# Patient Record
Sex: FEMALE | Race: WHITE | NOT HISPANIC OR LATINO | Employment: OTHER | URBAN - METROPOLITAN AREA
[De-identification: names, ages, dates, MRNs, and addresses within clinical notes are randomized per-mention and may not be internally consistent; named-entity substitution may affect disease eponyms.]

---

## 2017-02-02 ENCOUNTER — TRANSCRIBE ORDERS (OUTPATIENT)
Dept: MAMMOGRAPHY | Facility: HOSPITAL | Age: 67
End: 2017-02-02

## 2017-02-02 DIAGNOSIS — Z12.31 VISIT FOR SCREENING MAMMOGRAM: Primary | ICD-10-CM

## 2017-02-21 ENCOUNTER — HOSPITAL ENCOUNTER (OUTPATIENT)
Dept: MAMMOGRAPHY | Facility: HOSPITAL | Age: 67
Discharge: HOME | End: 2017-02-21
Admitting: FAMILY MEDICINE

## 2017-02-21 DIAGNOSIS — Z12.31 VISIT FOR SCREENING MAMMOGRAM: ICD-10-CM

## 2017-02-21 PROCEDURE — 77063 BREAST TOMOSYNTHESIS BI: CPT

## 2017-02-21 PROCEDURE — G0202 SCR MAMMO BI INCL CAD: HCPCS | Performed by: RADIOLOGY

## 2017-02-21 PROCEDURE — 77063 BREAST TOMOSYNTHESIS BI: CPT | Performed by: RADIOLOGY

## 2017-02-21 PROCEDURE — G0202 SCR MAMMO BI INCL CAD: HCPCS

## 2017-08-31 ENCOUNTER — RESULTS ENCOUNTER (OUTPATIENT)
Dept: INTERNAL MEDICINE | Facility: CLINIC | Age: 67
End: 2017-08-31

## 2017-08-31 ENCOUNTER — OFFICE VISIT (OUTPATIENT)
Dept: INTERNAL MEDICINE | Facility: CLINIC | Age: 67
End: 2017-08-31

## 2017-08-31 VITALS
WEIGHT: 185 LBS | BODY MASS INDEX: 28.04 KG/M2 | HEIGHT: 68 IN | DIASTOLIC BLOOD PRESSURE: 68 MMHG | SYSTOLIC BLOOD PRESSURE: 120 MMHG | HEART RATE: 66 BPM | OXYGEN SATURATION: 98 %

## 2017-08-31 DIAGNOSIS — Z12.11 SCREENING FOR COLON CANCER: ICD-10-CM

## 2017-08-31 DIAGNOSIS — E11.40 CONTROLLED TYPE 2 DIABETES MELLITUS WITH DIABETIC NEUROPATHY, WITHOUT LONG-TERM CURRENT USE OF INSULIN (HCC): ICD-10-CM

## 2017-08-31 DIAGNOSIS — N90.7 LABIAL CYST: ICD-10-CM

## 2017-08-31 DIAGNOSIS — E03.9 HYPOTHYROIDISM, UNSPECIFIED TYPE: ICD-10-CM

## 2017-08-31 DIAGNOSIS — I10 ESSENTIAL HYPERTENSION: ICD-10-CM

## 2017-08-31 DIAGNOSIS — Z00.00 HEALTHCARE MAINTENANCE: ICD-10-CM

## 2017-08-31 DIAGNOSIS — K21.9 GASTROESOPHAGEAL REFLUX DISEASE WITHOUT ESOPHAGITIS: ICD-10-CM

## 2017-08-31 DIAGNOSIS — K21.9 GASTROESOPHAGEAL REFLUX DISEASE, ESOPHAGITIS PRESENCE NOT SPECIFIED: ICD-10-CM

## 2017-08-31 DIAGNOSIS — E78.2 MIXED HYPERLIPIDEMIA: ICD-10-CM

## 2017-08-31 DIAGNOSIS — B37.31 VULVOVAGINAL CANDIDIASIS: ICD-10-CM

## 2017-08-31 DIAGNOSIS — H66.005 RECURRENT ACUTE SUPPURATIVE OTITIS MEDIA WITHOUT SPONTANEOUS RUPTURE OF LEFT TYMPANIC MEMBRANE: Primary | ICD-10-CM

## 2017-08-31 DIAGNOSIS — E55.9 VITAMIN D INSUFFICIENCY: ICD-10-CM

## 2017-08-31 DIAGNOSIS — N95.1 MENOPAUSAL HOT FLUSHES: ICD-10-CM

## 2017-08-31 PROCEDURE — 99204 OFFICE O/P NEW MOD 45 MIN: CPT | Performed by: NURSE PRACTITIONER

## 2017-08-31 RX ORDER — AMLODIPINE BESYLATE 5 MG/1
5 TABLET ORAL DAILY
Qty: 90 TABLET | Refills: 1 | Status: SHIPPED | OUTPATIENT
Start: 2017-08-31 | End: 2018-01-25 | Stop reason: SDUPTHER

## 2017-08-31 RX ORDER — ESTRADIOL 1 MG/1
1 TABLET ORAL DAILY
Qty: 90 TABLET | Refills: 1 | Status: SHIPPED | OUTPATIENT
Start: 2017-08-31 | End: 2018-01-25 | Stop reason: SDUPTHER

## 2017-08-31 RX ORDER — FLUCONAZOLE 200 MG/1
TABLET ORAL
COMMUNITY
Start: 2017-06-26 | End: 2017-08-31 | Stop reason: SDUPTHER

## 2017-08-31 RX ORDER — TRIAMCINOLONE ACETONIDE 0.25 MG/G
CREAM TOPICAL
COMMUNITY
Start: 2017-08-18 | End: 2018-06-09 | Stop reason: SDUPTHER

## 2017-08-31 RX ORDER — PANTOPRAZOLE SODIUM 20 MG/1
TABLET, DELAYED RELEASE ORAL
COMMUNITY
Start: 2017-06-20 | End: 2017-08-31 | Stop reason: SDUPTHER

## 2017-08-31 RX ORDER — LATANOPROST 50 UG/ML
SOLUTION/ DROPS OPHTHALMIC
COMMUNITY
Start: 2017-07-24 | End: 2018-06-26

## 2017-08-31 RX ORDER — METFORMIN HYDROCHLORIDE 500 MG/1
1000 TABLET, EXTENDED RELEASE ORAL
Qty: 180 TABLET | Refills: 1 | Status: SHIPPED | OUTPATIENT
Start: 2017-08-31 | End: 2018-01-25 | Stop reason: SDUPTHER

## 2017-08-31 RX ORDER — LEVOTHYROXINE SODIUM 0.05 MG/1
TABLET ORAL
COMMUNITY
Start: 2017-06-20 | End: 2017-08-31 | Stop reason: SDUPTHER

## 2017-08-31 RX ORDER — FLUCONAZOLE 200 MG/1
200 TABLET ORAL ONCE
Qty: 1 TABLET | Refills: 1 | Status: SHIPPED | OUTPATIENT
Start: 2017-08-31 | End: 2017-08-31

## 2017-08-31 RX ORDER — LOSARTAN POTASSIUM AND HYDROCHLOROTHIAZIDE 12.5; 1 MG/1; MG/1
1 TABLET ORAL DAILY
Qty: 90 TABLET | Refills: 1 | Status: SHIPPED | OUTPATIENT
Start: 2017-08-31 | End: 2018-01-25 | Stop reason: SDUPTHER

## 2017-08-31 RX ORDER — AMLODIPINE BESYLATE 5 MG/1
TABLET ORAL
COMMUNITY
Start: 2017-06-20 | End: 2017-08-31 | Stop reason: SDUPTHER

## 2017-08-31 RX ORDER — NYSTATIN 100000 U/G
CREAM TOPICAL
COMMUNITY
Start: 2017-08-18 | End: 2018-05-11

## 2017-08-31 RX ORDER — PANTOPRAZOLE SODIUM 20 MG/1
20 TABLET, DELAYED RELEASE ORAL DAILY
Qty: 90 TABLET | Refills: 1 | Status: SHIPPED | OUTPATIENT
Start: 2017-08-31 | End: 2018-01-25 | Stop reason: SDUPTHER

## 2017-08-31 RX ORDER — CLOTRIMAZOLE 1 %
CREAM (GRAM) TOPICAL 2 TIMES DAILY
Qty: 40 G | Refills: 0 | Status: SHIPPED | OUTPATIENT
Start: 2017-08-31 | End: 2020-05-11

## 2017-08-31 RX ORDER — ESTRADIOL 1 MG/1
TABLET ORAL
COMMUNITY
Start: 2017-06-20 | End: 2017-08-31 | Stop reason: SDUPTHER

## 2017-08-31 RX ORDER — LEVOTHYROXINE SODIUM 0.05 MG/1
50 TABLET ORAL DAILY
Qty: 90 TABLET | Refills: 1 | Status: SHIPPED | OUTPATIENT
Start: 2017-08-31 | End: 2018-01-25 | Stop reason: SDUPTHER

## 2017-08-31 RX ORDER — AMOXICILLIN 500 MG/1
1000 CAPSULE ORAL 2 TIMES DAILY
Qty: 40 CAPSULE | Refills: 0 | Status: SHIPPED | OUTPATIENT
Start: 2017-08-31 | End: 2017-09-01 | Stop reason: SDUPTHER

## 2017-08-31 RX ORDER — BLOOD SUGAR DIAGNOSTIC
STRIP MISCELLANEOUS
COMMUNITY
Start: 2017-07-10 | End: 2018-06-27 | Stop reason: SDUPTHER

## 2017-08-31 RX ORDER — TIMOLOL MALEATE 5 MG/ML
SOLUTION/ DROPS OPHTHALMIC
COMMUNITY
Start: 2017-07-10

## 2017-08-31 RX ORDER — LOSARTAN POTASSIUM AND HYDROCHLOROTHIAZIDE 12.5; 1 MG/1; MG/1
TABLET ORAL
COMMUNITY
Start: 2017-06-20 | End: 2017-08-31 | Stop reason: SDUPTHER

## 2017-08-31 RX ORDER — METFORMIN HYDROCHLORIDE 500 MG/1
TABLET, EXTENDED RELEASE ORAL
COMMUNITY
Start: 2017-06-30 | End: 2018-06-09 | Stop reason: SDUPTHER

## 2017-09-01 ENCOUNTER — TELEPHONE (OUTPATIENT)
Dept: INTERNAL MEDICINE | Facility: CLINIC | Age: 67
End: 2017-09-01

## 2017-09-01 DIAGNOSIS — H66.005 RECURRENT ACUTE SUPPURATIVE OTITIS MEDIA WITHOUT SPONTANEOUS RUPTURE OF LEFT TYMPANIC MEMBRANE: ICD-10-CM

## 2017-09-01 LAB
25(OH)D3+25(OH)D2 SERPL-MCNC: 8.6 NG/ML
ALBUMIN SERPL-MCNC: 4.2 G/DL (ref 3.2–4.8)
ALBUMIN/GLOB SERPL: 1.6 G/DL (ref 1.5–2.5)
ALP SERPL-CCNC: 66 U/L (ref 25–100)
ALT SERPL-CCNC: 24 U/L (ref 7–40)
AST SERPL-CCNC: 19 U/L (ref 0–33)
BASOPHILS # BLD AUTO: 0.03 10*3/MM3 (ref 0–0.2)
BASOPHILS NFR BLD AUTO: 0.4 % (ref 0–1)
BILIRUB SERPL-MCNC: 0.6 MG/DL (ref 0.3–1.2)
BUN SERPL-MCNC: 12 MG/DL (ref 9–23)
BUN/CREAT SERPL: 20 (ref 7–25)
CALCIUM SERPL-MCNC: 9.4 MG/DL (ref 8.7–10.4)
CHLORIDE SERPL-SCNC: 106 MMOL/L (ref 99–109)
CHOLEST SERPL-MCNC: 267 MG/DL (ref 0–200)
CO2 SERPL-SCNC: 26 MMOL/L (ref 20–31)
CREAT SERPL-MCNC: 0.6 MG/DL (ref 0.6–1.3)
EOSINOPHIL # BLD AUTO: 0.45 10*3/MM3 (ref 0–0.3)
EOSINOPHIL NFR BLD AUTO: 6.6 % (ref 0–3)
ERYTHROCYTE [DISTWIDTH] IN BLOOD BY AUTOMATED COUNT: 12.8 % (ref 11.3–14.5)
GLOBULIN SER CALC-MCNC: 2.7 GM/DL
GLUCOSE SERPL-MCNC: 158 MG/DL (ref 70–100)
HCT VFR BLD AUTO: 41 % (ref 34.5–44)
HDLC SERPL-MCNC: 54 MG/DL (ref 40–60)
HGB BLD-MCNC: 14.1 G/DL (ref 11.5–15.5)
IMM GRANULOCYTES # BLD: 0.02 10*3/MM3 (ref 0–0.03)
IMM GRANULOCYTES NFR BLD: 0.3 % (ref 0–0.6)
LDLC SERPL CALC-MCNC: ABNORMAL MG/DL
LYMPHOCYTES # BLD AUTO: 1.7 10*3/MM3 (ref 0.6–4.8)
LYMPHOCYTES NFR BLD AUTO: 24.9 % (ref 24–44)
MCH RBC QN AUTO: 30.5 PG (ref 27–31)
MCHC RBC AUTO-ENTMCNC: 34.4 G/DL (ref 32–36)
MCV RBC AUTO: 88.7 FL (ref 80–99)
MONOCYTES # BLD AUTO: 0.46 10*3/MM3 (ref 0–1)
MONOCYTES NFR BLD AUTO: 6.7 % (ref 0–12)
NEUTROPHILS # BLD AUTO: 4.16 10*3/MM3 (ref 1.5–8.3)
NEUTROPHILS NFR BLD AUTO: 61.1 % (ref 41–71)
PLATELET # BLD AUTO: 263 10*3/MM3 (ref 150–450)
POTASSIUM SERPL-SCNC: 3.3 MMOL/L (ref 3.5–5.5)
PROT SERPL-MCNC: 6.9 G/DL (ref 5.7–8.2)
RBC # BLD AUTO: 4.62 10*6/MM3 (ref 3.89–5.14)
SODIUM SERPL-SCNC: 139 MMOL/L (ref 132–146)
TRIGL SERPL-MCNC: 437 MG/DL (ref 0–150)
TSH SERPL DL<=0.005 MIU/L-ACNC: 2.52 MIU/ML (ref 0.35–5.35)
VLDLC SERPL CALC-MCNC: ABNORMAL MG/DL
WBC # BLD AUTO: 6.82 10*3/MM3 (ref 3.5–10.8)

## 2017-09-01 RX ORDER — AMOXICILLIN 500 MG/1
1000 CAPSULE ORAL 2 TIMES DAILY
Qty: 40 CAPSULE | Refills: 0 | Status: SHIPPED | OUTPATIENT
Start: 2017-09-01 | End: 2017-09-11

## 2017-09-01 NOTE — TELEPHONE ENCOUNTER
PT WAS SEEN YESTERDAY BY JONATHAN AND WHEN SHE DID THE REFILL REQUESTS, THE AMOXICILLIN FOR THE EARACHE WAS ALL SENT TO [facility].  CAN THE ANTIBIOTIC BE CALLED INTO [facility] ON [address].

## 2017-09-01 NOTE — TELEPHONE ENCOUNTER
----- Message from ELIZABETH Burch sent at 9/1/2017  9:54 AM EDT -----  Please notify patient that he cholesterol and triglycerides are high, if she is not agreeable to a statin, I recommend she try red yeast rice 1200 mg twice daily and recheck in 3 months. She can buy OTC.  Also Vitamin D is very low. Advise 5000 units of D3 daily. Potassium is a little low, increase potassium rich foods (spinach, bananas, avacado, sweet potato, coconut water)  TSH is good

## 2017-09-18 ENCOUNTER — TELEPHONE (OUTPATIENT)
Dept: INTERNAL MEDICINE | Facility: CLINIC | Age: 67
End: 2017-09-18

## 2017-10-18 ENCOUNTER — OFFICE VISIT (OUTPATIENT)
Dept: OBSTETRICS AND GYNECOLOGY | Facility: CLINIC | Age: 67
End: 2017-10-18

## 2017-10-18 VITALS
HEIGHT: 67 IN | OXYGEN SATURATION: 97 % | RESPIRATION RATE: 14 BRPM | WEIGHT: 179 LBS | BODY MASS INDEX: 28.09 KG/M2 | DIASTOLIC BLOOD PRESSURE: 76 MMHG | SYSTOLIC BLOOD PRESSURE: 140 MMHG | HEART RATE: 60 BPM

## 2017-10-18 DIAGNOSIS — N90.7 INCLUSION CYST OF VULVA: Primary | ICD-10-CM

## 2017-10-18 PROCEDURE — 11470 EXC SKN H/P/P/U SMPL/NTRM: CPT | Performed by: NURSE PRACTITIONER

## 2017-10-18 PROCEDURE — 99213 OFFICE O/P EST LOW 20 MIN: CPT | Performed by: NURSE PRACTITIONER

## 2017-10-18 RX ORDER — FLUCONAZOLE 200 MG/1
TABLET ORAL
COMMUNITY
Start: 2017-09-01 | End: 2018-05-11

## 2017-10-18 RX ORDER — NYSTATIN 100000 U/G
CREAM TOPICAL 2 TIMES DAILY
Qty: 90 G | Refills: 2 | Status: SHIPPED | OUTPATIENT
Start: 2017-10-18 | End: 2018-06-09 | Stop reason: SDUPTHER

## 2017-10-18 NOTE — PROGRESS NOTES
[facility]      Pari Howell  [id number]  1950      Chief Complaint: Follow-up (Lesion on right labia)        History of present illness:  Pari Howell is a 67 y.o. year old female who is here today for c/o right labial lesion. She is a previous patient of Dr. Kelly Mullen, last seen in 2015. She reports having a cyst to her right labia for many years that was the size of a marble, stable, and observation was recommended. Over the last year, however, this cyst has been enlarging and is now the size of a golf ball. She denies pain, redness, or drainage in the area. She does have external vaginal itching, but this is general to the external genitalia, not only to this area. She has a long history of chronic external genital itching and a combination of triamcinolone and nystatin was helpful in the past. She was given diflucan for these symptoms by another provider recently, which helped some, but not as much as the topical treatments. She states she still has some triamcinolone at home, but is out of her nystatin cream. She has used Premarin cream in the past for her dryness symptoms, but this was too costly and was not continued.     Obstetric History:  OB History      Para Term  AB Living    5 4 4  1 4    SAB TAB Ectopic Multiple Live Births                   Menstrual History:     No LMP recorded (lmp unknown). Patient has had a hysterectomy.          Past Medical History:   Diagnosis Date   • Diabetes    • Dyspareunia, female    • Glaucoma    • H/O sexual problem    • High cholesterol    • History of abnormal cervical Pap smear    • Hypertension    • Hypertension    • Labial cyst    • Muscle weakness    • Thyroid disease    • Thyroid disorder    • Urinary incontinence    • Vaginal itching    • Yeast infection        Past Surgical History:   Procedure Laterality Date   • CARPAL TUNNEL RELEASE     • CHOLECYSTECTOMY     • HYSTERECTOMY     • OOPHORECTOMY     •  "TUBAL ABDOMINAL LIGATION  11/1979       MEDICATIONS: The current medication list was reviewed and reconciled.     Allergies:  has No Known Allergies.    Family History   Problem Relation Age of Onset   • Colon cancer Maternal Uncle    • Diabetes Mother    • Heart attack Mother    • Hypertension Mother    • Hyperlipidemia Mother    • Thyroid disease Mother    • Stroke Mother    • Cancer Sister      Brain   • Thyroid disease Sister    • Diabetes Brother    • Prostate cancer Brother    • Breast cancer Neg Hx    • Ovarian cancer Neg Hx        Review of Systems   Constitutional: Negative for appetite change, chills, fatigue, fever and unexpected weight change.   Respiratory: Negative for cough, shortness of breath and wheezing.    Cardiovascular: Negative for chest pain, palpitations and leg swelling.   Gastrointestinal: Negative for abdominal distention, abdominal pain, blood in stool, constipation, diarrhea, nausea and vomiting.   Endocrine: Negative.    Genitourinary: Positive for genital sores (large cyst to right labia, nontender, enlarging). Negative for dyspareunia, dysuria, frequency, hematuria, pelvic pain, urgency, vaginal bleeding, vaginal discharge and vaginal pain.   Musculoskeletal: Negative for arthralgias, gait problem and joint swelling.   Neurological: Negative for dizziness, seizures, syncope, weakness, light-headedness, numbness and headaches.   Hematological: Negative for adenopathy.   Psychiatric/Behavioral: Negative.        Physical Exam  Vital Signs: /76  Pulse 60  Resp 14  Ht 67\" (170.2 cm)  Wt 179 lb (81.2 kg)  LMP  (LMP Unknown)  SpO2 97%  Breastfeeding? No  BMI 28.04 kg/m2   General Appearance:  alert, cooperative, no apparent distress and appears stated age   Neurologic/Psychiatric: A&O x 3, gait steady, appropriate affect   HEENT:  Normocephalic, without obvious abnormality, mucous membranes moist   Neck: Supple, symmetrical, trachea midline, no adenopathy;  No thyromegaly, " masses, or tenderness   Back:   Symmetric, no curvature, ROM normal, no CVA tenderness   Lungs:   Clear to auscultation bilaterally; respirations regular, even, and unlabored bilaterally   Heart:  Regular rate and rhythm, no murmurs appreciated   Breasts:  deferred   Abdomen:   Soft, non-tender, non-distended and no organomegaly   Extremities: Normal, atraumatic; no clubbing, cyanosis, or edema    Skin: No rashes, ulcers, or suspicious lesions noted   Pelvic: External Genitalia  No skin lesions noted. Right labia markedly enlarged with palpable cyst approx 3-4 cm in size, nontender, feels to be fluid filled.   Vagina  is pale, atrophic.   Vaginal Cuff  Female Vaginal Cuff: smooth, intact and without visible lesions  Uterus  surgically absent  Ovaries  surgically absent bilaterallly  Parametria  smooth  Rectovaginal  Female rectovaginal: deferred       Procedure Note:  After discussion of procedure and obtaining consent, an incision and drainage of large right labial cyst was attempted. After beginning procedure, found to be consistent with an inclusion cyst and procedure was converted to a simple excision. Local lidocaine used for anesthetic to lower aspect of labia. 2 cm incision made to open tissue and inclusion cyst pulled forward using hemostat. Tissue was placed in appropriate container and labeled at the bedside. Specimen prepared for lab evaluation. Bleeding was minimal and hemostasis was achieved with silver nitrate. Precautions and home wound care reviewed with patient. She tolerated the procedure well.     Assessment and Plan:    Pari was seen today for follow-up.    Diagnoses and all orders for this visit:    Inclusion cyst of vulva  -     Tissue Pathology Exam - Tissue, Vulva; Future  -     Tissue Pathology Exam - Tissue, Vulva    Other orders  -     nystatin (MYCOSTATIN) 009857 UNIT/GM cream; Apply  topically 2 (Two) Times a Day.        Simple inclusion cyst excision performed in office today for  enlarging cyst at right vulva. Pari was given home precautions and wound care instructions. She was educated that this tissue could recur and if that happens, I will recommend outpatient excision by one of our physicians. This tissue appears benign, but will be sent to pathology for confirmation. She will be notified of results upon their return. Once area heals she may return to mixing topical nystatin with triamcinolone for treatment of external genital itching and irritation.     Return for annual exam or PRN.      Fide Ho, ELIZABETH      Note: Speech recognition transcription software was used to dictate portions of this document.  An attempt at proofreading has been made though minor errors in transcription may still be present.  Please do not hesitate to call our office with any questions.

## 2018-01-25 ENCOUNTER — OFFICE VISIT (OUTPATIENT)
Dept: INTERNAL MEDICINE | Facility: CLINIC | Age: 68
End: 2018-01-25

## 2018-01-25 VITALS
DIASTOLIC BLOOD PRESSURE: 64 MMHG | OXYGEN SATURATION: 98 % | HEIGHT: 68 IN | RESPIRATION RATE: 16 BRPM | WEIGHT: 184 LBS | BODY MASS INDEX: 27.89 KG/M2 | HEART RATE: 83 BPM | SYSTOLIC BLOOD PRESSURE: 128 MMHG

## 2018-01-25 DIAGNOSIS — I10 ESSENTIAL HYPERTENSION: ICD-10-CM

## 2018-01-25 DIAGNOSIS — E11.40 CONTROLLED TYPE 2 DIABETES MELLITUS WITH DIABETIC NEUROPATHY, WITHOUT LONG-TERM CURRENT USE OF INSULIN (HCC): ICD-10-CM

## 2018-01-25 DIAGNOSIS — N95.1 MENOPAUSAL HOT FLUSHES: ICD-10-CM

## 2018-01-25 DIAGNOSIS — E03.9 HYPOTHYROIDISM, UNSPECIFIED TYPE: ICD-10-CM

## 2018-01-25 DIAGNOSIS — K21.9 GASTROESOPHAGEAL REFLUX DISEASE WITHOUT ESOPHAGITIS: ICD-10-CM

## 2018-01-25 PROCEDURE — 99214 OFFICE O/P EST MOD 30 MIN: CPT | Performed by: NURSE PRACTITIONER

## 2018-01-25 RX ORDER — INFLUENZA A VIRUS A/MICHIGAN/45/2015 X-275 (H1N1) ANTIGEN (FORMALDEHYDE INACTIVATED), INFLUENZA A VIRUS A/SINGAPORE/INFIMH-16-0019/2016 IVR-186 (H3N2) ANTIGEN (FORMALDEHYDE INACTIVATED), AND INFLUENZA B VIRUS B/MARYLAND/15/2016 BX-69A (A B/COLORADO/6/2017-LIKE VIRUS) ANTIGEN (FORMALDEHYDE INACTIVATED) 60; 60; 60 UG/.5ML; UG/.5ML; UG/.5ML
INJECTION, SUSPENSION INTRAMUSCULAR ONCE
COMMUNITY
Start: 2017-10-20 | End: 2019-05-06

## 2018-01-25 RX ORDER — LOSARTAN POTASSIUM AND HYDROCHLOROTHIAZIDE 12.5; 1 MG/1; MG/1
1 TABLET ORAL DAILY
Qty: 14 TABLET | Refills: 0 | Status: SHIPPED | OUTPATIENT
Start: 2018-01-25 | End: 2018-06-09 | Stop reason: SDUPTHER

## 2018-01-25 RX ORDER — AMLODIPINE BESYLATE 5 MG/1
5 TABLET ORAL DAILY
Qty: 14 TABLET | Refills: 0 | Status: SHIPPED | OUTPATIENT
Start: 2018-01-25 | End: 2018-06-09 | Stop reason: SDUPTHER

## 2018-01-25 RX ORDER — LEVOTHYROXINE SODIUM 0.05 MG/1
50 TABLET ORAL DAILY
Qty: 14 TABLET | Refills: 0 | Status: SHIPPED | OUTPATIENT
Start: 2018-01-25 | End: 2018-06-09 | Stop reason: SDUPTHER

## 2018-01-25 RX ORDER — PANTOPRAZOLE SODIUM 20 MG/1
20 TABLET, DELAYED RELEASE ORAL DAILY
Qty: 14 TABLET | Refills: 0 | Status: SHIPPED | OUTPATIENT
Start: 2018-01-25 | End: 2018-06-09 | Stop reason: SDUPTHER

## 2018-01-25 RX ORDER — ESTRADIOL 1 MG/1
1 TABLET ORAL DAILY
Qty: 14 TABLET | Refills: 0 | Status: SHIPPED | OUTPATIENT
Start: 2018-01-25 | End: 2018-06-09 | Stop reason: SDUPTHER

## 2018-01-25 RX ORDER — METFORMIN HYDROCHLORIDE 500 MG/1
1000 TABLET, EXTENDED RELEASE ORAL
Qty: 28 TABLET | Refills: 0 | Status: SHIPPED | OUTPATIENT
Start: 2018-01-25 | End: 2018-06-09 | Stop reason: SDUPTHER

## 2018-04-05 ENCOUNTER — TRANSCRIBE ORDERS (OUTPATIENT)
Dept: ADMINISTRATIVE | Facility: HOSPITAL | Age: 68
End: 2018-04-05

## 2018-04-05 DIAGNOSIS — Z12.31 VISIT FOR SCREENING MAMMOGRAM: Primary | ICD-10-CM

## 2018-05-10 ENCOUNTER — HOSPITAL ENCOUNTER (OUTPATIENT)
Dept: MAMMOGRAPHY | Facility: HOSPITAL | Age: 68
Discharge: HOME | End: 2018-05-10
Attending: INTERNAL MEDICINE | Admitting: INTERNAL MEDICINE

## 2018-05-10 DIAGNOSIS — Z12.31 VISIT FOR SCREENING MAMMOGRAM: ICD-10-CM

## 2018-05-10 PROCEDURE — 77067 SCR MAMMO BI INCL CAD: CPT

## 2018-05-10 PROCEDURE — 77063 BREAST TOMOSYNTHESIS BI: CPT | Performed by: RADIOLOGY

## 2018-05-10 PROCEDURE — 77067 SCR MAMMO BI INCL CAD: CPT | Performed by: RADIOLOGY

## 2018-05-10 PROCEDURE — 77063 BREAST TOMOSYNTHESIS BI: CPT

## 2018-06-09 ENCOUNTER — OFFICE VISIT (OUTPATIENT)
Dept: INTERNAL MEDICINE | Facility: CLINIC | Age: 68
End: 2018-06-09

## 2018-06-09 VITALS
DIASTOLIC BLOOD PRESSURE: 58 MMHG | BODY MASS INDEX: 28.34 KG/M2 | SYSTOLIC BLOOD PRESSURE: 120 MMHG | OXYGEN SATURATION: 96 % | HEIGHT: 68 IN | WEIGHT: 187 LBS | HEART RATE: 78 BPM | RESPIRATION RATE: 16 BRPM

## 2018-06-09 DIAGNOSIS — N89.8 VAGINAL ITCHING: ICD-10-CM

## 2018-06-09 DIAGNOSIS — N95.1 MENOPAUSAL HOT FLUSHES: ICD-10-CM

## 2018-06-09 DIAGNOSIS — E11.9 TYPE 2 DIABETES MELLITUS WITHOUT COMPLICATION, WITHOUT LONG-TERM CURRENT USE OF INSULIN (HCC): Primary | ICD-10-CM

## 2018-06-09 DIAGNOSIS — E03.9 HYPOTHYROIDISM, UNSPECIFIED TYPE: ICD-10-CM

## 2018-06-09 DIAGNOSIS — I10 ESSENTIAL HYPERTENSION: ICD-10-CM

## 2018-06-09 DIAGNOSIS — K21.9 GASTROESOPHAGEAL REFLUX DISEASE WITHOUT ESOPHAGITIS: ICD-10-CM

## 2018-06-09 LAB — HBA1C MFR BLD: 6.2 %

## 2018-06-09 PROCEDURE — 99214 OFFICE O/P EST MOD 30 MIN: CPT | Performed by: NURSE PRACTITIONER

## 2018-06-09 PROCEDURE — 83036 HEMOGLOBIN GLYCOSYLATED A1C: CPT | Performed by: NURSE PRACTITIONER

## 2018-06-09 RX ORDER — METFORMIN HYDROCHLORIDE 500 MG/1
1000 TABLET, EXTENDED RELEASE ORAL
Qty: 28 TABLET | Refills: 0 | Status: SHIPPED | OUTPATIENT
Start: 2018-06-09 | End: 2018-06-25 | Stop reason: SDUPTHER

## 2018-06-09 RX ORDER — NYSTATIN 100000 U/G
CREAM TOPICAL 2 TIMES DAILY
Qty: 90 G | Refills: 2 | Status: SHIPPED | OUTPATIENT
Start: 2018-06-09 | End: 2020-07-22 | Stop reason: SDUPTHER

## 2018-06-09 RX ORDER — AMLODIPINE BESYLATE 5 MG/1
5 TABLET ORAL DAILY
Qty: 14 TABLET | Refills: 0 | Status: SHIPPED | OUTPATIENT
Start: 2018-06-09 | End: 2018-06-25 | Stop reason: SDUPTHER

## 2018-06-09 RX ORDER — PANTOPRAZOLE SODIUM 20 MG/1
20 TABLET, DELAYED RELEASE ORAL DAILY
Qty: 14 TABLET | Refills: 0 | Status: SHIPPED | OUTPATIENT
Start: 2018-06-09 | End: 2018-06-25 | Stop reason: SDUPTHER

## 2018-06-09 RX ORDER — ESTRADIOL 1 MG/1
1 TABLET ORAL DAILY
Qty: 14 TABLET | Refills: 0 | Status: SHIPPED | OUTPATIENT
Start: 2018-06-09 | End: 2018-06-25 | Stop reason: SDUPTHER

## 2018-06-09 RX ORDER — TRIAMCINOLONE ACETONIDE 0.25 MG/G
CREAM TOPICAL 3 TIMES DAILY
Qty: 80 G | Refills: 4 | Status: SHIPPED | OUTPATIENT
Start: 2018-06-09 | End: 2020-01-08 | Stop reason: SDUPTHER

## 2018-06-09 RX ORDER — TRIAMCINOLONE ACETONIDE 0.25 MG/G
CREAM TOPICAL 3 TIMES DAILY
Qty: 80 G | Refills: 4 | Status: SHIPPED | OUTPATIENT
Start: 2018-06-09 | End: 2018-06-09 | Stop reason: SDUPTHER

## 2018-06-09 RX ORDER — LEVOTHYROXINE SODIUM 0.05 MG/1
50 TABLET ORAL DAILY
Qty: 14 TABLET | Refills: 0 | Status: SHIPPED | OUTPATIENT
Start: 2018-06-09 | End: 2018-06-25 | Stop reason: SDUPTHER

## 2018-06-09 RX ORDER — LOSARTAN POTASSIUM AND HYDROCHLOROTHIAZIDE 12.5; 1 MG/1; MG/1
1 TABLET ORAL DAILY
Qty: 14 TABLET | Refills: 0 | Status: SHIPPED | OUTPATIENT
Start: 2018-06-09 | End: 2018-06-25 | Stop reason: SDUPTHER

## 2018-06-09 NOTE — PROGRESS NOTES
CHIEF COMPLAINT  Diabetes (Pt. says she thinks the metformin isn't agreeing with her, cut down to one, pt says levels have been running around 130-150); Hypothyroidism; Hypertension; Hot Flashes; Heartburn; Vaginal Itching; and Med Refill      HPI  Pari Howell is a 68 y.o. female is here today for follow-up for T2DM, hypothyroidism, HTN, menopausal hot flashes, GERD, vaginal itching and needs refills on medications.    T2DM  Controlled, A1C=6.2; currently taking Metformin  mg daily; was taking BID, but causing a lot of GI sx, so she decreased to QD herself; denies hypoglycemic episodes and increased urination, thirst, hunger; AM glucose readings are in 130 range;     Hypothyroidism  Stable, last TSH=2.517; currently taking 50 mcg daily; denies c/o or s/e of medication    HTN  Controlled, HL=625/58; currently taking amlodipine 5 mg and losartan-HCTZ 100-12.5 mg daily; denies SOA, chest pain, h/a, dizziness, swelling in BLE    Hot flashes d/t Menopause  Stable, currently taking Estradiol 1 mg daily; works well; denies hot flashes     GERD  Controlled, currently taking pantoprazole EC 20 mg daily; denies heartburn, epigastric pain, reflux, nausea    Vaginal itching, chronic  Uses triamcinolone 0.025% cream TID prn for chronic vaginal itching; has been using this for many years and it works well; she does report having atrophic vaginitis for which this was prescribed as she is already taking oral HRT      Past Medical History:   Diagnosis Date   • Diabetes    • Dyspareunia, female    • Glaucoma    • H/O sexual problem    • High cholesterol    • History of abnormal cervical Pap smear    • Hypertension    • Hypertension    • Labial cyst    • Muscle weakness    • Thyroid disease    • Thyroid disorder    • Urinary incontinence    • Vaginal itching    • Yeast infection        Past Surgical History:   Procedure Laterality Date   • CARPAL TUNNEL RELEASE  2007   • CHOLECYSTECTOMY  1991   • HYSTERECTOMY  2007   •  "OOPHORECTOMY     • TUBAL ABDOMINAL LIGATION  11/1979       Family History   Problem Relation Age of Onset   • Colon cancer Maternal Uncle    • Diabetes Mother    • Heart attack Mother    • Hypertension Mother    • Hyperlipidemia Mother    • Thyroid disease Mother    • Stroke Mother    • Cancer Sister         Brain   • Thyroid disease Sister    • Diabetes Brother    • Prostate cancer Brother    • Breast cancer Neg Hx    • Ovarian cancer Neg Hx        Social History     Social History   • Marital status:      Spouse name: N/A   • Number of children: N/A   • Years of education: N/A     Occupational History   • Not on file.     Social History Main Topics   • Smoking status: Never Smoker   • Smokeless tobacco: Never Used   • Alcohol use No   • Drug use: No   • Sexual activity: Yes     Partners: Male     Birth control/ protection: Surgical     Other Topics Concern   • Not on file     Social History Narrative   • No narrative on file       The following portions of the patient's history were reviewed and updated as appropriate: allergies, current medications, past family history, past medical history, past social history, past surgical history and problem list.    ROS  Review of Systems   Constitutional: Negative for activity change, appetite change and fatigue.   Respiratory: Negative for chest tightness, shortness of breath and wheezing.    Cardiovascular: Negative for chest pain, palpitations and leg swelling.   Endocrine: Negative for cold intolerance, heat intolerance, polydipsia, polyphagia and polyuria.   Genitourinary: Negative for vaginal discharge.        Vaginal itching   Neurological: Negative for dizziness and headaches.   All other systems reviewed and are negative.      /58   Pulse 78   Resp 16   Ht 172.7 cm (68\")   Wt 84.8 kg (187 lb)   LMP  (LMP Unknown)   SpO2 96%   Breastfeeding? No   BMI 28.43 kg/m²     PHYSICAL EXAM  Physical Exam   Constitutional: She is oriented to person, place, " and time. She appears well-developed and well-nourished.   HENT:   Head: Normocephalic and atraumatic.   Eyes: Conjunctivae are normal.   Neck: Trachea normal and normal range of motion. Neck supple. No JVD present. No thyromegaly present.   Cardiovascular: Normal rate, regular rhythm and normal heart sounds.    No murmur heard.  No swelling in BLE   Pulmonary/Chest: Effort normal and breath sounds normal.   Neurological: She is alert and oriented to person, place, and time.   Skin: Skin is warm, dry and intact.   Vitals reviewed.      Lab Results   Component Value Date    HGBA1C 6.2 06/09/2018       ASSESSMENT/PLAN    1. Type 2 diabetes mellitus without complication, without long-term current use of insulin  - POC Glycosylated Hemoglobin (Hb A1C)  -continue Metformin  mg daily with breakfast  -continue to monitor glucose at home    2. Vaginal itching  -may continue triamcinolone cream  - triamcinolone (KENALOG) 0.025 % cream; Apply  topically 3 (Three) Times a Day.  Dispense: 80 g; Refill: 4  -also rx given for--  -Nystatin 100,000 unit/gm cream, apply BID prn vaginal itching    3. Hypothyroidism, unspecified type  -continue at current dose  - levothyroxine (SYNTHROID, LEVOTHROID) 50 MCG tablet; Take 1 tablet by mouth Daily.  Dispense: 14 tablet; Refill: 0  -will recheck TSH at next visit for Medicare Wellness     4. Essential hypertension  -continue both meds at current dose  - amLODIPine (NORVASC) 5 MG tablet; Take 1 tablet by mouth Daily.  Dispense: 14 tablet; Refill: 0  - losartan-hydrochlorothiazide (HYZAAR) 100-12.5 MG per tablet; Take 1 tablet by mouth Daily.  Dispense: 14 tablet; Refill: 0  -notify if increase in BP readings    5. Menopausal hot flushes  -may continue--  - estradiol (ESTRACE) 1 MG tablet; Take 1 tablet by mouth Daily.  Dispense: 14 tablet; Refill: 0    6. Gastroesophageal reflux disease without esophagitis  -continue at current dose  - pantoprazole (PROTONIX) 20 MG EC tablet; Take 1  tablet by mouth Daily.  Dispense: 14 tablet; Refill: 0      Plan of care reviewed with patient at the conclusion of today's visit. Education was provided in regards to diagnosis, management and any prescribed or recommended OTC medications.  Patient verbalizes Understanding of and agreement with management plan.    FOLLOW-UP  6 week(s) Medicare Wellness with fasting labs, ECG    RTC as needed      ELIZABETH Love  06/09/2018

## 2018-06-11 ENCOUNTER — TELEPHONE (OUTPATIENT)
Dept: INTERNAL MEDICINE | Facility: CLINIC | Age: 68
End: 2018-06-11

## 2018-06-25 DIAGNOSIS — K21.9 GASTROESOPHAGEAL REFLUX DISEASE WITHOUT ESOPHAGITIS: ICD-10-CM

## 2018-06-25 DIAGNOSIS — E03.9 HYPOTHYROIDISM, UNSPECIFIED TYPE: ICD-10-CM

## 2018-06-25 DIAGNOSIS — E11.40 CONTROLLED TYPE 2 DIABETES MELLITUS WITH DIABETIC NEUROPATHY, WITHOUT LONG-TERM CURRENT USE OF INSULIN (HCC): ICD-10-CM

## 2018-06-25 DIAGNOSIS — I10 ESSENTIAL HYPERTENSION: ICD-10-CM

## 2018-06-25 DIAGNOSIS — N95.1 MENOPAUSAL HOT FLUSHES: ICD-10-CM

## 2018-06-26 DIAGNOSIS — K21.9 GASTROESOPHAGEAL REFLUX DISEASE WITHOUT ESOPHAGITIS: ICD-10-CM

## 2018-06-26 DIAGNOSIS — E03.9 HYPOTHYROIDISM, UNSPECIFIED TYPE: ICD-10-CM

## 2018-06-26 DIAGNOSIS — I10 ESSENTIAL HYPERTENSION: ICD-10-CM

## 2018-06-26 DIAGNOSIS — E11.40 CONTROLLED TYPE 2 DIABETES MELLITUS WITH DIABETIC NEUROPATHY, WITHOUT LONG-TERM CURRENT USE OF INSULIN (HCC): ICD-10-CM

## 2018-06-26 DIAGNOSIS — H40.89 OTHER GLAUCOMA OF BOTH EYES: Primary | ICD-10-CM

## 2018-06-26 DIAGNOSIS — N95.1 MENOPAUSAL HOT FLUSHES: ICD-10-CM

## 2018-06-26 RX ORDER — METFORMIN HYDROCHLORIDE 500 MG/1
1000 TABLET, EXTENDED RELEASE ORAL
Qty: 180 TABLET | Refills: 3 | Status: SHIPPED | OUTPATIENT
Start: 2018-06-26 | End: 2018-06-27 | Stop reason: SDUPTHER

## 2018-06-26 RX ORDER — LOSARTAN POTASSIUM AND HYDROCHLOROTHIAZIDE 12.5; 1 MG/1; MG/1
1 TABLET ORAL DAILY
Qty: 90 TABLET | Refills: 3 | Status: SHIPPED | OUTPATIENT
Start: 2018-06-26 | End: 2018-06-27 | Stop reason: SDUPTHER

## 2018-06-26 RX ORDER — LATANOPROST 50 UG/ML
1 SOLUTION/ DROPS OPHTHALMIC DAILY
Qty: 7.5 ML | Refills: 3 | Status: SHIPPED | OUTPATIENT
Start: 2018-06-26

## 2018-06-26 RX ORDER — AMLODIPINE BESYLATE 5 MG/1
TABLET ORAL
Qty: 90 TABLET | Refills: 3 | Status: SHIPPED | OUTPATIENT
Start: 2018-06-26 | End: 2018-06-27 | Stop reason: SDUPTHER

## 2018-06-26 RX ORDER — LOSARTAN POTASSIUM AND HYDROCHLOROTHIAZIDE 12.5; 1 MG/1; MG/1
TABLET ORAL
Qty: 14 TABLET | Refills: 0 | Status: SHIPPED | OUTPATIENT
Start: 2018-06-26 | End: 2018-06-26

## 2018-06-26 RX ORDER — ESTRADIOL 1 MG/1
TABLET ORAL
Qty: 14 TABLET | Refills: 0 | Status: SHIPPED | OUTPATIENT
Start: 2018-06-26 | End: 2018-06-26

## 2018-06-26 RX ORDER — PANTOPRAZOLE SODIUM 20 MG/1
TABLET, DELAYED RELEASE ORAL
Qty: 14 TABLET | Refills: 0 | Status: SHIPPED | OUTPATIENT
Start: 2018-06-26 | End: 2018-06-26

## 2018-06-26 RX ORDER — ESTRADIOL 1 MG/1
1 TABLET ORAL DAILY
Qty: 90 TABLET | Refills: 3 | Status: SHIPPED | OUTPATIENT
Start: 2018-06-26 | End: 2019-05-06 | Stop reason: DRUGHIGH

## 2018-06-26 RX ORDER — PANTOPRAZOLE SODIUM 20 MG/1
20 TABLET, DELAYED RELEASE ORAL DAILY
Qty: 90 TABLET | Refills: 3 | Status: SHIPPED | OUTPATIENT
Start: 2018-06-26 | End: 2018-06-27 | Stop reason: SDUPTHER

## 2018-06-26 RX ORDER — LEVOTHYROXINE SODIUM 0.05 MG/1
50 TABLET ORAL DAILY
Qty: 90 TABLET | Refills: 3 | Status: SHIPPED | OUTPATIENT
Start: 2018-06-26 | End: 2018-06-27 | Stop reason: SDUPTHER

## 2018-06-26 RX ORDER — METFORMIN HYDROCHLORIDE 500 MG/1
TABLET, EXTENDED RELEASE ORAL
Qty: 28 TABLET | Refills: 0 | Status: SHIPPED | OUTPATIENT
Start: 2018-06-26 | End: 2018-06-26

## 2018-06-26 RX ORDER — LEVOTHYROXINE SODIUM 0.05 MG/1
TABLET ORAL
Qty: 14 TABLET | Refills: 0 | Status: SHIPPED | OUTPATIENT
Start: 2018-06-26 | End: 2018-06-26

## 2018-06-27 ENCOUNTER — OFFICE VISIT (OUTPATIENT)
Dept: INTERNAL MEDICINE | Facility: CLINIC | Age: 68
End: 2018-06-27

## 2018-06-27 VITALS
OXYGEN SATURATION: 96 % | SYSTOLIC BLOOD PRESSURE: 118 MMHG | WEIGHT: 188 LBS | HEIGHT: 68 IN | DIASTOLIC BLOOD PRESSURE: 70 MMHG | HEART RATE: 62 BPM | BODY MASS INDEX: 28.49 KG/M2

## 2018-06-27 DIAGNOSIS — Z00.00 HEALTHCARE MAINTENANCE: ICD-10-CM

## 2018-06-27 DIAGNOSIS — E78.00 HIGH CHOLESTEROL: Primary | ICD-10-CM

## 2018-06-27 DIAGNOSIS — E03.9 HYPOTHYROIDISM, UNSPECIFIED TYPE: ICD-10-CM

## 2018-06-27 DIAGNOSIS — Z86.2 HISTORY OF IRON DEFICIENCY ANEMIA: ICD-10-CM

## 2018-06-27 DIAGNOSIS — I10 ESSENTIAL HYPERTENSION: ICD-10-CM

## 2018-06-27 DIAGNOSIS — E11.40 CONTROLLED TYPE 2 DIABETES MELLITUS WITH DIABETIC NEUROPATHY, WITHOUT LONG-TERM CURRENT USE OF INSULIN (HCC): ICD-10-CM

## 2018-06-27 DIAGNOSIS — E55.9 VITAMIN D DEFICIENCY: ICD-10-CM

## 2018-06-27 DIAGNOSIS — K21.9 GASTROESOPHAGEAL REFLUX DISEASE WITHOUT ESOPHAGITIS: ICD-10-CM

## 2018-06-27 PROCEDURE — 93000 ELECTROCARDIOGRAM COMPLETE: CPT | Performed by: NURSE PRACTITIONER

## 2018-06-27 PROCEDURE — G0439 PPPS, SUBSEQ VISIT: HCPCS | Performed by: NURSE PRACTITIONER

## 2018-06-27 RX ORDER — LOSARTAN POTASSIUM AND HYDROCHLOROTHIAZIDE 12.5; 1 MG/1; MG/1
1 TABLET ORAL DAILY
Qty: 90 TABLET | Refills: 3 | Status: SHIPPED | OUTPATIENT
Start: 2018-06-27 | End: 2019-05-06 | Stop reason: SDUPTHER

## 2018-06-27 RX ORDER — PANTOPRAZOLE SODIUM 20 MG/1
20 TABLET, DELAYED RELEASE ORAL DAILY
Qty: 90 TABLET | Refills: 3 | Status: SHIPPED | OUTPATIENT
Start: 2018-06-27 | End: 2019-05-06 | Stop reason: SDUPTHER

## 2018-06-27 RX ORDER — BLOOD SUGAR DIAGNOSTIC
STRIP MISCELLANEOUS
Qty: 50 EACH | Refills: 5 | Status: SHIPPED | OUTPATIENT
Start: 2018-06-27 | End: 2019-05-06

## 2018-06-27 RX ORDER — METFORMIN HYDROCHLORIDE 500 MG/1
1000 TABLET, EXTENDED RELEASE ORAL
Qty: 180 TABLET | Refills: 3 | Status: SHIPPED | OUTPATIENT
Start: 2018-06-27 | End: 2019-05-06 | Stop reason: SDUPTHER

## 2018-06-27 RX ORDER — GEMFIBROZIL 600 MG/1
600 TABLET, FILM COATED ORAL
Qty: 180 TABLET | Refills: 4 | Status: SHIPPED | OUTPATIENT
Start: 2018-06-27 | End: 2019-05-06

## 2018-06-27 RX ORDER — LEVOTHYROXINE SODIUM 0.05 MG/1
50 TABLET ORAL DAILY
Qty: 90 TABLET | Refills: 3 | Status: SHIPPED | OUTPATIENT
Start: 2018-06-27 | End: 2019-05-08 | Stop reason: SDUPTHER

## 2018-06-27 RX ORDER — AMLODIPINE BESYLATE 5 MG/1
5 TABLET ORAL DAILY
Qty: 90 TABLET | Refills: 3 | Status: SHIPPED | OUTPATIENT
Start: 2018-06-27 | End: 2019-05-06 | Stop reason: SDUPTHER

## 2018-06-28 LAB
25(OH)D3+25(OH)D2 SERPL-MCNC: 13.5 NG/ML
ALBUMIN SERPL-MCNC: 4.48 G/DL (ref 3.2–4.8)
ALBUMIN/GLOB SERPL: 1.8 G/DL (ref 1.5–2.5)
ALP SERPL-CCNC: 70 U/L (ref 25–100)
ALT SERPL-CCNC: 28 U/L (ref 7–40)
AST SERPL-CCNC: 22 U/L (ref 0–33)
BASOPHILS # BLD AUTO: 0.05 10*3/MM3 (ref 0–0.2)
BASOPHILS NFR BLD AUTO: 0.8 % (ref 0–1)
BILIRUB SERPL-MCNC: 0.7 MG/DL (ref 0.3–1.2)
BUN SERPL-MCNC: 10 MG/DL (ref 9–23)
BUN/CREAT SERPL: 17.2 (ref 7–25)
CALCIUM SERPL-MCNC: 9.3 MG/DL (ref 8.7–10.4)
CHLORIDE SERPL-SCNC: 101 MMOL/L (ref 99–109)
CHOLEST SERPL-MCNC: 257 MG/DL (ref 0–200)
CO2 SERPL-SCNC: 29 MMOL/L (ref 20–31)
CREAT SERPL-MCNC: 0.58 MG/DL (ref 0.6–1.3)
EOSINOPHIL # BLD AUTO: 0.39 10*3/MM3 (ref 0–0.3)
EOSINOPHIL NFR BLD AUTO: 6 % (ref 0–3)
ERYTHROCYTE [DISTWIDTH] IN BLOOD BY AUTOMATED COUNT: 12.6 % (ref 11.3–14.5)
FERRITIN SERPL-MCNC: 84 NG/ML (ref 10–291)
GLOBULIN SER CALC-MCNC: 2.5 GM/DL
GLUCOSE SERPL-MCNC: 131 MG/DL (ref 70–100)
HCT VFR BLD AUTO: 43.6 % (ref 34.5–44)
HCV AB S/CO SERPL IA: <0.1 S/CO RATIO (ref 0–0.9)
HDLC SERPL-MCNC: 55 MG/DL (ref 40–60)
HGB BLD-MCNC: 14.8 G/DL (ref 11.5–15.5)
IMM GRANULOCYTES # BLD: 0.02 10*3/MM3 (ref 0–0.03)
IMM GRANULOCYTES NFR BLD: 0.3 % (ref 0–0.6)
IRON SATN MFR SERPL: 25 % (ref 15–50)
IRON SERPL-MCNC: 92 MCG/DL (ref 50–175)
LDLC SERPL CALC-MCNC: ABNORMAL MG/DL
LYMPHOCYTES # BLD AUTO: 1.61 10*3/MM3 (ref 0.6–4.8)
LYMPHOCYTES NFR BLD AUTO: 24.8 % (ref 24–44)
MCH RBC QN AUTO: 31.3 PG (ref 27–31)
MCHC RBC AUTO-ENTMCNC: 33.9 G/DL (ref 32–36)
MCV RBC AUTO: 92.2 FL (ref 80–99)
MONOCYTES # BLD AUTO: 0.4 10*3/MM3 (ref 0–1)
MONOCYTES NFR BLD AUTO: 6.2 % (ref 0–12)
NEUTROPHILS # BLD AUTO: 4.01 10*3/MM3 (ref 1.5–8.3)
NEUTROPHILS NFR BLD AUTO: 61.9 % (ref 41–71)
PLATELET # BLD AUTO: 263 10*3/MM3 (ref 150–450)
POTASSIUM SERPL-SCNC: 4.1 MMOL/L (ref 3.5–5.5)
PROT SERPL-MCNC: 7 G/DL (ref 5.7–8.2)
RBC # BLD AUTO: 4.73 10*6/MM3 (ref 3.89–5.14)
SODIUM SERPL-SCNC: 138 MMOL/L (ref 132–146)
TIBC SERPL-MCNC: 361 MCG/DL (ref 250–450)
TRIGL SERPL-MCNC: 437 MG/DL (ref 0–150)
TSH SERPL DL<=0.005 MIU/L-ACNC: 3.02 MIU/ML (ref 0.35–5.35)
UIBC SERPL-MCNC: 269 MCG/DL
VLDLC SERPL CALC-MCNC: ABNORMAL MG/DL
WBC # BLD AUTO: 6.48 10*3/MM3 (ref 3.5–10.8)

## 2018-06-29 DIAGNOSIS — E55.9 VITAMIN D DEFICIENCY: Primary | ICD-10-CM

## 2018-06-29 RX ORDER — ERGOCALCIFEROL 1.25 MG/1
50000 CAPSULE ORAL WEEKLY
Qty: 12 CAPSULE | Refills: 0 | Status: SHIPPED | OUTPATIENT
Start: 2018-06-29 | End: 2018-06-29

## 2018-06-29 RX ORDER — ERGOCALCIFEROL 1.25 MG/1
50000 CAPSULE ORAL WEEKLY
Qty: 12 CAPSULE | Refills: 0 | Status: SHIPPED | OUTPATIENT
Start: 2018-06-29 | End: 2018-09-15

## 2018-07-05 ENCOUNTER — TELEPHONE (OUTPATIENT)
Dept: INTERNAL MEDICINE | Facility: CLINIC | Age: 68
End: 2018-07-05

## 2018-07-16 ENCOUNTER — TELEPHONE (OUTPATIENT)
Dept: INTERNAL MEDICINE | Facility: CLINIC | Age: 68
End: 2018-07-16

## 2018-07-16 NOTE — TELEPHONE ENCOUNTER
THEY FAXED US INFORMATION BACK ON 6/1/18 FOR DRUG THERAPY ALERT ON THIS  PATIENT. THEY ARE CALLING TO FOLLOW UP THE STATUS OF THIS PAPER WORK. YOU CAN REACH THEM BACK [phone] FAX NUMBER [phone]

## 2018-08-17 ENCOUNTER — TELEPHONE (OUTPATIENT)
Dept: INTERNAL MEDICINE | Facility: CLINIC | Age: 68
End: 2018-08-17

## 2018-08-17 DIAGNOSIS — E11.9 TYPE 2 DIABETES MELLITUS WITHOUT COMPLICATION, WITHOUT LONG-TERM CURRENT USE OF INSULIN (HCC): Primary | ICD-10-CM

## 2019-01-07 ENCOUNTER — HOSPITAL ENCOUNTER (OUTPATIENT)
Dept: PHYSICAL THERAPY | Facility: HOSPITAL | Age: 69
Setting detail: THERAPIES SERIES
Discharge: HOME | End: 2019-01-07

## 2019-01-07 DIAGNOSIS — S93.401S SPRAIN OF RIGHT ANKLE, UNSPECIFIED LIGAMENT, SEQUELA: Primary | ICD-10-CM

## 2019-01-07 PROCEDURE — 97161 PT EVAL LOW COMPLEX 20 MIN: CPT | Performed by: PHYSICAL THERAPIST

## 2019-01-07 NOTE — THERAPY EVALUATION
Outpatient Physical Therapy Ortho Initial Evaluation   [place]     Patient Name: Pari Howell  : 1950  MRN: [id number]  Today's Date: 2019      Visit Date: 2019    Patient Active Problem List   Diagnosis   • Diabetes (CMS/HCC)   • Vaginal itching   • Other specified glaucoma   • High cholesterol   • Vitamin D deficiency        Past Medical History:   Diagnosis Date   • Diabetes (CMS/HCC)    • Dyspareunia, female    • Glaucoma    • H/O sexual problem    • High cholesterol    • History of abnormal cervical Pap smear    • Hypertension    • Hypertension    • Labial cyst    • Muscle weakness    • Thyroid disease    • Thyroid disorder    • Urinary incontinence    • Vaginal itching    • Yeast infection         Past Surgical History:   Procedure Laterality Date   • CARPAL TUNNEL RELEASE     • CHOLECYSTECTOMY     • HYSTERECTOMY     • OOPHORECTOMY     • TUBAL ABDOMINAL LIGATION  1979       Visit Dx:     ICD-10-CM ICD-9-CM   1. Sprain of right ankle, unspecified ligament, sequela S93.401S 905.7       Patient History     Row Name 19 1000             History    Chief Complaint  Difficulty with daily activities;Pain;Swelling  -RB      Type of Pain  Ankle pain  -RB      Brief Description of Current Complaint  Pt reports that she initially sprained her R ankle last September. She recovered well but then reinjured her ankle in 2018 when running to try and catch a plane. Pn has improved and usually is minimal, but she reports mild discomfort behind the medial malleolus extending up into the medial calf/lower leg. She has difficulty walking when she first stands and descending stairs. Has swelling occasionally at night, usually after a long day of being up on her feet. Was given a brace to wear but has not used it much, concerned it will inhibit her recovery.   -RB      Previous treatment for THIS PROBLEM  Medication  -RB      Patient/Caregiver Goals  Relieve pain  -RB      Current  Tobacco Use  None  -RB      Patient's Rating of General Health  Very good  -RB      Hand Dominance  right-handed  -RB      Occupation/sports/leisure activities  Retired, enjoys reading.  -RB      Patient seeing anyone else for problem(s)?  Dr. Zamorano- follow up in 6 months  -RB      What clinical tests have you had for this problem?  X-ray  -RB      Results of Clinical Tests  unremarkable  -RB         Pain     Pain Location  Ankle  -RB      Pain at Present  0  -RB      Pain at Best  0  -RB      Pain at Worst  4  -RB      Pain Frequency  Intermittent  -RB      What Performance Factors Make the Current Problem(s) WORSE?  standing after immobility, descending stairs  -RB      What Performance Factors Make the Current Problem(s) BETTER?  rest, exercise  -RB      Is your sleep disturbed?  No  -RB         Fall Risk Assessment    Any falls in the past year:  No  -RB         Daily Activities    Primary Language  English  -RB      Are you able to read  Yes  -RB      Are you able to write  Yes  -RB      How does patient learn best?  Demonstration;Reading  -RB      Teaching needs identified  Home Exercise Program;Management of Condition  -RB      Patient is concerned about/has problems with  Climbing Stairs;Flexibility;Performing home management (household chores, shopping, care of dependents);Walking  -RB      Does patient have problems with the following?  None  -RB      Barriers to learning  None  -RB      Pt Participated in POC and Goals  Yes  -RB         Safety    Are you being hurt, hit, or frightened by anyone at home or in your life?  No  -RB      Are you being neglected by a caregiver  No  -RB        User Key  (r) = Recorded By, (t) = Taken By, (c) = Cosigned By    Initials Name Provider Type    RB Dana Veloz PT Physical Therapist          PT Ortho     Row Name 01/07/19 1000       Precautions and Contraindications    Precautions/Limitations  no known precautions/limitations  -RB       Posture/Observations     Posture/Observations Comments  Pt presents to PT ambulating independently. With tennis shoes donned demo minimal to no gait deviation. However w/o shoes demo decreased stance time, decreased push off R foot. Mild edema observed around medial malleolus, non tender to palpation.  -RB       Special Tests/Palpation    Special Tests/Palpation  Ankle/Foot  -RB       Foot/Ankle Palpation    Foot/Ankle Palpation?  No Tenderness/Abnormality  -RB       Ankle Accessory Motions    Ankle Accessory Motions Tested?  -- intact and w/o pn  -RB       Ankle/Foot Special Tests    Anterior drawer (ATFL lesion)  Negative  -RB    Talar tilt test (instability)  Negative  -RB       General ROM    RT Lower Ext  Rt Ankle Dorsiflexion;Rt Ankle Plantarflexion;Rt Ankle Inversion;Rt Ankle Eversion  -RB    LT Lower Ext  Lt Ankle Dorsiflexion;Lt Ankle Plantarflexion;Lt Ankle Inversion;Lt Ankle Eversion  -RB       Right Lower Ext    Rt Ankle Dorsiflexion AROM  10  -RB    Rt Ankle Plantarflexion AROM  58  -RB    Rt Ankle Inversion AROM  40  -RB    Rt Ankle Eversion AROM  10 deg w/ report of tightness medial ankle and foot  -RB       Left Lower Ext    Lt Ankle Dorsiflexion AROM  10  -RB    Lt Ankle Plantarflexion AROM  60  -RB    Lt Ankle Inversion AROM  42  -RB    Lt Ankle Eversion AROM  25  -RB       MMT (Manual Muscle Testing)    Rt Lower Ext  Rt Ankle Plantarflexion;Rt Ankle Dorsiflexion;Rt Ankle Subtalar Inversion;Rt Ankle Subtalar Eversion  -RB    Lt Lower Ext  Lt Ankle Plantarflexion;Lt Ankle Dorsiflexion;Lt Ankle Subtalar Inversion;Lt Ankle Subtalar Eversion  -RB       MMT Right Lower Ext    Rt Ankle Plantarflexion MMT, Gross Movement  (4/5) good  -RB    Rt Ankle Dorsiflexion MMT, Gross Movement  (4/5) good  -RB    Rt Ankle Subtalar Inversion MT, Gross Movement  (4-/5) good minus  -RB    Rt Ankle Subtalar Eversion MMT, Gross Movement  (4-/5) good minus  -RB    Rt Lower Extremity Comments   post tib 4-/5  -RB       MMT Left Lower Ext    Lt  Ankle Plantarflexion MMT, Gross Movement  (5/5) normal  -RB    Lt Ankle Dorsiflexion MMT, Gross Movement  (5/5) normal  -RB    Lt Ankle Subtalar Inversion MMT, Gross Movement  (4+/5) good plus  -RB    Lt Ankle Subtalar Eversion MMT, Gross Movement  (4+/5) good plus  -RB    Lt Lower Extremity Comments   post tib 4+/5  -RB       Sensation    Sensation WNL?  WNL  -RB       Girth    Girth Measured?  Ankle  -RB       Ankle Girth    Figure 8- Right  53.5  -RB    Figure 8- Left  53.5  -RB       Balance Skills Training    SLS  unable on R w/o assist  -RB      User Key  (r) = Recorded By, (t) = Taken By, (c) = Cosigned By    Initials Name Provider Type    Dana An PT Physical Therapist                      Therapy Education  Education Details: issued initial HEP including NWB arch raises, 4 way ankle w/ RTB which was issued to pt  Given: HEP  Program: New  How Provided: Verbal, Demonstration, Written  Provided to: Patient  Level of Understanding: Teach back education performed     PT OP Goals     Row Name 01/07/19 1555 01/07/19 1500       PT Short Term Goals    STG Date to Achieve  --  01/28/19  -RB    STG 1  --  Pt to be independent w/ long term HEP and self mgmt  -RB    STG 1 Progress  --  New  -RB    STG 2  --  Pt to demo R ankle eversion ROM equal to that of the L w/ minimal to no pn reproduction.  -RB    STG 2 Progress  --  New  -RB    STG 3  --  Pt to improve R ankle strength to 4+/5 in all planes  -RB    STG 3 Progress  --  New  -RB    STG 4  --  Pt to maintain SLS on RLE for 15 seconds or greater to reflect improved strength and neuromuscular control.  -RB    STG 4 Progress  --  New  -RB       Time Calculation    PT Goal Re-Cert Due Date  04/07/19  -RB  04/07/19  -RB      User Key  (r) = Recorded By, (t) = Taken By, (c) = Cosigned By    Initials Name Provider Type    Dana An PT Physical Therapist          PT Assessment/Plan     Row Name 01/07/19 1552          PT Assessment    Impairments   Balance;Edema;Pain;Range of motion;Muscle strength  -RB     Assessment Comments  Pt presents w/ complaint of chronic ankle weakness, discomfort, instability s/p previous R ankle sprain. She demonstrates mild ROM deficits w/ more profound impairments in ankle strength and balance, limiting her tolerance to daily activities. She would benefit from skilled PT services to address deficits, decrease pn, and maximize function.  -RB     Please refer to paper survey for additional self-reported information  Yes  -RB     Rehab Potential  Good  -RB     Patient/caregiver participated in establishment of treatment plan and goals  Yes  -RB     Patient would benefit from skilled therapy intervention  Yes  -RB        PT Plan    PT Frequency  1x/week  -RB     Predicted Duration of Therapy Intervention (Therapy Eval)  6-8 visits  -RB     Planned CPT's?  PT EVAL LOW COMPLEXITY: 81390;PT RE-EVAL: 09081;PT THER PROC EA 15 MIN: 88028;PT MANUAL THERAPY EA 15 MIN: 04580;PT NEUROMUSC RE-EDUCATION EA 15 MIN: 98573;PT ULTRASOUND EA 15 MIN: 16060;PT ELECTRICAL STIM UNATTEND: ;PT HOT OR COLD PACK TREAT MCARE  -RB     PT Plan Comments  PT POC to include progressive ankle strengthening/ROM activities, neuromuscular re-education to restore muscular stability, manual therapy techniques and modalities as indicated.  -RB       User Key  (r) = Recorded By, (t) = Taken By, (c) = Cosigned By    Initials Name Provider Type    RB Dana Veloz, PT Physical Therapist                              Outcome Measure Options: Lower Extremity Functional Scale (LEFS)  Lower Extremity Functional Index  Any of your usual work, housework or school activities: A little bit of difficulty  Your usual hobbies, recreational or sporting activities: A little bit of difficulty  Getting into or out of the bath: A little bit of difficulty  Walking between rooms: A little bit of difficulty  Putting on your shoes or socks: No difficulty  Squatting: No difficulty  Lifting  an object, like a bag of groceries from the floor: No difficulty  Performing light activities around your home: A little bit of difficulty  Performing heavy activities around your home: A little bit of difficulty  Getting into or out of a car: A little bit of difficulty  Walking 2 blocks: A little bit of difficulty  Walking a mile: Moderate difficulty  Going up or down 10 stairs (about 1 flight of stairs): Moderate difficulty  Standing for 1 hour: A little bit of difficulty  Sitting for 1 hour: No difficulty  Running on even ground: A little bit of difficulty  Running on uneven ground: A little bit of difficulty  Making sharp turns while running fast: A little bit of difficulty  Hopping: A little bit of difficulty  Rolling over in bed: No difficulty  Total: 63      Time Calculation:     Therapy Suggested Charges     Code   Minutes Charges    None             Start Time: 1015     Therapy Charges for Today     Code Description Service Date Service Provider Modifiers Qty    52283967368 HC PT EVAL LOW COMPLEXITY 3 1/7/2019 Dana Veloz, PT GP 1          PT G-Codes  Outcome Measure Options: Lower Extremity Functional Scale (LEFS)  Total: 63         Dana Veloz, PT  1/7/2019

## 2019-01-08 ENCOUNTER — TRANSCRIBE ORDERS (OUTPATIENT)
Dept: PHYSICAL THERAPY | Facility: HOSPITAL | Age: 69
End: 2019-01-08

## 2019-01-08 DIAGNOSIS — S93.409D SPRAIN OF ANKLE, UNSPECIFIED LATERALITY, UNSPECIFIED LIGAMENT, SUBSEQUENT ENCOUNTER: ICD-10-CM

## 2019-01-08 DIAGNOSIS — M25.579 ANKLE PAIN, UNSPECIFIED CHRONICITY, UNSPECIFIED LATERALITY: ICD-10-CM

## 2019-01-08 DIAGNOSIS — M25.373 UNSTABLE ANKLE, UNSPECIFIED LATERALITY: Primary | ICD-10-CM

## 2019-01-24 ENCOUNTER — HOSPITAL ENCOUNTER (OUTPATIENT)
Dept: PHYSICAL THERAPY | Facility: HOSPITAL | Age: 69
Setting detail: THERAPIES SERIES
Discharge: HOME | End: 2019-01-24

## 2019-01-24 DIAGNOSIS — S93.401S SPRAIN OF RIGHT ANKLE, UNSPECIFIED LIGAMENT, SEQUELA: Primary | ICD-10-CM

## 2019-01-24 PROCEDURE — 97110 THERAPEUTIC EXERCISES: CPT | Performed by: PHYSICAL THERAPIST

## 2019-01-24 NOTE — THERAPY TREATMENT NOTE
Outpatient Physical Therapy Ortho Treatment Note   [place]     Patient Name: Pari Howell  : 1950  MRN: [id number]  Today's Date: 2019      Visit Date: 2019    Visit Dx:    ICD-10-CM ICD-9-CM   1. Sprain of right ankle, unspecified ligament, sequela S93.401S 905.7       Patient Active Problem List   Diagnosis   • Diabetes (CMS/HCC)   • Vaginal itching   • Other specified glaucoma   • High cholesterol   • Vitamin D deficiency        Past Medical History:   Diagnosis Date   • Diabetes (CMS/HCC)    • Dyspareunia, female    • Glaucoma    • H/O sexual problem    • High cholesterol    • History of abnormal cervical Pap smear    • Hypertension    • Hypertension    • Labial cyst    • Muscle weakness    • Thyroid disease    • Thyroid disorder    • Urinary incontinence    • Vaginal itching    • Yeast infection         Past Surgical History:   Procedure Laterality Date   • CARPAL TUNNEL RELEASE     • CHOLECYSTECTOMY     • HYSTERECTOMY     • OOPHORECTOMY     • TUBAL ABDOMINAL LIGATION  1979       PT Ortho     Row Name 19 1000       Subjective Comments    Subjective Comments  Pt reports that pn is much improved. Still feels some catching, but more infrequent. No difficulty w/ transition between sitting and standing like before. HEP going well.  -RB       Subjective Pain    Able to rate subjective pain?  yes  -RB    Pre-Treatment Pain Level  0  -RB    Post-Treatment Pain Level  0  -RB      User Key  (r) = Recorded By, (t) = Taken By, (c) = Cosigned By    Initials Name Provider Type    RB Dana Veloz, PT Physical Therapist                      PT Assessment/Plan     Row Name 19 1101          PT Assessment    Assessment Comments  Pt subjectively reports improved pn and decreased catching in ankle w/ gait and transfers. She tolerated all ther ex w/o report of pn. She required cueing for proper technique w/ arch raises.  -RB        PT Plan    PT Plan Comments  Cont per POC-  update HEP.  -RB       User Key  (r) = Recorded By, (t) = Taken By, (c) = Cosigned By    Initials Name Provider Type    Dana An PT Physical Therapist              Exercises     Row Name 01/24/19 1000             Subjective Comments    Subjective Comments  Pt reports that pn is much improved. Still feels some catching, but more infrequent. No difficulty w/ transition between sitting and standing like before. HEP going well.  -RB         Subjective Pain    Able to rate subjective pain?  yes  -RB      Pre-Treatment Pain Level  0  -RB      Post-Treatment Pain Level  0  -RB         Total Minutes    32397 - PT Therapeutic Exercise Minutes  30  -RB         Exercise 1    Exercise Name 1  Ther ex in clinic per flow sheet w/ emphasis on restoring ankle mobility, low level ankle/foot intrinsic strengthening , and neuromuscular control.  -RB        User Key  (r) = Recorded By, (t) = Taken By, (c) = Cosigned By    Initials Name Provider Type    Dana An PT Physical Therapist                         PT OP Goals     Row Name 01/24/19 1103          Time Calculation    PT Goal Re-Cert Due Date  04/07/19  -RB       User Key  (r) = Recorded By, (t) = Taken By, (c) = Cosigned By    Initials Name Provider Type    Dana An PT Physical Therapist          Therapy Education  Education Details: issued GTB for progression of ankle 4 way  Given: HEP  Program: Progressed  How Provided: Verbal, Demonstration  Provided to: Patient  Level of Understanding: Teach back education performed              Time Calculation:   Start Time: 1028  Therapy Suggested Charges     Code   Minutes Charges    89248 (CPT®) Hc Pt Neuromusc Re Education Ea 15 Min      16642 (CPT®) Hc Pt Ther Proc Ea 15 Min 30 2    04380 (CPT®) Hc Gait Training Ea 15 Min      38928 (CPT®) Hc Pt Therapeutic Act Ea 15 Min      84777 (CPT®) Hc Pt Manual Therapy Ea 15 Min      75954 (CPT®) Hc Pt Ther Massage- Per 15 Min      32488 (CPT®) Hc Pt  Iontophoresis Ea 15 Min      84488 (CPT®) Hc Pt Elec Stim Ea-Per 15 Min      29499 (CPT®) Hc Pt Ultrasound Ea 15 Min      77363 (CPT®) Hc Pt Self Care/Mgmt/Train Ea 15 Min      90287 (CPT®) Hc Pt Prosthetic (S) Train Initial Encounter, Each 15 Min      84848 (CPT®) Hc Orthotic(S) Mgmt/Train Initial Encounter, Each 15min      62213 (CPT®) Hc Pt Aquatic Therapy Ea 15 Min      24150 (CPT®) Hc Pt Orthotic(S)/Prosthetic(S) Encounter, Each 15 Min       (CPT®) Hc Pt Electrical Stim Unattended      Total  30 2        Therapy Charges for Today     Code Description Service Date Service Provider Modifiers Qty    04242044239 HC PT THER PROC EA 15 MIN 1/24/2019 Dana Veloz, PT GP 2                    Dana Veloz, PT  1/24/2019

## 2019-01-31 ENCOUNTER — HOSPITAL ENCOUNTER (OUTPATIENT)
Dept: PHYSICAL THERAPY | Facility: HOSPITAL | Age: 69
Setting detail: THERAPIES SERIES
Discharge: HOME | End: 2019-01-31

## 2019-01-31 DIAGNOSIS — S93.401S SPRAIN OF RIGHT ANKLE, UNSPECIFIED LIGAMENT, SEQUELA: Primary | ICD-10-CM

## 2019-01-31 PROCEDURE — 97110 THERAPEUTIC EXERCISES: CPT | Performed by: PHYSICAL THERAPIST

## 2019-04-16 ENCOUNTER — TRANSCRIBE ORDERS (OUTPATIENT)
Dept: ADMINISTRATIVE | Facility: HOSPITAL | Age: 69
End: 2019-04-16

## 2019-04-16 DIAGNOSIS — Z12.31 VISIT FOR SCREENING MAMMOGRAM: Primary | ICD-10-CM

## 2019-05-06 ENCOUNTER — OFFICE VISIT (OUTPATIENT)
Dept: INTERNAL MEDICINE | Facility: CLINIC | Age: 69
End: 2019-05-06

## 2019-05-06 VITALS
WEIGHT: 182 LBS | HEIGHT: 68 IN | BODY MASS INDEX: 27.58 KG/M2 | SYSTOLIC BLOOD PRESSURE: 120 MMHG | OXYGEN SATURATION: 96 % | DIASTOLIC BLOOD PRESSURE: 60 MMHG | HEART RATE: 65 BPM | TEMPERATURE: 98.2 F

## 2019-05-06 DIAGNOSIS — E11.40 CONTROLLED TYPE 2 DIABETES MELLITUS WITH DIABETIC NEUROPATHY, WITHOUT LONG-TERM CURRENT USE OF INSULIN (HCC): ICD-10-CM

## 2019-05-06 DIAGNOSIS — E03.9 HYPOTHYROIDISM, UNSPECIFIED TYPE: ICD-10-CM

## 2019-05-06 DIAGNOSIS — K21.9 GASTROESOPHAGEAL REFLUX DISEASE WITHOUT ESOPHAGITIS: Primary | ICD-10-CM

## 2019-05-06 DIAGNOSIS — I10 ESSENTIAL HYPERTENSION: ICD-10-CM

## 2019-05-06 DIAGNOSIS — E89.41 HOT FLASHES DUE TO SURGICAL MENOPAUSE: ICD-10-CM

## 2019-05-06 LAB — HBA1C MFR BLD: 6 %

## 2019-05-06 PROCEDURE — 99213 OFFICE O/P EST LOW 20 MIN: CPT | Performed by: NURSE PRACTITIONER

## 2019-05-06 PROCEDURE — 83036 HEMOGLOBIN GLYCOSYLATED A1C: CPT | Performed by: NURSE PRACTITIONER

## 2019-05-06 RX ORDER — LOSARTAN POTASSIUM AND HYDROCHLOROTHIAZIDE 12.5; 1 MG/1; MG/1
1 TABLET ORAL DAILY
Qty: 90 TABLET | Refills: 1 | Status: SHIPPED | OUTPATIENT
Start: 2019-05-06 | End: 2019-09-24 | Stop reason: SDUPTHER

## 2019-05-06 RX ORDER — BLOOD-GLUCOSE METER
1 KIT MISCELLANEOUS DAILY
Qty: 1 EACH | Refills: 0 | Status: SHIPPED | OUTPATIENT
Start: 2019-05-06 | End: 2022-05-13

## 2019-05-06 RX ORDER — METFORMIN HYDROCHLORIDE 500 MG/1
1000 TABLET, EXTENDED RELEASE ORAL
Qty: 180 TABLET | Refills: 1 | Status: SHIPPED | OUTPATIENT
Start: 2019-05-06 | End: 2019-09-24 | Stop reason: SDUPTHER

## 2019-05-06 RX ORDER — AMLODIPINE BESYLATE 5 MG/1
5 TABLET ORAL DAILY
Qty: 90 TABLET | Refills: 1 | Status: SHIPPED | OUTPATIENT
Start: 2019-05-06 | End: 2019-09-24 | Stop reason: SDUPTHER

## 2019-05-06 RX ORDER — ESTRADIOL 0.5 MG/1
0.5 TABLET ORAL DAILY
Qty: 90 TABLET | Refills: 1 | Status: SHIPPED | OUTPATIENT
Start: 2019-05-06 | End: 2019-09-24 | Stop reason: SDUPTHER

## 2019-05-06 RX ORDER — LANCETS 30 GAUGE
1 EACH MISCELLANEOUS DAILY
Qty: 100 EACH | Refills: 3 | Status: SHIPPED | OUTPATIENT
Start: 2019-05-06

## 2019-05-06 RX ORDER — PANTOPRAZOLE SODIUM 20 MG/1
20 TABLET, DELAYED RELEASE ORAL DAILY
Qty: 90 TABLET | Refills: 0 | Status: SHIPPED | OUTPATIENT
Start: 2019-05-06 | End: 2019-07-10 | Stop reason: SDUPTHER

## 2019-05-06 NOTE — PROGRESS NOTES
Subjective   Pari Howell is a 68 y.o. female.   Chief Complaint   Patient presents with   • Establish Care   • Diabetes     needs a new meter      History of Present Illness BG less than 120.  Has rash to left lower leg.  Itches, takes benadryl and using mycostatin cream with good relief.  Is tired.  Patient denies fever chills, headache, ear pain.  Does have vertigo-sees chiro with good relief.  No sore throat, shortness of air, cough, wheezing, chest pain, abdominal pain, nausea, vomiting.  Has diarrhea RT IBS-watches diet.  No dysuria, blood in stool or urine.  Mood is good.  Eating and drinking as usual.  No unexplained weight loss or gain.  Sees Dr Saldana for eye exam. (glaucoma).  Stopped lopid RT GI intol.  BP good at home. Tried CBD oil for neuropathy in feet with spike in BP,  lyndsey CBD cream for feet neuropathy-with some relief.       The following portions of the patient's history were reviewed and updated as appropriate: allergies, current medications, past family history, past medical history, past social history, past surgical history and problem list.    Current Outpatient Medications:   •  amLODIPine (NORVASC) 5 MG tablet, Take 1 tablet by mouth Daily., Disp: 90 tablet, Rfl: 1  •  clotrimazole (LOTRIMIN) 1 % cream, Apply  topically 2 (Two) Times a Day., Disp: 40 g, Rfl: 0  •  latanoprost (XALATAN) 0.005 % ophthalmic solution, Apply 1 drop to eye Daily., Disp: 7.5 mL, Rfl: 3  •  losartan-hydrochlorothiazide (HYZAAR) 100-12.5 MG per tablet, Take 1 tablet by mouth Daily., Disp: 90 tablet, Rfl: 1  •  metFORMIN ER (GLUCOPHAGE-XR) 500 MG 24 hr tablet, Take 2 tablets by mouth Daily With Breakfast., Disp: 180 tablet, Rfl: 1  •  nystatin (MYCOSTATIN) 373512 UNIT/GM cream, Apply  topically 2 (Two) Times a Day., Disp: 90 g, Rfl: 2  •  pantoprazole (PROTONIX) 20 MG EC tablet, Take 1 tablet by mouth Daily., Disp: 90 tablet, Rfl: 0  •  timolol (TIMOPTIC) 0.5 % ophthalmic solution, , Disp: , Rfl:   •   "triamcinolone (KENALOG) 0.025 % cream, Apply  topically 3 (Three) Times a Day., Disp: 80 g, Rfl: 4  •  estradiol (ESTRACE) 0.5 MG tablet, Take 1 tablet by mouth Daily., Disp: 90 tablet, Rfl: 1  •  glucose blood test strip, 1 each by Other route Daily. Use as instructed, Disp: 100 each, Rfl: 3  •  glucose monitor monitoring kit, 1 each Daily., Disp: 1 each, Rfl: 0  •  Lancets misc, 1 applicator Daily., Disp: 100 each, Rfl: 3  •  levothyroxine (SYNTHROID, LEVOTHROID) 50 MCG tablet, Take 1 tablet by mouth Daily., Disp: 90 tablet, Rfl: 1    Review of Systems Consitutional, HEENT, Respiratory, CV, GI, , Skin, Musculoskeletal, Neuro-mental, Endocrinological, Hematological were reviewed.  Positives were discussed in the HPI, otherwise ROS was negative   /60   Pulse 65   Temp 98.2 °F (36.8 °C)   Ht 172.7 cm (68\")   Wt 82.6 kg (182 lb)   LMP  (LMP Unknown)   SpO2 96%   BMI 27.67 kg/m²     Objective   No Known Allergies    Physical Exam   Constitutional: She is oriented to person, place, and time. She appears well-developed and well-nourished. No distress.   HENT:   Head: Normocephalic.   Right Ear: External ear normal.   Left Ear: External ear normal.   Nose: Nose normal.   Mouth/Throat: Oropharynx is clear and moist.   TMs clear   Eyes: Right eye exhibits no discharge. Left eye exhibits no discharge. No scleral icterus.   Neck: Neck supple. No thyromegaly present.   No carotid bruit bilat   Cardiovascular: Normal rate, regular rhythm, normal heart sounds and intact distal pulses. Exam reveals no gallop and no friction rub.   No murmur heard.  No pedal edema   Pulmonary/Chest: Effort normal and breath sounds normal. No stridor. No respiratory distress. She has no wheezes.   Abdominal: Soft. Bowel sounds are normal. There is no tenderness.   Musculoskeletal:   RYAN well.  Gait upright and steady    Lymphadenopathy:     She has no cervical adenopathy.   Neurological: She is alert and oriented to person, place, " and time.   Skin: Skin is warm and dry. Capillary refill takes less than 2 seconds.   Is pink, no rash    Psychiatric: She has a normal mood and affect. Her behavior is normal. Judgment and thought content normal.   Nursing note and vitals reviewed.      Procedures    LABS  Results for orders placed or performed in visit on 05/06/19   Comprehensive Metabolic Panel   Result Value Ref Range    Glucose 136 (H) 65 - 99 mg/dL    BUN 12 8 - 23 mg/dL    Creatinine 0.65 0.57 - 1.00 mg/dL    eGFR Non African Am 91 >60 mL/min/1.73    eGFR African Am 110 >60 mL/min/1.73    BUN/Creatinine Ratio 18.5 7.0 - 25.0    Sodium 140 136 - 145 mmol/L    Potassium 4.2 3.5 - 5.2 mmol/L    Chloride 100 98 - 107 mmol/L    Total CO2 26.1 22.0 - 29.0 mmol/L    Calcium 10.1 8.6 - 10.5 mg/dL    Total Protein 7.8 6.0 - 8.5 g/dL    Albumin 4.60 3.50 - 5.20 g/dL    Globulin 3.2 gm/dL    A/G Ratio 1.4 g/dL    Total Bilirubin 0.5 0.2 - 1.2 mg/dL    Alkaline Phosphatase 73 39 - 117 U/L    AST (SGOT) 14 1 - 32 U/L    ALT (SGPT) 19 1 - 33 U/L   TSH   Result Value Ref Range    TSH 2.600 0.450 - 4.500 uIU/mL   POC Glycosylated Hemoglobin (Hb A1C)   Result Value Ref Range    Hemoglobin A1C 6 %       Assessment/Plan   Pari was seen today for establish care and diabetes.    Diagnoses and all orders for this visit:    Gastroesophageal reflux disease without esophagitis  -     pantoprazole (PROTONIX) 20 MG EC tablet; Take 1 tablet by mouth Daily.    Hypothyroidism, unspecified type  -     TSH    Controlled type 2 diabetes mellitus with diabetic neuropathy, without long-term current use of insulin (CMS/[facility])  -     POC Glycosylated Hemoglobin (Hb A1C)  -     glucose monitor monitoring kit; 1 each Daily.  -     glucose blood test strip; 1 each by Other route Daily. Use as instructed  -     Lancets misc; 1 applicator Daily.  -     metFORMIN ER (GLUCOPHAGE-XR) 500 MG 24 hr tablet; Take 2 tablets by mouth Daily With Breakfast.  -     Comprehensive Metabolic  Panel    Hot flashes due to surgical menopause  -     estradiol (ESTRACE) 0.5 MG tablet; Take 1 tablet by mouth Daily.    Essential hypertension  -     losartan-hydrochlorothiazide (HYZAAR) 100-12.5 MG per tablet; Take 1 tablet by mouth Daily.  -     amLODIPine (NORVASC) 5 MG tablet; Take 1 tablet by mouth Daily.      Patient Instructions   Labs as discussed.  Continue same medicines.  Stay active.  Monitor blood pressure at least once a week.  Monitor blood sugars at least twice weekly.  Recommend diabetic eye exam.  Patient will schedule on her own.  Pt verbalizes understanding and agreement with plan of care.         EMR Dragon/transcription disclaimer:  Please note that portions of this note were completed with a voice recognition program.  Electronic transcription of the voice recognition program may permit erroneous words or phrases to be inadvertently transcribed.  Although I have reviewed the note for such errors, some may still exist in this documentation   ELIZABETH Harper

## 2019-05-07 ENCOUNTER — TELEPHONE (OUTPATIENT)
Dept: INTERNAL MEDICINE | Facility: CLINIC | Age: 69
End: 2019-05-07

## 2019-05-07 LAB
ALBUMIN SERPL-MCNC: 4.6 G/DL (ref 3.5–5.2)
ALBUMIN/GLOB SERPL: 1.4 G/DL
ALP SERPL-CCNC: 73 U/L (ref 39–117)
ALT SERPL-CCNC: 19 U/L (ref 1–33)
AST SERPL-CCNC: 14 U/L (ref 1–32)
BILIRUB SERPL-MCNC: 0.5 MG/DL (ref 0.2–1.2)
BUN SERPL-MCNC: 12 MG/DL (ref 8–23)
BUN/CREAT SERPL: 18.5 (ref 7–25)
CALCIUM SERPL-MCNC: 10.1 MG/DL (ref 8.6–10.5)
CHLORIDE SERPL-SCNC: 100 MMOL/L (ref 98–107)
CO2 SERPL-SCNC: 26.1 MMOL/L (ref 22–29)
CREAT SERPL-MCNC: 0.65 MG/DL (ref 0.57–1)
GLOBULIN SER CALC-MCNC: 3.2 GM/DL
GLUCOSE SERPL-MCNC: 136 MG/DL (ref 65–99)
POTASSIUM SERPL-SCNC: 4.2 MMOL/L (ref 3.5–5.2)
PROT SERPL-MCNC: 7.8 G/DL (ref 6–8.5)
SODIUM SERPL-SCNC: 140 MMOL/L (ref 136–145)
TSH SERPL DL<=0.005 MIU/L-ACNC: 2.6 UIU/ML (ref 0.45–4.5)

## 2019-05-07 NOTE — TELEPHONE ENCOUNTER
PT informed that Fanny is out of office today. Once labs are reviewed we will contact her with instructions. PT voiced understanding

## 2019-05-07 NOTE — TELEPHONE ENCOUNTER
Patient would like to know her a1c results and her tsh.  She would like to know if she needs to change any medications.  Please advise.

## 2019-05-08 DIAGNOSIS — E03.9 HYPOTHYROIDISM, UNSPECIFIED TYPE: ICD-10-CM

## 2019-05-08 RX ORDER — LEVOTHYROXINE SODIUM 0.05 MG/1
50 TABLET ORAL DAILY
Qty: 90 TABLET | Refills: 1 | Status: SHIPPED | OUTPATIENT
Start: 2019-05-08 | End: 2019-09-24 | Stop reason: SDUPTHER

## 2019-05-08 NOTE — TELEPHONE ENCOUNTER
There is a note in the computer.  No medication changes are needed.  I sent her prescription for levothyroxine to her mail order pharmacy.  If she needs anything else please let me know

## 2019-06-04 ENCOUNTER — HOSPITAL ENCOUNTER (OUTPATIENT)
Dept: MAMMOGRAPHY | Facility: HOSPITAL | Age: 69
Discharge: HOME | End: 2019-06-04
Admitting: NURSE PRACTITIONER

## 2019-06-04 DIAGNOSIS — Z12.31 VISIT FOR SCREENING MAMMOGRAM: ICD-10-CM

## 2019-06-04 PROCEDURE — 77063 BREAST TOMOSYNTHESIS BI: CPT | Performed by: RADIOLOGY

## 2019-06-04 PROCEDURE — 77063 BREAST TOMOSYNTHESIS BI: CPT

## 2019-06-04 PROCEDURE — 77067 SCR MAMMO BI INCL CAD: CPT

## 2019-06-04 PROCEDURE — 77067 SCR MAMMO BI INCL CAD: CPT | Performed by: RADIOLOGY

## 2019-07-01 ENCOUNTER — TELEPHONE (OUTPATIENT)
Dept: INTERNAL MEDICINE | Facility: CLINIC | Age: 69
End: 2019-07-01

## 2019-07-01 NOTE — TELEPHONE ENCOUNTER
Please call [facility] they wanted to make sure we received the drug therapy plan for the to start a statin due to diabetes    Please call [phone]

## 2019-07-03 NOTE — TELEPHONE ENCOUNTER
I am not certain why my name is listed as PCP. I have never seen her and she is seeing you; has FU appt with you.

## 2019-07-10 ENCOUNTER — OFFICE VISIT (OUTPATIENT)
Dept: INTERNAL MEDICINE | Facility: CLINIC | Age: 69
End: 2019-07-10

## 2019-07-10 VITALS
SYSTOLIC BLOOD PRESSURE: 140 MMHG | WEIGHT: 183.2 LBS | HEART RATE: 70 BPM | BODY MASS INDEX: 27.77 KG/M2 | HEIGHT: 68 IN | OXYGEN SATURATION: 98 % | DIASTOLIC BLOOD PRESSURE: 74 MMHG

## 2019-07-10 DIAGNOSIS — K21.9 GASTROESOPHAGEAL REFLUX DISEASE WITHOUT ESOPHAGITIS: ICD-10-CM

## 2019-07-10 DIAGNOSIS — I10 ESSENTIAL HYPERTENSION: Primary | ICD-10-CM

## 2019-07-10 DIAGNOSIS — E11.40 CONTROLLED TYPE 2 DIABETES MELLITUS WITH DIABETIC NEUROPATHY, WITHOUT LONG-TERM CURRENT USE OF INSULIN (HCC): ICD-10-CM

## 2019-07-10 LAB
GLUCOSE BLDC GLUCOMTR-MCNC: 119 MG/DL (ref 70–130)
HBA1C MFR BLD: 6.6 %

## 2019-07-10 PROCEDURE — 83036 HEMOGLOBIN GLYCOSYLATED A1C: CPT | Performed by: NURSE PRACTITIONER

## 2019-07-10 PROCEDURE — 82962 GLUCOSE BLOOD TEST: CPT | Performed by: NURSE PRACTITIONER

## 2019-07-10 PROCEDURE — 99213 OFFICE O/P EST LOW 20 MIN: CPT | Performed by: NURSE PRACTITIONER

## 2019-07-10 RX ORDER — DIPHENHYDRAMINE HCL 25 MG
TABLET ORAL
COMMUNITY
Start: 2019-05-07 | End: 2021-04-30 | Stop reason: SDUPTHER

## 2019-07-10 RX ORDER — PANTOPRAZOLE SODIUM 20 MG/1
TABLET, DELAYED RELEASE ORAL
Qty: 90 TABLET | Refills: 0 | Status: SHIPPED | OUTPATIENT
Start: 2019-07-10 | End: 2019-09-11 | Stop reason: SDUPTHER

## 2019-07-10 NOTE — PATIENT INSTRUCTIONS
Patient is to return to the clinic in 1 month for a blood pressure recheck, formal visit in 3 months.  I did not change any medicines today however if her blood pressure remains elevated may need to increase amlodipine to 10 mg daily.  Labs as discussed.  Hemoglobin A1c was 6.6.  Recommend dietary modification.  I did not change any medicines.Pt verbalizes understanding and agreement with plan of care.

## 2019-07-10 NOTE — PROGRESS NOTES
Subjective   Pari Howell is a 69 y.o. female.   Chief Complaint   Patient presents with   • Hyperlipidemia     Follow UP   • Diabetes      History of Present Illness -170.  Denies sores or wounds that will not heal.  Since last visit, has had [employer] home visit.  Patient denies fever chills, headache, ear pain, sore throat, shortness of air, cough, wheezing, chest pain, abdominal pain, nausea, vomiting, diarrhea, dysuria, blood in stool or urine.  Mood is good.  Eating and drinking as usual.  No unexplained weight loss or gain.      The following portions of the patient's history were reviewed and updated as appropriate: allergies, current medications, past family history, past medical history, past social history, past surgical history and problem list.    Current Outpatient Medications:   •  amLODIPine (NORVASC) 5 MG tablet, Take 1 tablet by mouth Daily., Disp: 90 tablet, Rfl: 1  •  Blood Glucose Monitoring Suppl (TRUE METRIX AIR GLUCOSE METER) w/Device kit, , Disp: , Rfl:   •  clotrimazole (LOTRIMIN) 1 % cream, Apply  topically 2 (Two) Times a Day., Disp: 40 g, Rfl: 0  •  estradiol (ESTRACE) 0.5 MG tablet, Take 1 tablet by mouth Daily., Disp: 90 tablet, Rfl: 1  •  glucose blood test strip, 1 each by Other route Daily. Use as instructed, Disp: 100 each, Rfl: 3  •  glucose monitor monitoring kit, 1 each Daily., Disp: 1 each, Rfl: 0  •  Lancets misc, 1 applicator Daily., Disp: 100 each, Rfl: 3  •  latanoprost (XALATAN) 0.005 % ophthalmic solution, Apply 1 drop to eye Daily., Disp: 7.5 mL, Rfl: 3  •  levothyroxine (SYNTHROID, LEVOTHROID) 50 MCG tablet, Take 1 tablet by mouth Daily., Disp: 90 tablet, Rfl: 1  •  losartan-hydrochlorothiazide (HYZAAR) 100-12.5 MG per tablet, Take 1 tablet by mouth Daily., Disp: 90 tablet, Rfl: 1  •  metFORMIN ER (GLUCOPHAGE-XR) 500 MG 24 hr tablet, Take 2 tablets by mouth Daily With Breakfast., Disp: 180 tablet, Rfl: 1  •  nystatin (MYCOSTATIN) 432012 UNIT/GM cream, Apply  topically  "2 (Two) Times a Day., Disp: 90 g, Rfl: 2  •  pantoprazole (PROTONIX) 20 MG EC tablet, Take 1 tablet by mouth Daily., Disp: 90 tablet, Rfl: 0  •  timolol (TIMOPTIC) 0.5 % ophthalmic solution, , Disp: , Rfl:   •  triamcinolone (KENALOG) 0.025 % cream, Apply  topically 3 (Three) Times a Day., Disp: 80 g, Rfl: 4    Review of Systems Consitutional, HEENT, Respiratory, CV, GI, , Skin, Musculoskeletal, Neuro-mental, Endocrinological, Hematological were reviewed.  Positives were discussed in the HPI, otherwise ROS was negative   /74   Pulse 70   Ht 172.7 cm (68\")   Wt 83.1 kg (183 lb 3.2 oz)   LMP  (LMP Unknown)   SpO2 98%   BMI 27.86 kg/m²     Objective   No Known Allergies    Physical Exam   Constitutional: She is oriented to person, place, and time. She appears well-developed and well-nourished. No distress.   HENT:   Head: Normocephalic.   Eyes: Left eye exhibits no discharge. No scleral icterus.   Neck: Neck supple. No thyromegaly present.   No carotid bruit bilat   Cardiovascular: Normal rate, regular rhythm, normal heart sounds and intact distal pulses. Exam reveals no gallop and no friction rub.   No murmur heard.  No pedal edema   Pulmonary/Chest: Effort normal and breath sounds normal. No stridor. No respiratory distress. She has no wheezes. She has no rales.   Abdominal: Soft. There is no tenderness.   Musculoskeletal:   RYAN well.  Gait upright and steady    Lymphadenopathy:     She has no cervical adenopathy.   Neurological: She is alert and oriented to person, place, and time.   Skin: Skin is warm and dry. Capillary refill takes less than 2 seconds.   Is pink, no rash    Nursing note and vitals reviewed.      Procedures    LABS  Results for orders placed or performed in visit on 07/10/19   POC Glycosylated Hemoglobin (Hb A1C)   Result Value Ref Range    Hemoglobin A1C 6.6 %   POCT Glucose   Result Value Ref Range    Glucose 119 70 - 130 mg/dL       Assessment/Plan   Pari was seen today for " hyperlipidemia and diabetes.    Diagnoses and all orders for this visit:    Essential hypertension  -     Basic Metabolic Panel    Controlled type 2 diabetes mellitus with diabetic neuropathy, without long-term current use of insulin (CMS/[facility])  -     Basic Metabolic Panel  -     POC Glycosylated Hemoglobin (Hb A1C)  -     POCT Glucose      Patient Instructions   Patient is to return to the clinic in 1 month for a blood pressure recheck, formal visit in 3 months.  I did not change any medicines today however if her blood pressure remains elevated may need to increase amlodipine to 10 mg daily.  Labs as discussed.  Hemoglobin A1c was 6.6.  Recommend dietary modification.  I did not change any medicines.Pt verbalizes understanding and agreement with plan of care.       EMR Dragon/transcription disclaimer:  Please note that portions of this note were completed with a voice recognition program.  Electronic transcription of the voice recognition program may permit erroneous words or phrases to be inadvertently transcribed.  Although I have reviewed the note for such errors, some may still exist in this documentation     ELIZABETH Harper

## 2019-07-11 LAB
BUN SERPL-MCNC: 13 MG/DL (ref 8–23)
BUN/CREAT SERPL: 17.3 (ref 7–25)
CALCIUM SERPL-MCNC: 9.6 MG/DL (ref 8.6–10.5)
CHLORIDE SERPL-SCNC: 102 MMOL/L (ref 98–107)
CO2 SERPL-SCNC: 25.4 MMOL/L (ref 22–29)
CREAT SERPL-MCNC: 0.75 MG/DL (ref 0.57–1)
GLUCOSE SERPL-MCNC: 143 MG/DL (ref 65–99)
POTASSIUM SERPL-SCNC: 4.4 MMOL/L (ref 3.5–5.2)
SODIUM SERPL-SCNC: 140 MMOL/L (ref 136–145)

## 2019-08-09 ENCOUNTER — HOSPITAL ENCOUNTER (OUTPATIENT)
Dept: CT IMAGING | Facility: HOSPITAL | Age: 69
Discharge: HOME | End: 2019-08-09
Admitting: NURSE PRACTITIONER

## 2019-08-09 ENCOUNTER — OFFICE VISIT (OUTPATIENT)
Dept: INTERNAL MEDICINE | Facility: CLINIC | Age: 69
End: 2019-08-09

## 2019-08-09 VITALS
HEIGHT: 68 IN | RESPIRATION RATE: 16 BRPM | DIASTOLIC BLOOD PRESSURE: 68 MMHG | WEIGHT: 179 LBS | HEART RATE: 69 BPM | TEMPERATURE: 97.9 F | SYSTOLIC BLOOD PRESSURE: 126 MMHG | BODY MASS INDEX: 27.13 KG/M2 | OXYGEN SATURATION: 95 %

## 2019-08-09 DIAGNOSIS — R19.7 DIARRHEA, UNSPECIFIED TYPE: ICD-10-CM

## 2019-08-09 DIAGNOSIS — R10.84 GENERALIZED ABDOMINAL PAIN: Primary | ICD-10-CM

## 2019-08-09 DIAGNOSIS — R10.84 GENERALIZED ABDOMINAL PAIN: ICD-10-CM

## 2019-08-09 PROCEDURE — 99214 OFFICE O/P EST MOD 30 MIN: CPT | Performed by: NURSE PRACTITIONER

## 2019-08-09 PROCEDURE — 74176 CT ABD & PELVIS W/O CONTRAST: CPT

## 2019-08-09 NOTE — PATIENT INSTRUCTIONS
N.p.o. until after abdominal CAT scan results have been obtained.   Scan was ordered stat.  Further plan of care will be decided once CT results are in.  Pt verbalizes understanding and agreement with plan of care.

## 2019-08-09 NOTE — PROGRESS NOTES
Subjective   Pari Howell is a 69 y.o. female.   Chief Complaint   Patient presents with   • GI Problem   • Diarrhea     Pt states Immodium isn't working any more, yellow and liquid   • Fatigue      History of Present Illness  Generalized abd pain.  Onset Tuesday.  Feels like cramping.  Took imodium yesterday for loose stool with minimal relief.  Has about 6 loose stools today.  No blood in stool.  No vomiting but is nauseated.  Not eating but is drinking OK.  No fever.  No dysuria.   No antibiocs.  Granddaughter had similar symptoms in July after returing from [facility].  Last intake 0830 this AM.   Denies headache, shortness of air, chest pain.  Denies history of diverticulitis or diverticulosis.    The following portions of the patient's history were reviewed and updated as appropriate: allergies, current medications, past family history, past medical history, past social history, past surgical history and problem list.    Current Outpatient Medications:   •  amLODIPine (NORVASC) 5 MG tablet, Take 1 tablet by mouth Daily., Disp: 90 tablet, Rfl: 1  •  Blood Glucose Monitoring Suppl (TRUE METRIX AIR GLUCOSE METER) w/Device kit, , Disp: , Rfl:   •  clotrimazole (LOTRIMIN) 1 % cream, Apply  topically 2 (Two) Times a Day., Disp: 40 g, Rfl: 0  •  estradiol (ESTRACE) 0.5 MG tablet, Take 1 tablet by mouth Daily., Disp: 90 tablet, Rfl: 1  •  glucose blood test strip, 1 each by Other route Daily. Use as instructed, Disp: 100 each, Rfl: 3  •  glucose monitor monitoring kit, 1 each Daily., Disp: 1 each, Rfl: 0  •  Lancets misc, 1 applicator Daily., Disp: 100 each, Rfl: 3  •  latanoprost (XALATAN) 0.005 % ophthalmic solution, Apply 1 drop to eye Daily., Disp: 7.5 mL, Rfl: 3  •  levothyroxine (SYNTHROID, LEVOTHROID) 50 MCG tablet, Take 1 tablet by mouth Daily., Disp: 90 tablet, Rfl: 1  •  losartan-hydrochlorothiazide (HYZAAR) 100-12.5 MG per tablet, Take 1 tablet by mouth Daily., Disp: 90 tablet, Rfl: 1  •  metFORMIN ER  "(GLUCOPHAGE-XR) 500 MG 24 hr tablet, Take 2 tablets by mouth Daily With Breakfast., Disp: 180 tablet, Rfl: 1  •  nystatin (MYCOSTATIN) 004632 UNIT/GM cream, Apply  topically 2 (Two) Times a Day., Disp: 90 g, Rfl: 2  •  pantoprazole (PROTONIX) 20 MG EC tablet, TAKE 1 TABLET EVERY DAY, Disp: 90 tablet, Rfl: 0  •  timolol (TIMOPTIC) 0.5 % ophthalmic solution, , Disp: , Rfl:   •  triamcinolone (KENALOG) 0.025 % cream, Apply  topically 3 (Three) Times a Day., Disp: 80 g, Rfl: 4    Review of Systems Consitutional, HEENT, Respiratory, CV, GI, , Skin, Musculoskeletal, Neuro-mental, Endocrinological, Hematological were reviewed.  Positives were discussed in the HPI, otherwise ROS was negative   /68   Pulse 69   Temp 97.9 °F (36.6 °C)   Resp 16   Ht 172.7 cm (68\")   Wt 81.2 kg (179 lb)   LMP  (LMP Unknown)   SpO2 95%   BMI 27.22 kg/m²     Objective   No Known Allergies    Physical Exam   Constitutional: She is oriented to person, place, and time. She appears well-developed and well-nourished.   Appears not to feel well   HENT:   Head: Normocephalic.   Oral mucosa moist   Eyes: Right eye exhibits no discharge. Left eye exhibits no discharge. No scleral icterus.   Neck: Neck supple.   Cardiovascular: Normal rate, regular rhythm, normal heart sounds and intact distal pulses. Exam reveals no gallop and no friction rub.   No murmur heard.  Pulmonary/Chest: Effort normal and breath sounds normal. No stridor. No respiratory distress. She has no wheezes. She has no rales.   Abdominal: Soft.   Has hyperactive bowel sounds in all quadrants.  Diffusely tender to abdomen especially to the epigastrium and left lower quadrant.  No mass   Lymphadenopathy:     She has no cervical adenopathy.   Neurological: She is alert and oriented to person, place, and time.   Skin: Skin is warm and dry. Capillary refill takes less than 2 seconds.   Is pink, no rash    Nursing note and vitals reviewed.      Procedures    LABS  Results for " orders placed or performed in visit on 07/10/19   Basic Metabolic Panel   Result Value Ref Range    Glucose 143 (H) 65 - 99 mg/dL    BUN 13 8 - 23 mg/dL    Creatinine 0.75 0.57 - 1.00 mg/dL    eGFR Non African Am 77 >60 mL/min/1.73    eGFR African Am 93 >60 mL/min/1.73    BUN/Creatinine Ratio 17.3 7.0 - 25.0    Sodium 140 136 - 145 mmol/L    Potassium 4.4 3.5 - 5.2 mmol/L    Chloride 102 98 - 107 mmol/L    Total CO2 25.4 22.0 - 29.0 mmol/L    Calcium 9.6 8.6 - 10.5 mg/dL   POC Glycosylated Hemoglobin (Hb A1C)   Result Value Ref Range    Hemoglobin A1C 6.6 %   POCT Glucose   Result Value Ref Range    Glucose 119 70 - 130 mg/dL       Assessment/Plan   Pari was seen today for gi problem, diarrhea and fatigue.    Diagnoses and all orders for this visit:    Generalized abdominal pain  -     CBC & Differential  -     Comprehensive Metabolic Panel  -     CT Abdomen Pelvis Without Contrast; Future        Patient Instructions   N.p.o. until after abdominal CAT scan results have been obtained.   Scan was ordered stat.  Further plan of care will be decided once CT results are in.  Pt verbalizes understanding and agreement with plan of care.   EMR Dragon/transcription disclaimer:  Please note that portions of this note were completed with a voice recognition program.  Electronic transcription of the voice recognition program may permit erroneous words or phrases to be inadvertently transcribed.  Although I have reviewed the note for such errors, some may still exist in this documentation       ELIZABETH Harper

## 2019-08-10 LAB
ALBUMIN SERPL-MCNC: 4.1 G/DL (ref 3.5–5.2)
ALBUMIN/GLOB SERPL: 1.4 G/DL
ALP SERPL-CCNC: 73 U/L (ref 39–117)
ALT SERPL-CCNC: 20 U/L (ref 1–33)
AST SERPL-CCNC: 12 U/L (ref 1–32)
BASOPHILS # BLD AUTO: 0.06 10*3/MM3 (ref 0–0.2)
BASOPHILS NFR BLD AUTO: 0.8 % (ref 0–1.5)
BILIRUB SERPL-MCNC: 0.6 MG/DL (ref 0.2–1.2)
BUN SERPL-MCNC: 9 MG/DL (ref 8–23)
BUN/CREAT SERPL: 12 (ref 7–25)
CALCIUM SERPL-MCNC: 8.8 MG/DL (ref 8.6–10.5)
CHLORIDE SERPL-SCNC: 100 MMOL/L (ref 98–107)
CO2 SERPL-SCNC: 25.4 MMOL/L (ref 22–29)
CREAT SERPL-MCNC: 0.75 MG/DL (ref 0.57–1)
EOSINOPHIL # BLD AUTO: 0.52 10*3/MM3 (ref 0–0.4)
EOSINOPHIL NFR BLD AUTO: 6.7 % (ref 0.3–6.2)
ERYTHROCYTE [DISTWIDTH] IN BLOOD BY AUTOMATED COUNT: 12.6 % (ref 12.3–15.4)
GLOBULIN SER CALC-MCNC: 3 GM/DL
GLUCOSE SERPL-MCNC: 126 MG/DL (ref 65–99)
HCT VFR BLD AUTO: 46.7 % (ref 34–46.6)
HGB BLD-MCNC: 15.1 G/DL (ref 12–15.9)
IMM GRANULOCYTES # BLD AUTO: 0.03 10*3/MM3 (ref 0–0.05)
IMM GRANULOCYTES NFR BLD AUTO: 0.4 % (ref 0–0.5)
LYMPHOCYTES # BLD AUTO: 1.64 10*3/MM3 (ref 0.7–3.1)
LYMPHOCYTES NFR BLD AUTO: 21 % (ref 19.6–45.3)
MCH RBC QN AUTO: 30.2 PG (ref 26.6–33)
MCHC RBC AUTO-ENTMCNC: 32.3 G/DL (ref 31.5–35.7)
MCV RBC AUTO: 93.4 FL (ref 79–97)
MONOCYTES # BLD AUTO: 0.81 10*3/MM3 (ref 0.1–0.9)
MONOCYTES NFR BLD AUTO: 10.4 % (ref 5–12)
NEUTROPHILS # BLD AUTO: 4.74 10*3/MM3 (ref 1.7–7)
NEUTROPHILS NFR BLD AUTO: 60.7 % (ref 42.7–76)
NRBC BLD AUTO-RTO: 0 /100 WBC (ref 0–0.2)
PLATELET # BLD AUTO: 271 10*3/MM3 (ref 140–450)
POTASSIUM SERPL-SCNC: 3.8 MMOL/L (ref 3.5–5.2)
PROT SERPL-MCNC: 7.1 G/DL (ref 6–8.5)
RBC # BLD AUTO: 5 10*6/MM3 (ref 3.77–5.28)
SODIUM SERPL-SCNC: 140 MMOL/L (ref 136–145)
WBC # BLD AUTO: 7.8 10*3/MM3 (ref 3.4–10.8)

## 2019-08-15 ENCOUNTER — TELEPHONE (OUTPATIENT)
Dept: INTERNAL MEDICINE | Facility: CLINIC | Age: 69
End: 2019-08-15

## 2019-08-15 RX ORDER — METRONIDAZOLE 500 MG/1
500 TABLET ORAL 3 TIMES DAILY
Qty: 21 TABLET | Refills: 0 | Status: SHIPPED | OUTPATIENT
Start: 2019-08-15 | End: 2020-05-11

## 2019-08-15 NOTE — TELEPHONE ENCOUNTER
PATIENT CALLED TO REPORT THAT SHE IS STILL HAVING TROUBLE WITH DIARRHEA AND WANTED TO LET THE DOCTOR KNOW.

## 2019-08-15 NOTE — TELEPHONE ENCOUNTER
Patient continues to have loose stool.  No blood.  Some abdominal cramping.  Stool cultures pending.  She is using the Metamucil.  She may begin Imodium.  We will try Flagyl pending culture results.  Gloria, please call patient and let her know the cultures are still pending.  I have sent in a prescription for Flagyl to [facility].

## 2019-08-16 LAB
BACTERIA SPEC CULT: NORMAL
BACTERIA SPEC CULT: NORMAL
CAMPYLOBACTER STL CULT: NORMAL
E COLI SXT STL QL IA: NEGATIVE
SALM + SHIG STL CULT: NORMAL
SPECIMEN STATUS: NORMAL

## 2019-08-20 ENCOUNTER — TELEPHONE (OUTPATIENT)
Dept: INTERNAL MEDICINE | Facility: CLINIC | Age: 69
End: 2019-08-20

## 2019-08-20 NOTE — TELEPHONE ENCOUNTER
----- Message from ELIZABETH Woodson sent at 8/16/2019  5:53 PM EDT -----  Please call patient the stool cultures were negative for Salmonella and Shigella, Campylobacter and E. coli.  Please let me know if you are not doing better.

## 2019-08-21 ENCOUNTER — OFFICE VISIT (OUTPATIENT)
Dept: INTERNAL MEDICINE | Facility: CLINIC | Age: 69
End: 2019-08-21

## 2019-08-21 VITALS
SYSTOLIC BLOOD PRESSURE: 138 MMHG | BODY MASS INDEX: 26.98 KG/M2 | OXYGEN SATURATION: 95 % | DIASTOLIC BLOOD PRESSURE: 72 MMHG | RESPIRATION RATE: 16 BRPM | HEIGHT: 68 IN | WEIGHT: 178 LBS | HEART RATE: 78 BPM

## 2019-08-21 DIAGNOSIS — R19.7 DIARRHEA, UNSPECIFIED TYPE: Primary | ICD-10-CM

## 2019-08-21 PROCEDURE — 99213 OFFICE O/P EST LOW 20 MIN: CPT | Performed by: NURSE PRACTITIONER

## 2019-08-21 NOTE — PROGRESS NOTES
Subjective   Pari Howell is a 69 y.o. female.   Chief Complaint   Patient presents with   • Abdominal Pain     Follow Up   • Diarrhea      History of Present Illness Pt reports her diarrhea has improved.  Still with some cramping.  No blood in stool.  Feels weak from all the diarrhea.  Has no energy.  She continues to take the Flagyl and also the Metamucil.    The following portions of the patient's history were reviewed and updated as appropriate: allergies, current medications, past family history, past medical history, past social history, past surgical history and problem list.    Current Outpatient Medications:   •  amLODIPine (NORVASC) 5 MG tablet, Take 1 tablet by mouth Daily., Disp: 90 tablet, Rfl: 1  •  Blood Glucose Monitoring Suppl (TRUE METRIX AIR GLUCOSE METER) w/Device kit, , Disp: , Rfl:   •  clotrimazole (LOTRIMIN) 1 % cream, Apply  topically 2 (Two) Times a Day., Disp: 40 g, Rfl: 0  •  estradiol (ESTRACE) 0.5 MG tablet, Take 1 tablet by mouth Daily., Disp: 90 tablet, Rfl: 1  •  glucose blood test strip, 1 each by Other route Daily. Use as instructed, Disp: 100 each, Rfl: 3  •  glucose monitor monitoring kit, 1 each Daily., Disp: 1 each, Rfl: 0  •  Lancets misc, 1 applicator Daily., Disp: 100 each, Rfl: 3  •  latanoprost (XALATAN) 0.005 % ophthalmic solution, Apply 1 drop to eye Daily., Disp: 7.5 mL, Rfl: 3  •  levothyroxine (SYNTHROID, LEVOTHROID) 50 MCG tablet, Take 1 tablet by mouth Daily., Disp: 90 tablet, Rfl: 1  •  losartan-hydrochlorothiazide (HYZAAR) 100-12.5 MG per tablet, Take 1 tablet by mouth Daily., Disp: 90 tablet, Rfl: 1  •  metFORMIN ER (GLUCOPHAGE-XR) 500 MG 24 hr tablet, Take 2 tablets by mouth Daily With Breakfast., Disp: 180 tablet, Rfl: 1  •  metroNIDAZOLE (FLAGYL) 500 MG tablet, Take 1 tablet by mouth 3 (Three) Times a Day., Disp: 21 tablet, Rfl: 0  •  nystatin (MYCOSTATIN) 815805 UNIT/GM cream, Apply  topically 2 (Two) Times a Day., Disp: 90 g, Rfl: 2  •  pantoprazole  "(PROTONIX) 20 MG EC tablet, TAKE 1 TABLET EVERY DAY, Disp: 90 tablet, Rfl: 0  •  timolol (TIMOPTIC) 0.5 % ophthalmic solution, , Disp: , Rfl:   •  triamcinolone (KENALOG) 0.025 % cream, Apply  topically 3 (Three) Times a Day., Disp: 80 g, Rfl: 4    Review of Systems Consitutional, HEENT, Respiratory, CV, GI, , Skin, Musculoskeletal, Neuro-mental, Endocrinological, Hematological were reviewed.  Positives were discussed in the HPI, otherwise ROS was negative   /72   Pulse 78   Resp 16   Ht 172.7 cm (68\")   Wt 80.7 kg (178 lb)   LMP  (LMP Unknown)   SpO2 95%   Breastfeeding? No   BMI 27.06 kg/m²     Objective   No Known Allergies    Physical Exam   Constitutional: She is oriented to person, place, and time. She appears well-developed and well-nourished. No distress.   HENT:   Head: Normocephalic.   Cardiovascular: Normal rate, regular rhythm, normal heart sounds and intact distal pulses. Exam reveals no gallop and no friction rub.   No murmur heard.  Pulmonary/Chest: Effort normal and breath sounds normal. No stridor. No respiratory distress. She has no wheezes. She has no rales.   Abdominal: Soft.   Has active bowel sounds in all quadrants.  Is soft, nontender.  No mass   Neurological: She is alert and oriented to person, place, and time.   Skin: Skin is warm and dry. Capillary refill takes less than 2 seconds.   Is pink, no rash    Nursing note and vitals reviewed.      Procedures    LABS  Results for orders placed or performed in visit on 08/09/19   Stool Culture - ,   Result Value Ref Range    Salmonella/Shigella Screen Final report     Result 1 Comment     Campylobacter Culture Final report     Result 1 Comment     E coli, Shiga toxin Assay Negative Negative   Comprehensive Metabolic Panel   Result Value Ref Range    Glucose 126 (H) 65 - 99 mg/dL    BUN 9 8 - 23 mg/dL    Creatinine 0.75 0.57 - 1.00 mg/dL    eGFR Non African Am 77 >60 mL/min/1.73    eGFR African Am 93 >60 mL/min/1.73    " BUN/Creatinine Ratio 12.0 7.0 - 25.0    Sodium 140 136 - 145 mmol/L    Potassium 3.8 3.5 - 5.2 mmol/L    Chloride 100 98 - 107 mmol/L    Total CO2 25.4 22.0 - 29.0 mmol/L    Calcium 8.8 8.6 - 10.5 mg/dL    Total Protein 7.1 6.0 - 8.5 g/dL    Albumin 4.10 3.50 - 5.20 g/dL    Globulin 3.0 gm/dL    A/G Ratio 1.4 g/dL    Total Bilirubin 0.6 0.2 - 1.2 mg/dL    Alkaline Phosphatase 73 39 - 117 U/L    AST (SGOT) 12 1 - 32 U/L    ALT (SGPT) 20 1 - 33 U/L   Specimen Status Report   Result Value Ref Range    Specimen Status CANCELED    CBC & Differential   Result Value Ref Range    WBC 7.80 3.40 - 10.80 10*3/mm3    RBC 5.00 3.77 - 5.28 10*6/mm3    Hemoglobin 15.1 12.0 - 15.9 g/dL    Hematocrit 46.7 (H) 34.0 - 46.6 %    MCV 93.4 79.0 - 97.0 fL    MCH 30.2 26.6 - 33.0 pg    MCHC 32.3 31.5 - 35.7 g/dL    RDW 12.6 12.3 - 15.4 %    Platelets 271 140 - 450 10*3/mm3    Neutrophil Rel % 60.7 42.7 - 76.0 %    Lymphocyte Rel % 21.0 19.6 - 45.3 %    Monocyte Rel % 10.4 5.0 - 12.0 %    Eosinophil Rel % 6.7 (H) 0.3 - 6.2 %    Basophil Rel % 0.8 0.0 - 1.5 %    Neutrophils Absolute 4.74 1.70 - 7.00 10*3/mm3    Lymphocytes Absolute 1.64 0.70 - 3.10 10*3/mm3    Monocytes Absolute 0.81 0.10 - 0.90 10*3/mm3    Eosinophils Absolute 0.52 (H) 0.00 - 0.40 10*3/mm3    Basophils Absolute 0.06 0.00 - 0.20 10*3/mm3    Immature Granulocyte Rel % 0.4 0.0 - 0.5 %    Immature Grans Absolute 0.03 0.00 - 0.05 10*3/mm3    nRBC 0.0 0.0 - 0.2 /100 WBC       Assessment/Plan   Pari was seen today for abdominal pain and diarrhea.    Diagnoses and all orders for this visit:    Diarrhea, unspecified type  -     CBC & Differential  -     Comprehensive Metabolic Panel  -     Vitamin B12  -     Helicobacter Pylori, IgA IgG IgM        Patient Instructions   Labs as discussed.  Continue Flagyl and Metamucil.  If she continues to have problems with loose stool, we may need to try to decrease her metformin dose to see if that will help.  May also need a GI referral.  Pt  verbalizes understanding and agreement with plan of care.       EMR Dragon/transcription disclaimer:  Please note that portions of this note were completed with a voice recognition program.  Electronic transcription of the voice recognition program may permit erroneous words or phrases to be inadvertently transcribed.  Although I have reviewed the note for such errors, some may still exist in this documentation   Fanny Veloz, APRN

## 2019-08-22 PROBLEM — K58.0 IRRITABLE BOWEL SYNDROME WITH DIARRHEA: Status: ACTIVE | Noted: 2019-08-22

## 2019-08-22 NOTE — PATIENT INSTRUCTIONS
Labs as discussed.  Continue Flagyl and Metamucil.  If she continues to have problems with loose stool, we may need to try to decrease her metformin dose to see if that will help.  May also need a GI referral.  Pt verbalizes understanding and agreement with plan of care.

## 2019-08-23 ENCOUNTER — TELEPHONE (OUTPATIENT)
Dept: INTERNAL MEDICINE | Facility: CLINIC | Age: 69
End: 2019-08-23

## 2019-08-23 LAB
ALBUMIN SERPL-MCNC: 4.6 G/DL (ref 3.5–5.2)
ALBUMIN/GLOB SERPL: 2.2 G/DL
ALP SERPL-CCNC: 64 U/L (ref 39–117)
ALT SERPL-CCNC: 23 U/L (ref 1–33)
AST SERPL-CCNC: 25 U/L (ref 1–32)
BASOPHILS # BLD AUTO: 0.05 10*3/MM3 (ref 0–0.2)
BASOPHILS NFR BLD AUTO: 0.6 % (ref 0–1.5)
BILIRUB SERPL-MCNC: 0.4 MG/DL (ref 0.2–1.2)
BUN SERPL-MCNC: 9 MG/DL (ref 8–23)
BUN/CREAT SERPL: 13.8 (ref 7–25)
CALCIUM SERPL-MCNC: 9.4 MG/DL (ref 8.6–10.5)
CHLORIDE SERPL-SCNC: 103 MMOL/L (ref 98–107)
CO2 SERPL-SCNC: 22.1 MMOL/L (ref 22–29)
CREAT SERPL-MCNC: 0.65 MG/DL (ref 0.57–1)
EOSINOPHIL # BLD AUTO: 0.36 10*3/MM3 (ref 0–0.4)
EOSINOPHIL NFR BLD AUTO: 4.4 % (ref 0.3–6.2)
ERYTHROCYTE [DISTWIDTH] IN BLOOD BY AUTOMATED COUNT: 13.2 % (ref 12.3–15.4)
GLOBULIN SER CALC-MCNC: 2.1 GM/DL
GLUCOSE SERPL-MCNC: 122 MG/DL (ref 65–99)
H PYLORI IGA SER-ACNC: <9 UNITS (ref 0–8.9)
H PYLORI IGG SER IA-ACNC: 0.14 INDEX VALUE (ref 0–0.79)
H PYLORI IGM SER-ACNC: <9 UNITS (ref 0–8.9)
HCT VFR BLD AUTO: 45.3 % (ref 34–46.6)
HGB BLD-MCNC: 14.7 G/DL (ref 12–15.9)
IMM GRANULOCYTES # BLD AUTO: 0.02 10*3/MM3 (ref 0–0.05)
IMM GRANULOCYTES NFR BLD AUTO: 0.2 % (ref 0–0.5)
LYMPHOCYTES # BLD AUTO: 1.51 10*3/MM3 (ref 0.7–3.1)
LYMPHOCYTES NFR BLD AUTO: 18.4 % (ref 19.6–45.3)
MCH RBC QN AUTO: 30.1 PG (ref 26.6–33)
MCHC RBC AUTO-ENTMCNC: 32.5 G/DL (ref 31.5–35.7)
MCV RBC AUTO: 92.8 FL (ref 79–97)
MONOCYTES # BLD AUTO: 0.59 10*3/MM3 (ref 0.1–0.9)
MONOCYTES NFR BLD AUTO: 7.2 % (ref 5–12)
NEUTROPHILS # BLD AUTO: 5.68 10*3/MM3 (ref 1.7–7)
NEUTROPHILS NFR BLD AUTO: 69.2 % (ref 42.7–76)
NRBC BLD AUTO-RTO: 0 /100 WBC (ref 0–0.2)
PLATELET # BLD AUTO: 353 10*3/MM3 (ref 140–450)
POTASSIUM SERPL-SCNC: 3.7 MMOL/L (ref 3.5–5.2)
PROT SERPL-MCNC: 6.7 G/DL (ref 6–8.5)
RBC # BLD AUTO: 4.88 10*6/MM3 (ref 3.77–5.28)
SODIUM SERPL-SCNC: 139 MMOL/L (ref 136–145)
VIT B12 SERPL-MCNC: 591 PG/ML (ref 211–946)
WBC # BLD AUTO: 8.21 10*3/MM3 (ref 3.4–10.8)

## 2019-09-11 DIAGNOSIS — K21.9 GASTROESOPHAGEAL REFLUX DISEASE WITHOUT ESOPHAGITIS: ICD-10-CM

## 2019-09-11 RX ORDER — PANTOPRAZOLE SODIUM 20 MG/1
TABLET, DELAYED RELEASE ORAL
Qty: 90 TABLET | Refills: 0 | Status: SHIPPED | OUTPATIENT
Start: 2019-09-11 | End: 2019-11-13 | Stop reason: SDUPTHER

## 2019-09-24 DIAGNOSIS — E03.9 HYPOTHYROIDISM, UNSPECIFIED TYPE: ICD-10-CM

## 2019-09-24 DIAGNOSIS — E89.41 HOT FLASHES DUE TO SURGICAL MENOPAUSE: ICD-10-CM

## 2019-09-24 DIAGNOSIS — E11.40 CONTROLLED TYPE 2 DIABETES MELLITUS WITH DIABETIC NEUROPATHY, WITHOUT LONG-TERM CURRENT USE OF INSULIN (HCC): ICD-10-CM

## 2019-09-24 DIAGNOSIS — I10 ESSENTIAL HYPERTENSION: ICD-10-CM

## 2019-09-25 RX ORDER — ESTRADIOL 0.5 MG/1
TABLET ORAL
Qty: 90 TABLET | Refills: 1 | Status: SHIPPED | OUTPATIENT
Start: 2019-09-25 | End: 2020-01-08 | Stop reason: SDUPTHER

## 2019-09-25 RX ORDER — METFORMIN HYDROCHLORIDE 500 MG/1
1000 TABLET, EXTENDED RELEASE ORAL
Qty: 180 TABLET | Refills: 1 | Status: SHIPPED | OUTPATIENT
Start: 2019-09-25 | End: 2020-01-08 | Stop reason: SDUPTHER

## 2019-09-25 RX ORDER — LEVOTHYROXINE SODIUM 0.05 MG/1
TABLET ORAL
Qty: 90 TABLET | Refills: 1 | Status: SHIPPED | OUTPATIENT
Start: 2019-09-25 | End: 2020-02-12

## 2019-09-25 RX ORDER — AMLODIPINE BESYLATE 5 MG/1
TABLET ORAL
Qty: 90 TABLET | Refills: 1 | Status: SHIPPED | OUTPATIENT
Start: 2019-09-25 | End: 2020-01-08 | Stop reason: SDUPTHER

## 2019-09-25 RX ORDER — LOSARTAN POTASSIUM AND HYDROCHLOROTHIAZIDE 12.5; 1 MG/1; MG/1
TABLET ORAL
Qty: 90 TABLET | Refills: 1 | Status: SHIPPED | OUTPATIENT
Start: 2019-09-25 | End: 2020-02-12

## 2019-10-01 ENCOUNTER — OFFICE VISIT (OUTPATIENT)
Dept: INTERNAL MEDICINE | Facility: CLINIC | Age: 69
End: 2019-10-01

## 2019-10-01 VITALS
RESPIRATION RATE: 16 BRPM | SYSTOLIC BLOOD PRESSURE: 142 MMHG | DIASTOLIC BLOOD PRESSURE: 64 MMHG | OXYGEN SATURATION: 93 % | HEART RATE: 88 BPM | WEIGHT: 179.4 LBS | TEMPERATURE: 100 F | BODY MASS INDEX: 27.19 KG/M2 | HEIGHT: 68 IN

## 2019-10-01 DIAGNOSIS — R50.9 FEVER, UNSPECIFIED FEVER CAUSE: ICD-10-CM

## 2019-10-01 DIAGNOSIS — R30.0 DYSURIA: Primary | ICD-10-CM

## 2019-10-01 DIAGNOSIS — R19.7 DIARRHEA, UNSPECIFIED TYPE: ICD-10-CM

## 2019-10-01 LAB
BILIRUB BLD-MCNC: NEGATIVE MG/DL
CLARITY, POC: CLEAR
COLOR UR: YELLOW
EXPIRATION DATE: NORMAL
FLUAV AG NPH QL: NEGATIVE
FLUBV AG NPH QL: NEGATIVE
GLUCOSE UR STRIP-MCNC: NEGATIVE MG/DL
INTERNAL CONTROL: NORMAL
KETONES UR QL: NEGATIVE
LEUKOCYTE EST, POC: NEGATIVE
Lab: NORMAL
NITRITE UR-MCNC: NEGATIVE MG/ML
PH UR: 5 [PH] (ref 5–8)
PROT UR STRIP-MCNC: NEGATIVE MG/DL
RBC # UR STRIP: NEGATIVE /UL
SP GR UR: 1.01 (ref 1–1.03)
UROBILINOGEN UR QL: NORMAL

## 2019-10-01 PROCEDURE — 87804 INFLUENZA ASSAY W/OPTIC: CPT | Performed by: NURSE PRACTITIONER

## 2019-10-01 PROCEDURE — 99213 OFFICE O/P EST LOW 20 MIN: CPT | Performed by: NURSE PRACTITIONER

## 2019-10-01 PROCEDURE — 81003 URINALYSIS AUTO W/O SCOPE: CPT | Performed by: NURSE PRACTITIONER

## 2019-10-01 NOTE — PROGRESS NOTES
Subjective   Pari Howell is a 69 y.o. female.   Chief Complaint   Patient presents with   • Urinary Tract Infection     diarreha, chills, fever      History of Present Illness As above.  Some burning with urination.  Denies headache, ear pain, sore throat, shortness of air, cough, chest pain.  Said she had some diarrhea since yesterday after eating a fried sandwich.  No blood in stool.  She is taking Imodium with relief.  No blood in urine.  No back pain.    The following portions of the patient's history were reviewed and updated as appropriate: allergies, current medications, past family history, past medical history, past social history, past surgical history and problem list.    Current Outpatient Medications:   •  amLODIPine (NORVASC) 5 MG tablet, TAKE 1 TABLET EVERY DAY, Disp: 90 tablet, Rfl: 1  •  Blood Glucose Monitoring Suppl (TRUE METRIX AIR GLUCOSE METER) w/Device kit, , Disp: , Rfl:   •  clotrimazole (LOTRIMIN) 1 % cream, Apply  topically 2 (Two) Times a Day., Disp: 40 g, Rfl: 0  •  estradiol (ESTRACE) 0.5 MG tablet, TAKE 1 TABLET EVERY DAY, Disp: 90 tablet, Rfl: 1  •  glucose blood test strip, 1 each by Other route Daily. Use as instructed, Disp: 100 each, Rfl: 3  •  glucose monitor monitoring kit, 1 each Daily., Disp: 1 each, Rfl: 0  •  Lancets misc, 1 applicator Daily., Disp: 100 each, Rfl: 3  •  latanoprost (XALATAN) 0.005 % ophthalmic solution, Apply 1 drop to eye Daily., Disp: 7.5 mL, Rfl: 3  •  levothyroxine (SYNTHROID, LEVOTHROID) 50 MCG tablet, TAKE 1 TABLET EVERY DAY, Disp: 90 tablet, Rfl: 1  •  losartan-hydrochlorothiazide (HYZAAR) 100-12.5 MG per tablet, TAKE 1 TABLET EVERY DAY, Disp: 90 tablet, Rfl: 1  •  metFORMIN ER (GLUCOPHAGE-XR) 500 MG 24 hr tablet, TAKE 2 TABLETS BY MOUTH DAILY WITH BREAKFAST., Disp: 180 tablet, Rfl: 1  •  metroNIDAZOLE (FLAGYL) 500 MG tablet, Take 1 tablet by mouth 3 (Three) Times a Day., Disp: 21 tablet, Rfl: 0  •  nystatin (MYCOSTATIN) 001466 UNIT/GM cream,  "Apply  topically 2 (Two) Times a Day., Disp: 90 g, Rfl: 2  •  pantoprazole (PROTONIX) 20 MG EC tablet, TAKE 1 TABLET EVERY DAY, Disp: 90 tablet, Rfl: 0  •  timolol (TIMOPTIC) 0.5 % ophthalmic solution, , Disp: , Rfl:   •  triamcinolone (KENALOG) 0.025 % cream, Apply  topically 3 (Three) Times a Day., Disp: 80 g, Rfl: 4     Active Ambulatory Problems     Diagnosis Date Noted   • Diabetes (CMS/HCC) 06/09/2018   • Other specified glaucoma 06/26/2018   • High cholesterol 06/27/2018   • Vitamin D deficiency 06/29/2018   • Irritable bowel syndrome with diarrhea 08/22/2019     Resolved Ambulatory Problems     Diagnosis Date Noted   • Vaginal itching 06/09/2018     Past Medical History:   Diagnosis Date   • Diabetes (CMS/[facility])    • Dyspareunia, female    • Glaucoma    • H/O sexual problem    • High cholesterol    • History of abnormal cervical Pap smear    • Hypertension    • Hypertension    • Labial cyst    • Muscle weakness    • Thyroid disease    • Thyroid disorder    • Urinary incontinence    • Vaginal itching    • Yeast infection        Review of Systems Consitutional, HEENT, Respiratory, CV, GI, , Skin, Musculoskeletal, Neuro-mental, Endocrinological, Hematological were reviewed.  Positives were discussed in the HPI, otherwise ROS was negative   /64   Pulse 88   Temp 100 °F (37.8 °C)   Resp 16   Ht 172.7 cm (68\")   Wt 81.4 kg (179 lb 6.4 oz)   LMP  (LMP Unknown)   SpO2 93%   Breastfeeding? No   BMI 27.28 kg/m²     Objective   No Known Allergies    Physical Exam   Constitutional: She is oriented to person, place, and time. She appears well-developed and well-nourished. No distress.   HENT:   Head: Normocephalic.   Eyes: Right eye exhibits no discharge. Left eye exhibits no discharge.   Neck: Neck supple.   Cardiovascular: Normal rate, regular rhythm, normal heart sounds and intact distal pulses. Exam reveals no gallop and no friction rub.   No murmur heard.  Pulmonary/Chest: Effort normal and breath " sounds normal. No stridor. No respiratory distress. She has no wheezes. She has no rales.   Abdominal: Soft. Bowel sounds are normal.   Diffusely tender.  No CVA/flank tenderness   Neurological: She is alert and oriented to person, place, and time.   Skin: Skin is warm and dry. Capillary refill takes less than 2 seconds.   Is pink, no rash    Nursing note and vitals reviewed.      Procedures    LABS  Results for orders placed or performed in visit on 10/01/19   POCT urinalysis dipstick, automated   Result Value Ref Range    Color Yellow Yellow, Straw, Dark Yellow, Alicia    Clarity, UA Clear Clear    Specific Gravity  1.010 1.005 - 1.030    pH, Urine 5.0 5.0 - 8.0    Leukocytes Negative Negative    Nitrite, UA Negative Negative    Protein, POC Negative Negative mg/dL    Glucose, UA Negative Negative, 1000 mg/dL (3+) mg/dL    Ketones, UA Negative Negative    Urobilinogen, UA Normal Normal    Bilirubin Negative Negative    Blood, UA Negative Negative   POCT Influenza A/B   Result Value Ref Range    Rapid Influenza A Ag Negative Negative    Rapid Influenza B Ag Negative Negative    Internal Control Passed Passed    Lot Number [id number]     Expiration Date 11/17/2021        Assessment/Plan   Pari was seen today for urinary tract infection.    Diagnoses and all orders for this visit:    Dysuria  -     POCT urinalysis dipstick, automated    Diarrhea, unspecified type  -     Ambulatory Referral to Gastroenterology    Fever, unspecified fever cause  -     POCT Influenza A/B        Patient Instructions   Tylenol for pain and fever.  Drink plenty of fluids.  Continue Imodium for loose stool.  Add Metamucil to help thicken stool.  Since the loose stool seems to be an ongoing problem, we will refer to GI.  Flu was negative.  Urine was negative.  If you have new or worsening of symptoms, go to the emergency department.  Pt verbalizes understanding and agreement with plan of care.     EMR Dragon/transcription disclaimer:  Please  note that portions of this note were completed with a voice recognition program.  Electronic transcription of the voice recognition program may permit erroneous words or phrases to be inadvertently transcribed.  Although I have reviewed the note for such errors, some may still exist in this documentation     Fanny Veloz APRN

## 2019-10-02 PROBLEM — N89.8 VAGINAL ITCHING: Status: RESOLVED | Noted: 2018-06-09 | Resolved: 2019-10-02

## 2019-10-02 NOTE — PATIENT INSTRUCTIONS
Tylenol for pain and fever.  Drink plenty of fluids.  Continue Imodium for loose stool.  Add Metamucil to help thicken stool.  Since the loose stool seems to be an ongoing problem, we will refer to GI.  Flu was negative.  Urine was negative.  If you have new or worsening of symptoms, go to the emergency department.  Pt verbalizes understanding and agreement with plan of care.

## 2019-10-04 ENCOUNTER — TELEPHONE (OUTPATIENT)
Dept: INTERNAL MEDICINE | Facility: CLINIC | Age: 69
End: 2019-10-04

## 2019-10-04 NOTE — TELEPHONE ENCOUNTER
PT WAS CALLING ABOUT HER COLONOSCOPY     PLEASE CALL HER AS SOON AS ITS SCHEDULED  PTS NUMBER [phone]

## 2019-11-07 DIAGNOSIS — Z12.11 SCREENING FOR COLON CANCER: Primary | ICD-10-CM

## 2019-11-11 ENCOUNTER — TELEPHONE (OUTPATIENT)
Dept: GASTROENTEROLOGY | Facility: CLINIC | Age: 69
End: 2019-11-11

## 2019-11-11 NOTE — TELEPHONE ENCOUNTER
I WENT OVER BOWEL PREP INSTRUCTIONS WITH PATIENT. SHE WASN'T FOR SURE ON THE TIME TO START BOWEL PREP.

## 2019-11-13 DIAGNOSIS — K21.9 GASTROESOPHAGEAL REFLUX DISEASE WITHOUT ESOPHAGITIS: ICD-10-CM

## 2019-11-14 ENCOUNTER — OUTSIDE FACILITY SERVICE (OUTPATIENT)
Dept: GASTROENTEROLOGY | Facility: CLINIC | Age: 69
End: 2019-11-14

## 2019-11-14 ENCOUNTER — LAB REQUISITION (OUTPATIENT)
Dept: LAB | Facility: HOSPITAL | Age: 69
End: 2019-11-14

## 2019-11-14 DIAGNOSIS — K57.30 DIVERTICULOSIS OF LARGE INTESTINE WITHOUT PERFORATION OR ABSCESS WITHOUT BLEEDING: ICD-10-CM

## 2019-11-14 DIAGNOSIS — R19.7 DIARRHEA, UNSPECIFIED: ICD-10-CM

## 2019-11-14 PROCEDURE — G0500 MOD SEDAT ENDO SERVICE >5YRS: HCPCS | Performed by: INTERNAL MEDICINE

## 2019-11-14 PROCEDURE — 88305 TISSUE EXAM BY PATHOLOGIST: CPT | Performed by: INTERNAL MEDICINE

## 2019-11-14 PROCEDURE — 45380 COLONOSCOPY AND BIOPSY: CPT | Performed by: INTERNAL MEDICINE

## 2019-11-14 RX ORDER — PANTOPRAZOLE SODIUM 20 MG/1
TABLET, DELAYED RELEASE ORAL
Qty: 90 TABLET | Refills: 0 | Status: SHIPPED | OUTPATIENT
Start: 2019-11-14 | End: 2020-01-08 | Stop reason: SDUPTHER

## 2019-11-15 LAB
CYTO UR: NORMAL
LAB AP CASE REPORT: NORMAL
LAB AP CLINICAL INFORMATION: NORMAL
PATH REPORT.FINAL DX SPEC: NORMAL
PATH REPORT.GROSS SPEC: NORMAL

## 2019-11-22 ENCOUNTER — TELEPHONE (OUTPATIENT)
Dept: GASTROENTEROLOGY | Facility: CLINIC | Age: 69
End: 2019-11-22

## 2019-11-22 NOTE — TELEPHONE ENCOUNTER
Result Notes for Tissue Pathology Exam     Notes recorded by Brett Lara MD on 11/15/2019 at 6:27 PM EST  She should have started Align also and needs to give this combination 4-6 weeks before additional medication is tried.She can use OTC imodium 1-2 pills before going out or daily as needed. Dr. Lara  ------    Notes recorded by Michelle Vincent MA on 11/15/2019 at 4:05 PM EST  Spoke with patient and advised of results. She would like to know what to do since she is still having the issue with diarrhea?? She states she has started the benefiber today. Please advise?  ------    Notes recorded by Michelle Vincent MA on 11/15/2019 at 2:48 PM EST  LVM for pt to cb. Provided office number.  ------    Notes recorded by Brett Lara MD on 11/15/2019 at 1:26 PM EST  Please let Pari know that she does not have microscopic colitis. Dr. Lara

## 2019-11-22 NOTE — TELEPHONE ENCOUNTER
Called patient with recommendation. No answer left detailed message with instructions to call back with any questions.

## 2020-01-08 ENCOUNTER — OFFICE VISIT (OUTPATIENT)
Dept: INTERNAL MEDICINE | Facility: CLINIC | Age: 70
End: 2020-01-08

## 2020-01-08 VITALS
SYSTOLIC BLOOD PRESSURE: 130 MMHG | RESPIRATION RATE: 18 BRPM | HEART RATE: 67 BPM | WEIGHT: 184.6 LBS | OXYGEN SATURATION: 98 % | HEIGHT: 68 IN | DIASTOLIC BLOOD PRESSURE: 74 MMHG | TEMPERATURE: 97.5 F | BODY MASS INDEX: 27.98 KG/M2

## 2020-01-08 DIAGNOSIS — I10 ESSENTIAL HYPERTENSION: ICD-10-CM

## 2020-01-08 DIAGNOSIS — N89.8 VAGINAL ITCHING: ICD-10-CM

## 2020-01-08 DIAGNOSIS — E11.9 TYPE 2 DIABETES MELLITUS WITHOUT COMPLICATION, WITHOUT LONG-TERM CURRENT USE OF INSULIN (HCC): ICD-10-CM

## 2020-01-08 DIAGNOSIS — K21.9 GASTROESOPHAGEAL REFLUX DISEASE WITHOUT ESOPHAGITIS: ICD-10-CM

## 2020-01-08 DIAGNOSIS — E89.41 HOT FLASHES DUE TO SURGICAL MENOPAUSE: ICD-10-CM

## 2020-01-08 DIAGNOSIS — E11.40 CONTROLLED TYPE 2 DIABETES MELLITUS WITH DIABETIC NEUROPATHY, WITHOUT LONG-TERM CURRENT USE OF INSULIN (HCC): ICD-10-CM

## 2020-01-08 PROCEDURE — 99214 OFFICE O/P EST MOD 30 MIN: CPT | Performed by: INTERNAL MEDICINE

## 2020-01-08 RX ORDER — METFORMIN HYDROCHLORIDE 500 MG/1
1000 TABLET, EXTENDED RELEASE ORAL
Qty: 180 TABLET | Refills: 1 | Status: SHIPPED | OUTPATIENT
Start: 2020-01-08 | End: 2020-07-22 | Stop reason: SDUPTHER

## 2020-01-08 RX ORDER — TRIAMCINOLONE ACETONIDE 0.25 MG/G
CREAM TOPICAL 3 TIMES DAILY
Qty: 80 G | Refills: 4 | Status: SHIPPED | OUTPATIENT
Start: 2020-01-08 | End: 2020-07-22 | Stop reason: SDUPTHER

## 2020-01-08 RX ORDER — ESTRADIOL 0.5 MG/1
0.5 TABLET ORAL DAILY
Qty: 90 TABLET | Refills: 1 | Status: SHIPPED | OUTPATIENT
Start: 2020-01-08 | End: 2020-07-22 | Stop reason: SDUPTHER

## 2020-01-08 RX ORDER — AMLODIPINE BESYLATE 5 MG/1
5 TABLET ORAL DAILY
Qty: 90 TABLET | Refills: 1 | Status: SHIPPED | OUTPATIENT
Start: 2020-01-08 | End: 2020-07-22 | Stop reason: SDUPTHER

## 2020-01-08 RX ORDER — INFLUENZA A VIRUS A/MICHIGAN/45/2015 (H1N1) RECOMBINANT HEMAGGLUTININ ANTIGEN, INFLUENZA A VIRUS A/SINGAPORE/INFIMH-16-0019/2016 (H3N2) RECOMBINANT HEMAGGLUTININ ANTIGEN, INFLUENZA B VIRUS B/MARYLAND/15/2016 RECOMBINANT HEMAGGLUTININ ANTIGEN, AND INFLUENZA B VIRUS B/PHUKET/3073/2013 RECOMBINANT HEMAGGLUTININ ANTIGEN 45; 45; 45; 45 UG/.5ML; UG/.5ML; UG/.5ML; UG/.5ML
INJECTION INTRAMUSCULAR
Refills: 0 | COMMUNITY
Start: 2019-10-17 | End: 2020-01-08

## 2020-01-08 RX ORDER — PANTOPRAZOLE SODIUM 20 MG/1
20 TABLET, DELAYED RELEASE ORAL DAILY
Qty: 90 TABLET | Refills: 0 | Status: SHIPPED | OUTPATIENT
Start: 2020-01-08 | End: 2020-03-19

## 2020-01-08 NOTE — PROGRESS NOTES
"Subjective   Pari Howell is a 69 y.o. female.   Chief Complaint   Patient presents with   • Diabetes       History of Present Illness   Here to estab care with me. Was seeing NP in our practice who left. HTN, hot flashes, DMII, GERD. BP is well controlled on current medication.  DMII is controlled. Eye exam up to date. Sees podiatrist. Hot flashes are stable. Gerd is controlled with PPI  The following portions of the patient's history were reviewed and updated as appropriate: allergies, current medications, past family history, past medical history, past surgical history and problem list.    Review of Systems   Constitutional: Negative for activity change, appetite change, chills, diaphoresis, fatigue, fever and unexpected weight change.   HENT: Negative for congestion, ear discharge, ear pain, mouth sores, nosebleeds, sinus pressure, sneezing and sore throat.    Eyes: Negative for pain, discharge and itching.   Respiratory: Negative for cough, chest tightness, shortness of breath and wheezing.    Cardiovascular: Negative for chest pain, palpitations and leg swelling.   Gastrointestinal: Negative for abdominal pain, constipation, diarrhea, nausea and vomiting.   Endocrine: Negative for cold intolerance, heat intolerance, polydipsia and polyphagia.   Genitourinary: Negative for dysuria, flank pain, frequency, hematuria and urgency.   Musculoskeletal: Negative for arthralgias, back pain, gait problem, myalgias, neck pain and neck stiffness.   Skin: Negative for color change, pallor and rash.   Neurological: Negative for seizures, speech difficulty, numbness and headaches.   Psychiatric/Behavioral: Negative for agitation, confusion, decreased concentration and sleep disturbance. The patient is not nervous/anxious.    /74   Pulse 67   Temp 97.5 °F (36.4 °C) (Temporal)   Resp 18   Ht 172.7 cm (68\")   Wt 83.7 kg (184 lb 9.6 oz)   LMP  (LMP Unknown)   SpO2 98%   BMI 28.07 kg/m²       Objective   Physical " Exam   Constitutional: She is oriented to person, place, and time. She appears well-developed.   HENT:   Head: Normocephalic.   Right Ear: External ear normal.   Left Ear: External ear normal.   Nose: Nose normal.   Mouth/Throat: Oropharynx is clear and moist.   Eyes: Pupils are equal, round, and reactive to light. Conjunctivae are normal.   Neck: No JVD present. No thyromegaly present.   Cardiovascular: Normal rate, regular rhythm and normal heart sounds. Exam reveals no friction rub.   No murmur heard.  Pulmonary/Chest: Effort normal and breath sounds normal. No respiratory distress. She has no wheezes. She has no rales.   Abdominal: Soft. Bowel sounds are normal. She exhibits no distension. There is no tenderness. There is no guarding.   Musculoskeletal: She exhibits no edema or tenderness.   Lymphadenopathy:     She has no cervical adenopathy.   Neurological: She is alert and oriented to person, place, and time. She displays normal reflexes. No cranial nerve deficit.   Skin: No rash noted.   Psychiatric: Her behavior is normal.   Nursing note and vitals reviewed.      Assessment/Plan   Pari was seen today for diabetes.    Diagnoses and all orders for this visit:    Essential hypertension  -     amLODIPine (NORVASC) 5 MG tablet; Take 1 tablet by mouth Daily.  -     Comprehensive Metabolic Panel; Future  -     Lipid Panel; Future    Hot flashes due to surgical menopause  -     estradiol (ESTRACE) 0.5 MG tablet; Take 1 tablet by mouth Daily.    Controlled type 2 diabetes mellitus with diabetic neuropathy, without long-term current use of insulin (CMS/[facility])  -     metFORMIN ER (GLUCOPHAGE-XR) 500 MG 24 hr tablet; Take 2 tablets by mouth Daily With Breakfast.    Gastroesophageal reflux disease without esophagitis  -     pantoprazole (PROTONIX) 20 MG EC tablet; Take 1 tablet by mouth Daily.    Type 2 diabetes mellitus without complication, without long-term current use of insulin (CMS/HCC)  -     triamcinolone  (KENALOG) 0.025 % cream; Apply  topically to the appropriate area as directed 3 (Three) Times a Day.  -     Hemoglobin A1c; Future  -     Microalbumin / Creatinine Urine Ratio - Urine, Clean Catch; Future    Vaginal itching  -     triamcinolone (KENALOG) 0.025 % cream; Apply  topically to the appropriate area as directed 3 (Three) Times a Day.

## 2020-01-28 PROCEDURE — 87186 SC STD MICRODIL/AGAR DIL: CPT | Performed by: NURSE PRACTITIONER

## 2020-01-28 PROCEDURE — 87086 URINE CULTURE/COLONY COUNT: CPT | Performed by: NURSE PRACTITIONER

## 2020-01-30 ENCOUNTER — TELEPHONE (OUTPATIENT)
Dept: URGENT CARE | Facility: CLINIC | Age: 70
End: 2020-01-30

## 2020-01-31 ENCOUNTER — TELEPHONE (OUTPATIENT)
Dept: URGENT CARE | Facility: CLINIC | Age: 70
End: 2020-01-31

## 2020-02-12 ENCOUNTER — TELEPHONE (OUTPATIENT)
Dept: INTERNAL MEDICINE | Facility: CLINIC | Age: 70
End: 2020-02-12

## 2020-02-12 DIAGNOSIS — E03.9 HYPOTHYROIDISM, UNSPECIFIED TYPE: ICD-10-CM

## 2020-02-12 DIAGNOSIS — I10 ESSENTIAL HYPERTENSION: ICD-10-CM

## 2020-02-12 RX ORDER — LEVOTHYROXINE SODIUM 0.05 MG/1
TABLET ORAL
Qty: 90 TABLET | Refills: 1 | Status: SHIPPED | OUTPATIENT
Start: 2020-02-12 | End: 2020-07-22 | Stop reason: SDUPTHER

## 2020-02-12 RX ORDER — LOSARTAN POTASSIUM AND HYDROCHLOROTHIAZIDE 12.5; 1 MG/1; MG/1
TABLET ORAL
Qty: 90 TABLET | Refills: 1 | Status: SHIPPED | OUTPATIENT
Start: 2020-02-12 | End: 2020-07-30

## 2020-02-12 NOTE — TELEPHONE ENCOUNTER
PT called, states she had labwork completed 1/28/20 and never received her results. It looks as though results were relayed to PT, she may have just forgotten. PT would like someone to call her back to relay results, she'd also like a paper copy of the results mailed to the following address:   [address] [place] [address]      Please call Pari back with results:   [phone]

## 2020-02-13 NOTE — TELEPHONE ENCOUNTER
LVM for pt to return call, the labs pt is wanting results for was done during hospital encounter. Office number given.

## 2020-02-14 NOTE — TELEPHONE ENCOUNTER
Okay to print labs and mail. Sugars in diabetic range (not new). Triglycerides high. Needs low carb diet and otc fish oil to help lower TG.

## 2020-03-18 DIAGNOSIS — K21.9 GASTROESOPHAGEAL REFLUX DISEASE WITHOUT ESOPHAGITIS: ICD-10-CM

## 2020-03-19 RX ORDER — PANTOPRAZOLE SODIUM 20 MG/1
TABLET, DELAYED RELEASE ORAL
Qty: 90 TABLET | Refills: 0 | Status: SHIPPED | OUTPATIENT
Start: 2020-03-19 | End: 2020-05-11 | Stop reason: SDUPTHER

## 2020-04-24 DIAGNOSIS — E11.40 CONTROLLED TYPE 2 DIABETES MELLITUS WITH DIABETIC NEUROPATHY, WITHOUT LONG-TERM CURRENT USE OF INSULIN (HCC): ICD-10-CM

## 2020-04-27 RX ORDER — CALCIUM CITRATE/VITAMIN D3 200MG-6.25
TABLET ORAL
Qty: 100 EACH | Refills: 3 | Status: SHIPPED | OUTPATIENT
Start: 2020-04-27 | End: 2021-04-15

## 2020-05-11 ENCOUNTER — OFFICE VISIT (OUTPATIENT)
Dept: INTERNAL MEDICINE | Facility: CLINIC | Age: 70
End: 2020-05-11

## 2020-05-11 ENCOUNTER — LAB REQUISITION (OUTPATIENT)
Dept: LAB | Facility: HOSPITAL | Age: 70
End: 2020-05-11

## 2020-05-11 VITALS
HEART RATE: 70 BPM | HEIGHT: 67 IN | SYSTOLIC BLOOD PRESSURE: 136 MMHG | DIASTOLIC BLOOD PRESSURE: 80 MMHG | BODY MASS INDEX: 29.47 KG/M2 | TEMPERATURE: 96 F | OXYGEN SATURATION: 98 % | WEIGHT: 187.8 LBS

## 2020-05-11 DIAGNOSIS — E11.9 TYPE 2 DIABETES MELLITUS WITHOUT COMPLICATION, WITHOUT LONG-TERM CURRENT USE OF INSULIN (HCC): ICD-10-CM

## 2020-05-11 DIAGNOSIS — E78.5 HYPERLIPIDEMIA LDL GOAL <100: Primary | ICD-10-CM

## 2020-05-11 DIAGNOSIS — E03.9 HYPOTHYROIDISM (ACQUIRED): ICD-10-CM

## 2020-05-11 DIAGNOSIS — Z00.00 ROUTINE GENERAL MEDICAL EXAMINATION AT A HEALTH CARE FACILITY: ICD-10-CM

## 2020-05-11 DIAGNOSIS — K21.9 GASTROESOPHAGEAL REFLUX DISEASE WITHOUT ESOPHAGITIS: ICD-10-CM

## 2020-05-11 LAB
GLUCOSE BLDC GLUCOMTR-MCNC: 188 MG/DL (ref 70–130)
HBA1C MFR BLD: 7.2 %

## 2020-05-11 PROCEDURE — 83036 HEMOGLOBIN GLYCOSYLATED A1C: CPT | Performed by: INTERNAL MEDICINE

## 2020-05-11 PROCEDURE — 36415 COLL VENOUS BLD VENIPUNCTURE: CPT | Performed by: INTERNAL MEDICINE

## 2020-05-11 PROCEDURE — 99213 OFFICE O/P EST LOW 20 MIN: CPT | Performed by: INTERNAL MEDICINE

## 2020-05-11 PROCEDURE — 82962 GLUCOSE BLOOD TEST: CPT | Performed by: INTERNAL MEDICINE

## 2020-05-11 RX ORDER — PANTOPRAZOLE SODIUM 20 MG/1
20 TABLET, DELAYED RELEASE ORAL DAILY
Qty: 90 TABLET | Refills: 1 | Status: SHIPPED | OUTPATIENT
Start: 2020-05-11 | End: 2020-07-22 | Stop reason: SDUPTHER

## 2020-05-11 NOTE — PROGRESS NOTES
"Subjective   Pari Howell is a 69 y.o. female.   Chief Complaint   Patient presents with   • Hypertension       History of Present Illness   F/u on HTN, dm, and hlp. HTN has been stable. No cp or SOA. No HA. No heart palpitations.  HLP stable. Tolerating med. Working on low carb diet.  DM stable. Eye exam and foot exam up to date.  Hypothyroid has been stable on synthroid.  Itching all over for few weeks. Takes benadryl at night which helps. No fever.  The following portions of the patient's history were reviewed and updated as appropriate: allergies, current medications, past family history, past medical history, past social history, past surgical history and problem list.    Review of Systems   Constitutional: Negative for activity change, appetite change, chills, diaphoresis, fatigue, fever and unexpected weight change.   HENT: Negative for congestion, ear discharge, ear pain, mouth sores, nosebleeds, sinus pressure, sneezing and sore throat.    Eyes: Negative for pain, discharge and itching.   Respiratory: Negative for cough, chest tightness, shortness of breath and wheezing.    Cardiovascular: Negative for chest pain, palpitations and leg swelling.   Gastrointestinal: Negative for abdominal pain, constipation, diarrhea, nausea and vomiting.   Endocrine: Negative for cold intolerance, heat intolerance, polydipsia and polyphagia.   Genitourinary: Negative for dysuria, flank pain, frequency, hematuria and urgency.   Musculoskeletal: Negative for arthralgias, back pain, gait problem, myalgias, neck pain and neck stiffness.   Skin: Negative for color change, pallor and rash.        itching   Neurological: Negative for seizures, speech difficulty, numbness and headaches.   Psychiatric/Behavioral: Negative for agitation, confusion, decreased concentration and sleep disturbance. The patient is not nervous/anxious.      /80   Pulse 70   Temp 96 °F (35.6 °C)   Ht 170.2 cm (67\")   Wt 85.2 kg (187 lb 12.8 oz)  "  LMP  (LMP Unknown)   SpO2 98%   BMI 29.41 kg/m²     Objective   Physical Exam   Constitutional: She is oriented to person, place, and time. She appears well-developed.   HENT:   Head: Normocephalic.   Right Ear: External ear normal.   Left Ear: External ear normal.   Nose: Nose normal.   Mouth/Throat: Oropharynx is clear and moist.   Eyes: Pupils are equal, round, and reactive to light. Conjunctivae are normal.   Neck: No JVD present. No thyromegaly present.   Cardiovascular: Normal rate, regular rhythm and normal heart sounds. Exam reveals no friction rub.   No murmur heard.  Pulmonary/Chest: Effort normal and breath sounds normal. No respiratory distress. She has no wheezes. She has no rales.   Abdominal: Soft. Bowel sounds are normal. She exhibits no distension. There is no tenderness. There is no guarding.   Musculoskeletal: She exhibits no edema or tenderness.   Lymphadenopathy:     She has no cervical adenopathy.   Neurological: She is alert and oriented to person, place, and time. She displays normal reflexes. No cranial nerve deficit.   Skin: No rash noted.   Psychiatric: Her behavior is normal.   Nursing note and vitals reviewed.      Assessment/Plan   Pari was seen today for hypertension.    Diagnoses and all orders for this visit:    Hyperlipidemia LDL goal <100  -     Comprehensive Metabolic Panel  -     Lipid Panel    Type 2 diabetes mellitus without complication, without long-term current use of insulin (CMS/[facility])  -     POC Glycosylated Hemoglobin (Hb A1C)  -     POCT Glucose    Gastroesophageal reflux disease without esophagitis  -     pantoprazole (PROTONIX) 20 MG EC tablet; Take 1 tablet by mouth Daily.    Hypothyroidism (acquired)  -     TSH

## 2020-05-12 LAB
ALBUMIN SERPL-MCNC: 4.6 G/DL (ref 3.5–5.2)
ALBUMIN/GLOB SERPL: 1.5 G/DL
ALP SERPL-CCNC: 69 U/L (ref 39–117)
ALT SERPL-CCNC: 22 U/L (ref 1–33)
AST SERPL-CCNC: 13 U/L (ref 1–32)
BILIRUB SERPL-MCNC: 0.6 MG/DL (ref 0.2–1.2)
BUN SERPL-MCNC: 12 MG/DL (ref 8–23)
BUN/CREAT SERPL: 15 (ref 7–25)
CALCIUM SERPL-MCNC: 9.8 MG/DL (ref 8.6–10.5)
CHLORIDE SERPL-SCNC: 98 MMOL/L (ref 98–107)
CO2 SERPL-SCNC: 23.3 MMOL/L (ref 22–29)
CREAT SERPL-MCNC: 0.8 MG/DL (ref 0.57–1)
GLOBULIN SER CALC-MCNC: 3 GM/DL
GLUCOSE SERPL-MCNC: 204 MG/DL (ref 65–99)
POTASSIUM SERPL-SCNC: 4.2 MMOL/L (ref 3.5–5.2)
PROT SERPL-MCNC: 7.6 G/DL (ref 6–8.5)
SODIUM SERPL-SCNC: 138 MMOL/L (ref 136–145)
TSH SERPL DL<=0.005 MIU/L-ACNC: 3.25 UIU/ML (ref 0.27–4.2)

## 2020-07-22 ENCOUNTER — OFFICE VISIT (OUTPATIENT)
Dept: INTERNAL MEDICINE | Facility: CLINIC | Age: 70
End: 2020-07-22

## 2020-07-22 VITALS
TEMPERATURE: 98.6 F | HEART RATE: 75 BPM | BODY MASS INDEX: 29.32 KG/M2 | WEIGHT: 186.8 LBS | OXYGEN SATURATION: 94 % | SYSTOLIC BLOOD PRESSURE: 142 MMHG | HEIGHT: 67 IN | DIASTOLIC BLOOD PRESSURE: 80 MMHG

## 2020-07-22 DIAGNOSIS — R21 RASH: Primary | ICD-10-CM

## 2020-07-22 DIAGNOSIS — E78.5 HYPERLIPIDEMIA LDL GOAL <100: ICD-10-CM

## 2020-07-22 DIAGNOSIS — E89.41 HOT FLASHES DUE TO SURGICAL MENOPAUSE: ICD-10-CM

## 2020-07-22 DIAGNOSIS — N89.8 VAGINAL ITCHING: ICD-10-CM

## 2020-07-22 DIAGNOSIS — E11.9 TYPE 2 DIABETES MELLITUS WITHOUT COMPLICATION, WITHOUT LONG-TERM CURRENT USE OF INSULIN (HCC): ICD-10-CM

## 2020-07-22 DIAGNOSIS — E03.9 HYPOTHYROIDISM, UNSPECIFIED TYPE: ICD-10-CM

## 2020-07-22 DIAGNOSIS — E11.40 CONTROLLED TYPE 2 DIABETES MELLITUS WITH DIABETIC NEUROPATHY, WITHOUT LONG-TERM CURRENT USE OF INSULIN (HCC): ICD-10-CM

## 2020-07-22 DIAGNOSIS — K21.9 GASTROESOPHAGEAL REFLUX DISEASE WITHOUT ESOPHAGITIS: ICD-10-CM

## 2020-07-22 DIAGNOSIS — I10 ESSENTIAL HYPERTENSION: ICD-10-CM

## 2020-07-22 PROCEDURE — 99214 OFFICE O/P EST MOD 30 MIN: CPT | Performed by: INTERNAL MEDICINE

## 2020-07-22 RX ORDER — ESTRADIOL 0.5 MG/1
0.5 TABLET ORAL DAILY
Qty: 90 TABLET | Refills: 1 | Status: SHIPPED | OUTPATIENT
Start: 2020-07-22 | End: 2020-12-17

## 2020-07-22 RX ORDER — PANTOPRAZOLE SODIUM 20 MG/1
20 TABLET, DELAYED RELEASE ORAL DAILY
Qty: 90 TABLET | Refills: 1 | Status: SHIPPED | OUTPATIENT
Start: 2020-07-22 | End: 2020-12-17 | Stop reason: SDUPTHER

## 2020-07-22 RX ORDER — AMLODIPINE BESYLATE 5 MG/1
5 TABLET ORAL DAILY
Qty: 90 TABLET | Refills: 1 | Status: SHIPPED | OUTPATIENT
Start: 2020-07-22 | End: 2020-12-17 | Stop reason: SDUPTHER

## 2020-07-22 RX ORDER — HYDROXYZINE HYDROCHLORIDE 25 MG/1
25 TABLET, FILM COATED ORAL 3 TIMES DAILY PRN
COMMUNITY
End: 2021-03-03

## 2020-07-22 RX ORDER — NYSTATIN 100000 U/G
CREAM TOPICAL 2 TIMES DAILY
Qty: 90 G | Refills: 2 | Status: SHIPPED | OUTPATIENT
Start: 2020-07-22 | End: 2021-04-21

## 2020-07-22 RX ORDER — METFORMIN HYDROCHLORIDE 500 MG/1
1000 TABLET, EXTENDED RELEASE ORAL
Qty: 180 TABLET | Refills: 1 | Status: SHIPPED | OUTPATIENT
Start: 2020-07-22 | End: 2020-07-22 | Stop reason: SINTOL

## 2020-07-22 RX ORDER — TRIAMCINOLONE ACETONIDE 0.25 MG/G
CREAM TOPICAL 3 TIMES DAILY
Qty: 80 G | Refills: 4 | Status: SHIPPED | OUTPATIENT
Start: 2020-07-22 | End: 2021-03-18

## 2020-07-22 RX ORDER — LEVOTHYROXINE SODIUM 0.05 MG/1
50 TABLET ORAL DAILY
Qty: 90 TABLET | Refills: 1 | Status: SHIPPED | OUTPATIENT
Start: 2020-07-22 | End: 2020-12-17 | Stop reason: SDUPTHER

## 2020-07-22 NOTE — PROGRESS NOTES
Subjective   Pari Howell is a 70 y.o. female.   Chief Complaint   Patient presents with   • Rash   • Hypertension   • Heartburn   • Diabetes       History of Present Illness   Rash for months. Started on legs. Given steroid cream from Derm and got better. Now hive like rash on back, called derm and told to see PCP. Rash itches. No change in meds, foods, detergents, or perfumes. No fever. Rash is not painful.  Using atarax and Benadryl and steroid cream.  F/u on dm, htn, heartburn, diabetes, hypothyroid, vaginal itching, and hot flashes. Needs refills on all meds.  These conditions are stable on current meds.   The following portions of the patient's history were reviewed and updated as appropriate: allergies, current medications, past family history, past medical history, past social history, past surgical history and problem list.    Review of Systems   Constitutional: Negative for activity change, appetite change, chills, diaphoresis, fatigue, fever and unexpected weight change.   HENT: Negative for congestion, ear discharge, ear pain, mouth sores, nosebleeds, sinus pressure, sneezing and sore throat.    Eyes: Negative for pain, discharge and itching.   Respiratory: Negative for cough, chest tightness, shortness of breath and wheezing.    Cardiovascular: Negative for chest pain, palpitations and leg swelling.   Gastrointestinal: Negative for abdominal pain, constipation, diarrhea, nausea and vomiting.   Endocrine: Negative for cold intolerance, heat intolerance, polydipsia and polyphagia.   Genitourinary: Negative for dysuria, flank pain, frequency, hematuria and urgency.   Musculoskeletal: Negative for arthralgias, back pain, gait problem, myalgias, neck pain and neck stiffness.   Skin: Positive for rash. Negative for color change and pallor.   Neurological: Negative for seizures, speech difficulty, numbness and headaches.   Psychiatric/Behavioral: Negative for agitation, confusion, decreased concentration  "and sleep disturbance. The patient is not nervous/anxious.    /80   Pulse 75   Temp 98.6 °F (37 °C)   Ht 170.2 cm (67\")   Wt 84.7 kg (186 lb 12.8 oz)   LMP  (LMP Unknown)   SpO2 94%   BMI 29.26 kg/m²       Objective   Physical Exam   Constitutional: She appears well-developed.   HENT:   Head: Normocephalic.   Right Ear: External ear normal.   Left Ear: External ear normal.   Nose: Nose normal.   Mouth/Throat: Oropharynx is clear and moist.   Eyes: Pupils are equal, round, and reactive to light. Conjunctivae are normal.   Neck: No JVD present. No thyromegaly present.   Cardiovascular: Normal rate, regular rhythm and normal heart sounds. Exam reveals no friction rub.   No murmur heard.  Pulmonary/Chest: No respiratory distress. She has no wheezes. She has no rales.   Abdominal: She exhibits no distension. There is no tenderness. There is no guarding.   Musculoskeletal: She exhibits no edema or tenderness.   Lymphadenopathy:     She has no cervical adenopathy.   Neurological: She displays normal reflexes. No cranial nerve deficit.   Skin: Rash (urticarial rash on back) noted.   Psychiatric: Her behavior is normal.   Nursing note and vitals reviewed.      Assessment/Plan   Pari was seen today for rash, hypertension, heartburn and diabetes.    Diagnoses and all orders for this visit:    Rash  -     triamcinolone (KENALOG) 0.025 % cream; Apply  topically to the appropriate area as directed 3 (Three) Times a Day.  -     Ambulatory Referral to Allergy    Essential hypertension  -     amLODIPine (NORVASC) 5 MG tablet; Take 1 tablet by mouth Daily.    Hot flashes due to surgical menopause  -     estradiol (ESTRACE) 0.5 MG tablet; Take 1 tablet by mouth Daily.    Hypothyroidism, unspecified type  -     levothyroxine (SYNTHROID, LEVOTHROID) 50 MCG tablet; Take 1 tablet by mouth Daily.    Controlled type 2 diabetes mellitus with diabetic neuropathy, without long-term current use of insulin (CMS/[facility])  -     " Discontinue: metFORMIN ER (GLUCOPHAGE-XR) 500 MG 24 hr tablet; Take 2 tablets by mouth Daily With Breakfast.    Gastroesophageal reflux disease without esophagitis  -     pantoprazole (PROTONIX) 20 MG EC tablet; Take 1 tablet by mouth Daily.    Type 2 diabetes mellitus without complication, without long-term current use of insulin (CMS/[facility])  -     linagliptin (TRADJENTA) 5 MG tablet tablet; Take 1 tablet by mouth Daily.    Vaginal itching  -     nystatin (MYCOSTATIN) 868021 UNIT/GM cream; Apply  topically to the appropriate area as directed 2 (Two) Times a Day.

## 2020-07-27 ENCOUNTER — TRANSCRIBE ORDERS (OUTPATIENT)
Dept: ADMINISTRATIVE | Facility: HOSPITAL | Age: 70
End: 2020-07-27

## 2020-07-27 DIAGNOSIS — Z12.31 VISIT FOR SCREENING MAMMOGRAM: Primary | ICD-10-CM

## 2020-07-30 DIAGNOSIS — I10 ESSENTIAL HYPERTENSION: ICD-10-CM

## 2020-07-30 RX ORDER — LOSARTAN POTASSIUM AND HYDROCHLOROTHIAZIDE 12.5; 1 MG/1; MG/1
TABLET ORAL
Qty: 90 TABLET | Refills: 0 | Status: SHIPPED | OUTPATIENT
Start: 2020-07-30 | End: 2020-10-29

## 2020-08-14 ENCOUNTER — TELEPHONE (OUTPATIENT)
Dept: INTERNAL MEDICINE | Facility: CLINIC | Age: 70
End: 2020-08-14

## 2020-08-14 DIAGNOSIS — E11.9 TYPE 2 DIABETES MELLITUS WITHOUT COMPLICATION, WITHOUT LONG-TERM CURRENT USE OF INSULIN (HCC): ICD-10-CM

## 2020-08-14 NOTE — TELEPHONE ENCOUNTER
Pari called and said that her meds for her diabetes was suppose to be changed and she hasn't heard from [facility] in 3 weeks, she was wondering if you could send it over to the [facility] in [facility]. Thanks :)

## 2020-08-18 ENCOUNTER — TELEPHONE (OUTPATIENT)
Dept: INTERNAL MEDICINE | Facility: CLINIC | Age: 70
End: 2020-08-18

## 2020-08-18 NOTE — TELEPHONE ENCOUNTER
PT JUST RECEIVED A PRESCRIPTION FOR linagliptin (TRADJENTA) 5 MG tablet tablet AND WANTS TO KNOW IF SHE NEEDS TO STOP THE METFORMIN. PATIENT WOULD LIKE A CALL BACK.  PT NUMBER VERIFIED [phone]    PLEASE ADVISE

## 2020-09-21 ENCOUNTER — TELEPHONE (OUTPATIENT)
Dept: INTERNAL MEDICINE | Facility: CLINIC | Age: 70
End: 2020-09-21

## 2020-09-21 DIAGNOSIS — E78.5 HYPERLIPIDEMIA LDL GOAL <100: Primary | ICD-10-CM

## 2020-09-21 NOTE — TELEPHONE ENCOUNTER
PT CALLED TO REQUEST LAB ORDERS FOR APPT. Friday, PT WOULD LIKE TO COME TODAY BECAUSE SHE HAS NOT HAD ANYTHING TO EAT.    PLEASE ADVISE.  CALL BACK:[phone]

## 2020-09-22 ENCOUNTER — LAB REQUISITION (OUTPATIENT)
Dept: LAB | Facility: HOSPITAL | Age: 70
End: 2020-09-22

## 2020-09-22 DIAGNOSIS — E78.5 HYPERLIPIDEMIA, UNSPECIFIED: ICD-10-CM

## 2020-09-23 ENCOUNTER — TELEPHONE (OUTPATIENT)
Dept: INTERNAL MEDICINE | Facility: CLINIC | Age: 70
End: 2020-09-23

## 2020-09-23 DIAGNOSIS — Z79.899 HIGH RISK MEDICATION USE: Primary | ICD-10-CM

## 2020-09-23 LAB
ALBUMIN SERPL-MCNC: 4.3 G/DL (ref 3.5–5.2)
ALBUMIN/GLOB SERPL: 1.7 G/DL
ALP SERPL-CCNC: 77 U/L (ref 39–117)
ALT SERPL-CCNC: 21 U/L (ref 1–33)
AST SERPL-CCNC: 12 U/L (ref 1–32)
BILIRUB SERPL-MCNC: 0.5 MG/DL (ref 0–1.2)
BUN SERPL-MCNC: 17 MG/DL (ref 8–23)
BUN/CREAT SERPL: 23.9 (ref 7–25)
CALCIUM SERPL-MCNC: 9.5 MG/DL (ref 8.6–10.5)
CHLORIDE SERPL-SCNC: 101 MMOL/L (ref 98–107)
CHOLEST SERPL-MCNC: 266 MG/DL (ref 0–200)
CO2 SERPL-SCNC: 25.4 MMOL/L (ref 22–29)
CREAT SERPL-MCNC: 0.71 MG/DL (ref 0.57–1)
GLOBULIN SER CALC-MCNC: 2.6 GM/DL
GLUCOSE SERPL-MCNC: 249 MG/DL (ref 65–99)
HDLC SERPL-MCNC: 37 MG/DL (ref 40–60)
LDLC SERPL CALC-MCNC: ABNORMAL MG/DL
POTASSIUM SERPL-SCNC: 3.7 MMOL/L (ref 3.5–5.2)
PROT SERPL-MCNC: 6.9 G/DL (ref 6–8.5)
SODIUM SERPL-SCNC: 139 MMOL/L (ref 136–145)
TRIGL SERPL-MCNC: 424 MG/DL (ref 0–150)
VLDLC SERPL CALC-MCNC: ABNORMAL MG/DL

## 2020-09-23 RX ORDER — ATORVASTATIN CALCIUM 10 MG/1
10 TABLET, FILM COATED ORAL DAILY
Qty: 90 TABLET | Refills: 0 | Status: SHIPPED | OUTPATIENT
Start: 2020-09-23 | End: 2020-12-17 | Stop reason: SINTOL

## 2020-09-23 NOTE — TELEPHONE ENCOUNTER
----- Message from Hayley Ty DO sent at 9/23/2020 10:24 AM EDT -----  TG high. Puts her a t risk of pancreatitis. Start Lipitor 10 mg po qhs 30 with 1 refill. Liver labs 6 weeks. OV 3m.

## 2020-09-24 RX ORDER — MONTELUKAST SODIUM 10 MG/1
10 TABLET ORAL EVERY EVENING
COMMUNITY
Start: 2020-08-27 | End: 2020-12-17

## 2020-09-24 RX ORDER — INFLUENZA A VIRUS A/MICHIGAN/45/2015 X-275 (H1N1) ANTIGEN (FORMALDEHYDE INACTIVATED), INFLUENZA A VIRUS A/SINGAPORE/INFIMH-16-0019/2016 IVR-186 (H3N2) ANTIGEN (FORMALDEHYDE INACTIVATED), INFLUENZA B VIRUS B/PHUKET/3073/2013 ANTIGEN (FORMALDEHYDE INACTIVATED), AND INFLUENZA B VIRUS B/MARYLAND/15/2016 BX-69A ANTIGEN (FORMALDEHYDE INACTIVATED) 60; 60; 60; 60 UG/.7ML; UG/.7ML; UG/.7ML; UG/.7ML
INJECTION, SUSPENSION INTRAMUSCULAR
COMMUNITY
Start: 2020-09-17 | End: 2020-09-25

## 2020-09-24 RX ORDER — METFORMIN HYDROCHLORIDE 500 MG/1
TABLET, EXTENDED RELEASE ORAL
COMMUNITY
Start: 2020-07-23 | End: 2020-09-25 | Stop reason: SINTOL

## 2020-09-25 ENCOUNTER — OFFICE VISIT (OUTPATIENT)
Dept: INTERNAL MEDICINE | Facility: CLINIC | Age: 70
End: 2020-09-25

## 2020-09-25 VITALS
SYSTOLIC BLOOD PRESSURE: 142 MMHG | WEIGHT: 181.8 LBS | OXYGEN SATURATION: 96 % | BODY MASS INDEX: 28.53 KG/M2 | DIASTOLIC BLOOD PRESSURE: 88 MMHG | TEMPERATURE: 97.1 F | HEIGHT: 67 IN | HEART RATE: 72 BPM

## 2020-09-25 DIAGNOSIS — Z79.899 HIGH RISK MEDICATION USE: ICD-10-CM

## 2020-09-25 DIAGNOSIS — E03.9 HYPOTHYROIDISM (ACQUIRED): ICD-10-CM

## 2020-09-25 DIAGNOSIS — H40.89 OTHER SPECIFIED GLAUCOMA, UNSPECIFIED LATERALITY: ICD-10-CM

## 2020-09-25 DIAGNOSIS — E11.9 TYPE 2 DIABETES MELLITUS WITHOUT COMPLICATION, WITHOUT LONG-TERM CURRENT USE OF INSULIN (HCC): ICD-10-CM

## 2020-09-25 DIAGNOSIS — Z00.00 MEDICARE ANNUAL WELLNESS VISIT, SUBSEQUENT: ICD-10-CM

## 2020-09-25 DIAGNOSIS — E78.5 HYPERLIPIDEMIA LDL GOAL <100: ICD-10-CM

## 2020-09-25 DIAGNOSIS — E55.9 VITAMIN D DEFICIENCY: ICD-10-CM

## 2020-09-25 LAB
GLUCOSE BLDC GLUCOMTR-MCNC: 302 MG/DL (ref 70–130)
HBA1C MFR BLD: 8.5 %

## 2020-09-25 PROCEDURE — 82947 ASSAY GLUCOSE BLOOD QUANT: CPT | Performed by: INTERNAL MEDICINE

## 2020-09-25 PROCEDURE — 96160 PT-FOCUSED HLTH RISK ASSMT: CPT | Performed by: INTERNAL MEDICINE

## 2020-09-25 PROCEDURE — G0439 PPPS, SUBSEQ VISIT: HCPCS | Performed by: INTERNAL MEDICINE

## 2020-09-25 PROCEDURE — 83036 HEMOGLOBIN GLYCOSYLATED A1C: CPT | Performed by: INTERNAL MEDICINE

## 2020-09-25 RX ORDER — GLIMEPIRIDE 2 MG/1
2 TABLET ORAL
Qty: 30 TABLET | Refills: 1 | Status: SHIPPED | OUTPATIENT
Start: 2020-09-25 | End: 2020-11-16 | Stop reason: SDUPTHER

## 2020-09-25 NOTE — PROGRESS NOTES
The ABCs of the Annual Wellness Visit  Subsequent Medicare Wellness Visit    Chief Complaint   Patient presents with   • Medicare Wellness-subsequent       Subjective   History of Present Illness:  Pari Howell is a 70 y.o. female who presents for a Subsequent Medicare Wellness Visit.    HEALTH RISK ASSESSMENT    Recent Hospitalizations:  No hospitalization(s) within the last year.    Current Medical Providers:  Patient Care Team:  Hayley Ty DO as PCP - General (Internal Medicine)    Smoking Status:  Social History     Tobacco Use   Smoking Status Never Smoker   Smokeless Tobacco Never Used       Alcohol Consumption:  Social History     Substance and Sexual Activity   Alcohol Use No       Depression Screen:   PHQ-2/PHQ-9 Depression Screening 9/25/2020   Little interest or pleasure in doing things 0   Feeling down, depressed, or hopeless 0   Total Score 0       Fall Risk Screen:  STEADI Fall Risk Assessment was completed, and patient is at LOW risk for falls.Assessment completed on:9/25/2020    Health Habits and Functional and Cognitive Screening:  Functional & Cognitive Status 9/25/2020   Do you have difficulty preparing food and eating? No   Do you have difficulty bathing yourself, getting dressed or grooming yourself? No   Do you have difficulty using the toilet? No   Do you have difficulty moving around from place to place? No   Do you have trouble with steps or getting out of a bed or a chair? No   Current Diet Well Balanced Diet   Dental Exam Up to date   Eye Exam Up to date   Exercise (times per week) 3 times per week   Current Exercise Activities Include Stationary Bicycling/Spin Class   Do you need help using the phone?  No   Are you deaf or do you have serious difficulty hearing?  No   Do you need help with transportation? No   Do you need help shopping? No   Do you need help preparing meals?  No   Do you need help with housework?  No   Do you need help with laundry? No   Do you need help  taking your medications? No   Do you need help managing money? No   Do you ever drive or ride in a car without wearing a seat belt? No   Have you felt unusual stress, anger or loneliness in the last month? No   Who do you live with? Spouse   If you need help, do you have trouble finding someone available to you? No   Have you been bothered in the last four weeks by sexual problems? No   Do you have difficulty concentrating, remembering or making decisions? -         Does the patient have evidence of cognitive impairment? No    Asprin use counseling:Does not need ASA (and currently is not on it)    Age-appropriate Screening Schedule:  Refer to the list below for future screening recommendations based on patient's age, sex and/or medical conditions. Orders for these recommended tests are listed in the plan section. The patient has been provided with a written plan.    Health Maintenance   Topic Date Due   • ZOSTER VACCINE (2 of 3) 10/26/2015   • DIABETIC FOOT EXAM  10/07/2020   • DIABETIC EYE EXAM  10/15/2020   • HEMOGLOBIN A1C  11/11/2020   • URINE MICROALBUMIN  01/10/2021   • MAMMOGRAM  06/04/2021   • LIPID PANEL  09/22/2021   • TDAP/TD VACCINES (2 - Td) 01/24/2028   • COLONOSCOPY  11/14/2029   • INFLUENZA VACCINE  Completed   • PAP SMEAR  Discontinued          The following portions of the patient's history were reviewed and updated as appropriate: allergies, current medications, past family history, past medical history, past social history, past surgical history and problem list.    Outpatient Medications Prior to Visit   Medication Sig Dispense Refill   • amLODIPine (NORVASC) 5 MG tablet Take 1 tablet by mouth Daily. 90 tablet 1   • atorvastatin (Lipitor) 10 MG tablet Take 1 tablet by mouth Daily. 90 tablet 0   • Blood Glucose Monitoring Suppl (TRUE METRIX AIR GLUCOSE METER) w/Device kit      • glucose monitor monitoring kit 1 each Daily. 1 each 0   • Lancets misc 1 applicator Daily. 100 each 3   • latanoprost  (XALATAN) 0.005 % ophthalmic solution Apply 1 drop to eye Daily. 7.5 mL 3   • levothyroxine (SYNTHROID, LEVOTHROID) 50 MCG tablet Take 1 tablet by mouth Daily. 90 tablet 1   • linagliptin (TRADJENTA) 5 MG tablet tablet Take 1 tablet by mouth Daily. 30 tablet 1   • losartan-hydrochlorothiazide (HYZAAR) 100-12.5 MG per tablet TAKE 1 TABLET EVERY DAY 90 tablet 0   • montelukast (SINGULAIR) 10 MG tablet Take 10 mg by mouth Every Evening.     • nystatin (MYCOSTATIN) 725664 UNIT/GM cream Apply  topically to the appropriate area as directed 2 (Two) Times a Day. 90 g 2   • pantoprazole (PROTONIX) 20 MG EC tablet Take 1 tablet by mouth Daily. 90 tablet 1   • timolol (TIMOPTIC) 0.5 % ophthalmic solution      • triamcinolone (KENALOG) 0.025 % cream Apply  topically to the appropriate area as directed 3 (Three) Times a Day. 80 g 4   • TRUE METRIX BLOOD GLUCOSE TEST test strip TEST EVERY DAY  AS  INSTRUCTED 100 each 3   • Fluzone High-Dose Quadrivalent 0.7 ML suspension prefilled syringe PHARMACIST ADMINISTERED IMMUNIZATION ADMINISTERED AT TIME OF DISPENSING     • estradiol (ESTRACE) 0.5 MG tablet Take 1 tablet by mouth Daily. 90 tablet 1   • hydrOXYzine (ATARAX) 25 MG tablet Take 25 mg by mouth 3 (Three) Times a Day As Needed for Itching.     • metFORMIN ER (GLUCOPHAGE-XR) 500 MG 24 hr tablet        No facility-administered medications prior to visit.        Patient Active Problem List   Diagnosis   • Type 2 diabetes mellitus without complication, without long-term current use of insulin (CMS/[facility])   • Other specified glaucoma   • Hyperlipidemia LDL goal <100   • Vitamin D deficiency   • Irritable bowel syndrome with diarrhea   • Hypothyroidism (acquired)       Advanced Care Planning:  ACP discussion was held with the patient during this visit. Patient has an advance directive (not in EMR), copy requested.    Review of Systems   Constitutional: Negative for activity change, appetite change, chills, diaphoresis, fatigue, fever  "and unexpected weight change.   HENT: Negative for congestion, ear discharge, ear pain, mouth sores, nosebleeds, sinus pressure, sneezing and sore throat.    Eyes: Negative for pain, discharge and itching.   Respiratory: Negative for cough, chest tightness, shortness of breath and wheezing.    Cardiovascular: Negative for chest pain, palpitations and leg swelling.   Gastrointestinal: Negative for abdominal pain, constipation, diarrhea, nausea and vomiting.   Endocrine: Negative for cold intolerance, heat intolerance, polydipsia and polyphagia.   Genitourinary: Negative for dysuria, flank pain, frequency, hematuria and urgency.   Musculoskeletal: Negative for arthralgias, back pain, gait problem, myalgias, neck pain and neck stiffness.   Skin: Negative for color change, pallor and rash.   Neurological: Negative for seizures, speech difficulty, numbness and headaches.   Psychiatric/Behavioral: Negative for agitation, confusion, decreased concentration and sleep disturbance. The patient is not nervous/anxious.        Compared to one year ago, the patient feels her physical health is the same.  Compared to one year ago, the patient feels her mental health is the same.    Reviewed chart for potential of high risk medication in the elderly: yes  Reviewed chart for potential of harmful drug interactions in the elderly:yes    Objective         Vitals:    09/25/20 0959   BP: 142/88   Pulse: 72   Temp: 97.1 °F (36.2 °C)   SpO2: 96%   Weight: 82.5 kg (181 lb 12.8 oz)   Height: 170.2 cm (67\")   PainSc: 0-No pain       Body mass index is 28.47 kg/m².  Discussed the patient's BMI with her. The BMI is above average; BMI management plan is completed.    Physical Exam  Vitals signs and nursing note reviewed.   Constitutional:       Appearance: She is well-developed.   HENT:      Head: Normocephalic.      Right Ear: Tympanic membrane, ear canal and external ear normal.      Left Ear: External ear normal.      Nose: Nose normal.      " Mouth/Throat:      Pharynx: No oropharyngeal exudate or posterior oropharyngeal erythema.   Eyes:      Conjunctiva/sclera: Conjunctivae normal.      Pupils: Pupils are equal, round, and reactive to light.   Neck:      Thyroid: No thyromegaly.      Vascular: No JVD.   Cardiovascular:      Rate and Rhythm: Normal rate and regular rhythm.      Heart sounds: Normal heart sounds. No murmur. No friction rub.   Pulmonary:      Effort: Pulmonary effort is normal. No respiratory distress.      Breath sounds: Normal breath sounds. No wheezing or rales.   Abdominal:      General: There is no distension.      Tenderness: There is no abdominal tenderness.   Musculoskeletal:         General: No tenderness.   Lymphadenopathy:      Cervical: No cervical adenopathy.   Skin:     Findings: No rash.   Neurological:      General: No focal deficit present.      Mental Status: She is oriented to person, place, and time.      Cranial Nerves: No cranial nerve deficit.      Deep Tendon Reflexes: Reflexes normal.   Psychiatric:         Mood and Affect: Mood normal.         Behavior: Behavior normal.         Lab Results   Component Value Date     (H) 09/22/2020    CHLPL 266 (H) 09/22/2020    TRIG 424 (H) 09/22/2020    HDL 37 (L) 09/22/2020    LDL CANCELED 09/22/2020    VLDL CANCELED 09/22/2020    HGBA1C 8.5 09/25/2020        Assessment/Plan   Medicare Risks and Personalized Health Plan  CMS Preventative Services Quick Reference  Obesity/Overweight     The above risks/problems have been discussed with the patient.  Pertinent information has been shared with the patient in the After Visit Summary.  Follow up plans and orders are seen below in the Assessment/Plan Section.    Diagnoses and all orders for this visit:    1. Medicare annual wellness visit, subsequent  Done today  2. Type 2 diabetes mellitus without complication, without long-term current use of insulin (CMS/[facility])  -     POCT Glucose  -     POC Glycosylated Hemoglobin (Hb  A1C)  -     Comprehensive Metabolic Panel; Future  -     Lipid Panel; Future  -     glimepiride (AMARYL) 2 MG tablet; Take 1 tablet by mouth Every Morning Before Breakfast.  Dispense: 30 tablet; Refill: 1  Can't take metformin= severe diarrhea and abdominal pian. Tradjenta= increase her blood sugars. Stop tradjenta. Will try amaryl 2mg po qd. She will monitro her blood sugars daily. Call 2 weeks sugars to us.   A1c 8.5  3. Hyperlipidemia LDL goal <100  -     Lipid Panel; Future    4. Vitamin D deficiency  Stable on 1,000 IU vitamin d 3 daily  5. Hypothyroidism (acquired)  Continue synthroid  6. Other specified glaucoma, unspecified laterality  Follows with opth.  7. High risk medication use  -     CBC & Differential; Future      Follow Up:  Return in about 3 months (around 12/25/2020).     An After Visit Summary and PPPS were given to the patient.

## 2020-10-09 ENCOUNTER — TELEPHONE (OUTPATIENT)
Dept: INTERNAL MEDICINE | Facility: CLINIC | Age: 70
End: 2020-10-09

## 2020-10-09 NOTE — TELEPHONE ENCOUNTER
PT STATES THAT HER BLOOD PRESSURE HAS BEEN RUNNING FROM 150/80 -162/97.     PT STATES EVEN AFTER TAKING BOTH MEDICATIONS HER BLOOD PRESSURE IS STILL STAYING HIGH.      PLEASE ADVISE  [phone]

## 2020-10-28 DIAGNOSIS — I10 ESSENTIAL HYPERTENSION: ICD-10-CM

## 2020-10-28 DIAGNOSIS — E11.9 TYPE 2 DIABETES MELLITUS WITHOUT COMPLICATION, WITHOUT LONG-TERM CURRENT USE OF INSULIN (HCC): ICD-10-CM

## 2020-10-29 RX ORDER — LINAGLIPTIN 5 MG/1
TABLET, FILM COATED ORAL
Qty: 60 TABLET | Refills: 0 | Status: SHIPPED | OUTPATIENT
Start: 2020-10-29 | End: 2020-12-17

## 2020-10-29 RX ORDER — LOSARTAN POTASSIUM AND HYDROCHLOROTHIAZIDE 12.5; 1 MG/1; MG/1
TABLET ORAL
Qty: 90 TABLET | Refills: 0 | Status: SHIPPED | OUTPATIENT
Start: 2020-10-29 | End: 2020-12-17 | Stop reason: SDUPTHER

## 2020-10-29 NOTE — TELEPHONE ENCOUNTER
LOV for medicare wellness on 09/25/20  NOV 12/17/20    Losartan refilled on 07/30/20  Tradjenta refilled on 08/14/20 for 30 with 1 refill

## 2020-11-11 ENCOUNTER — HOSPITAL ENCOUNTER (OUTPATIENT)
Dept: MAMMOGRAPHY | Facility: HOSPITAL | Age: 70
Discharge: HOME | End: 2020-11-11
Admitting: INTERNAL MEDICINE

## 2020-11-11 DIAGNOSIS — Z12.31 VISIT FOR SCREENING MAMMOGRAM: ICD-10-CM

## 2020-11-11 PROCEDURE — 77067 SCR MAMMO BI INCL CAD: CPT

## 2020-11-11 PROCEDURE — 77067 SCR MAMMO BI INCL CAD: CPT | Performed by: RADIOLOGY

## 2020-11-11 PROCEDURE — 77063 BREAST TOMOSYNTHESIS BI: CPT | Performed by: RADIOLOGY

## 2020-11-11 PROCEDURE — 77063 BREAST TOMOSYNTHESIS BI: CPT

## 2020-11-16 DIAGNOSIS — E11.9 TYPE 2 DIABETES MELLITUS WITHOUT COMPLICATION, WITHOUT LONG-TERM CURRENT USE OF INSULIN (HCC): ICD-10-CM

## 2020-11-16 RX ORDER — GLIMEPIRIDE 2 MG/1
2 TABLET ORAL
Qty: 90 TABLET | Refills: 0 | Status: SHIPPED | OUTPATIENT
Start: 2020-11-16 | End: 2020-12-17 | Stop reason: SDUPTHER

## 2020-11-16 NOTE — TELEPHONE ENCOUNTER
Last Office Visit: 09/25/20  Next Office Visit: 12/17/20    Labs completed in past 6 months? Yes   Last Refill Date: 09/25/20  Quantity: 30  Refills: 1

## 2020-11-19 DIAGNOSIS — E11.9 TYPE 2 DIABETES MELLITUS WITHOUT COMPLICATION, WITHOUT LONG-TERM CURRENT USE OF INSULIN (HCC): ICD-10-CM

## 2020-11-19 RX ORDER — GLIMEPIRIDE 2 MG/1
TABLET ORAL
Qty: 30 TABLET | Refills: 0 | OUTPATIENT
Start: 2020-11-19

## 2020-11-19 NOTE — TELEPHONE ENCOUNTER
Last Office Visit: 09/25/20  Next Office Visit:12/17/20    Last Refill Date:11/16/20  Quantity:90  Refills:0

## 2020-11-23 DIAGNOSIS — E11.9 TYPE 2 DIABETES MELLITUS WITHOUT COMPLICATION, WITHOUT LONG-TERM CURRENT USE OF INSULIN (HCC): ICD-10-CM

## 2020-11-23 RX ORDER — GLIMEPIRIDE 2 MG/1
TABLET ORAL
Qty: 30 TABLET | Refills: 0 | OUTPATIENT
Start: 2020-11-23

## 2020-11-27 DIAGNOSIS — E11.9 TYPE 2 DIABETES MELLITUS WITHOUT COMPLICATION, WITHOUT LONG-TERM CURRENT USE OF INSULIN (HCC): ICD-10-CM

## 2020-11-30 DIAGNOSIS — E11.9 TYPE 2 DIABETES MELLITUS WITHOUT COMPLICATION, WITHOUT LONG-TERM CURRENT USE OF INSULIN (HCC): ICD-10-CM

## 2020-11-30 RX ORDER — GLIMEPIRIDE 2 MG/1
TABLET ORAL
Qty: 30 TABLET | Refills: 0 | OUTPATIENT
Start: 2020-11-30

## 2020-11-30 NOTE — TELEPHONE ENCOUNTER
Last Office Visit: 09/25/20  Next Office Visit:12/17/20    Labs completed in past 6 months? yes      Last Refill Date:11/16/20  Quantity:90  Refills:0

## 2020-12-01 RX ORDER — GLIMEPIRIDE 2 MG/1
TABLET ORAL
Qty: 30 TABLET | Refills: 0 | OUTPATIENT
Start: 2020-12-01

## 2020-12-01 NOTE — TELEPHONE ENCOUNTER
Last Office Visit:  09/25/20  Next Office Visit: 12/17/20    Labs completed in past 6 months? yes      Last Refill Date:11/16/20  Quantity:90  Refills:0    refilled for 90 day supply at mail order pharmacy on 11/16/20

## 2020-12-09 ENCOUNTER — LAB REQUISITION (OUTPATIENT)
Dept: LAB | Facility: HOSPITAL | Age: 70
End: 2020-12-09

## 2020-12-09 DIAGNOSIS — Z79.899 OTHER LONG TERM (CURRENT) DRUG THERAPY: ICD-10-CM

## 2020-12-09 DIAGNOSIS — E11.9 TYPE 2 DIABETES MELLITUS WITHOUT COMPLICATIONS (HCC): ICD-10-CM

## 2020-12-09 DIAGNOSIS — E78.5 HYPERLIPIDEMIA, UNSPECIFIED: ICD-10-CM

## 2020-12-09 LAB
ALBUMIN SERPL-MCNC: 4.2 G/DL (ref 3.5–5.2)
ALBUMIN/GLOB SERPL: 1.4 G/DL
ALP SERPL-CCNC: 76 U/L (ref 39–117)
ALT SERPL-CCNC: 16 U/L (ref 1–33)
AST SERPL-CCNC: 8 U/L (ref 1–32)
BASOPHILS # BLD AUTO: 0.05 10*3/MM3 (ref 0–0.2)
BASOPHILS NFR BLD AUTO: 0.8 % (ref 0–1.5)
BILIRUB SERPL-MCNC: 0.5 MG/DL (ref 0–1.2)
BUN SERPL-MCNC: 16 MG/DL (ref 8–23)
BUN/CREAT SERPL: 23.2 (ref 7–25)
CALCIUM SERPL-MCNC: 9.8 MG/DL (ref 8.6–10.5)
CHLORIDE SERPL-SCNC: 101 MMOL/L (ref 98–107)
CHOLEST SERPL-MCNC: 275 MG/DL (ref 0–200)
CO2 SERPL-SCNC: 27.2 MMOL/L (ref 22–29)
CREAT SERPL-MCNC: 0.69 MG/DL (ref 0.57–1)
EOSINOPHIL # BLD AUTO: 0.44 10*3/MM3 (ref 0–0.4)
EOSINOPHIL NFR BLD AUTO: 7 % (ref 0.3–6.2)
ERYTHROCYTE [DISTWIDTH] IN BLOOD BY AUTOMATED COUNT: 13 % (ref 12.3–15.4)
GLOBULIN SER CALC-MCNC: 3.1 GM/DL
GLUCOSE SERPL-MCNC: 177 MG/DL (ref 65–99)
HCT VFR BLD AUTO: 45 % (ref 34–46.6)
HDLC SERPL-MCNC: 44 MG/DL (ref 40–60)
HGB BLD-MCNC: 15 G/DL (ref 12–15.9)
IMM GRANULOCYTES # BLD AUTO: 0.02 10*3/MM3 (ref 0–0.05)
IMM GRANULOCYTES NFR BLD AUTO: 0.3 % (ref 0–0.5)
LDLC SERPL CALC-MCNC: 159 MG/DL (ref 0–100)
LYMPHOCYTES # BLD AUTO: 1.41 10*3/MM3 (ref 0.7–3.1)
LYMPHOCYTES NFR BLD AUTO: 22.5 % (ref 19.6–45.3)
MCH RBC QN AUTO: 30.1 PG (ref 26.6–33)
MCHC RBC AUTO-ENTMCNC: 33.3 G/DL (ref 31.5–35.7)
MCV RBC AUTO: 90.4 FL (ref 79–97)
MONOCYTES # BLD AUTO: 0.48 10*3/MM3 (ref 0.1–0.9)
MONOCYTES NFR BLD AUTO: 7.6 % (ref 5–12)
NEUTROPHILS # BLD AUTO: 3.88 10*3/MM3 (ref 1.7–7)
NEUTROPHILS NFR BLD AUTO: 61.8 % (ref 42.7–76)
NRBC BLD AUTO-RTO: 0 /100 WBC (ref 0–0.2)
PLATELET # BLD AUTO: 293 10*3/MM3 (ref 140–450)
POTASSIUM SERPL-SCNC: 4 MMOL/L (ref 3.5–5.2)
PROT SERPL-MCNC: 7.3 G/DL (ref 6–8.5)
RBC # BLD AUTO: 4.98 10*6/MM3 (ref 3.77–5.28)
SODIUM SERPL-SCNC: 139 MMOL/L (ref 136–145)
TRIGL SERPL-MCNC: 377 MG/DL (ref 0–150)
VLDLC SERPL CALC-MCNC: 72 MG/DL (ref 5–40)
WBC # BLD AUTO: 6.28 10*3/MM3 (ref 3.4–10.8)

## 2020-12-10 ENCOUNTER — TELEPHONE (OUTPATIENT)
Dept: INTERNAL MEDICINE | Facility: CLINIC | Age: 70
End: 2020-12-10

## 2020-12-10 NOTE — TELEPHONE ENCOUNTER
----- Message from Hayley Ty DO sent at 12/10/2020 10:29 AM EST -----  LDL not at goal. Is she taking her lipitor every night?

## 2020-12-10 NOTE — TELEPHONE ENCOUNTER
Attempted to call patient.  Unable to leavet message for patient to call regarding lab results do do voicemail not set up.

## 2020-12-15 DIAGNOSIS — E11.9 TYPE 2 DIABETES MELLITUS WITHOUT COMPLICATION, WITHOUT LONG-TERM CURRENT USE OF INSULIN (HCC): ICD-10-CM

## 2020-12-15 RX ORDER — GLIMEPIRIDE 2 MG/1
TABLET ORAL
Qty: 30 TABLET | Refills: 0 | OUTPATIENT
Start: 2020-12-15

## 2020-12-17 ENCOUNTER — OFFICE VISIT (OUTPATIENT)
Dept: INTERNAL MEDICINE | Facility: CLINIC | Age: 70
End: 2020-12-17

## 2020-12-17 VITALS
BODY MASS INDEX: 28.88 KG/M2 | OXYGEN SATURATION: 98 % | HEIGHT: 67 IN | WEIGHT: 184 LBS | DIASTOLIC BLOOD PRESSURE: 80 MMHG | HEART RATE: 78 BPM | SYSTOLIC BLOOD PRESSURE: 135 MMHG

## 2020-12-17 DIAGNOSIS — E11.9 TYPE 2 DIABETES MELLITUS WITHOUT COMPLICATION, WITHOUT LONG-TERM CURRENT USE OF INSULIN (HCC): Primary | ICD-10-CM

## 2020-12-17 DIAGNOSIS — K21.9 GASTROESOPHAGEAL REFLUX DISEASE WITHOUT ESOPHAGITIS: ICD-10-CM

## 2020-12-17 DIAGNOSIS — I10 ESSENTIAL HYPERTENSION: ICD-10-CM

## 2020-12-17 DIAGNOSIS — E03.9 HYPOTHYROIDISM, UNSPECIFIED TYPE: ICD-10-CM

## 2020-12-17 LAB — HBA1C MFR BLD: 8.3 %

## 2020-12-17 PROCEDURE — 83036 HEMOGLOBIN GLYCOSYLATED A1C: CPT | Performed by: INTERNAL MEDICINE

## 2020-12-17 PROCEDURE — 99214 OFFICE O/P EST MOD 30 MIN: CPT | Performed by: INTERNAL MEDICINE

## 2020-12-17 RX ORDER — LOSARTAN POTASSIUM AND HYDROCHLOROTHIAZIDE 12.5; 1 MG/1; MG/1
1 TABLET ORAL DAILY
Qty: 90 TABLET | Refills: 0 | Status: SHIPPED | OUTPATIENT
Start: 2020-12-17 | End: 2021-04-08

## 2020-12-17 RX ORDER — LEVOTHYROXINE SODIUM 0.05 MG/1
50 TABLET ORAL DAILY
Qty: 90 TABLET | Refills: 1 | Status: SHIPPED | OUTPATIENT
Start: 2020-12-17 | End: 2021-03-07 | Stop reason: SDUPTHER

## 2020-12-17 RX ORDER — PANTOPRAZOLE SODIUM 20 MG/1
20 TABLET, DELAYED RELEASE ORAL DAILY
Qty: 90 TABLET | Refills: 0 | Status: SHIPPED | OUTPATIENT
Start: 2020-12-17 | End: 2021-04-08

## 2020-12-17 RX ORDER — GLIMEPIRIDE 2 MG/1
2 TABLET ORAL
Qty: 90 TABLET | Refills: 0 | Status: SHIPPED | OUTPATIENT
Start: 2020-12-17 | End: 2020-12-30 | Stop reason: DRUGHIGH

## 2020-12-17 RX ORDER — AMLODIPINE BESYLATE 5 MG/1
10 TABLET ORAL DAILY
Qty: 90 TABLET | Refills: 0 | Status: SHIPPED | OUTPATIENT
Start: 2020-12-17 | End: 2021-03-10

## 2020-12-17 RX ORDER — EZETIMIBE 10 MG/1
10 TABLET ORAL DAILY
Qty: 90 TABLET | Refills: 1 | Status: SHIPPED | OUTPATIENT
Start: 2020-12-17 | End: 2021-03-18

## 2020-12-21 ENCOUNTER — TELEPHONE (OUTPATIENT)
Dept: INTERNAL MEDICINE | Facility: CLINIC | Age: 70
End: 2020-12-21

## 2020-12-21 NOTE — TELEPHONE ENCOUNTER
Abi with [facility] though [place] called about Blood Pressure Therapy assessment for the patient.  Abi stated that she has sent faxes with no response so she would like a call back.    Abi Callback: [phone]  Patient reference # [id number]    Please advise

## 2020-12-21 NOTE — TELEPHONE ENCOUNTER
Called and spoke with [facility].  Information given of patients last visit and relayed that her BP was under control.

## 2020-12-30 ENCOUNTER — TELEPHONE (OUTPATIENT)
Dept: INTERNAL MEDICINE | Facility: CLINIC | Age: 70
End: 2020-12-30

## 2020-12-30 DIAGNOSIS — E11.9 TYPE 2 DIABETES MELLITUS WITHOUT COMPLICATION, WITHOUT LONG-TERM CURRENT USE OF INSULIN: ICD-10-CM

## 2020-12-30 DIAGNOSIS — E11.9 TYPE 2 DIABETES MELLITUS WITHOUT COMPLICATION, WITHOUT LONG-TERM CURRENT USE OF INSULIN (HCC): ICD-10-CM

## 2020-12-30 RX ORDER — GLIMEPIRIDE 2 MG/1
2 TABLET ORAL 2 TIMES DAILY
Qty: 180 TABLET | Refills: 0 | Status: CANCELLED | OUTPATIENT
Start: 2020-12-30

## 2020-12-30 RX ORDER — GLIMEPIRIDE 2 MG/1
2 TABLET ORAL 2 TIMES DAILY
Qty: 180 TABLET | Refills: 0 | Status: SHIPPED | OUTPATIENT
Start: 2020-12-30 | End: 2021-02-19 | Stop reason: SDUPTHER

## 2020-12-30 NOTE — TELEPHONE ENCOUNTER
Caller: Pari Howell    Relationship: Self    Best call back number: [phone]    Medication needed:   glimepiride (AMARYL) 2 MG tablet    PATIENT SAYS THAT PCP HAD INCREASED THE DOSAGE OF THIS MEDICATION TO 2 TIMES A DAY AT MORNING AND AT NOON.      PATIENT ONLY HAS ENOUGH MEDS TO LAST FOR 2 WEEKS AND DOESN'T SEE THE DIABETIC DOCTOR UNTIL MARCH 3RD.  PLEASE REFILL WITH NEW DOSING INFORMATION AND INCREASE QUANTITY.       When do you need the refill by: 12-    What details did the patient provide when requesting the medication: SAME AS ABOVE    Does the patient have less than a 3 day supply:  [] Yes  [x] No    What is the patient's preferred pharmacy: [facility] - [facility] [address] - [phone] [facility] [phone]

## 2021-02-19 RX ORDER — GLIMEPIRIDE 2 MG/1
2 TABLET ORAL 2 TIMES DAILY
Qty: 180 TABLET | Refills: 1 | Status: SHIPPED | OUTPATIENT
Start: 2021-02-19 | End: 2021-06-03

## 2021-02-19 NOTE — TELEPHONE ENCOUNTER
Last Office Visit:  12/17/20  Next Office Visit: 03//1821    Labs completed in past 6 months? yes  Labs completed in past year? yes    Last Refill Date:12/30/20  Quantity:180  Refills:0    Pharmacy:

## 2021-03-03 ENCOUNTER — OFFICE VISIT (OUTPATIENT)
Dept: ENDOCRINOLOGY | Facility: CLINIC | Age: 71
End: 2021-03-03

## 2021-03-03 VITALS
RESPIRATION RATE: 18 BRPM | TEMPERATURE: 97.1 F | OXYGEN SATURATION: 95 % | DIASTOLIC BLOOD PRESSURE: 82 MMHG | HEIGHT: 67 IN | BODY MASS INDEX: 28.88 KG/M2 | WEIGHT: 184 LBS | HEART RATE: 69 BPM | SYSTOLIC BLOOD PRESSURE: 112 MMHG

## 2021-03-03 DIAGNOSIS — L50.9 HIVES: ICD-10-CM

## 2021-03-03 DIAGNOSIS — E78.2 MIXED HYPERLIPIDEMIA: ICD-10-CM

## 2021-03-03 DIAGNOSIS — E11.69 TYPE 2 DIABETES MELLITUS WITH OTHER SPECIFIED COMPLICATION, WITHOUT LONG-TERM CURRENT USE OF INSULIN (HCC): Primary | ICD-10-CM

## 2021-03-03 DIAGNOSIS — E03.9 HYPOTHYROIDISM (ACQUIRED): ICD-10-CM

## 2021-03-03 DIAGNOSIS — B37.31 VAGINAL YEAST INFECTION: ICD-10-CM

## 2021-03-03 PROCEDURE — 99204 OFFICE O/P NEW MOD 45 MIN: CPT | Performed by: INTERNAL MEDICINE

## 2021-03-03 RX ORDER — SEMAGLUTIDE 1.34 MG/ML
0.25 INJECTION, SOLUTION SUBCUTANEOUS WEEKLY
Qty: 1 PEN | Refills: 0
Start: 2021-03-03 | End: 2021-06-03

## 2021-03-03 RX ORDER — SEMAGLUTIDE 1.34 MG/ML
0.25 INJECTION, SOLUTION SUBCUTANEOUS WEEKLY
Qty: 4.5 ML | Refills: 1 | Status: SHIPPED | OUTPATIENT
Start: 2021-03-03 | End: 2021-03-18 | Stop reason: SDUPTHER

## 2021-03-03 RX ORDER — FLUCONAZOLE 150 MG/1
150 TABLET ORAL
Qty: 3 TABLET | Refills: 0 | Status: SHIPPED | OUTPATIENT
Start: 2021-03-03 | End: 2021-03-10

## 2021-03-03 NOTE — PROGRESS NOTES
Chief Complaint   Patient presents with   • Establish Care   • Diabetes        Referring Provider  Hayley Ty DO HPI   Pari Howell is a 70 y.o. female had concerns including Establish Care and Diabetes.    Diabetes was diagnosed 10-12 years ago.  Complications include neuropathy, glaucoma.  Last ophtho exam was 2 months ago.  Current medications for diabetes include glimepiride 2 mg twice daily. She has issues with vaginal itching/recurrent yeast infections.   She checks her blood sugar 1 times per day. BGs range high 100s-200s. Avg .   Denies any hypoglycemia.   Pt has a history of intolerance to metformin. Pt was also on januvia in the past but she noted no improvement in her glucose levels.   No personal history of pancreatitis, no family history of MEN or MTC.    Diet: avoids sweets, eats salads, meats, vegetables, no fried foods. Knows she eats a moderate amount of carbs. Drinks water and unsweet decaf tea.     Has an issue with hives.   Pt has hypothyroidism and has been on levothyroxine for many years. Is on 50 mcg levothyroxine daily.   Last TSH was 3.25.    Complains of fatigue. Feels like she sleep well.     She was on lopid and atorvastatin, rosuvastatin in the past and didn't tolerate due to myalgia. Hasn't been on lovastatin or pravastatin. Is on zetia now and tolerating.    Past Medical History:   Diagnosis Date   • Bladder infection    • Dyspareunia, female    • Glaucoma    • H/O sexual problem    • High cholesterol    • History of abnormal cervical Pap smear    • Hypertension    • Hypertension    • Hypothyroidism    • Labial cyst    • Muscle weakness    • Type 2 diabetes mellitus (CMS/HCC)    • Urinary incontinence    • Vaginal itching    • Yeast infection      Past Surgical History:   Procedure Laterality Date   • CARPAL TUNNEL RELEASE  2007   • CHOLECYSTECTOMY  1991   • HYSTERECTOMY  2007   • OOPHORECTOMY     • TUBAL ABDOMINAL LIGATION  11/1979      Family History   Problem  Relation Age of Onset   • Colon cancer Maternal Uncle    • Diabetes Mother    • Heart attack Mother    • Hypertension Mother    • Hyperlipidemia Mother    • Thyroid disease Mother    • Stroke Mother    • Cancer Sister         Brain   • Thyroid disease Sister    • Diabetes Brother    • Prostate cancer Brother    • Breast cancer Daughter 46   • Ovarian cancer Neg Hx       Social History     Socioeconomic History   • Marital status:      Spouse name: Not on file   • Number of children: Not on file   • Years of education: Not on file   • Highest education level: Not on file   Tobacco Use   • Smoking status: Never Smoker   • Smokeless tobacco: Never Used   Substance and Sexual Activity   • Alcohol use: No   • Drug use: No   • Sexual activity: Yes     Partners: Male     Birth control/protection: Surgical      No Known Allergies   Current Outpatient Medications on File Prior to Visit   Medication Sig Dispense Refill   • amLODIPine (NORVASC) 5 MG tablet Take 2 tablets by mouth Daily. 90 tablet 0   • Blood Glucose Monitoring Suppl (TRUE METRIX AIR GLUCOSE METER) w/Device kit      • ezetimibe (Zetia) 10 MG tablet Take 1 tablet by mouth Daily. 90 tablet 1   • glimepiride (Amaryl) 2 MG tablet Take 1 tablet by mouth 2 (two) times a day. 180 tablet 1   • glucose monitor monitoring kit 1 each Daily. 1 each 0   • Lancets misc 1 applicator Daily. 100 each 3   • latanoprost (XALATAN) 0.005 % ophthalmic solution Apply 1 drop to eye Daily. 7.5 mL 3   • levothyroxine (SYNTHROID, LEVOTHROID) 50 MCG tablet Take 1 tablet by mouth Daily. 90 tablet 1   • losartan-hydrochlorothiazide (HYZAAR) 100-12.5 MG per tablet Take 1 tablet by mouth Daily. 90 tablet 0   • nystatin (MYCOSTATIN) 099103 UNIT/GM cream Apply  topically to the appropriate area as directed 2 (Two) Times a Day. 90 g 2   • pantoprazole (PROTONIX) 20 MG EC tablet Take 1 tablet by mouth Daily. 90 tablet 0   • timolol (TIMOPTIC) 0.5 % ophthalmic solution      • triamcinolone  "(KENALOG) 0.025 % cream Apply  topically to the appropriate area as directed 3 (Three) Times a Day. 80 g 4   • TRUE METRIX BLOOD GLUCOSE TEST test strip TEST EVERY DAY  AS  INSTRUCTED 100 each 3   • [DISCONTINUED] hydrOXYzine (ATARAX) 25 MG tablet Take 25 mg by mouth 3 (Three) Times a Day As Needed for Itching.       No current facility-administered medications on file prior to visit.         Review of Systems   Constitutional: Positive for fatigue. Negative for activity change and appetite change.   HENT: Negative.    Eyes: Positive for itching. Negative for redness.   Respiratory: Negative.    Cardiovascular: Negative.    Gastrointestinal: Positive for diarrhea and GERD.   Endocrine: Positive for polydipsia and polyuria.        Hair loss   Genitourinary: Positive for dyspareunia and urinary incontinence.   Musculoskeletal: Positive for gait problem. Negative for arthralgias.   Skin: Positive for rash (hives). Negative for bruise.   Allergic/Immunologic: Positive for environmental allergies. Negative for food allergies.   Neurological: Positive for dizziness. Negative for headache.   Hematological: Negative.    Psychiatric/Behavioral: Negative.         /82   Pulse 69   Temp 97.1 °F (36.2 °C)   Resp 18   Ht 170.2 cm (67.01\")   Wt 83.5 kg (184 lb)   LMP  (LMP Unknown)   SpO2 95%   BMI 28.81 kg/m²      Physical Exam    Constitutional:  well developed; well nourished  no acute distress   ENT/Thyroid: no thyromegaly  no palpable nodules   Eyes: EOM intact  Conjunctiva: clear   Respiratory:  breathing is unlabored  clear to auscultation bilaterally   Cardiovascular:  regular rate and rhythm, S1, S2 normal, no murmur, click, rub or gallop   Chest:  Not performed.   Abdomen: Not performed.   : Not performed.   Musculoskeletal: negative findings:  ROM of all joints is normal, no deformities present   Skin: dry and warm   Neuro: normal without focal findings and mental status, speech normal, alert and " oriented x3   Psych: oriented to time, place and person, mood and affect are within normal limits     CMP:  Lab Results   Component Value Date     (H) 12/09/2020    BUN 16 12/09/2020    CREATININE 0.69 12/09/2020    EGFRIFNONA 84 12/09/2020    EGFRIFAFRI 102 12/09/2020    BCR 23.2 12/09/2020     12/09/2020    K 4.0 12/09/2020    CO2 27.2 12/09/2020    CALCIUM 9.8 12/09/2020    PROTENTOTREF 7.3 12/09/2020    ALBUMIN 4.20 12/09/2020    LABGLOBREF 3.1 12/09/2020    LABIL2 1.4 12/09/2020    BILITOT 0.5 12/09/2020    ALKPHOS 76 12/09/2020    AST 8 12/09/2020    ALT 16 12/09/2020     Lipid Panel:  Lab Results   Component Value Date    TRIG 377 (H) 12/09/2020    HDL 44 12/09/2020    VLDL 72 (H) 12/09/2020     (H) 12/09/2020     HbA1c:  Lab Results   Component Value Date    HGBA1C 8.3 12/17/2020    HGBA1C 8.5 09/25/2020     Glucose:  Lab Results   Component Value Date    POCGLU 302 (A) 09/25/2020     Microalbumin:  Lab Results   Component Value Date    MALBCRERATIO 4.8 01/10/2020     TSH:  Lab Results   Component Value Date    TSH 3.250 05/11/2020       Assessment and Plan    Diagnoses and all orders for this visit:    1. Type 2 diabetes mellitus with other specified complication, without long-term current use of insulin (CMS/[facility]) (Primary)  Uncontrolled last A1c at 8.3 and frequent glucose levels greater than 200.  Complicated by history of neuropathy and glaucoma.  Continue glimepiride 4 mg daily.  Add Ozempic 0.25 mg weekly and increase to 0.5 mg after 4 weeks.  Caution regarding possible GI side effects.  No personal history of pancreatitis and no family history of MEN or MTC.  Trulicity was not covered in the past therefore insurance coverage may be an issue.  Glimepiride could be increased to 8 mg daily if needed.  Consider addition of SGLT2 inhibitor if patient has resolution of recurrent yeast infections.  If glucose levels are better controlled, this may be an option.    Check BG's once daily  and call the office if persistently greater than 180.  Labs are up-to-date.  Urine microalbumin is due.  Ophthoexam is up-to-date every 4 months.  Monofilament was checked by PCP last year.  -     Semaglutide,0.25 or 0.5MG/DOS, (Ozempic, 0.25 or 0.5 MG/DOSE,) 2 MG/1.5ML solution pen-injector; Inject 0.25 mg under the skin into the appropriate area as directed 1 (One) Time Per Week. Increase to 0.5 mg weekly after 4 weeks.  Dispense: 1 pen; Refill: 0  -     Microalbumin / Creatinine Urine Ratio - Urine, Clean Catch  -     Semaglutide,0.25 or 0.5MG/DOS, (Ozempic, 0.25 or 0.5 MG/DOSE,) 2 MG/1.5ML solution pen-injector; Inject 0.25 mg under the skin into the appropriate area as directed 1 (One) Time Per Week.  Dispense: 4.5 mL; Refill: 1    2. Vaginal yeast infection  Suspect due to hyperglycemia.  Diflucan prescription was sent.  -     fluconazole (Diflucan) 150 MG tablet; Take 1 tablet by mouth Every 72 (Seventy-Two) Hours for 3 doses.  Dispense: 3 tablet; Refill: 0    3. Hypothyroidism (acquired)  TSH is in the normal range but potentially could be improved.  Patient complains of fatigue which is likely multifactorial, potentially related to uncontrolled diabetes.  There is some data of potential reduction in hives with optimizing levothyroxine dosing.  Recheck TFTs today and titrate levothyroxine if able for TSH in the mid to lower range of normal.  -     TSH  -     T4, Free    4. Mixed hyperlipidemia  Last .  History of intolerance to atorvastatin and rosuvastatin due to myalgia.  Sample of atorvastatin was given today.  If not tolerated or LDL fails to improve enough, consider addition of PCSK9 inhibitor.  Patient is agreeable to try pitavastatin before Repatha/Praluent.  -     pitavastatin calcium (Livalo) 2 MG tablet tablet; Take 1 tablet by mouth Every Night.  Dispense: 7 tablet; Refill: 0    5. Hives  As above, there may be some link between hypothyroidism and hives.  Optimize thyroid dosing as above.   Hives may or may not improve with adjustment in levothyroxine.       Return in about 3 months (around 6/3/2021) for next scheduled follow up. The patient was instructed to contact the clinic with any interval questions or concerns.    Gloria Santoro,    Endocrinologist    Please note that portions of this document were completed with a voice recognition program. Efforts were made to edit the dictations, but occasionally words are mis-transcribed.

## 2021-03-04 LAB
ALBUMIN/CREAT UR: 10 MG/G CREAT (ref 0–29)
CREAT UR-MCNC: 120.1 MG/DL
MICROALBUMIN UR-MCNC: 11.8 UG/ML
T4 FREE SERPL-MCNC: 1.49 NG/DL (ref 0.93–1.7)
TSH SERPL DL<=0.005 MIU/L-ACNC: 2.63 UIU/ML (ref 0.27–4.2)

## 2021-03-07 DIAGNOSIS — E03.9 HYPOTHYROIDISM, UNSPECIFIED TYPE: ICD-10-CM

## 2021-03-07 RX ORDER — LEVOTHYROXINE SODIUM 0.07 MG/1
75 TABLET ORAL DAILY
Qty: 30 TABLET | Refills: 3 | Status: SHIPPED | OUTPATIENT
Start: 2021-03-07 | End: 2021-06-10

## 2021-03-09 ENCOUNTER — TELEPHONE (OUTPATIENT)
Dept: ENDOCRINOLOGY | Facility: CLINIC | Age: 71
End: 2021-03-09

## 2021-03-09 DIAGNOSIS — E78.2 MIXED HYPERLIPIDEMIA: ICD-10-CM

## 2021-03-10 DIAGNOSIS — I10 ESSENTIAL HYPERTENSION: ICD-10-CM

## 2021-03-10 RX ORDER — AMLODIPINE BESYLATE 5 MG/1
TABLET ORAL
Qty: 90 TABLET | Refills: 0 | Status: SHIPPED | OUTPATIENT
Start: 2021-03-10 | End: 2021-07-19 | Stop reason: SDUPTHER

## 2021-03-10 NOTE — TELEPHONE ENCOUNTER
Last Office Visit: 12/1720  Next Office Visit:03/18/21    Labs completed in past 6 months? yes  Labs completed in past year? yes    Last Refill Date: 12/17/20  Quantity:90  Refills:0    Pharmacy:

## 2021-03-16 ENCOUNTER — TELEPHONE (OUTPATIENT)
Dept: ENDOCRINOLOGY | Facility: CLINIC | Age: 71
End: 2021-03-16

## 2021-03-16 NOTE — TELEPHONE ENCOUNTER
PLEASE CALL [facility] [phone] EXT [id number]  PTS LIVALO IS NOT COVERED AND THEY WANT TO CHANGE TO PRAVASTATIN, SIMBAVASTIN LOVASTATIN    PLEASE CALL THEM BACK

## 2021-03-18 ENCOUNTER — TELEPHONE (OUTPATIENT)
Dept: ENDOCRINOLOGY | Facility: CLINIC | Age: 71
End: 2021-03-18

## 2021-03-18 ENCOUNTER — OFFICE VISIT (OUTPATIENT)
Dept: INTERNAL MEDICINE | Facility: CLINIC | Age: 71
End: 2021-03-18

## 2021-03-18 VITALS
OXYGEN SATURATION: 97 % | HEART RATE: 63 BPM | SYSTOLIC BLOOD PRESSURE: 128 MMHG | HEIGHT: 67 IN | BODY MASS INDEX: 28.56 KG/M2 | WEIGHT: 182 LBS | TEMPERATURE: 96.9 F | DIASTOLIC BLOOD PRESSURE: 70 MMHG

## 2021-03-18 DIAGNOSIS — E78.2 MIXED HYPERLIPIDEMIA: Primary | ICD-10-CM

## 2021-03-18 DIAGNOSIS — Z78.0 POSTMENOPAUSAL: ICD-10-CM

## 2021-03-18 DIAGNOSIS — E78.5 HYPERLIPIDEMIA LDL GOAL <70: Primary | ICD-10-CM

## 2021-03-18 DIAGNOSIS — E55.9 VITAMIN D DEFICIENCY: ICD-10-CM

## 2021-03-18 PROCEDURE — 99213 OFFICE O/P EST LOW 20 MIN: CPT | Performed by: INTERNAL MEDICINE

## 2021-03-18 RX ORDER — PRAVASTATIN SODIUM 10 MG
10 TABLET ORAL EVERY EVENING
Qty: 90 TABLET | Refills: 1 | Status: SHIPPED | OUTPATIENT
Start: 2021-03-18 | End: 2021-06-14 | Stop reason: SDUPTHER

## 2021-03-18 RX ORDER — PRAVASTATIN SODIUM 10 MG
10 TABLET ORAL EVERY EVENING
Qty: 30 TABLET | Refills: 5 | Status: SHIPPED | OUTPATIENT
Start: 2021-03-18 | End: 2021-03-18 | Stop reason: SDUPTHER

## 2021-03-18 NOTE — TELEPHONE ENCOUNTER
Approvedon March 17  The drug you asked for is non-formulary (not on [employer] list of preferred drugs). Before the drug can be covered, we need more information. Please ask your prescriber to explain to [facility] why the preferred drugs have not worked for your medical condition and/or would have bad side effects. The list of drugs offered by your [employer] plan can be found in your Prescription Drug Guide (PDG). You can view your PDG online at [email or web address], or request a printed list be mailed to you from this website or by calling customer care at [phone] ([phone]: [phone]), 8 a.m. 8 p.m., seven days a week. If you have not tried the preferred drugs, including pravastatin tablet, please talk to your health care provider about prescribing one of these for you. Some preferred drugs may require an additional review and approval by [employer]. This determination was based on the [employer] Non-Formulary Exceptions Coverage Policy. PA Case: [id number], Status: Approved, Coverage Starts on: 1/1/2021 12:00:00 AM, Coverage Ends on: 12/31/2021 12:00:00 AM. Questions? Contact [phone].  Drug  Livalo 2MG tablets  Form  [employer] Electronic PA Form

## 2021-03-18 NOTE — PROGRESS NOTES
Subjective   Pari Howell is a 70 y.o. female.   Chief Complaint   Patient presents with   • Hyperlipidemia   • Vitamin D Deficiency       History of Present Illness   Here for f/u on HLP and vitmain d def. While she was seeing Endo they stopped her Zetia and changed to Livalo which she could not afford. They now changed her to Pravastatin (which she has not tolerated statins in the past).  She has not gotten the pravastatin in the mail so has not restarted it. Vitamin d is stable on 1,000 IU daily  The following portions of the patient's history were reviewed and updated as appropriate: allergies, current medications, past family history, past medical history, past social history, past surgical history and problem list.  Past Medical History:   Diagnosis Date   • Bladder infection    • Dyspareunia, female    • Glaucoma    • H/O sexual problem    • High cholesterol    • History of abnormal cervical Pap smear    • Hypertension    • Hypertension    • Hypothyroidism    • Labial cyst    • Muscle weakness    • Type 2 diabetes mellitus (CMS/HCC)    • Urinary incontinence    • Vaginal itching    • Yeast infection      Past Surgical History:   Procedure Laterality Date   • CARPAL TUNNEL RELEASE  2007   • CHOLECYSTECTOMY  1991   • HYSTERECTOMY  2007   • OOPHORECTOMY     • TUBAL ABDOMINAL LIGATION  11/1979     Family History   Problem Relation Age of Onset   • Colon cancer Maternal Uncle    • Diabetes Mother    • Heart attack Mother    • Hypertension Mother    • Hyperlipidemia Mother    • Thyroid disease Mother    • Stroke Mother    • Cancer Sister         Brain   • Thyroid disease Sister    • Diabetes Brother    • Prostate cancer Brother    • Breast cancer Daughter 46   • Ovarian cancer Neg Hx      Social History     Socioeconomic History   • Marital status:      Spouse name: Not on file   • Number of children: Not on file   • Years of education: Not on file   • Highest education level: Not on file   Tobacco Use    • Smoking status: Never Smoker   • Smokeless tobacco: Never Used   Substance and Sexual Activity   • Alcohol use: No   • Drug use: No   • Sexual activity: Yes     Partners: Male     Birth control/protection: Surgical     Current Outpatient Medications on File Prior to Visit   Medication Sig Dispense Refill   • amLODIPine (NORVASC) 5 MG tablet TAKE 2 TABLETS EVERY DAY 90 tablet 0   • Blood Glucose Monitoring Suppl (TRUE METRIX AIR GLUCOSE METER) w/Device kit      • glimepiride (Amaryl) 2 MG tablet Take 1 tablet by mouth 2 (two) times a day. 180 tablet 1   • glucose monitor monitoring kit 1 each Daily. 1 each 0   • Lancets misc 1 applicator Daily. 100 each 3   • latanoprost (XALATAN) 0.005 % ophthalmic solution Apply 1 drop to eye Daily. 7.5 mL 3   • levothyroxine (SYNTHROID, LEVOTHROID) 75 MCG tablet Take 1 tablet by mouth Daily. 30 tablet 3   • losartan-hydrochlorothiazide (HYZAAR) 100-12.5 MG per tablet Take 1 tablet by mouth Daily. 90 tablet 0   • nystatin (MYCOSTATIN) 117284 UNIT/GM cream Apply  topically to the appropriate area as directed 2 (Two) Times a Day. 90 g 2   • pantoprazole (PROTONIX) 20 MG EC tablet Take 1 tablet by mouth Daily. 90 tablet 0   • Semaglutide,0.25 or 0.5MG/DOS, (Ozempic, 0.25 or 0.5 MG/DOSE,) 2 MG/1.5ML solution pen-injector Inject 0.25 mg under the skin into the appropriate area as directed 1 (One) Time Per Week. Increase to 0.5 mg weekly after 4 weeks. 1 pen 0   • timolol (TIMOPTIC) 0.5 % ophthalmic solution      • TRUE METRIX BLOOD GLUCOSE TEST test strip TEST EVERY DAY  AS  INSTRUCTED 100 each 3   • [DISCONTINUED] triamcinolone (KENALOG) 0.025 % cream Apply  topically to the appropriate area as directed 3 (Three) Times a Day. 80 g 4   • Semaglutide,0.25 or 0.5MG/DOS, (Ozempic, 0.25 or 0.5 MG/DOSE,) 2 MG/1.5ML solution pen-injector Inject 0.25 mg under the skin into the appropriate area as directed 1 (One) Time Per Week. 4.5 mL 1   • [DISCONTINUED] ezetimibe (Zetia) 10 MG tablet  "Take 1 tablet by mouth Daily. 90 tablet 1   • [DISCONTINUED] pitavastatin calcium (Livalo) 2 MG tablet tablet Take 1 tablet by mouth Every Night. 90 tablet 3     No current facility-administered medications on file prior to visit.       Review of Systems   Constitutional: Negative for activity change, appetite change, chills, diaphoresis, fatigue, fever and unexpected weight change.   HENT: Negative for congestion, ear discharge, ear pain, mouth sores, nosebleeds, sinus pressure, sneezing and sore throat.    Eyes: Negative for pain, discharge and itching.   Respiratory: Negative for cough, chest tightness, shortness of breath and wheezing.    Cardiovascular: Negative for chest pain, palpitations and leg swelling.   Gastrointestinal: Negative for abdominal pain, constipation, diarrhea, nausea and vomiting.   Endocrine: Negative for cold intolerance, heat intolerance, polydipsia and polyphagia.   Genitourinary: Negative for dysuria, flank pain, frequency, hematuria and urgency.   Musculoskeletal: Negative for arthralgias, back pain, gait problem, myalgias, neck pain and neck stiffness.   Skin: Negative for color change, pallor and rash.   Neurological: Negative for seizures, speech difficulty, numbness and headaches.   Psychiatric/Behavioral: Negative for agitation, confusion, decreased concentration and sleep disturbance. The patient is not nervous/anxious.      /70   Pulse 63   Temp 96.9 °F (36.1 °C)   Ht 170.2 cm (67\")   Wt 82.6 kg (182 lb)   LMP  (LMP Unknown)   SpO2 97%   BMI 28.51 kg/m²     Objective   Physical Exam  Vitals and nursing note reviewed.   Constitutional:       Appearance: Normal appearance. She is well-developed.   HENT:      Head: Normocephalic.      Right Ear: External ear normal.      Left Ear: External ear normal.      Nose: Nose normal.   Eyes:      Conjunctiva/sclera: Conjunctivae normal.      Pupils: Pupils are equal, round, and reactive to light.   Neck:      Thyroid: No " thyromegaly.      Vascular: No JVD.   Cardiovascular:      Rate and Rhythm: Normal rate and regular rhythm.      Heart sounds: Normal heart sounds. No murmur heard.   No friction rub.   Pulmonary:      Effort: No respiratory distress.      Breath sounds: No wheezing or rales.   Abdominal:      General: Abdomen is flat. There is no distension.      Palpations: Abdomen is soft.      Tenderness: There is no abdominal tenderness.   Musculoskeletal:         General: No tenderness.   Lymphadenopathy:      Cervical: No cervical adenopathy.   Skin:     Findings: No rash.   Neurological:      General: No focal deficit present.      Mental Status: She is alert and oriented to person, place, and time.      Cranial Nerves: No cranial nerve deficit.      Deep Tendon Reflexes: Reflexes normal.   Psychiatric:         Mood and Affect: Mood normal.         Behavior: Behavior normal.         Assessment/Plan   Diagnoses and all orders for this visit:    1. Hyperlipidemia LDL goal <70 (Primary)  Endo dc her Zetia and gave Livalo= too expensive and now they changed her to Pravastatin (which did not tolerate Lipitor in past). Has not arrived in mail. I will recheck  Lipids in . Advised her to have Lft's labs 6 weeks after starting statin.   2. Vitamin D deficiency  Continue 1,000 vitamin d 3 daily  3. Postmenopausal  -     DEXA Bone Density Axial; Future

## 2021-03-18 NOTE — TELEPHONE ENCOUNTER
PT CALLED STATING THAT WE SENT IN A PA FOR A CHOLESTEROL MEDICATION (PT DID NOT RECALL NAME) AND IT IS STILL TOO EXPENSIVE. PT STATED THAT HER INSURANCE WILL COVER PRAVASTATIN. PLEASE CONFER W/ DR AND SEND IN MED. THANK YOU

## 2021-03-31 RX ORDER — GLIMEPIRIDE 2 MG/1
TABLET ORAL
Qty: 180 TABLET | Refills: 1 | OUTPATIENT
Start: 2021-03-31

## 2021-03-31 NOTE — TELEPHONE ENCOUNTER
Last Office Visit: 03/18/21  Next Office Visit: 07/1921    Labs completed in past 6 months? yes  Labs completed in past year? yes    Last Refill Date: 02/198/21  Quantity:180  Refills:1    Pharmacy:

## 2021-04-07 DIAGNOSIS — K21.9 GASTROESOPHAGEAL REFLUX DISEASE WITHOUT ESOPHAGITIS: ICD-10-CM

## 2021-04-07 DIAGNOSIS — I10 ESSENTIAL HYPERTENSION: ICD-10-CM

## 2021-04-08 RX ORDER — LOSARTAN POTASSIUM AND HYDROCHLOROTHIAZIDE 12.5; 1 MG/1; MG/1
TABLET ORAL
Qty: 90 TABLET | Refills: 0 | Status: SHIPPED | OUTPATIENT
Start: 2021-04-08 | End: 2021-07-07

## 2021-04-08 RX ORDER — PANTOPRAZOLE SODIUM 20 MG/1
TABLET, DELAYED RELEASE ORAL
Qty: 90 TABLET | Refills: 0 | Status: SHIPPED | OUTPATIENT
Start: 2021-04-08 | End: 2021-07-07

## 2021-04-08 NOTE — TELEPHONE ENCOUNTER
Last Office Visit: 03/18/21  Next Office Visit:07/18/21    Labs completed in past 6 months? yes  Labs completed in past year? yes    Last Refill Date: 12/17/20  Quantity:90  Refills:0    Pharmacy:     Please review pended refill request for any changes needed on refills or quantities. Thank you!

## 2021-04-14 DIAGNOSIS — E11.40 CONTROLLED TYPE 2 DIABETES MELLITUS WITH DIABETIC NEUROPATHY, WITHOUT LONG-TERM CURRENT USE OF INSULIN (HCC): ICD-10-CM

## 2021-04-15 RX ORDER — CALCIUM CITRATE/VITAMIN D3 200MG-6.25
TABLET ORAL
Qty: 100 EACH | Refills: 11 | Status: SHIPPED | OUTPATIENT
Start: 2021-04-15 | End: 2022-05-13 | Stop reason: SDUPTHER

## 2021-04-15 NOTE — TELEPHONE ENCOUNTER
Last Office Visit: 03/18/21  Next Office Visit:07/19/21    Labs completed in past 6 months? yes  Labs completed in past year? yes    Last Refill Date: 04/27/20  Quantity:100  Refills:3    Pharmacy:     Please review pended refill request for any changes needed on refills or quantities. Thank you!

## 2021-04-21 DIAGNOSIS — N89.8 VAGINAL ITCHING: ICD-10-CM

## 2021-04-21 RX ORDER — NYSTATIN 100000 U/G
CREAM TOPICAL 2 TIMES DAILY
Qty: 90 G | Refills: 2 | Status: SHIPPED | OUTPATIENT
Start: 2021-04-21 | End: 2022-11-09 | Stop reason: SDUPTHER

## 2021-04-21 NOTE — TELEPHONE ENCOUNTER
Last Office Visit: 03/18/21  Next Office Visit: 07/19/21    Labs completed in past 6 months? yes  Labs completed in past year? yes    Last Refill Date: 07/22/20  Quantity: 90  Refills:2    Pharmacy:     Please review pended refill request for any changes needed on refills or quantities. Thank you!

## 2021-04-30 RX ORDER — DIPHENHYDRAMINE HCL 25 MG
1 TABLET ORAL 2 TIMES DAILY
Qty: 1 KIT | Refills: 0 | Status: SHIPPED | OUTPATIENT
Start: 2021-04-30

## 2021-06-03 ENCOUNTER — OFFICE VISIT (OUTPATIENT)
Dept: ENDOCRINOLOGY | Facility: CLINIC | Age: 71
End: 2021-06-03

## 2021-06-03 VITALS
BODY MASS INDEX: 26.37 KG/M2 | HEIGHT: 68 IN | DIASTOLIC BLOOD PRESSURE: 58 MMHG | HEART RATE: 70 BPM | WEIGHT: 174 LBS | SYSTOLIC BLOOD PRESSURE: 118 MMHG | OXYGEN SATURATION: 98 %

## 2021-06-03 DIAGNOSIS — E11.69 TYPE 2 DIABETES MELLITUS WITH OTHER SPECIFIED COMPLICATION, WITHOUT LONG-TERM CURRENT USE OF INSULIN (HCC): Primary | ICD-10-CM

## 2021-06-03 DIAGNOSIS — E78.2 MIXED HYPERLIPIDEMIA: ICD-10-CM

## 2021-06-03 DIAGNOSIS — E03.9 HYPOTHYROIDISM, UNSPECIFIED TYPE: ICD-10-CM

## 2021-06-03 LAB
EXPIRATION DATE: NORMAL
EXPIRATION DATE: NORMAL
GLUCOSE BLDC GLUCOMTR-MCNC: 110 MG/DL (ref 70–130)
HBA1C MFR BLD: 6.2 %
Lab: NORMAL
Lab: NORMAL

## 2021-06-03 PROCEDURE — 3044F HG A1C LEVEL LT 7.0%: CPT | Performed by: INTERNAL MEDICINE

## 2021-06-03 PROCEDURE — 99214 OFFICE O/P EST MOD 30 MIN: CPT | Performed by: INTERNAL MEDICINE

## 2021-06-03 PROCEDURE — 82947 ASSAY GLUCOSE BLOOD QUANT: CPT | Performed by: INTERNAL MEDICINE

## 2021-06-03 PROCEDURE — 83036 HEMOGLOBIN GLYCOSYLATED A1C: CPT | Performed by: INTERNAL MEDICINE

## 2021-06-03 RX ORDER — SEMAGLUTIDE 1.34 MG/ML
0.5 INJECTION, SOLUTION SUBCUTANEOUS WEEKLY
Qty: 1 PEN | Refills: 0
Start: 2021-06-03 | End: 2021-09-08

## 2021-06-03 NOTE — PROGRESS NOTES
Chief Complaint   Patient presents with   • Diabetes          HPI   Pari Howell is a 70 y.o. female had concerns including Diabetes.    She is checking blood sugars 1-2 times per day. BGs are all in the low 100s.   A1c is improved to 6.2 from 8.3 at her last visit.  She is decrease glimepiride to 2 mg once a day rather than twice daily.  Current medications for diabetes include glimepiride 2 mg once a day, ozempic 0.5 mg once per week. Has some constipation which is new - wonders if it is the ozempic.    Still having genital irritation and itching. Using monistat. Hasn't seen gyn in years. Denies discharge. C/o dryness.     Dose of levothyroxine was increased after her last visit. She feels better on this.     Is tolerating pravastatin without difficulty.     The following portions of the patient's history were reviewed and updated as appropriate: allergies, current medications, past family history, past medical history, past social history, past surgical history and problem list.      Review of Systems   Constitutional: Negative.    Endocrine: Negative.    Genitourinary:        See HPI   Neurological: Positive for numbness.        Physical Exam  Vitals reviewed.   Constitutional:       Appearance: Normal appearance.   Cardiovascular:      Rate and Rhythm: Normal rate.      Pulses:           Dorsalis pedis pulses are 2+ on the right side and 2+ on the left side.   Pulmonary:      Effort: Pulmonary effort is normal.   Feet:      Right foot:      Skin integrity: Skin integrity normal.      Toenail Condition: Fungal disease present.     Left foot:      Skin integrity: Skin integrity normal.      Toenail Condition: Left toenails are normal.      Comments: Monofilament 2/5 bilaterally, diminished diffusely    Neurological:      General: No focal deficit present.      Mental Status: She is alert. Mental status is at baseline.   Psychiatric:         Mood and Affect: Mood normal.        /58 (BP Location: Left arm,  "Patient Position: Sitting, Cuff Size: Adult)   Pulse 70   Ht 172.7 cm (68\")   Wt 78.9 kg (174 lb)   LMP  (LMP Unknown)   SpO2 98%   BMI 26.46 kg/m²    Diabetic Foot Exam Performed and Monofilament Test Performed      Labs and imaging    CMP:  Lab Results   Component Value Date     (H) 12/09/2020    BUN 16 12/09/2020    CREATININE 0.69 12/09/2020    EGFRIFNONA 84 12/09/2020    EGFRIFAFRI 102 12/09/2020    BCR 23.2 12/09/2020     12/09/2020    K 4.0 12/09/2020    CO2 27.2 12/09/2020    CALCIUM 9.8 12/09/2020    PROTENTOTREF 7.3 12/09/2020    ALBUMIN 4.20 12/09/2020    LABGLOBREF 3.1 12/09/2020    LABIL2 1.4 12/09/2020    BILITOT 0.5 12/09/2020    ALKPHOS 76 12/09/2020    AST 8 12/09/2020    ALT 16 12/09/2020     Lipid Panel:  Lab Results   Component Value Date    TRIG 377 (H) 12/09/2020    HDL 44 12/09/2020    VLDL 72 (H) 12/09/2020     (H) 12/09/2020     HbA1c:  Lab Results   Component Value Date    HGBA1C 6.2 06/03/2021    HGBA1C 8.3 12/17/2020     Glucose:    Lab Results   Component Value Date    POCGLU 110 06/03/2021     Microalbumin:  Lab Results   Component Value Date    MALBCRERATIO 10 03/03/2021     TSH:  Lab Results   Component Value Date    TSH 2.630 03/03/2021       Assessment and plan  Diagnoses and all orders for this visit:    1. Type 2 diabetes mellitus with other specified complication, without long-term current use of insulin (CMS/[facility]) (Primary)  Controlled with A1c down to 6.2.  Complicated by history of neuropathy, retinopathy with glaucoma.  Discontinue glimepiride.  Continue Ozempic 0.5 mg weekly.  If BG's trend up, try increased dose to 1 mg weekly.  She has mild constipation which she thinks could be in part due to Ozempic.  SGLT2 inhibitor has been avoided due to vaginal irritation and question of recurrent yeast infections.  Recommend she follow-up with PCP/gynecology for additional evaluation as this is a chronic problem and BG's are now well controlled.  Labs are " up-to-date though she is due for repeat lipid and CMP.  Ophtho exam up-to-date from December 2020.  Monofilament updated in the office today.  -     POC Glycosylated Hemoglobin (Hb A1C)  -     POC Glucose, Blood  -     Lipid Panel; Future  -     Comprehensive Metabolic Panel; Future  -     Semaglutide,0.25 or 0.5MG/DOS, (Ozempic, 0.25 or 0.5 MG/DOSE,) 2 MG/1.5ML solution pen-injector; Inject 0.5 mg under the skin into the appropriate area as directed 1 (One) Time Per Week.  Dispense: 1 pen; Refill: 0    2. Hypothyroidism, unspecified type  Symptoms improved after dose increase to 75 mcg daily.  Due for repeat TFTs.  Titrate levothyroxine if needed for TSH in the mid to lower range of normal.  -     T4, Free; Future  -     TSH; Future    3. Mixed hyperlipidemia  Tolerating pravastatin without difficulty.  Recheck fasting lipid and CMP.       Return in about 3 months (around 9/3/2021) for next scheduled follow up. The patient was instructed to contact the clinic with any interval questions or concerns.    Gloria Santoro, DO   Endocrinologist    Please note that portions of this document were completed with a voice recognition program. Efforts were made to edit the dictations, but occasionally words are mis-transcribed.

## 2021-06-04 ENCOUNTER — HOSPITAL ENCOUNTER (OUTPATIENT)
Dept: BONE DENSITY | Facility: HOSPITAL | Age: 71
Discharge: HOME | End: 2021-06-04
Admitting: INTERNAL MEDICINE

## 2021-06-04 ENCOUNTER — LAB (OUTPATIENT)
Dept: ENDOCRINOLOGY | Facility: CLINIC | Age: 71
End: 2021-06-04

## 2021-06-04 DIAGNOSIS — Z78.0 POSTMENOPAUSAL: ICD-10-CM

## 2021-06-04 DIAGNOSIS — E11.69 TYPE 2 DIABETES MELLITUS WITH OTHER SPECIFIED COMPLICATION, WITHOUT LONG-TERM CURRENT USE OF INSULIN (HCC): ICD-10-CM

## 2021-06-04 DIAGNOSIS — E03.9 HYPOTHYROIDISM, UNSPECIFIED TYPE: ICD-10-CM

## 2021-06-04 PROCEDURE — 77080 DXA BONE DENSITY AXIAL: CPT

## 2021-06-05 LAB
ALBUMIN SERPL-MCNC: 4.4 G/DL (ref 3.5–5.2)
ALBUMIN/GLOB SERPL: 1.5 G/DL
ALP SERPL-CCNC: 55 U/L (ref 39–117)
ALT SERPL-CCNC: 14 U/L (ref 1–33)
AST SERPL-CCNC: 13 U/L (ref 1–32)
BILIRUB SERPL-MCNC: 0.6 MG/DL (ref 0–1.2)
BUN SERPL-MCNC: 15 MG/DL (ref 8–23)
BUN/CREAT SERPL: 18.8 (ref 7–25)
CALCIUM SERPL-MCNC: 10.3 MG/DL (ref 8.6–10.5)
CHLORIDE SERPL-SCNC: 103 MMOL/L (ref 98–107)
CHOLEST SERPL-MCNC: 182 MG/DL (ref 0–200)
CO2 SERPL-SCNC: 25.5 MMOL/L (ref 22–29)
CREAT SERPL-MCNC: 0.8 MG/DL (ref 0.57–1)
GLOBULIN SER CALC-MCNC: 3 GM/DL
GLUCOSE SERPL-MCNC: 112 MG/DL (ref 65–99)
HDLC SERPL-MCNC: 38 MG/DL (ref 40–60)
LDLC SERPL CALC-MCNC: 111 MG/DL (ref 0–100)
POTASSIUM SERPL-SCNC: 5.2 MMOL/L (ref 3.5–5.2)
PROT SERPL-MCNC: 7.4 G/DL (ref 6–8.5)
SODIUM SERPL-SCNC: 138 MMOL/L (ref 136–145)
T4 FREE SERPL-MCNC: 1.53 NG/DL (ref 0.93–1.7)
TRIGL SERPL-MCNC: 191 MG/DL (ref 0–150)
TSH SERPL DL<=0.005 MIU/L-ACNC: 2.32 UIU/ML (ref 0.27–4.2)
VLDLC SERPL CALC-MCNC: 33 MG/DL (ref 5–40)

## 2021-06-09 DIAGNOSIS — E03.9 HYPOTHYROIDISM, UNSPECIFIED TYPE: ICD-10-CM

## 2021-06-10 RX ORDER — LEVOTHYROXINE SODIUM 0.07 MG/1
75 TABLET ORAL DAILY
Qty: 90 TABLET | Refills: 0 | Status: SHIPPED | OUTPATIENT
Start: 2021-06-10 | End: 2021-06-10 | Stop reason: SDUPTHER

## 2021-06-10 RX ORDER — LEVOTHYROXINE SODIUM 0.07 MG/1
75 TABLET ORAL DAILY
Qty: 14 TABLET | Refills: 0 | Status: SHIPPED | OUTPATIENT
Start: 2021-06-10 | End: 2021-06-14 | Stop reason: SDUPTHER

## 2021-06-10 NOTE — TELEPHONE ENCOUNTER
PATIENT NEEDS AT LEAST A WEEKS SUPPLY OF LEVOTHYROXINE CALLED INTO LOCAL PHARMACY AS SHE WAITS ON MAIL ORDER PRESCRIPTION. SHE ONLY HAS 1 DAY LEFT OF MEDICATION. LOCAL PHARMACY IS [facility] ON [facility].

## 2021-06-14 DIAGNOSIS — E78.2 MIXED HYPERLIPIDEMIA: ICD-10-CM

## 2021-06-14 DIAGNOSIS — E03.9 HYPOTHYROIDISM, UNSPECIFIED TYPE: ICD-10-CM

## 2021-06-14 RX ORDER — LEVOTHYROXINE SODIUM 0.07 MG/1
75 TABLET ORAL DAILY
Qty: 90 TABLET | Refills: 3 | Status: SHIPPED | OUTPATIENT
Start: 2021-06-14 | End: 2021-07-19 | Stop reason: SDUPTHER

## 2021-06-14 RX ORDER — PRAVASTATIN SODIUM 40 MG
40 TABLET ORAL EVERY EVENING
Qty: 90 TABLET | Refills: 1 | Status: SHIPPED | OUTPATIENT
Start: 2021-06-14 | End: 2021-09-08 | Stop reason: SDUPTHER

## 2021-07-07 DIAGNOSIS — K21.9 GASTROESOPHAGEAL REFLUX DISEASE WITHOUT ESOPHAGITIS: ICD-10-CM

## 2021-07-07 DIAGNOSIS — I10 ESSENTIAL HYPERTENSION: ICD-10-CM

## 2021-07-07 RX ORDER — PANTOPRAZOLE SODIUM 20 MG/1
TABLET, DELAYED RELEASE ORAL
Qty: 90 TABLET | Refills: 0 | Status: SHIPPED | OUTPATIENT
Start: 2021-07-07 | End: 2021-07-19 | Stop reason: SDUPTHER

## 2021-07-07 RX ORDER — LOSARTAN POTASSIUM AND HYDROCHLOROTHIAZIDE 12.5; 1 MG/1; MG/1
TABLET ORAL
Qty: 90 TABLET | Refills: 0 | Status: SHIPPED | OUTPATIENT
Start: 2021-07-07 | End: 2021-12-06

## 2021-07-07 NOTE — TELEPHONE ENCOUNTER
Last Office Visit: 3/18/21  Next Office Visit:7/19/21    Labs completed in past 6 months? yes  Labs completed in past year? yes    Last Refill Date:4/8/21  Quantity:90  Refills:0    Pharmacy: on file     Please review pended refill request for any changes needed on refills or quantities. Thank you!    Patient instructed to follow up with his regular Primary Care Physician or Walk-in Care if his symptoms fail to improve as anticipated, worsen, or new symptoms develop.  Please proceed to the nearest hospital Emergency Room or call 911 for medical emergencies.    Additional Educational Resources:  For additional resources regarding your symptoms, diagnosis, or further health information, please visit the Health Resources section on [email or web address] or the Online Health Resources section in [employer].

## 2021-07-19 ENCOUNTER — LAB (OUTPATIENT)
Dept: LAB | Facility: HOSPITAL | Age: 71
End: 2021-07-19

## 2021-07-19 ENCOUNTER — OFFICE VISIT (OUTPATIENT)
Dept: INTERNAL MEDICINE | Facility: CLINIC | Age: 71
End: 2021-07-19

## 2021-07-19 VITALS
WEIGHT: 172 LBS | OXYGEN SATURATION: 99 % | SYSTOLIC BLOOD PRESSURE: 130 MMHG | HEART RATE: 62 BPM | BODY MASS INDEX: 26.07 KG/M2 | HEIGHT: 68 IN | DIASTOLIC BLOOD PRESSURE: 80 MMHG

## 2021-07-19 DIAGNOSIS — I10 ESSENTIAL HYPERTENSION: ICD-10-CM

## 2021-07-19 DIAGNOSIS — K21.9 GASTROESOPHAGEAL REFLUX DISEASE WITHOUT ESOPHAGITIS: ICD-10-CM

## 2021-07-19 DIAGNOSIS — E03.9 HYPOTHYROIDISM (ACQUIRED): ICD-10-CM

## 2021-07-19 DIAGNOSIS — E78.5 HYPERLIPIDEMIA LDL GOAL <70: Primary | ICD-10-CM

## 2021-07-19 DIAGNOSIS — E78.5 HYPERLIPIDEMIA LDL GOAL <70: ICD-10-CM

## 2021-07-19 PROCEDURE — 99214 OFFICE O/P EST MOD 30 MIN: CPT | Performed by: INTERNAL MEDICINE

## 2021-07-19 RX ORDER — LEVOTHYROXINE SODIUM 0.07 MG/1
75 TABLET ORAL DAILY
Qty: 90 TABLET | Refills: 1 | Status: SHIPPED | OUTPATIENT
Start: 2021-07-19 | End: 2022-05-04 | Stop reason: SDUPTHER

## 2021-07-19 RX ORDER — PANTOPRAZOLE SODIUM 20 MG/1
20 TABLET, DELAYED RELEASE ORAL DAILY
Qty: 90 TABLET | Refills: 1 | Status: SHIPPED | OUTPATIENT
Start: 2021-07-19 | End: 2021-11-05 | Stop reason: SDUPTHER

## 2021-07-19 RX ORDER — AMLODIPINE BESYLATE 5 MG/1
10 TABLET ORAL DAILY
Qty: 90 TABLET | Refills: 1 | Status: SHIPPED | OUTPATIENT
Start: 2021-07-19 | End: 2021-11-05 | Stop reason: SDUPTHER

## 2021-07-19 NOTE — PROGRESS NOTES
Subjective   Pari Howell is a 71 y.o. female.   Chief Complaint   Patient presents with   • Hyperlipidemia   • Hypertension   • Hypothyroidism   • Heartburn       History of Present Illness   Here for f/u on chronic conditions: HLP, HTN, hypothyroid, and GERD. HTN controlled with losartan hctz and norvasc.. No CP or SOA. No heart palpitations. HLP controlled with pravastatin. No muscle weakness. Hypothyroid controlled with synthroid. No fatigue or weakness.  GERD controlled with Protonix and dietary changes.   The following portions of the patient's history were reviewed and updated as appropriate: allergies, current medications, past family history, past medical history, past social history, past surgical history and problem list.  Past Medical History:   Diagnosis Date   • Bladder infection    • Dyspareunia, female    • Glaucoma    • H/O sexual problem    • High cholesterol    • History of abnormal cervical Pap smear    • Hypertension    • Hypertension    • Hypothyroidism    • Labial cyst    • Muscle weakness    • Type 2 diabetes mellitus (CMS/HCC)    • Urinary incontinence    • Vaginal itching    • Yeast infection      Past Surgical History:   Procedure Laterality Date   • CARPAL TUNNEL RELEASE  2007   • CHOLECYSTECTOMY  1991   • HYSTERECTOMY  2007   • OOPHORECTOMY     • TUBAL ABDOMINAL LIGATION  11/1979     Family History   Problem Relation Age of Onset   • Colon cancer Maternal Uncle    • Diabetes Mother    • Heart attack Mother    • Hypertension Mother    • Hyperlipidemia Mother    • Thyroid disease Mother    • Stroke Mother    • Cancer Sister         Brain   • Thyroid disease Sister    • Diabetes Brother    • Prostate cancer Brother    • Breast cancer Daughter 46   • Ovarian cancer Neg Hx      Social History     Socioeconomic History   • Marital status:      Spouse name: Not on file   • Number of children: Not on file   • Years of education: Not on file   • Highest education level: Not on file    Tobacco Use   • Smoking status: Never Smoker   • Smokeless tobacco: Never Used   Vaping Use   • Vaping Use: Never used   Substance and Sexual Activity   • Alcohol use: No   • Drug use: No   • Sexual activity: Yes     Partners: Male     Birth control/protection: Surgical     Current Outpatient Medications on File Prior to Visit   Medication Sig Dispense Refill   • Blood Glucose Monitoring Suppl (True Metrix Air Glucose Meter) w/Device kit 1 each 2 (two) times a day. 1 kit 0   • glucose monitor monitoring kit 1 each Daily. 1 each 0   • Lancets misc 1 applicator Daily. 100 each 3   • latanoprost (XALATAN) 0.005 % ophthalmic solution Apply 1 drop to eye Daily. 7.5 mL 3   • losartan-hydrochlorothiazide (HYZAAR) 100-12.5 MG per tablet TAKE 1 TABLET EVERY DAY 90 tablet 0   • nystatin (MYCOSTATIN) 715386 UNIT/GM cream APPLY  TOPICALLY TO THE APPROPRIATE AREA AS DIRECTED 2 (TWO) TIMES A DAY. 90 g 2   • pravastatin (PRAVACHOL) 40 MG tablet Take 1 tablet by mouth Every Evening. 90 tablet 1   • Semaglutide,0.25 or 0.5MG/DOS, (Ozempic, 0.25 or 0.5 MG/DOSE,) 2 MG/1.5ML solution pen-injector Inject 0.5 mg under the skin into the appropriate area as directed 1 (One) Time Per Week. 1 pen 0   • timolol (TIMOPTIC) 0.5 % ophthalmic solution      • True Metrix Blood Glucose Test test strip TEST EVERY DAY  AS  INSTRUCTED 100 each 11   • [DISCONTINUED] amLODIPine (NORVASC) 5 MG tablet TAKE 2 TABLETS EVERY DAY 90 tablet 0   • [DISCONTINUED] levothyroxine (SYNTHROID, LEVOTHROID) 75 MCG tablet Take 1 tablet by mouth Daily. 90 tablet 3   • [DISCONTINUED] pantoprazole (PROTONIX) 20 MG EC tablet TAKE 1 TABLET EVERY DAY 90 tablet 0     No current facility-administered medications on file prior to visit.       Review of Systems   Constitutional: Negative for activity change, appetite change, chills, diaphoresis, fatigue, fever and unexpected weight change.   HENT: Negative for congestion, mouth sores, nosebleeds, sinus pressure, sneezing and  "sore throat.    Eyes: Negative for pain, discharge and itching.   Respiratory: Negative for cough, chest tightness, shortness of breath and wheezing.    Cardiovascular: Negative for chest pain, palpitations and leg swelling.   Gastrointestinal: Negative for abdominal pain, constipation, diarrhea, nausea and vomiting.   Endocrine: Negative for cold intolerance, heat intolerance, polydipsia and polyphagia.   Genitourinary: Negative for dysuria, flank pain, frequency, hematuria and urgency.   Musculoskeletal: Negative for arthralgias, back pain, gait problem, myalgias, neck pain and neck stiffness.   Skin: Negative for color change, pallor and rash.   Neurological: Negative for seizures, speech difficulty, numbness and headaches.   Psychiatric/Behavioral: Negative for agitation, confusion, decreased concentration and sleep disturbance. The patient is not nervous/anxious.      /80   Pulse 62   Ht 172.7 cm (68\")   Wt 78 kg (172 lb)   LMP  (LMP Unknown)   SpO2 99%   BMI 26.15 kg/m²     Objective   Physical Exam  Vitals and nursing note reviewed.   Constitutional:       Appearance: Normal appearance. She is well-developed.   HENT:      Head: Normocephalic.      Right Ear: External ear normal.      Left Ear: External ear normal.      Nose: Nose normal.   Eyes:      Conjunctiva/sclera: Conjunctivae normal.      Pupils: Pupils are equal, round, and reactive to light.   Neck:      Thyroid: No thyromegaly.      Vascular: No JVD.   Cardiovascular:      Rate and Rhythm: Normal rate and regular rhythm.      Heart sounds: Normal heart sounds. No murmur heard.   No friction rub.   Pulmonary:      Effort: Pulmonary effort is normal. No respiratory distress.      Breath sounds: Normal breath sounds. No wheezing or rales.   Abdominal:      General: There is no distension.      Palpations: Abdomen is soft.      Tenderness: There is no abdominal tenderness.   Musculoskeletal:         General: No tenderness.   Lymphadenopathy: "      Cervical: No cervical adenopathy.   Skin:     Findings: No rash.   Neurological:      Mental Status: She is alert and oriented to person, place, and time.      Cranial Nerves: No cranial nerve deficit.      Deep Tendon Reflexes: Reflexes normal.   Psychiatric:         Mood and Affect: Mood normal.         Behavior: Behavior normal.         Assessment/Plan   Diagnoses and all orders for this visit:    1. Hyperlipidemia LDL goal <70 (Primary)  -     Lipid Panel; Future  Continue Pravastatin 40 mg po qhs  2. Essential hypertension  -     amLODIPine (NORVASC) 5 MG tablet; Take 2 tablets by mouth Daily.  Dispense: 90 tablet; Refill: 1  -     Comprehensive Metabolic Panel; Future  -     CBC & Differential; Future  Continue Norvasc 5mg 2 tabs po qd and losartan hctz 100 / 12.5 mg po qd  3. Gastroesophageal reflux disease without esophagitis  -     pantoprazole (PROTONIX) 20 MG EC tablet; Take 1 tablet by mouth Daily.  Dispense: 90 tablet; Refill: 1  Continue Protonix 20 mg po qd  4. Hypothyroidism (acquired)  -     levothyroxine (SYNTHROID, LEVOTHROID) 75 MCG tablet; Take 1 tablet by mouth Daily.  Dispense: 90 tablet; Refill: 1  -     TSH; Future  Continue synthroid 75 mcg po qd.

## 2021-07-20 LAB
ALBUMIN SERPL-MCNC: 4.3 G/DL (ref 3.5–5.2)
ALBUMIN/GLOB SERPL: 1.4 G/DL
ALP SERPL-CCNC: 47 U/L (ref 39–117)
ALT SERPL-CCNC: 17 U/L (ref 1–33)
AST SERPL-CCNC: 14 U/L (ref 1–32)
BASOPHILS # BLD AUTO: 0.05 10*3/MM3 (ref 0–0.2)
BASOPHILS NFR BLD AUTO: 0.9 % (ref 0–1.5)
BILIRUB SERPL-MCNC: 0.7 MG/DL (ref 0–1.2)
BUN SERPL-MCNC: 11 MG/DL (ref 8–23)
BUN/CREAT SERPL: 15.3 (ref 7–25)
CALCIUM SERPL-MCNC: 10 MG/DL (ref 8.6–10.5)
CHLORIDE SERPL-SCNC: 104 MMOL/L (ref 98–107)
CHOLEST SERPL-MCNC: 169 MG/DL (ref 0–200)
CO2 SERPL-SCNC: 26.5 MMOL/L (ref 22–29)
CREAT SERPL-MCNC: 0.72 MG/DL (ref 0.57–1)
EOSINOPHIL # BLD AUTO: 0.48 10*3/MM3 (ref 0–0.4)
EOSINOPHIL NFR BLD AUTO: 8.5 % (ref 0.3–6.2)
ERYTHROCYTE [DISTWIDTH] IN BLOOD BY AUTOMATED COUNT: 12.9 % (ref 12.3–15.4)
GLOBULIN SER CALC-MCNC: 3 GM/DL
GLUCOSE SERPL-MCNC: 95 MG/DL (ref 65–99)
HCT VFR BLD AUTO: 44.6 % (ref 34–46.6)
HDLC SERPL-MCNC: 39 MG/DL (ref 40–60)
HGB BLD-MCNC: 15 G/DL (ref 12–15.9)
IMM GRANULOCYTES # BLD AUTO: 0.01 10*3/MM3 (ref 0–0.05)
IMM GRANULOCYTES NFR BLD AUTO: 0.2 % (ref 0–0.5)
LDLC SERPL CALC-MCNC: 103 MG/DL (ref 0–100)
LYMPHOCYTES # BLD AUTO: 1.83 10*3/MM3 (ref 0.7–3.1)
LYMPHOCYTES NFR BLD AUTO: 32.3 % (ref 19.6–45.3)
MCH RBC QN AUTO: 30.5 PG (ref 26.6–33)
MCHC RBC AUTO-ENTMCNC: 33.6 G/DL (ref 31.5–35.7)
MCV RBC AUTO: 90.8 FL (ref 79–97)
MONOCYTES # BLD AUTO: 0.49 10*3/MM3 (ref 0.1–0.9)
MONOCYTES NFR BLD AUTO: 8.6 % (ref 5–12)
NEUTROPHILS # BLD AUTO: 2.81 10*3/MM3 (ref 1.7–7)
NEUTROPHILS NFR BLD AUTO: 49.5 % (ref 42.7–76)
NRBC BLD AUTO-RTO: 0 /100 WBC (ref 0–0.2)
PLATELET # BLD AUTO: 271 10*3/MM3 (ref 140–450)
POTASSIUM SERPL-SCNC: 3.8 MMOL/L (ref 3.5–5.2)
PROT SERPL-MCNC: 7.3 G/DL (ref 6–8.5)
RBC # BLD AUTO: 4.91 10*6/MM3 (ref 3.77–5.28)
SODIUM SERPL-SCNC: 140 MMOL/L (ref 136–145)
TRIGL SERPL-MCNC: 154 MG/DL (ref 0–150)
TSH SERPL DL<=0.005 MIU/L-ACNC: 2.41 UIU/ML (ref 0.27–4.2)
VLDLC SERPL CALC-MCNC: 27 MG/DL (ref 5–40)
WBC # BLD AUTO: 5.67 10*3/MM3 (ref 3.4–10.8)

## 2021-09-08 ENCOUNTER — OFFICE VISIT (OUTPATIENT)
Dept: ENDOCRINOLOGY | Facility: CLINIC | Age: 71
End: 2021-09-08

## 2021-09-08 VITALS
HEIGHT: 68 IN | BODY MASS INDEX: 25.76 KG/M2 | SYSTOLIC BLOOD PRESSURE: 130 MMHG | WEIGHT: 170 LBS | HEART RATE: 74 BPM | DIASTOLIC BLOOD PRESSURE: 70 MMHG | OXYGEN SATURATION: 97 %

## 2021-09-08 DIAGNOSIS — E78.2 MIXED HYPERLIPIDEMIA: ICD-10-CM

## 2021-09-08 DIAGNOSIS — E11.69 TYPE 2 DIABETES MELLITUS WITH OTHER SPECIFIED COMPLICATION, WITHOUT LONG-TERM CURRENT USE OF INSULIN (HCC): Primary | ICD-10-CM

## 2021-09-08 DIAGNOSIS — E03.9 HYPOTHYROIDISM (ACQUIRED): ICD-10-CM

## 2021-09-08 LAB
EXPIRATION DATE: ABNORMAL
EXPIRATION DATE: NORMAL
GLUCOSE BLDC GLUCOMTR-MCNC: 150 MG/DL (ref 70–130)
HBA1C MFR BLD: 5.3 %
Lab: ABNORMAL
Lab: NORMAL

## 2021-09-08 PROCEDURE — 99214 OFFICE O/P EST MOD 30 MIN: CPT | Performed by: INTERNAL MEDICINE

## 2021-09-08 PROCEDURE — 82947 ASSAY GLUCOSE BLOOD QUANT: CPT | Performed by: INTERNAL MEDICINE

## 2021-09-08 RX ORDER — SEMAGLUTIDE 1.34 MG/ML
1 INJECTION, SOLUTION SUBCUTANEOUS
Qty: 9 ML | Refills: 1 | Status: SHIPPED | OUTPATIENT
Start: 2021-09-08 | End: 2021-09-08

## 2021-09-08 RX ORDER — GLIMEPIRIDE 2 MG/1
2 TABLET ORAL
COMMUNITY
End: 2021-09-08

## 2021-09-08 RX ORDER — PRAVASTATIN SODIUM 40 MG
40 TABLET ORAL EVERY EVENING
Qty: 90 TABLET | Refills: 3 | Status: SHIPPED | OUTPATIENT
Start: 2021-09-08 | End: 2022-09-22 | Stop reason: SDUPTHER

## 2021-09-08 RX ORDER — SEMAGLUTIDE 1.34 MG/ML
0.5 INJECTION, SOLUTION SUBCUTANEOUS WEEKLY
Qty: 4 PEN | Refills: 0
Start: 2021-09-08 | End: 2022-01-10 | Stop reason: SDUPTHER

## 2021-09-08 NOTE — PROGRESS NOTES
"Chief Complaint   Patient presents with   • Diabetes          HPI   Pari Howell is a 71 y.o. female had concerns including Diabetes.    She is checking blood sugars 1 times per day. Fasting BGs ranging 100s. Around 3-5 PM she gets hungry. Thinks it could be glucose related but hasn't checked.   Current medications for diabetes include glimepiride 2 mg AM, ozempic 0.5 mg weekly.  Resumed the glimepiride because glucose levels were running high.     Still having some constipation controlled with stool softener.     Yeast infection/genital irritation controlled on vagisil and AZO. Notes worse problems when glucose high.     History of intolerance to alternate statins due to myalgia.  Tolerating pravastatin without issue.    The following portions of the patient's history were reviewed and updated as appropriate: allergies, current medications, past family history, past medical history, past social history, past surgical history and problem list.      Review of Systems   Constitutional: Positive for appetite change.   Gastrointestinal: Positive for constipation.   Genitourinary:        See HPI        Physical Exam  Vitals reviewed.   Constitutional:       Appearance: Normal appearance.   Cardiovascular:      Rate and Rhythm: Normal rate.   Pulmonary:      Effort: Pulmonary effort is normal.   Neurological:      General: No focal deficit present.      Mental Status: She is alert. Mental status is at baseline.   Psychiatric:         Mood and Affect: Mood normal.         Behavior: Behavior normal.        /70   Pulse 74   Ht 172.7 cm (68\")   Wt 77.1 kg (170 lb)   LMP  (LMP Unknown)   SpO2 97%   BMI 25.85 kg/m²      Labs and imaging    CMP:  Lab Results   Component Value Date    GLU 95 07/19/2021    BUN 11 07/19/2021    CREATININE 0.72 07/19/2021    EGFRIFNONA 80 07/19/2021    EGFRIFAFRI 97 07/19/2021    BCR 15.3 07/19/2021     07/19/2021    K 3.8 07/19/2021    CO2 26.5 07/19/2021    CALCIUM 10.0 " 07/19/2021    PROTENTOTREF 7.3 07/19/2021    ALBUMIN 4.30 07/19/2021    LABGLOBREF 3.0 07/19/2021    LABIL2 1.4 07/19/2021    BILITOT 0.7 07/19/2021    ALKPHOS 47 07/19/2021    AST 14 07/19/2021    ALT 17 07/19/2021     Lipid Panel:  Lab Results   Component Value Date    TRIG 154 (H) 07/19/2021    HDL 39 (L) 07/19/2021    VLDL 27 07/19/2021     (H) 07/19/2021     HbA1c:  Lab Results   Component Value Date    HGBA1C 5.3 09/08/2021    HGBA1C 6.2 06/03/2021     Glucose:    Lab Results   Component Value Date    POCGLU 150 (A) 09/08/2021     Microalbumin:  Lab Results   Component Value Date    MALBCRERATIO 10 03/03/2021     TSH:  Lab Results   Component Value Date    TSH 2.410 07/19/2021       Assessment and plan  Diagnoses and all orders for this visit:    1. Type 2 diabetes mellitus with other specified complication, without long-term current use of insulin (CMS/[facility]) (Primary)  Controlled but with suspected hypoglycemia with A1c down to 5.3.  Complicated by history of neuropathy and retinopathy with glaucoma.  Discontinue glimepiride.  Discussed possible increase in Ozempic to 1 mg weekly though out-of-pocket cost over $100 for 3-month supply.  Patient prefers to remain on samples of 0.5 mg if able.  If BG's trend up, increase Ozempic to 1 mg weekly.  History of intolerance to Metformin.  Patient has chronic/recurrent vaginal irritation and yeast infections, SGLT2 inhibitors contraindicated.  Microalbumin normal from March.  Other labs are up-to-date from July.  Ophtho exam up-to-date yearly.  Patient has pending cataract surgery.  Monofilament up-to-date from June 2021.  -     POC Glycosylated Hemoglobin (Hb A1C)  -     POC Glucose, Blood  -     Semaglutide,0.25 or 0.5MG/DOS, (Ozempic, 0.25 or 0.5 MG/DOSE,) 2 MG/1.5ML solution pen-injector; Inject 0.5 mg under the skin into the appropriate area as directed 1 (One) Time Per Week. SAMPLE  Dispense: 4 pen; Refill: 0    2. Mixed hyperlipidemia  Lipids not quite at  goal but patient has a history of intolerance to alternate statins due to myalgia.    Continue pravastatin.  -     pravastatin (PRAVACHOL) 40 MG tablet; Take 1 tablet by mouth Every Evening.  Dispense: 90 tablet; Refill: 3    3. Hypothyroidism (acquired)  Clinically and biochemically euthyroid on levothyroxine 75 mcg daily.  Monitor yearly.       Return in about 4 months (around 1/8/2022) for next scheduled follow up. The patient was instructed to contact the clinic with any interval questions or concerns.    Gloria Santoro, DO   Endocrinologist    Please note that portions of this document were completed with a voice recognition program. Efforts were made to edit the dictations, but occasionally words are mis-transcribed.

## 2021-11-05 ENCOUNTER — OFFICE VISIT (OUTPATIENT)
Dept: INTERNAL MEDICINE | Facility: CLINIC | Age: 71
End: 2021-11-05

## 2021-11-05 VITALS
HEART RATE: 67 BPM | SYSTOLIC BLOOD PRESSURE: 138 MMHG | DIASTOLIC BLOOD PRESSURE: 70 MMHG | BODY MASS INDEX: 26.52 KG/M2 | WEIGHT: 175 LBS | HEIGHT: 68 IN | OXYGEN SATURATION: 99 %

## 2021-11-05 DIAGNOSIS — K21.9 GASTROESOPHAGEAL REFLUX DISEASE WITHOUT ESOPHAGITIS: ICD-10-CM

## 2021-11-05 DIAGNOSIS — E78.5 HYPERLIPIDEMIA LDL GOAL <70: ICD-10-CM

## 2021-11-05 DIAGNOSIS — E11.9 TYPE 2 DIABETES MELLITUS WITHOUT COMPLICATION, WITHOUT LONG-TERM CURRENT USE OF INSULIN (HCC): ICD-10-CM

## 2021-11-05 DIAGNOSIS — E55.9 VITAMIN D DEFICIENCY: ICD-10-CM

## 2021-11-05 DIAGNOSIS — H40.89 OTHER GLAUCOMA OF BOTH EYES: ICD-10-CM

## 2021-11-05 DIAGNOSIS — E03.9 HYPOTHYROIDISM (ACQUIRED): ICD-10-CM

## 2021-11-05 DIAGNOSIS — I10 ESSENTIAL HYPERTENSION: ICD-10-CM

## 2021-11-05 DIAGNOSIS — Z12.31 ENCOUNTER FOR SCREENING MAMMOGRAM FOR BREAST CANCER: ICD-10-CM

## 2021-11-05 DIAGNOSIS — Z00.00 MEDICARE ANNUAL WELLNESS VISIT, SUBSEQUENT: ICD-10-CM

## 2021-11-05 DIAGNOSIS — Z23 NEED FOR INFLUENZA VACCINATION: ICD-10-CM

## 2021-11-05 PROCEDURE — 1160F RVW MEDS BY RX/DR IN RCRD: CPT | Performed by: INTERNAL MEDICINE

## 2021-11-05 PROCEDURE — G0439 PPPS, SUBSEQ VISIT: HCPCS | Performed by: INTERNAL MEDICINE

## 2021-11-05 PROCEDURE — 96160 PT-FOCUSED HLTH RISK ASSMT: CPT | Performed by: INTERNAL MEDICINE

## 2021-11-05 PROCEDURE — 99214 OFFICE O/P EST MOD 30 MIN: CPT | Performed by: INTERNAL MEDICINE

## 2021-11-05 PROCEDURE — 90662 IIV NO PRSV INCREASED AG IM: CPT | Performed by: INTERNAL MEDICINE

## 2021-11-05 PROCEDURE — 1126F AMNT PAIN NOTED NONE PRSNT: CPT | Performed by: INTERNAL MEDICINE

## 2021-11-05 PROCEDURE — 1170F FXNL STATUS ASSESSED: CPT | Performed by: INTERNAL MEDICINE

## 2021-11-05 PROCEDURE — G0008 ADMIN INFLUENZA VIRUS VAC: HCPCS | Performed by: INTERNAL MEDICINE

## 2021-11-05 RX ORDER — PANTOPRAZOLE SODIUM 20 MG/1
20 TABLET, DELAYED RELEASE ORAL DAILY
Qty: 90 TABLET | Refills: 1 | Status: SHIPPED | OUTPATIENT
Start: 2021-11-05 | End: 2022-05-18

## 2021-11-05 RX ORDER — AMLODIPINE BESYLATE 5 MG/1
10 TABLET ORAL DAILY
Qty: 90 TABLET | Refills: 1 | Status: SHIPPED | OUTPATIENT
Start: 2021-11-05 | End: 2022-08-09

## 2021-11-05 NOTE — PROGRESS NOTES
Immunization  Immunization performed in [facility] by Citlaly Hood MA. Patient tolerated the procedure well without complications.  11/05/21   Citlaly Hood MA

## 2021-11-05 NOTE — PROGRESS NOTES
The ABCs of the Annual Wellness Visit  Subsequent Medicare Wellness Visit    Chief Complaint   Patient presents with   • Medicare Wellness-subsequent   • Hyperlipidemia   • Fatigue      Subjective    History of Present Illness:  Pari Howell is a 71 y.o. female who presents for a Subsequent Medicare Wellness Visit.    The following portions of the patient's history were reviewed and   updated as appropriate: allergies, current medications, past family history, past medical history, past social history, past surgical history and problem list.    Compared to one year ago, the patient feels her physical   health is the same.    Compared to one year ago, the patient feels her mental   health is the same.    Recent Hospitalizations:  She was not admitted to the hospital during the last year.       Current Medical Providers:  Patient Care Team:  Hayley Ty DO as PCP - General (Internal Medicine)  Gloria Santoro DO as Consulting Physician (Endocrinology)    Outpatient Medications Prior to Visit   Medication Sig Dispense Refill   • Blood Glucose Monitoring Suppl (True Metrix Air Glucose Meter) w/Device kit 1 each 2 (two) times a day. 1 kit 0   • glucose monitor monitoring kit 1 each Daily. 1 each 0   • Lancets misc 1 applicator Daily. 100 each 3   • latanoprost (XALATAN) 0.005 % ophthalmic solution Apply 1 drop to eye Daily. 7.5 mL 3   • levothyroxine (SYNTHROID, LEVOTHROID) 75 MCG tablet Take 1 tablet by mouth Daily. 90 tablet 1   • losartan-hydrochlorothiazide (HYZAAR) 100-12.5 MG per tablet TAKE 1 TABLET EVERY DAY 90 tablet 0   • nystatin (MYCOSTATIN) 717636 UNIT/GM cream APPLY  TOPICALLY TO THE APPROPRIATE AREA AS DIRECTED 2 (TWO) TIMES A DAY. 90 g 2   • pravastatin (PRAVACHOL) 40 MG tablet Take 1 tablet by mouth Every Evening. 90 tablet 3   • Semaglutide,0.25 or 0.5MG/DOS, (Ozempic, 0.25 or 0.5 MG/DOSE,) 2 MG/1.5ML solution pen-injector Inject 0.5 mg under the skin into the appropriate area as  "directed 1 (One) Time Per Week. SAMPLE 4 pen 0   • timolol (TIMOPTIC) 0.5 % ophthalmic solution      • True Metrix Blood Glucose Test test strip TEST EVERY DAY  AS  INSTRUCTED 100 each 11   • amLODIPine (NORVASC) 5 MG tablet Take 2 tablets by mouth Daily. 90 tablet 1   • pantoprazole (PROTONIX) 20 MG EC tablet Take 1 tablet by mouth Daily. 90 tablet 1     No facility-administered medications prior to visit.       No opioid medication identified on active medication list. I have reviewed chart for other potential  high risk medication/s and harmful drug interactions in the elderly.          Aspirin is not on active medication list.  Aspirin use is not indicated based on review of current medical condition/s. Risk of harm outweighs potential benefits.  .    Patient Active Problem List   Diagnosis   • Type 2 diabetes mellitus without complication, without long-term current use of insulin (HCC)   • Other specified glaucoma   • Hyperlipidemia LDL goal <70   • Vitamin D deficiency   • Irritable bowel syndrome with diarrhea   • Hypothyroidism (acquired)     Advance Care Planning  Advance Directive is not on file.  ACP discussion was held with the patient during this visit. Patient has an advance directive (not in EMR), copy requested.    Review of Systems   Constitutional: Negative for chills, diaphoresis, fatigue and fever.   HENT: Negative for rhinorrhea and sinus pressure.    Respiratory: Negative for cough and shortness of breath.    Cardiovascular: Negative for chest pain and palpitations.   Gastrointestinal: Negative for diarrhea, nausea and vomiting.   Genitourinary: Negative for dysuria.   Musculoskeletal: Negative for back pain.   Neurological: Negative for confusion.   Psychiatric/Behavioral: Negative for hallucinations.        Objective    Vitals:    11/05/21 1239   BP: 138/70   Pulse: 67   SpO2: 99%   Weight: 79.4 kg (175 lb)   Height: 172.7 cm (68\")   PainSc: 0-No pain     BMI Readings from Last 1 Encounters: "   11/05/21 26.61 kg/m²   BMI is above normal parameters. Recommendations include: exercise counseling    Does the patient have evidence of cognitive impairment? No    Physical Exam  Vitals reviewed.   Cardiovascular:      Rate and Rhythm: Normal rate and regular rhythm.      Heart sounds: No murmur heard.      Pulmonary:      Effort: No respiratory distress.      Breath sounds: No wheezing or rales.   Abdominal:      General: There is no distension.      Tenderness: There is no abdominal tenderness. There is no guarding.   Neurological:      General: No focal deficit present.      Mental Status: She is alert and oriented to person, place, and time.   Psychiatric:         Mood and Affect: Mood normal.         Behavior: Behavior normal.       Lab Results   Component Value Date    HGBA1C 5.3 09/08/2021            HEALTH RISK ASSESSMENT    Smoking Status:  Social History     Tobacco Use   Smoking Status Never Smoker   Smokeless Tobacco Never Used     Alcohol Consumption:  Social History     Substance and Sexual Activity   Alcohol Use No     Fall Risk Screen:    MADONNA Fall Risk Assessment was completed, and patient is at LOW risk for falls.Assessment completed on:11/5/2021    Depression Screening:  PHQ-2/PHQ-9 Depression Screening 11/5/2021   Little interest or pleasure in doing things 0   Feeling down, depressed, or hopeless 0   Total Score 0       Health Habits and Functional and Cognitive Screening:  Functional & Cognitive Status 11/5/2021   Do you have difficulty preparing food and eating? No   Do you have difficulty bathing yourself, getting dressed or grooming yourself? No   Do you have difficulty using the toilet? No   Do you have difficulty moving around from place to place? No   Do you have trouble with steps or getting out of a bed or a chair? No   Current Diet Well Balanced Diet   Dental Exam Up to date   Eye Exam Up to date   Exercise (times per week) 3 times per week   Current Exercises Include Walking    Current Exercise Activities Include -   Do you need help using the phone?  No   Are you deaf or do you have serious difficulty hearing?  No   Do you need help with transportation? No   Do you need help shopping? No   Do you need help preparing meals?  No   Do you need help with housework?  No   Do you need help with laundry? No   Do you need help taking your medications? No   Do you need help managing money? No   Do you ever drive or ride in a car without wearing a seat belt? No   Have you felt unusual stress, anger or loneliness in the last month? No   Who do you live with? Spouse   If you need help, do you have trouble finding someone available to you? No   Have you been bothered in the last four weeks by sexual problems? No   Do you have difficulty concentrating, remembering or making decisions? No       Age-appropriate Screening Schedule:  Refer to the list below for future screening recommendations based on patient's age, sex and/or medical conditions. Orders for these recommended tests are listed in the plan section. The patient has been provided with a written plan.    Health Maintenance   Topic Date Due   • ZOSTER VACCINE (2 of 3) 10/26/2015   • DIABETIC EYE EXAM  12/16/2021   • URINE MICROALBUMIN  03/03/2022   • HEMOGLOBIN A1C  03/08/2022   • DIABETIC FOOT EXAM  06/03/2022   • LIPID PANEL  07/19/2022   • MAMMOGRAM  11/11/2022   • DXA SCAN  06/04/2023   • TDAP/TD VACCINES (2 - Td or Tdap) 01/24/2028   • INFLUENZA VACCINE  Completed   • PAP SMEAR  Discontinued              Assessment/Plan   CMS Preventative Services Quick Reference  Risk Factors Identified During Encounter  Obesity/Overweight   The above risks/problems have been discussed with the patient.  Follow up actions/plans if indicated are seen below in the Assessment/Plan Section.  Pertinent information has been shared with the patient in the After Visit Summary.    Diagnoses and all orders for this visit:    1. Medicare annual wellness visit,  subsequent  Done today  2. Essential hypertension  -     amLODIPine (NORVASC) 5 MG tablet; Take 2 tablets by mouth Daily.  Dispense: 90 tablet; Refill: 1  -     Comprehensive Metabolic Panel; Future  Continue norvasc 5 mg po qd and losartan hctz 100/12.5 mg po qd  3. Gastroesophageal reflux disease without esophagitis  -     pantoprazole (PROTONIX) 20 MG EC tablet; Take 1 tablet by mouth Daily.  Dispense: 90 tablet; Refill: 1  Continue Protonix 20 mg po qd  4. Hyperlipidemia LDL goal <70  -     Lipid Panel; Future  Continue pravastatin 40 mg po qhs  5. Hypothyroidism (acquired)  Continue synthroid 75 mcg po qd  6. Type 2 diabetes mellitus without complication, without long-term current use of insulin (HCC)  Continue ozempic as prescribed by conrado  7. Vitamin D deficiency  Continue vitamin d3 1,000 IU daily  8. Other glaucoma of both eyes  Continue xalatan 1 drop each eye daily  9. Need for influenza vaccination  -     Fluzone High-Dose 65+yrs    10. Encounter for screening mammogram for breast cancer  -     Mammo screening digital tomosynthesis bilateral w CAD; Future        Follow Up:   No follow-ups on file.     An After Visit Summary and PPPS were made available to the patient.    {Optional Chart N

## 2021-11-29 ENCOUNTER — LAB (OUTPATIENT)
Dept: LAB | Facility: HOSPITAL | Age: 71
End: 2021-11-29

## 2021-11-29 DIAGNOSIS — E78.5 HYPERLIPIDEMIA LDL GOAL <70: ICD-10-CM

## 2021-11-29 DIAGNOSIS — I10 ESSENTIAL HYPERTENSION: ICD-10-CM

## 2021-11-29 LAB
ALBUMIN SERPL-MCNC: 4.5 G/DL (ref 3.5–5.2)
ALBUMIN/GLOB SERPL: 1.9 G/DL
ALP SERPL-CCNC: 61 U/L (ref 39–117)
ALT SERPL-CCNC: 22 U/L (ref 1–33)
AST SERPL-CCNC: 18 U/L (ref 1–32)
BILIRUB SERPL-MCNC: 0.6 MG/DL (ref 0–1.2)
BUN SERPL-MCNC: 13 MG/DL (ref 8–23)
BUN/CREAT SERPL: 17.8 (ref 7–25)
CALCIUM SERPL-MCNC: 9.7 MG/DL (ref 8.6–10.5)
CHLORIDE SERPL-SCNC: 104 MMOL/L (ref 98–107)
CHOLEST SERPL-MCNC: 206 MG/DL (ref 0–200)
CO2 SERPL-SCNC: 25.7 MMOL/L (ref 22–29)
CREAT SERPL-MCNC: 0.73 MG/DL (ref 0.57–1)
GLOBULIN SER CALC-MCNC: 2.4 GM/DL
GLUCOSE SERPL-MCNC: 137 MG/DL (ref 65–99)
HDLC SERPL-MCNC: 46 MG/DL (ref 40–60)
LDLC SERPL CALC-MCNC: 116 MG/DL (ref 0–100)
POTASSIUM SERPL-SCNC: 3.6 MMOL/L (ref 3.5–5.2)
PROT SERPL-MCNC: 6.9 G/DL (ref 6–8.5)
SODIUM SERPL-SCNC: 140 MMOL/L (ref 136–145)
TRIGL SERPL-MCNC: 251 MG/DL (ref 0–150)
VLDLC SERPL CALC-MCNC: 44 MG/DL (ref 5–40)

## 2021-12-06 DIAGNOSIS — I10 ESSENTIAL HYPERTENSION: ICD-10-CM

## 2021-12-06 RX ORDER — LOSARTAN POTASSIUM AND HYDROCHLOROTHIAZIDE 12.5; 1 MG/1; MG/1
TABLET ORAL
Qty: 90 TABLET | Refills: 0 | Status: SHIPPED | OUTPATIENT
Start: 2021-12-06 | End: 2022-10-13 | Stop reason: SDUPTHER

## 2022-01-10 ENCOUNTER — OFFICE VISIT (OUTPATIENT)
Dept: ENDOCRINOLOGY | Facility: CLINIC | Age: 72
End: 2022-01-10

## 2022-01-10 VITALS
BODY MASS INDEX: 26.37 KG/M2 | WEIGHT: 174 LBS | DIASTOLIC BLOOD PRESSURE: 70 MMHG | SYSTOLIC BLOOD PRESSURE: 132 MMHG | OXYGEN SATURATION: 97 % | HEART RATE: 68 BPM | HEIGHT: 68 IN

## 2022-01-10 DIAGNOSIS — E11.69 TYPE 2 DIABETES MELLITUS WITH OTHER SPECIFIED COMPLICATION, WITHOUT LONG-TERM CURRENT USE OF INSULIN: Primary | ICD-10-CM

## 2022-01-10 DIAGNOSIS — E03.9 HYPOTHYROIDISM (ACQUIRED): ICD-10-CM

## 2022-01-10 DIAGNOSIS — E78.2 MIXED HYPERLIPIDEMIA: ICD-10-CM

## 2022-01-10 LAB
EXPIRATION DATE: ABNORMAL
EXPIRATION DATE: NORMAL
GLUCOSE BLDC GLUCOMTR-MCNC: 159 MG/DL (ref 70–130)
HBA1C MFR BLD: 6.4 %
Lab: ABNORMAL
Lab: NORMAL

## 2022-01-10 PROCEDURE — 82947 ASSAY GLUCOSE BLOOD QUANT: CPT | Performed by: INTERNAL MEDICINE

## 2022-01-10 PROCEDURE — 3044F HG A1C LEVEL LT 7.0%: CPT | Performed by: INTERNAL MEDICINE

## 2022-01-10 PROCEDURE — 99214 OFFICE O/P EST MOD 30 MIN: CPT | Performed by: INTERNAL MEDICINE

## 2022-01-10 PROCEDURE — 83036 HEMOGLOBIN GLYCOSYLATED A1C: CPT | Performed by: INTERNAL MEDICINE

## 2022-01-10 RX ORDER — SEMAGLUTIDE 1.34 MG/ML
0.5 INJECTION, SOLUTION SUBCUTANEOUS WEEKLY
Qty: 4.5 ML | Refills: 1 | Status: SHIPPED | OUTPATIENT
Start: 2022-01-10 | End: 2022-05-04 | Stop reason: SDUPTHER

## 2022-01-10 NOTE — PROGRESS NOTES
"Chief Complaint   Patient presents with   • Diabetes     Type II          HPI   Pari Howell is a 71 y.o. female had concerns including Diabetes (Type II).    She is checking blood sugars daily times per day. BGs range 130s-180s.  Current medications for diabetes include ozempic 0.5 mg weekly. Glimepiride was stopped after her last visit for down to A1c 5.3.    She is on pravastatin for cholesterol. History of intolerance to alternate statins.    Is on levothyroxine 75 mcg daily. Last TSH normal range in July.     PT is taking biosoothe pills daily for her neuropathy pain which has helped her neuropathy pain tremendously.     The following portions of the patient's history were reviewed and updated as appropriate: allergies, current medications, past family history, past medical history, past social history, past surgical history and problem list.      Review of Systems   Constitutional: Positive for fatigue.   Endocrine:        Hot flashes   Genitourinary:        Vaginal itching   Neurological: Positive for numbness.   Psychiatric/Behavioral: Positive for sleep disturbance.        Physical Exam  Vitals reviewed.   Constitutional:       Appearance: Normal appearance.   Cardiovascular:      Rate and Rhythm: Normal rate.   Pulmonary:      Effort: Pulmonary effort is normal.   Neurological:      General: No focal deficit present.      Mental Status: She is alert. Mental status is at baseline.   Psychiatric:         Mood and Affect: Mood normal.         Behavior: Behavior normal.        /70 (BP Location: Left arm, Patient Position: Sitting, Cuff Size: Adult)   Pulse 68   Ht 172.7 cm (68\")   Wt 78.9 kg (174 lb)   LMP  (LMP Unknown)   SpO2 97%   BMI 26.46 kg/m²      Labs and imaging    CMP:  Lab Results   Component Value Date    BUN 13 11/29/2021    CREATININE 0.73 11/29/2021    EGFRIFNONA 79 11/29/2021    EGFRIFAFRI 95 11/29/2021    BCR 17.8 11/29/2021     11/29/2021    K 3.6 11/29/2021    CO2 25.7 " 11/29/2021    CALCIUM 9.7 11/29/2021    PROTENTOTREF 6.9 11/29/2021    ALBUMIN 4.50 11/29/2021    LABGLOBREF 2.4 11/29/2021    LABIL2 1.9 11/29/2021    BILITOT 0.6 11/29/2021    ALKPHOS 61 11/29/2021    AST 18 11/29/2021    ALT 22 11/29/2021     Lipid Panel:  Lab Results   Component Value Date    TRIG 251 (H) 11/29/2021    HDL 46 11/29/2021    VLDL 44 (H) 11/29/2021     (H) 11/29/2021     HbA1c:  Lab Results   Component Value Date    HGBA1C 6.4 01/10/2022    HGBA1C 5.3 09/08/2021     Glucose:    Lab Results   Component Value Date    POCGLU 159 (A) 01/10/2022     Microalbumin:  Lab Results   Component Value Date    MALBCRERATIO 10 03/03/2021     TSH:  Lab Results   Component Value Date    TSH 2.410 07/19/2021       Assessment and plan  Diagnoses and all orders for this visit:    1. Type 2 diabetes mellitus with other specified complication, without long-term current use of insulin (HCC) (Primary)  Controlled with A1c 6.4.  Complicated by neuropathy, retinopathy with history of glaucoma.  Continue Ozempic 0.5 mg weekly.  Higher doses not tolerated due to GI side effects.  No metformin due to GI side effects.  Glimepiride was discontinued after her last visit and has not needed.  Labs are up-to-date with lipid and CMP from November.  Urine microalbumin up-to-date from March.  Ophtho exam should be updated yearly.  Monofilament up-to-date from June.  -     POC Glucose, Blood  -     POC Glycosylated Hemoglobin (Hb A1C)  -     Semaglutide,0.25 or 0.5MG/DOS, (Ozempic, 0.25 or 0.5 MG/DOSE,) 2 MG/1.5ML solution pen-injector; Inject 0.5 mg under the skin into the appropriate area as directed 1 (One) Time Per Week. SAMPLE  Dispense: 4.5 mL; Refill: 1    2. Mixed hyperlipidemia  Lipids have been above goal though patient has a history of intolerance to alternate statins.  Continue pravastatin.    3. Hypothyroidism (acquired)  Clinically and biochemically euthyroid on levothyroxine 75 mcg daily.  TSH in a normal range  from July.  Monitor yearly.  No higher doses recommended due to heat intolerance/hot flashes.    Return in about 4 months (around 5/10/2022) for next scheduled follow up. The patient was instructed to contact the clinic with any interval questions or concerns.    Gloria Santoro, DO   Endocrinologist    Please note that portions of this note were completed with a voice recognition program.

## 2022-01-20 ENCOUNTER — HOSPITAL ENCOUNTER (OUTPATIENT)
Dept: MAMMOGRAPHY | Facility: HOSPITAL | Age: 72
Discharge: HOME | End: 2022-01-20
Admitting: INTERNAL MEDICINE

## 2022-01-20 DIAGNOSIS — Z12.31 ENCOUNTER FOR SCREENING MAMMOGRAM FOR BREAST CANCER: ICD-10-CM

## 2022-01-20 PROCEDURE — 77063 BREAST TOMOSYNTHESIS BI: CPT

## 2022-01-20 PROCEDURE — 77063 BREAST TOMOSYNTHESIS BI: CPT | Performed by: RADIOLOGY

## 2022-01-20 PROCEDURE — 77067 SCR MAMMO BI INCL CAD: CPT | Performed by: RADIOLOGY

## 2022-01-20 PROCEDURE — 77067 SCR MAMMO BI INCL CAD: CPT

## 2022-05-04 ENCOUNTER — LAB (OUTPATIENT)
Dept: LAB | Facility: HOSPITAL | Age: 72
End: 2022-05-04

## 2022-05-04 ENCOUNTER — OFFICE VISIT (OUTPATIENT)
Dept: ENDOCRINOLOGY | Facility: CLINIC | Age: 72
End: 2022-05-04

## 2022-05-04 VITALS
OXYGEN SATURATION: 99 % | DIASTOLIC BLOOD PRESSURE: 66 MMHG | WEIGHT: 180 LBS | BODY MASS INDEX: 27.28 KG/M2 | SYSTOLIC BLOOD PRESSURE: 142 MMHG | HEIGHT: 68 IN | HEART RATE: 68 BPM | RESPIRATION RATE: 16 BRPM

## 2022-05-04 DIAGNOSIS — E78.2 MIXED HYPERLIPIDEMIA: ICD-10-CM

## 2022-05-04 DIAGNOSIS — E03.9 HYPOTHYROIDISM (ACQUIRED): ICD-10-CM

## 2022-05-04 DIAGNOSIS — E11.42 TYPE 2 DIABETES MELLITUS WITH DIABETIC POLYNEUROPATHY, WITHOUT LONG-TERM CURRENT USE OF INSULIN: Primary | ICD-10-CM

## 2022-05-04 DIAGNOSIS — E11.42 TYPE 2 DIABETES MELLITUS WITH DIABETIC POLYNEUROPATHY, WITHOUT LONG-TERM CURRENT USE OF INSULIN: ICD-10-CM

## 2022-05-04 LAB
EXPIRATION DATE: ABNORMAL
EXPIRATION DATE: NORMAL
GLUCOSE BLDC GLUCOMTR-MCNC: 153 MG/DL (ref 70–130)
HBA1C MFR BLD: 6.6 %
Lab: ABNORMAL
Lab: NORMAL
TSH SERPL DL<=0.05 MIU/L-ACNC: 2.13 UIU/ML (ref 0.27–4.2)

## 2022-05-04 PROCEDURE — 36415 COLL VENOUS BLD VENIPUNCTURE: CPT

## 2022-05-04 PROCEDURE — 83036 HEMOGLOBIN GLYCOSYLATED A1C: CPT | Performed by: INTERNAL MEDICINE

## 2022-05-04 PROCEDURE — 3044F HG A1C LEVEL LT 7.0%: CPT | Performed by: INTERNAL MEDICINE

## 2022-05-04 PROCEDURE — 82947 ASSAY GLUCOSE BLOOD QUANT: CPT | Performed by: INTERNAL MEDICINE

## 2022-05-04 PROCEDURE — 82570 ASSAY OF URINE CREATININE: CPT | Performed by: INTERNAL MEDICINE

## 2022-05-04 PROCEDURE — 99214 OFFICE O/P EST MOD 30 MIN: CPT | Performed by: INTERNAL MEDICINE

## 2022-05-04 PROCEDURE — 82043 UR ALBUMIN QUANTITATIVE: CPT | Performed by: INTERNAL MEDICINE

## 2022-05-04 PROCEDURE — 84443 ASSAY THYROID STIM HORMONE: CPT

## 2022-05-04 RX ORDER — LEVOTHYROXINE SODIUM 0.07 MG/1
75 TABLET ORAL DAILY
Qty: 90 TABLET | Refills: 1 | Status: SHIPPED | OUTPATIENT
Start: 2022-05-04 | End: 2022-12-28 | Stop reason: SDUPTHER

## 2022-05-04 RX ORDER — SEMAGLUTIDE 1.34 MG/ML
0.5 INJECTION, SOLUTION SUBCUTANEOUS WEEKLY
Qty: 4.5 ML | Refills: 1 | Status: SHIPPED | OUTPATIENT
Start: 2022-05-04 | End: 2022-09-22

## 2022-05-04 RX ORDER — HYDROCODONE BITARTRATE AND ACETAMINOPHEN 5; 325 MG/1; MG/1
TABLET ORAL
COMMUNITY
Start: 2022-04-12 | End: 2022-09-22

## 2022-05-04 NOTE — PROGRESS NOTES
"Chief Complaint   Patient presents with   • Type 2 diabetes mellitus with other specified complication,     4 mo f/u   • Med Refill     Glucose strips          HPI   Pari Howell is a 71 y.o. female had concerns including Type 2 diabetes mellitus with other specified complication, (4 mo f/u) and Med Refill (Glucose strips).      Current medications for diabetes include Ozempic 0.5 mg weekly.  A1c 6.6 today.    Pt had COVID in February. Required steroids. No hospitalization. BGs were running a bit higher.     Has since had hot flashes at night. Symptoms are followed by chills after.     Is on levothyroxine 75 mcg daily.    The following portions of the patient's history were reviewed and updated as appropriate: allergies, current medications, past family history, past medical history, past social history, past surgical history and problem list.      Review of Systems   Constitutional: Negative.    Endocrine:        Hot flashes   Musculoskeletal: Positive for arthralgias.        Physical Exam  Vitals reviewed.   Constitutional:       Appearance: Normal appearance.   Cardiovascular:      Rate and Rhythm: Normal rate.   Pulmonary:      Effort: Pulmonary effort is normal.   Neurological:      General: No focal deficit present.      Mental Status: She is alert. Mental status is at baseline.   Psychiatric:         Mood and Affect: Mood normal.         Behavior: Behavior normal.        /66   Pulse 68   Resp 16   Ht 172.7 cm (68\")   Wt 81.6 kg (180 lb)   LMP  (LMP Unknown)   SpO2 99%   BMI 27.37 kg/m²      Labs and imaging    CMP:  Lab Results   Component Value Date    BUN 13 11/29/2021    CREATININE 0.73 11/29/2021    EGFRIFNONA 79 11/29/2021    EGFRIFAFRI 95 11/29/2021    BCR 17.8 11/29/2021     11/29/2021    K 3.6 11/29/2021    CO2 25.7 11/29/2021    CALCIUM 9.7 11/29/2021    PROTENTOTREF 6.9 11/29/2021    ALBUMIN 4.50 11/29/2021    LABGLOBREF 2.4 11/29/2021    LABIL2 1.9 11/29/2021    BILITOT 0.6 " 11/29/2021    ALKPHOS 61 11/29/2021    AST 18 11/29/2021    ALT 22 11/29/2021     Lipid Panel:  Lab Results   Component Value Date    TRIG 251 (H) 11/29/2021    HDL 46 11/29/2021    VLDL 44 (H) 11/29/2021     (H) 11/29/2021     HbA1c:  Lab Results   Component Value Date    HGBA1C 6.6 05/04/2022    HGBA1C 6.4 01/10/2022     Glucose:    Lab Results   Component Value Date    POCGLU 153 (A) 05/04/2022     Microalbumin:  Lab Results   Component Value Date    MALBCRERATIO 10 03/03/2021     TSH:  Lab Results   Component Value Date    TSH 2.410 07/19/2021       Assessment and plan  Diagnoses and all orders for this visit:    1. Type 2 diabetes mellitus with diabetic polyneuropathy, without long-term current use of insulin (HCC) (Primary)  Controlled with A1c 6.6.    Complicated by neuropathy, retinopathy with history of glaucoma.  Continue Ozempic 0.5 mg weekly.  Higher doses not tolerated due to GI side effects.  No metformin due to GI side effects.  Glimepiride was discontinued after her last visit and not needed at this time unless unable to stay on ozempic due to cost.   Discussed option to try actos or resume glimepiride if ozempic can't be continued.  Labs are up-to-date with lipid and CMP from November.  Urine microalbumin will be checked today. Ophtho exam should be updated yearly.  Monofilament up-to-date from June.  -     POC Glucose, Blood  -     POC Glycosylated Hemoglobin (Hb A1C)  -     Microalbumin / Creatinine Urine Ratio - Urine, Clean Catch  -     Semaglutide,0.25 or 0.5MG/DOS, (Ozempic, 0.25 or 0.5 MG/DOSE,) 2 MG/1.5ML solution pen-injector; Inject 0.5 mg under the skin into the appropriate area as directed 1 (One) Time Per Week.  Dispense: 4.5 mL; Refill: 1    2. Mixed hyperlipidemia  Lipids last checked in November.  History of intolerance to alternate statins.  Continue pravastatin monitor lipids yearly.    3. Hypothyroidism (acquired)  Clinically euthyroid on levothyroxine 75 mcg daily.  Last  TSH was in the normal range.  Patient has not tolerated higher doses.  Recheck level today due to recent hot flashes post COVID.  Patient had a total hysterectomy with oophorectomy years ago.  -     TSH Rfx On Abnormal To Free T4         Return in about 4 months (around 9/4/2022) for next scheduled follow up. The patient was instructed to contact the clinic with any interval questions or concerns.    Gloria Santoro, DO   Endocrinologist    Please note that portions of this note were completed with a voice recognition program.

## 2022-05-06 LAB
ALBUMIN/CREAT UR: 6 MG/G CREAT (ref 0–29)
CREAT UR-MCNC: 100.9 MG/DL
MICROALBUMIN UR-MCNC: 6.1 UG/ML

## 2022-05-13 DIAGNOSIS — E11.40 CONTROLLED TYPE 2 DIABETES MELLITUS WITH DIABETIC NEUROPATHY, WITHOUT LONG-TERM CURRENT USE OF INSULIN: ICD-10-CM

## 2022-05-13 RX ORDER — CALCIUM CITRATE/VITAMIN D3 200MG-6.25
TABLET ORAL
Qty: 100 EACH | Refills: 3 | Status: SHIPPED | OUTPATIENT
Start: 2022-05-13 | End: 2023-03-08 | Stop reason: SDUPTHER

## 2022-05-13 RX ORDER — CALCIUM CITRATE/VITAMIN D3 200MG-6.25
TABLET ORAL
Qty: 100 EACH | Refills: 11 | Status: CANCELLED | OUTPATIENT
Start: 2022-05-13

## 2022-05-18 DIAGNOSIS — K21.9 GASTROESOPHAGEAL REFLUX DISEASE WITHOUT ESOPHAGITIS: ICD-10-CM

## 2022-05-18 RX ORDER — PANTOPRAZOLE SODIUM 20 MG/1
TABLET, DELAYED RELEASE ORAL
Qty: 90 TABLET | Refills: 1 | Status: SHIPPED | OUTPATIENT
Start: 2022-05-18 | End: 2022-11-09 | Stop reason: SDUPTHER

## 2022-07-28 RX ORDER — PIOGLITAZONEHYDROCHLORIDE 15 MG/1
15 TABLET ORAL DAILY
Qty: 90 TABLET | Refills: 1 | Status: SHIPPED | OUTPATIENT
Start: 2022-07-28 | End: 2022-09-22 | Stop reason: SDUPTHER

## 2022-07-28 NOTE — TELEPHONE ENCOUNTER
PT CALLED AND SAID HER OZEMPIC WAS OVER $400 THIS MONTH AND SHE HAD SPOKEN WITH DR. CEDENO ABOUT CHANGING HER MEDICATION TO ACTOS (?) BUT SHE IS NEEDING MEDICATION SOON. CAN SHE COME AND  SAMPLES OF OZEMPIC UNTIL HER F/U IN SEPT OR CAN WE CALL IN THE ACTOS (?) MEDICATION?    PLEASE CALL PT [phone]    LAST OV 1/10/22  F/U 9/22/22

## 2022-07-28 NOTE — TELEPHONE ENCOUNTER
If we have samples she can . I'll go ahead and send in script for actos. Watch for possible leg swelling. Notify the office if any issues though is typically well tolerated. Does she want sent to local pharmacy or mail order?

## 2022-08-09 DIAGNOSIS — I10 ESSENTIAL HYPERTENSION: ICD-10-CM

## 2022-08-09 RX ORDER — AMLODIPINE BESYLATE 5 MG/1
TABLET ORAL
Qty: 180 TABLET | Refills: 0 | Status: SHIPPED | OUTPATIENT
Start: 2022-08-09 | End: 2022-11-09 | Stop reason: SDUPTHER

## 2022-09-22 ENCOUNTER — OFFICE VISIT (OUTPATIENT)
Dept: ENDOCRINOLOGY | Facility: CLINIC | Age: 72
End: 2022-09-22

## 2022-09-22 VITALS
OXYGEN SATURATION: 98 % | HEIGHT: 68 IN | SYSTOLIC BLOOD PRESSURE: 148 MMHG | DIASTOLIC BLOOD PRESSURE: 66 MMHG | BODY MASS INDEX: 27.43 KG/M2 | HEART RATE: 59 BPM | WEIGHT: 181 LBS

## 2022-09-22 DIAGNOSIS — E03.9 HYPOTHYROIDISM (ACQUIRED): ICD-10-CM

## 2022-09-22 DIAGNOSIS — E78.2 MIXED HYPERLIPIDEMIA: ICD-10-CM

## 2022-09-22 DIAGNOSIS — E11.65 TYPE 2 DIABETES MELLITUS WITH HYPERGLYCEMIA, WITHOUT LONG-TERM CURRENT USE OF INSULIN: Primary | ICD-10-CM

## 2022-09-22 LAB
EXPIRATION DATE: ABNORMAL
EXPIRATION DATE: NORMAL
GLUCOSE BLDC GLUCOMTR-MCNC: 161 MG/DL (ref 70–130)
HBA1C MFR BLD: 6.9 %
Lab: ABNORMAL
Lab: NORMAL

## 2022-09-22 PROCEDURE — 82947 ASSAY GLUCOSE BLOOD QUANT: CPT | Performed by: INTERNAL MEDICINE

## 2022-09-22 PROCEDURE — 83036 HEMOGLOBIN GLYCOSYLATED A1C: CPT | Performed by: INTERNAL MEDICINE

## 2022-09-22 PROCEDURE — 99214 OFFICE O/P EST MOD 30 MIN: CPT | Performed by: INTERNAL MEDICINE

## 2022-09-22 PROCEDURE — 3044F HG A1C LEVEL LT 7.0%: CPT | Performed by: INTERNAL MEDICINE

## 2022-09-22 RX ORDER — METFORMIN HYDROCHLORIDE 500 MG/1
500 TABLET, EXTENDED RELEASE ORAL DAILY
Qty: 90 TABLET | Refills: 1 | Status: SHIPPED | OUTPATIENT
Start: 2022-09-22 | End: 2022-11-09 | Stop reason: SDUPTHER

## 2022-09-22 RX ORDER — PIOGLITAZONEHYDROCHLORIDE 30 MG/1
30 TABLET ORAL DAILY
Qty: 90 TABLET | Refills: 1 | Status: SHIPPED | OUTPATIENT
Start: 2022-09-22 | End: 2022-11-09 | Stop reason: SDUPTHER

## 2022-09-22 RX ORDER — PRAVASTATIN SODIUM 40 MG
40 TABLET ORAL EVERY EVENING
Qty: 90 TABLET | Refills: 3 | Status: SHIPPED | OUTPATIENT
Start: 2022-09-22 | End: 2023-03-08 | Stop reason: SDUPTHER

## 2022-09-22 NOTE — PROGRESS NOTES
Chief Complaint   Patient presents with   • Diabetes     Type II          HPI   Pari Howell is a 72 y.o. female had concerns including Diabetes (Type II).    She is checking blood sugars 0-1 times per day.  Fasting BG's recently are higher, highest in recent weeks to 57.  This may be in part due to decreased activity level after having cataract surgery recently.  Current medications for diabetes include Actos 15 mg daily.  Ozempic has been inadequately covered and therefore she had to discontinue taking.  She is interested in trying low-dose metformin again despite a prior history of GI intolerance.    She has a history of recurrent yeast infections - uses AZO and replens wash.     The following portions of the patient's history were reviewed and updated as appropriate: allergies, current medications, past family history, past medical history, past social history, past surgical history and problem list.      Review of Systems   Constitutional: Negative.    Endocrine:        See HPI        Physical Exam  Vitals reviewed.   Constitutional:       Appearance: Normal appearance.   Cardiovascular:      Rate and Rhythm: Normal rate.      Pulses:           Dorsalis pedis pulses are 2+ on the right side and 2+ on the left side.   Pulmonary:      Effort: Pulmonary effort is normal.   Feet:      Right foot:      Skin integrity: Skin integrity normal.      Toenail Condition: Fungal disease present.     Left foot:      Skin integrity: Skin integrity normal.      Toenail Condition: Left toenails are normal.      Comments: Diabetic Foot Exam Performed and Monofilament Test Performed    Monofilament 5/5 bilaterally though diminished    Neurological:      General: No focal deficit present.      Mental Status: She is alert. Mental status is at baseline.   Psychiatric:         Mood and Affect: Mood normal.         Behavior: Behavior normal.        /66 (BP Location: Left arm, Patient Position: Sitting, Cuff Size: Adult)    "Pulse 59   Ht 172.7 cm (68\")   Wt 82.1 kg (181 lb)   LMP  (LMP Unknown)   SpO2 98%   BMI 27.52 kg/m²      Labs and imaging    CMP:  Lab Results   Component Value Date    BUN 13 11/29/2021    CREATININE 0.73 11/29/2021    EGFRIFNONA 79 11/29/2021    EGFRIFAFRI 95 11/29/2021    BCR 17.8 11/29/2021     11/29/2021    K 3.6 11/29/2021    CO2 25.7 11/29/2021    CALCIUM 9.7 11/29/2021    PROTENTOTREF 6.9 11/29/2021    ALBUMIN 4.50 11/29/2021    LABGLOBREF 2.4 11/29/2021    LABIL2 1.9 11/29/2021    BILITOT 0.6 11/29/2021    ALKPHOS 61 11/29/2021    AST 18 11/29/2021    ALT 22 11/29/2021     Lipid Panel:  Lab Results   Component Value Date    TRIG 251 (H) 11/29/2021    HDL 46 11/29/2021    VLDL 44 (H) 11/29/2021     (H) 11/29/2021     HbA1c:  Lab Results   Component Value Date    HGBA1C 6.9 09/22/2022    HGBA1C 6.6 05/04/2022     Glucose:    Lab Results   Component Value Date    POCGLU 161 (A) 09/22/2022     Microalbumin:  Lab Results   Component Value Date    MALBCRERATIO 6 05/04/2022     TSH:  Lab Results   Component Value Date    TSH 2.130 05/04/2022        Assessment and plan  Diagnoses and all orders for this visit:    1. Type 2 diabetes mellitus with hyperglycemia, without long-term current use of insulin (HCC) (Primary)  Uncontrolled with hyperglycemia.  A1c up to 6.9.  Complicated by history of neuropathy and retinopathy with glaucoma.  Resume metformin at low-dose - 500 mg nightly.  History of GI intolerance.  Increase pioglitazone to 30 mg daily.  Call the office if BG's are persistently greater than 200.  GLP-1 agonists are inadequately covered.  SGLT2 inhibitors relatively contraindicated due to recurrent/chronic yeast infections.  Lipid and metabolic panel up-to-date from November.  Urine microalbumin normal in May.  Ophtho exam should be updated yearly.  Monofilament updated today.  -     POC Glucose, Blood  -     POC Glycosylated Hemoglobin (Hb A1C)  -     pioglitazone (Actos) 30 MG tablet; " Take 1 tablet by mouth Daily.  Dispense: 90 tablet; Refill: 1  -     metFORMIN ER (GLUCOPHAGE-XR) 500 MG 24 hr tablet; Take 1 tablet by mouth Daily.  Dispense: 90 tablet; Refill: 1    2. Hypothyroidism (acquired)  Clinically and biochemically euthyroid on levothyroxine 75 mcg daily.  Higher doses not tolerated in the past.  Monitor yearly.    3. Mixed hyperlipidemia  Alternate statins not tolerated in the past.  On pravastatin 40 mg daily.  Continue pravastatin and monitor lipid and CMP yearly.  -     pravastatin (PRAVACHOL) 40 MG tablet; Take 1 tablet by mouth Every Evening.  Dispense: 90 tablet; Refill: 3         Return in about 4 months (around 1/22/2023) for next scheduled follow up. The patient was instructed to contact the clinic with any interval questions or concerns.    Gloria Santoro,    Endocrinologist    Please note that portions of this note were completed with a voice recognition program.

## 2022-10-13 DIAGNOSIS — I10 ESSENTIAL HYPERTENSION: ICD-10-CM

## 2022-10-13 NOTE — TELEPHONE ENCOUNTER
Caller: Pari Howell    Relationship: Self    Best call back number: [phone]    Requested Prescriptions:   Requested Prescriptions     Pending Prescriptions Disp Refills   • losartan-hydrochlorothiazide (HYZAAR) 100-12.5 MG per tablet 90 tablet 0     Sig: Take 1 tablet by mouth Daily.        Pharmacy where request should be sent: [facility] - [facility] [address] - [phone]  - [phone]     Additional details provided by patient: THE PATIENT HAS A WEEK LEFT OF MEDICATION SHE HAS AN APPOINTMENT SCHEDULED FOR 11/09/2022    Does the patient have less than a 3 day supply:  [] Yes  [x] No    Brandon Lux Rep   10/13/22 15:04 EDT

## 2022-10-14 RX ORDER — LOSARTAN POTASSIUM AND HYDROCHLOROTHIAZIDE 12.5; 1 MG/1; MG/1
1 TABLET ORAL DAILY
Qty: 90 TABLET | Refills: 0 | Status: SHIPPED | OUTPATIENT
Start: 2022-10-14 | End: 2022-11-09 | Stop reason: SDUPTHER

## 2022-11-09 ENCOUNTER — OFFICE VISIT (OUTPATIENT)
Dept: INTERNAL MEDICINE | Facility: CLINIC | Age: 72
End: 2022-11-09

## 2022-11-09 VITALS
WEIGHT: 178 LBS | HEART RATE: 70 BPM | TEMPERATURE: 97.2 F | HEIGHT: 68 IN | OXYGEN SATURATION: 96 % | SYSTOLIC BLOOD PRESSURE: 114 MMHG | DIASTOLIC BLOOD PRESSURE: 70 MMHG | BODY MASS INDEX: 26.98 KG/M2

## 2022-11-09 DIAGNOSIS — E03.9 HYPOTHYROIDISM (ACQUIRED): ICD-10-CM

## 2022-11-09 DIAGNOSIS — E78.5 HYPERLIPIDEMIA LDL GOAL <70: ICD-10-CM

## 2022-11-09 DIAGNOSIS — Z00.00 MEDICARE ANNUAL WELLNESS VISIT, SUBSEQUENT: Primary | ICD-10-CM

## 2022-11-09 DIAGNOSIS — N89.8 VAGINAL ITCHING: ICD-10-CM

## 2022-11-09 DIAGNOSIS — E11.9 TYPE 2 DIABETES MELLITUS WITHOUT COMPLICATION, WITHOUT LONG-TERM CURRENT USE OF INSULIN: ICD-10-CM

## 2022-11-09 DIAGNOSIS — K21.9 GASTROESOPHAGEAL REFLUX DISEASE WITHOUT ESOPHAGITIS: ICD-10-CM

## 2022-11-09 DIAGNOSIS — I10 ESSENTIAL HYPERTENSION: ICD-10-CM

## 2022-11-09 PROCEDURE — 1126F AMNT PAIN NOTED NONE PRSNT: CPT | Performed by: INTERNAL MEDICINE

## 2022-11-09 PROCEDURE — G0439 PPPS, SUBSEQ VISIT: HCPCS | Performed by: INTERNAL MEDICINE

## 2022-11-09 PROCEDURE — 1160F RVW MEDS BY RX/DR IN RCRD: CPT | Performed by: INTERNAL MEDICINE

## 2022-11-09 PROCEDURE — 1170F FXNL STATUS ASSESSED: CPT | Performed by: INTERNAL MEDICINE

## 2022-11-09 PROCEDURE — 96160 PT-FOCUSED HLTH RISK ASSMT: CPT | Performed by: INTERNAL MEDICINE

## 2022-11-09 RX ORDER — TRIAMCINOLONE ACETONIDE 0.25 MG/G
1 CREAM TOPICAL 2 TIMES DAILY
Qty: 15 G | Refills: 1 | Status: SHIPPED | OUTPATIENT
Start: 2022-11-09

## 2022-11-09 RX ORDER — NYSTATIN 100000 U/G
CREAM TOPICAL 2 TIMES DAILY
Qty: 90 G | Refills: 2 | Status: SHIPPED | OUTPATIENT
Start: 2022-11-09

## 2022-11-09 RX ORDER — PANTOPRAZOLE SODIUM 20 MG/1
20 TABLET, DELAYED RELEASE ORAL DAILY
Qty: 90 TABLET | Refills: 1 | Status: SHIPPED | OUTPATIENT
Start: 2022-11-09

## 2022-11-09 RX ORDER — LOSARTAN POTASSIUM AND HYDROCHLOROTHIAZIDE 12.5; 1 MG/1; MG/1
1 TABLET ORAL DAILY
Qty: 90 TABLET | Refills: 1 | Status: SHIPPED | OUTPATIENT
Start: 2022-11-09

## 2022-11-09 RX ORDER — TRIAMCINOLONE ACETONIDE 0.25 MG/G
1 CREAM TOPICAL 2 TIMES DAILY
COMMUNITY
End: 2022-11-09 | Stop reason: SDUPTHER

## 2022-11-09 RX ORDER — AMLODIPINE BESYLATE 5 MG/1
10 TABLET ORAL DAILY
Qty: 180 TABLET | Refills: 1 | Status: SHIPPED | OUTPATIENT
Start: 2022-11-09

## 2022-11-10 ENCOUNTER — LAB (OUTPATIENT)
Dept: LAB | Facility: HOSPITAL | Age: 72
End: 2022-11-10

## 2022-11-10 DIAGNOSIS — E03.9 HYPOTHYROIDISM (ACQUIRED): ICD-10-CM

## 2022-11-10 DIAGNOSIS — I10 ESSENTIAL HYPERTENSION: ICD-10-CM

## 2022-11-10 DIAGNOSIS — E78.5 HYPERLIPIDEMIA LDL GOAL <70: ICD-10-CM

## 2022-11-11 LAB
ALBUMIN SERPL-MCNC: 4.3 G/DL (ref 3.5–5.2)
ALBUMIN/GLOB SERPL: 2 G/DL
ALP SERPL-CCNC: 62 U/L (ref 39–117)
ALT SERPL-CCNC: 16 U/L (ref 1–33)
AST SERPL-CCNC: 15 U/L (ref 1–32)
BASOPHILS # BLD AUTO: 0.05 10*3/MM3 (ref 0–0.2)
BASOPHILS NFR BLD AUTO: 0.8 % (ref 0–1.5)
BILIRUB SERPL-MCNC: 0.4 MG/DL (ref 0–1.2)
BUN SERPL-MCNC: 10 MG/DL (ref 8–23)
BUN/CREAT SERPL: 14.1 (ref 7–25)
CALCIUM SERPL-MCNC: 9.5 MG/DL (ref 8.6–10.5)
CHLORIDE SERPL-SCNC: 103 MMOL/L (ref 98–107)
CHOLEST SERPL-MCNC: 207 MG/DL (ref 0–200)
CO2 SERPL-SCNC: 25 MMOL/L (ref 22–29)
CREAT SERPL-MCNC: 0.71 MG/DL (ref 0.57–1)
EGFRCR SERPLBLD CKD-EPI 2021: 90.5 ML/MIN/1.73
EOSINOPHIL # BLD AUTO: 0.41 10*3/MM3 (ref 0–0.4)
EOSINOPHIL NFR BLD AUTO: 6.9 % (ref 0.3–6.2)
ERYTHROCYTE [DISTWIDTH] IN BLOOD BY AUTOMATED COUNT: 12.5 % (ref 12.3–15.4)
GLOBULIN SER CALC-MCNC: 2.1 GM/DL
GLUCOSE SERPL-MCNC: 122 MG/DL (ref 65–99)
HCT VFR BLD AUTO: 42.8 % (ref 34–46.6)
HDLC SERPL-MCNC: 46 MG/DL (ref 40–60)
HGB BLD-MCNC: 14.5 G/DL (ref 12–15.9)
IMM GRANULOCYTES # BLD AUTO: 0.02 10*3/MM3 (ref 0–0.05)
IMM GRANULOCYTES NFR BLD AUTO: 0.3 % (ref 0–0.5)
LDLC SERPL CALC-MCNC: 130 MG/DL (ref 0–100)
LYMPHOCYTES # BLD AUTO: 1.68 10*3/MM3 (ref 0.7–3.1)
LYMPHOCYTES NFR BLD AUTO: 28.4 % (ref 19.6–45.3)
MCH RBC QN AUTO: 31.5 PG (ref 26.6–33)
MCHC RBC AUTO-ENTMCNC: 33.9 G/DL (ref 31.5–35.7)
MCV RBC AUTO: 93 FL (ref 79–97)
MONOCYTES # BLD AUTO: 0.45 10*3/MM3 (ref 0.1–0.9)
MONOCYTES NFR BLD AUTO: 7.6 % (ref 5–12)
NEUTROPHILS # BLD AUTO: 3.3 10*3/MM3 (ref 1.7–7)
NEUTROPHILS NFR BLD AUTO: 56 % (ref 42.7–76)
NRBC BLD AUTO-RTO: 0 /100 WBC (ref 0–0.2)
PLATELET # BLD AUTO: 261 10*3/MM3 (ref 140–450)
POTASSIUM SERPL-SCNC: 3.7 MMOL/L (ref 3.5–5.2)
PROT SERPL-MCNC: 6.4 G/DL (ref 6–8.5)
RBC # BLD AUTO: 4.6 10*6/MM3 (ref 3.77–5.28)
SODIUM SERPL-SCNC: 139 MMOL/L (ref 136–145)
TRIGL SERPL-MCNC: 172 MG/DL (ref 0–150)
TSH SERPL DL<=0.005 MIU/L-ACNC: 2.59 UIU/ML (ref 0.27–4.2)
VLDLC SERPL CALC-MCNC: 31 MG/DL (ref 5–40)
WBC # BLD AUTO: 5.91 10*3/MM3 (ref 3.4–10.8)

## 2022-12-28 DIAGNOSIS — E03.9 HYPOTHYROIDISM (ACQUIRED): ICD-10-CM

## 2022-12-28 RX ORDER — PIOGLITAZONEHYDROCHLORIDE 30 MG/1
30 TABLET ORAL DAILY
Qty: 90 TABLET | Refills: 1
Start: 2022-12-28 | End: 2023-03-08 | Stop reason: SDUPTHER

## 2022-12-28 RX ORDER — LEVOTHYROXINE SODIUM 0.07 MG/1
75 TABLET ORAL DAILY
Qty: 90 TABLET | Refills: 1 | Status: SHIPPED | OUTPATIENT
Start: 2022-12-28

## 2022-12-28 NOTE — TELEPHONE ENCOUNTER
Last visit 9/22/22  Next 3/8/23  Spoke to pt-she can not tolerate metformin.  She has been taking actos 30mg daily. (wasn't on her med list so I added it) She wants to get back on ozempic-I mailed her the pt assistance application (she spoke to [employer] and they recommended she do that).  She states her bs's are 150-190.  She had to reschedule her appt due to  minor surgery and being out of town.

## 2023-03-08 ENCOUNTER — OFFICE VISIT (OUTPATIENT)
Dept: ENDOCRINOLOGY | Facility: CLINIC | Age: 73
End: 2023-03-08
Payer: MEDICARE

## 2023-03-08 VITALS
OXYGEN SATURATION: 96 % | WEIGHT: 182.8 LBS | BODY MASS INDEX: 27.71 KG/M2 | SYSTOLIC BLOOD PRESSURE: 120 MMHG | DIASTOLIC BLOOD PRESSURE: 82 MMHG | HEIGHT: 68 IN | HEART RATE: 70 BPM

## 2023-03-08 DIAGNOSIS — E03.9 HYPOTHYROIDISM (ACQUIRED): ICD-10-CM

## 2023-03-08 DIAGNOSIS — E11.65 TYPE 2 DIABETES MELLITUS WITH HYPERGLYCEMIA, WITHOUT LONG-TERM CURRENT USE OF INSULIN: Primary | ICD-10-CM

## 2023-03-08 DIAGNOSIS — E78.2 MIXED HYPERLIPIDEMIA: ICD-10-CM

## 2023-03-08 LAB
EXPIRATION DATE: ABNORMAL
EXPIRATION DATE: NORMAL
GLUCOSE BLDC GLUCOMTR-MCNC: 230 MG/DL (ref 70–130)
HBA1C MFR BLD: 8 %
Lab: ABNORMAL
Lab: NORMAL

## 2023-03-08 PROCEDURE — 3052F HG A1C>EQUAL 8.0%<EQUAL 9.0%: CPT | Performed by: INTERNAL MEDICINE

## 2023-03-08 PROCEDURE — 83036 HEMOGLOBIN GLYCOSYLATED A1C: CPT | Performed by: INTERNAL MEDICINE

## 2023-03-08 PROCEDURE — 1160F RVW MEDS BY RX/DR IN RCRD: CPT | Performed by: INTERNAL MEDICINE

## 2023-03-08 PROCEDURE — 1159F MED LIST DOCD IN RCRD: CPT | Performed by: INTERNAL MEDICINE

## 2023-03-08 PROCEDURE — 99214 OFFICE O/P EST MOD 30 MIN: CPT | Performed by: INTERNAL MEDICINE

## 2023-03-08 PROCEDURE — 82947 ASSAY GLUCOSE BLOOD QUANT: CPT | Performed by: INTERNAL MEDICINE

## 2023-03-08 RX ORDER — SEMAGLUTIDE 1.34 MG/ML
0.25 INJECTION, SOLUTION SUBCUTANEOUS WEEKLY
Qty: 1.5 ML | Refills: 0
Start: 2023-03-08

## 2023-03-08 RX ORDER — PIOGLITAZONEHYDROCHLORIDE 30 MG/1
30 TABLET ORAL DAILY
Qty: 90 TABLET | Refills: 1 | Status: SHIPPED | OUTPATIENT
Start: 2023-03-08

## 2023-03-08 RX ORDER — PRAVASTATIN SODIUM 40 MG
40 TABLET ORAL EVERY EVENING
Qty: 90 TABLET | Refills: 3 | Status: SHIPPED | OUTPATIENT
Start: 2023-03-08 | End: 2024-03-07

## 2023-03-08 NOTE — PROGRESS NOTES
"Chief Complaint   Patient presents with   • Diabetes          HPI   Pari Howell is a 72 y.o. female had concerns including Diabetes.    She is checking blood sugars 0-1 times per day. BGs mid to upper 100s - low 200s.   A1c up to 8.0.  Current medications for diabetes include actos 30 mg daily.  She cannot tolerate even the lowest dose of metformin.   BG's were much improved on Ozempic.  She has a pending application to [employer] patient assistance program.    BG up to 230 today - she had white toast with butter.     Has increase in fatigue. Is on levothyroxine 75 mcg daily.     The following portions of the patient's history were reviewed and updated as appropriate: allergies, current medications, past family history, past medical history, past social history, past surgical history and problem list.      Review of Systems   Constitutional: Positive for fatigue.   Endocrine:        See HPI        Physical Exam  Vitals reviewed.   Constitutional:       Appearance: Normal appearance.   Cardiovascular:      Rate and Rhythm: Normal rate.   Pulmonary:      Effort: Pulmonary effort is normal.   Neurological:      General: No focal deficit present.      Mental Status: She is alert. Mental status is at baseline.   Psychiatric:         Mood and Affect: Mood normal.         Behavior: Behavior normal.        /82   Pulse 70   Ht 172.7 cm (68\")   Wt 82.9 kg (182 lb 12.8 oz)   LMP  (LMP Unknown)   SpO2 96%   BMI 27.79 kg/m²      Labs and imaging    CMP:  Lab Results   Component Value Date    BUN 10 11/10/2022    CREATININE 0.71 11/10/2022    EGFRIFNONA 79 11/29/2021    EGFRIFAFRI 95 11/29/2021    BCR 14.1 11/10/2022     11/10/2022    K 3.7 11/10/2022    CO2 25.0 11/10/2022    CALCIUM 9.5 11/10/2022    PROTENTOTREF 6.4 11/10/2022    ALBUMIN 4.30 11/10/2022    LABGLOBREF 2.1 11/10/2022    LABIL2 2.0 11/10/2022    BILITOT 0.4 11/10/2022    ALKPHOS 62 11/10/2022    AST 15 11/10/2022    ALT 16 11/10/2022 "     Lipid Panel:  Lab Results   Component Value Date    TRIG 172 (H) 11/10/2022    HDL 46 11/10/2022    VLDL 31 11/10/2022     (H) 11/10/2022     HbA1c:  Lab Results   Component Value Date    HGBA1C 8.0 03/08/2023    HGBA1C 6.9 09/22/2022     Glucose:    Lab Results   Component Value Date    POCGLU 230 (A) 03/08/2023     Microalbumin:  Lab Results   Component Value Date    MALBCRERATIO 6 05/04/2022     TSH:  Lab Results   Component Value Date    TSH 2.590 11/10/2022       Assessment and plan  Diagnoses and all orders for this visit:    1. Type 2 diabetes mellitus with hyperglycemia, without long-term current use of insulin (HCC) (Primary)  Uncontrolled with hyperglycemia.  A1c up to 8.0.    Complicated by neuropathy and retinopathy.  History of GI intolerance to even the lowest dose of metformin.  SGLT2 inhibitors relatively contraindication due to recurrent yeast infections.  Resume Ozempic.  Sample given today.  Patient will complete patient assistance program and resend.  We have completed her portion of the paperwork.  If BGs remain in the 200s, resume glipizide 5 mg daily.  Prescription will be sent if needed.  Labs are up-to-date from November.  Urine microalbumin normal from last May.  Ophtho exam should be updated yearly.  Monofilament up-to-date from September.  -     POC Glucose, Blood  -     POC Glycosylated Hemoglobin (Hb A1C)  -     glucose blood (True Metrix Blood Glucose Test) test strip; 1 strip daily  Dispense: 100 each; Refill: 3  -     pioglitazone (Actos) 30 MG tablet; Take 1 tablet by mouth Daily.  Dispense: 90 tablet; Refill: 1  -     Semaglutide,0.25 or 0.5MG/DOS, (Ozempic, 0.25 or 0.5 MG/DOSE,) 2 MG/1.5ML solution pen-injector; Inject 0.25 mg under the skin into the appropriate area as directed 1 (One) Time Per Week. SAMPLE, increase to 0.5 mg after 4 weeks  Dispense: 1.5 mL; Refill: 0    2. Hypothyroidism (acquired)  Clinically euthyroid on levothyroxine 75 mcg daily.  Last TSH in a  normal range in November.  Notes fatigue.  Could be due to uncontrolled diabetes.  Management of diabetes as above and if fatigue is persistent recheck TSH and titrate levothyroxine if able for TSH in the lower recently normal.    3. Mixed hyperlipidemia  Intolerant to alternate statins.  Refill sent for pravastatin.  Monitor lipids yearly.  -     pravastatin (PRAVACHOL) 40 MG tablet; Take 1 tablet by mouth Every Evening.  Dispense: 90 tablet; Refill: 3         Return in about 3 months (around 6/8/2023) for next scheduled follow up. The patient was instructed to contact the clinic with any interval questions or concerns.    Gloria Santoro, DO   Endocrinologist    Please note that portions of this note were completed with a voice recognition program.

## 2023-03-09 ENCOUNTER — TRANSCRIBE ORDERS (OUTPATIENT)
Dept: ADMINISTRATIVE | Facility: HOSPITAL | Age: 73
End: 2023-03-09
Payer: MEDICARE

## 2023-03-09 DIAGNOSIS — Z12.31 VISIT FOR SCREENING MAMMOGRAM: Primary | ICD-10-CM

## 2023-03-29 ENCOUNTER — TELEPHONE (OUTPATIENT)
Dept: ENDOCRINOLOGY | Facility: CLINIC | Age: 73
End: 2023-03-29
Payer: MEDICARE

## 2023-03-29 NOTE — TELEPHONE ENCOUNTER
Pt called states Ozempic is out of stock at all pharmacies pt wants to know the next step. Pt last f/u 03/08/23 pt next f/u 06/19/23

## 2023-03-31 ENCOUNTER — HOSPITAL ENCOUNTER (OUTPATIENT)
Dept: MAMMOGRAPHY | Facility: HOSPITAL | Age: 73
Discharge: HOME | End: 2023-03-31
Admitting: INTERNAL MEDICINE
Payer: MEDICARE

## 2023-03-31 DIAGNOSIS — Z12.31 VISIT FOR SCREENING MAMMOGRAM: ICD-10-CM

## 2023-03-31 PROCEDURE — 77063 BREAST TOMOSYNTHESIS BI: CPT | Performed by: RADIOLOGY

## 2023-03-31 PROCEDURE — 77067 SCR MAMMO BI INCL CAD: CPT

## 2023-03-31 PROCEDURE — 77067 SCR MAMMO BI INCL CAD: CPT | Performed by: RADIOLOGY

## 2023-03-31 PROCEDURE — 77063 BREAST TOMOSYNTHESIS BI: CPT

## 2023-04-17 DIAGNOSIS — K21.9 GASTROESOPHAGEAL REFLUX DISEASE WITHOUT ESOPHAGITIS: ICD-10-CM

## 2023-04-17 DIAGNOSIS — I10 ESSENTIAL HYPERTENSION: ICD-10-CM

## 2023-04-17 RX ORDER — PANTOPRAZOLE SODIUM 20 MG/1
TABLET, DELAYED RELEASE ORAL
Qty: 30 TABLET | Refills: 0 | Status: SHIPPED | OUTPATIENT
Start: 2023-04-17

## 2023-04-17 RX ORDER — AMLODIPINE BESYLATE 5 MG/1
TABLET ORAL
Qty: 60 TABLET | Refills: 0 | Status: SHIPPED | OUTPATIENT
Start: 2023-04-17

## 2023-04-17 NOTE — TELEPHONE ENCOUNTER
Sent patient 1 month supply of Amlodipine and Pantoprazole. Advised pharmacy patient needs an appointment for any further refills.

## 2023-04-20 RX ORDER — SEMAGLUTIDE 0.68 MG/ML
0.5 INJECTION, SOLUTION SUBCUTANEOUS WEEKLY
Qty: 9 ML | Refills: 1 | Status: SHIPPED | OUTPATIENT
Start: 2023-04-20 | End: 2023-04-20

## 2023-04-20 RX ORDER — SEMAGLUTIDE 0.68 MG/ML
0.5 INJECTION, SOLUTION SUBCUTANEOUS WEEKLY
Qty: 9 ML | Refills: 1 | Status: SHIPPED | OUTPATIENT
Start: 2023-04-20

## 2023-06-05 DIAGNOSIS — I10 ESSENTIAL HYPERTENSION: ICD-10-CM

## 2023-06-05 RX ORDER — LOSARTAN POTASSIUM AND HYDROCHLOROTHIAZIDE 12.5; 1 MG/1; MG/1
TABLET ORAL
Qty: 90 TABLET | Refills: 1 | Status: SHIPPED | OUTPATIENT
Start: 2023-06-05

## 2023-06-19 ENCOUNTER — OFFICE VISIT (OUTPATIENT)
Dept: ENDOCRINOLOGY | Facility: CLINIC | Age: 73
End: 2023-06-19
Payer: MEDICARE

## 2023-06-19 VITALS
HEIGHT: 68 IN | HEART RATE: 66 BPM | BODY MASS INDEX: 27.28 KG/M2 | SYSTOLIC BLOOD PRESSURE: 126 MMHG | WEIGHT: 180 LBS | DIASTOLIC BLOOD PRESSURE: 82 MMHG | OXYGEN SATURATION: 98 %

## 2023-06-19 DIAGNOSIS — E11.42 TYPE 2 DIABETES MELLITUS WITH DIABETIC POLYNEUROPATHY, WITHOUT LONG-TERM CURRENT USE OF INSULIN: Primary | ICD-10-CM

## 2023-06-19 DIAGNOSIS — E78.2 MIXED HYPERLIPIDEMIA: ICD-10-CM

## 2023-06-19 DIAGNOSIS — R53.82 CHRONIC FATIGUE: ICD-10-CM

## 2023-06-19 DIAGNOSIS — E03.9 HYPOTHYROIDISM (ACQUIRED): ICD-10-CM

## 2023-06-19 LAB
EXPIRATION DATE: NORMAL
EXPIRATION DATE: NORMAL
GLUCOSE BLDC GLUCOMTR-MCNC: 107 MG/DL (ref 70–130)
HBA1C MFR BLD: 5.7 %
Lab: NORMAL
Lab: NORMAL

## 2023-06-19 PROCEDURE — 99214 OFFICE O/P EST MOD 30 MIN: CPT | Performed by: INTERNAL MEDICINE

## 2023-06-19 PROCEDURE — 1159F MED LIST DOCD IN RCRD: CPT | Performed by: INTERNAL MEDICINE

## 2023-06-19 PROCEDURE — 3044F HG A1C LEVEL LT 7.0%: CPT | Performed by: INTERNAL MEDICINE

## 2023-06-19 PROCEDURE — 36415 COLL VENOUS BLD VENIPUNCTURE: CPT | Performed by: INTERNAL MEDICINE

## 2023-06-19 PROCEDURE — 82947 ASSAY GLUCOSE BLOOD QUANT: CPT | Performed by: INTERNAL MEDICINE

## 2023-06-19 PROCEDURE — 83036 HEMOGLOBIN GLYCOSYLATED A1C: CPT | Performed by: INTERNAL MEDICINE

## 2023-06-19 PROCEDURE — 1160F RVW MEDS BY RX/DR IN RCRD: CPT | Performed by: INTERNAL MEDICINE

## 2023-06-19 NOTE — PROGRESS NOTES
"Chief Complaint   Patient presents with    Diabetes          HPI   Pari Howell is a 72 y.o. female had concerns including Diabetes.    She is checking blood sugars daily. Bgs range low to mid 100s.   Current medications for diabetes include Actos 30 mg daily, Ozempic 0.5 mg weekly.   Has poor appetite for the first few days after ozempic but is tolerable.     Notes fatigue and cold intolerance. Is on levothyroxine 75 mcg daily. Interested in a dose increase.     Having night sweats. Worse since COVID 2 years ago.    The following portions of the patient's history were reviewed and updated as appropriate: allergies, current medications, past family history, past medical history, past social history, past surgical history, and problem list.      Review of Systems   Constitutional: Negative.    Endocrine: Negative.       Physical Exam  Vitals reviewed.   Constitutional:       Appearance: Normal appearance.   Cardiovascular:      Rate and Rhythm: Normal rate.   Pulmonary:      Effort: Pulmonary effort is normal.   Neurological:      General: No focal deficit present.      Mental Status: She is alert. Mental status is at baseline.   Psychiatric:         Mood and Affect: Mood normal.         Behavior: Behavior normal.      /82   Pulse 66   Ht 172.7 cm (68\")   Wt 81.6 kg (180 lb)   LMP  (LMP Unknown)   SpO2 98%   BMI 27.37 kg/m²      Labs and imaging    CMP:  Lab Results   Component Value Date    BUN 10 11/10/2022    CREATININE 0.71 11/10/2022    EGFRIFNONA 79 11/29/2021    EGFRIFAFRI 95 11/29/2021    BCR 14.1 11/10/2022     11/10/2022    K 3.7 11/10/2022    CO2 25.0 11/10/2022    CALCIUM 9.5 11/10/2022    PROTENTOTREF 6.4 11/10/2022    ALBUMIN 4.30 11/10/2022    LABGLOBREF 2.1 11/10/2022    LABIL2 2.0 11/10/2022    BILITOT 0.4 11/10/2022    ALKPHOS 62 11/10/2022    AST 15 11/10/2022    ALT 16 11/10/2022     Lipid Panel:  Lab Results   Component Value Date    TRIG 172 (H) 11/10/2022    HDL 46 11/10/2022 "    VLDL 31 11/10/2022     (H) 11/10/2022     HbA1c:  Lab Results   Component Value Date    HGBA1C 5.7 06/19/2023    HGBA1C 8.0 03/08/2023     Glucose:    Lab Results   Component Value Date    POCGLU 107 06/19/2023     Microalbumin:  Lab Results   Component Value Date    MALBCRERATIO 6 05/04/2022     TSH:  Lab Results   Component Value Date    TSH 2.590 11/10/2022       Assessment and plan  Diagnoses and all orders for this visit:    1. Type 2 diabetes mellitus with diabetic polyneuropathy, without long-term current use of insulin (Primary)  Controlled with A1c down to 5.7.  Complicated by neuropathy and retinopathy.  History of GI intolerance to even the lowest dose of metformin.  SGLT2 inhibitors relatively contraindication due to recurrent yeast infections.  Discontinue Actos, no longer needed.    Continue Ozempic 0.5 mg weekly.  Consider increased dose if needed but she has poor appetite on current dose, higher doses may not be tolerated.  Labs are up-to-date from November.  Urine microalbumin due today.  Monofilament up-to-date from September.  Ophtho exam should be updated yearly.  -     POC Glucose, Blood  -     POC Glycosylated Hemoglobin (Hb A1C)  -     Microalbumin / Creatinine Urine Ratio - Urine, Clean Catch    2. Hypothyroidism (acquired)  On levothyroxine 75 mcg daily.  Notes good fatigue and cold intolerance.  Repeat TFTs and titrate levothyroxine if able for TSH in the lower range of normal.  -     T4, Free  -     TSH    3. Mixed hyperlipidemia  History of intolerance to alternate statins.  Continue pravastatin 40 mg daily.  Monitor lipids yearly.    4. Chronic fatigue  Check B12 levels with labs today.  -     Vitamin B12         Return in about 4 months (around 10/19/2023) for next scheduled follow up. The patient was instructed to contact the clinic with any interval questions or concerns.    Gloria Santoro, DO   Endocrinologist    Please note that portions of this note were completed with a  voice recognition program.

## 2023-06-20 LAB
ALBUMIN/CREAT UR: 20 MG/G CREAT (ref 0–29)
CREAT UR-MCNC: 134 MG/DL
MICROALBUMIN UR-MCNC: 26.4 UG/ML
T4 FREE SERPL-MCNC: 1.58 NG/DL (ref 0.93–1.7)
TSH SERPL DL<=0.005 MIU/L-ACNC: 1.91 UIU/ML (ref 0.27–4.2)
VIT B12 SERPL-MCNC: 851 PG/ML (ref 211–946)

## 2023-08-28 ENCOUNTER — TELEPHONE (OUTPATIENT)
Dept: ENDOCRINOLOGY | Facility: CLINIC | Age: 73
End: 2023-08-28
Payer: MEDICARE

## 2023-08-28 ENCOUNTER — TELEPHONE (OUTPATIENT)
Dept: INTERNAL MEDICINE | Facility: CLINIC | Age: 73
End: 2023-08-28

## 2023-08-28 NOTE — TELEPHONE ENCOUNTER
Provider: DR. CARMEN    Caller: EVIE THERESA     Phone Number: [phone]     Reason for Call: PATIENT MOVED TO [place] AND WAS GOING TO FIND A NEW PROVIDER AND TOLD DR. CARMEN THAT BUT SHE HAS NOW CHANGED HER MIND AND IS NOT GOING TO FIND A NEW PROVIDER AND WILL CONTINUE TO SEE DR. CARMEN.

## 2023-09-19 DIAGNOSIS — K21.9 GASTROESOPHAGEAL REFLUX DISEASE WITHOUT ESOPHAGITIS: ICD-10-CM

## 2023-09-19 RX ORDER — PANTOPRAZOLE SODIUM 20 MG/1
TABLET, DELAYED RELEASE ORAL
Qty: 90 TABLET | Refills: 0 | Status: SHIPPED | OUTPATIENT
Start: 2023-09-19

## 2023-09-20 DIAGNOSIS — I10 ESSENTIAL HYPERTENSION: ICD-10-CM

## 2023-09-20 RX ORDER — AMLODIPINE BESYLATE 5 MG/1
TABLET ORAL
Qty: 180 TABLET | Refills: 0 | Status: SHIPPED | OUTPATIENT
Start: 2023-09-20

## 2023-10-25 ENCOUNTER — APPOINTMENT (OUTPATIENT)
Dept: GENERAL RADIOLOGY | Facility: HOSPITAL | Age: 73
End: 2023-10-25
Payer: MEDICARE

## 2023-10-25 ENCOUNTER — HOSPITAL ENCOUNTER (EMERGENCY)
Facility: HOSPITAL | Age: 73
Discharge: HOME | End: 2023-10-25
Attending: STUDENT IN AN ORGANIZED HEALTH CARE EDUCATION/TRAINING PROGRAM
Payer: MEDICARE

## 2023-10-25 VITALS
RESPIRATION RATE: 14 BRPM | TEMPERATURE: 99 F | HEIGHT: 68 IN | WEIGHT: 175.8 LBS | OXYGEN SATURATION: 91 % | DIASTOLIC BLOOD PRESSURE: 76 MMHG | SYSTOLIC BLOOD PRESSURE: 134 MMHG | HEART RATE: 94 BPM | BODY MASS INDEX: 26.64 KG/M2

## 2023-10-25 DIAGNOSIS — B34.9 VIRAL ILLNESS: Primary | ICD-10-CM

## 2023-10-25 LAB
FLUAV SUBTYP SPEC NAA+PROBE: NOT DETECTED
FLUBV RNA ISLT QL NAA+PROBE: NOT DETECTED
GLUCOSE BLDC GLUCOMTR-MCNC: 261 MG/DL (ref 70–130)
SARS-COV-2 RNA RESP QL NAA+PROBE: NOT DETECTED

## 2023-10-25 PROCEDURE — 99283 EMERGENCY DEPT VISIT LOW MDM: CPT

## 2023-10-25 PROCEDURE — 82948 REAGENT STRIP/BLOOD GLUCOSE: CPT

## 2023-10-25 PROCEDURE — 63710000001 ONDANSETRON ODT 4 MG TABLET DISPERSIBLE

## 2023-10-25 PROCEDURE — 71045 X-RAY EXAM CHEST 1 VIEW: CPT

## 2023-10-25 PROCEDURE — 87636 SARSCOV2 & INF A&B AMP PRB: CPT

## 2023-10-25 RX ORDER — NAPROXEN 500 MG/1
500 TABLET ORAL ONCE
Status: COMPLETED | OUTPATIENT
Start: 2023-10-25 | End: 2023-10-25

## 2023-10-25 RX ORDER — ONDANSETRON 4 MG/1
4 TABLET, ORALLY DISINTEGRATING ORAL ONCE
Status: COMPLETED | OUTPATIENT
Start: 2023-10-25 | End: 2023-10-25

## 2023-10-25 RX ADMIN — NAPROXEN 500 MG: 500 TABLET ORAL at 12:01

## 2023-10-25 RX ADMIN — ONDANSETRON 4 MG: 4 TABLET, ORALLY DISINTEGRATING ORAL at 12:01

## 2023-10-25 NOTE — ED PROVIDER NOTES
Subjective:  History of Present Illness:    Patient is a 73-year-old female here today with nausea, vomiting, headache, and body aches.  She states that the symptoms suddenly started over night and are progressing.  She describes her headache as being in the occipital region with associated nausea and vomiting.  She is noting hyperglycemia, is a type II diabetic, but states that she recently took steroids for an allergic reaction to shellfish.  Body aches are generalized. History of diabetes, glaucoma, HLD, HTN, and hypothyroidism.      Nurses Notes reviewed and agree, including vitals, allergies, social history and prior medical history.     REVIEW OF SYSTEMS: All systems reviewed and not pertinent unless noted.  Review of Systems    Past Medical History:   Diagnosis Date    Bladder infection     Dyspareunia, female     Glaucoma     H/O sexual problem     High cholesterol     History of abnormal cervical Pap smear     Hypertension     Hypertension     Hypothyroidism     Labial cyst     Muscle weakness     Type 2 diabetes mellitus     Urinary incontinence     Vaginal itching     Yeast infection        Allergies:    Shellfish-derived products      Past Surgical History:   Procedure Laterality Date    CARPAL TUNNEL RELEASE  2007    CATARACT EXTRACTION Right 09/27/2022    CATARACT EXTRACTION, BILATERAL Left     CHOLECYSTECTOMY  1991    EYE SURGERY  2021    HYSTERECTOMY  2007    OOPHORECTOMY      TUBAL ABDOMINAL LIGATION  11/1979         Social History     Socioeconomic History    Marital status:    Tobacco Use    Smoking status: Never    Smokeless tobacco: Never   Vaping Use    Vaping Use: Never used   Substance and Sexual Activity    Alcohol use: Never    Drug use: Never    Sexual activity: Yes     Partners: Male     Birth control/protection: Surgical, Hysterectomy         Family History   Problem Relation Age of Onset    Colon cancer Maternal Uncle     Diabetes Mother     Heart attack Mother     Hypertension  "Mother     Hyperlipidemia Mother     Thyroid disease Mother     Stroke Mother     Vision loss Mother     Cancer Sister     Thyroid disease Sister     Diabetes Brother     Prostate cancer Brother     Cancer Brother         Efren prostrates cancer    Breast cancer Daughter 46    Diabetes Sister     Hyperlipidemia Sister     Thyroid disease Sister     Vision loss Sister     Vision loss Sister     Hyperlipidemia Sister     Ovarian cancer Neg Hx        Objective  Physical Exam:  /76   Pulse 94   Temp 99 °F (37.2 °C) (Oral)   Resp 14   Ht 172.7 cm (68\")   Wt 79.7 kg (175 lb 12.8 oz)   LMP  (LMP Unknown)   SpO2 91%   BMI 26.73 kg/m²      Physical Exam  Vitals and nursing note reviewed.   Constitutional:       Appearance: Normal appearance. She is ill-appearing.   HENT:      Head: Normocephalic and atraumatic.      Right Ear: Tympanic membrane, ear canal and external ear normal.      Left Ear: Tympanic membrane, ear canal and external ear normal.      Nose: Nose normal.      Mouth/Throat:      Mouth: Mucous membranes are moist.      Pharynx: Oropharynx is clear.   Eyes:      Extraocular Movements: Extraocular movements intact.      Conjunctiva/sclera: Conjunctivae normal.      Pupils: Pupils are equal, round, and reactive to light.   Neck:      Vascular: No carotid bruit.   Cardiovascular:      Rate and Rhythm: Normal rate and regular rhythm.      Pulses: Normal pulses.      Heart sounds: Normal heart sounds.   Pulmonary:      Effort: Pulmonary effort is normal.      Breath sounds: Normal breath sounds.   Abdominal:      General: Abdomen is flat. Bowel sounds are normal. There is no distension.      Palpations: Abdomen is soft.      Tenderness: There is no abdominal tenderness.   Musculoskeletal:      Cervical back: Neck supple. No tenderness.      Right lower leg: No edema.      Left lower leg: No edema.   Lymphadenopathy:      Cervical: No cervical adenopathy.   Skin:     General: Skin is warm and dry.      " Capillary Refill: Capillary refill takes less than 2 seconds.   Neurological:      General: No focal deficit present.      Mental Status: She is alert and oriented to person, place, and time.   Psychiatric:         Mood and Affect: Mood normal.         Behavior: Behavior normal.         Procedures    ED Course:         Lab Results (last 24 hours)       Procedure Component Value Units Date/Time    POC Glucose Once [[id number]]  (Abnormal) Collected: 10/25/23 1034    Specimen: Blood Updated: 10/25/23 1035     Glucose 261 mg/dL      Comment: Serial Number: [id number]:  [id number]       COVID-19 and FLU A/B PCR - Swab, Nasopharynx [[id number]]  (Normal) Collected: 10/25/23 1204    Specimen: Swab from Nasopharynx Updated: 10/25/23 1227     COVID19 Not Detected     Influenza A PCR Not Detected     Influenza B PCR Not Detected    Narrative:      Fact sheet for providers: https://www.fda.gov/media/753408/download    Fact sheet for patients: https://www.fda.gov/media/129500/download    Test performed by PCR.             XR Chest 1 View    Result Date: 10/25/2023  PROCEDURE: XR CHEST 1 VW-  INDICATION:  Wheezing  FINDINGS:  A portable view of the chest was obtained.  Comparison is made to a prior exam dated 5/2/2016.   Cardiac and mediastinal silhouettes are normal. There is left basilar opacity which could represent atelectasis or pneumonia. The lungs are otherwise clear. No pleural effusion or pneumothorax.      Impression: Left basilar opacity, atelectasis versus pneumonia.   This report was signed and finalized on 10/25/2023 1:12 PM by Suyapa Madrid MD.          MDM     Amount and/or Complexity of Data Reviewed  Clinical lab tests: reviewed        Initial impression of presenting illness: Patient is a 73 year old female here today with cough, headache, and body aches.     DDX: includes but is not limited to: COVID, influenza, other viral illness, pneumonia, among others    Patient arrives hemodynamically stable with  vitals interpreted by myself.     Pertinent features from physical exam: Ill appearance, no acute process noted.    Initial diagnostic plan: COVID/influenza swab and chest x-ray    Results from initial plan were reviewed and interpreted by me revealing no acute process per my interpretation. Radiologist shows atelectasis vs pneumonia. Attending physician reviewed, no concern at this time.    Diagnostic information from other sources: medical record    Interventions / Re-evaluation: Naproxen and Zofran given for symptomatic treatment.    Results/clinical rationale were discussed with attending physician. Illness is viral in nature, without confirmatory flu test will not provide Tamiflu today. Instead recommended treatment with OTC meds. Follow-up with her PCP as needed.    Consultations/Discussion of results with other physicians: none    Disposition plan: Patient discharged home in stable condition.  -----        Final diagnoses:   Viral illness          Haim Shaw, APRN  10/25/23 172

## 2023-11-07 ENCOUNTER — APPOINTMENT (OUTPATIENT)
Dept: GENERAL RADIOLOGY | Facility: HOSPITAL | Age: 73
DRG: 195 | End: 2023-11-07
Payer: MEDICARE

## 2023-11-07 ENCOUNTER — APPOINTMENT (OUTPATIENT)
Dept: CT IMAGING | Facility: HOSPITAL | Age: 73
DRG: 195 | End: 2023-11-07
Payer: MEDICARE

## 2023-11-07 ENCOUNTER — HOSPITAL ENCOUNTER (INPATIENT)
Facility: HOSPITAL | Age: 73
LOS: 2 days | Discharge: HOME | DRG: 195 | End: 2023-11-09
Attending: EMERGENCY MEDICINE | Admitting: FAMILY MEDICINE
Payer: MEDICARE

## 2023-11-07 DIAGNOSIS — J96.01 ACUTE RESPIRATORY FAILURE WITH HYPOXIA: Primary | ICD-10-CM

## 2023-11-07 DIAGNOSIS — R06.89 ACUTE RESPIRATORY INSUFFICIENCY: ICD-10-CM

## 2023-11-07 DIAGNOSIS — J06.9 VIRAL URI WITH COUGH: ICD-10-CM

## 2023-11-07 LAB
ALBUMIN SERPL-MCNC: 3.9 G/DL (ref 3.5–5.2)
ALBUMIN/GLOB SERPL: 1.2 G/DL
ALP SERPL-CCNC: 68 U/L (ref 39–117)
ALT SERPL W P-5'-P-CCNC: 14 U/L (ref 1–33)
ANION GAP SERPL CALCULATED.3IONS-SCNC: 13.7 MMOL/L (ref 5–15)
AST SERPL-CCNC: 15 U/L (ref 1–32)
B PARAPERT DNA SPEC QL NAA+PROBE: NOT DETECTED
B PERT DNA SPEC QL NAA+PROBE: NOT DETECTED
BASOPHILS # BLD AUTO: 0.07 10*3/MM3 (ref 0–0.2)
BASOPHILS NFR BLD AUTO: 0.5 % (ref 0–1.5)
BILIRUB SERPL-MCNC: 0.7 MG/DL (ref 0–1.2)
BUN SERPL-MCNC: 9 MG/DL (ref 8–23)
BUN/CREAT SERPL: 12.9 (ref 7–25)
C PNEUM DNA NPH QL NAA+NON-PROBE: NOT DETECTED
CALCIUM SPEC-SCNC: 9.6 MG/DL (ref 8.6–10.5)
CHLORIDE SERPL-SCNC: 103 MMOL/L (ref 98–107)
CO2 SERPL-SCNC: 22.3 MMOL/L (ref 22–29)
CREAT SERPL-MCNC: 0.7 MG/DL (ref 0.57–1)
D DIMER PPP FEU-MCNC: 0.39 MCGFEU/ML (ref 0–0.73)
D-LACTATE SERPL-SCNC: 1.1 MMOL/L (ref 0.5–2)
D-LACTATE SERPL-SCNC: 2.1 MMOL/L (ref 0.5–2)
DEPRECATED RDW RBC AUTO: 39.5 FL (ref 37–54)
EGFRCR SERPLBLD CKD-EPI 2021: 91.5 ML/MIN/1.73
EOSINOPHIL # BLD AUTO: 0.41 10*3/MM3 (ref 0–0.4)
EOSINOPHIL NFR BLD AUTO: 2.9 % (ref 0.3–6.2)
ERYTHROCYTE [DISTWIDTH] IN BLOOD BY AUTOMATED COUNT: 12.7 % (ref 12.3–15.4)
FLUAV RNA RESP QL NAA+PROBE: NOT DETECTED
FLUAV SUBTYP SPEC NAA+PROBE: NOT DETECTED
FLUBV RNA ISLT QL NAA+PROBE: NOT DETECTED
FLUBV RNA RESP QL NAA+PROBE: NOT DETECTED
GLOBULIN UR ELPH-MCNC: 3.3 GM/DL
GLUCOSE BLDC GLUCOMTR-MCNC: 313 MG/DL (ref 70–130)
GLUCOSE SERPL-MCNC: 122 MG/DL (ref 65–99)
HADV DNA SPEC NAA+PROBE: NOT DETECTED
HCOV 229E RNA SPEC QL NAA+PROBE: NOT DETECTED
HCOV HKU1 RNA SPEC QL NAA+PROBE: NOT DETECTED
HCOV NL63 RNA SPEC QL NAA+PROBE: NOT DETECTED
HCOV OC43 RNA SPEC QL NAA+PROBE: NOT DETECTED
HCT VFR BLD AUTO: 36.4 % (ref 34–46.6)
HGB BLD-MCNC: 13 G/DL (ref 12–15.9)
HMPV RNA NPH QL NAA+NON-PROBE: NOT DETECTED
HOLD SPECIMEN: NORMAL
HOLD SPECIMEN: NORMAL
HPIV1 RNA ISLT QL NAA+PROBE: NOT DETECTED
HPIV2 RNA SPEC QL NAA+PROBE: NOT DETECTED
HPIV3 RNA NPH QL NAA+PROBE: NOT DETECTED
HPIV4 P GENE NPH QL NAA+PROBE: NOT DETECTED
IMM GRANULOCYTES # BLD AUTO: 0.05 10*3/MM3 (ref 0–0.05)
IMM GRANULOCYTES NFR BLD AUTO: 0.3 % (ref 0–0.5)
LYMPHOCYTES # BLD AUTO: 1.29 10*3/MM3 (ref 0.7–3.1)
LYMPHOCYTES NFR BLD AUTO: 9 % (ref 19.6–45.3)
M PNEUMO IGG SER IA-ACNC: NOT DETECTED
MAGNESIUM SERPL-MCNC: 1.5 MG/DL (ref 1.6–2.4)
MCH RBC QN AUTO: 31 PG (ref 26.6–33)
MCHC RBC AUTO-ENTMCNC: 35.7 G/DL (ref 31.5–35.7)
MCV RBC AUTO: 86.7 FL (ref 79–97)
MONOCYTES # BLD AUTO: 0.48 10*3/MM3 (ref 0.1–0.9)
MONOCYTES NFR BLD AUTO: 3.3 % (ref 5–12)
NEUTROPHILS NFR BLD AUTO: 12.08 10*3/MM3 (ref 1.7–7)
NEUTROPHILS NFR BLD AUTO: 84 % (ref 42.7–76)
NRBC BLD AUTO-RTO: 0 /100 WBC (ref 0–0.2)
NT-PROBNP SERPL-MCNC: 383.9 PG/ML (ref 0–900)
PLATELET # BLD AUTO: 317 10*3/MM3 (ref 140–450)
PMV BLD AUTO: 8.8 FL (ref 6–12)
POTASSIUM SERPL-SCNC: 3.3 MMOL/L (ref 3.5–5.2)
PROCALCITONIN SERPL-MCNC: 0.07 NG/ML (ref 0–0.25)
PROT SERPL-MCNC: 7.2 G/DL (ref 6–8.5)
RBC # BLD AUTO: 4.2 10*6/MM3 (ref 3.77–5.28)
RHINOVIRUS RNA SPEC NAA+PROBE: NOT DETECTED
RSV RNA NPH QL NAA+NON-PROBE: NOT DETECTED
SARS-COV-2 RNA NPH QL NAA+NON-PROBE: NOT DETECTED
SARS-COV-2 RNA RESP QL NAA+PROBE: NOT DETECTED
SODIUM SERPL-SCNC: 139 MMOL/L (ref 136–145)
TROPONIN T SERPL HS-MCNC: 9 NG/L
WBC NRBC COR # BLD: 14.38 10*3/MM3 (ref 3.4–10.8)
WHOLE BLOOD HOLD COAG: NORMAL
WHOLE BLOOD HOLD SPECIMEN: NORMAL

## 2023-11-07 PROCEDURE — 94761 N-INVAS EAR/PLS OXIMETRY MLT: CPT

## 2023-11-07 PROCEDURE — 83880 ASSAY OF NATRIURETIC PEPTIDE: CPT | Performed by: EMERGENCY MEDICINE

## 2023-11-07 PROCEDURE — 94640 AIRWAY INHALATION TREATMENT: CPT

## 2023-11-07 PROCEDURE — 0202U NFCT DS 22 TRGT SARS-COV-2: CPT | Performed by: EMERGENCY MEDICINE

## 2023-11-07 PROCEDURE — 80053 COMPREHEN METABOLIC PANEL: CPT | Performed by: EMERGENCY MEDICINE

## 2023-11-07 PROCEDURE — 85379 FIBRIN DEGRADATION QUANT: CPT | Performed by: EMERGENCY MEDICINE

## 2023-11-07 PROCEDURE — 87040 BLOOD CULTURE FOR BACTERIA: CPT | Performed by: FAMILY MEDICINE

## 2023-11-07 PROCEDURE — 93005 ELECTROCARDIOGRAM TRACING: CPT | Performed by: EMERGENCY MEDICINE

## 2023-11-07 PROCEDURE — 36415 COLL VENOUS BLD VENIPUNCTURE: CPT

## 2023-11-07 PROCEDURE — 71250 CT THORAX DX C-: CPT

## 2023-11-07 PROCEDURE — 99285 EMERGENCY DEPT VISIT HI MDM: CPT

## 2023-11-07 PROCEDURE — 25010000002 ENOXAPARIN PER 10 MG: Performed by: FAMILY MEDICINE

## 2023-11-07 PROCEDURE — 94799 UNLISTED PULMONARY SVC/PX: CPT

## 2023-11-07 PROCEDURE — 87449 NOS EACH ORGANISM AG IA: CPT | Performed by: FAMILY MEDICINE

## 2023-11-07 PROCEDURE — 82948 REAGENT STRIP/BLOOD GLUCOSE: CPT

## 2023-11-07 PROCEDURE — 63710000001 INSULIN ASPART PER 5 UNITS: Performed by: FAMILY MEDICINE

## 2023-11-07 PROCEDURE — 71045 X-RAY EXAM CHEST 1 VIEW: CPT

## 2023-11-07 PROCEDURE — 83735 ASSAY OF MAGNESIUM: CPT | Performed by: FAMILY MEDICINE

## 2023-11-07 PROCEDURE — 83605 ASSAY OF LACTIC ACID: CPT | Performed by: EMERGENCY MEDICINE

## 2023-11-07 PROCEDURE — 25010000002 METHYLPREDNISOLONE PER 125 MG: Performed by: EMERGENCY MEDICINE

## 2023-11-07 PROCEDURE — 84484 ASSAY OF TROPONIN QUANT: CPT | Performed by: EMERGENCY MEDICINE

## 2023-11-07 PROCEDURE — 25010000002 CEFTRIAXONE SODIUM-DEXTROSE 2-2.22 GM-%(50ML) RECONSTITUTED SOLUTION: Performed by: FAMILY MEDICINE

## 2023-11-07 PROCEDURE — 85025 COMPLETE CBC W/AUTO DIFF WBC: CPT | Performed by: EMERGENCY MEDICINE

## 2023-11-07 PROCEDURE — 84145 PROCALCITONIN (PCT): CPT | Performed by: EMERGENCY MEDICINE

## 2023-11-07 PROCEDURE — 99223 1ST HOSP IP/OBS HIGH 75: CPT | Performed by: FAMILY MEDICINE

## 2023-11-07 PROCEDURE — 87636 SARSCOV2 & INF A&B AMP PRB: CPT | Performed by: EMERGENCY MEDICINE

## 2023-11-07 RX ORDER — SODIUM CHLORIDE 0.9 % (FLUSH) 0.9 %
10 SYRINGE (ML) INJECTION EVERY 12 HOURS SCHEDULED
Status: DISCONTINUED | OUTPATIENT
Start: 2023-11-07 | End: 2023-11-09 | Stop reason: HOSPADM

## 2023-11-07 RX ORDER — CEFTRIAXONE 2 G/50ML
2000 INJECTION, SOLUTION INTRAVENOUS EVERY 24 HOURS
Status: DISCONTINUED | OUTPATIENT
Start: 2023-11-07 | End: 2023-11-09 | Stop reason: HOSPADM

## 2023-11-07 RX ORDER — BISACODYL 10 MG
10 SUPPOSITORY, RECTAL RECTAL DAILY PRN
Status: DISCONTINUED | OUTPATIENT
Start: 2023-11-07 | End: 2023-11-09 | Stop reason: HOSPADM

## 2023-11-07 RX ORDER — ENOXAPARIN SODIUM 100 MG/ML
40 INJECTION SUBCUTANEOUS NIGHTLY
Status: DISCONTINUED | OUTPATIENT
Start: 2023-11-07 | End: 2023-11-09 | Stop reason: HOSPADM

## 2023-11-07 RX ORDER — INSULIN ASPART 100 [IU]/ML
2-9 INJECTION, SOLUTION INTRAVENOUS; SUBCUTANEOUS
Status: DISCONTINUED | OUTPATIENT
Start: 2023-11-07 | End: 2023-11-09 | Stop reason: HOSPADM

## 2023-11-07 RX ORDER — POLYETHYLENE GLYCOL 3350 17 G/17G
17 POWDER, FOR SOLUTION ORAL DAILY PRN
Status: DISCONTINUED | OUTPATIENT
Start: 2023-11-07 | End: 2023-11-09 | Stop reason: HOSPADM

## 2023-11-07 RX ORDER — SODIUM CHLORIDE 0.9 % (FLUSH) 0.9 %
10 SYRINGE (ML) INJECTION AS NEEDED
Status: DISCONTINUED | OUTPATIENT
Start: 2023-11-07 | End: 2023-11-09 | Stop reason: HOSPADM

## 2023-11-07 RX ORDER — IPRATROPIUM BROMIDE AND ALBUTEROL SULFATE 2.5; .5 MG/3ML; MG/3ML
3 SOLUTION RESPIRATORY (INHALATION) ONCE
Status: COMPLETED | OUTPATIENT
Start: 2023-11-07 | End: 2023-11-07

## 2023-11-07 RX ORDER — METHYLPREDNISOLONE SODIUM SUCCINATE 125 MG/2ML
125 INJECTION, POWDER, LYOPHILIZED, FOR SOLUTION INTRAMUSCULAR; INTRAVENOUS ONCE
Status: COMPLETED | OUTPATIENT
Start: 2023-11-07 | End: 2023-11-07

## 2023-11-07 RX ORDER — LATANOPROST 50 UG/ML
1 SOLUTION/ DROPS OPHTHALMIC DAILY
Status: DISCONTINUED | OUTPATIENT
Start: 2023-11-08 | End: 2023-11-09 | Stop reason: HOSPADM

## 2023-11-07 RX ORDER — DEXTROSE MONOHYDRATE 25 G/50ML
25 INJECTION, SOLUTION INTRAVENOUS
Status: DISCONTINUED | OUTPATIENT
Start: 2023-11-07 | End: 2023-11-09 | Stop reason: HOSPADM

## 2023-11-07 RX ORDER — LEVOTHYROXINE SODIUM 0.07 MG/1
75 TABLET ORAL
Status: DISCONTINUED | OUTPATIENT
Start: 2023-11-08 | End: 2023-11-09 | Stop reason: HOSPADM

## 2023-11-07 RX ORDER — SODIUM CHLORIDE 9 MG/ML
40 INJECTION, SOLUTION INTRAVENOUS AS NEEDED
Status: DISCONTINUED | OUTPATIENT
Start: 2023-11-07 | End: 2023-11-09 | Stop reason: HOSPADM

## 2023-11-07 RX ORDER — POTASSIUM CHLORIDE 750 MG/1
40 CAPSULE, EXTENDED RELEASE ORAL ONCE
Status: COMPLETED | OUTPATIENT
Start: 2023-11-07 | End: 2023-11-07

## 2023-11-07 RX ORDER — PANTOPRAZOLE SODIUM 40 MG/1
40 TABLET, DELAYED RELEASE ORAL
Status: DISCONTINUED | OUTPATIENT
Start: 2023-11-08 | End: 2023-11-09 | Stop reason: HOSPADM

## 2023-11-07 RX ORDER — ACETAMINOPHEN 325 MG/1
650 TABLET ORAL EVERY 4 HOURS PRN
Status: DISCONTINUED | OUTPATIENT
Start: 2023-11-07 | End: 2023-11-09 | Stop reason: HOSPADM

## 2023-11-07 RX ORDER — NICOTINE POLACRILEX 4 MG
15 LOZENGE BUCCAL
Status: DISCONTINUED | OUTPATIENT
Start: 2023-11-07 | End: 2023-11-09 | Stop reason: HOSPADM

## 2023-11-07 RX ORDER — HYDROCHLOROTHIAZIDE 12.5 MG/1
12.5 TABLET ORAL
Status: DISCONTINUED | OUTPATIENT
Start: 2023-11-08 | End: 2023-11-09 | Stop reason: HOSPADM

## 2023-11-07 RX ORDER — GUAIFENESIN AND DEXTROMETHORPHAN HYDROBROMIDE 600; 30 MG/1; MG/1
1 TABLET, EXTENDED RELEASE ORAL 2 TIMES DAILY PRN
Status: DISCONTINUED | OUTPATIENT
Start: 2023-11-07 | End: 2023-11-09 | Stop reason: HOSPADM

## 2023-11-07 RX ORDER — CHOLECALCIFEROL (VITAMIN D3) 125 MCG
5 CAPSULE ORAL NIGHTLY PRN
Status: DISCONTINUED | OUTPATIENT
Start: 2023-11-07 | End: 2023-11-09 | Stop reason: HOSPADM

## 2023-11-07 RX ORDER — LOSARTAN POTASSIUM 50 MG/1
100 TABLET ORAL
Status: DISCONTINUED | OUTPATIENT
Start: 2023-11-08 | End: 2023-11-09 | Stop reason: HOSPADM

## 2023-11-07 RX ORDER — ALUMINA, MAGNESIA, AND SIMETHICONE 2400; 2400; 240 MG/30ML; MG/30ML; MG/30ML
15 SUSPENSION ORAL EVERY 6 HOURS PRN
Status: DISCONTINUED | OUTPATIENT
Start: 2023-11-07 | End: 2023-11-09 | Stop reason: HOSPADM

## 2023-11-07 RX ORDER — BENZONATATE 100 MG/1
100 CAPSULE ORAL 3 TIMES DAILY PRN
Status: DISCONTINUED | OUTPATIENT
Start: 2023-11-07 | End: 2023-11-09 | Stop reason: HOSPADM

## 2023-11-07 RX ORDER — PRAVASTATIN SODIUM 20 MG
40 TABLET ORAL NIGHTLY
Status: DISCONTINUED | OUTPATIENT
Start: 2023-11-07 | End: 2023-11-09 | Stop reason: HOSPADM

## 2023-11-07 RX ORDER — NITROGLYCERIN 0.4 MG/1
0.4 TABLET SUBLINGUAL
Status: DISCONTINUED | OUTPATIENT
Start: 2023-11-07 | End: 2023-11-09 | Stop reason: HOSPADM

## 2023-11-07 RX ORDER — AMOXICILLIN 250 MG
2 CAPSULE ORAL 2 TIMES DAILY
Status: DISCONTINUED | OUTPATIENT
Start: 2023-11-07 | End: 2023-11-09 | Stop reason: HOSPADM

## 2023-11-07 RX ORDER — BISACODYL 5 MG/1
5 TABLET, DELAYED RELEASE ORAL DAILY PRN
Status: DISCONTINUED | OUTPATIENT
Start: 2023-11-07 | End: 2023-11-09 | Stop reason: HOSPADM

## 2023-11-07 RX ORDER — ACETAMINOPHEN 650 MG/1
650 SUPPOSITORY RECTAL EVERY 4 HOURS PRN
Status: DISCONTINUED | OUTPATIENT
Start: 2023-11-07 | End: 2023-11-09 | Stop reason: HOSPADM

## 2023-11-07 RX ORDER — AMLODIPINE BESYLATE 5 MG/1
10 TABLET ORAL DAILY
Status: DISCONTINUED | OUTPATIENT
Start: 2023-11-08 | End: 2023-11-09 | Stop reason: HOSPADM

## 2023-11-07 RX ORDER — ACETAMINOPHEN 160 MG/5ML
650 SOLUTION ORAL EVERY 4 HOURS PRN
Status: DISCONTINUED | OUTPATIENT
Start: 2023-11-07 | End: 2023-11-09 | Stop reason: HOSPADM

## 2023-11-07 RX ORDER — ONDANSETRON 2 MG/ML
4 INJECTION INTRAMUSCULAR; INTRAVENOUS EVERY 6 HOURS PRN
Status: DISCONTINUED | OUTPATIENT
Start: 2023-11-07 | End: 2023-11-09 | Stop reason: HOSPADM

## 2023-11-07 RX ORDER — ONDANSETRON 4 MG/1
4 TABLET, FILM COATED ORAL EVERY 6 HOURS PRN
Status: DISCONTINUED | OUTPATIENT
Start: 2023-11-07 | End: 2023-11-09 | Stop reason: HOSPADM

## 2023-11-07 RX ORDER — IPRATROPIUM BROMIDE AND ALBUTEROL SULFATE 2.5; .5 MG/3ML; MG/3ML
3 SOLUTION RESPIRATORY (INHALATION) EVERY 4 HOURS PRN
Status: DISCONTINUED | OUTPATIENT
Start: 2023-11-07 | End: 2023-11-09 | Stop reason: HOSPADM

## 2023-11-07 RX ADMIN — ENOXAPARIN SODIUM 40 MG: 100 INJECTION SUBCUTANEOUS at 20:04

## 2023-11-07 RX ADMIN — INSULIN ASPART 7 UNITS: 100 INJECTION, SOLUTION INTRAVENOUS; SUBCUTANEOUS at 20:18

## 2023-11-07 RX ADMIN — DOCUSATE SODIUM 50MG AND SENNOSIDES 8.6MG 2 TABLET: 8.6; 5 TABLET, FILM COATED ORAL at 20:04

## 2023-11-07 RX ADMIN — IPRATROPIUM BROMIDE AND ALBUTEROL SULFATE 3 ML: .5; 3 SOLUTION RESPIRATORY (INHALATION) at 13:57

## 2023-11-07 RX ADMIN — Medication 10 ML: at 20:04

## 2023-11-07 RX ADMIN — CEFTRIAXONE 2000 MG: 2 INJECTION, SOLUTION INTRAVENOUS at 20:04

## 2023-11-07 RX ADMIN — METHYLPREDNISOLONE SODIUM SUCCINATE 125 MG: 125 INJECTION, POWDER, FOR SOLUTION INTRAMUSCULAR; INTRAVENOUS at 13:52

## 2023-11-07 RX ADMIN — POTASSIUM CHLORIDE 40 MEQ: 10 CAPSULE, COATED, EXTENDED RELEASE ORAL at 20:04

## 2023-11-07 NOTE — CASE MANAGEMENT/SOCIAL WORK
Dis[facility] Planning Assessment  [facility]     Patient Name: Pari Howell  MRN: [id number]  Today's Date: 11/7/2023    Admit Date: 11/7/2023    Plan: The patient is awake and able to answer questions.  Her daughter is at bedside; she consents for her daughter to be present for her DC planning.  She is a current patient of Radha Gregg and gets her medications from [facility].  She does not use any DME at home, and denies the need for any DME or additional services when she is DC home.  This RN asked patient if she would be open to home O2 if it is necessary, and she stated that she doesn't want to go home if she has to have more oxygen.  She plans to return to her home with her  on DC.  The patient's daughter is very supportive and helpful.  All questions and concerns were addressed during time of conversation.  Will provide additional resources and information upon patient request.   Discharge Needs Assessment       Row Name 11/07/23 1818       Living Environment    People in Home spouse    Name(s) of People in Home Aldo Howell,     Current Living Arrangements home    Duration at Residence 1 year    Potentially Unsafe Housing Conditions none    In the past 12 months has the electric, gas, oil, or water company threatened to shut off services in your home? No    Primary Care Provided by self    Provides Primary Care For spouse    Caregiving Concerns  has been sick with pneumonia for over a month.  She has been providing care for him    Quality of Family Relationships helpful;involved;supportive    Able to Return to Prior Arrangements yes       Resource/Environmental Concerns    Resource/Environmental Concerns none       Food Insecurity    Within the past 12 months, you worried that your food would run out before you got the money to buy more. Never true    Within the past 12 months, the food you bought just didn't last and you didn't have money to get more. Never true       Transition Planning     Patient/Family Anticipates Transition to home with family    Patient/Family Anticipated Services at Transition none       Discharge Needs Assessment    Readmission Within the Last 30 Days no previous admission in last 30 days    Equipment Currently Used at Home none    Concerns to be Addressed denies needs/concerns at this time    Equipment Needed After Discharge none                   Discharge Plan       Row Name 11/07/23 1822       Plan    Plan The patient is awake and able to answer questions.  Her daughter is at bedside; she consents for her daughter to be present for her DC planning.  She is a current patient of Radha Gregg and gets her medications from [facility].  She does not use any DME at home, and denies the need for any DME or additional services when she is DC home.  This RN asked patient if she would be open to home O2 if it is necessary, and she stated that she doesn't want to go home if she has to have more oxygen.  She plans to return to her home with her  on DC.  The patient's daughter is very supportive and helpful.  All questions and concerns were addressed during time of conversation.  Will provide additional resources and information upon patient request.    Final Discharge Disposition Code 01 - home or self-care                  Continued Care and Services - Admitted Since 11/7/2023    Coordination has not been started for this encounter.          Demographic Summary       Row Name 11/07/23 1816       General Information    Admission Type inpatient    Arrived From emergency department    Required Notices Provided Important Message from Medicare    Referral Source admission list    Reason for Consult discharge planning    Preferred Language English                   Functional Status       Row Name 11/07/23 1816       Functional Status    Usual Activity Tolerance good    Current Activity Tolerance moderate    Functional Status Comments Patient states that her increased shortness of  breath has made her weak.       Physical Activity    On average, how many days per week do you engage in moderate to strenuous exercise (like a brisk walk)? 0 days    On average, how many minutes do you engage in exercise at this level? 0 min    Number of minutes of exercise per week 0       Assessment of Health Literacy    How often do you have someone help you read hospital materials? Never    How often do you have problems learning about your medical condition because of difficulty understanding written information? Never    How often do you have a problem understanding what is told to you about your medical condition? Never    How confident are you filling out medical forms by yourself? Extremely    Health Literacy Excellent       Functional Status, IADL    Medications independent    Meal Preparation independent    Housekeeping independent    Laundry independent    Shopping independent       Mental Status    General Appearance WDL WDL       Mental Status Summary    Recent Changes in Mental Status/Cognitive Functioning no changes       Employment/    Employment Status retired    Current or Previous Occupation healthcare                   Psychosocial       Row Name 11/07/23 1817       Values/Beliefs    Spiritual, Cultural Beliefs, Advent Practices, Values that Affect Care no       Behavior WDL    Behavior WDL WDL       Emotion Mood WDL    Emotion/Mood/Affect WDL WDL       Mental Health    Little Interest or Pleasure in Doing Things 0-->not at all    Feeling Down, Depressed or Hopeless 0-->not at all    Do you feel stress - tense, restless, nervous, or anxious, or unable to sleep at night because your mind is troubled all the time - these days? Only a littl       Speech WDL    Speech WDL WDL       Perceptual State WDL    Perceptual State WDL WDL       Thought Process WDL    Thought Process WDL WDL                   Abuse/Neglect       Row Name 11/07/23 1817       Personal Safety    Feels Unsafe at Home  or Work/School no    Feels Threatened by Someone no    Does Anyone Try to Keep You From Having Contact with Others or Doing Things Outside Your Home? no    Physical Signs of Abuse Present no                   Legal       Row Name 11/07/23 1817       Financial Resource Strain    How hard is it for you to pay for the very basics like food, housing, medical care, and heating? Not hard                   Substance Abuse       Row Name 11/07/23 1818       Substance Use    Substance Use Status never used                   Patient Forms       Row Name 11/07/23 1826       Patient Forms    Important Message from Medicare (IMM) Delivered    Delivered to Patient    Method of delivery In person                      Vandana Good

## 2023-11-07 NOTE — PLAN OF CARE
Goal Outcome Evaluation:         Pt is a new admission from the ED, admitted to  320.

## 2023-11-07 NOTE — H&P
[facility]   HISTORY AND PHYSICAL      Name:  Pari Howell   Age:  73 y.o.  Sex:  female  :  1950  MRN:  [id number]   Visit Number:  [id number]  Admission Date:  2023  Date Of Service:  23  Primary Care Physician:  Hayley Ty DO    Chief Complaint:     Cough, fever, shortness of breath    History Of Presenting Illness:      Patient is a pleasant 73 with a history of hypertension, hypothyroidism, diabetes, who presented to the emergency room with complaints of cough shortness of breath and fever.  She states that her and her  have both been sick since about , he actually just got out of the hospital after being treated for pneumonia and some heart issues.  She states that she had also been sick about the same time, but has been taking care of him at home.  She noted today she developed a fever, increased cough and sputum production.  She notes that she has had off-and-on breathing issues since she had COVID in February of last year.  She denies any falls or trauma.  Appetite has been okay.  Denies any recent nausea or vomiting.  Most recent travel was to [place] in October.  Denies any known sick contacts other than her .    In the ER, she was overall hemodynamically stable but was requiring 2 L of oxygen.  She did have a white count of 14, otherwise her CMP was unremarkable.  Procalcitonin within normal limits.  Negative respiratory panel.  Chest x-ray was unremarkable.  CT scan of the chest demonstrating findings concerning for pneumonia/groundglass opacities with questionable edema.  The patient was admitted thereafter.    Review Of Systems:    All systems were reviewed and negative except as mentioned in history of presenting illness, assessment and plan.    Past Medical History: Patient  has a past medical history of Bladder infection, Dyspareunia, female, Glaucoma, H/O sexual problem, High cholesterol, History of abnormal cervical Pap  smear, Hypertension, Hypertension, Hypothyroidism, Labial cyst, Muscle weakness, Type 2 diabetes mellitus, Urinary incontinence, Vaginal itching, and Yeast infection.    Past Surgical History: Patient  has a past surgical history that includes Tubal ligation (11/1979); Cholecystectomy (1991); Carpal tunnel release (2007); Oophorectomy; Hysterectomy (2007); Eye surgery (2021); Cataract extraction, bilateral (Left); and Cataract extraction (Right, 09/27/2022).    Social History: Patient  reports that she has never smoked. She has never used smokeless tobacco. She reports that she does not drink alcohol and does not use drugs.    Family History:  Patient's family history has been reviewed and found to be noncontributory.     Allergies:      Shellfish-derived products    Home Medications:    Prior to Admission Medications       Prescriptions Last Dose Informant Patient Reported? Taking?    amLODIPine (NORVASC) 5 MG tablet   No No    TAKE 2 TABLETS EVERY DAY    Blood Glucose Monitoring Suppl (True Metrix Air Glucose Meter) w/Device kit   No No    1 each 2 (two) times a day.    glucose blood (True Metrix Blood Glucose Test) test strip   No No    1 strip daily    Lancets misc   No No    1 applicator Daily.    latanoprost (XALATAN) 0.005 % ophthalmic solution   No No    Apply 1 drop to eye Daily.    levothyroxine (SYNTHROID, LEVOTHROID) 75 MCG tablet   No No    Take 1 tablet by mouth Daily.    losartan-hydrochlorothiazide (HYZAAR) 100-12.5 MG per tablet   No No    TAKE 1 TABLET EVERY DAY    nystatin (MYCOSTATIN) 668192 UNIT/GM cream   No No    Apply  topically to the appropriate area as directed 2 (Two) Times a Day.    pantoprazole (PROTONIX) 20 MG EC tablet   No No    TAKE 1 TABLET EVERY DAY    pravastatin (PRAVACHOL) 40 MG tablet   No No    Take 1 tablet by mouth Every Evening.    Semaglutide,0.25 or 0.5MG/DOS, (Ozempic, 0.25 or 0.5 MG/DOSE,) 2 MG/3ML solution pen-injector   No No    Inject 0.5 mg under the skin into the  "appropriate area as directed 1 (One) Time Per Week.    timolol (TIMOPTIC) 0.5 % ophthalmic solution   Yes No    triamcinolone (KENALOG) 0.025 % cream   No No    Apply 1 application topically to the appropriate area as directed 2 (Two) Times a Day.          ED Medications:    Medications   sodium chloride 0.9 % flush 10 mL (has no administration in time range)   ipratropium-albuterol (DUO-NEB) nebulizer solution 3 mL (3 mL Nebulization Given 11/7/23 1357)   methylPREDNISolone sodium succinate (SOLU-Medrol) injection 125 mg (125 mg Intravenous Given 11/7/23 1352)     Vital Signs:  Temp:  [98.9 °F (37.2 °C)-99.7 °F (37.6 °C)] 98.9 °F (37.2 °C)  Heart Rate:  [80-99] 82  Resp:  [18-20] 20  BP: (127-160)/() 143/75        11/07/23  1252   Weight: 81.6 kg (180 lb)     Body mass index is 27.37 kg/m².    Physical Exam:     Most recent vital Signs: /75   Pulse 82   Temp 98.9 °F (37.2 °C) (Oral)   Resp 20   Ht 172.7 cm (68\")   Wt 81.6 kg (180 lb)   LMP  (LMP Unknown)   SpO2 93%   BMI 27.37 kg/m²     Physical Exam  Constitutional:       Appearance: Normal appearance.   HENT:      Head: Normocephalic and atraumatic.      Mouth/Throat:      Mouth: Mucous membranes are dry.   Eyes:      Extraocular Movements: Extraocular movements intact.      Pupils: Pupils are equal, round, and reactive to light.   Cardiovascular:      Rate and Rhythm: Normal rate and regular rhythm.      Pulses: Normal pulses.      Heart sounds: No murmur heard.     No gallop.   Pulmonary:      Effort: No respiratory distress.      Breath sounds: Rhonchi present. No rales.   Abdominal:      General: Abdomen is flat. Bowel sounds are normal. There is no distension.      Tenderness: There is no abdominal tenderness. There is no guarding.   Musculoskeletal:         General: Normal range of motion.      Right lower leg: No edema.      Left lower leg: No edema.   Skin:     General: Skin is warm.      Capillary Refill: Capillary refill takes less " "than 2 seconds.      Findings: No bruising or lesion.   Neurological:      General: No focal deficit present.      Mental Status: She is alert. Mental status is at baseline.      Motor: Weakness present.   Psychiatric:         Mood and Affect: Mood normal.         Thought Content: Thought content normal.         Laboratory data:    I have reviewed the labs done in the emergency room.    Results from last 7 days   Lab Units 11/07/23  1305   SODIUM mmol/L 139   POTASSIUM mmol/L 3.3*   CHLORIDE mmol/L 103   CO2 mmol/L 22.3   BUN mg/dL 9   CREATININE mg/dL 0.70   CALCIUM mg/dL 9.6   BILIRUBIN mg/dL 0.7   ALK PHOS U/L 68   ALT (SGPT) U/L 14   AST (SGOT) U/L 15   GLUCOSE mg/dL 122*     Results from last 7 days   Lab Units 11/07/23  1305   WBC 10*3/mm3 14.38*   HEMOGLOBIN g/dL 13.0   HEMATOCRIT % 36.4   PLATELETS 10*3/mm3 317         Results from last 7 days   Lab Units 11/07/23  1305   HSTROP T ng/L 9     Results from last 7 days   Lab Units 11/07/23  1305   PROBNP pg/mL 383.9                       Invalid input(s): \"USDES\", \"NITRITITE\", \"BACT\", \"EP\"    Pain Management Panel  More data exists         Latest Ref Rng & Units 6/19/2023 5/4/2022   Pain Management Panel   Creatinine, Urine Not Estab. mg/dL 134.0  100.9        EKG:      Normal rhythm, rate, no acute ST or T wave changes per my interpretation    Radiology:    XR Chest 1 View    Result Date: 11/7/2023  PROCEDURE: XR CHEST 1 VW-  HISTORY: SOA Triage Protocol, fever, cough for 2 weeks  COMPARISON: October 25, 2023..  FINDINGS: The heart is normal in size. Right lung is clear. There are linear densities in the left lung base which are stable consistent with atelectasis or or scar.. The mediastinum is unremarkable. There is no pneumothorax.  There are no acute osseous abnormalities.      Stable chest..      This report was signed and finalized on 11/7/2023 1:34 PM by Miesha Schmidt MD.       Assessment:    Community-acquired pneumonia, due to unknown organism, present " "admission  Acute respiratory insufficiency  Hypertension  Type 2 diabetes  Hypothyroidism    Plan:    Admit to hospital    Pneumonia:  My suspicion is that patient initially had a viral infection that is now more progressive than likely bacterial infection.  We will send for strep pneumo, Legionella antigens and sputum if possible.  We will treat with Rocephin.  See no indication for steroids.  Wean oxygen as tolerated.  Antitussives as needed.  Incentive spirometer.  Of note, patient may be \"long-hauler\" from COVID.  Consider outpatient pulmonology follow-up.    Hypertension/diabetes/hypothyroidism:  We will restart home medications.  Complicates all aspects of care.  Sliding scale insulin coverage.  Further recommendations depend upon clinical course.  Plan of care discussed with the patient and her daughter at bedside.      Risk Assessment: High  DVT Prophylaxis: Lovenox  Code Status: Full  Diet: As tolerated diabetic    Advance Care Planning   ACP discussion was held with the patient during this visit. Patient does not have an advance directive, declines further assistance.           Annemarie Barton, DO  11/07/23  18:26 EST    Dictated utilizing Dragon dictation.  "

## 2023-11-07 NOTE — ED PROVIDER NOTES
Subjective   History of Present Illness  73-year-old female presents to the ED with a chief complaint of shortness of breath.  Patient states that she has been short of breath for approximately 1 week.  Has been worsening over the last 2 days.  Patient's  was recently admitted for pneumonia.  He tested negative for COVID.  She states that she was caring for him and has been unable to take care of herself.  Her primary care physician did place her on some Tessalon Perles and told her she had a likely viral respiratory infection.  Her shortness of breath is worsening.  At home today they used her 's pulse oximeter and her pulse ox was 82%. Cough is productive of small amount of sputum. + fever today and yesterday. + chills. No other complaints at this time.       Review of Systems   Constitutional:  Positive for chills, fatigue and fever.   Respiratory:  Positive for cough, chest tightness, shortness of breath and wheezing.    Cardiovascular:  Negative for palpitations and leg swelling.   All other systems reviewed and are negative.      Past Medical History:   Diagnosis Date    Bladder infection     Dyspareunia, female     Glaucoma     H/O sexual problem     High cholesterol     History of abnormal cervical Pap smear     Hypertension     Hypertension     Hypothyroidism     Impaired functional mobility, balance, gait, and endurance     Labial cyst     Muscle weakness     Type 2 diabetes mellitus     Urinary incontinence     Vaginal itching     Yeast infection        Allergies   Allergen Reactions    Shellfish-Derived Products Rash       Past Surgical History:   Procedure Laterality Date    CARPAL TUNNEL RELEASE  2007    CATARACT EXTRACTION Right 09/27/2022    CATARACT EXTRACTION, BILATERAL Left     CHOLECYSTECTOMY  1991    EYE SURGERY  2021    HYSTERECTOMY  2007    OOPHORECTOMY      TUBAL ABDOMINAL LIGATION  11/1979       Family History   Problem Relation Age of Onset    Colon cancer Maternal Uncle      Diabetes Mother     Heart attack Mother     Hypertension Mother     Hyperlipidemia Mother     Thyroid disease Mother     Stroke Mother     Vision loss Mother     Cancer Sister     Thyroid disease Sister     Diabetes Brother     Prostate cancer Brother     Cancer Brother         Efren prostrates cancer    Breast cancer Daughter 46    Diabetes Sister     Hyperlipidemia Sister     Thyroid disease Sister     Vision loss Sister     Vision loss Sister     Hyperlipidemia Sister     Ovarian cancer Neg Hx        Social History     Socioeconomic History    Marital status:    Tobacco Use    Smoking status: Never    Smokeless tobacco: Never   Vaping Use    Vaping Use: Never used   Substance and Sexual Activity    Alcohol use: Never    Drug use: Never    Sexual activity: Yes     Partners: Male     Birth control/protection: Surgical, Hysterectomy           Objective   Physical Exam  Vitals and nursing note reviewed.   Constitutional:       General: She is not in acute distress.     Appearance: She is well-developed. She is not diaphoretic.   HENT:      Head: Normocephalic and atraumatic.      Nose: Nose normal.   Eyes:      Conjunctiva/sclera: Conjunctivae normal.   Cardiovascular:      Rate and Rhythm: Regular rhythm. Tachycardia present.   Pulmonary:      Effort: No respiratory distress.      Comments: Coarse breath sounds bilateral lung fields.  Mild tachypnea.  Abdominal:      General: There is no distension.      Palpations: Abdomen is soft.      Tenderness: There is no abdominal tenderness. There is no guarding.   Musculoskeletal:         General: No deformity.      Right lower leg: No edema.      Left lower leg: No edema.   Neurological:      Mental Status: She is alert and oriented to person, place, and time.      Cranial Nerves: No cranial nerve deficit.         Procedures           ED Course                                           Medical Decision Making  Problems Addressed:  Acute respiratory failure with  hypoxia: complicated acute illness or injury  Viral URI with cough: complicated acute illness or injury    Amount and/or Complexity of Data Reviewed  Labs: ordered.  Radiology: ordered.  ECG/medicine tests: ordered.    Risk  Prescription drug management.  Decision regarding hospitalization.    EKG interpreted by me.  Normal sinus rhythm.  Rate of 80.  Intervals appropriate.  No obvious ST segment or T wave changes.  Normal EKG      Chief complaint: Shortness of breath    Differential diagnosis includes but not limited to: Pneumonia, viral bronchitis, STEMI, NSTEMI, pulmonary embolism, pleural effusion, CHF, other    Patient arrives stable, afebrile, without respiratory distress with initial vitals interpreted by myself.  Pertinent initial vitals include hypoxic 88% on room air, heart rate 99, /76.    Plan: CBC, CMP, EKG, chest x-ray, troponin, BNP, procalcitonin, lactic acid, D-dimer, other    Results from initial plan were reviewed and interpreted by myself and include: Respiratory panel negative.  Chest x-ray did not show any focal pneumonia, initial lactic was 2.1.  D-dimer was negative at 0.39, procalcitonin was negative at 0.07.  White blood cell count was slightly elevated at 14.38 with 84% neutrophils.  proBNP was normal at 383.  Troponin was negative.  CMP without significant acute abnormality.  CT chest was obtained which shows patchy groundglass opacities infection versus edema and mediastinal adenopathy.    Consultations Dr. Barton, hospitalist graciously accepts the patient for admission    Reevaluation/discussion: Patient was able to ambulate to the restroom but immediately upon returning back from the restroom her pulse ox was 85 to 86% she was placed back on 2 L nasal cannula.  Sats were around 90% on 2 L nasal cannula.  Given this new oxygen requirement and findings I suspect that she likely has a viral respiratory infection.  She was treated as such with supplemental oxygen, DuoNebs, steroids  and magnesium.  She will be admitted for further evaluation medical management.      Diagnostic information from other sources includes: Review of previous visits, prior labs, prior imaging, available notes from prior evaluations or visits with specialists, medication list, allergies, past medical history, past surgical history    Interventions in the ED included: DuoNeb's Solu-Medrol magnesium continuous cardiac monitoring, supplemental oxygen, admission    Disposition plan: Admission    Final diagnoses:   Acute respiratory failure with hypoxia   Viral URI with cough       ED Disposition  ED Disposition       ED Disposition   Decision to Admit    Condition   --    Comment   Level of Care: Telemetry [5]   Diagnosis: Acute respiratory insufficiency [755795]   Admitting Physician: ADYAMI DOZIER [[id number]]   Attending Physician: DAYAMI DOZIER [[id number]]   Isolate for COVID?: No [0]   Certification: I Certify That Inpatient Hospital Services Are Medically Necessary For Greater Than 2 Midnights                 No follow-up provider specified.       Medication List      No changes were made to your prescriptions during this visit.            Rod Elias, DO  11/08/23 [id number]

## 2023-11-08 LAB
ANION GAP SERPL CALCULATED.3IONS-SCNC: 12.4 MMOL/L (ref 5–15)
BUN SERPL-MCNC: 12 MG/DL (ref 8–23)
BUN/CREAT SERPL: 15.2 (ref 7–25)
CALCIUM SPEC-SCNC: 10.4 MG/DL (ref 8.6–10.5)
CHLORIDE SERPL-SCNC: 99 MMOL/L (ref 98–107)
CO2 SERPL-SCNC: 24.6 MMOL/L (ref 22–29)
CREAT SERPL-MCNC: 0.79 MG/DL (ref 0.57–1)
DEPRECATED RDW RBC AUTO: 41.4 FL (ref 37–54)
EGFRCR SERPLBLD CKD-EPI 2021: 79.1 ML/MIN/1.73
ERYTHROCYTE [DISTWIDTH] IN BLOOD BY AUTOMATED COUNT: 12.8 % (ref 12.3–15.4)
GLUCOSE BLDC GLUCOMTR-MCNC: 165 MG/DL (ref 70–130)
GLUCOSE BLDC GLUCOMTR-MCNC: 230 MG/DL (ref 70–130)
GLUCOSE BLDC GLUCOMTR-MCNC: 239 MG/DL (ref 70–130)
GLUCOSE BLDC GLUCOMTR-MCNC: 242 MG/DL (ref 70–130)
GLUCOSE SERPL-MCNC: 227 MG/DL (ref 65–99)
HCT VFR BLD AUTO: 39.3 % (ref 34–46.6)
HGB BLD-MCNC: 13.7 G/DL (ref 12–15.9)
L PNEUMO1 AG UR QL IA: NEGATIVE
MCH RBC QN AUTO: 30.9 PG (ref 26.6–33)
MCHC RBC AUTO-ENTMCNC: 34.9 G/DL (ref 31.5–35.7)
MCV RBC AUTO: 88.5 FL (ref 79–97)
PLATELET # BLD AUTO: 389 10*3/MM3 (ref 140–450)
PMV BLD AUTO: 8.9 FL (ref 6–12)
POTASSIUM SERPL-SCNC: 3.6 MMOL/L (ref 3.5–5.2)
POTASSIUM SERPL-SCNC: 4.3 MMOL/L (ref 3.5–5.2)
RBC # BLD AUTO: 4.44 10*6/MM3 (ref 3.77–5.28)
S PNEUM AG SPEC QL LA: NEGATIVE
SODIUM SERPL-SCNC: 136 MMOL/L (ref 136–145)
WBC NRBC COR # BLD: 15.66 10*3/MM3 (ref 3.4–10.8)

## 2023-11-08 PROCEDURE — 80048 BASIC METABOLIC PNL TOTAL CA: CPT | Performed by: FAMILY MEDICINE

## 2023-11-08 PROCEDURE — 63710000001 INSULIN ASPART PER 5 UNITS: Performed by: FAMILY MEDICINE

## 2023-11-08 PROCEDURE — 25010000002 ENOXAPARIN PER 10 MG: Performed by: FAMILY MEDICINE

## 2023-11-08 PROCEDURE — 97165 OT EVAL LOW COMPLEX 30 MIN: CPT

## 2023-11-08 PROCEDURE — 84132 ASSAY OF SERUM POTASSIUM: CPT | Performed by: FAMILY MEDICINE

## 2023-11-08 PROCEDURE — 97161 PT EVAL LOW COMPLEX 20 MIN: CPT

## 2023-11-08 PROCEDURE — 85027 COMPLETE CBC AUTOMATED: CPT | Performed by: FAMILY MEDICINE

## 2023-11-08 PROCEDURE — 94799 UNLISTED PULMONARY SVC/PX: CPT

## 2023-11-08 PROCEDURE — 99232 SBSQ HOSP IP/OBS MODERATE 35: CPT | Performed by: FAMILY MEDICINE

## 2023-11-08 PROCEDURE — 82948 REAGENT STRIP/BLOOD GLUCOSE: CPT

## 2023-11-08 PROCEDURE — 25010000002 CEFTRIAXONE SODIUM-DEXTROSE 2-2.22 GM-%(50ML) RECONSTITUTED SOLUTION: Performed by: FAMILY MEDICINE

## 2023-11-08 RX ORDER — POTASSIUM CHLORIDE 20 MEQ/1
40 TABLET, EXTENDED RELEASE ORAL EVERY 4 HOURS
Status: COMPLETED | OUTPATIENT
Start: 2023-11-08 | End: 2023-11-08

## 2023-11-08 RX ORDER — MULTIVIT WITH MINERALS/LUTEIN
1000 TABLET ORAL DAILY
COMMUNITY

## 2023-11-08 RX ORDER — UBIDECARENONE 75 MG
100 CAPSULE ORAL DAILY
COMMUNITY

## 2023-11-08 RX ORDER — MULTIPLE VITAMINS W/ MINERALS TAB 9MG-400MCG
1 TAB ORAL DAILY
COMMUNITY

## 2023-11-08 RX ORDER — DIPHENHYDRAMINE HCL 25 MG
25 CAPSULE ORAL NIGHTLY PRN
COMMUNITY

## 2023-11-08 RX ORDER — CETIRIZINE HYDROCHLORIDE 10 MG/1
10 TABLET ORAL DAILY
COMMUNITY

## 2023-11-08 RX ADMIN — INSULIN ASPART 2 UNITS: 100 INJECTION, SOLUTION INTRAVENOUS; SUBCUTANEOUS at 17:15

## 2023-11-08 RX ADMIN — LOSARTAN POTASSIUM 100 MG: 50 TABLET, FILM COATED ORAL at 08:17

## 2023-11-08 RX ADMIN — IPRATROPIUM BROMIDE AND ALBUTEROL SULFATE 3 ML: .5; 3 SOLUTION RESPIRATORY (INHALATION) at 00:54

## 2023-11-08 RX ADMIN — POTASSIUM CHLORIDE 40 MEQ: 1500 TABLET, EXTENDED RELEASE ORAL at 12:22

## 2023-11-08 RX ADMIN — PRAVASTATIN SODIUM 40 MG: 20 TABLET ORAL at 20:16

## 2023-11-08 RX ADMIN — INSULIN ASPART 4 UNITS: 100 INJECTION, SOLUTION INTRAVENOUS; SUBCUTANEOUS at 20:26

## 2023-11-08 RX ADMIN — DOCUSATE SODIUM 50MG AND SENNOSIDES 8.6MG 2 TABLET: 8.6; 5 TABLET, FILM COATED ORAL at 08:17

## 2023-11-08 RX ADMIN — POTASSIUM CHLORIDE 40 MEQ: 1500 TABLET, EXTENDED RELEASE ORAL at 15:09

## 2023-11-08 RX ADMIN — CEFTRIAXONE 2000 MG: 2 INJECTION, SOLUTION INTRAVENOUS at 20:26

## 2023-11-08 RX ADMIN — GUAIFENESIN AND DEXTROMETHORPHAN HYDROBROMIDE 1 TABLET: 600; 30 TABLET, EXTENDED RELEASE ORAL at 00:31

## 2023-11-08 RX ADMIN — Medication 10 ML: at 08:17

## 2023-11-08 RX ADMIN — BENZONATATE 100 MG: 100 CAPSULE ORAL at 14:33

## 2023-11-08 RX ADMIN — BENZONATATE 100 MG: 100 CAPSULE ORAL at 00:31

## 2023-11-08 RX ADMIN — GUAIFENESIN AND DEXTROMETHORPHAN HYDROBROMIDE 1 TABLET: 600; 30 TABLET, EXTENDED RELEASE ORAL at 20:34

## 2023-11-08 RX ADMIN — HYDROCHLOROTHIAZIDE 12.5 MG: 12.5 CAPSULE ORAL at 08:17

## 2023-11-08 RX ADMIN — INSULIN ASPART 4 UNITS: 100 INJECTION, SOLUTION INTRAVENOUS; SUBCUTANEOUS at 12:21

## 2023-11-08 RX ADMIN — LEVOTHYROXINE SODIUM 75 MCG: 75 TABLET ORAL at 06:45

## 2023-11-08 RX ADMIN — Medication 5 MG: at 20:16

## 2023-11-08 RX ADMIN — INSULIN ASPART 4 UNITS: 100 INJECTION, SOLUTION INTRAVENOUS; SUBCUTANEOUS at 08:17

## 2023-11-08 RX ADMIN — Medication 10 ML: at 20:17

## 2023-11-08 RX ADMIN — AMLODIPINE BESYLATE 10 MG: 5 TABLET ORAL at 08:17

## 2023-11-08 RX ADMIN — PANTOPRAZOLE SODIUM 40 MG: 40 TABLET, DELAYED RELEASE ORAL at 06:45

## 2023-11-08 RX ADMIN — ENOXAPARIN SODIUM 40 MG: 100 INJECTION SUBCUTANEOUS at 20:15

## 2023-11-08 NOTE — CASE MANAGEMENT/SOCIAL WORK
Case Management/Social Work    Patient Name:  Pari Howell  YOB: 1950  MRN: [id number]  Admit Date:  11/7/2023        Pt not medically ready for dc at this time. Pt currently on 2L O2 at this time. None at baseline. Goal is home, family to transport. SW/CM will continue to follow for any needs.       Electronically signed by:  JUNIOR Calderon  11/08/23 09:12 EST

## 2023-11-08 NOTE — PHARMACY RECOMMENDATION
"Pharmacy Consult - Enoxaparin Dosing  Pharmacy was consulted to dose enoxaparin for  Pari Howell, a 73 y.o. female  172.7 cm (67.99\") 79.1 kg (174 lb 6.1 oz)    Indication: VTE prophylaxis    Allergies  Shellfish-derived products    Relevant clinical data and objective history reviewed:   [Ht: 172.7 cm (67.99\"); Wt: 79.1 kg (174 lb 6.1 oz)]  Body mass index is 26.52 kg/m².  Estimated Creatinine Clearance: 79.1 mL/min (by C-G formula based on SCr of 0.7 mg/dL).  Results from last 7 days   Lab Units 11/07/23  1305   HEMOGLOBIN g/dL 13.0   HEMATOCRIT % 36.4   PLATELETS 10*3/mm3 317   CREATININE mg/dL 0.70       Asessment/Plan  Initiate enoxaparin 40 mg SQ every 24 hours  Pharmacy will monitor renal function and clinical status and adjust the dose and/or frequency as needed.    Thanks,   Charmaine Villarreal, PharmD, BCPS  11/7/2023  19:13 EST    "

## 2023-11-08 NOTE — PLAN OF CARE
Goal Outcome Evaluation:  Plan of Care Reviewed With: patient        Progress: no change  Outcome Evaluation: VSS, maintaining o2 sats >90% on 2L NC, increased cough noted  with activity with PRN meds given

## 2023-11-08 NOTE — THERAPY DISCHARGE NOTE
Acute Care - Occupational Therapy Discharge  [facility]    Patient Name: Pari Howell  : 1950    MRN: [id number]                              Today's Date: 2023       Admit Date: 2023    Visit Dx:     ICD-10-CM ICD-9-CM   1. Acute respiratory failure with hypoxia  J96.01 518.81   2. Viral URI with cough  J06.9 465.9     Patient Active Problem List   Diagnosis    Type 2 diabetes mellitus without complication, without long-term current use of insulin    Other specified glaucoma    Hyperlipidemia LDL goal <70    Vitamin D deficiency    Irritable bowel syndrome with diarrhea    Hypothyroidism (acquired)    Acute respiratory insufficiency     Past Medical History:   Diagnosis Date    Bladder infection     Dyspareunia, female     Glaucoma     H/O sexual problem     High cholesterol     History of abnormal cervical Pap smear     Hypertension     Hypertension     Hypothyroidism     Labial cyst     Muscle weakness     Type 2 diabetes mellitus     Urinary incontinence     Vaginal itching     Yeast infection      Past Surgical History:   Procedure Laterality Date    CARPAL TUNNEL RELEASE      CATARACT EXTRACTION Right 2022    CATARACT EXTRACTION, BILATERAL Left     CHOLECYSTECTOMY  1991    EYE SURGERY      HYSTERECTOMY      OOPHORECTOMY      TUBAL ABDOMINAL LIGATION  1979      General Information       Row Name 23 1103          OT Time and Intention    Document Type discharge evaluation/summary  -     Mode of Treatment occupational therapy  -       Row Name 23 1103          General Information    Patient Profile Reviewed yes  -     Prior Level of Function independent:;ADL's;community mobility  -     Existing Precautions/Restrictions oxygen therapy device and L/min  -       Row Name 23 1103          Living Environment    People in Home spouse  -       Row Name 23 1103          Home Main Entrance    Number of Stairs, Main Entrance six  -     Stair  Railings, Main Entrance railings on both sides of stairs  -[facility] Name 11/08/23 1103          Stairs Within Home, Primary    Number of Stairs, Within Home, Primary none  -[facility] Name 11/08/23 1103          Cognition    Orientation Status (Cognition) oriented x 4  -[facility] Name 11/08/23 1103          Safety Issues, Functional Mobility    Impairments Affecting Function (Mobility) endurance/activity tolerance  -               User Key  (r) = Recorded By, (t) = Taken By, (c) = Cosigned By      Initials Name Provider Type    Aleyda Fernandez Occupational Therapist                   Mobility/ADL's       Row Name 11/08/23 1104          Bed Mobility    Bed Mobility bed mobility (all) activities  -     All Activities, [place] (Bed Mobility) independent  -[facility] Name 11/08/23 1104          Transfers    Transfers sit-stand transfer;toilet transfer  -[facility] Name 11/08/23 1104          Sit-Stand Transfer    Sit-Stand [place] (Transfers) independent  -[facility] Name 11/08/23 1104          Toilet Transfer    Type (Toilet Transfer) sit-stand;stand-sit  -     [place] Level (Toilet Transfer) independent  -[facility] Name 11/08/23 1104          Functional Mobility    Functional Mobility- Ind. Level independent  -     Functional Mobility-Distance (Feet) 330  -     Functional Mobility- Safety Issues supplemental O2  -[facility] Name 11/08/23 1104          Activities of Daily Living    BADL Assessment/Intervention bathing;upper body dressing;lower body dressing;grooming;feeding;toileting  -[facility] Name 11/08/23 1104          Bathing Assessment/Intervention    [place] Level (Bathing) independent  -[facility] Name 11/08/23 1104          Upper Body Dressing Assessment/Training    [place] Level (Upper Body Dressing) independent  -[facility] Name 11/08/23 1104          Lower Body Dressing Assessment/Training    [place] Level (Lower Body Dressing) independent   -[facility] Name 11/08/23 1104          Grooming Assessment/Training    [place] Level (Grooming) independent  -AH       Row Name 11/08/23 1104          Self-Feeding Assessment/Training    [place] Level (Feeding) independent  -AH       Row Name 11/08/23 1104          Toileting Assessment/Training    [place] Level (Toileting) independent  -               User Key  (r) = Recorded By, (t) = Taken By, (c) = Cosigned By      Initials Name Provider Type     Aleyda Gastelum Occupational Therapist                   Obj/Interventions       Row Name 11/08/23 1106          Vision Assessment/Intervention    Visual Impairment/Limitations WFL;corrective lenses full-time  -[facility] Name 11/08/23 1106          Range of Motion Comprehensive    General Range of Motion bilateral upper extremity ROM WFL  -AH       Row Name 11/08/23 1106          Strength Comprehensive (MMT)    Comment, General Manual Muscle Testing (MMT) Assessment BUE WFL  -               User Key  (r) = Recorded By, (t) = Taken By, (c) = Cosigned By      Initials Name Provider Type     Aleyda Gastelum Occupational Therapist                   Goals/Plan    No documentation.                  Clinical Impression       Row Name 11/08/23 [facility]6          Pain Assessment    Pretreatment Pain Rating 0/10 - no pain  -     Posttreatment Pain Rating 0/10 - no pain  -     Pain Intervention(s) Repositioned;Ambulation/increased activity  -AH       Row Name 11/08/23 1106          Plan of Care Review    Plan of Care Reviewed With patient;spouse  -     Progress no change  -     Outcome Evaluation OT evaluation completed today.  Pt independent with all mobility and self care tasks.  Pt walked 330' initially on 3L O2, but decreased to 2L as her sats were 96-97% with activity.  Pt is independent with all self care tasks and has no need for skilled OT services.  OT will sign off at this time.  -[facility] Name 11/08/23 1106          Therapy  Assessment/Plan (OT)    Patient/Family Therapy Goal Statement (OT) d/c home  -     Criteria for Skilled Therapeutic Interventions Met (OT) no problems identified which require skilled intervention  -     Therapy Frequency (OT) evaluation only  -       Row Name 11/08/23 1106          Therapy Plan Review/Discharge Plan (OT)    Anticipated Discharge Disposition (OT) home  -       Row Name 11/08/23 1106          Vital Signs    Pre SpO2 (%) 94  -AH     O2 Delivery Pre Treatment supplemental O2  3L  -AH     Intra SpO2 (%) 97  -AH     O2 Delivery Intra Treatment supplemental O2  2L  -AH     Post SpO2 (%) 94  -AH     O2 Delivery Post Treatment room air  O2 removed by RN  -       Row Name 11/08/23 1106          Positioning and Restraints    Pre-Treatment Position in bed  -     Post Treatment Position chair  -     In Chair sitting;call light within reach;encouraged to call for assist;with family/caregiver;with other staff  -               User Key  (r) = Recorded By, (t) = Taken By, (c) = Cosigned By      Initials Name Provider Type    Aleyda Fernandez Occupational Therapist                   Outcome Measures       Row Name 11/08/23 1110          How much help from another is currently needed...    Putting on and taking off regular lower body clothing? 4  -AH     Bathing (including washing, rinsing, and drying) 4  -AH     Toileting (which includes using toilet bed pan or urinal) 4  -AH     Putting on and taking off regular upper body clothing 4  -AH     Taking care of personal grooming (such as brushing teeth) 4  -AH     Eating meals 4  -AH     AM-PAC 6 Clicks Score (OT) 24  -       Row Name 11/08/23 0800          How much help from another person do you currently need...    Turning from your back to your side while in flat bed without using bedrails? 4  -LA     Moving from lying on back to sitting on the side of a flat bed without bedrails? 4  -LA     Moving to and from a bed to a chair (including a  wheelchair)? 4  -LA     Standing up from a chair using your arms (e.g., wheelchair, bedside chair)? 4  -LA     Climbing 3-5 steps with a railing? 3  -LA     To walk in hospital room? 4  -LA     AM-PAC 6 Clicks Score (PT) 23  -LA     Highest level of mobility 7 --> Walked 25 feet or more  -LA       Row Name 11/08/23 1110          Functional Assessment    Outcome Measure Options AM-PAC 6 Clicks Daily Activity (OT)  -               User Key  (r) = Recorded By, (t) = Taken By, (c) = Cosigned By      Initials Name Provider Type     Aleyda Gastelum Occupational Therapist    Pavan Aguilar RN Registered Nurse                  Occupational Therapy Education       Title: PT OT SLP Therapies (Done)       Topic: Occupational Therapy (Done)       Point: ADL training (Done)       Description:   Instruct learner(s) on proper safety adaptation and remediation techniques during self care or transfers.   Instruct in proper use of assistive devices.                  Learning Progress Summary             Patient Acceptance, E,TB, VU by  at 11/8/2023 1110    Comment: Role of OT   Family Acceptance, E,TB, VU by  at 11/8/2023 1110    Comment: Role of OT                                         User Key       Initials Effective Dates Name Provider Type [facility] 06/16/21 -  Aleyda Gastelum Occupational Therapist OT                  OT Recommendation and Plan  Therapy Frequency (OT): evaluation only  Plan of Care Review  Plan of Care Reviewed With: patient, spouse  Progress: no change  Outcome Evaluation: OT evaluation completed today.  Pt independent with all mobility and self care tasks.  Pt walked 330' initially on 3L O2, but decreased to 2L as her sats were 96-97% with activity.  Pt is independent with all self care tasks and has no need for skilled OT services.  OT will sign off at this time.  Plan of Care Reviewed With: patient, spouse  Outcome Evaluation: OT evaluation completed today.  Pt independent with all mobility  and self care tasks.  Pt walked 330' initially on 3L O2, but decreased to 2L as her sats were 96-97% with activity.  Pt is independent with all self care tasks and has no need for skilled OT services.  OT will sign off at this time.     Time Calculation:   Evaluation Complexity (OT)  Review Occupational Profile/Medical/Therapy History Complexity: brief/low complexity  Assessment, Occupational Performance/Identification of Deficit Complexity: 1-3 performance deficits  Clinical Decision Making Complexity (OT): problem focused assessment/low complexity  Overall Complexity of Evaluation (OT): low complexity     Time Calculation- OT       Row Name 11/08/23 1111             Time Calculation- OT    OT Start Time 1003  -AH      OT Received On 11/08/23  -AH         Untimed Charges    OT Eval/Re-eval Minutes 30  -AH         Total Minutes    Untimed Charges Total Minutes 30  -AH       Total Minutes 30  -AH                User Key  (r) = Recorded By, (t) = Taken By, (c) = Cosigned By      Initials Name Provider Type     Aleyda Gastelum Occupational Therapist                  Therapy Charges for Today       Code Description Service Date Service Provider Modifiers Qty    [id number]  OT EVAL LOW COMPLEXITY 2 11/8/2023 Aleyda Gastelum GO 1               OT Discharge Summary  Anticipated Discharge Disposition (OT): home    Aleyda Gastelum  11/8/2023

## 2023-11-08 NOTE — PROGRESS NOTES
[facility]    Name:  Pari Howell   Age:  73 y.o.  Sex:  female  :  1950  MRN:  [id number]   Visit Number:  [id number]  Admission Date:  2023  Date Of Service:  23  Primary Care Physician:  Hayley Ty DO     LOS: 1 day :    Chief Complaint:      Cough, fever, shortness of breath     Subjective:    Patient was seen and examined at bedside today.  Patient sitting up comfortably in chair with no distress.  Patient reports feeling much better today overall.  She denies shortness of breath currently, continues to have dry cough.  She denies any pain or other complaints today.  Patient is titrated down to room air and currently saturating above 90%.  Vitals otherwise stable.    Hospital Course:    Patient is a pleasant 73 with a history of hypertension, hypothyroidism, diabetes, who presented to the emergency room with complaints of cough shortness of breath and fever.  She states that her and her  have both been sick since about , he actually just got out of the hospital after being treated for pneumonia and some heart issues.  She states that she had also been sick about the same time, but has been taking care of him at home.  She noted today she developed a fever, increased cough and sputum production.  She notes that she has had off-and-on breathing issues since she had COVID in February of last year.  She denies any falls or trauma.  Appetite has been okay.  Denies any recent nausea or vomiting.  Most recent travel was to [place] in October.  Denies any known sick contacts other than her .     In the ER, she was overall hemodynamically stable but was requiring 2 L of oxygen.  She did have a white count of 14, otherwise her CMP was unremarkable.  Procalcitonin within normal limits.  Negative respiratory panel.  Chest x-ray was unremarkable.  CT scan of the chest demonstrating findings concerning for pneumonia/groundglass  "opacities with questionable edema.  The patient was admitted thereafter.    Review of Systems:     All systems were reviewed and negative except as mentioned in subjective, assessment and plan.    Vital Signs:    Temp:  [98 °F (36.7 °C)-99.7 °F (37.6 °C)] 98.1 °F (36.7 °C)  Heart Rate:  [77-99] 85  Resp:  [16-20] 16  BP: (123-160)/() 133/81    Intake and output:    I/O last 3 completed shifts:  In: 360 [P.O.:360]  Out: 1500 [Urine:1500]  I/O this shift:  In: 240 [P.O.:240]  Out: -     Physical Examination:    General Appearance:  Alert and cooperative.  No acute distress.   Head:  Atraumatic and normocephalic.   Eyes: Conjunctivae and sclerae normal, no icterus. No pallor.   Throat: No oral lesions, no thrush, oral mucosa moist.   Neck: Supple, trachea midline, no thyromegaly.   Lungs:   Breath sounds heard bilaterally equally.  No wheezing or crackles. No Pleural rub or bronchial breathing.   Heart:  Normal S1 and S2, no murmur, no gallop, no rub. No JVD.   Abdomen:   Normal bowel sounds, no masses, no organomegaly. Soft, nontender, nondistended, no rebound tenderness.   Extremities: Supple, no edema, no cyanosis, no clubbing.   Skin: No bleeding or rash.   Neurologic: Alert and oriented x 3. No facial asymmetry. Moves all four limbs. No tremors.      Laboratory results:    Results from last 7 days   Lab Units 11/07/23  1305   SODIUM mmol/L 139   POTASSIUM mmol/L 3.3*   CHLORIDE mmol/L 103   CO2 mmol/L 22.3   BUN mg/dL 9   CREATININE mg/dL 0.70   CALCIUM mg/dL 9.6   BILIRUBIN mg/dL 0.7   ALK PHOS U/L 68   ALT (SGPT) U/L 14   AST (SGOT) U/L 15   GLUCOSE mg/dL 122*     Results from last 7 days   Lab Units 11/07/23  1305   WBC 10*3/mm3 14.38*   HEMOGLOBIN g/dL 13.0   HEMATOCRIT % 36.4   PLATELETS 10*3/mm3 317         Results from last 7 days   Lab Units 11/07/23  1305   HSTROP T ng/L 9         No results for input(s): \"PHART\", \"YKA7YBY\", \"PO2ART\", \"BBZ2NTO\", \"BASEEXCESS\" in the last 8760 hours.   I have reviewed " the patient's laboratory results.    Radiology results:    CT Chest Without Contrast Diagnostic    Result Date: 11/7/2023  FINAL REPORT TECHNIQUE: Axial images were obtained from the lung apex to the mid abdomen by computed tomography. Coronal reformatted images were obtained.  This study was performed with techniques to keep radiation doses as low as reasonably achievable, (ALARA). Individualized dose reduction techniques using automated exposure control or adjustment of mA and/or kV according to the patient''s size were employed. CLINICAL HISTORY: acute resp failure, pneumonia, vs bronchitis FINDINGS: There is mild mediastinal and left hilar adenopathy as a a nonspecific finding.  This may be reactive or neoplastic.  There are bilateral patchy ground glass opacities that may represent pulmonary edema or pneumonia.  Heart size is normal. There is no pericardial or pleural effusion.   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     Impression: 1.  Mild mediastinal and left hilar adenopathy which is nonspecific.  Recommend follow-up postcontrast CT in 3-6 months or PET/CT.  2.  Patchy ground glass opacities, edema versus pneumonia. Authenticated and Electronically Signed by Usman Segovia III, MD on 11/07/2023 07:24:08 PM    XR Chest 1 View    Result Date: 11/7/2023  PROCEDURE: XR CHEST 1 VW-  HISTORY: SOA Triage Protocol, fever, cough for 2 weeks  COMPARISON: October 25, 2023..  FINDINGS: The heart is normal in size. Right lung is clear. There are linear densities in the left lung base which are stable consistent with atelectasis or or scar.. The mediastinum is unremarkable. There is no pneumothorax.  There are no acute osseous abnormalities.      Impression: Stable chest..      This report was signed and finalized on 11/7/2023 1:34 PM by Miesha Schmidt MD.     I have reviewed the patient's radiology reports.    Medication Review:     I have reviewed the patient's active and  "prn medications.     Problem List:      Acute respiratory insufficiency      Assessment:    Community-acquired pneumonia, due to unknown organism, present admission  Acute respiratory insufficiency  Hypertension  Type 2 diabetes  Hypothyroidism       Plan:    Pneumonia:  My suspicion is that patient initially had a viral infection that is now more progressive than likely bacterial infection.  We will send for strep pneumo, Legionella antigens and sputum if possible.  We will treat with Rocephin.  See no indication for steroids.  Wean oxygen as tolerated.  Antitussives as needed.  Incentive spirometer.  Of note, patient may be \"long-hauler\" from COVID.  Consider outpatient pulmonology follow-up.  -Titrated down and saturating well with no supplemental oxygen and while on room air.  -Plan on switching her antibiotics to p.o. in a.m.   -Cultures still pending.     Hypertension/diabetes/hypothyroidism:  We will restart home medications.  Complicates all aspects of care.  Sliding scale insulin coverage.  Further recommendations depend upon clinical course.  Plan of care discussed with the patient and her daughter at bedside.     I have reviewed the copied text and it is accurate as of 11/8/2023     DVT Prophylaxis: Lovenox  Code Status: Full  Diet: As tolerated diabetic  Discharge Plan: Likely home in a..    Anthony Cormier MD  11/08/23  09:56 EST    Dictated utilizing Dragon dictation.    "

## 2023-11-08 NOTE — PAYOR COMM NOTE
"TO:HUMANA  FROM:KENA GAYTAN, RN PHONE [phone]  IN CLINICALS REF# [id number]    Pari Howell (73 y.o. Female)       Date of Birth   1950    Social Security Number       Address   [address]  [place] [address]    Home Phone   [phone]    MRN   [id number]       Protestant   Restorationist    Marital Status                               Admission Date   23    Admission Type   Emergency    Admitting Provider   Annemarie Barton DO    Attending Provider   Anthony Cormier MD    Department, Room/Bed   [facility] TELEMETRY 3, 320/1       Discharge Date       Discharge Disposition       Discharge Destination                                 Attending Provider: Anthony Cormier MD    Allergies: Shellfish-derived Products    Isolation: None   Infection: None   Code Status: CPR    Ht: 172.7 cm (67.99\")   Wt: 79.1 kg (174 lb 6.1 oz)    Admission Cmt: None   Principal Problem: Acute respiratory insufficiency [R06.89]                   Active Insurance as of 2023       Primary Coverage       Payor Plan Insurance Group Employer/Plan Group    HUMANA MEDICARE REPLACEMENT HUMANA MEDICARE REPLACEMENT [id number]       Payor Plan Address Payor Plan Phone Number Payor Plan Fax Number Effective Dates    [address] [address] [phone]  2015 - None Entered    [facility] [id number]         Subscriber Name Subscriber Birth Date Member ID       PARI HOWELL 1950 [id number]                     Emergency Contacts        (Rel.) Home Phone Work Phone Mobile Phone    sandeep howell (Spouse) [phone] -- [phone]                 History & Physical        Annemarie Barton DO at 23 1826            [facility] HOSPITALIST   HISTORY AND PHYSICAL      Name:  Pari Howell   Age:  73 y.o.  Sex:  female  :  1950  MRN:  [id number]   Visit Number:  [id number]  Admission Date:  2023  Date Of Service:  23  Primary " Care Physician:  Hayley Ty DO    Chief Complaint:     Cough, fever, shortness of breath    History Of Presenting Illness:      Patient is a pleasant 73 with a history of hypertension, hypothyroidism, diabetes, who presented to the emergency room with complaints of cough shortness of breath and fever.  She states that her and her  have both been sick since about 25 October, he actually just got out of the hospital after being treated for pneumonia and some heart issues.  She states that she had also been sick about the same time, but has been taking care of him at home.  She noted today she developed a fever, increased cough and sputum production.  She notes that she has had off-and-on breathing issues since she had COVID in February of last year.  She denies any falls or trauma.  Appetite has been okay.  Denies any recent nausea or vomiting.  Most recent travel was to [place] in October.  Denies any known sick contacts other than her .    In the ER, she was overall hemodynamically stable but was requiring 2 L of oxygen.  She did have a white count of 14, otherwise her CMP was unremarkable.  Procalcitonin within normal limits.  Negative respiratory panel.  Chest x-ray was unremarkable.  CT scan of the chest demonstrating findings concerning for pneumonia/groundglass opacities with questionable edema.  The patient was admitted thereafter.    Review Of Systems:    All systems were reviewed and negative except as mentioned in history of presenting illness, assessment and plan.    Past Medical History: Patient  has a past medical history of Bladder infection, Dyspareunia, female, Glaucoma, H/O sexual problem, High cholesterol, History of abnormal cervical Pap smear, Hypertension, Hypertension, Hypothyroidism, Labial cyst, Muscle weakness, Type 2 diabetes mellitus, Urinary incontinence, Vaginal itching, and Yeast infection.    Past Surgical History: Patient  has a past surgical history that includes  Tubal ligation (11/1979); Cholecystectomy (1991); Carpal tunnel release (2007); Oophorectomy; Hysterectomy (2007); Eye surgery (2021); Cataract extraction, bilateral (Left); and Cataract extraction (Right, 09/27/2022).    Social History: Patient  reports that she has never smoked. She has never used smokeless tobacco. She reports that she does not drink alcohol and does not use drugs.    Family History:  Patient's family history has been reviewed and found to be noncontributory.     Allergies:      Shellfish-derived products    Home Medications:    Prior to Admission Medications       Prescriptions Last Dose Informant Patient Reported? Taking?    amLODIPine (NORVASC) 5 MG tablet   No No    TAKE 2 TABLETS EVERY DAY    Blood Glucose Monitoring Suppl (True Metrix Air Glucose Meter) w/Device kit   No No    1 each 2 (two) times a day.    glucose blood (True Metrix Blood Glucose Test) test strip   No No    1 strip daily    Lancets misc   No No    1 applicator Daily.    latanoprost (XALATAN) 0.005 % ophthalmic solution   No No    Apply 1 drop to eye Daily.    levothyroxine (SYNTHROID, LEVOTHROID) 75 MCG tablet   No No    Take 1 tablet by mouth Daily.    losartan-hydrochlorothiazide (HYZAAR) 100-12.5 MG per tablet   No No    TAKE 1 TABLET EVERY DAY    nystatin (MYCOSTATIN) 880117 UNIT/GM cream   No No    Apply  topically to the appropriate area as directed 2 (Two) Times a Day.    pantoprazole (PROTONIX) 20 MG EC tablet   No No    TAKE 1 TABLET EVERY DAY    pravastatin (PRAVACHOL) 40 MG tablet   No No    Take 1 tablet by mouth Every Evening.    Semaglutide,0.25 or 0.5MG/DOS, (Ozempic, 0.25 or 0.5 MG/DOSE,) 2 MG/3ML solution pen-injector   No No    Inject 0.5 mg under the skin into the appropriate area as directed 1 (One) Time Per Week.    timolol (TIMOPTIC) 0.5 % ophthalmic solution   Yes No    triamcinolone (KENALOG) 0.025 % cream   No No    Apply 1 application topically to the appropriate area as directed 2 (Two) Times a  "Day.          ED Medications:    Medications   sodium chloride 0.9 % flush 10 mL (has no administration in time range)   ipratropium-albuterol (DUO-NEB) nebulizer solution 3 mL (3 mL Nebulization Given 11/7/23 1357)   methylPREDNISolone sodium succinate (SOLU-Medrol) injection 125 mg (125 mg Intravenous Given 11/7/23 1352)     Vital Signs:  Temp:  [98.9 °F (37.2 °C)-99.7 °F (37.6 °C)] 98.9 °F (37.2 °C)  Heart Rate:  [80-99] 82  Resp:  [18-20] 20  BP: (127-160)/() 143/75        11/07/23  1252   Weight: 81.6 kg (180 lb)     Body mass index is 27.37 kg/m².    Physical Exam:     Most recent vital Signs: /75   Pulse 82   Temp 98.9 °F (37.2 °C) (Oral)   Resp 20   Ht 172.7 cm (68\")   Wt 81.6 kg (180 lb)   LMP  (LMP Unknown)   SpO2 93%   BMI 27.37 kg/m²     Physical Exam  Constitutional:       Appearance: Normal appearance.   HENT:      Head: Normocephalic and atraumatic.      Mouth/Throat:      Mouth: Mucous membranes are dry.   Eyes:      Extraocular Movements: Extraocular movements intact.      Pupils: Pupils are equal, round, and reactive to light.   Cardiovascular:      Rate and Rhythm: Normal rate and regular rhythm.      Pulses: Normal pulses.      Heart sounds: No murmur heard.     No gallop.   Pulmonary:      Effort: No respiratory distress.      Breath sounds: Rhonchi present. No rales.   Abdominal:      General: Abdomen is flat. Bowel sounds are normal. There is no distension.      Tenderness: There is no abdominal tenderness. There is no guarding.   Musculoskeletal:         General: Normal range of motion.      Right lower leg: No edema.      Left lower leg: No edema.   Skin:     General: Skin is warm.      Capillary Refill: Capillary refill takes less than 2 seconds.      Findings: No bruising or lesion.   Neurological:      General: No focal deficit present.      Mental Status: She is alert. Mental status is at baseline.      Motor: Weakness present.   Psychiatric:         Mood and Affect: " "Mood normal.         Thought Content: Thought content normal.         Laboratory data:    I have reviewed the labs done in the emergency room.    Results from last 7 days   Lab Units 11/07/23  1305   SODIUM mmol/L 139   POTASSIUM mmol/L 3.3*   CHLORIDE mmol/L 103   CO2 mmol/L 22.3   BUN mg/dL 9   CREATININE mg/dL 0.70   CALCIUM mg/dL 9.6   BILIRUBIN mg/dL 0.7   ALK PHOS U/L 68   ALT (SGPT) U/L 14   AST (SGOT) U/L 15   GLUCOSE mg/dL 122*     Results from last 7 days   Lab Units 11/07/23  1305   WBC 10*3/mm3 14.38*   HEMOGLOBIN g/dL 13.0   HEMATOCRIT % 36.4   PLATELETS 10*3/mm3 317         Results from last 7 days   Lab Units 11/07/23  1305   HSTROP T ng/L 9     Results from last 7 days   Lab Units 11/07/23  1305   PROBNP pg/mL 383.9                       Invalid input(s): \"USDES\", \"NITRITITE\", \"BACT\", \"EP\"    Pain Management Panel  More data exists         Latest Ref Rng & Units 6/19/2023 5/4/2022   Pain Management Panel   Creatinine, Urine Not Estab. mg/dL 134.0  100.9        EKG:      Normal rhythm, rate, no acute ST or T wave changes per my interpretation    Radiology:    XR Chest 1 View    Result Date: 11/7/2023  PROCEDURE: XR CHEST 1 VW-  HISTORY: SOA Triage Protocol, fever, cough for 2 weeks  COMPARISON: October 25, 2023..  FINDINGS: The heart is normal in size. Right lung is clear. There are linear densities in the left lung base which are stable consistent with atelectasis or or scar.. The mediastinum is unremarkable. There is no pneumothorax.  There are no acute osseous abnormalities.      Stable chest..      This report was signed and finalized on 11/7/2023 1:34 PM by Miesha Schmidt MD.       Assessment:    Community-acquired pneumonia, due to unknown organism, present admission  Acute respiratory insufficiency  Hypertension  Type 2 diabetes  Hypothyroidism    Plan:    Admit to hospital    Pneumonia:  My suspicion is that patient initially had a viral infection that is now more progressive than likely " "bacterial infection.  We will send for strep pneumo, Legionella antigens and sputum if possible.  We will treat with Rocephin.  See no indication for steroids.  Wean oxygen as tolerated.  Antitussives as needed.  Incentive spirometer.  Of note, patient may be \"long-hauler\" from COVID.  Consider outpatient pulmonology follow-up.    Hypertension/diabetes/hypothyroidism:  We will restart home medications.  Complicates all aspects of care.  Sliding scale insulin coverage.  Further recommendations depend upon clinical course.  Plan of care discussed with the patient and her daughter at bedside.      Risk Assessment: High  DVT Prophylaxis: Lovenox  Code Status: Full  Diet: As tolerated diabetic    Advance Care Planning  ACP discussion was held with the patient during this visit. Patient does not have an advance directive, declines further assistance.           Annemarie Barton DO  11/07/23  18:26 EST    Dictated utilizing Dragon dictation.    Electronically signed by Annemarie Barton DO at 11/07/23 1940       Emergency Department Notes    No notes of this type exist for this encounter.       Vital Signs (last day)       Date/Time Temp Temp src Pulse Resp BP Patient Position SpO2    11/08/23 0448 98 (36.7) Oral 85 18 131/79 Lying 92    11/08/23 0102 -- -- 78 18 -- -- --    11/08/23 0054 -- -- 80 18 -- -- 93    11/07/23 2309 98.4 (36.9) Oral 77 18 123/70 Lying 92    11/07/23 1827 98.3 (36.8) Oral -- 18 153/65 Lying 94    11/07/23 17:32:09 98.9 (37.2) Oral -- 20 -- -- --    11/07/23 1702 -- -- 82 -- 143/75 -- 93    11/07/23 1603 -- -- 80 -- -- -- 94    11/07/23 1600 -- -- -- -- 144/89 -- --    11/07/23 1459 -- -- 86 -- -- -- 91    11/07/23 1429 -- -- 94 -- 127/103 -- 92    11/07/23 1357 -- -- 87 -- -- -- 94    11/07/23 1328 -- -- 87 -- 146/72 -- 90    11/07/23 1252 99.7 (37.6) Oral 99 18 160/76 Sitting 88          Current Facility-Administered Medications   Medication Dose Route Frequency Provider Last Rate Last " Admin    acetaminophen (TYLENOL) tablet 650 mg  650 mg Oral Q4H PRN Annemarie Barton DO        Or    acetaminophen (TYLENOL) 160 MG/5ML oral solution 650 mg  650 mg Oral Q4H PRN Annemarie Barton DO        Or    acetaminophen (TYLENOL) suppository 650 mg  650 mg Rectal Q4H PRN Annemarie Barton DO        aluminum-magnesium hydroxide-simethicone (MAALOX MAX) 400-400-40 MG/5ML suspension 15 mL  15 mL Oral Q6H PRN Annemarie Barton DO        amLODIPine (NORVASC) tablet 10 mg  10 mg Oral Daily Annemarie Barton DO        benzonatate (TESSALON) capsule 100 mg  100 mg Oral TID PRN Annemarie Barton DO   100 mg at 11/08/23 0031    sennosides-docusate (PERICOLACE) 8.6-50 MG per tablet 2 tablet  2 tablet Oral BID Annemarie Barton DO   2 tablet at 11/07/23 2004    And    polyethylene glycol (MIRALAX) packet 17 g  17 g Oral Daily PRN Annemarie Barton DO        And    bisacodyl (DULCOLAX) EC tablet 5 mg  5 mg Oral Daily PRN Annemarie Barton DO        And    bisacodyl (DULCOLAX) suppository 10 mg  10 mg Rectal Daily PRN Annemarie Barton DO        cefTRIAXone (ROCEPHIN) IVPB 2000 mg/50ml dextrose (premix)  2,000 mg Intravenous Q24H Annemarie Barton  mL/hr at 11/07/23 2004 2,000 mg at 11/07/23 2004    dextrose (D50W) (25 g/50 mL) IV injection 25 g  25 g Intravenous Q15 Min PRN Annemarie Barton DO        dextrose (GLUTOSE) oral gel 15 g  15 g Oral Q15 Min PRN Annemarie Barton DO        Enoxaparin Sodium (LOVENOX) syringe 40 mg  40 mg Subcutaneous Nightly Annemarie Barton DO   40 mg at 11/07/23 2004    glucagon (GLUCAGEN) injection 1 mg  1 mg Intramuscular Q15 Min PRN Annemarie Barton DO        guaifenesin-dextromethorphan (MUCINEX DM) tablet 1 tablet  1 tablet Oral BID PRN Annemarie Barton DO   1 tablet at 11/08/23 0031    losartan (COZAAR) tablet 100 mg  100 mg Oral Q24H Annemarie Barton DO        And     hydroCHLOROthiazide (HYDRODIURIL) oral 12.5 mg  12.5 mg Oral Q24H Annemarie Barton,         Insulin Aspart (novoLOG) injection 2-9 Units  2-9 Units Subcutaneous 4x Daily AC & at Bedtime Annemarie Barton, DO   7 Units at 11/07/23 2018    ipratropium-albuterol (DUO-NEB) nebulizer solution 3 mL  3 mL Nebulization Q4H PRN Annemarie Barton, DO   3 mL at 11/08/23 0054    latanoprost (XALATAN) 0.005 % ophthalmic solution 1 drop  1 drop Both Eyes Daily Annemarie Barton DO        levothyroxine (SYNTHROID, LEVOTHROID) tablet 75 mcg  75 mcg Oral Q AM Annemarie Barton, DO   75 mcg at 11/08/23 0645    melatonin tablet 5 mg  5 mg Oral Nightly PRN Annemarie Barton, DO        nitroglycerin (NITROSTAT) SL tablet 0.4 mg  0.4 mg Sublingual Q5 Min PRN Annemarie Barton, DO        ondansetron (ZOFRAN) tablet 4 mg  4 mg Oral Q6H PRN Annemarie Barton DO        Or    ondansetron (ZOFRAN) injection 4 mg  4 mg Intravenous Q6H PRN Annemarie Barton,         pantoprazole (PROTONIX) EC tablet 40 mg  40 mg Oral QAM AC Annemarie Barton, DO   40 mg at 11/08/23 0645    Pharmacy to Dose enoxaparin (LOVENOX)   Does not apply Continuous PRN Annemarie Barton DO        pravastatin (PRAVACHOL) tablet 40 mg  40 mg Oral Nightly Annemarie Barton,         sodium chloride 0.9 % flush 10 mL  10 mL Intravenous PRN Annemarie Barton,         sodium chloride 0.9 % flush 10 mL  10 mL Intravenous Q12H Annemarie Barton, DO   10 mL at 11/07/23 2004    sodium chloride 0.9 % flush 10 mL  10 mL Intravenous PRN Annemarie Barton,         sodium chloride 0.9 % infusion 40 mL  40 mL Intravenous PRN Annemarie Barton,          Lab Results (last 24 hours)       Procedure Component Value Units Date/Time    POC Glucose Once [[id number]]  (Abnormal) Collected: 11/08/23 0736    Specimen: Blood Updated: 11/08/23 0742     Glucose 239 mg/dL      Comment: Serial Number:  [id number]:  [id number]       S. Pneumo Ag Urine or CSF - Urine, Urine, Clean Catch [[id number]]  (Normal) Collected: 11/07/23 2019    Specimen: Urine, Clean Catch Updated: 11/08/23 0157     Strep Pneumo Ag Negative    Legionella Antigen, Urine - Urine, Urine, Clean Catch [[id number]]  (Normal) Collected: 11/07/23 2019    Specimen: Urine, Clean Catch Updated: 11/08/23 0157     LEGIONELLA ANTIGEN, URINE Negative    POC Glucose Once [[id number]]  (Abnormal) Collected: 11/07/23 1954    Specimen: Blood Updated: 11/07/23 2046     Glucose 313 mg/dL      Comment: Serial Number: [id number]:  [id number]       Blood Culture - Blood, Hand, Right [[id number]] Collected: 11/07/23 1949    Specimen: Blood from Hand, Right Updated: 11/07/23 2040    Blood Culture - Blood, Hand, Left [[id number]] Collected: 11/07/23 1958    Specimen: Blood from Hand, Left Updated: 11/07/23 2039    Magnesium [[id number]]  (Abnormal) Collected: 11/07/23 1305    Specimen: Blood Updated: 11/07/23 1919     Magnesium 1.5 mg/dL     STAT Lactic Acid, Reflex [[id number]]  (Normal) Collected: 11/07/23 1612    Specimen: Blood Updated: 11/07/23 1639     Lactate 1.1 mmol/L     Respiratory Panel PCR w/COVID-19(SARS-CoV-2) [facility] In-House, NP Swab in UTM/VTM, 3-4 HR TAT - Swab, Nasopharynx [[id number]]  (Normal) Collected: 11/07/23 1353    Specimen: Swab from Nasopharynx Updated: 11/07/23 1446     ADENOVIRUS, PCR Not Detected     Coronavirus 229E Not Detected     Coronavirus HKU1 Not Detected     Coronavirus NL63 Not Detected     Coronavirus OC43 Not Detected     COVID19 Not Detected     Human Metapneumovirus Not Detected     Human Rhinovirus/Enterovirus Not Detected     Influenza A PCR Not Detected     Influenza B PCR Not Detected     Parainfluenza Virus 1 Not Detected     Parainfluenza Virus 2 Not Detected     Parainfluenza Virus 3 Not Detected     Parainfluenza Virus 4 Not Detected     RSV, PCR Not Detected     Bordetella pertussis pcr Not  Detected     Bordetella parapertussis PCR Not Detected     Chlamydophila pneumoniae PCR Not Detected     Mycoplasma pneumo by PCR Not Detected    Narrative:      In the setting of a positive respiratory panel with a viral infection PLUS a negative procalcitonin without other underlying concern for bacterial infection, consider observing off antibiotics or discontinuation of antibiotics and continue supportive care. If the respiratory panel is positive for atypical bacterial infection (Bordetella pertussis, Chlamydophila pneumoniae, or Mycoplasma pneumoniae), consider antibiotic de-escalation to target atypical bacterial infection.    [place] [facility] [[id number]] Collected: 11/07/23 1305    Specimen: Blood Updated: 11/07/23 1415    Narrative:      The following orders were created for panel order [place] [facility].  Procedure                               Abnormality         Status                     ---------                               -----------         ------                     Green Top (Gel)[[id number]]                                  Final result               Lavender Top[[id number]]                                     Final result               Gold Top - SST[[id number]]                                   Final result               Light Blue Top[[id number]]                                   Final result                 Please view results for these tests on the individual orders.    Green Top (Gel) [[id number]] Collected: 11/07/23 1305    Specimen: Blood Updated: 11/07/23 1415     Extra Tube Hold for add-ons.     Comment: Auto resulted.       Lavender Top [[id number]] Collected: 11/07/23 1305    Specimen: Blood Updated: 11/07/23 1415     Extra Tube hold for add-on     Comment: Auto resulted       Gold Top - SST [[id number]] Collected: 11/07/23 1305    Specimen: Blood Updated: 11/07/23 1415     Extra Tube Hold for add-ons.     Comment: Auto resulted.       Light Blue Top [[id number]] Collected: 11/07/23 1305     "Specimen: Blood Updated: 11/07/23 1415     Extra Tube Hold for add-ons.     Comment: Auto resulted       Procalcitonin [[id number]]  (Normal) Collected: 11/07/23 1305    Specimen: Blood Updated: 11/07/23 1355     Procalcitonin 0.07 ng/mL     Narrative:      As a Marker for Sepsis (Non-Neonates):    1. <0.5 ng/mL represents a low risk of severe sepsis and/or septic shock.  2. >2 ng/mL represents a high risk of severe sepsis and/or septic shock.    As a Marker for Lower Respiratory Tract Infections that require antibiotic therapy:    PCT on Admission    Antibiotic Therapy       6-12 Hrs later    >0.5                Strongly Recommended  >0.25 - <0.5        Recommended   0.1 - 0.25          Discouraged              Remeasure/reassess PCT  <0.1                Strongly Discouraged     Remeasure/reassess PCT    As 28 day mortality risk marker: \"Change in Procalcitonin Result\" (>80% or <=80%) if Day 0 (or Day 1) and Day 4 values are available. Refer to http://www.[facility] [place]p[place]    Change in PCT <=80%  A decrease of PCT levels below or equal to 80% defines a positive change in PCT test result representing a higher risk for 28-day all-cause mortality of patients diagnosed with severe sepsis for septic shock.    Change in PCT >80%  A decrease of PCT levels of more than 80% defines a negative change in PCT result representing a lower risk for 28-day all-cause mortality of patients diagnosed with severe sepsis or septic shock.       D-dimer, Quantitative [[id number]]  (Normal) Collected: 11/07/23 1305    Specimen: Blood Updated: 11/07/23 1353     D-Dimer, Quantitative 0.39 MCGFEU/mL     Narrative:      According to the assay 's published package insert, a normal (<0.50 MCGFEU/mL) D-dimer result in conjunction with a non-high clinical probability assessment, excludes deep vein thrombosis (DVT) and pulmonary embolism (PE) with high sensitivity.    D-dimer values increase with age and this can make VTE " "exclusion of an older population difficult. To address this, the American College of [employer], based on best available evidence and recent guidelines, recommends that clinicians use age-adjusted D-dimer thresholds in patients greater than 50 years of age with: a) a low probability of PE who do not meet all Pulmonary Embolism Rule Out Criteria, or b) in those with intermediate probability of PE.   The formula for an age-adjusted D-dimer cut-off is \"age/100\".  For example, a 60 year old patient would have an age-adjusted cut-off of 0.60 MCGFEU/mL and an 80 year old 0.80 MCGFEU/mL.    Lactic Acid, Plasma [[id number]]  (Abnormal) Collected: 11/07/23 1305    Specimen: Blood Updated: 11/07/23 1352     Lactate 2.1 mmol/L     Comprehensive Metabolic Panel [[id number]]  (Abnormal) Collected: 11/07/23 1305    Specimen: Blood Updated: 11/07/23 1334     Glucose 122 mg/dL      BUN 9 mg/dL      Creatinine 0.70 mg/dL      Sodium 139 mmol/L      Potassium 3.3 mmol/L      Chloride 103 mmol/L      CO2 22.3 mmol/L      Calcium 9.6 mg/dL      Total Protein 7.2 g/dL      Albumin 3.9 g/dL      ALT (SGPT) 14 U/L      AST (SGOT) 15 U/L      Alkaline Phosphatase 68 U/L      Total Bilirubin 0.7 mg/dL      Globulin 3.3 gm/dL      A/G Ratio 1.2 g/dL      BUN/Creatinine Ratio 12.9     Anion Gap 13.7 mmol/L      eGFR 91.5 mL/min/1.73     Narrative:      GFR Normal >60  Chronic Kidney Disease <60  Kidney Failure <15    The GFR formula is only valid for adults with stable renal function between ages 18 and 70.    Single High Sensitivity Troponin T [[id number]]  (Normal) Collected: 11/07/23 1305    Specimen: Blood Updated: 11/07/23 1333     HS Troponin T 9 ng/L     Narrative:      High Sensitive Troponin T Reference Range:  <14.0 ng/L- Negative Female for AMI  <22.0 ng/L- Negative Male for AMI  >=14 - Abnormal Female indicating possible myocardial injury.  >=22 - Abnormal Male indicating possible myocardial injury.   Clinicians would have to utilize " clinical acumen, EKG, Troponin, and serial changes to determine if it is an Acute Myocardial Infarction or myocardial injury due to an underlying chronic condition.         BNP [[id number]]  (Normal) Collected: 11/07/23 1305    Specimen: Blood Updated: 11/07/23 1333     proBNP 383.9 pg/mL     Narrative:      This assay is used as an aid in the diagnosis of individuals suspected of having heart failure. It can be used as an aid in the diagnosis of acute decompensated heart failure (ADHF) in patients presenting with signs and symptoms of ADHF to the emergency department (ED). In addition, NT-proBNP of <300 pg/mL indicates ADHF is not likely.    Age Range Result Interpretation  NT-proBNP Concentration (pg/mL:      <50             Positive            >450                   Gray                 300-450                    Negative             <300    50-75           Positive            >900                  Gray                300-900                  Negative            <300      >75             Positive            >1800                  Gray                300-1800                  Negative            <300    COVID-19 and FLU A/B PCR - Swab, Nasopharynx [[id number]]  (Normal) Collected: 11/07/23 1301    Specimen: Swab from Nasopharynx Updated: 11/07/23 1325     COVID19 Not Detected     Influenza A PCR Not Detected     Influenza B PCR Not Detected    Narrative:      Fact sheet for providers: https://www.fda.gov/media/334621/download    Fact sheet for patients: https://www.fda.gov/media/040561/download    Test performed by PCR.    CBC & Differential [[id number]]  (Abnormal) Collected: 11/07/23 1305    Specimen: Blood Updated: 11/07/23 1313    Narrative:      The following orders were created for panel order CBC & Differential.  Procedure                               Abnormality         Status                     ---------                               -----------         ------                     CBC Auto  Differential[[id number]]        Abnormal            Final result                 Please view results for these tests on the individual orders.    CBC Auto Differential [[id number]]  (Abnormal) Collected: 11/07/23 1305    Specimen: Blood Updated: 11/07/23 1313     WBC 14.38 10*3/mm3      RBC 4.20 10*6/mm3      Hemoglobin 13.0 g/dL      Hematocrit 36.4 %      MCV 86.7 fL      MCH 31.0 pg      MCHC 35.7 g/dL      RDW 12.7 %      RDW-SD 39.5 fl      MPV 8.8 fL      Platelets 317 10*3/mm3      Neutrophil % 84.0 %      Lymphocyte % 9.0 %      Monocyte % 3.3 %      Eosinophil % 2.9 %      Basophil % 0.5 %      Immature Grans % 0.3 %      Neutrophils, Absolute 12.08 10*3/mm3      Lymphocytes, Absolute 1.29 10*3/mm3      Monocytes, Absolute 0.48 10*3/mm3      Eosinophils, Absolute 0.41 10*3/mm3      Basophils, Absolute 0.07 10*3/mm3      Immature Grans, Absolute 0.05 10*3/mm3      nRBC 0.0 /100 WBC           Imaging Results (Last 24 Hours)       Procedure Component Value Units Date/Time    CT Chest Without Contrast Diagnostic [[id number]] Collected: 11/07/23 1924     Updated: 11/07/23 1925    Narrative:      FINAL REPORT    TECHNIQUE:  Axial images were obtained from the lung apex to the mid abdomen  by computed tomography. Coronal reformatted images were  obtained.  This study was performed with techniques to keep  radiation doses as low as reasonably achievable, (ALARA).  Individualized dose reduction techniques using automated  exposure control or adjustment of mA and/or kV according to the  patient''s size were employed.    CLINICAL HISTORY:  acute resp failure, pneumonia, vs bronchitis    FINDINGS:  There is mild mediastinal and left hilar adenopathy as a a  nonspecific finding.  This may be reactive or neoplastic.  There  are bilateral patchy ground glass opacities that may represent  pulmonary edema or pneumonia.  Heart size is normal. There is no  pericardial or pleural effusion.   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      Impression:      1.  Mild mediastinal and left hilar adenopathy which is  nonspecific.  Recommend follow-up postcontrast CT in 3-6 months  or PET/CT.  2.  Patchy ground glass opacities, edema versus  pneumonia.    Authenticated and Electronically Signed by Usman Segovia III, MD on 11/07/2023 07:24:08 PM    XR Chest 1 View [[id number]] Collected: 11/07/23 1334     Updated: 11/07/23 1336    Narrative:      PROCEDURE: XR CHEST 1 VW-     HISTORY: [facility] Triage Protocol, fever, cough for 2 weeks     COMPARISON: October 25, 2023..     FINDINGS: The heart is normal in size. Right lung is clear. There are  linear densities in the left lung base which are stable consistent with  atelectasis or or scar.. The mediastinum is unremarkable. There is no  pneumothorax.  There are no acute osseous abnormalities.       Impression:      Stable chest..                 This report was signed and finalized on 11/7/2023 1:34 PM by Miesha Schmidt MD.             Physician Progress Notes (last 24 hours)  Notes from 11/07/23 0758 through 11/08/23 0758   No notes of this type exist for this encounter.       Consult Notes (last 24 hours)  Notes from 11/07/23 0758 through 11/08/23 0758   No notes of this type exist for this encounter.

## 2023-11-08 NOTE — PLAN OF CARE
Goal Outcome Evaluation:  Plan of Care Reviewed With: patient, spouse        Progress: no change  Outcome Evaluation: Patient participated well in PT evauation and demosntrated safe, independent fucntional mobility.  She was able to walk 330 feet without assistance.  She maintained oxygen saturation levels of 97% while walking with 2 LPM of oxygen adn was at 94% on room air after walking.  She does not require the skills of PT, but is encouraged to walk several times per day and to stay out of bed as much as possible.  PT will sign off at this time.      Anticipated Discharge Disposition (PT): home

## 2023-11-08 NOTE — PLAN OF CARE
Problem: Adult Inpatient Plan of Care  Goal: Absence of Hospital-Acquired Illness or Injury  Intervention: Identify and Manage Fall Risk  Recent Flowsheet Documentation  Taken 11/8/2023 1600 by Pavan Henry RN  Safety Promotion/Fall Prevention:   clutter free environment maintained   assistive device/personal items within reach   activity supervised   fall prevention program maintained   gait belt   lighting adjusted   nonskid shoes/slippers when out of bed   room organization consistent   safety round/check completed  Taken 11/8/2023 1400 by Pavan Henry RN  Safety Promotion/Fall Prevention:   clutter free environment maintained   assistive device/personal items within reach   activity supervised   fall prevention program maintained   gait belt   lighting adjusted   nonskid shoes/slippers when out of bed   room organization consistent   safety round/check completed  Taken 11/8/2023 1200 by Pavan Henry RN  Safety Promotion/Fall Prevention:   assistive device/personal items within reach   clutter free environment maintained   activity supervised   fall prevention program maintained   gait belt   lighting adjusted   nonskid shoes/slippers when out of bed   room organization consistent   safety round/check completed  Taken 11/8/2023 1000 by Pavan Henry RN  Safety Promotion/Fall Prevention:   clutter free environment maintained   assistive device/personal items within reach   activity supervised   fall prevention program maintained   gait belt   lighting adjusted   nonskid shoes/slippers when out of bed   safety round/check completed   room organization consistent  Taken 11/8/2023 0800 by Pavan Henry RN  Safety Promotion/Fall Prevention:   assistive device/personal items within reach   activity supervised   clutter free environment maintained   fall prevention program maintained   lighting adjusted   gait belt   nonskid shoes/slippers when out of bed   room organization consistent   safety round/check  completed  Intervention: Prevent Skin Injury  Recent Flowsheet Documentation  Taken 11/8/2023 1600 by Pavan Henry RN  Body Position:   turned   left   sitting up in bed  Taken 11/8/2023 1400 by Pavan Henry RN  Body Position:   turned   right   sitting up in bed  Taken 11/8/2023 1200 by Pavan Henry RN  Body Position:   turned   left   sitting up in bed  Taken 11/8/2023 1000 by Pavan Henry RN  Body Position:   turned   right   sitting up in bed  Taken 11/8/2023 0800 by Pavan Henry RN  Body Position:   turned   left   sitting up in bed  Skin Protection:   adhesive use limited   tubing/devices free from skin contact   pulse oximeter probe site changed  Intervention: Prevent and Manage VTE (Venous Thromboembolism) Risk  Recent Flowsheet Documentation  Taken 11/8/2023 1600 by Pavan Henry RN  Activity Management: activity encouraged  Taken 11/8/2023 1400 by Pavan Henry RN  Activity Management: activity encouraged  Taken 11/8/2023 1200 by Pavan Henry RN  Activity Management: activity encouraged  Taken 11/8/2023 1000 by Pavan Henry RN  Activity Management: activity encouraged  Taken 11/8/2023 0800 by Pavan Henry RN  Activity Management: activity encouraged  Range of Motion: active ROM (range of motion) encouraged  Intervention: Prevent Infection  Recent Flowsheet Documentation  Taken 11/8/2023 1600 by Pavan Henry RN  Infection Prevention:   environmental surveillance performed   single patient room provided  Taken 11/8/2023 1400 by Pavan Henry RN  Infection Prevention:   environmental surveillance performed   single patient room provided  Taken 11/8/2023 1200 by Pavan Henry RN  Infection Prevention:   environmental surveillance performed   single patient room provided  Taken 11/8/2023 1000 by Pavan Henry RN  Infection Prevention:   environmental surveillance performed   single patient room provided  Taken 11/8/2023 0800 by Pavan Henry RN  Infection Prevention:   environmental surveillance  performed   single patient room provided  Goal: Optimal Comfort and Wellbeing  Intervention: Provide Person-Centered Care  Recent Flowsheet Documentation  Taken 11/8/2023 0800 by Pavan Henry RN  Trust Relationship/Rapport:   care explained   thoughts/feelings acknowledged   reassurance provided   questions encouraged   questions answered     Problem: Diabetes Comorbidity  Goal: Blood Glucose Level Within Targeted Range  Intervention: Monitor and Manage Glycemia  Recent Flowsheet Documentation  Taken 11/8/2023 0800 by Pavan Henry RN  Glycemic Management: blood glucose monitored     Problem: Hypertension Comorbidity  Goal: Blood Pressure in Desired Range  Intervention: Maintain Blood Pressure Management  Recent Flowsheet Documentation  Taken 11/8/2023 1600 by Pavan Henry RN  Medication Review/Management: medications reviewed  Taken 11/8/2023 1400 by Pavan Henry RN  Medication Review/Management: medications reviewed  Taken 11/8/2023 1200 by Pavan Henry RN  Medication Review/Management: medications reviewed  Taken 11/8/2023 1000 by Pavan Henry RN  Medication Review/Management: medications reviewed  Taken 11/8/2023 0800 by Pavan Henry RN  Medication Review/Management: medications reviewed     Problem: Fall Injury Risk  Goal: Absence of Fall and Fall-Related Injury  Intervention: Identify and Manage Contributors  Recent Flowsheet Documentation  Taken 11/8/2023 1600 by Pavan Henry RN  Medication Review/Management: medications reviewed  Taken 11/8/2023 1400 by Pavan Henry RN  Medication Review/Management: medications reviewed  Taken 11/8/2023 1200 by Pavan Henry RN  Medication Review/Management: medications reviewed  Taken 11/8/2023 1000 by Pavan Henry RN  Medication Review/Management: medications reviewed  Taken 11/8/2023 0800 by Pavan Henry RN  Medication Review/Management: medications reviewed  Intervention: Promote Injury-Free Environment  Recent Flowsheet Documentation  Taken 11/8/2023 1600 by  Carl, Laili, RN  Safety Promotion/Fall Prevention:   clutter free environment maintained   assistive device/personal items within reach   activity supervised   fall prevention program maintained   gait belt   lighting adjusted   nonskid shoes/slippers when out of bed   room organization consistent   safety round/check completed  Taken 11/8/2023 1400 by Pavan Henry RN  Safety Promotion/Fall Prevention:   clutter free environment maintained   assistive device/personal items within reach   activity supervised   fall prevention program maintained   gait belt   lighting adjusted   nonskid shoes/slippers when out of bed   room organization consistent   safety round/check completed  Taken 11/8/2023 1200 by Pavan Henry RN  Safety Promotion/Fall Prevention:   assistive device/personal items within reach   clutter free environment maintained   activity supervised   fall prevention program maintained   gait belt   lighting adjusted   nonskid shoes/slippers when out of bed   room organization consistent   safety round/check completed  Taken 11/8/2023 1000 by Pavan Henry RN  Safety Promotion/Fall Prevention:   clutter free environment maintained   assistive device/personal items within reach   activity supervised   fall prevention program maintained   gait belt   lighting adjusted   nonskid shoes/slippers when out of bed   safety round/check completed   room organization consistent  Taken 11/8/2023 0800 by Pavan Henry RN  Safety Promotion/Fall Prevention:   assistive device/personal items within reach   activity supervised   clutter free environment maintained   fall prevention program maintained   lighting adjusted   gait belt   nonskid shoes/slippers when out of bed   room organization consistent   safety round/check completed   Goal Outcome Evaluation:  Plan of Care Reviewed With: patient        Progress: improving  Outcome Evaluation: Patient participated in therapy today. FSBG monitored throughout shift; see MAR.  Electrolyte protocol initiated; see MAR. Patient oxygn titrated as needed. Will continue to monitor.

## 2023-11-08 NOTE — PLAN OF CARE
Goal Outcome Evaluation:  Plan of Care Reviewed With: patient, spouse        Progress: no change  Outcome Evaluation: OT evaluation completed today.  Pt independent with all mobility and self care tasks.  Pt walked 330' initially on 3L O2, but decreased to 2L as her sats were 96-97% with activity.  Pt is independent with all self care tasks and has no need for skilled OT services.  OT will sign off at this time.      Anticipated Discharge Disposition (OT): home

## 2023-11-08 NOTE — THERAPY DISCHARGE NOTE
Patient Name: Pari Howell  : 1950    MRN: [id number]                              Today's Date: 2023       Admit Date: 2023    Visit Dx:     ICD-10-CM ICD-9-CM   1. Acute respiratory failure with hypoxia  J96.01 518.81   2. Viral URI with cough  J06.9 465.9     Patient Active Problem List   Diagnosis    Type 2 diabetes mellitus without complication, without long-term current use of insulin    Other specified glaucoma    Hyperlipidemia LDL goal <70    Vitamin D deficiency    Irritable bowel syndrome with diarrhea    Hypothyroidism (acquired)    Acute respiratory insufficiency     Past Medical History:   Diagnosis Date    Bladder infection     Dyspareunia, female     Glaucoma     H/O sexual problem     High cholesterol     History of abnormal cervical Pap smear     Hypertension     Hypertension     Hypothyroidism     Impaired functional mobility, balance, gait, and endurance     Labial cyst     Muscle weakness     Type 2 diabetes mellitus     Urinary incontinence     Vaginal itching     Yeast infection      Past Surgical History:   Procedure Laterality Date    CARPAL TUNNEL RELEASE      CATARACT EXTRACTION Right 2022    CATARACT EXTRACTION, BILATERAL Left     CHOLECYSTECTOMY      EYE SURGERY      HYSTERECTOMY      OOPHORECTOMY      TUBAL ABDOMINAL LIGATION  1979      General Information       Row Name 2344          Physical Therapy Time and Intention    Document Type discharge evaluation/summary  -JR     Mode of Treatment physical therapy  -JR       Row Name 23          General Information    Patient Profile Reviewed yes  -JR     Prior Level of Function independent:;community mobility  -JR     Existing Precautions/Restrictions oxygen therapy device and L/min  -JR     Barriers to Rehab none identified  -JR       Row Name 2344          Living Environment    People in Home spouse  -JR       Row Name 2344          Home Main Entrance     Number of Stairs, Main Entrance six  -JR     Stair Railings, Main Entrance railings on both sides of stairs  -       Row Name 11/08/23 0944          Stairs Within Home, Primary    Number of Stairs, Within Home, Primary none  -JR       Row Name 11/08/23 0944          Cognition    Orientation Status (Cognition) oriented x 4  -       Row Name 11/08/23 0944          Safety Issues, Functional Mobility    Impairments Affecting Function (Mobility) endurance/activity tolerance  -               User Key  (r) = Recorded By, (t) = Taken By, (c) = Cosigned By      Initials Name Provider Type     Juliette Alexander, PT Physical Therapist                   Mobility       Row Name 11/08/23 0944          Bed Mobility    Bed Mobility bed mobility (all) activities  -     All Activities, [place] (Bed Mobility) independent  -       Row Name 11/08/23 0944          Sit-Stand Transfer    Sit-Stand [place] (Transfers) independent  -       Row Name 11/08/23 0944          Gait/Stairs (Locomotion)    [place] Level (Gait) independent  -     Distance in Feet (Gait) 330  -JR     Comment, (Gait/Stairs) Good pattern without path deviations  -               User Key  (r) = Recorded By, (t) = Taken By, (c) = Cosigned By      Initials Name Provider Type     Juliette Alexander, PT Physical Therapist                   Obj/Interventions       Row Name 11/08/23 0944          Range of Motion Comprehensive    General Range of Motion bilateral lower extremity ROM WFL  -       Row Name 11/08/23 0944          Strength Comprehensive (MMT)    General Manual Muscle Testing (MMT) Assessment no strength deficits identified  -       Row Name 11/08/23 0944          Balance    Balance Assessment sitting static balance;sitting dynamic balance;sit to stand dynamic balance;standing static balance;standing dynamic balance  -     Static Sitting Balance independent  -JR     Dynamic Sitting Balance independent  -JR     Position, Sitting Balance  unsupported;sitting edge of bed  -JR     Sit to Stand Dynamic Balance independent  -JR     Static Standing Balance independent  -JR     Dynamic Standing Balance independent  -JR               User Key  (r) = Recorded By, (t) = Taken By, (c) = Cosigned By      Initials Name Provider Type    Juliette Stephens, PT Physical Therapist                   Goals/Plan    No documentation.                  Clinical Impression       Row Name 11/08/23 0944          Pain    Pretreatment Pain Rating 0/10 - no pain  -JR     Posttreatment Pain Rating 0/10 - no pain  -JR       Row Name 11/08/23 0944          Plan of Care Review    Plan of Care Reviewed With patient;spouse  -JR     Progress no change  -JR     Outcome Evaluation Patient participated well in PT evauation and demosntrated safe, independent fucntional mobility.  She was able to walk 330 feet without assistance.  She maintained oxygen saturation levels of 97% while walking with 2 LPM of oxygen adn was at 94% on room air after walking.  She does not require the skills of PT, but is encouraged to walk several times per day and to stay out of bed as much as possible.  PT will sign off at this time.  -JR       Row Name 11/08/23 0944          Therapy Assessment/Plan (PT)    Patient/Family Therapy Goals Statement (PT) Patient is eager to go home  -JR     Criteria for Skilled Interventions Met (PT) no;no problems identified which require skilled intervention  -JR     Therapy Frequency (PT) evaluation only  -JR       Row Name 11/08/23 0944          Positioning and Restraints    Pre-Treatment Position in bed  -JR     Post Treatment Position chair  -JR     In Chair sitting;call light within reach;encouraged to call for assist;with family/caregiver;with other staff  -JR               User Key  (r) = Recorded By, (t) = Taken By, (c) = Cosigned By      Initials Name Provider Type    Juliette Stephens, PT Physical Therapist                   Outcome Measures       Row Name 11/08/23 0944  11/08/23 0800       How much help from another person do you currently need...    Turning from your back to your side while in flat bed without using bedrails? 4  -JR 4  -LA    Moving from lying on back to sitting on the side of a flat bed without bedrails? 4  -JR 4  -LA    Moving to and from a bed to a chair (including a wheelchair)? 4  -JR 4  -LA    Standing up from a chair using your arms (e.g., wheelchair, bedside chair)? 4  -JR 4  -LA    Climbing 3-5 steps with a railing? 4  -JR 3  -LA    To walk in hospital room? 4  -JR 4  -LA    AM-PAC 6 Clicks Score (PT) 24  - 23  -LA    Highest level of mobility 8 --> Walked 250 feet or more  - 7 --> Walked 25 feet or more  -LA      Row Name 11/08/23 1110 11/08/23 0944       Functional Assessment    Outcome Measure Options AM-PAC 6 Clicks Daily Activity (OT)  - AM-PAC 6 Clicks Basic Mobility (PT)  -              User Key  (r) = Recorded By, (t) = Taken By, (c) = Cosigned By      Initials Name Provider Type     Aleyda Gastelum Occupational Therapist     Juliette Alexander, PT Physical Therapist    Pavan Aguilar, RN Registered Nurse                  Physical Therapy Education       Title: PT OT SLP Therapies (Done)       Topic: Physical Therapy (Done)       Point: Mobility training (Done)       Learning Progress Summary             Patient Acceptance, E,TB, VU by  at 11/8/2023 1634    Comment: Importance of mobility to recovery                                         User Key       Initials Effective Dates Name Provider Type [facility] 08/22/23 -  Juliette Alexander, PT Physical Therapist PT                  PT Recommendation and Plan     Plan of Care Reviewed With: patient, spouse  Progress: no change  Outcome Evaluation: Patient participated well in PT evauation and demosntrated safe, independent fucntional mobility.  She was able to walk 330 feet without assistance.  She maintained oxygen saturation levels of 97% while walking with 2 LPM of oxygen adn was at 94%  on room air after walking.  She does not require the skills of PT, but is encouraged to walk several times per day and to stay out of bed as much as possible.  PT will sign off at this time.     Time Calculation:   PT Evaluation Complexity  History, PT Evaluation Complexity: 1-2 personal factors and/or comorbidities  Examination of Body Systems (PT Eval Complexity): 1-2 elements  Clinical Presentation (PT Evaluation Complexity): evolving  Clinical Decision Making (PT Evaluation Complexity): low complexity  Overall Complexity (PT Evaluation Complexity): low complexity     PT Charges       Row Name 11/08/23 0944             Time Calculation    Start Time 0944  -JR      PT Received On 11/08/23  -JR         Untimed Charges    PT Eval/Re-eval Minutes 35  -JR         Total Minutes    Untimed Charges Total Minutes 35  -JR       Total Minutes 35  -JR                User Key  (r) = Recorded By, (t) = Taken By, (c) = Cosigned By      Initials Name Provider Type    Juliette Stephens, PT Physical Therapist                  Therapy Charges for Today       Code Description Service Date Service Provider Modifiers Qty    [id number]  PT EVAL LOW COMPLEXITY 2 11/8/2023 Juliette Alexander, PT GP 1            PT G-Codes  Outcome Measure Options: AM-PAC 6 Clicks Daily Activity (OT)  AM-PAC 6 Clicks Score (PT): 24  AM-PAC 6 Clicks Score (OT): 24    PT Discharge Summary  Anticipated Discharge Disposition (PT): home    Juliette Alexander PT  11/8/2023

## 2023-11-09 ENCOUNTER — READMISSION MANAGEMENT (OUTPATIENT)
Dept: CALL CENTER | Facility: HOSPITAL | Age: 73
End: 2023-11-09
Payer: MEDICARE

## 2023-11-09 VITALS
BODY MASS INDEX: 27 KG/M2 | RESPIRATION RATE: 16 BRPM | HEIGHT: 68 IN | DIASTOLIC BLOOD PRESSURE: 77 MMHG | OXYGEN SATURATION: 96 % | TEMPERATURE: 98 F | SYSTOLIC BLOOD PRESSURE: 141 MMHG | HEART RATE: 61 BPM | WEIGHT: 178.13 LBS

## 2023-11-09 LAB — GLUCOSE BLDC GLUCOMTR-MCNC: 124 MG/DL (ref 70–130)

## 2023-11-09 PROCEDURE — 99238 HOSP IP/OBS DSCHRG MGMT 30/<: CPT | Performed by: FAMILY MEDICINE

## 2023-11-09 PROCEDURE — 82948 REAGENT STRIP/BLOOD GLUCOSE: CPT

## 2023-11-09 RX ORDER — DEXTROMETHORPHAN HYDROBROMIDE AND PROMETHAZINE HYDROCHLORIDE 15; 6.25 MG/5ML; MG/5ML
5 SYRUP ORAL 4 TIMES DAILY PRN
Qty: 180 ML | Refills: 0 | Status: SHIPPED | OUTPATIENT
Start: 2023-11-09 | End: 2023-11-19

## 2023-11-09 RX ORDER — CEFDINIR 300 MG/1
300 CAPSULE ORAL 2 TIMES DAILY
Qty: 10 CAPSULE | Refills: 0 | Status: SHIPPED | OUTPATIENT
Start: 2023-11-09 | End: 2023-11-14

## 2023-11-09 RX ORDER — BENZONATATE 100 MG/1
100 CAPSULE ORAL 3 TIMES DAILY PRN
Qty: 30 CAPSULE | Refills: 0 | Status: SHIPPED | OUTPATIENT
Start: 2023-11-09 | End: 2023-11-19

## 2023-11-09 RX ADMIN — PANTOPRAZOLE SODIUM 40 MG: 40 TABLET, DELAYED RELEASE ORAL at 06:22

## 2023-11-09 RX ADMIN — LOSARTAN POTASSIUM 100 MG: 50 TABLET, FILM COATED ORAL at 09:00

## 2023-11-09 RX ADMIN — HYDROCHLOROTHIAZIDE 12.5 MG: 12.5 CAPSULE ORAL at 09:00

## 2023-11-09 RX ADMIN — AMLODIPINE BESYLATE 10 MG: 5 TABLET ORAL at 08:59

## 2023-11-09 RX ADMIN — LEVOTHYROXINE SODIUM 75 MCG: 75 TABLET ORAL at 06:22

## 2023-11-09 NOTE — DISCHARGE INSTRUCTIONS
-Take antibiotics as prescribed until finished.  -Use cough medicine as prescribed as needed  -Follow-up with your PCP in 2 to 3 days for recheck and continued care.  -Follow-up with pulmonology as an outpatient as directed.

## 2023-11-09 NOTE — OUTREACH NOTE
Prep Survey      Flowsheet Row Responses   [facility] facility patient discharged from? [facility]   Is LACE score < 7 ? No   Eligibility [facility]   Date of Admission 11/07/23   Date of Discharge 11/09/23   Discharge Disposition Home or Self Care   Discharge diagnosis Acute resp insufficiency   Does the patient have one of the following disease processes/diagnoses(primary or secondary)? Other   Does the patient have Home health ordered? No   Is there a DME ordered? No   Prep survey completed? Yes            RACHELLE CESPEDES - Registered Nurse

## 2023-11-09 NOTE — PAYOR COMM NOTE
"    D/c summary  Ur manager; ifrah treviño [phone] and fax [phone]    Pari Cardenas (73 y.o. Female)       Date of Birth   1950    Social Security Number       Address   [address] [place] [address]    Home Phone   [phone]    MRN   [id number]       [facility]    Marital Status                               Admission Date   23    Admission Type   Emergency    Admitting Provider   Annemarie Barton DO    Attending Provider       Department, Room/Bed   [facility] TELEMETRY 3, 320/1       Discharge Date   2023    Discharge Disposition   Home or Self Care    Discharge Destination                                 Attending Provider: (none)   Allergies: Shellfish-derived Products    Isolation: None   Infection: None   Code Status: CPR    Ht: 172.7 cm (67.99\")   Wt: 80.8 kg (178 lb 2.1 oz)    Admission Cmt: None   Principal Problem: Acute respiratory insufficiency [R06.89]                   Active Insurance as of 2023       Primary Coverage       Payor Plan Insurance Group Employer/Plan Group    HUMANA MEDICARE REPLACEMENT HUMANA MEDICARE REPLACEMENT [id number]       Payor Plan Address Payor Plan Phone Number Payor Plan Fax Number Effective Dates    [address] [address] [phone]  2015 - None Entered    [facility] [address]         Subscriber Name Subscriber Birth Date Member ID       PARI CARDENAS 1950 [id number]                     Emergency Contacts        (Rel.) Home Phone Work Phone Mobile Phone    sandeep cardenas (Spouse) [phone] -- [phone]              Physician Progress Notes (last 24 hours)  Notes from 23 1132 through 23 1132   No notes of this type exist for this encounter.          Discharge Summary        Anthony Cormier MD at 23 0921              [facility] HOSPITALIST   DISCHARGE SUMMARY      Name:  Pari Cardenas   Age:  73 y.o.  Sex:  female  :  " 1950  MRN:  [id number]   Visit Number:  [id number]    Admission Date:  11/7/2023  Date of Discharge:  11/9/2023  Primary Care Physician:  Hayley Ty, DO    Important issues to note:    -Discharged on Omnicef to finish treatment  -Follow-up with PCP and pulmonology    Discharge Diagnoses:     Community-acquired pneumonia, due to unknown organism, present admission, improved  Acute respiratory insufficiency, POA, improved  Hypertension  Type 2 diabetes  Hypothyroidism       Problem List:     Active Hospital Problems    Diagnosis  POA    **Acute respiratory insufficiency [R06.89]  Yes      Resolved Hospital Problems   No resolved problems to display.     Presenting Problem:    Chief Complaint   Patient presents with    Shortness of Breath     States fever, cough with low O2 sats. Pt feels soa. Pt has a home pulse ox that has been reading in the 80's. Pt was here 2 weeks ago for cough.       Consults:     Consulting Physician(s)                     None              Procedures Performed:        History of presenting illness/Hospital Course:    Patient is a pleasant 73 with a history of hypertension, hypothyroidism, diabetes, who presented to the emergency room with complaints of cough shortness of breath and fever.  She states that her and her  have both been sick since about 25 October, he actually just got out of the hospital after being treated for pneumonia and some heart issues.  She states that she had also been sick about the same time, but has been taking care of him at home.  She noted today she developed a fever, increased cough and sputum production.  She notes that she has had off-and-on breathing issues since she had COVID in February of last year.  She denies any falls or trauma.  Appetite has been okay.  Denies any recent nausea or vomiting.  Most recent travel was to [place] in October.  Denies any known sick contacts other than her .     In the ER, she was overall  "hemodynamically stable but was requiring 2 L of oxygen.  She did have a white count of 14, otherwise her CMP was unremarkable.  Procalcitonin within normal limits.  Negative respiratory panel.  Chest x-ray was unremarkable.  CT scan of the chest demonstrating findings concerning for pneumonia/groundglass opacities with questionable edema.  The patient was admitted thereafter.    Pneumonia:  -My suspicion is that patient initially had a viral infection that is now more progressive than likely bacterial infection.    -strep pneumo, Legionella antigens negative.  -Treated with Rocephin.  -Leukocytosis could be reactive and secondary to steroids she received in the ER, Pro-Robel was negative.  -D-dimer was negative.  -no indication for steroids.    - Antitussives as needed.    -Incentive spirometer.    -Of note, patient may be \"long-hauler\" from COVID.  Referral sent for outpatient pulmonology follow-up.  -Titrated down and saturating well with no supplemental oxygen and while on room air.  -Switched antibiotics to Omnicef on discharge  -Blood cultures remains negative.    Patient was seen and examined on the day of discharge.  Patient sitting up comfortably in bed with no distress.  Patient reporting feeling very good today and is ready to go home.  Patient is titrated down and no longer needing supplemental oxygen and saturating well above 90% on room air.  Patient reporting overall improvement on her respiratory status, she still has dry nonproductive cough which appears to be improving slowly.  She denies any pain or discomfort.  No acute complaints otherwise today.  Patient clinically improved. Patient instructed on management and plan in details.  Discharge patient on Omnicef to finish treatment.  Plan on following up with pulmonology as an outpatient and with PCP in 2 to 3 days for recheck and continued care. Clear return precautions discussed.  Patient verbalized understanding and agreeable to plan.  All questions " were answered to the patient's satisfaction.    Vital Signs:    Temp:  [97.6 °F (36.4 °C)-98 °F (36.7 °C)] 98 °F (36.7 °C)  Heart Rate:  [61-84] 61  Resp:  [16-24] 16  BP: (115-151)/(71-81) 141/77    Physical Exam:    General Appearance:  Alert and cooperative.  No acute distress.   Head:  Atraumatic and normocephalic.   Eyes: Conjunctivae and sclerae normal, no icterus. No pallor.   Ears:  Ears with no abnormalities noted.   Throat: No oral lesions, no thrush, oral mucosa moist.   Neck: Supple, trachea midline, no thyromegaly.   Back:   No kyphoscoliosis present. No tenderness to palpation.   Lungs:   Breath sounds heard bilaterally equally.  No crackles or wheezing. No Pleural rub or bronchial breathing.   Heart:  Normal S1 and S2, no murmur, no gallop, no rub. No JVD.   Abdomen:   Normal bowel sounds, no masses, no organomegaly. Soft, nontender, nondistended, no rebound tenderness.   Extremities: Supple, no edema, no cyanosis, no clubbing.   Pulses: Pulses palpable bilaterally.   Skin: No bleeding or rash.   Neurologic: Alert and oriented x 3. No facial asymmetry. Moves all four limbs. No tremors.     Pertinent Lab Results:     Results from last 7 days   Lab Units 11/08/23 2009 11/08/23 1022 11/07/23  1305   SODIUM mmol/L  --  136 139   POTASSIUM mmol/L 4.3 3.6 3.3*   CHLORIDE mmol/L  --  99 103   CO2 mmol/L  --  24.6 22.3   BUN mg/dL  --  12 9   CREATININE mg/dL  --  0.79 0.70   CALCIUM mg/dL  --  10.4 9.6   BILIRUBIN mg/dL  --   --  0.7   ALK PHOS U/L  --   --  68   ALT (SGPT) U/L  --   --  14   AST (SGOT) U/L  --   --  15   GLUCOSE mg/dL  --  227* 122*     Results from last 7 days   Lab Units 11/08/23 1022 11/07/23  1305   WBC 10*3/mm3 15.66* 14.38*   HEMOGLOBIN g/dL 13.7 13.0   HEMATOCRIT % 39.3 36.4   PLATELETS 10*3/mm3 389 317         Results from last 7 days   Lab Units 11/07/23  1305   HSTROP T ng/L 9     Results from last 7 days   Lab Units 11/07/23  1305   PROBNP pg/mL 383.9                 Results  from last 7 days   Lab Units 11/07/23 1958 11/07/23 1949   BLOODCX  No growth at 24 hours No growth at 24 hours       Pertinent Radiology Results:    Imaging Results (All)       Procedure Component Value Units Date/Time    CT Chest Without Contrast Diagnostic [[id number]] Collected: 11/07/23 1924     Updated: 11/07/23 1925    Narrative:      FINAL REPORT    TECHNIQUE:  Axial images were obtained from the lung apex to the mid abdomen  by computed tomography. Coronal reformatted images were  obtained.  This study was performed with techniques to keep  radiation doses as low as reasonably achievable, (ALARA).  Individualized dose reduction techniques using automated  exposure control or adjustment of mA and/or kV according to the  patient''s size were employed.    CLINICAL HISTORY:  acute resp failure, pneumonia, vs bronchitis    FINDINGS:  There is mild mediastinal and left hilar adenopathy as a a  nonspecific finding.  This may be reactive or neoplastic.  There  are bilateral patchy ground glass opacities that may represent  pulmonary edema or pneumonia.  Heart size is normal. There is no  pericardial or pleural effusion.   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      Impression:      1.  Mild mediastinal and left hilar adenopathy which is  nonspecific.  Recommend follow-up postcontrast CT in 3-6 months  or PET/CT.  2.  Patchy ground glass opacities, edema versus  pneumonia.    Authenticated and Electronically Signed by Usman Segovia III, MD on 11/07/2023 07:24:08 PM    XR Chest 1 View [[id number]] Collected: 11/07/23 1334     Updated: 11/07/23 1336    Narrative:      PROCEDURE: XR CHEST 1 VW-     HISTORY: SOA Triage Protocol, fever, cough for 2 weeks     COMPARISON: October 25, 2023..     FINDINGS: The heart is normal in size. Right lung is clear. There are  linear densities in the left lung base which are stable consistent with  atelectasis or or scar.. The  mediastinum is unremarkable. There is no  pneumothorax.  There are no acute osseous abnormalities.       Impression:      Stable chest..                 This report was signed and finalized on 11/7/2023 1:34 PM by Miesha Schmidt MD.               Echo:      Condition on Discharge:      Stable.    Code status during the hospital stay:    Code Status and Medical Interventions:   Ordered at: 11/07/23 1908     Code Status (Patient has no pulse and is not breathing):    CPR (Attempt to Resuscitate)     Medical Interventions (Patient has pulse or is breathing):    Full Support     Discharge Disposition:    Home or Self Care    Discharge Medications:       Discharge Medications        New Medications        Instructions Start Date   benzonatate 100 MG capsule  Commonly known as: TESSALON   100 mg, Oral, 3 Times Daily PRN      cefdinir 300 MG capsule  Commonly known as: OMNICEF   300 mg, Oral, 2 Times Daily      promethazine-dextromethorphan 6.25-15 MG/5ML syrup  Commonly known as: PROMETHAZINE-DM   5 mL, Oral, 4 Times Daily PRN             Changes to Medications        Instructions Start Date   latanoprost 0.005 % ophthalmic solution  Commonly known as: XALATAN  What changed: how to take this   1 drop, Ophthalmic, Daily             Continue These Medications        Instructions Start Date   amLODIPine 5 MG tablet  Commonly known as: NORVASC   TAKE 2 TABLETS EVERY DAY      cetirizine 10 MG tablet  Commonly known as: zyrTEC   10 mg, Oral, Daily      diphenhydrAMINE 25 mg capsule  Commonly known as: BENADRYL   25 mg, Oral, Nightly PRN      ELDERBERRY PO   1 tablet, Oral, Daily, Patient unsure of dose      glucose blood test strip  Commonly known as: True Metrix Blood Glucose Test   1 strip daily      Lancets misc   1 applicator, Does not apply, Daily      levothyroxine 75 MCG tablet  Commonly known as: SYNTHROID, LEVOTHROID   75 mcg, Oral, Daily      losartan-hydrochlorothiazide 100-12.5 MG per tablet  Commonly known as:  HYZAAR   TAKE 1 TABLET EVERY DAY      multivitamin with minerals tablet tablet   1 tablet, Oral, Daily      NON FORMULARY   Specialty Compounded Cream for neuropathy of feet. Cream contains lidocaine, gabapentin, ketoprofen, ibuprofen and clonidine per patient. Apply to bilateral feet at bedtime      nystatin 302309 UNIT/GM cream  Commonly known as: MYCOSTATIN   Topical, 2 Times Daily      Ozempic (0.25 or 0.5 MG/DOSE) 2 MG/3ML solution pen-injector  Generic drug: Semaglutide(0.25 or 0.5MG/DOS)   0.5 mg, Subcutaneous, Weekly      pantoprazole 20 MG EC tablet  Commonly known as: PROTONIX   TAKE 1 TABLET EVERY DAY      pravastatin 40 MG tablet  Commonly known as: PRAVACHOL   40 mg, Oral, Every Evening      timolol 0.5 % ophthalmic solution  Commonly known as: TIMOPTIC   1 drop, Both Eyes, 2 Times Daily      triamcinolone 0.025 % cream  Commonly known as: KENALOG   1 application , Topical, 2 Times Daily      True Metrix Air Glucose Meter w/Device kit   1 each, Does not apply, 2 times daily      vitamin B-12 100 MCG tablet  Commonly known as: CYANOCOBALAMIN   100 mcg, Oral, Daily      vitamin C 250 MG tablet  Commonly known as: ASCORBIC ACID   1,000 mg, Oral, Daily             Discharge Diet:   Cardiac    Activity at Discharge:   As tolerated    Follow-up Appointments:     Follow-up Information       Luz Marina Baca MD .    Specialties: Pulmonary Disease, Sleep Medicine  Contact information:  [address]  [address] [address]  [phone]                           Future Appointments   Date Time Provider Department Center   11/10/2023 10:45 AM Hayley Ty DO [facility]   12/18/2023  2:00 PM Hayley Ty DO [facility]     Test Results Pending at Discharge:    Pending Labs       Order Current Status    Blood Culture - Blood, Hand, Left Preliminary result    Blood Culture - Blood, Hand, Right Preliminary result               Anthony Cormier MD  11/09/23  09:21 EST    Time:  I spent 28 minutes on this discharge activity which included: face-to-face encounter with the patient, reviewing the data in the system, coordination of the care with the nursing staff as well as consultants, documentation, and entering orders.     Dictated utilizing Dragon dictation.      Electronically signed by Anthony Cormier MD at 11/09/23 1027

## 2023-11-09 NOTE — CASE MANAGEMENT/SOCIAL WORK
Case Management Discharge Note                Selected Continued Care - Admitted Since 11/7/2023       Destination    No services have been selected for the patient.                Durable Medical Equipment    No services have been selected for the patient.                Dialysis/Infusion    No services have been selected for the patient.                Home Medical Care    No services have been selected for the patient.                Therapy    No services have been selected for the patient.                Community Resources    No services have been selected for the patient.                [facility]    No services have been selected for the patient.                    Transportation Services  Private: Car    Final Discharge Disposition Code: 01 - home or self-care

## 2023-11-09 NOTE — PLAN OF CARE
Goal Outcome Evaluation:  Plan of Care Reviewed With: patient        Progress: improving  Outcome Evaluation: VSS, maintaining o2 sats >90% on RA, pt anticipating d/c home today.

## 2023-11-09 NOTE — DISCHARGE SUMMARY
[facility]      Name:  Pari Howell   Age:  73 y.o.  Sex:  female  :  1950  MRN:  [id number]   Visit Number:  [id number]    Admission Date:  2023  Date of Discharge:  2023  Primary Care Physician:  Hayley Ty, DO    Important issues to note:    -Discharged on Omnicef to finish treatment  -Follow-up with PCP and pulmonology    Discharge Diagnoses:     Community-acquired pneumonia, due to unknown organism, present admission, improved  Acute respiratory insufficiency, POA, improved  Hypertension  Type 2 diabetes  Hypothyroidism       Problem List:     Active Hospital Problems    Diagnosis  POA    **Acute respiratory insufficiency [R06.89]  Yes      Resolved Hospital Problems   No resolved problems to display.     Presenting Problem:    Chief Complaint   Patient presents with    Shortness of Breath     States fever, cough with low O2 sats. Pt feels soa. Pt has a home pulse ox that has been reading in the 80's. Pt was here 2 weeks ago for cough.       Consults:     Consulting Physician(s)                     None              Procedures Performed:        History of presenting illness/Hospital Course:    Patient is a pleasant 73 with a history of hypertension, hypothyroidism, diabetes, who presented to the emergency room with complaints of cough shortness of breath and fever.  She states that her and her  have both been sick since about , he actually just got out of the hospital after being treated for pneumonia and some heart issues.  She states that she had also been sick about the same time, but has been taking care of him at home.  She noted today she developed a fever, increased cough and sputum production.  She notes that she has had off-and-on breathing issues since she had COVID in February of last year.  She denies any falls or trauma.  Appetite has been okay.  Denies any recent nausea or vomiting.  Most recent travel  "was to [place] in October.  Denies any known sick contacts other than her .     In the ER, she was overall hemodynamically stable but was requiring 2 L of oxygen.  She did have a white count of 14, otherwise her CMP was unremarkable.  Procalcitonin within normal limits.  Negative respiratory panel.  Chest x-ray was unremarkable.  CT scan of the chest demonstrating findings concerning for pneumonia/groundglass opacities with questionable edema.  The patient was admitted thereafter.    Pneumonia:  -My suspicion is that patient initially had a viral infection that is now more progressive than likely bacterial infection.    -strep pneumo, Legionella antigens negative.  -Treated with Rocephin.  -Leukocytosis could be reactive and secondary to steroids she received in the ER, Pro-Robel was negative.  -D-dimer was negative.  -no indication for steroids.    - Antitussives as needed.    -Incentive spirometer.    -Of note, patient may be \"long-hauler\" from COVID.  Referral sent for outpatient pulmonology follow-up.  -Titrated down and saturating well with no supplemental oxygen and while on room air.  -Switched antibiotics to Omnicef on discharge  -Blood cultures remains negative.    Patient was seen and examined on the day of discharge.  Patient sitting up comfortably in bed with no distress.  Patient reporting feeling very good today and is ready to go home.  Patient is titrated down and no longer needing supplemental oxygen and saturating well above 90% on room air.  Patient reporting overall improvement on her respiratory status, she still has dry nonproductive cough which appears to be improving slowly.  She denies any pain or discomfort.  No acute complaints otherwise today.  Patient clinically improved. Patient instructed on management and plan in details.  Discharge patient on Omnicef to finish treatment.  Plan on following up with pulmonology as an outpatient and with PCP in 2 to 3 days for recheck and continued " care. Clear return precautions discussed.  Patient verbalized understanding and agreeable to plan.  All questions were answered to the patient's satisfaction.    Vital Signs:    Temp:  [97.6 °F (36.4 °C)-98 °F (36.7 °C)] 98 °F (36.7 °C)  Heart Rate:  [61-84] 61  Resp:  [16-24] 16  BP: (115-151)/(71-81) 141/77    Physical Exam:    General Appearance:  Alert and cooperative.  No acute distress.   Head:  Atraumatic and normocephalic.   Eyes: Conjunctivae and sclerae normal, no icterus. No pallor.   Ears:  Ears with no abnormalities noted.   Throat: No oral lesions, no thrush, oral mucosa moist.   Neck: Supple, trachea midline, no thyromegaly.   Back:   No kyphoscoliosis present. No tenderness to palpation.   Lungs:   Breath sounds heard bilaterally equally.  No crackles or wheezing. No Pleural rub or bronchial breathing.   Heart:  Normal S1 and S2, no murmur, no gallop, no rub. No JVD.   Abdomen:   Normal bowel sounds, no masses, no organomegaly. Soft, nontender, nondistended, no rebound tenderness.   Extremities: Supple, no edema, no cyanosis, no clubbing.   Pulses: Pulses palpable bilaterally.   Skin: No bleeding or rash.   Neurologic: Alert and oriented x 3. No facial asymmetry. Moves all four limbs. No tremors.     Pertinent Lab Results:     Results from last 7 days   Lab Units 11/08/23 2009 11/08/23 1022 11/07/23  1305   SODIUM mmol/L  --  136 139   POTASSIUM mmol/L 4.3 3.6 3.3*   CHLORIDE mmol/L  --  99 103   CO2 mmol/L  --  24.6 22.3   BUN mg/dL  --  12 9   CREATININE mg/dL  --  0.79 0.70   CALCIUM mg/dL  --  10.4 9.6   BILIRUBIN mg/dL  --   --  0.7   ALK PHOS U/L  --   --  68   ALT (SGPT) U/L  --   --  14   AST (SGOT) U/L  --   --  15   GLUCOSE mg/dL  --  227* 122*     Results from last 7 days   Lab Units 11/08/23 1022 11/07/23  1305   WBC 10*3/mm3 15.66* 14.38*   HEMOGLOBIN g/dL 13.7 13.0   HEMATOCRIT % 39.3 36.4   PLATELETS 10*3/mm3 389 317         Results from last 7 days   Lab Units 11/07/23  6581    HSTROP T ng/L 9     Results from last 7 days   Lab Units 11/07/23  1305   PROBNP pg/mL 383.9                 Results from last 7 days   Lab Units 11/07/23 1958 11/07/23  1949   BLOODCX  No growth at 24 hours No growth at 24 hours       Pertinent Radiology Results:    Imaging Results (All)       Procedure Component Value Units Date/Time    CT Chest Without Contrast Diagnostic [[id number]] Collected: 11/07/23 1924     Updated: 11/07/23 1925    Narrative:      FINAL REPORT    TECHNIQUE:  Axial images were obtained from the lung apex to the mid abdomen  by computed tomography. Coronal reformatted images were  obtained.  This study was performed with techniques to keep  radiation doses as low as reasonably achievable, (ALARA).  Individualized dose reduction techniques using automated  exposure control or adjustment of mA and/or kV according to the  patient''s size were employed.    CLINICAL HISTORY:  acute resp failure, pneumonia, vs bronchitis    FINDINGS:  There is mild mediastinal and left hilar adenopathy as a a  nonspecific finding.  This may be reactive or neoplastic.  There  are bilateral patchy ground glass opacities that may represent  pulmonary edema or pneumonia.  Heart size is normal. There is no  pericardial or pleural effusion.   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      Impression:      1.  Mild mediastinal and left hilar adenopathy which is  nonspecific.  Recommend follow-up postcontrast CT in 3-6 months  or PET/CT.  2.  Patchy ground glass opacities, edema versus  pneumonia.    Authenticated and Electronically Signed by Usman Segovia III, MD on 11/07/2023 07:24:08 PM    XR Chest 1 View [[id number]] Collected: 11/07/23 1334     Updated: 11/07/23 1336    Narrative:      PROCEDURE: XR CHEST 1 VW-     HISTORY: SOA Triage Protocol, fever, cough for 2 weeks     COMPARISON: October 25, 2023..     FINDINGS: The heart is normal in size. Right lung is clear.  There are  linear densities in the left lung base which are stable consistent with  atelectasis or or scar.. The mediastinum is unremarkable. There is no  pneumothorax.  There are no acute osseous abnormalities.       Impression:      Stable chest..                 This report was signed and finalized on 11/7/2023 1:34 PM by Miesha Schmidt MD.               Echo:      Condition on Discharge:      Stable.    Code status during the hospital stay:    Code Status and Medical Interventions:   Ordered at: 11/07/23 7179     Code Status (Patient has no pulse and is not breathing):    CPR (Attempt to Resuscitate)     Medical Interventions (Patient has pulse or is breathing):    Full Support     Discharge Disposition:    Home or Self Care    Discharge Medications:       Discharge Medications        New Medications        Instructions Start Date   benzonatate 100 MG capsule  Commonly known as: TESSALON   100 mg, Oral, 3 Times Daily PRN      cefdinir 300 MG capsule  Commonly known as: OMNICEF   300 mg, Oral, 2 Times Daily      promethazine-dextromethorphan 6.25-15 MG/5ML syrup  Commonly known as: PROMETHAZINE-DM   5 mL, Oral, 4 Times Daily PRN             Changes to Medications        Instructions Start Date   latanoprost 0.005 % ophthalmic solution  Commonly known as: XALATAN  What changed: how to take this   1 drop, Ophthalmic, Daily             Continue These Medications        Instructions Start Date   amLODIPine 5 MG tablet  Commonly known as: NORVASC   TAKE 2 TABLETS EVERY DAY      cetirizine 10 MG tablet  Commonly known as: zyrTEC   10 mg, Oral, Daily      diphenhydrAMINE 25 mg capsule  Commonly known as: BENADRYL   25 mg, Oral, Nightly PRN      ELDERBERRY PO   1 tablet, Oral, Daily, Patient unsure of dose      glucose blood test strip  Commonly known as: True Metrix Blood Glucose Test   1 strip daily      Lancets misc   1 applicator, Does not apply, Daily      levothyroxine 75 MCG tablet  Commonly known as: SYNTHROID,  LEVOTHROID   75 mcg, Oral, Daily      losartan-hydrochlorothiazide 100-12.5 MG per tablet  Commonly known as: HYZAAR   TAKE 1 TABLET EVERY DAY      multivitamin with minerals tablet tablet   1 tablet, Oral, Daily      NON FORMULARY   Specialty Compounded Cream for neuropathy of feet. Cream contains lidocaine, gabapentin, ketoprofen, ibuprofen and clonidine per patient. Apply to bilateral feet at bedtime      nystatin [id number] UNIT/GM cream  Commonly known as: MYCOSTATIN   Topical, 2 Times Daily      Ozempic (0.25 or 0.5 MG/DOSE) 2 MG/3ML solution pen-injector  Generic drug: Semaglutide(0.25 or 0.5MG/DOS)   0.5 mg, Subcutaneous, Weekly      pantoprazole 20 MG EC tablet  Commonly known as: PROTONIX   TAKE 1 TABLET EVERY DAY      pravastatin 40 MG tablet  Commonly known as: PRAVACHOL   40 mg, Oral, Every Evening      timolol 0.5 % ophthalmic solution  Commonly known as: TIMOPTIC   1 drop, Both Eyes, 2 Times Daily      triamcinolone 0.025 % cream  Commonly known as: KENALOG   1 application , Topical, 2 Times Daily      True Metrix Air Glucose Meter w/Device kit   1 each, Does not apply, 2 times daily      vitamin B-12 100 MCG tablet  Commonly known as: CYANOCOBALAMIN   100 mcg, Oral, Daily      vitamin C 250 MG tablet  Commonly known as: ASCORBIC ACID   1,000 mg, Oral, Daily             Discharge Diet:   Cardiac    Activity at Discharge:   As tolerated    Follow-up Appointments:     Follow-up Information       Luz Marina Baca MD .    Specialties: Pulmonary Disease, Sleep Medicine  Contact information:  [address] [facility] [address] [place] [id number] [phone]                           Future Appointments   Date Time Provider Department Center   11/10/2023 10:45 AM Hayley Ty DO [facility]   12/18/2023  2:00 PM Hayley Ty DO [facility]     Test Results Pending at Discharge:    Pending Labs       Order Current Status    Blood Culture - Blood, Hand, Left Preliminary result    Blood  Culture - Blood, Hand, Right Preliminary result               Anthony Cormier MD  11/09/23  09:21 EST    Time: I spent 28 minutes on this discharge activity which included: face-to-face encounter with the patient, reviewing the data in the system, coordination of the care with the nursing staff as well as consultants, documentation, and entering orders.     Dictated utilizing Dragon dictation.

## 2023-11-10 ENCOUNTER — TRANSITIONAL CARE MANAGEMENT TELEPHONE ENCOUNTER (OUTPATIENT)
Dept: CALL CENTER | Facility: HOSPITAL | Age: 73
End: 2023-11-10
Payer: MEDICARE

## 2023-11-10 NOTE — OUTREACH NOTE
Call Center TCM Note      Flowsheet Row Responses   [facility] patient discharged from? [facility]   Does the patient have one of the following disease processes/diagnoses(primary or secondary)? Other   TCM attempt successful? Yes   Call start time 1136   Call end time 1146   Discharge diagnosis Acute resp insufficiency   Meds reviewed with patient/caregiver? Yes   Is the patient having any side effects they believe may be caused by any medication additions or changes? No   Does the patient have all medications ordered at discharge? N/A   Is the patient taking all medications as directed (includes completed medication regime)? Yes   Medication comments Pt has questions regarding restarting her ozempic this week as she has decreased appetite and BG have been low--she was receiving SSI while inpt,  advised to call her PCP to discuss action plan   Comments Hospital f/u 11/14/23 with PCP (updated PCP as pt has changed to non- provider)   Does the patient have an appointment with their PCP within 7-14 days of discharge? Yes   Has home health visited the patient within 72 hours of discharge? N/A   Psychosocial issues? No   Did the patient receive a copy of their discharge instructions? Yes   Nursing interventions Reviewed instructions with patient   What is the patient's perception of their health status since discharge? Same  [Reports she is still feeling poorly.  Has a cough, had a fever last HS.  Reports her sats 93-94% and her .  Reports she has had issues since covid dx a few years ago and will be following up with pulmonology once appt arranged.  Pt using I.S.]   Is the patient/caregiver able to teach back signs and symptoms related to disease process for when to call PCP? Yes   Is the patient/caregiver able to teach back signs and symptoms related to disease process for when to call 911? Yes   Additional teach back comments pt aware to seek care for increased SOA and worsening resp s/s.   TCM call  completed? Yes   Call end time [id number]            Meeta Doan RN    11/10/2023, 12:04 EST

## 2023-11-10 NOTE — PAYOR COMM NOTE
"To:  [employer]  From: Laura Sarmiento RN  Phone: [phone]  Fax: [phone]  NPI: [id number]  TIN: [id number]  Member ID: [id number]   MRN: [id number]    Pari Cardenas (73 y.o. Female)       Date of Birth   1950    Social Security Number       Address   [address]  [facility] [address]    Home Phone   [phone]    MRN   [id number]       [facility]    Marital Status                               Admission Date   23    Admission Type   Emergency    Admitting Provider   Annemarie Barton DO    Attending Provider       Department, Room/Bed   [facility] TELEMETRY 3, 320/1       Discharge Date   2023    Discharge Disposition   Home or Self Care    Discharge Destination                                 Attending Provider: (none)   Allergies: Shellfish-derived Products    Isolation: None   Infection: None   Code Status: Prior    Ht: 172.7 cm (67.99\")   Wt: 80.8 kg (178 lb 2.1 oz)    Admission Cmt: None   Principal Problem: Acute respiratory insufficiency [R06.89]                   Active Insurance as of 2023       Primary Coverage       Payor Plan Insurance Group Employer/Plan Group    HUMA[employer] MEDICARE REPLACEMENT HU[employer] MEDICARE REPLACEMENT [id number]       Payor Plan Address Payor Plan Phone Number Payor Plan Fax Number Effective Dates    [address] [address] [phone]  2015 - None Entered    [facility] [address]         Subscriber Name Subscriber Birth Date Member ID       PARI CARDENAS 1950 [id number]                     Emergency Contacts        (Rel.) Home Phone Work Phone Mobile Phone    sandeep cardenas (Spouse) [phone] -- [phone]                 Discharge Summary        Anthony Cormier MD at 23 0921              [facility] HOSPITALIST   DISCHARGE SUMMARY      Name:  Pari Cardenas   Age:  73 y.o.  Sex:  female  :  1950  MRN:  [id number]   Visit Number:  [id number]    Admission Date:  " 11/7/2023  Date of Discharge:  11/9/2023  Primary Care Physician:  Hayley Ty, DO    Important issues to note:    -Discharged on Omnicef to finish treatment  -Follow-up with PCP and pulmonology    Discharge Diagnoses:     Community-acquired pneumonia, due to unknown organism, present admission, improved  Acute respiratory insufficiency, POA, improved  Hypertension  Type 2 diabetes  Hypothyroidism       Problem List:     Active Hospital Problems    Diagnosis  POA    **Acute respiratory insufficiency [R06.89]  Yes      Resolved Hospital Problems   No resolved problems to display.     Presenting Problem:    Chief Complaint   Patient presents with    Shortness of Breath     States fever, cough with low O2 sats. Pt feels soa. Pt has a home pulse ox that has been reading in the 80's. Pt was here 2 weeks ago for cough.       Consults:     Consulting Physician(s)                     None              Procedures Performed:        History of presenting illness/Hospital Course:    Patient is a pleasant 73 with a history of hypertension, hypothyroidism, diabetes, who presented to the emergency room with complaints of cough shortness of breath and fever.  She states that her and her  have both been sick since about 25 October, he actually just got out of the hospital after being treated for pneumonia and some heart issues.  She states that she had also been sick about the same time, but has been taking care of him at home.  She noted today she developed a fever, increased cough and sputum production.  She notes that she has had off-and-on breathing issues since she had COVID in February of last year.  She denies any falls or trauma.  Appetite has been okay.  Denies any recent nausea or vomiting.  Most recent travel was to [place] in October.  Denies any known sick contacts other than her .     In the ER, she was overall hemodynamically stable but was requiring 2 L of oxygen.  She did have a white count of 14,  "otherwise her CMP was unremarkable.  Procalcitonin within normal limits.  Negative respiratory panel.  Chest x-ray was unremarkable.  CT scan of the chest demonstrating findings concerning for pneumonia/groundglass opacities with questionable edema.  The patient was admitted thereafter.    Pneumonia:  -My suspicion is that patient initially had a viral infection that is now more progressive than likely bacterial infection.    -strep pneumo, Legionella antigens negative.  -Treated with Rocephin.  -Leukocytosis could be reactive and secondary to steroids she received in the ER, Pro-Robel was negative.  -D-dimer was negative.  -no indication for steroids.    - Antitussives as needed.    -Incentive spirometer.    -Of note, patient may be \"long-hauler\" from COVID.  Referral sent for outpatient pulmonology follow-up.  -Titrated down and saturating well with no supplemental oxygen and while on room air.  -Switched antibiotics to Omnicef on discharge  -Blood cultures remains negative.    Patient was seen and examined on the day of discharge.  Patient sitting up comfortably in bed with no distress.  Patient reporting feeling very good today and is ready to go home.  Patient is titrated down and no longer needing supplemental oxygen and saturating well above 90% on room air.  Patient reporting overall improvement on her respiratory status, she still has dry nonproductive cough which appears to be improving slowly.  She denies any pain or discomfort.  No acute complaints otherwise today.  Patient clinically improved. Patient instructed on management and plan in details.  Discharge patient on Omnicef to finish treatment.  Plan on following up with pulmonology as an outpatient and with PCP in 2 to 3 days for recheck and continued care. Clear return precautions discussed.  Patient verbalized understanding and agreeable to plan.  All questions were answered to the patient's satisfaction.    Vital Signs:    Temp:  [97.6 °F (36.4 " °C)-98 °F (36.7 °C)] 98 °F (36.7 °C)  Heart Rate:  [61-84] 61  Resp:  [16-24] 16  BP: (115-151)/(71-81) 141/77    Physical Exam:    General Appearance:  Alert and cooperative.  No acute distress.   Head:  Atraumatic and normocephalic.   Eyes: Conjunctivae and sclerae normal, no icterus. No pallor.   Ears:  Ears with no abnormalities noted.   Throat: No oral lesions, no thrush, oral mucosa moist.   Neck: Supple, trachea midline, no thyromegaly.   Back:   No kyphoscoliosis present. No tenderness to palpation.   Lungs:   Breath sounds heard bilaterally equally.  No crackles or wheezing. No Pleural rub or bronchial breathing.   Heart:  Normal S1 and S2, no murmur, no gallop, no rub. No JVD.   Abdomen:   Normal bowel sounds, no masses, no organomegaly. Soft, nontender, nondistended, no rebound tenderness.   Extremities: Supple, no edema, no cyanosis, no clubbing.   Pulses: Pulses palpable bilaterally.   Skin: No bleeding or rash.   Neurologic: Alert and oriented x 3. No facial asymmetry. Moves all four limbs. No tremors.     Pertinent Lab Results:     Results from last 7 days   Lab Units 11/08/23 2009 11/08/23 1022 11/07/23  1305   SODIUM mmol/L  --  136 139   POTASSIUM mmol/L 4.3 3.6 3.3*   CHLORIDE mmol/L  --  99 103   CO2 mmol/L  --  24.6 22.3   BUN mg/dL  --  12 9   CREATININE mg/dL  --  0.79 0.70   CALCIUM mg/dL  --  10.4 9.6   BILIRUBIN mg/dL  --   --  0.7   ALK PHOS U/L  --   --  68   ALT (SGPT) U/L  --   --  14   AST (SGOT) U/L  --   --  15   GLUCOSE mg/dL  --  227* 122*     Results from last 7 days   Lab Units 11/08/23 1022 11/07/23  1305   WBC 10*3/mm3 15.66* 14.38*   HEMOGLOBIN g/dL 13.7 13.0   HEMATOCRIT % 39.3 36.4   PLATELETS 10*3/mm3 389 317         Results from last 7 days   Lab Units 11/07/23  1305   HSTROP T ng/L 9     Results from last 7 days   Lab Units 11/07/23  1305   PROBNP pg/mL 383.9                 Results from last 7 days   Lab Units 11/07/23 1958 11/07/23  1949   BLOODCX  No growth at 24  hours No growth at 24 hours       Pertinent Radiology Results:    Imaging Results (All)       Procedure Component Value Units Date/Time    CT Chest Without Contrast Diagnostic [[id number]] Collected: 11/07/23 1924     Updated: 11/07/23 1925    Narrative:      FINAL REPORT    TECHNIQUE:  Axial images were obtained from the lung apex to the mid abdomen  by computed tomography. Coronal reformatted images were  obtained.  This study was performed with techniques to keep  radiation doses as low as reasonably achievable, (ALARA).  Individualized dose reduction techniques using automated  exposure control or adjustment of mA and/or kV according to the  patient''s size were employed.    CLINICAL HISTORY:  acute resp failure, pneumonia, vs bronchitis    FINDINGS:  There is mild mediastinal and left hilar adenopathy as a a  nonspecific finding.  This may be reactive or neoplastic.  There  are bilateral patchy ground glass opacities that may represent  pulmonary edema or pneumonia.  Heart size is normal. There is no  pericardial or pleural effusion.   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      Impression:      1.  Mild mediastinal and left hilar adenopathy which is  nonspecific.  Recommend follow-up postcontrast CT in 3-6 months  or PET/CT.  2.  Patchy ground glass opacities, edema versus  pneumonia.    Authenticated and Electronically Signed by Usman Segovia III, MD on 11/07/2023 07:24:08 PM    XR Chest 1 View [[id number]] Collected: 11/07/23 1334     Updated: 11/07/23 1336    Narrative:      PROCEDURE: XR CHEST 1 VW-     HISTORY: SOA Triage Protocol, fever, cough for 2 weeks     COMPARISON: October 25, 2023..     FINDINGS: The heart is normal in size. Right lung is clear. There are  linear densities in the left lung base which are stable consistent with  atelectasis or or scar.. The mediastinum is unremarkable. There is no  pneumothorax.  There are no acute osseous  abnormalities.       Impression:      Stable chest..                 This report was signed and finalized on 11/7/2023 1:34 PM by Miesha Schmidt MD.               Echo:      Condition on Discharge:      Stable.    Code status during the hospital stay:    Code Status and Medical Interventions:   Ordered at: 11/07/23 1908     Code Status (Patient has no pulse and is not breathing):    CPR (Attempt to Resuscitate)     Medical Interventions (Patient has pulse or is breathing):    Full Support     Discharge Disposition:    Home or Self Care    Discharge Medications:       Discharge Medications        New Medications        Instructions Start Date   benzonatate 100 MG capsule  Commonly known as: TESSALON   100 mg, Oral, 3 Times Daily PRN      cefdinir 300 MG capsule  Commonly known as: OMNICEF   300 mg, Oral, 2 Times Daily      promethazine-dextromethorphan 6.25-15 MG/5ML syrup  Commonly known as: PROMETHAZINE-DM   5 mL, Oral, 4 Times Daily PRN             Changes to Medications        Instructions Start Date   latanoprost 0.005 % ophthalmic solution  Commonly known as: XALATAN  What changed: how to take this   1 drop, Ophthalmic, Daily             Continue These Medications        Instructions Start Date   amLODIPine 5 MG tablet  Commonly known as: NORVASC   TAKE 2 TABLETS EVERY DAY      cetirizine 10 MG tablet  Commonly known as: zyrTEC   10 mg, Oral, Daily      diphenhydrAMINE 25 mg capsule  Commonly known as: BENADRYL   25 mg, Oral, Nightly PRN      ELDERBERRY PO   1 tablet, Oral, Daily, Patient unsure of dose      glucose blood test strip  Commonly known as: True Metrix Blood Glucose Test   1 strip daily      Lancets misc   1 applicator, Does not apply, Daily      levothyroxine 75 MCG tablet  Commonly known as: SYNTHROID, LEVOTHROID   75 mcg, Oral, Daily      losartan-hydrochlorothiazide 100-12.5 MG per tablet  Commonly known as: HYZAAR   TAKE 1 TABLET EVERY DAY      multivitamin with minerals tablet tablet   1  tablet, Oral, Daily      NON FORMULARY   Specialty Compounded Cream for neuropathy of feet. Cream contains lidocaine, gabapentin, ketoprofen, ibuprofen and clonidine per patient. Apply to bilateral feet at bedtime      nystatin 448335 UNIT/GM cream  Commonly known as: MYCOSTATIN   Topical, 2 Times Daily      Ozempic (0.25 or 0.5 MG/DOSE) 2 MG/3ML solution pen-injector  Generic drug: Semaglutide(0.25 or 0.5MG/DOS)   0.5 mg, Subcutaneous, Weekly      pantoprazole 20 MG EC tablet  Commonly known as: PROTONIX   TAKE 1 TABLET EVERY DAY      pravastatin 40 MG tablet  Commonly known as: PRAVACHOL   40 mg, Oral, Every Evening      timolol 0.5 % ophthalmic solution  Commonly known as: TIMOPTIC   1 drop, Both Eyes, 2 Times Daily      triamcinolone 0.025 % cream  Commonly known as: KENALOG   1 application , Topical, 2 Times Daily      True Metrix Air Glucose Meter w/Device kit   1 each, Does not apply, 2 times daily      vitamin B-12 100 MCG tablet  Commonly known as: CYANOCOBALAMIN   100 mcg, Oral, Daily      vitamin C 250 MG tablet  Commonly known as: ASCORBIC ACID   1,000 mg, Oral, Daily             Discharge Diet:   Cardiac    Activity at Discharge:   As tolerated    Follow-up Appointments:     Follow-up Information       Luz Marina Baca MD .    Specialties: Pulmonary Disease, Sleep Medicine  Contact information:  [address] [facility] [address] [place] [id number] [phone]                           Future Appointments   Date Time Provider Department Center   11/10/2023 10:45 AM Hayley Ty DO [facility]   12/18/2023  2:00 PM Hayley Ty DO [facility]     Test Results Pending at Discharge:    Pending Labs       Order Current Status    Blood Culture - Blood, Hand, Left Preliminary result    Blood Culture - Blood, Hand, Right Preliminary result               Anthony Cormier MD  11/09/23  09:21 EST    Time: I spent 28 minutes on this discharge activity which included: face-to-face  encounter with the patient, reviewing the data in the system, coordination of the care with the nursing staff as well as consultants, documentation, and entering orders.     Dictated utilizing Dragon dictation.      Electronically signed by Anthony Corimer MD at 11/09/23 7670

## 2023-11-12 LAB
BACTERIA SPEC AEROBE CULT: NORMAL
BACTERIA SPEC AEROBE CULT: NORMAL

## 2023-11-20 ENCOUNTER — READMISSION MANAGEMENT (OUTPATIENT)
Dept: CALL CENTER | Facility: HOSPITAL | Age: 73
End: 2023-11-20
Payer: MEDICARE

## 2023-11-20 NOTE — OUTREACH NOTE
Medical Week 2 Survey      Flowsheet Row Responses   [facility] patient discharged from? [facility]   Does the patient have one of the following disease processes/diagnoses(primary or secondary)? Other   Week 2 attempt successful? Yes   Call start time 1704   Discharge diagnosis Acute resp insufficiency   Call end time 1705   Person spoke with today (if not patient) and relationship patient   Meds reviewed with patient/caregiver? Yes   Is the patient taking all medications as directed (includes completed medication regime)? Yes   Does the patient have a primary care provider?  Yes   Does the patient have an appointment with their PCP within 7 days of discharge? Yes   Comments regarding PCP Pt has seen PCP   Has the patient kept scheduled appointments due by today? Yes   Psychosocial issues? No   Did the patient receive a copy of their discharge instructions? Yes   Nursing interventions Reviewed instructions with patient   What is the patient's perception of their health status since discharge? Improving   If the patient is a current smoker, are they able to teach back resources for cessation? Not a smoker   Week 2 Call Completed? Yes   Graduated Yes   Did the patient feel the follow up calls were helpful during their recovery period? Yes   Wrap up additional comments Pt doing well.  Denies needs.   Call end time 1705            RACHELLE CESPEDES - Registered Nurse

## 2023-12-08 ENCOUNTER — TELEPHONE (OUTPATIENT)
Dept: ENDOCRINOLOGY | Facility: CLINIC | Age: 73
End: 2023-12-08
Payer: MEDICARE

## 2024-02-04 ENCOUNTER — HOSPITAL ENCOUNTER (INPATIENT)
Facility: HOSPITAL | Age: 74
LOS: 2 days | Discharge: HOME | End: 2024-02-06
Attending: EMERGENCY MEDICINE | Admitting: INTERNAL MEDICINE
Payer: MEDICARE

## 2024-02-04 ENCOUNTER — ANESTHESIA (OUTPATIENT)
Dept: PERIOP | Facility: HOSPITAL | Age: 74
End: 2024-02-04
Payer: MEDICARE

## 2024-02-04 ENCOUNTER — APPOINTMENT (OUTPATIENT)
Dept: GENERAL RADIOLOGY | Facility: HOSPITAL | Age: 74
End: 2024-02-04
Payer: MEDICARE

## 2024-02-04 ENCOUNTER — ANESTHESIA EVENT (OUTPATIENT)
Dept: PERIOP | Facility: HOSPITAL | Age: 74
End: 2024-02-04
Payer: MEDICARE

## 2024-02-04 DIAGNOSIS — S72.001A CLOSED DISPLACED FRACTURE OF RIGHT FEMORAL NECK: Primary | ICD-10-CM

## 2024-02-04 DIAGNOSIS — S72.001A CLOSED FRACTURE OF RIGHT HIP, INITIAL ENCOUNTER: ICD-10-CM

## 2024-02-04 PROBLEM — S72.009A HIP FRACTURE: Status: ACTIVE | Noted: 2024-02-04

## 2024-02-04 LAB
ALBUMIN SERPL-MCNC: 4.2 G/DL (ref 3.5–5.2)
ALBUMIN/GLOB SERPL: 1.4 G/DL
ALP SERPL-CCNC: 68 U/L (ref 39–117)
ALT SERPL W P-5'-P-CCNC: 17 U/L (ref 1–33)
ANION GAP SERPL CALCULATED.3IONS-SCNC: 10 MMOL/L (ref 5–15)
ANION GAP SERPL CALCULATED.3IONS-SCNC: 11 MMOL/L (ref 5–15)
AST SERPL-CCNC: 18 U/L (ref 1–32)
BASOPHILS # BLD AUTO: 0.05 10*3/MM3 (ref 0–0.2)
BASOPHILS NFR BLD AUTO: 0.5 % (ref 0–1.5)
BILIRUB SERPL-MCNC: 0.6 MG/DL (ref 0–1.2)
BUN SERPL-MCNC: 10 MG/DL (ref 8–23)
BUN SERPL-MCNC: 11 MG/DL (ref 8–23)
BUN/CREAT SERPL: 13.9 (ref 7–25)
BUN/CREAT SERPL: 16.7 (ref 7–25)
CALCIUM SPEC-SCNC: 9.3 MG/DL (ref 8.6–10.5)
CALCIUM SPEC-SCNC: 9.6 MG/DL (ref 8.6–10.5)
CHLORIDE SERPL-SCNC: 101 MMOL/L (ref 98–107)
CHLORIDE SERPL-SCNC: 102 MMOL/L (ref 98–107)
CO2 SERPL-SCNC: 25 MMOL/L (ref 22–29)
CO2 SERPL-SCNC: 25 MMOL/L (ref 22–29)
CREAT SERPL-MCNC: 0.66 MG/DL (ref 0.57–1)
CREAT SERPL-MCNC: 0.72 MG/DL (ref 0.57–1)
DEPRECATED RDW RBC AUTO: 38.5 FL (ref 37–54)
EGFRCR SERPLBLD CKD-EPI 2021: 88.4 ML/MIN/1.73
EGFRCR SERPLBLD CKD-EPI 2021: 92.8 ML/MIN/1.73
EOSINOPHIL # BLD AUTO: 0.47 10*3/MM3 (ref 0–0.4)
EOSINOPHIL NFR BLD AUTO: 5 % (ref 0.3–6.2)
ERYTHROCYTE [DISTWIDTH] IN BLOOD BY AUTOMATED COUNT: 12 % (ref 12.3–15.4)
GLOBULIN UR ELPH-MCNC: 3.1 GM/DL
GLUCOSE BLDC GLUCOMTR-MCNC: 122 MG/DL (ref 70–130)
GLUCOSE SERPL-MCNC: 122 MG/DL (ref 65–99)
GLUCOSE SERPL-MCNC: 124 MG/DL (ref 65–99)
HCT VFR BLD AUTO: 45 % (ref 34–46.6)
HGB BLD-MCNC: 15.6 G/DL (ref 12–15.9)
IMM GRANULOCYTES # BLD AUTO: 0.07 10*3/MM3 (ref 0–0.05)
IMM GRANULOCYTES NFR BLD AUTO: 0.7 % (ref 0–0.5)
LYMPHOCYTES # BLD AUTO: 2.06 10*3/MM3 (ref 0.7–3.1)
LYMPHOCYTES NFR BLD AUTO: 21.8 % (ref 19.6–45.3)
MCH RBC QN AUTO: 30.3 PG (ref 26.6–33)
MCHC RBC AUTO-ENTMCNC: 34.7 G/DL (ref 31.5–35.7)
MCV RBC AUTO: 87.4 FL (ref 79–97)
MONOCYTES # BLD AUTO: 0.48 10*3/MM3 (ref 0.1–0.9)
MONOCYTES NFR BLD AUTO: 5.1 % (ref 5–12)
NEUTROPHILS NFR BLD AUTO: 6.32 10*3/MM3 (ref 1.7–7)
NEUTROPHILS NFR BLD AUTO: 66.9 % (ref 42.7–76)
NRBC BLD AUTO-RTO: 0 /100 WBC (ref 0–0.2)
PLATELET # BLD AUTO: 261 10*3/MM3 (ref 140–450)
PMV BLD AUTO: 9.4 FL (ref 6–12)
POTASSIUM SERPL-SCNC: 3.2 MMOL/L (ref 3.5–5.2)
POTASSIUM SERPL-SCNC: 3.8 MMOL/L (ref 3.5–5.2)
PROT SERPL-MCNC: 7.3 G/DL (ref 6–8.5)
QT INTERVAL: 394 MS
QTC INTERVAL: 454 MS
RBC # BLD AUTO: 5.15 10*6/MM3 (ref 3.77–5.28)
SODIUM SERPL-SCNC: 136 MMOL/L (ref 136–145)
SODIUM SERPL-SCNC: 138 MMOL/L (ref 136–145)
WBC NRBC COR # BLD AUTO: 9.45 10*3/MM3 (ref 3.4–10.8)

## 2024-02-04 PROCEDURE — C1776 JOINT DEVICE (IMPLANTABLE): HCPCS | Performed by: ORTHOPAEDIC SURGERY

## 2024-02-04 PROCEDURE — 99285 EMERGENCY DEPT VISIT HI MDM: CPT

## 2024-02-04 PROCEDURE — 0SR9039 REPLACEMENT OF RIGHT HIP JOINT WITH CERAMIC SYNTHETIC SUBSTITUTE, CEMENTED, OPEN APPROACH: ICD-10-PCS | Performed by: ORTHOPAEDIC SURGERY

## 2024-02-04 PROCEDURE — 25010000002 SUGAMMADEX 200 MG/2ML SOLUTION: Performed by: ANESTHESIOLOGY

## 2024-02-04 PROCEDURE — 25010000002 HYDROMORPHONE 1 MG/ML SOLUTION: Performed by: EMERGENCY MEDICINE

## 2024-02-04 PROCEDURE — 25010000002 FENTANYL CITRATE (PF) 50 MCG/ML SOLUTION

## 2024-02-04 PROCEDURE — 25010000002 CEFAZOLIN PER 500 MG: Performed by: ORTHOPAEDIC SURGERY

## 2024-02-04 PROCEDURE — 73502 X-RAY EXAM HIP UNI 2-3 VIEWS: CPT

## 2024-02-04 PROCEDURE — 85025 COMPLETE CBC W/AUTO DIFF WBC: CPT | Performed by: EMERGENCY MEDICINE

## 2024-02-04 PROCEDURE — 93005 ELECTROCARDIOGRAM TRACING: CPT | Performed by: EMERGENCY MEDICINE

## 2024-02-04 PROCEDURE — 25010000002 DROPERIDOL PER 5 MG

## 2024-02-04 PROCEDURE — 25810000003 SODIUM CHLORIDE 0.9 % SOLUTION 250 ML FLEX CONT: Performed by: FAMILY MEDICINE

## 2024-02-04 PROCEDURE — 25010000002 FENTANYL CITRATE (PF) 100 MCG/2ML SOLUTION: Performed by: ANESTHESIOLOGY

## 2024-02-04 PROCEDURE — 25010000002 EPINEPHRINE PER 0.1 MG: Performed by: ORTHOPAEDIC SURGERY

## 2024-02-04 PROCEDURE — 25010000002 DEXAMETHASONE PER 1 MG: Performed by: ANESTHESIOLOGY

## 2024-02-04 PROCEDURE — C1713 ANCHOR/SCREW BN/BN,TIS/BN: HCPCS | Performed by: ORTHOPAEDIC SURGERY

## 2024-02-04 PROCEDURE — 25010000002 PROPOFOL 10 MG/ML EMULSION: Performed by: ANESTHESIOLOGY

## 2024-02-04 PROCEDURE — 25010000002 CLONIDINE PER 1 MG: Performed by: ORTHOPAEDIC SURGERY

## 2024-02-04 PROCEDURE — 25010000002 ONDANSETRON PER 1 MG: Performed by: ANESTHESIOLOGY

## 2024-02-04 PROCEDURE — 25010000002 POTASSIUM CHLORIDE 10 MEQ/100ML SOLUTION: Performed by: FAMILY MEDICINE

## 2024-02-04 PROCEDURE — 25810000003 LACTATED RINGERS PER 1000 ML: Performed by: ANESTHESIOLOGY

## 2024-02-04 PROCEDURE — 99221 1ST HOSP IP/OBS SF/LOW 40: CPT | Performed by: FAMILY MEDICINE

## 2024-02-04 PROCEDURE — 25010000002 ONDANSETRON PER 1 MG: Performed by: EMERGENCY MEDICINE

## 2024-02-04 PROCEDURE — 25810000003 LACTATED RINGERS PER 1000 ML: Performed by: ORTHOPAEDIC SURGERY

## 2024-02-04 PROCEDURE — 71045 X-RAY EXAM CHEST 1 VIEW: CPT

## 2024-02-04 PROCEDURE — 27130 TOTAL HIP ARTHROPLASTY: CPT

## 2024-02-04 PROCEDURE — 25010000002 KETOROLAC TROMETHAMINE PER 15 MG: Performed by: ORTHOPAEDIC SURGERY

## 2024-02-04 PROCEDURE — 82948 REAGENT STRIP/BLOOD GLUCOSE: CPT

## 2024-02-04 PROCEDURE — 80053 COMPREHEN METABOLIC PANEL: CPT | Performed by: FAMILY MEDICINE

## 2024-02-04 PROCEDURE — 76000 FLUOROSCOPY <1 HR PHYS/QHP: CPT

## 2024-02-04 PROCEDURE — 25010000002 MORPHINE PER 10 MG: Performed by: EMERGENCY MEDICINE

## 2024-02-04 PROCEDURE — 25010000002 VANCOMYCIN 1 G RECONSTITUTED SOLUTION: Performed by: ORTHOPAEDIC SURGERY

## 2024-02-04 PROCEDURE — 25010000002 HYDROMORPHONE 1 MG/ML SOLUTION

## 2024-02-04 PROCEDURE — 25010000002 BUPIVACAINE (PF) 0.25 % SOLUTION 30 ML VIAL: Performed by: ORTHOPAEDIC SURGERY

## 2024-02-04 DEVICE — PLUG BONE IM FEM/HIP EXETERV40 10MM: Type: IMPLANTABLE DEVICE | Site: HIP | Status: FUNCTIONAL

## 2024-02-04 DEVICE — HD FEM/HIP BIOLOX/DELTA V40 CERAM 36MM MIN2.5: Type: IMPLANTABLE DEVICE | Site: HIP | Status: FUNCTIONAL

## 2024-02-04 DEVICE — IMPLANTABLE DEVICE: Type: IMPLANTABLE DEVICE | Site: HIP | Status: FUNCTIONAL

## 2024-02-04 DEVICE — DEV CONTRL TISS STRATAFIX SYMM PDS PLUS VIL CT-1 45CM: Type: IMPLANTABLE DEVICE | Site: HIP | Status: FUNCTIONAL

## 2024-02-04 DEVICE — SCRW HEX LP TRIDENT2 6.5X40MM: Type: IMPLANTABLE DEVICE | Site: HIP | Status: FUNCTIONAL

## 2024-02-04 DEVICE — SUT NONABS MAXBRAID/PE NMBR2 HC5 38IN WHT 900333: Type: IMPLANTABLE DEVICE | Site: HIP | Status: FUNCTIONAL

## 2024-02-04 DEVICE — SHLL TRIDENT2 TRITANIUM C/HL 52MM: Type: IMPLANTABLE DEVICE | Site: HIP | Status: FUNCTIONAL

## 2024-02-04 DEVICE — INSRT HIP TRIDENT X3 0DEG 36MM SZE STRL: Type: IMPLANTABLE DEVICE | Site: HIP | Status: FUNCTIONAL

## 2024-02-04 DEVICE — CMT BONE SIMPLEX/P TMYCIN FDOS 10PK: Type: IMPLANTABLE DEVICE | Site: HIP | Status: FUNCTIONAL

## 2024-02-04 RX ORDER — LOSARTAN POTASSIUM 50 MG/1
100 TABLET ORAL
Status: DISCONTINUED | OUTPATIENT
Start: 2024-02-05 | End: 2024-02-06 | Stop reason: HOSPADM

## 2024-02-04 RX ORDER — SODIUM CHLORIDE, SODIUM LACTATE, POTASSIUM CHLORIDE, CALCIUM CHLORIDE 600; 310; 30; 20 MG/100ML; MG/100ML; MG/100ML; MG/100ML
INJECTION, SOLUTION INTRAVENOUS CONTINUOUS PRN
Status: DISCONTINUED | OUTPATIENT
Start: 2024-02-04 | End: 2024-02-04 | Stop reason: SURG

## 2024-02-04 RX ORDER — ACETAMINOPHEN 500 MG
1000 TABLET ORAL EVERY 8 HOURS SCHEDULED
Status: DISCONTINUED | OUTPATIENT
Start: 2024-02-04 | End: 2024-02-06 | Stop reason: HOSPADM

## 2024-02-04 RX ORDER — POTASSIUM CHLORIDE 7.45 MG/ML
10 INJECTION INTRAVENOUS ONCE
Status: COMPLETED | OUTPATIENT
Start: 2024-02-04 | End: 2024-02-04

## 2024-02-04 RX ORDER — OXYCODONE HYDROCHLORIDE 10 MG/1
10 TABLET ORAL EVERY 4 HOURS PRN
Status: DISCONTINUED | OUTPATIENT
Start: 2024-02-04 | End: 2024-02-05

## 2024-02-04 RX ORDER — ACETAMINOPHEN 325 MG/1
650 TABLET ORAL EVERY 4 HOURS PRN
Status: DISCONTINUED | OUTPATIENT
Start: 2024-02-04 | End: 2024-02-05 | Stop reason: ALTCHOICE

## 2024-02-04 RX ORDER — POLYETHYLENE GLYCOL 3350 17 G/17G
17 POWDER, FOR SOLUTION ORAL DAILY PRN
Status: DISCONTINUED | OUTPATIENT
Start: 2024-02-04 | End: 2024-02-06 | Stop reason: HOSPADM

## 2024-02-04 RX ORDER — HYDROMORPHONE HYDROCHLORIDE 1 MG/ML
0.5 INJECTION, SOLUTION INTRAMUSCULAR; INTRAVENOUS; SUBCUTANEOUS
Status: DISCONTINUED | OUTPATIENT
Start: 2024-02-04 | End: 2024-02-04

## 2024-02-04 RX ORDER — PRAVASTATIN SODIUM 40 MG
40 TABLET ORAL EVERY EVENING
Status: DISCONTINUED | OUTPATIENT
Start: 2024-02-04 | End: 2024-02-06 | Stop reason: HOSPADM

## 2024-02-04 RX ORDER — DEXAMETHASONE SODIUM PHOSPHATE 4 MG/ML
INJECTION, SOLUTION INTRA-ARTICULAR; INTRALESIONAL; INTRAMUSCULAR; INTRAVENOUS; SOFT TISSUE AS NEEDED
Status: DISCONTINUED | OUTPATIENT
Start: 2024-02-04 | End: 2024-02-04 | Stop reason: SURG

## 2024-02-04 RX ORDER — PANTOPRAZOLE SODIUM 40 MG/1
40 TABLET, DELAYED RELEASE ORAL
Status: DISCONTINUED | OUTPATIENT
Start: 2024-02-05 | End: 2024-02-06 | Stop reason: HOSPADM

## 2024-02-04 RX ORDER — MEPERIDINE HYDROCHLORIDE 25 MG/ML
12.5 INJECTION INTRAMUSCULAR; INTRAVENOUS; SUBCUTANEOUS
Status: DISCONTINUED | OUTPATIENT
Start: 2024-02-04 | End: 2024-02-04

## 2024-02-04 RX ORDER — FENTANYL CITRATE 50 UG/ML
INJECTION, SOLUTION INTRAMUSCULAR; INTRAVENOUS
Status: COMPLETED
Start: 2024-02-04 | End: 2024-02-04

## 2024-02-04 RX ORDER — PROPOFOL 10 MG/ML
VIAL (ML) INTRAVENOUS AS NEEDED
Status: DISCONTINUED | OUTPATIENT
Start: 2024-02-04 | End: 2024-02-04 | Stop reason: SURG

## 2024-02-04 RX ORDER — ONDANSETRON 2 MG/ML
4 INJECTION INTRAMUSCULAR; INTRAVENOUS ONCE AS NEEDED
Status: DISCONTINUED | OUTPATIENT
Start: 2024-02-04 | End: 2024-02-04

## 2024-02-04 RX ORDER — ONDANSETRON 2 MG/ML
INJECTION INTRAMUSCULAR; INTRAVENOUS AS NEEDED
Status: DISCONTINUED | OUTPATIENT
Start: 2024-02-04 | End: 2024-02-04 | Stop reason: SURG

## 2024-02-04 RX ORDER — ACETAMINOPHEN 650 MG/1
650 SUPPOSITORY RECTAL EVERY 4 HOURS PRN
Status: DISCONTINUED | OUTPATIENT
Start: 2024-02-04 | End: 2024-02-05 | Stop reason: ALTCHOICE

## 2024-02-04 RX ORDER — MAGNESIUM HYDROXIDE 1200 MG/15ML
LIQUID ORAL AS NEEDED
Status: DISCONTINUED | OUTPATIENT
Start: 2024-02-04 | End: 2024-02-04 | Stop reason: HOSPADM

## 2024-02-04 RX ORDER — NALOXONE HCL 0.4 MG/ML
0.4 VIAL (ML) INJECTION
Status: DISCONTINUED | OUTPATIENT
Start: 2024-02-04 | End: 2024-02-05

## 2024-02-04 RX ORDER — KETOROLAC TROMETHAMINE 30 MG/ML
15 INJECTION, SOLUTION INTRAMUSCULAR; INTRAVENOUS EVERY 6 HOURS PRN
Status: DISCONTINUED | OUTPATIENT
Start: 2024-02-04 | End: 2024-02-06 | Stop reason: HOSPADM

## 2024-02-04 RX ORDER — LIDOCAINE HYDROCHLORIDE 10 MG/ML
INJECTION, SOLUTION EPIDURAL; INFILTRATION; INTRACAUDAL; PERINEURAL AS NEEDED
Status: DISCONTINUED | OUTPATIENT
Start: 2024-02-04 | End: 2024-02-04 | Stop reason: SURG

## 2024-02-04 RX ORDER — SODIUM CHLORIDE 0.9 % (FLUSH) 0.9 %
10 SYRINGE (ML) INJECTION AS NEEDED
Status: DISCONTINUED | OUTPATIENT
Start: 2024-02-04 | End: 2024-02-06 | Stop reason: HOSPADM

## 2024-02-04 RX ORDER — MORPHINE SULFATE 4 MG/ML
4 INJECTION, SOLUTION INTRAMUSCULAR; INTRAVENOUS ONCE
Status: COMPLETED | OUTPATIENT
Start: 2024-02-04 | End: 2024-02-04

## 2024-02-04 RX ORDER — LEVOTHYROXINE SODIUM 0.07 MG/1
75 TABLET ORAL
Status: DISCONTINUED | OUTPATIENT
Start: 2024-02-05 | End: 2024-02-06 | Stop reason: HOSPADM

## 2024-02-04 RX ORDER — TRANEXAMIC ACID 10 MG/ML
INJECTION, SOLUTION INTRAVENOUS AS NEEDED
Status: DISCONTINUED | OUTPATIENT
Start: 2024-02-04 | End: 2024-02-04 | Stop reason: SURG

## 2024-02-04 RX ORDER — ONDANSETRON 2 MG/ML
4 INJECTION INTRAMUSCULAR; INTRAVENOUS EVERY 6 HOURS PRN
Status: DISCONTINUED | OUTPATIENT
Start: 2024-02-04 | End: 2024-02-06 | Stop reason: HOSPADM

## 2024-02-04 RX ORDER — NALOXONE HCL 0.4 MG/ML
0.1 VIAL (ML) INJECTION
Status: DISCONTINUED | OUTPATIENT
Start: 2024-02-04 | End: 2024-02-06 | Stop reason: HOSPADM

## 2024-02-04 RX ORDER — SODIUM CHLORIDE 9 MG/ML
40 INJECTION, SOLUTION INTRAVENOUS AS NEEDED
Status: DISCONTINUED | OUTPATIENT
Start: 2024-02-04 | End: 2024-02-06 | Stop reason: HOSPADM

## 2024-02-04 RX ORDER — NICOTINE POLACRILEX 4 MG
15 LOZENGE BUCCAL
Status: DISCONTINUED | OUTPATIENT
Start: 2024-02-04 | End: 2024-02-06 | Stop reason: HOSPADM

## 2024-02-04 RX ORDER — AMOXICILLIN 250 MG
2 CAPSULE ORAL 2 TIMES DAILY
Status: DISCONTINUED | OUTPATIENT
Start: 2024-02-04 | End: 2024-02-06 | Stop reason: HOSPADM

## 2024-02-04 RX ORDER — TRANEXAMIC ACID 10 MG/ML
1000 INJECTION, SOLUTION INTRAVENOUS ONCE
Status: DISCONTINUED | OUTPATIENT
Start: 2024-02-04 | End: 2024-02-04 | Stop reason: HOSPADM

## 2024-02-04 RX ORDER — ONDANSETRON 2 MG/ML
4 INJECTION INTRAMUSCULAR; INTRAVENOUS ONCE
Qty: 2 ML | Refills: 0 | Status: COMPLETED | OUTPATIENT
Start: 2024-02-04 | End: 2024-02-04

## 2024-02-04 RX ORDER — DEXTROSE MONOHYDRATE 25 G/50ML
25 INJECTION, SOLUTION INTRAVENOUS
Status: DISCONTINUED | OUTPATIENT
Start: 2024-02-04 | End: 2024-02-06 | Stop reason: HOSPADM

## 2024-02-04 RX ORDER — DROPERIDOL 2.5 MG/ML
INJECTION, SOLUTION INTRAMUSCULAR; INTRAVENOUS
Status: COMPLETED
Start: 2024-02-04 | End: 2024-02-04

## 2024-02-04 RX ORDER — AMLODIPINE BESYLATE 10 MG/1
10 TABLET ORAL DAILY
Status: DISCONTINUED | OUTPATIENT
Start: 2024-02-05 | End: 2024-02-06 | Stop reason: HOSPADM

## 2024-02-04 RX ORDER — IBUPROFEN 600 MG/1
1 TABLET ORAL
Status: DISCONTINUED | OUTPATIENT
Start: 2024-02-04 | End: 2024-02-06 | Stop reason: HOSPADM

## 2024-02-04 RX ORDER — DOCUSATE SODIUM 100 MG/1
100 CAPSULE, LIQUID FILLED ORAL
COMMUNITY

## 2024-02-04 RX ORDER — CEFAZOLIN SODIUM 2 G/100ML
2000 INJECTION, SOLUTION INTRAVENOUS EVERY 8 HOURS
Qty: 200 ML | Refills: 0 | Status: DISCONTINUED | OUTPATIENT
Start: 2024-02-05 | End: 2024-02-04

## 2024-02-04 RX ORDER — VANCOMYCIN HYDROCHLORIDE 1 G/20ML
INJECTION, POWDER, LYOPHILIZED, FOR SOLUTION INTRAVENOUS AS NEEDED
Status: DISCONTINUED | OUTPATIENT
Start: 2024-02-04 | End: 2024-02-04 | Stop reason: HOSPADM

## 2024-02-04 RX ORDER — BISACODYL 5 MG/1
5 TABLET, DELAYED RELEASE ORAL DAILY PRN
Status: DISCONTINUED | OUTPATIENT
Start: 2024-02-04 | End: 2024-02-06 | Stop reason: HOSPADM

## 2024-02-04 RX ORDER — BISACODYL 10 MG
10 SUPPOSITORY, RECTAL RECTAL DAILY PRN
Status: DISCONTINUED | OUTPATIENT
Start: 2024-02-04 | End: 2024-02-06 | Stop reason: HOSPADM

## 2024-02-04 RX ORDER — EPINEPHRINE 1 MG/ML
INJECTION, SOLUTION, CONCENTRATE INTRAVENOUS AS NEEDED
Status: DISCONTINUED | OUTPATIENT
Start: 2024-02-04 | End: 2024-02-04 | Stop reason: HOSPADM

## 2024-02-04 RX ORDER — MELOXICAM 15 MG/1
15 TABLET ORAL DAILY
Status: DISCONTINUED | OUTPATIENT
Start: 2024-02-05 | End: 2024-02-06 | Stop reason: HOSPADM

## 2024-02-04 RX ORDER — ACETAMINOPHEN 160 MG/5ML
650 SOLUTION ORAL EVERY 4 HOURS PRN
Status: DISCONTINUED | OUTPATIENT
Start: 2024-02-04 | End: 2024-02-05 | Stop reason: ALTCHOICE

## 2024-02-04 RX ORDER — TRAMADOL HYDROCHLORIDE 50 MG/1
50 TABLET ORAL EVERY 8 HOURS PRN
Status: DISCONTINUED | OUTPATIENT
Start: 2024-02-04 | End: 2024-02-06 | Stop reason: HOSPADM

## 2024-02-04 RX ORDER — SODIUM CHLORIDE 0.9 % (FLUSH) 0.9 %
10 SYRINGE (ML) INJECTION EVERY 12 HOURS SCHEDULED
Status: DISCONTINUED | OUTPATIENT
Start: 2024-02-04 | End: 2024-02-06 | Stop reason: HOSPADM

## 2024-02-04 RX ORDER — HYDROMORPHONE HYDROCHLORIDE 2 MG/ML
0.5 INJECTION, SOLUTION INTRAMUSCULAR; INTRAVENOUS; SUBCUTANEOUS
Status: DISCONTINUED | OUTPATIENT
Start: 2024-02-04 | End: 2024-02-06 | Stop reason: HOSPADM

## 2024-02-04 RX ORDER — FENTANYL CITRATE 50 UG/ML
50 INJECTION, SOLUTION INTRAMUSCULAR; INTRAVENOUS
Status: DISCONTINUED | OUTPATIENT
Start: 2024-02-04 | End: 2024-02-04

## 2024-02-04 RX ORDER — HYDROMORPHONE HYDROCHLORIDE 1 MG/ML
0.5 INJECTION, SOLUTION INTRAMUSCULAR; INTRAVENOUS; SUBCUTANEOUS
Status: DISCONTINUED | OUTPATIENT
Start: 2024-02-04 | End: 2024-02-05

## 2024-02-04 RX ORDER — OXYCODONE HYDROCHLORIDE 5 MG/1
5 TABLET ORAL EVERY 4 HOURS PRN
Status: DISCONTINUED | OUTPATIENT
Start: 2024-02-04 | End: 2024-02-06 | Stop reason: HOSPADM

## 2024-02-04 RX ORDER — ASPIRIN 81 MG/1
81 TABLET ORAL EVERY 12 HOURS SCHEDULED
Status: DISCONTINUED | OUTPATIENT
Start: 2024-02-05 | End: 2024-02-06 | Stop reason: HOSPADM

## 2024-02-04 RX ORDER — ONDANSETRON 2 MG/ML
4 INJECTION INTRAMUSCULAR; INTRAVENOUS ONCE
Status: COMPLETED | OUTPATIENT
Start: 2024-02-04 | End: 2024-02-04

## 2024-02-04 RX ORDER — ROCURONIUM BROMIDE 10 MG/ML
INJECTION, SOLUTION INTRAVENOUS AS NEEDED
Status: DISCONTINUED | OUTPATIENT
Start: 2024-02-04 | End: 2024-02-04 | Stop reason: SURG

## 2024-02-04 RX ORDER — HYDROCHLOROTHIAZIDE 12.5 MG/1
12.5 TABLET ORAL
Status: DISCONTINUED | OUTPATIENT
Start: 2024-02-05 | End: 2024-02-06 | Stop reason: HOSPADM

## 2024-02-04 RX ORDER — FENTANYL CITRATE 50 UG/ML
INJECTION, SOLUTION INTRAMUSCULAR; INTRAVENOUS AS NEEDED
Status: DISCONTINUED | OUTPATIENT
Start: 2024-02-04 | End: 2024-02-04 | Stop reason: SURG

## 2024-02-04 RX ORDER — ONDANSETRON 4 MG/1
4 TABLET, ORALLY DISINTEGRATING ORAL EVERY 6 HOURS PRN
Status: DISCONTINUED | OUTPATIENT
Start: 2024-02-04 | End: 2024-02-06 | Stop reason: HOSPADM

## 2024-02-04 RX ORDER — DROPERIDOL 2.5 MG/ML
0.62 INJECTION, SOLUTION INTRAMUSCULAR; INTRAVENOUS ONCE AS NEEDED
Status: COMPLETED | OUTPATIENT
Start: 2024-02-04 | End: 2024-02-04

## 2024-02-04 RX ORDER — PHENYLEPHRINE HCL IN 0.9% NACL 1 MG/10 ML
SYRINGE (ML) INTRAVENOUS AS NEEDED
Status: DISCONTINUED | OUTPATIENT
Start: 2024-02-04 | End: 2024-02-04 | Stop reason: SURG

## 2024-02-04 RX ORDER — ACETAMINOPHEN 160 MG
TABLET,DISINTEGRATING ORAL AS NEEDED
Status: DISCONTINUED | OUTPATIENT
Start: 2024-02-04 | End: 2024-02-04 | Stop reason: HOSPADM

## 2024-02-04 RX ORDER — SODIUM CHLORIDE, SODIUM LACTATE, POTASSIUM CHLORIDE, CALCIUM CHLORIDE 600; 310; 30; 20 MG/100ML; MG/100ML; MG/100ML; MG/100ML
100 INJECTION, SOLUTION INTRAVENOUS CONTINUOUS
Status: DISCONTINUED | OUTPATIENT
Start: 2024-02-04 | End: 2024-02-06 | Stop reason: HOSPADM

## 2024-02-04 RX ADMIN — ONDANSETRON 4 MG: 2 INJECTION INTRAMUSCULAR; INTRAVENOUS at 20:58

## 2024-02-04 RX ADMIN — DEXAMETHASONE SODIUM PHOSPHATE 4 MG: 4 INJECTION INTRA-ARTICULAR; INTRALESIONAL; INTRAMUSCULAR; INTRAVENOUS; SOFT TISSUE at 19:43

## 2024-02-04 RX ADMIN — LIDOCAINE HYDROCHLORIDE 50 MG: 10 INJECTION, SOLUTION EPIDURAL; INFILTRATION; INTRACAUDAL; PERINEURAL at 19:43

## 2024-02-04 RX ADMIN — HYDROMORPHONE HYDROCHLORIDE 0.5 MG: 1 INJECTION, SOLUTION INTRAMUSCULAR; INTRAVENOUS; SUBCUTANEOUS at 21:50

## 2024-02-04 RX ADMIN — SODIUM CHLORIDE, POTASSIUM CHLORIDE, SODIUM LACTATE AND CALCIUM CHLORIDE 100 ML/HR: 600; 310; 30; 20 INJECTION, SOLUTION INTRAVENOUS at 22:42

## 2024-02-04 RX ADMIN — TRANEXAMIC ACID 1000 MG: 10 INJECTION, SOLUTION INTRAVENOUS at 20:44

## 2024-02-04 RX ADMIN — FENTANYL CITRATE 50 MCG: 50 INJECTION, SOLUTION INTRAMUSCULAR; INTRAVENOUS at 21:45

## 2024-02-04 RX ADMIN — HYDROMORPHONE HYDROCHLORIDE 0.5 MG: 1 INJECTION, SOLUTION INTRAMUSCULAR; INTRAVENOUS; SUBCUTANEOUS at 22:00

## 2024-02-04 RX ADMIN — DROPERIDOL 0.62 MG: 2.5 INJECTION, SOLUTION INTRAMUSCULAR; INTRAVENOUS at 22:12

## 2024-02-04 RX ADMIN — POTASSIUM CHLORIDE 10 MEQ: 7.46 INJECTION, SOLUTION INTRAVENOUS at 17:50

## 2024-02-04 RX ADMIN — FENTANYL CITRATE 50 MCG: 50 INJECTION, SOLUTION INTRAMUSCULAR; INTRAVENOUS at 21:56

## 2024-02-04 RX ADMIN — SODIUM CHLORIDE, POTASSIUM CHLORIDE, SODIUM LACTATE AND CALCIUM CHLORIDE: 600; 310; 30; 20 INJECTION, SOLUTION INTRAVENOUS at 19:43

## 2024-02-04 RX ADMIN — HYDROMORPHONE HYDROCHLORIDE 1 MG: 1 INJECTION, SOLUTION INTRAMUSCULAR; INTRAVENOUS; SUBCUTANEOUS at 16:25

## 2024-02-04 RX ADMIN — ROCURONIUM BROMIDE 70 MG: 10 SOLUTION INTRAVENOUS at 19:43

## 2024-02-04 RX ADMIN — ONDANSETRON 4 MG: 2 INJECTION INTRAMUSCULAR; INTRAVENOUS at 14:08

## 2024-02-04 RX ADMIN — ONDANSETRON 4 MG: 2 INJECTION INTRAMUSCULAR; INTRAVENOUS at 16:25

## 2024-02-04 RX ADMIN — SODIUM CHLORIDE 2000 MG: 900 INJECTION INTRAVENOUS at 19:47

## 2024-02-04 RX ADMIN — MORPHINE SULFATE 4 MG: 4 INJECTION, SOLUTION INTRAMUSCULAR; INTRAVENOUS at 14:07

## 2024-02-04 RX ADMIN — Medication 100 MCG: at 20:31

## 2024-02-04 RX ADMIN — TRANEXAMIC ACID 1000 MG: 10 INJECTION, SOLUTION INTRAVENOUS at 19:50

## 2024-02-04 RX ADMIN — PROPOFOL 150 MG: 10 INJECTION, EMULSION INTRAVENOUS at 19:43

## 2024-02-04 RX ADMIN — SUGAMMADEX 200 MG: 100 INJECTION, SOLUTION INTRAVENOUS at 21:01

## 2024-02-04 RX ADMIN — FENTANYL CITRATE 100 MCG: 50 INJECTION, SOLUTION INTRAMUSCULAR; INTRAVENOUS at 19:43

## 2024-02-04 RX ADMIN — SODIUM CHLORIDE 40 ML: 9 INJECTION, SOLUTION INTRAVENOUS at 17:50

## 2024-02-04 NOTE — H&P
[facility] Medicine Services  HISTORY AND PHYSICAL    Patient Name: Pari Howell  : 1950  MRN: [id number]  Primary Care Physician: Radha Gregg APRN  Date of admission: 2024      Subjective   Subjective     Chief Complaint:  Hip Fracture     HPI:  Pari Howell is a 73 y.o. female with PMH of hypothyroidism, HTN, DM II with neuropathy that presented after a fall. She was getting out of her vehicle and her foot got caught in her purse and she fell. She immediately had pain in her right hip. She currently is having pain in the hip. No chest pain or shortness of breath.   In the ED, XR of hip is right femoral head-neck fracture. Ortho was consulted and she will be taken to OR today.   She will be admitted to the hospitalist service for further treatment and evaluation.       Personal History     Past Medical History:   Diagnosis Date    Bladder infection     Dyspareunia, female     Glaucoma     H/O sexual problem     High cholesterol     History of abnormal cervical Pap smear     Hypertension     Hypertension     Hypothyroidism     Impaired functional mobility, balance, gait, and endurance     Labial cyst     Muscle weakness     Type 2 diabetes mellitus     Urinary incontinence     Vaginal itching     Yeast infection            Past Surgical History:   Procedure Laterality Date    CARPAL TUNNEL RELEASE  2007    CATARACT EXTRACTION Right 2022    CATARACT EXTRACTION, BILATERAL Left     CHOLECYSTECTOMY      EYE SURGERY      HYSTERECTOMY      OOPHORECTOMY      TUBAL ABDOMINAL LIGATION  1979       Family History: family history includes Breast cancer (age of onset: 46) in her daughter; Cancer in her brother and sister; Colon cancer in her maternal uncle; Diabetes in her brother, mother, and sister; Heart attack in her mother; Hyperlipidemia in her mother, sister, and sister; Hypertension in her mother; Prostate cancer in her brother; Stroke in her mother;  Thyroid disease in her mother, sister, and sister; Vision loss in her mother, sister, and sister.     Social History:  reports that she has never smoked. She has never used smokeless tobacco. She reports that she does not drink alcohol and does not use drugs.  Social History     Social History Narrative    Not on file       Medications:  Available home medication information reviewed.  Elderberry, Lancets, NON FORMULARY, Semaglutide(0.25 or 0.5MG/DOS), True Metrix Air Glucose Meter, amLODIPine, cetirizine, diphenhydrAMINE, glucose blood, latanoprost, levothyroxine, losartan-hydrochlorothiazide, multivitamin with minerals, nystatin, pantoprazole, pravastatin, timolol, triamcinolone, vitamin B-12, and vitamin C    Allergies   Allergen Reactions    Shellfish-Derived Products Rash       Objective   Objective     Vital Signs:   Temp:  [98.7 °F (37.1 °C)] 98.7 °F (37.1 °C)  Heart Rate:  [75-89] 80  Resp:  [15] 15  BP: (159-195)/(74-96) 176/87       Physical Exam   Constitutional: Awake, alert  HENT: NCAT, mucous membranes moist  Neck: Supple  Respiratory: Clear to auscultation bilaterally, nonlabored respirations   Cardiovascular: RRR, no murmurs, rubs, or gallops,   Gastrointestinal:  nondistended  Musculoskeletal: No bilateral ankle edema, no clubbing or cyanosis to extremities  Psychiatric: Appropriate affect, cooperative  Neurologic: Oriented x 3,, speech clear  Skin: No rashes      Result Review:  I have personally reviewed the results from the time of this admission to 2/4/2024 16:13 EST and agree with these findings:  []  Laboratory list / accordion  []  Microbiology  []  Radiology  []  EKG/Telemetry   []  Cardiology/Vascular   []  Pathology  []  Old records  []  Other:  Most notable findings include:       LAB RESULTS:      Lab 02/04/24  1405   WBC 9.45   HEMOGLOBIN 15.6   HEMATOCRIT 45.0   PLATELETS 261   NEUTROS ABS 6.32   IMMATURE GRANS (ABS) 0.07*   LYMPHS ABS 2.06   MONOS ABS 0.48   EOS ABS 0.47*   MCV 87.4          Lab 02/04/24  1428   SODIUM 136   POTASSIUM 3.2*   CHLORIDE 101   CO2 25.0   ANION GAP 10.0   BUN 11   CREATININE 0.66   EGFR 92.8   GLUCOSE 122*   CALCIUM 9.3         Lab 02/04/24  1428   TOTAL PROTEIN 7.3   ALBUMIN 4.2   GLOBULIN 3.1   ALT (SGPT) 17   AST (SGOT) 18   BILIRUBIN 0.6   ALK PHOS 68                         Microbiology Results (last 10 days)       ** No results found for the last 240 hours. **            XR Hip With or Without Pelvis 2 - 3 View Right    Result Date: 2/4/2024  XR HIP W OR WO PELVIS 2-3 VIEW RIGHT Date of Exam: 2/4/2024 2:12 PM EST Indication: fall/pain Comparison: None available. Findings: There is a transverse impacted fracture involving the proximal right femoral head neck junction. There is impaction of the dominant distal diaphyseal fracture fragment into the femoral head fracture fragment. The articulation of the hip remains intact without dislocation. There does not appear to be significant involvement of the trochanteric portion of the femur. The left hip is intact. The pubic rami and sacral alae are intact. The SI joints and pubic symphysis are intact. Mild age-related changes are noted. 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 Vascular calcifications are noted. Phleboliths are observed.     Impression: Impression: 1.Transverse impacted fracture involving the proximal right femoral head-neck junction. There is impaction of the diaphyseal fracture fragment into the femoral head fracture fragment. The articulation the right hip remains intact without dislocation. Electronically Signed: Jeremy Benites MD  2/4/2024 2:43 PM EST  Workstation ID: [id number]    XR Chest 1 View    Result Date: 2/4/2024  XR CHEST 1 VW Date of Exam: 2/4/2024 2:12 PM EST Indication: preop Comparison: 11/7/2023 FINDINGS: No new consolidations or pleural effusions are observed. The cardiac silhouette and mediastinum are stable. No acute osseous abnormalities are identified.     Impression:  No evidence for acute cardiopulmonary process. Electronically Signed: Jeremy Benites MD  2/4/2024 2:40 PM EST  Workstation ID: [id number]         Assessment & Plan   Assessment & Plan       Hip fracture      Hip Fracture  -ortho to see, taking to OR today  -NPO    DM II   -NPO currently, Mod SSI    HTN  HLD  -home meds    Hypokalemia  -will give a one time dose of 10 meq  -start replacement           DVT prophylaxis:  No DVT prophylaxis order currently exists.          CODE STATUS:    There are no questions and answers to display.       Expected Discharge   Expected discharge date/ time has not been documented.     Leanna Saleh,   02/04/24

## 2024-02-04 NOTE — ED NOTES
Pari Howell    Nursing Report ED to Floor:  Mental status: A&Ox4  Ambulatory status: normally up independently, bedrest for now unable to move right hip  Oxygen Therapy:  RA  Cardiac Rhythm: NS  Admitted from: ED  Safety Concerns:  None  Social Issues: None; family at bedside and very pleasant  ED Room #:  29    ED Nurse Phone Extension - [phone] or may call [phone].      HPI:   Chief Complaint   Patient presents with    Fall       Past Medical History:  Past Medical History:   Diagnosis Date    Bladder infection     Dyspareunia, female     Glaucoma     H/O sexual problem     High cholesterol     History of abnormal cervical Pap smear     Hypertension     Hypertension     Hypothyroidism     Impaired functional mobility, balance, gait, and endurance     Labial cyst     Muscle weakness     Type 2 diabetes mellitus     Urinary incontinence     Vaginal itching     Yeast infection         Past Surgical History:  Past Surgical History:   Procedure Laterality Date    CARPAL TUNNEL RELEASE  2007    CATARACT EXTRACTION Right 09/27/2022    CATARACT EXTRACTION, BILATERAL Left     CHOLECYSTECTOMY  1991    EYE SURGERY  2021    HYSTERECTOMY  2007    OOPHORECTOMY      TUBAL ABDOMINAL LIGATION  11/1979        Admitting Doctor:   Leanna Saleh DO    Consulting Provider(s):  Consults       No orders found from 1/6/2024 to 2/5/2024.             Admitting Diagnosis:   The encounter diagnosis was Closed displaced fracture of right femoral neck.    Most Recent Vitals:   Vitals:    02/04/24 1430 02/04/24 1500 02/04/24 1530 02/04/24 1544   BP: 159/96 168/74 176/87    Pulse: 85 75 80    Resp:    15   Temp:    98.7 °F (37.1 °C)   TempSrc:    Oral   SpO2: 95% 96% 97%        Active LDAs/IV Access:   Lines, Drains & Airways       Active LDAs       Name Placement date Placement time Site Days    Peripheral IV 02/04/24 1407 Right Antecubital 02/04/24  1407  Antecubital  less than 1                    Labs (abnormal labs have a star):    Labs Reviewed   COMPREHENSIVE METABOLIC PANEL - Abnormal; Notable for the following components:       Result Value    Glucose 122 (*)     Potassium 3.2 (*)     All other components within normal limits    Narrative:     GFR Normal >60  Chronic Kidney Disease <60  Kidney Failure <15    The GFR formula is only valid for adults with stable renal function between ages 18 and 70.   CBC WITH AUTO DIFFERENTIAL - Abnormal; Notable for the following components:    RDW 12.0 (*)     Immature Grans % 0.7 (*)     Eosinophils, Absolute 0.47 (*)     Immature Grans, Absolute 0.07 (*)     All other components within normal limits   CBC AND DIFFERENTIAL    Narrative:     The following orders were created for panel order CBC & Differential.  Procedure                               Abnormality         Status                     ---------                               -----------         ------                     CBC Auto Differential[[id number]]        Abnormal            Final result                 Please view results for these tests on the individual orders.       Meds Given in ED:   Medications   sodium chloride 0.9 % flush 10 mL (has no administration in time range)   Morphine sulfate (PF) injection 4 mg (4 mg Intravenous Given 2/4/24 1407)   ondansetron (ZOFRAN) injection 4 mg (4 mg Intravenous Given 2/4/24 1408)

## 2024-02-04 NOTE — Clinical Note
Level of Care: Telemetry [5]   Diagnosis: Hip fracture [197979]   Admitting Physician: WILLI MOULTON [[id number]]   Attending Physician: WILLI MOULTON [[id number]]

## 2024-02-04 NOTE — ED PROVIDER NOTES
Subjective   History of Present Illness  73-year-old female who presents for evaluation of right hip pain after a fall.  The patient reports that she was getting out of a vehicle when she got her foot caught in her purse strap and ultimately fell landing on her right hip.  She now presents with a complaint of pain along the inguinal crease on the right pelvis.  She denies any lateral hip pain.  She has not been able to stand and ambulate.  She did not take any pain medication prior to arrival.  She was delivered here via EMS.  She did not hit her head or lose consciousness.  She does not take any anticoagulation.  No reported chest or abdominal pain.  No reported neck or midline back pain.  No bilateral upper extremity pain or left lower extremity pain.  No previous surgery to her hips or pelvis.  She exhibits normal mentation.  No other acute complaint.      Review of Systems   Constitutional:  Negative for chills, fatigue and fever.   HENT:  Negative for congestion, ear pain, postnasal drip, sinus pressure and sore throat.    Eyes:  Negative for pain, redness and visual disturbance.   Respiratory:  Negative for cough, chest tightness and shortness of breath.    Cardiovascular:  Negative for chest pain, palpitations and leg swelling.   Gastrointestinal:  Negative for abdominal pain, anal bleeding, blood in stool, diarrhea, nausea and vomiting.   Endocrine: Negative for polydipsia and polyuria.   Genitourinary:  Negative for difficulty urinating, dysuria, frequency and urgency.   Musculoskeletal:  Positive for arthralgias. Negative for back pain and neck pain.   Skin:  Negative for pallor and rash.   Allergic/Immunologic: Negative for environmental allergies and immunocompromised state.   Neurological:  Negative for dizziness, weakness and headaches.   Hematological:  Negative for adenopathy.   Psychiatric/Behavioral:  Negative for confusion, self-injury and suicidal ideas. The patient is not nervous/anxious.    All  other systems reviewed and are negative.      Past Medical History:   Diagnosis Date    Bladder infection     Dyspareunia, female     Glaucoma     H/O sexual problem     High cholesterol     History of abnormal cervical Pap smear     Hypertension     Hypertension     Hypothyroidism     Impaired functional mobility, balance, gait, and endurance     Labial cyst     Muscle weakness     Type 2 diabetes mellitus     Urinary incontinence     Vaginal itching     Yeast infection        Allergies   Allergen Reactions    Shellfish-Derived Products Rash       Past Surgical History:   Procedure Laterality Date    CARPAL TUNNEL RELEASE  2007    CATARACT EXTRACTION Right 09/27/2022    CATARACT EXTRACTION, BILATERAL Left     CHOLECYSTECTOMY  1991    EYE SURGERY  2021    HYSTERECTOMY  2007    OOPHORECTOMY      TUBAL ABDOMINAL LIGATION  11/1979       Family History   Problem Relation Age of Onset    Colon cancer Maternal Uncle     Diabetes Mother     Heart attack Mother     Hypertension Mother     Hyperlipidemia Mother     Thyroid disease Mother     Stroke Mother     Vision loss Mother     Cancer Sister     Thyroid disease Sister     Diabetes Brother     Prostate cancer Brother     Cancer Brother         Efren prostrates cancer    Breast cancer Daughter 46    Diabetes Sister     Hyperlipidemia Sister     Thyroid disease Sister     Vision loss Sister     Vision loss Sister     Hyperlipidemia Sister     Ovarian cancer Neg Hx        Social History     Socioeconomic History    Marital status:    Tobacco Use    Smoking status: Never    Smokeless tobacco: Never   Vaping Use    Vaping Use: Never used   Substance and Sexual Activity    Alcohol use: Never    Drug use: Never    Sexual activity: Yes     Partners: Male     Birth control/protection: Surgical, Hysterectomy           Objective   Physical Exam  Vitals and nursing note reviewed.   Constitutional:       General: She is not in acute distress.     Appearance: Normal appearance.  She is well-developed. She is not toxic-appearing or diaphoretic.   HENT:      Head: Normocephalic and atraumatic.      Right Ear: External ear normal.      Left Ear: External ear normal.      Nose: Nose normal.   Eyes:      General: Lids are normal.      Pupils: Pupils are equal, round, and reactive to light.   Neck:      Trachea: No tracheal deviation.   Cardiovascular:      Rate and Rhythm: Normal rate and regular rhythm.      Pulses: No decreased pulses.      Heart sounds: Normal heart sounds. No murmur heard.     No friction rub. No gallop.   Pulmonary:      Effort: Pulmonary effort is normal. No respiratory distress.      Breath sounds: Normal breath sounds. No decreased breath sounds, wheezing, rhonchi or rales.   Abdominal:      General: Bowel sounds are normal.      Palpations: Abdomen is soft.      Tenderness: There is no abdominal tenderness. There is no guarding or rebound.   Musculoskeletal:         General: No deformity. Normal range of motion.      Cervical back: Normal range of motion and neck supple.      Right hip: Tenderness and bony tenderness present. No deformity. Decreased range of motion.   Lymphadenopathy:      Cervical: No cervical adenopathy.   Skin:     General: Skin is warm and dry.      Findings: No rash.   Neurological:      Mental Status: She is alert and oriented to person, place, and time.      Cranial Nerves: No cranial nerve deficit.      Sensory: No sensory deficit.   Psychiatric:         Speech: Speech normal.         Behavior: Behavior normal.         Thought Content: Thought content normal.         Judgment: Judgment normal.         Procedures           ED Course  ED Course as of 02/04/24 1528   Sun Feb 04, 2024   1527 Relatively healthy normally ambulatory female who does not take anticoagulation who suffered mechanical fall.  She does have underlying history of diabetes.  She has a right impacted femoral neck fracture.  Dr. Evans would like to take to the operating room  today, and is requesting hospitalist admission. [NS]   1527 ECG 12 Lead Pre-Op / Pre-Procedure [NS]      ED Course User Index  [NS] Enrique Isbell MD                                             Medical Decision Making  Differential diagnosis includes right hip contusion, right hip fracture, right hip dislocation, pelvic fracture.    X-ray of the right hip shows an impacted right femoral neck fracture.    The patient does not take anticoagulation.  Labs are otherwise nonconcerning.    Chest x-ray shows no acute disease.    EKG independently interpreted by myself shows normal sinus rhythm with no acute ischemic changes.    I discussed the patient with the orthopedic surgeon, Dr. Weber.  He request hospitalist admission.  He will consult for probable surgical intervention today.    Problems Addressed:  Closed displaced fracture of right femoral neck: complicated acute illness or injury with systemic symptoms    Amount and/or Complexity of Data Reviewed  Independent Historian: spouse  External Data Reviewed: labs, radiology and ECG.  Labs: ordered. Decision-making details documented in ED Course.  Radiology: ordered and independent interpretation performed. Decision-making details documented in ED Course.  ECG/medicine tests: ordered and independent interpretation performed. Decision-making details documented in ED Course.    Risk  Prescription drug management.  Decision regarding hospitalization.        Final diagnoses:   Closed displaced fracture of right femoral neck       ED Disposition  ED Disposition       ED Disposition   Decision to Admit    Condition   --    Comment   --               No follow-up provider specified.       Medication List      No changes were made to your prescriptions during this visit.            Enrique Isbell MD  02/04/24 1528

## 2024-02-05 LAB
ANION GAP SERPL CALCULATED.3IONS-SCNC: 13 MMOL/L (ref 5–15)
B PARAPERT DNA SPEC QL NAA+PROBE: NOT DETECTED
B PERT DNA SPEC QL NAA+PROBE: NOT DETECTED
BACTERIA UR QL AUTO: ABNORMAL /HPF
BASOPHILS # BLD AUTO: 0.02 10*3/MM3 (ref 0–0.2)
BASOPHILS NFR BLD AUTO: 0.1 % (ref 0–1.5)
BILIRUB UR QL STRIP: NEGATIVE
BUN SERPL-MCNC: 10 MG/DL (ref 8–23)
BUN/CREAT SERPL: 14.1 (ref 7–25)
C PNEUM DNA NPH QL NAA+NON-PROBE: NOT DETECTED
CALCIUM SPEC-SCNC: 9 MG/DL (ref 8.6–10.5)
CHLORIDE SERPL-SCNC: 101 MMOL/L (ref 98–107)
CLARITY UR: CLEAR
CO2 SERPL-SCNC: 23 MMOL/L (ref 22–29)
COLOR UR: YELLOW
CREAT SERPL-MCNC: 0.71 MG/DL (ref 0.57–1)
DEPRECATED RDW RBC AUTO: 38.8 FL (ref 37–54)
EGFRCR SERPLBLD CKD-EPI 2021: 89.9 ML/MIN/1.73
EOSINOPHIL # BLD AUTO: 0.01 10*3/MM3 (ref 0–0.4)
EOSINOPHIL NFR BLD AUTO: 0.1 % (ref 0.3–6.2)
ERYTHROCYTE [DISTWIDTH] IN BLOOD BY AUTOMATED COUNT: 12 % (ref 12.3–15.4)
FLUAV SUBTYP SPEC NAA+PROBE: NOT DETECTED
FLUBV RNA ISLT QL NAA+PROBE: NOT DETECTED
GLUCOSE BLDC GLUCOMTR-MCNC: 136 MG/DL (ref 70–130)
GLUCOSE BLDC GLUCOMTR-MCNC: 137 MG/DL (ref 70–130)
GLUCOSE BLDC GLUCOMTR-MCNC: 144 MG/DL (ref 70–130)
GLUCOSE BLDC GLUCOMTR-MCNC: 187 MG/DL (ref 70–130)
GLUCOSE BLDC GLUCOMTR-MCNC: 206 MG/DL (ref 70–130)
GLUCOSE SERPL-MCNC: 177 MG/DL (ref 65–99)
GLUCOSE UR STRIP-MCNC: NEGATIVE MG/DL
HADV DNA SPEC NAA+PROBE: NOT DETECTED
HCOV 229E RNA SPEC QL NAA+PROBE: NOT DETECTED
HCOV HKU1 RNA SPEC QL NAA+PROBE: NOT DETECTED
HCOV NL63 RNA SPEC QL NAA+PROBE: NOT DETECTED
HCOV OC43 RNA SPEC QL NAA+PROBE: NOT DETECTED
HCT VFR BLD AUTO: 38.1 % (ref 34–46.6)
HGB BLD-MCNC: 13.2 G/DL (ref 12–15.9)
HGB UR QL STRIP.AUTO: NEGATIVE
HMPV RNA NPH QL NAA+NON-PROBE: NOT DETECTED
HPIV1 RNA ISLT QL NAA+PROBE: NOT DETECTED
HPIV2 RNA SPEC QL NAA+PROBE: NOT DETECTED
HPIV3 RNA NPH QL NAA+PROBE: NOT DETECTED
HPIV4 P GENE NPH QL NAA+PROBE: NOT DETECTED
HYALINE CASTS UR QL AUTO: ABNORMAL /LPF
IMM GRANULOCYTES # BLD AUTO: 0.08 10*3/MM3 (ref 0–0.05)
IMM GRANULOCYTES NFR BLD AUTO: 0.6 % (ref 0–0.5)
KETONES UR QL STRIP: NEGATIVE
LEUKOCYTE ESTERASE UR QL STRIP.AUTO: ABNORMAL
LYMPHOCYTES # BLD AUTO: 0.7 10*3/MM3 (ref 0.7–3.1)
LYMPHOCYTES NFR BLD AUTO: 5 % (ref 19.6–45.3)
M PNEUMO IGG SER IA-ACNC: NOT DETECTED
MCH RBC QN AUTO: 30.9 PG (ref 26.6–33)
MCHC RBC AUTO-ENTMCNC: 34.6 G/DL (ref 31.5–35.7)
MCV RBC AUTO: 89.2 FL (ref 79–97)
MONOCYTES # BLD AUTO: 0.36 10*3/MM3 (ref 0.1–0.9)
MONOCYTES NFR BLD AUTO: 2.6 % (ref 5–12)
NEUTROPHILS NFR BLD AUTO: 12.83 10*3/MM3 (ref 1.7–7)
NEUTROPHILS NFR BLD AUTO: 91.6 % (ref 42.7–76)
NITRITE UR QL STRIP: NEGATIVE
NRBC BLD AUTO-RTO: 0 /100 WBC (ref 0–0.2)
PH UR STRIP.AUTO: 6.5 [PH] (ref 5–8)
PLATELET # BLD AUTO: 232 10*3/MM3 (ref 140–450)
PMV BLD AUTO: 9.6 FL (ref 6–12)
POTASSIUM SERPL-SCNC: 3.9 MMOL/L (ref 3.5–5.2)
PROT UR QL STRIP: NEGATIVE
RBC # BLD AUTO: 4.27 10*6/MM3 (ref 3.77–5.28)
RBC # UR STRIP: ABNORMAL /HPF
REF LAB TEST METHOD: ABNORMAL
RHINOVIRUS RNA SPEC NAA+PROBE: NOT DETECTED
RSV RNA NPH QL NAA+NON-PROBE: NOT DETECTED
SARS-COV-2 RNA NPH QL NAA+NON-PROBE: NOT DETECTED
SODIUM SERPL-SCNC: 137 MMOL/L (ref 136–145)
SP GR UR STRIP: 1.03 (ref 1–1.03)
SQUAMOUS #/AREA URNS HPF: ABNORMAL /HPF
UROBILINOGEN UR QL STRIP: ABNORMAL
WBC # UR STRIP: ABNORMAL /HPF
WBC NRBC COR # BLD AUTO: 14 10*3/MM3 (ref 3.4–10.8)

## 2024-02-05 PROCEDURE — 97110 THERAPEUTIC EXERCISES: CPT

## 2024-02-05 PROCEDURE — 82948 REAGENT STRIP/BLOOD GLUCOSE: CPT

## 2024-02-05 PROCEDURE — 63710000001 INSULIN REGULAR HUMAN PER 5 UNITS: Performed by: ORTHOPAEDIC SURGERY

## 2024-02-05 PROCEDURE — 85025 COMPLETE CBC W/AUTO DIFF WBC: CPT | Performed by: ORTHOPAEDIC SURGERY

## 2024-02-05 PROCEDURE — 97161 PT EVAL LOW COMPLEX 20 MIN: CPT

## 2024-02-05 PROCEDURE — 81001 URINALYSIS AUTO W/SCOPE: CPT | Performed by: INTERNAL MEDICINE

## 2024-02-05 PROCEDURE — 99232 SBSQ HOSP IP/OBS MODERATE 35: CPT | Performed by: INTERNAL MEDICINE

## 2024-02-05 PROCEDURE — 87086 URINE CULTURE/COLONY COUNT: CPT | Performed by: INTERNAL MEDICINE

## 2024-02-05 PROCEDURE — 25010000002 CEFAZOLIN PER 500 MG: Performed by: ORTHOPAEDIC SURGERY

## 2024-02-05 PROCEDURE — 97530 THERAPEUTIC ACTIVITIES: CPT

## 2024-02-05 PROCEDURE — 0202U NFCT DS 22 TRGT SARS-COV-2: CPT | Performed by: INTERNAL MEDICINE

## 2024-02-05 PROCEDURE — 97116 GAIT TRAINING THERAPY: CPT

## 2024-02-05 PROCEDURE — 97535 SELF CARE MNGMENT TRAINING: CPT | Performed by: OCCUPATIONAL THERAPIST

## 2024-02-05 PROCEDURE — 80048 BASIC METABOLIC PNL TOTAL CA: CPT | Performed by: ORTHOPAEDIC SURGERY

## 2024-02-05 PROCEDURE — 97165 OT EVAL LOW COMPLEX 30 MIN: CPT | Performed by: OCCUPATIONAL THERAPIST

## 2024-02-05 RX ORDER — OXYCODONE HYDROCHLORIDE 10 MG/1
10 TABLET ORAL EVERY 4 HOURS PRN
Status: DISCONTINUED | OUTPATIENT
Start: 2024-02-05 | End: 2024-02-06 | Stop reason: HOSPADM

## 2024-02-05 RX ADMIN — INSULIN HUMAN 4 UNITS: 100 INJECTION, SOLUTION PARENTERAL at 00:55

## 2024-02-05 RX ADMIN — SENNOSIDES AND DOCUSATE SODIUM 2 TABLET: 8.6; 5 TABLET ORAL at 21:14

## 2024-02-05 RX ADMIN — SODIUM CHLORIDE 2000 MG: 900 INJECTION INTRAVENOUS at 03:35

## 2024-02-05 RX ADMIN — Medication 10 ML: at 11:36

## 2024-02-05 RX ADMIN — PRAVASTATIN SODIUM 40 MG: 40 TABLET ORAL at 17:21

## 2024-02-05 RX ADMIN — LEVOTHYROXINE SODIUM 75 MCG: 0.07 TABLET ORAL at 08:33

## 2024-02-05 RX ADMIN — ACETAMINOPHEN 1000 MG: 500 TABLET ORAL at 05:00

## 2024-02-05 RX ADMIN — OXYCODONE HYDROCHLORIDE 5 MG: 5 TABLET ORAL at 09:41

## 2024-02-05 RX ADMIN — ACETAMINOPHEN 1000 MG: 500 TABLET ORAL at 13:19

## 2024-02-05 RX ADMIN — ASPIRIN 81 MG: 81 TABLET, COATED ORAL at 21:13

## 2024-02-05 RX ADMIN — SENNOSIDES AND DOCUSATE SODIUM 2 TABLET: 8.6; 5 TABLET ORAL at 08:32

## 2024-02-05 RX ADMIN — ACETAMINOPHEN 1000 MG: 500 TABLET ORAL at 21:14

## 2024-02-05 RX ADMIN — LOSARTAN POTASSIUM 100 MG: 50 TABLET, FILM COATED ORAL at 08:32

## 2024-02-05 RX ADMIN — OXYCODONE HYDROCHLORIDE 5 MG: 5 TABLET ORAL at 05:00

## 2024-02-05 RX ADMIN — PANTOPRAZOLE SODIUM 40 MG: 40 TABLET, DELAYED RELEASE ORAL at 05:00

## 2024-02-05 RX ADMIN — MELOXICAM 15 MG: 15 TABLET ORAL at 08:33

## 2024-02-05 RX ADMIN — SODIUM CHLORIDE 2000 MG: 900 INJECTION INTRAVENOUS at 11:34

## 2024-02-05 RX ADMIN — ACETAMINOPHEN 1000 MG: 500 TABLET ORAL at 00:56

## 2024-02-05 RX ADMIN — INSULIN HUMAN 2 UNITS: 100 INJECTION, SOLUTION PARENTERAL at 18:25

## 2024-02-05 RX ADMIN — TRAMADOL HYDROCHLORIDE 50 MG: 50 TABLET, COATED ORAL at 18:23

## 2024-02-05 RX ADMIN — HYDROCHLOROTHIAZIDE 12.5 MG: 12.5 CAPSULE ORAL at 08:33

## 2024-02-05 RX ADMIN — ASPIRIN 81 MG: 81 TABLET, COATED ORAL at 08:32

## 2024-02-05 RX ADMIN — OXYCODONE HYDROCHLORIDE 5 MG: 5 TABLET ORAL at 21:22

## 2024-02-05 NOTE — OP NOTE
TOTAL HIP ARTHROPLASTY ANTERIOR  Procedure Report    Patient Name:  Pari Howell  YOB: 1950    Date of Surgery:  2/4/2024     Indications:    73 y.o. female who was admitted to [facility] following a fall from standing with significant pain and difficulty ambulating. X-rays revealed a displaced femoral neck fracture.    The patient was indicated for a total hip arthroplasty. Likely risks and benefits of the procedure including but not limited to infection, DVT, pulmonary embolism, leg length discrepancy, recurrent dislocation, possibility of injury to nerves and vessels, and periprosthetic fractures have been discussed with the patient and her daughter in detail. Despite the risks involved, the patient elected to proceed and informed consent was obtained.     Patient Identification:  Patient was seen in the preop, consent was reviewed, operative procedure was identified, and surgical site and thigh marked.        Pre-op Diagnosis:   Closed right hip fracture [507697]       Post-Op Diagnosis Codes:     * Closed right hip fracture [S72.001A]    Procedure/CPT® Codes:      Procedure(s):  TOTAL HIP ARTHROPLASTY ANTERIOR    Staff:  Surgeon(s):  Doni Evans MD    Anesthesia: General with Block    Estimated Blood Loss: 200ml    Implants:    Implant Name Type Inv. Item Serial No.  Lot No. LRB No. Used Action   DEV CONTRL TISS STRATAFIX SYMM PDS PLUS NATALI CT-1 45CM - [id number] Implant DEV CONTRL TISS STRATAFIX SYMM PDS PLUS NATALI CT-1 45CM  [employer] [id number] Right 1 Implanted   SUT NONABS MAXBRAID/PE NMBR2 HC5 38IN WHT [id number] - [id number] Implant SUT NONABS MAXBRAID/PE NMBR2 HC5 38IN WHT 340758  [employer] [id number] Right 2 Implanted   SHLL TRIDENT2 TRITANIUM C/HL 52MM - [id number] Implant SHLL TRIDENT2 TRITANIUM C/HL 52MM  OLGA JANIE [id number] Right 1 Implanted   INSRT HIP TRIDENT X3 0DEG 36MM LILIANE STRL - [id number] Implant INSRT HIP TRIDENT X3 0DEG  36MM LILIANE STRL  OLGA JANIE [id number] Right 1 Implanted   SCRW HEX LP TRIDENT2 6.5X40MM - [id number] Implant SCRW HEX LP TRIDENT2 6.5X40MM  OLGA JANIE [id number] Right 1 Implanted   CMT BONE SIMPLEX/P TMYCIN FDOS 10PK - [id number] Implant CMT BONE SIMPLEX/P TMYCIN FDOS 10PK  OLGA JANIE [id number] Right 1 Implanted   CMT BONE SIMPLEX/P TMYCIN FDOS 10PK - [id number] Implant CMT BONE SIMPLEX/P TMYCIN FDOS 10PK  OLGA JANIE [id number] Right 1 Implanted   STEM FEM/HIP EXETER V40 CMT SS OFFST/44MM SZ2 150MM - [id number] Implant STEM FEM/HIP EXETER V40 CMT SS OFFST/44MM SZ2 150MM  OLGA JANIE [id number] Right 1 Implanted   PLUG BONE IM FEM/HIP [id number] 10MM - [id number] Implant PLUG BONE IM FEM/HIP [id number] 10MM  OLGA JANIE [id number] Right 1 Implanted   HD FEM/HIP BIOLOX/DELTA V40 CERAM 36MM MIN2.5 - [id number] Implant HD FEM/HIP BIOLOX/DELTA V40 CERAM 36MM MIN2.5  OLGA JANIE [id number] Right 1 Implanted       Specimen:          None      Findings: see dictation    Complications: none    Description of Procedure:   The patient was transferred to [facility] operating room and preoperative antibiotics were given IV prior to skin incision as well as 1 gram of Tranexemic Acid. Patient received general anesthesia and was transferred to the [place] Table. All bony prominences were padded adequately. The operative hip was then prepped and draped in the usual sterile fashion. Multiple timeouts were done identifying the correct patient, surgical site, and planned procedure.    A skin incision was made overlying the anterior lateral aspect of the hip for about 10 cm just below the lateral to the anterior superior iliac spine. Skin and subcutaneous tissue and fascia were incised in line with the skin incision overlying the tensor fascia michael muscle. The tensor muscle was then retracted laterally and the interval between sartorius and rectus femoris medially and the tensor fascia muscle were developed. The lateral femoral  circumflex vessels were identified and were ligated without difficulty. The deep fascia was incised and the capsule of the hip joint was identified. We then placed a soft tissue protecting device deep to the rectus and the tensor. Retractors were then placed extracapsular and the capsule was then incised in a T-shaped manner and the anterior posterior capsules were then tied with #2 FiberWire. Following this, retractors were placed intracapsularly. We did identify the obvious signs of severe osteoarthritis upon incising the capsule. We then made appropriate releases on the inferior medial neck and along the saddle of the femoral neck. Femoral neck remaining was identified and osteotomy was done according to the preoperative templating with an oscillating saw. The femoral head and cut neck were extracted using a corkscrew without difficulty. The femoral head did have some minor signs of articular cartilage loss and superior wear.    The acetabular cavity was exposed by placing retractors and taking care to protect the anterior vascular structures. The labrum was excised and the pulvinar was excised from the cotyloid fossa. The acetabular cavity was then progressively reamed maintaining the correct inclination and version under image intensifier control. Adequate medialization was obtained. Adequate fit was found to be obtained with the reamer size of 52. The [place] T2 cup size 52 was then impacted into position. This was found to seat satisfactorily with adequate inclination and anteversion. Single screw was placed for additional fixation. Following this, the cup was cleaned and then a neutral liner impacted. Following this, the periarticular tissues were then infiltrated with local anesthetic.    Attention was then directed to the proximal femur. The proximal femur was delivered using a trochanteric hook placed just distal to the vastus tubercle and proximal to the gluteus cheryl tendon. The leg was then  externally rotated and gross traction released. Hyperextension and adduction was done using the [place] table. Mechanical lift on the table was utilized to support the femur and appropriate capsular releases were made to expose the medial slope of the greater trochanter. The proximal femur was delivered and the femoral canal was then entered by removing lateral femoral neck with a box chisel by serial broaching. Adequate fit was found to be obtained with the Size 44 N2 150 broach. We then trialed and -2.5 neck was reduced. Fluoroscopic imaging was used to assess leg length and offset was found to be symmetric. Broach trials were then removed. Cement restrictor was placed distally. The femoral canal was prepped with thorough irrigation followed by a peroxide soak followed by an epinephrine soak. Antibiotic cement was inserted in a retrograde-fashion and pressurization was done prior to implanting the implant. A 44 N2 150 Emerson stem was implanted in appropriate anteversion. It sat very similar to our broach trial. We chose the -2.5 neck ceramic. This was then reduced again and fluoroscopy images both lateral and AP of the femur showed the stem to be in good position. AP pelvis showed symmetric leg length and offset. There are no acute fractures. The area was thoroughly irrigated with saline. We did use of more of the injection cocktail. Betadine irrigation was used followed by saline irrigation. Soft tissue hemostasis was secured and second dose of IV TXA was used. Sponge, needle, and instrument counts were correct. FiberWire sutures directly anterior and posterior capsular flaps were tied to each other. 1 g vanco powder placed deep. We then closed the fascial layer with a running #2 STRATAFIX followed by 2-0 vicryl and then monocryl for the skin and Dermabond . Once the Dermabond had dried, Mepilex AG dressing was placed over the top. The patient was then transferred to the recovery room in stable condition. The  patient tolerated the procedure well and there were no complications. The patient had adequate distal pulses and good cap refill.    The patient will be mobilized by physical therapy and receive antibiotic prophylaxis postoperatively for 2 more doses given the first 24 hours. The patient was started on DVT prophylaxis with 81 mg aspirin twice daily starting postoperative day #1.    I discussed the satisfactory performance of the procedure with the patient's family and discussed the postoperative management.      Assistant Participation:  Surgeon(s):  Doni Evans MD    Assistant: Hever Lee PA-C assisted with proper preoperative positioning, preoperative templating, determining availability of proper implants, prepping and draping of patient, manipulation placement of instruments, protection of ligaments and vital soft tissue structures, assistance in maintaining hemostasis and assistance with closure of the wound. Their skills and knowledge of the steps of operation and the desired outcome of each surgical step was crucial, allowing for efficient choreography of surgical procedure, and closure of the wound which lead to reduced surgical time, less blood loss, and less risk of complications for the patient.         Doni Evans MD     Date: 2/4/2024  Time: 21:38 EST

## 2024-02-05 NOTE — THERAPY EVALUATION
Patient Name: Pari Howell  : 1950    MRN: [id number]                              Today's Date: 2024       Admit Date: 2024    Visit Dx:     ICD-10-CM ICD-9-CM   1. Closed displaced fracture of right femoral neck  S72.001A 820.8     Patient Active Problem List   Diagnosis    Type 2 diabetes mellitus without complication, without long-term current use of insulin    Other specified glaucoma    Hyperlipidemia LDL goal <70    Vitamin D deficiency    Irritable bowel syndrome with diarrhea    Hypothyroidism (acquired)    Acute respiratory insufficiency    Hip fracture     Past Medical History:   Diagnosis Date    Bladder infection     Dyspareunia, female     Glaucoma     H/O sexual problem     High cholesterol     History of abnormal cervical Pap smear     Hypertension     Hypertension     Hypothyroidism     Impaired functional mobility, balance, gait, and endurance     Labial cyst     Muscle weakness     Type 2 diabetes mellitus     Urinary incontinence     Vaginal itching     Yeast infection      Past Surgical History:   Procedure Laterality Date    CARPAL TUNNEL RELEASE      CATARACT EXTRACTION Right 2022    CATARACT EXTRACTION, BILATERAL Left     CHOLECYSTECTOMY      EYE SURGERY      HYSTERECTOMY      OOPHORECTOMY      TOTAL HIP ARTHROPLASTY Right 2024    Procedure: TOTAL HIP ARTHROPLASTY ANTERIOR RIGHT;  Surgeon: Doni Evans MD;  Location: [facility];  Service: Orthopedics;  Laterality: Right;    TUBAL ABDOMINAL LIGATION  1979      General Information       Row Name 24 1117          OT Time and Intention    Document Type evaluation  -AR     Mode of Treatment individual therapy;occupational therapy  -AR       Row Name 24 1117          General Information    Patient Profile Reviewed yes  -AR     Prior Level of Function independent:;all household mobility;community mobility;gait;transfer;ADL's;driving  -AR     Existing Precautions/Restrictions  fall;right;hip, anterior;other (see comments)  vertigo, droplet  -AR     Barriers to Rehab none identified  -AR       Row Name 02/05/24 1117          Living Environment    People in Home spouse  -AR       Row Name 02/05/24 1117          Home Main Entrance    Number of Stairs, Main Entrance five  -AR     Stair Railings, Main Entrance railings on both sides of stairs  -AR       Row Name 02/05/24 1117          Stairs Within Home, Primary    Number of Stairs, Within Home, Primary none  -AR       Row Name 02/05/24 1117          Cognition    Orientation Status (Cognition) oriented x 4  -AR       Row Name 02/05/24 1117          Safety Issues, Functional Mobility    Safety Issues Affecting Function (Mobility) awareness of need for assistance;impulsivity;insight into deficits/self-awareness;safety precaution awareness;safety precautions follow-through/compliance  -AR     Impairments Affecting Function (Mobility) balance;endurance/activity tolerance;pain;sensation/sensory awareness;shortness of breath;strength;range of motion (ROM)  -AR               User Key  (r) = Recorded By, (t) = Taken By, (c) = Cosigned By      Initials Name Provider Type    AR Laxmi Prabhakar, OT Occupational Therapist                     Mobility/ADL's       Row Name 02/05/24 1118          Bed Mobility    Bed Mobility scooting/bridging;sit-supine  -AR     Scooting/Bridging [place] (Bed Mobility) supervision;verbal cues  -AR     Supine-Sit [place] (Bed Mobility) minimum assist (75% patient effort);verbal cues  -AR     Assistive Device (Bed Mobility) leg ;head of bed elevated  -AR     Comment, (Bed Mobility) Issued leg  and educated on use. Mild dizziness with positional changes which she reports is baseline.  -AR       Row Name 02/05/24 1118          Transfers    Transfers sit-stand transfer;stand-sit transfer;toilet transfer  -AR     Comment, (Transfers) Cues to advanced RLE during stand-to-sit transition and cues for safe  placement of walker. Issued handouts on bathroom safety and reviewed use of BSC frame over her commode as well as shower chair for walk-in shower. Reviewed places to obtain and issued/reviewed handouts with pt and spouse.  -AR       Row Name 02/05/24 1118          Sit-Stand Transfer    Sit-Stand [place] (Transfers) contact guard;verbal cues  -AR     Assistive Device (Sit-Stand Transfers) walker, front-wheeled  -AR       Row Name 02/05/24 1118          Stand-Sit Transfer    Stand-Sit [place] (Transfers) contact guard;verbal cues  -AR     Assistive Device (Stand-Sit Transfers) walker, front-wheeled  -AR       Row Name 02/05/24 1118          Toilet Transfer    Type (Toilet Transfer) stand-sit;sit-stand  -AR     [place] Level (Toilet Transfer) contact guard;verbal cues  -AR     Assistive Device (Toilet Transfer) raised toilet seat;grab bars/safety frame;walker, front-wheeled  -AR       Row Name 02/05/24 1118          Functional Mobility    Functional Mobility- Ind. Level contact guard assist;verbal cues required  -AR     Functional Mobility-Distance (Feet) 20  -AR       Row Name 02/05/24 1118          Activities of Daily Living    BADL Assessment/Intervention bathing;lower body dressing;grooming;feeding;toileting  -AR       Row Name 02/05/24 1118          Mobility    Extremity Weight-bearing Status right lower extremity  -AR     Right Lower Extremity (Weight-bearing Status) weight-bearing as tolerated (WBAT)  -AR       Row Name 02/05/24 1118          Bathing Assessment/Intervention    Comment, (Bathing) Issued LH sponge to assist at home and educated on use.  -AR       Row Name 02/05/24 1118          Lower Body Dressing Assessment/Training    [place] Level (Lower Body Dressing) don;socks;minimum assist (75% patient effort)  -AR     Position (Lower Body Dressing) unsupported sitting  -AR     Comment, (Lower Body Dressing) Reviewed anterior hip precautions and ADL retraining for comfort including AE  use and incorporation of brina-dressing technique. Issued self-care kit and educated on use.  -AR       Row Name 02/05/24 1118          Grooming Assessment/Training    [place] Level (Grooming) wash face, hands;supervision;set up  -AR     Position (Grooming) supine  -AR       Row Name 02/05/24 1118          Self-Feeding Assessment/Training    [place] Level (Feeding) liquids to mouth;supervision  -AR     Position (Self-Feeding) supine  -AR       Row Name 02/05/24 1118          Toileting Assessment/Training    [place] Level (Toileting) toileting skills;contact guard assist;verbal cues  -AR     Position (Toileting) supported standing;unsupported sitting  -AR               User Key  (r) = Recorded By, (t) = Taken By, (c) = Cosigned By      Initials Name Provider Type    Laxmi Honeycutt, OT Occupational Therapist                   Obj/Interventions       Row Name 02/05/24 1123          Sensory Assessment (Somatosensory)    Sensory Assessment (Somatosensory) UE sensation intact  -AR       Row Name 02/05/24 1123          Vision Assessment/Intervention    Vision Assessment Comment glasses not at beside, needs for reading  -AR       Row Name 02/05/24 1123          Range of Motion Comprehensive    General Range of Motion no range of motion deficits identified  -AR       Row Name 02/05/24 1123          Strength Comprehensive (MMT)    General Manual Muscle Testing (MMT) Assessment no strength deficits identified  -AR     Comment, General Manual Muscle Testing (MMT) Assessment BUE 5/5  -AR       Row Name 02/05/24 1123          Balance    Balance Assessment sitting static balance;sitting dynamic balance;standing static balance;standing dynamic balance  -AR     Static Sitting Balance supervision  -AR     Dynamic Sitting Balance supervision  -AR     Position, Sitting Balance unsupported;sitting in chair  -AR     Static Standing Balance contact guard  -AR     Dynamic Standing Balance contact guard  -AR      Position/Device Used, Standing Balance supported;walker, rolling  -AR               User Key  (r) = Recorded By, (t) = Taken By, (c) = Cosigned By      Initials Name Provider Type    Laxmi Honeycutt OT Occupational Therapist                   Goals/Plan       Row Name 02/05/24 1128          Transfer Goal 1 (OT)    Activity/Assistive Device (Transfer Goal 1, OT) sit-to-stand/stand-to-sit;toilet;walker, rolling  -AR     [place] Level/Cues Needed (Transfer Goal 1, OT) supervision required;verbal cues required  -AR     Time Frame (Transfer Goal 1, OT) long term goal (LTG);by discharge  -AR     Progress/Outcome (Transfer Goal 1, OT) goal ongoing  -AR       Row Name 02/05/24 1128          Dressing Goal 1 (OT)    Activity/Device (Dressing Goal 1, OT) lower body dressing;reacher;sock-aid  -AR     [place]/Cues Needed (Dressing Goal 1, OT) contact guard required;verbal cues required  -AR     Time Frame (Dressing Goal 1, OT) long term goal (LTG);by discharge  -AR     Progress/Outcome (Dressing Goal 1, OT) goal ongoing  -AR       Row Name 02/05/24 1128          Therapy Assessment/Plan (OT)    Planned Therapy Interventions (OT) adaptive equipment training;BADL retraining;edema control/reduction;IADL retraining;occupation/activity based interventions;patient/caregiver education/training;transfer/mobility retraining  -AR               User Key  (r) = Recorded By, (t) = Taken By, (c) = Cosigned By      Initials Name Provider Type    Laxmi Honeycutt OT Occupational Therapist                   Clinical Impression       Row Name 02/05/24 1124          Pain Assessment    Pretreatment Pain Rating 0/10 - no pain  -AR     Posttreatment Pain Rating 0/10 - no pain  -AR       Row Name 02/05/24 1124          Plan of Care Review    Plan of Care Reviewed With patient;spouse  -AR     Outcome Evaluation OT educated pt on ADL retraining, transfer training and home safety; issued necessary self-care kit and educated on  use. She completed bed mobility with min assist, LB dressing with AE with min assist and toileting with CGA. Recommend DC home with initial 24/7 assist from spouse. Reviewed handouts on bathroom safety and DME, they anticipate borrowing BSC to place frame over commode and obtaining shower chair.  -AR       Row Name 02/05/24 1124          Therapy Assessment/Plan (OT)    Rehab Potential (OT) good, to achieve stated therapy goals  -AR     Criteria for Skilled Therapeutic Interventions Met (OT) yes  -AR     Therapy Frequency (OT) daily  -AR       Row Name 02/05/24 1124          Therapy Plan Review/Discharge Plan (OT)    Equipment Needs Upon Discharge (OT) commode chair;shower chair  -AR     Anticipated Discharge Disposition (OT) home with 24/7 care  -AR       Row Name 02/05/24 1124          Vital Signs    Pre Patient Position Sitting  -AR     Intra Patient Position Standing  -AR     Post Patient Position Supine  -AR       Row Name 02/05/24 1124          Positioning and Restraints    Pre-Treatment Position sitting in chair/recliner  -AR     Post Treatment Position bed  -AR     In Chair notified nsg;reclined;call light within reach;encouraged to call for assist;exit alarm on;with family/caregiver;compression device  cold pack applied over incision  -AR               User Key  (r) = Recorded By, (t) = Taken By, (c) = Cosigned By      Initials Name Provider Type    AR Laxmi Prabhakar, OT Occupational Therapist                   Outcome Measures       Row Name 02/05/24 1128          How much help from another is currently needed...    Putting on and taking off regular lower body clothing? 3  -AR     Bathing (including washing, rinsing, and drying) 3  -AR     Toileting (which includes using toilet bed pan or urinal) 3  -AR     Putting on and taking off regular upper body clothing 3  -AR     Taking care of personal grooming (such as brushing teeth) 3  -AR     Eating meals 3  -AR     AM-PAC 6 Clicks Score (OT) 18  -AR        Row Name 02/05/24 0927          How much help from another person do you currently need...    Turning from your back to your side while in flat bed without using bedrails? 3  -CM     Moving from lying on back to sitting on the side of a flat bed without bedrails? 3  -CM     Moving to and from a bed to a chair (including a wheelchair)? 3  -CM     Standing up from a chair using your arms (e.g., wheelchair, bedside chair)? 3  -CM     Climbing 3-5 steps with a railing? 3  -CM     To walk in hospital room? 3  -CM     AM-PAC 6 Clicks Score (PT) 18  -CM     Highest Level of Mobility Goal 6 --> Walk 10 steps or more  -CM       Row Name 02/05/24 1128 02/05/24 0927       Functional Assessment    Outcome Measure Options AM-PAC 6 Clicks Daily Activity (OT)  -AR AM-PAC 6 Clicks Basic Mobility (PT)  -CM              User Key  (r) = Recorded By, (t) = Taken By, (c) = Cosigned By      Initials Name Provider Type    Laxmi Honeycutt, OT Occupational Therapist    CM Bessei Carpenter, PT Physical Therapist                    Occupational Therapy Education       Title: PT OT SLP Therapies (Done)       Topic: Occupational Therapy (Done)       Point: ADL training (Done)       Description:   Instruct learner(s) on proper safety adaptation and remediation techniques during self care or transfers.   Instruct in proper use of assistive devices.                  Learning Progress Summary             Patient YAHAIRA Mcbride,TB,D,H, DU,VU by AR at 2/5/2024 1129   Family YAHAIRA Mcbride,TB,D,H, DU,VU by AR at 2/5/2024 1129                         Point: Home exercise program (Done)       Description:   Instruct learner(s) on appropriate technique for monitoring, assisting and/or progressing therapeutic exercises/activities.                  Learning Progress Summary             Patient YAHAIRA Mcbride,TB,D,H, DU,VU by AR at 2/5/2024 1129   Family YAHAIRA Mcbride,TB,D,H, DU,VU by AR at 2/5/2024 1129                         Point: Precautions (Done)        Description:   Instruct learner(s) on prescribed precautions during self-care and functional transfers.                  Learning Progress Summary             Patient Eager, E,TB,D,H, DU,VU by AR at 2/5/2024 1129   Family Eager, E,TB,D,H, DU,VU by AR at 2/5/2024 1129                         Point: Body mechanics (Done)       Description:   Instruct learner(s) on proper positioning and spine alignment during self-care, functional mobility activities and/or exercises.                  Learning Progress Summary             Patient Eager, E,TB,D,H, DU,VU by AR at 2/5/2024 1129   Family Eager, E,TB,D,H, DU,VU by AR at 2/5/2024 1129                                         User Key       Initials Effective Dates Name Provider Type Discipline    AR 07/11/23 -  Laxmi Prabhakar, OT Occupational Therapist OT                  OT Recommendation and Plan  Planned Therapy Interventions (OT): adaptive equipment training, BADL retraining, edema control/reduction, IADL retraining, occupation/activity based interventions, patient/caregiver education/training, transfer/mobility retraining  Therapy Frequency (OT): daily  Plan of Care Review  Plan of Care Reviewed With: patient, spouse  Outcome Evaluation: OT educated pt on ADL retraining, transfer training and home safety; issued necessary self-care kit and educated on use. She completed bed mobility with min assist, LB dressing with AE with min assist and toileting with CGA. Recommend DC home with initial 24/7 assist from spouse. Reviewed handouts on bathroom safety and DME, they anticipate borrowing BSC to place frame over commode and obtaining shower chair.     Time Calculation:   Evaluation Complexity (OT)  Review Occupational Profile/Medical/Therapy History Complexity: brief/low complexity  Assessment, Occupational Performance/Identification of Deficit Complexity: 1-3 performance deficits  Clinical Decision Making Complexity (OT): problem focused assessment/low  complexity  Overall Complexity of Evaluation (OT): low complexity     Time Calculation- OT       Row Name 02/05/24 1129             Time Calculation- OT    OT Start Time 1022  -AR      OT Received On 02/05/24  -AR      OT Goal Re-Cert Due Date 02/15/24  -AR         Timed Charges    74194 - OT Self Care/Mgmt Minutes 9  -AR         Untimed Charges    OT Eval/Re-eval Minutes 60  -AR         Total Minutes    Timed Charges Total Minutes 9  -AR      Untimed Charges Total Minutes 60  -AR       Total Minutes 69  -AR                User Key  (r) = Recorded By, (t) = Taken By, (c) = Cosigned By      Initials Name Provider Type    AR Laxmi Prabhakar OT Occupational Therapist                  Therapy Charges for Today       Code Description Service Date Service Provider Modifiers Qty    33341554534 HC OT SELF CARE/MGMT/TRAIN EA 15 MIN 2/5/2024 Laxmi Prabhakar, OT GO 1    [id number] HC OT EVAL LOW COMPLEXITY 4 2/5/2024 Laxmi Prabhakar OT GO 1                 Laxmi Prabhakar OT  2/5/2024

## 2024-02-05 NOTE — CONSULTS
Orthopedic Consult    Patient: Pari Howell                                           YOB: 1950     Date of Admission: 2/4/2024  1:40 PM            Medical Record Number: [id number]    Attending Physician: Leanna Saleh*    Consulting Physician: Doni Evans MD    Reason for Consult: right hip fracture.    History of Present Illness: 73 y.o. female admitted to [facility], [place], operated by [facility] with Hip fracture [S72.009A]  Hip fracture [S72.009A].     The patient was evaluated in the emergency room and was diagnosed with a  hip fracture.   Secondary to the age / multiple medical co morbidities the patient was admitted to the hospitalist.   As I was on call for the emergency room I was consulted for further evaluation and treatment.     The patient was in the usual state of health and fell from standing height in home, Resulting in sudden onset hip pain and inability to ambulate.   Denies any history of loss of consciousness, headache, vomiting, or seizures.   Denies any other injuries.   The patient is accompanied by  family members  to this hospital visit.     The patient denies any prior  pre-existing pain in the hip.  Patient is a  home/ community ambulator. Patient denies walker/cane assistive device.   The patient  lives at home with  family , is quite active and independent in activities of daily living.  The patient denies history of dementia.    Patient denies any history of: DVT/PE, MRSA, COPD, CHF, CAD, , Dementia or A-Fib.   The patient has history of :Diabetes mellitus  The patient is on anticoagulants:     Past medical history, past surgical history, social history, family history, ALLERGIES, current medications have been reviewed by me.    Past Medical History:   Diagnosis Date    Bladder infection     Dyspareunia, female     Glaucoma     H/O sexual problem     High cholesterol     History of abnormal cervical Pap smear     Hypertension     Hypertension     Hypothyroidism      Impaired functional mobility, balance, gait, and endurance     Labial cyst     Muscle weakness     Type 2 diabetes mellitus     Urinary incontinence     Vaginal itching     Yeast infection      Past Surgical History:   Procedure Laterality Date    CARPAL TUNNEL RELEASE  2007    CATARACT EXTRACTION Right 09/27/2022    CATARACT EXTRACTION, BILATERAL Left     CHOLECYSTECTOMY  1991    EYE SURGERY  2021    HYSTERECTOMY  2007    OOPHORECTOMY      TUBAL ABDOMINAL LIGATION  11/1979     Social History     Occupational History    Not on file   Tobacco Use    Smoking status: Never    Smokeless tobacco: Not on file   Vaping Use    Vaping Use: Never used   Substance and Sexual Activity    Alcohol use: Never    Drug use: Never    Sexual activity: Yes     Partners: Male     Birth control/protection: Surgical, Hysterectomy      Social History     Social History Narrative    Not on file     Family History   Problem Relation Age of Onset    Colon cancer Maternal Uncle     Diabetes Mother     Heart attack Mother     Hypertension Mother     Hyperlipidemia Mother     Thyroid disease Mother     Stroke Mother     Vision loss Mother     Cancer Sister     Thyroid disease Sister     Diabetes Brother     Prostate cancer Brother     Cancer Brother         Efren prostrates cancer    Breast cancer Daughter 46    Diabetes Sister     Hyperlipidemia Sister     Thyroid disease Sister     Vision loss Sister     Vision loss Sister     Hyperlipidemia Sister     Ovarian cancer Neg Hx         Allergies   Allergen Reactions    Shellfish-Derived Products Rash       Home Medications:  Medications Prior to Admission   Medication Sig Dispense Refill Last Dose    amLODIPine (NORVASC) 5 MG tablet TAKE 2 TABLETS EVERY  tablet 0 2/4/2024    ascorbic acid (VITAMIN C) 1000 MG tablet Take 1 tablet by mouth Daily. OTC   2/4/2024    cetirizine (zyrTEC) 10 MG tablet Take 1 tablet by mouth Daily. OTC   2/4/2024    diphenhydrAMINE (BENADRYL) 25 mg capsule Take 1  capsule by mouth At Night As Needed for Itching. OTC   Past Month    docusate sodium (COLACE) 100 MG capsule Take 1 capsule by mouth every night at bedtime.   2/3/2024    Elderberry 500 MG capsule Take 1 capsule by mouth Daily. OTC   2/4/2024    latanoprost (XALATAN) 0.005 % ophthalmic solution Apply 1 drop to eye Daily. (Patient taking differently: Administer 1 drop to both eyes Every Night.) 7.5 mL 3 2/3/2024    levothyroxine (SYNTHROID, LEVOTHROID) 75 MCG tablet Take 1 tablet by mouth Daily. (Patient taking differently: Take 1 tablet by mouth Every Morning.) 90 tablet 1 2/4/2024    losartan-hydrochlorothiazide (HYZAAR) 100-12.5 MG per tablet TAKE 1 TABLET EVERY DAY 90 tablet 1 2/4/2024    multivitamin with minerals tablet tablet Take 1 tablet by mouth Daily. OTC   2/4/2024    NON FORMULARY Specialty Compounded Cream for neuropathy of feet. Cream contains lidocaine, gabapentin, ketoprofen, ibuprofen and clonidine per patient. Apply to bilateral feet at bedtime   2/3/2024    nystatin (MYCOSTATIN) 823005 UNIT/GM cream Apply  topically to the appropriate area as directed 2 (Two) Times a Day. 90 g 2 Past Week    pantoprazole (PROTONIX) 20 MG EC tablet TAKE 1 TABLET EVERY DAY 90 tablet 0 2/4/2024    pravastatin (PRAVACHOL) 40 MG tablet Take 1 tablet by mouth Every Evening. 90 tablet 3 2/3/2024    Semaglutide,0.25 or 0.5MG/DOS, (Ozempic, 0.25 or 0.5 MG/DOSE,) 2 MG/3ML solution pen-injector Inject 0.5 mg under the skin into the appropriate area as directed 1 (One) Time Per Week. (Patient taking differently: Inject 0.5 mg under the skin into the appropriate area as directed 1 (One) Time Per Week. Saturday) 9 mL 1 2/3/2024    timolol (TIMOPTIC) 0.5 % ophthalmic solution Administer 1 drop to both eyes 2 (Two) Times a Day.   2/4/2024    triamcinolone (KENALOG) 0.025 % cream Apply 1 application topically to the appropriate area as directed 2 (Two) Times a Day. 15 g 1 Past Month    vitamin B-12 (CYANOCOBALAMIN) 100 MCG tablet  Take 1 tablet by mouth Daily. OTC   2/4/2024       Current Medications:  Scheduled Meds:[START ON 2/5/2024] amLODIPine, 10 mg, Oral, Daily  [START ON 2/5/2024] losartan, 100 mg, Oral, Q24H   And  [START ON 2/5/2024] hydroCHLOROthiazide, 12.5 mg, Oral, Q24H  [MAR Hold] insulin regular, 2-9 Units, Subcutaneous, Q6H  [START ON 2/5/2024] levothyroxine, 75 mcg, Oral, QAM AC  [START ON 2/5/2024] pantoprazole, 40 mg, Oral, Q AM  pravastatin, 40 mg, Oral, Q PM  [MAR Hold] senna-docusate sodium, 2 tablet, Oral, BID  [MAR Hold] sodium chloride, 10 mL, Intravenous, Q12H      Continuous Infusions:   PRN Meds:.  [MAR Hold] acetaminophen **OR** [MAR Hold] acetaminophen **OR** [MAR Hold] acetaminophen    [MAR Hold] senna-docusate sodium **AND** [MAR Hold] polyethylene glycol **AND** [MAR Hold] bisacodyl **AND** [MAR Hold] bisacodyl    [MAR Hold] Calcium Replacement - Follow Nurse / BPA Driven Protocol    [MAR Hold] dextrose    [MAR Hold] dextrose    droperidol **OR** droperidol    fentanyl    [MAR Hold] glucagon (human recombinant)    [MAR Hold] HYDROmorphone **AND** [MAR Hold] naloxone    HYDROmorphone    lactated ringers    [MAR Hold] Magnesium Standard Dose Replacement - Follow Nurse / BPA Driven Protocol    meperidine    [MAR Hold] ondansetron ODT **OR** [MAR Hold] ondansetron    ondansetron    [MAR Hold] Phosphorus Replacement - Follow Nurse / BPA Driven Protocol    Potassium Replacement - Follow Nurse / BPA Driven Protocol    [COMPLETED] Insert Peripheral IV **AND** [MAR Hold] sodium chloride    [MAR Hold] sodium chloride    [MAR Hold] sodium chloride    Review of Systems:   A 12 point system review was reviewed with the patient and from the chart  and is negative except as in history of present illness.      Physical Exam: 73 y.o. female                    Vitals:    02/04/24 1600 02/04/24 1630 02/04/24 1715 02/04/24 1914   BP: (!) 183/109  176/75 171/75   BP Location:   Left arm Left arm   Patient Position:   Lying Lying  "  Pulse: 84 76 82 98   Resp:   15 16   Temp:   98.2 °F (36.8 °C) (!) 102.9 °F (39.4 °C)   TempSrc:   Oral Temporal   SpO2: 94% 96% 92% 92%        Gait: Could not be tested , patient is nonambulatory.    Mental/HEENT/cardio/skin: The patient's general appearance was well-nourished, well-hydrated, no acute distress.  Orientation was alert and oriented ×3.  The patient's mood was normal.   Pulmonary exam shows normal late exchange, no labored breathing, or shortness of breath.    The skin exam showed normal temperature and color in the area of examination.    Extremities: right   lower extremity positive shortening, positive external rotation, attempted movements of the  hip are painful and restricted. The patient is able to do gentle active range of motion of her toes. Gross sensation is intact over the toes.    Pulses: Pulses palpable and equal bilaterally    Diagnostic Tests:    Results from last 7 days   Lab Units 02/04/24  1405   WBC 10*3/mm3 9.45   HEMOGLOBIN g/dL 15.6   HEMATOCRIT % 45.0   PLATELETS 10*3/mm3 261     Results from last 7 days   Lab Units 02/04/24  1818 02/04/24  1428   SODIUM mmol/L 138 136   POTASSIUM mmol/L 3.8 3.2*   CHLORIDE mmol/L 102 101   CO2 mmol/L 25.0 25.0   BUN mg/dL 10 11   CREATININE mg/dL 0.72 0.66   GLUCOSE mg/dL 124* 122*   CALCIUM mg/dL 9.6 9.3         No results found for: \"URICACID\"  No results found for: \"CRYSTAL\"  Microbiology Results (last 10 days)       ** No results found for the last 240 hours. **          XR Hip With or Without Pelvis 2 - 3 View Right    Result Date: 2/4/2024  Impression: 1.Transverse impacted fracture involving the proximal right femoral head-neck junction. There is impaction of the diaphyseal fracture fragment into the femoral head fracture fragment. The articulation the right hip remains intact without dislocation. Electronically Signed: Jeremy Benites MD  2/4/2024 2:43 PM EST  Workstation ID: [id number]    XR Chest 1 View    Result Date: 2/4/2024  No " evidence for acute cardiopulmonary process. Electronically Signed: Jeremy Benites MD  2/4/2024 2:40 PM EST  Workstation ID: [id number]     Assessment: right Intracapsular Hip Fracture       Hip fracture      Plan:    Options and alternatives have been discussed in detail with patient and  family.   The patient is indicated for a  right total hip arthroplasty.    The likely,  Risks and benefits of the procedure including but not limited to infection, DVT, pulmonary embolism,  leg length discrepancy, recurrent dislocation, possibility of injury to nerves or vessels, possibility of periprosthetic fractures have been discussed in detail.  Despite the risks involved, The patient and patient's family  would like to proceed.     The patient is being scheduled for a right total hip arthroplasty by anterior approach at [facility] tentatively for 2/4/ 2024.     Patient will be made NPO.   Obtain informed consent.     The patient's admitting service has seen the patient and the patient is cleared to the operating room.  Npo  Bed rest  Pain control  [facility]    Date: 2/4/2024        CC: Radha Gregg, ELIZABETH; MD Therese Haney, Leanna Milligan*

## 2024-02-05 NOTE — PLAN OF CARE
Goal Outcome Evaluation:  Plan of Care Reviewed With: patient           Outcome Evaluation: Patient presents s/p R ant [place] with deficits in balance, strength, and endurance. She was able to ambulate 60' CGA with FWW with no LOB or buckling and gave good effort with BLE therex. Patient is mobilizing below her baseline indicating IPPT intervention. Recommend D/C home with assist and OPPT. Patient will need a FWW at D/C and will need to complete stair training prior to D/C. Will plan to initiate stair training this afternoon.      Anticipated Discharge Disposition (PT): home with assist, home with outpatient therapy services

## 2024-02-05 NOTE — DISCHARGE INSTRUCTIONS
[facility] INCISION CARE Primary Hip and Knee:  You have a sterile dressing in place. Only exchange the dressing if it becomes saturated (fluid draining out the sides of dressing) and notify the office. The dressing is water resistant. During the first 14 days after surgery, it is ok to shower. DO NOT scrub on or around the dressing. Do not submerge the dressing.  After 14 days, you can remove your dressing. Your sutures are all underneath your skin. There is a layer of glue over the incision. DO NOT pick at this or try to peal it off. If the edges do peel up it is ok to trim them as needed. It is OK to shower with the incision exposed. DO NOT scrub on or around the incision. DO NOT submerge the incision in pools, baths, or hot tubs for 1 month after surgery.  No creams or ointments to the incision for 1 month after surgery. After 1 month, it is recommended to massage the scar with vitamin E cream to help decrease scar formation.  Check incision every day and notify surgeon immediately if any of the following signs or symptoms are noted:  Increase in redness  Increase in swelling around the incision and of the entire extremity  Increase in pain  Drainage oozing from the incision  Pulling apart of the edges of the incision  Increase in overall body temperature (greater than 100.5 degrees)    Anticoagulants: You will be discharged on an anticoagulant. This is a prophylactic medication that helps prevent blood clots during your post-operative period. Most patients will be on Aspirin 81 mg Enteric coated every 12 hours orally for 30 days. Some patients, due to increased risk factors, will need to be on a stronger anticoagulant. Dr. Evans will discuss this need with you.   While taking the anticoagulant, you should avoid taking any additional aspirin, and limit ibuprofen (Advil or Motrin), Aleve (Naprosyn) or other non-steroidal anti-inflammatory medications.   Notify your surgeon immediately if any linda bleeding is noted  in the urine, stool, vomit, or from the nose or the incision. Blood in the stool will often appear as black rather than red. Blood in urine may appear as pink. Blood in vomit may appear as brown/black like coffee grounds.  You will need to apply pressure for longer periods of time to any cuts or abrasions to stop bleeding  Avoid alcohol while taking anticoagulants  Sequential Compression Device: You maybe be discharged home with a compression device that helps promote blood flow and prevent clots in your legs. Wear these at all times for the first two weeks.   Mobilization: The best way to avoid a blood clot is to get up and walk. 10 times a day get up and walk for 5 minutes for the first two weeks. Walking for longer periods of time will increase pain and swelling, making therapy more difficult. If taking any long travel (car or plane) in the first 1-2 months, be sure to get up and walk at least every one hour.     Stool Softeners: You will be at greater risk of constipation after surgery because of being less mobile and taking the pain medications.   Take stool softeners as instructed by your surgeon while on pain medications. Use over the counter Colace 100 mg 1-2 capsules twice daily.   If stools become too loose or too frequent, decreases the dosage or stop the stool softener.  If constipation occurs despite use of stool softeners, you are to continue the stool softeners and add a laxative (Milk of Magnesia 1 ounce daily as needed).  Dulcolax oral tabs or suppository or a fleets enema can also be utilized for constipation and can be obtained over the counter.   If above interventions are unsuccessful in inducing bowel movements, please contact your family physician's office/surgeon's office.  Drink plenty of fluids and eat fruits and vegetables during your recovery time    Pain Medications utilized after surgery are narcotics and the law requires that the following information be given to all patients that are  prescribed narcotics:  CLASSIFICATION: Pain medications are called Opioids and are narcotics  LEGALITIES: It is illegal to share narcotics with others and to drive within 24 hours of taking narcotics  POTENTIAL SIDE EFFECTS: Potential side effects of opioids include: nausea, vomiting, itching, dizziness, drowsiness, dry mouth, constipation, and difficulty urinating.  POTENTIAL ADVERSE EFFECTS:   Opioid tolerance can develop with use of pain medications and this simply means that it requires more and more of the medication to control pain; however, this is seen more in patients that use Opioids for longer periods of time.  Opioid dependence can develop with use of Opioids and this simply means that to stop the medication can cause withdrawal symptoms; however, this is seen with patients that use Opioids for longer periods of time.  Opioid addiction can develop with use of Opioids and the incidence of this is very unlikely in patients who take the medications as ordered and stop the medications as instructed.  Opioid overdose can be dangerous, but is unlikely when the medication is taken as ordered and stopped when ordered. It is important not to mix opioids with alcohol or with any type of sedative, such as Benadryl, as this can lead to over sedation and respiratory difficulty.  DOSAGE:   Pain medications may be needed consistently for the first week to decrease pain and promote adequate pain relief and participation in physical therapy.  After the initial surgical pain begins to resolve, you may begin to decrease the pain medication and only take it as needed. By the end of 6 weeks, you should be off of pain medications.  You can decrease your pain medication consumption by slowly spacing out the time in between the medication, and using 650mg Tylenol when the pain is not as severe. Do not exceed 3500mg of Tylenol in 24 hours.   Refills will not be given by the office during evening hours, on weekends, or after 6  weeks post-op.  To seek refills on pain medications during the initial 6 week post-operative period, you must call the office 48 hours in advance to request the refill. The office will then notify you when to  the prescription. DO NOT wait until you are out of the medication to request a refill.SMI COLD THERAPY - PATIENT INSTRUCTION SHEET    Cold Compression Therapy for your comfort and rehabilitation  Your caregivers want you to be productive in your rehab and comfortable during your stay. In keeping with those goals, you will be receiving an SMI Cold Therapy Wrap to help ease post-operative pain and swelling that might keep you from getting back on track! Your SMI Cold Therapy Wrap is effective and simple-to-use, and you will be encouraged to apply it throughout your hospital stay and at home through the duration of your recovery.    When you are ready to go home  Be sure to take your SMI Cold Therapy Wrap and both sets of Gel Bags with you for continued comfort and use throughout your rehabilitation. If you don't already have them, ask your nurse or aide to retrieve your SMI Gel Bags from the patient freezer.    Home use precautions  Always follow your medical professional's application instructions upon discharge. Your SMI Cold Therapy Wrap and Gel Bags are designed to last for months following your surgery. Never heat the Gel Bags unless specified by your healthcare provider. Supervision is advised when using this product on children or geriatric patients. To avoid danger of suffocation, please keep the outer plastic packaging away from children & pets.    Cold Therapy Instructions  Place Gel Bags in a freezer set ¾ of the way to max temperature for at least (4) hours. For best results, lay the Gel Bags flat and gkal-[id number] in the freezer. Once frozen, slide Gel Bags into the gel pouch and secure your wrap to the affected area with the straps.  Gel wraps that have been stored in a freezer for an  extended period of time may require a (10) minute period of softening up in a room temperature environment before application.  The gel pouch acts as a protective barrier. NEVER place frozen bags directly onto skin, as this may cause frostbite injury.  The [facility] Cold Therapy Wrap is designed to be able to be worm while ambulating. The compression straps can be secured well enough so that the Wrap won't fall off while moving.  Wrap Application Videos can be viewed at [email or web address].  An additional protective barrier such as clothing, a washcloth, hand-towel or pillowcase may be used during prolonged treatment applications.  The Gel-Pouch and Wrap are both Latex-Free and the Gel Bag ingredients are non toxic.    [facility] Wrap care instructions  The [facility] Cold Therapy Wrap may be hand washed and hung to dry when needed.    [facility] re-order information  Additional [facility] body specific wraps and/or Gel Bags can be re-ordered from [email or web address] or call [employer] ([phone])

## 2024-02-05 NOTE — ANESTHESIA PROCEDURE NOTES
Airway  Urgency: elective    Date/Time: 2/4/2024 7:43 PM  Airway not difficult    General Information and Staff    Patient location during procedure: OR    Indications and Patient Condition  Indications for airway management: airway protection    Preoxygenated: yes  MILS not maintained throughout  Mask difficulty assessment: 1 - vent by mask    Final Airway Details  Final airway type: endotracheal airway      Successful airway: ETT  Cuffed: yes   Successful intubation technique: video laryngoscopy and RSI  Facilitating devices/methods: cricoid pressure  Endotracheal tube insertion site: oral  Blade: Misti  Blade size: 3  ETT size (mm): 7.0  Cormack-Lehane Classification: grade I - full view of glottis  Placement verified by: chest auscultation and capnometry   Measured from: lips  ETT/EBT  to lips (cm): 20  Number of attempts at approach: 1  Assessment: lips, teeth, and gum same as pre-op and atraumatic intubation    Additional Comments  Negative epigastric sounds, Breath sound equal bilaterally with symmetric chest rise and fall

## 2024-02-05 NOTE — ANESTHESIA PREPROCEDURE EVALUATION
Anesthesia Evaluation     Patient summary reviewed and Nursing notes reviewed   NPO Solid Status: > 8 hours  NPO Liquid Status: > 2 hours           Airway   Mallampati: I  TM distance: >3 FB  Neck ROM: full  No difficulty expected  Dental          Pulmonary     breath sounds clear to auscultation  Cardiovascular     ECG reviewed  Rhythm: regular  Rate: normal    (+) hypertension, hyperlipidemia      Neuro/Psych  GI/Hepatic/Renal/Endo    (+) diabetes mellitus type 2, thyroid problem     Musculoskeletal     Abdominal    Substance History      OB/GYN          Other        ROS/Med Hx Other: On GLP 1 agonist                Anesthesia Plan    ASA 3     general   Rapid sequence  intravenous induction     Anesthetic plan, risks, benefits, and alternatives have been provided, discussed and informed consent has been obtained with: patient, spouse/significant other and child.    CODE STATUS:    Code Status (Patient has no pulse and is not breathing): CPR (Attempt to Resuscitate)  Medical Interventions (Patient has pulse or is breathing): Full Support

## 2024-02-05 NOTE — THERAPY TREATMENT NOTE
Patient Name: Pari Howell  : 1950    MRN: [id number]                              Today's Date: 2024       Admit Date: 2024    Visit Dx:     ICD-10-CM ICD-9-CM   1. Closed displaced fracture of right femoral neck  S72.001A 820.8     Patient Active Problem List   Diagnosis    Type 2 diabetes mellitus without complication, without long-term current use of insulin    Other specified glaucoma    Hyperlipidemia LDL goal <70    Vitamin D deficiency    Irritable bowel syndrome with diarrhea    Hypothyroidism (acquired)    Acute respiratory insufficiency    Hip fracture     Past Medical History:   Diagnosis Date    Bladder infection     Dyspareunia, female     Glaucoma     H/O sexual problem     High cholesterol     History of abnormal cervical Pap smear     Hypertension     Hypertension     Hypothyroidism     Impaired functional mobility, balance, gait, and endurance     Labial cyst     Muscle weakness     Type 2 diabetes mellitus     Urinary incontinence     Vaginal itching     Yeast infection      Past Surgical History:   Procedure Laterality Date    CARPAL TUNNEL RELEASE      CATARACT EXTRACTION Right 2022    CATARACT EXTRACTION, BILATERAL Left     CHOLECYSTECTOMY      EYE SURGERY      HYSTERECTOMY      OOPHORECTOMY      TOTAL HIP ARTHROPLASTY Right 2024    Procedure: TOTAL HIP ARTHROPLASTY ANTERIOR RIGHT;  Surgeon: Doni Evans MD;  Location: [facility];  Service: Orthopedics;  Laterality: Right;    TUBAL ABDOMINAL LIGATION  1979      General Information       Row Name 24 1519 24 0916       Physical Therapy Time and Intention    Document Type therapy note (daily note)  -CM evaluation  -CM    Mode of Treatment individual therapy;physical therapy  -CM physical therapy;individual therapy  -CM      Row Name 24 1519 24 0916       General Information    Patient Profile Reviewed yes  -CM yes  -CM    Prior Level of Function -- independent:;all household  mobility;ADL's;home management;driving  ind no AD  -CM    Existing Precautions/Restrictions fall;right;hip, anterior;other (see comments)  vertigo  -CM fall;right;hip, anterior;other (see comments)  RLE WBAT  -CM    Barriers to Rehab none identified  -CM none identified  -CM      Row Name 02/05/24 0916          Living Environment    People in Home spouse  -CM       Row Name 02/05/24 0916          Home Main Entrance    Number of Stairs, Main Entrance five  -CM     Stair Railings, Main Entrance railings on both sides of stairs  -CM       Row Name 02/05/24 0916          Stairs Within Home, Primary    Number of Stairs, Within Home, Primary none  -CM       Row Name 02/05/24 1519 02/05/24 0916       Cognition    Orientation Status (Cognition) oriented x 4  -CM oriented x 4  -CM      Row Name 02/05/24 1519 02/05/24 0916       Safety Issues, Functional Mobility    Safety Issues Affecting Function (Mobility) awareness of need for assistance;insight into deficits/self-awareness;safety precaution awareness  -CM insight into deficits/self-awareness;safety precaution awareness;safety precautions follow-through/compliance  -CM    Impairments Affecting Function (Mobility) balance;endurance/activity tolerance;pain;sensation/sensory awareness;shortness of breath;strength;range of motion (ROM)  -CM balance;endurance/activity tolerance;pain;sensation/sensory awareness;shortness of breath;strength  -CM              User Key  (r) = Recorded By, (t) = Taken By, (c) = Cosigned By      Initials Name Provider Type    Bessie Chaudhari, PT Physical Therapist                   Mobility       Row Name 02/05/24 1519 02/05/24 0918       Bed Mobility    Bed Mobility -- supine-sit  -CM    Supine-Sit [place] (Bed Mobility) -- standby assist  -CM    Assistive Device (Bed Mobility) -- head of bed elevated  -CM    Comment, (Bed Mobility) [facility] pre/post treatment  -CM patient reports she has vertigo, she denied dizziness at EOB, but did  require some additional time for positional changes  -CM      Row Name 02/05/24 1519 02/05/24 0918       Sit-Stand Transfer    Sit-Stand [place] (Transfers) contact guard;verbal cues  -CM contact guard;1 person assist;verbal cues  -CM    Assistive Device (Sit-Stand Transfers) walker, front-wheeled  -CM walker, front-wheeled  -CM    Comment, (Sit-Stand Transfer) -- patient demo'd safe hand placement without cues from EOB, cues for grab bar use in bathroom  -CM      Row Name 02/05/24 1519 02/05/24 0918       Gait/Stairs (Locomotion)    [place] Level (Gait) contact guard;1 person assist;verbal cues  -CM contact guard;1 person assist;verbal cues  -CM    Assistive Device (Gait) walker, front-wheeled  -CM walker, front-wheeled  -CM    Patient was able to Ambulate -- yes  -CM    Distance in Feet (Gait) 70  -CM 60  -CM    Deviations/Abnormal Patterns (Gait) bilateral deviations;lela decreased;gait speed decreased;stride length decreased  -CM bilateral deviations;lela decreased;gait speed decreased;stride length decreased  -CM    Bilateral Gait Deviations forward flexed posture  -CM forward flexed posture  -CM    [place] Level (Stairs) contact guard;1 person assist;verbal cues  -CM --    Handrail Location (Stairs) right side (ascending);right side (descending);other (see comments)  HHA from   -CM --    Number of Steps (Stairs) 5  -CM --    Ascending Technique (Stairs) step-to-step  -CM --    Descending Technique (Stairs) step-to-step  -CM --    Comment, (Gait/Stairs) Patient ambulated in [place] wtih step through gait pattern. Cues for upright posture with relaxed shoulders, patient does become fatigued and utilizes chair to get to gym to complete stair training. She completed stair training with cues for sequencing and HHA from her . She demonstrated good balance and strength with no LOB or buckling.  -CM Patient ambulated in [place] with step through gait pattern. Good heel strike and  sequencing with AD. Cues provided for upright posture with relaxed shoulders as well as PLB as patient tends to hold her breath. Distance limited by fatigue/feelings of weakness. No LOB or buckling with mobility.  -CM      Row Name 02/05/24 1519 02/05/24 0918       Mobility    Extremity Weight-bearing Status right lower extremity  -CM right lower extremity  -CM    Right Lower Extremity (Weight-bearing Status) weight-bearing as tolerated (WBAT)  -CM weight-bearing as tolerated (WBAT)  -CM              User Key  (r) = Recorded By, (t) = Taken By, (c) = Cosigned By      Initials Name Provider Type    CM Bessie Carpenter, PT Physical Therapist                   Obj/Interventions       Row Name 02/05/24 0921          Range of Motion Comprehensive    General Range of Motion bilateral lower extremity ROM WFL  -CM       Row Name 02/05/24 0921          Strength Comprehensive (MMT)    General Manual Muscle Testing (MMT) Assessment lower extremity strength deficits identified  -CM     Comment, General Manual Muscle Testing (MMT) Assessment RLE grossly 3+/5, LLE no deficits  -CM       Row Name 02/05/24 1527 02/05/24 0921       Motor Skills    Therapeutic Exercise other (see comments);hip;knee;ankle  printed HEP provided and reviewed  -CM hip;knee;ankle  -CM      Row Name 02/05/24 1527 02/05/24 0921       Hip (Therapeutic Exercise)    Hip (Therapeutic Exercise) isometric exercises;strengthening exercise  -CM isometric exercises;strengthening exercise  -CM    Hip Isometrics (Therapeutic Exercise) bilateral;gluteal sets;10 repetitions;3 second hold  -CM bilateral;gluteal sets;10 repetitions;3 second hold  -CM    Hip Strengthening (Therapeutic Exercise) right;aBduction;aDduction;supine;heel slides;10 repetitions  -CM right;aBduction;aDduction;supine;heel slides;10 repetitions  -CM      Row Name 02/05/24 1527 02/05/24 0921       Knee (Therapeutic Exercise)    Knee (Therapeutic Exercise) isometric exercises;strengthening exercise   -CM isometric exercises;strengthening exercise  -CM    Knee Isometrics (Therapeutic Exercise) bilateral;quad sets;10 repetitions;3 second hold  -CM bilateral;quad sets;10 repetitions;3 second hold  -CM    Knee Strengthening (Therapeutic Exercise) right;LAQ (long arc quad);10 repetitions  -CM right;LAQ (long arc quad);10 repetitions  -CM      Row Name 02/05/24 1527 02/05/24 0921       Ankle (Therapeutic Exercise)    Ankle (Therapeutic Exercise) AROM (active range of motion)  -CM AROM (active range of motion)  -CM    Ankle AROM (Therapeutic Exercise) bilateral;dorsiflexion;plantarflexion;10 repetitions  -CM bilateral;plantarflexion;dorsiflexion;10 repetitions  -CM      Row Name 02/05/24 1527 02/05/24 0921       Balance    Balance Assessment sitting static balance;standing static balance;standing dynamic balance  -CM sitting static balance;standing static balance;standing dynamic balance  -CM    Static Sitting Balance standby assist  -CM standby assist  -CM    Position, Sitting Balance unsupported;sitting in chair  -CM unsupported;sitting edge of bed  -CM    Static Standing Balance contact guard  -CM contact guard  -CM    Dynamic Standing Balance contact guard  -CM contact guard  -CM    Position/Device Used, Standing Balance supported;walker, front-wheeled  -CM supported;walker, front-wheeled  -CM    Comment, Balance no LOB  -CM no LOB or buckling  -CM      Row Name 02/05/24 0921          Sensory Assessment (Somatosensory)    Sensory Assessment (Somatosensory) bilateral LE  -CM     Bilateral LE Sensory Assessment impaired;other (see comments)  absent from ball of foot to toes, intact at heel proximally bilaterally  -CM               User Key  (r) = Recorded By, (t) = Taken By, (c) = Cosigned By      Initials Name Provider Type    CM Bessie Carpenter, PT Physical Therapist                   Goals/Plan       Row Name 02/05/24 0926          Bed Mobility Goal 1 (PT)    Activity/Assistive Device (Bed Mobility Goal 1,  PT) sit to supine/supine to sit  -CM     [place] Level/Cues Needed (Bed Mobility Goal 1, PT) independent  -CM     Time Frame (Bed Mobility Goal 1, PT) long term goal (LTG);10 days  -CM     Progress/Outcomes (Bed Mobility Goal 1, PT) new goal  -CM       Row Name 02/05/24 0926          Transfer Goal 1 (PT)    Activity/Assistive Device (Transfer Goal 1, PT) sit-to-stand/stand-to-sit;bed-to-chair/chair-to-bed  -CM     [place] Level/Cues Needed (Transfer Goal 1, PT) standby assist  -CM     Time Frame (Transfer Goal 1, PT) long term goal (LTG);10 days  -CM     Progress/Outcome (Transfer Goal 1, PT) new goal  -CM       Row Name 02/05/24 0926          Gait Training Goal 1 (PT)    Activity/Assistive Device (Gait Training Goal 1, PT) gait (walking locomotion);assistive device use  -CM     [place] Level (Gait Training Goal 1, PT) standby assist  -CM     Distance (Gait Training Goal 1, PT) 350'  -CM     Time Frame (Gait Training Goal 1, PT) long term goal (LTG);10 days  -CM     Progress/Outcome (Gait Training Goal 1, PT) new goal  -CM       Row Name 02/05/24 0926          Stairs Goal 1 (PT)    Activity/Assistive Device (Stairs Goal 1, PT) ascending stairs;descending stairs;using handrail, left;using handrail, right  -CM     [place] Level/Cues Needed (Stairs Goal 1, PT) contact guard required  -CM     Number of Stairs (Stairs Goal 1, PT) 5  -CM     Time Frame (Stairs Goal 1, PT) long term goal (LTG);10 days  -CM       Row Name 02/05/24 0926          Therapy Assessment/Plan (PT)    Planned Therapy Interventions (PT) balance training;bed mobility training;gait training;home exercise program;stretching;strengthening;stair training;ROM (range of motion);postural re-education;patient/family education;transfer training  -CM               User Key  (r) = Recorded By, (t) = Taken By, (c) = Cosigned By      Initials Name Provider Type    Bessie Chaudhari, PT Physical Therapist                   Clinical  Impression       Row Name 02/05/24 1528 02/05/24 0922       Pain    Pretreatment Pain Rating -- 0/10 - no pain  -CM    Posttreatment Pain Rating -- 0/10 - no pain  -CM    Pain Intervention(s) Ambulation/increased activity;Repositioned  -CM --    Additional Documentation Pain Scale: FACES Pre/Post-Treatment (Group)  -CM --      Row Name 02/05/24 1528          Pain Scale: FACES Pre/Post-Treatment    Pain: FACES Scale, Pretreatment 2-->hurts little bit  -CM     Posttreatment Pain Rating 2-->hurts little bit  -CM     Pain Location - Side/Orientation Right  -CM     Pain Location generalized  -CM     Pain Location - hip  -CM       Row Name 02/05/24 1528 02/05/24 0922       Plan of Care Review    Plan of Care Reviewed With patient;spouse;daughter  -CM patient  -CM    Progress improving  -CM --    Outcome Evaluation Patient able to progress ambulation distance to 70' CGA with FWW and navigated 5 steps with CGA per her home setup. Printed HEP reviewed with patient, , and daughter. No LOB or buckling with any activity. IPPT remains indicated to address mobility below baseline. Continue to recommend D/C home with assist and OPPT.  -CM Patient presents s/p R ant [place] with deficits in balance, strength, and endurance. She was able to ambulate 60' CGA with FWW with no LOB or buckling and gave good effort with BLE therex. Patient is mobilizing below her baseline indicating IPPT intervention. Recommend D/C home with assist and OPPT. Patient will need a FWW at D/C and will need to complete stair training prior to D/C. Will plan to initiate stair training this afternoon.  -CM      Row Name 02/05/24 0922          Therapy Assessment/Plan (PT)    Rehab Potential (PT) good, to achieve stated therapy goals  -CM     Criteria for Skilled Interventions Met (PT) yes;meets criteria;skilled treatment is necessary  -CM     Therapy Frequency (PT) 2 times/day  -CM       Row Name 02/05/24 1528 02/05/24 0922       Vital Signs    Pre Systolic  BP Rehab -- 126  -CM    Pre Treatment Diastolic BP -- 56  -CM    Pretreatment Heart Rate (beats/min) -- 84  -CM    Posttreatment Heart Rate (beats/min) -- 73  -CM    O2 Delivery Pre Treatment room air  -CM room air  -CM    O2 Delivery Intra Treatment room air  -CM room air  -CM    Post SpO2 (%) -- 92  -CM    O2 Delivery Post Treatment room air  -CM room air  -CM    Pre Patient Position Sitting  -CM Supine  -CM    Intra Patient Position Standing  -CM Standing  -CM    Post Patient Position Sitting  -CM Sitting  -CM      Row Name 02/05/24 1528 02/05/24 0922       Positioning and Restraints    Pre-Treatment Position sitting in chair/recliner  -CM in bed  -CM    Post Treatment Position chair  -CM chair  -CM    In Chair reclined;call light within reach;encouraged to call for assist;exit alarm on;with family/caregiver;waffle cushion;notified nsg  -CM reclined;call light within reach;encouraged to call for assist;exit alarm on;waffle cushion;compression device;notified nsg  -CM              User Key  (r) = Recorded By, (t) = Taken By, (c) = Cosigned By      Initials Name Provider Type    Bessie Chaudhari, PT Physical Therapist                   Outcome Measures       Row Name 02/05/24 1531 02/05/24 0927       How much help from another person do you currently need...    Turning from your back to your side while in flat bed without using bedrails? 3  -CM 3  -CM    Moving from lying on back to sitting on the side of a flat bed without bedrails? 3  -CM 3  -CM    Moving to and from a bed to a chair (including a wheelchair)? 3  -CM 3  -CM    Standing up from a chair using your arms (e.g., wheelchair, bedside chair)? 3  -CM 3  -CM    Climbing 3-5 steps with a railing? 3  -CM 3  -CM    To walk in hospital room? 3  -CM 3  -CM    AM-PAC 6 Clicks Score (PT) 18  -CM 18  -CM    Highest Level of Mobility Goal 6 --> Walk 10 steps or more  -CM 6 --> Walk 10 steps or more  -CM      Row Name 02/05/24 1531 02/05/24 1128        Functional Assessment    Outcome Measure Options AM-PAC 6 Clicks Basic Mobility (PT)  -CM AM-PAC 6 Clicks Daily Activity (OT)  -AR      Row Name 02/05/24 0927          Functional Assessment    Outcome Measure Options AM-PAC 6 Clicks Basic Mobility (PT)  -CM               User Key  (r) = Recorded By, (t) = Taken By, (c) = Cosigned By      Initials Name Provider Type    AR Laxmi Prabhakar, OT Occupational Therapist    CM Bessie Carpenter, PT Physical Therapist                                 Physical Therapy Education       Title: PT OT SLP Therapies (Done)       Topic: Physical Therapy (Done)       Point: Mobility training (Done)       Learning Progress Summary             Patient Acceptance, E, VU by CM at 2/5/2024 1531    Acceptance, E, VU by CM at 2/5/2024 0927   Family Acceptance, E, VU by CM at 2/5/2024 1531   Significant Other Acceptance, E, VU by CM at 2/5/2024 1531                         Point: Home exercise program (Done)       Learning Progress Summary             Patient Acceptance, E, VU by CM at 2/5/2024 1531    Acceptance, E, VU by CM at 2/5/2024 0927   Family Acceptance, E, VU by CM at 2/5/2024 1531   Significant Other Acceptance, E, VU by CM at 2/5/2024 1531                         Point: Body mechanics (Done)       Learning Progress Summary             Patient Acceptance, E, VU by CM at 2/5/2024 1531    Acceptance, E, VU by CM at 2/5/2024 0927   Family Acceptance, E, VU by CM at 2/5/2024 1531   Significant Other Acceptance, E, VU by CM at 2/5/2024 1531                         Point: Precautions (Done)       Learning Progress Summary             Patient Acceptance, E, VU by CM at 2/5/2024 1531    Acceptance, E, VU by CM at 2/5/2024 0927   Family Acceptance, E, VU by CM at 2/5/2024 1531   Significant Other Acceptance, E, VU by CM at 2/5/2024 1531                                         User Key       Initials Effective Dates Name Provider Type Discipline     09/22/22 -  Bessie Carpenter,  PT Physical Therapist PT                  PT Recommendation and Plan  Planned Therapy Interventions (PT): balance training, bed mobility training, gait training, home exercise program, stretching, strengthening, stair training, ROM (range of motion), postural re-education, patient/family education, transfer training  Plan of Care Reviewed With: patient, spouse, daughter  Progress: improving  Outcome Evaluation: Patient able to progress ambulation distance to 70' CGA with FWW and navigated 5 steps with CGA per her home setup. Printed HEP reviewed with patient, , and daughter. No LOB or buckling with any activity. IPPT remains indicated to address mobility below baseline. Continue to recommend D/C home with assist and OPPT.     Time Calculation:   PT Evaluation Complexity  History, PT Evaluation Complexity: 3 or more personal factors and/or comorbidities  Examination of Body Systems (PT Eval Complexity): total of 3 or more elements  Clinical Presentation (PT Evaluation Complexity): stable  Clinical Decision Making (PT Evaluation Complexity): low complexity  Overall Complexity (PT Evaluation Complexity): low complexity     PT Charges       Row Name 02/05/24 1531 02/05/24 0927          Time Calculation    Start Time 1434  -CM 0830  -CM     PT Received On 02/05/24  -CM 02/05/24  -CM     PT Goal Re-Cert Due Date 02/15/24  -CM 02/15/24  -CM        Timed Charges    48147 - PT Therapeutic Exercise Minutes 15  -CM 13  -CM     64592 - Gait Training Minutes  24  -CM --     42808 - PT Therapeutic Activity Minutes -- 10  -CM        Untimed Charges    PT Eval/Re-eval Minutes -- 40  -CM        Total Minutes    Timed Charges Total Minutes 39  -CM 23  -CM     Untimed Charges Total Minutes -- 40  -CM      Total Minutes 39  -CM 63  -CM               User Key  (r) = Recorded By, (t) = Taken By, (c) = Cosigned By      Initials Name Provider Type    Bessie Chaudhari, PT Physical Therapist                  Therapy Charges for  Today       Code Description Service Date Service Provider Modifiers Qty    63983540315 HC PT THER PROC EA 15 MIN 2/5/2024 Bessie Carpenter, PT GP 1    10896270669 HC PT THERAPEUTIC ACT EA 15 MIN 2/5/2024 Bessie Carpenter, PT GP 1    00994758074 HC PT EVAL LOW COMPLEXITY 3 2/5/2024 Bessie Carpenter, PT GP 1    87726334190 HC PT THER PROC EA 15 MIN 2/5/2024 Bessie Carpenter, PT GP 1    81733776100 HC GAIT TRAINING EA 15 MIN 2/5/2024 Bessie Carpenter, PT GP 2            PT G-Codes  Outcome Measure Options: AM-PAC 6 Clicks Basic Mobility (PT)  AM-PAC 6 Clicks Score (PT): 18  AM-PAC 6 Clicks Score (OT): 18  PT Discharge Summary  Anticipated Discharge Disposition (PT): home with assist, home with outpatient therapy services    Bessie Carpenter, JOSLYN  2/5/2024

## 2024-02-05 NOTE — CASE MANAGEMENT/SOCIAL WORK
"Continued Stay Note  [facility]     Patient Name: Pari Howell  MRN: [id number]  Today's Date: 2/5/2024    Admit Date: 2/4/2024    Plan: Home with 's assistance and [facility]   Discharge Plan       Row Name 02/05/24 1703       Plan    Plan Home with 's assistance and [facility]    Patient/Family in Agreement with Plan yes    Plan Comments Met with Ms. Howell, at the bedside, for discharge planning.    Ms. Howell lives with her , Aldo, in [facility].    The patient had surgery last Saturday.  She has been evaluated by PT and per notes, \"Patient able to progress ambulation distance to 70' CGA with FWW and navigated 5 steps with CGA per her home setup. Printed HEP reviewed with patient, , and daughter. No LOB or buckling with any activity. IPPT remains indicated to address mobility below baseline. Continue to recommend D/C home with assist and OPPT.\"    Ms. Howell states that her  is checking at home to see if she has a rolling walker.  She will let CM know if she needs to have one provided to her.    Discussed physical therapy and Ms. Howell was agreeable to a referral to [facility].  Referral given to Rachael with [facility] will contact Ms. Howell to schedule her first home appointment.    PCP is Radha Gregg.  Insurance is [employer] with no interruption in coverage.  No ACP documents.    DC plan is to return home with her 's assistance.  Ms. Howell's  can transport her home when discharged.    CM will continue to follow.    Final Discharge Disposition Code 01 - home or self-care                                  Expected Discharge Date and Time       Expected Discharge Date Expected Discharge Time    Feb 6, 2024               Heide Stewart RN    "

## 2024-02-05 NOTE — ANESTHESIA POSTPROCEDURE EVALUATION
Patient: Pari Howell    Procedure Summary       Date: 02/04/24 Room / Location:  [facility] OR 12 / [facility] OR    Anesthesia Start: 1937 Anesthesia Stop: 2139    Procedure: TOTAL HIP ARTHROPLASTY ANTERIOR (Right: Hip) Diagnosis:       Closed right hip fracture      (Closed right hip fracture [143041])    Surgeons: Doni Evans MD Provider: Louie Landa MD    Anesthesia Type: general ASA Status: 3            Anesthesia Type: general    Vitals  Vitals Value Taken Time   BP     Temp     Pulse 101 02/04/24 2139   Resp     SpO2 94 % 02/04/24 2139   Vitals shown include unfiled device data.        Post Anesthesia Care and Evaluation    Patient location during evaluation: PACU  Patient participation: complete - patient participated  Level of consciousness: awake and responsive to verbal stimuli  Pain score: 2  Pain management: adequate    Airway patency: patent  Anesthetic complications: No anesthetic complications  PONV Status: none  Cardiovascular status: hemodynamically stable and acceptable  Respiratory status: nonlabored ventilation, acceptable and nasal cannula  Hydration status: acceptable

## 2024-02-05 NOTE — PLAN OF CARE
Goal Outcome Evaluation:  Plan of Care Reviewed With: patient        Progress: improving       AOx4, VSS, RA, up to bedside commode, voiding spontaneously, slept well between rounding, abx given, no c/o of pain just tightness, optifoam dressing CDI, scds in place         Problem: Adult Inpatient Plan of Care  Goal: Absence of Hospital-Acquired Illness or Injury  Intervention: Identify and Manage Fall Risk  Recent Flowsheet Documentation  Taken 2/5/2024 0426 by Eugenia Chavez, RN  Safety Promotion/Fall Prevention:   safety round/check completed   room organization consistent   nonskid shoes/slippers when out of bed   mobility aid in reach   lighting adjusted   gait belt   fall prevention program maintained   elopement precautions   clutter free environment maintained   assistive device/personal items within reach   activity supervised  Taken 2/5/2024 0241 by Eugenia Chavez, RN  Safety Promotion/Fall Prevention:   safety round/check completed   room organization consistent   nonskid shoes/slippers when out of bed   mobility aid in reach   lighting adjusted   gait belt   fall prevention program maintained   elopement precautions   clutter free environment maintained   assistive device/personal items within reach   activity supervised  Taken 2/5/2024 0055 by Eugenia Chavez, RN  Safety Promotion/Fall Prevention:   safety round/check completed   room organization consistent   toileting scheduled   nonskid shoes/slippers when out of bed   mobility aid in reach   lighting adjusted   gait belt   fall prevention program maintained   elopement precautions   clutter free environment maintained   activity supervised   assistive device/personal items within reach  Taken 2/5/2024 0011 by Eugenia Chavez, RN  Safety Promotion/Fall Prevention:   safety round/check completed   room organization consistent   nonskid shoes/slippers when out of bed   muscle strengthening facilitated   mobility aid in reach   lighting adjusted   gait  belt   fall prevention program maintained   elopement precautions   clutter free environment maintained   assistive device/personal items within reach   activity supervised  Taken 2/4/2024 2235 by Eugenia Chavez, RN  Safety Promotion/Fall Prevention:   safety round/check completed   room organization consistent   nonskid shoes/slippers when out of bed   mobility aid in reach   lighting adjusted   elopement precautions   fall prevention program maintained   gait belt   clutter free environment maintained   assistive device/personal items within reach   activity supervised  Taken 2/4/2024 2122 by Eugenia Chavez, RN  Safety Promotion/Fall Prevention: patient off unit  Taken 2/4/2024 1930 by Eugenia Chavez, RN  Safety Promotion/Fall Prevention: patient off unit

## 2024-02-05 NOTE — PROGRESS NOTES
[facility] Medicine Services  PROGRESS NOTE    Patient Name: Pari Howell  : 1950  MRN: [id number]    Date of Admission: 2024  Primary Care Physician: Radha Gregg APRN    Subjective   Subjective     CC:  F/u hip fracture    HPI:  Patient sitting up in bed. Has no pain. Ambulating w/PT. Hopeful to discharge home tomorrow when  will be there to help.      Objective   Objective     Vital Signs:   Temp:  [98.1 °F (36.7 °C)-102.9 °F (39.4 °C)] 98.1 °F (36.7 °C)  Heart Rate:  [] 90  Resp:  [15-16] 16  BP: (123-195)/() 126/56  Flow (L/min):  [1-2] 1     Physical Exam:  Constitutional: No acute distress, awake, alert  HENT: NCAT, mucous membranes moist  Respiratory: Clear to auscultation bilaterally, respiratory effort normal   Cardiovascular: RRR, no murmurs, rubs, or gallops  Gastrointestinal: soft, nontender, nondistended  Musculoskeletal: No bilateral ankle edema  Psychiatric: Appropriate affect, cooperative  Neurologic: Oriented x 3, speech clear, no focal deficits  Skin: No rashes      Results Reviewed:  LAB RESULTS:      Lab 24  0338 24  1405   WBC 14.00* 9.45   HEMOGLOBIN 13.2 15.6   HEMATOCRIT 38.1 45.0   PLATELETS 232 261   NEUTROS ABS 12.83* 6.32   IMMATURE GRANS (ABS) 0.08* 0.07*   LYMPHS ABS 0.70 2.06   MONOS ABS 0.36 0.48   EOS ABS 0.01 0.47*   MCV 89.2 87.4         Lab 24  0338 24  1818 24  1428   SODIUM 137 138 136   POTASSIUM 3.9 3.8 3.2*   CHLORIDE 101 102 101   CO2 23.0 25.0 25.0   ANION GAP 13.0 11.0 10.0   BUN 10 10 11   CREATININE 0.71 0.72 0.66   EGFR 89.9 88.4 92.8   GLUCOSE 177* 124* 122*   CALCIUM 9.0 9.6 9.3         Lab 24  1428   TOTAL PROTEIN 7.3   ALBUMIN 4.2   GLOBULIN 3.1   ALT (SGPT) 17   AST (SGOT) 18   BILIRUBIN 0.6   ALK PHOS 68                     Brief Urine Lab Results  (Last result in the past 365 days)        Color   Clarity   Blood   Leuk Est   Nitrite   Protein   CREAT   Urine HCG         06/19/23 1027             134.0                 Microbiology Results Abnormal       None            XR Hip With or Without Pelvis 2 - 3 View Right    Result Date: 2/5/2024  XR HIP W OR WO PELVIS 2-3 VIEW RIGHT Date of Exam: 2/4/2024 10:00 PM EST Indication: Post hip replacement Comparison: 2/4/2024 at 1424 hours FINDINGS: Postoperative changes are noted status post hip arthroplasty. Near anatomic alignment is observed. There is no evidence for periimplant fracture. Surgical changes are seen within the surrounding soft tissues.     Impression: Postoperative changes status post hip arthroplasty. Near-anatomic alignment is noted. There is no evidence for periimplant fracture. Electronically Signed: Jeremy Benites MD  2/5/2024 7:20 AM EST  Workstation ID: [id number]    FL C Arm During Surgery    Result Date: 2/4/2024  This procedure was auto-finalized with no dictation required.    XR Hip With or Without Pelvis 2 - 3 View Right    Result Date: 2/4/2024  XR HIP W OR WO PELVIS 2-3 VIEW RIGHT Date of Exam: 2/4/2024 2:12 PM EST Indication: fall/pain Comparison: None available. Findings: There is a transverse impacted fracture involving the proximal right femoral head neck junction. There is impaction of the dominant distal diaphyseal fracture fragment into the femoral head fracture fragment. The articulation of the hip remains intact without dislocation. There does not appear to be significant involvement of the trochanteric portion of the femur. The left hip is intact. The pubic rami and sacral alae are intact. The SI joints and pubic symphysis are intact. Mild age-related changes are noted. 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 Vascular calcifications are noted. Phleboliths are observed.     Impression: Impression: 1.Transverse impacted fracture involving the proximal right femoral head-neck junction. There is impaction of the diaphyseal fracture fragment into the femoral head fracture fragment. The  articulation the right hip remains intact without dislocation. Electronically Signed: Jeremy Benites MD  2/4/2024 2:43 PM EST  Workstation ID: [id number]    XR Chest 1 View    Result Date: 2/4/2024  XR CHEST 1 VW Date of Exam: 2/4/2024 2:12 PM EST Indication: preop Comparison: 11/7/2023 FINDINGS: No new consolidations or pleural effusions are observed. The cardiac silhouette and mediastinum are stable. No acute osseous abnormalities are identified.     Impression: No evidence for acute cardiopulmonary process. Electronically Signed: Jeremy Benites MD  2/4/2024 2:40 PM EST  Workstation ID: [id number]         Current medications:  Scheduled Meds:acetaminophen, 1,000 mg, Oral, Q8H  amLODIPine, 10 mg, Oral, Daily  aspirin, 81 mg, Oral, Q12H  ceFAZolin, 2,000 mg, Intravenous, Q8H  losartan, 100 mg, Oral, Q24H   And  hydroCHLOROthiazide, 12.5 mg, Oral, Q24H  insulin regular, 2-9 Units, Subcutaneous, Q6H  levothyroxine, 75 mcg, Oral, QAM AC  meloxicam, 15 mg, Oral, Daily  pantoprazole, 40 mg, Oral, Q AM  pravastatin, 40 mg, Oral, Q PM  senna-docusate sodium, 2 tablet, Oral, BID  sodium chloride, 10 mL, Intravenous, Q12H      Continuous Infusions:lactated ringers, 100 mL/hr, Last Rate: Stopped (02/05/24 0609)      PRN Meds:.  senna-docusate sodium **AND** polyethylene glycol **AND** bisacodyl **AND** bisacodyl    Calcium Replacement - Follow Nurse / BPA Driven Protocol    dextrose    dextrose    glucagon (human recombinant)    HYDROmorphone **AND** naloxone    ketorolac    Magnesium Standard Dose Replacement - Follow Nurse / BPA Driven Protocol    ondansetron ODT **OR** ondansetron    oxyCODONE    oxyCODONE    Phosphorus Replacement - Follow Nurse / BPA Driven Protocol    Potassium Replacement - Follow Nurse / BPA Driven Protocol    [COMPLETED] Insert Peripheral IV **AND** sodium chloride    sodium chloride    sodium chloride    traMADol    Assessment & Plan   Assessment & Plan     Active Hospital Problems    Diagnosis  POA     **Hip fracture [S72.009A]  Yes      Resolved Hospital Problems   No resolved problems to display.        Brief Hospital Course to date:  Pari Howell is a 73 y.o. female ith PMH of hypothyroidism, HTN, DM II with neuropathy that presented after a fall.     Right Hip Fracture:  - s/p repair w/Dr. Evans  - WBAT, f/u with ortho in 6 weeks  - ASA for DVT ppx x 6 week course  - PRN pain control  - PT/OT    Fever:  -Tmax 102.9, no further fevers, patient does mention some burning w/urination  - will get UA, respiratory PCR    DM II   - SSI     HTN  HLD  -home meds     Hypokalemia:  - replacement per protocol     Expected Discharge Location and Transportation: Home  Expected Discharge tomorrow  Expected Discharge Date: 2/9/2024; Expected Discharge Time:      DVT prophylaxis:  Mechanical DVT prophylaxis orders are present.         AM-PAC 6 Clicks Score (PT): 18 (02/05/24 7230)    CODE STATUS:   Code Status and Medical Interventions:   Ordered at: 02/04/24 9473     Level Of Support Discussed With:    Patient     Code Status (Patient has no pulse and is not breathing):    CPR (Attempt to Resuscitate)     Medical Interventions (Patient has pulse or is breathing):    Full       Jessi Harvey, DO  02/05/24

## 2024-02-05 NOTE — PLAN OF CARE
Goal Outcome Evaluation:  Plan of Care Reviewed With: patient, spouse           Outcome Evaluation: OT educated pt on ADL retraining, transfer training and home safety; issued necessary self-care kit and educated on use. She completed bed mobility with min assist, LB dressing with AE with min assist and toileting with CGA. Recommend DC home with initial 24/7 assist from spouse. Reviewed handouts on bathroom safety and DME, they anticipate borrowing BSC to place frame over commode and obtaining shower chair.      Anticipated Discharge Disposition (OT): home with 24/7 care

## 2024-02-05 NOTE — THERAPY EVALUATION
Patient Name: Pari Howell  : 1950    MRN: [id number]                              Today's Date: 2024       Admit Date: 2024    Visit Dx:     ICD-10-CM ICD-9-CM   1. Closed displaced fracture of right femoral neck  S72.001A 820.8     Patient Active Problem List   Diagnosis    Type 2 diabetes mellitus without complication, without long-term current use of insulin    Other specified glaucoma    Hyperlipidemia LDL goal <70    Vitamin D deficiency    Irritable bowel syndrome with diarrhea    Hypothyroidism (acquired)    Acute respiratory insufficiency    Hip fracture     Past Medical History:   Diagnosis Date    Bladder infection     Dyspareunia, female     Glaucoma     H/O sexual problem     High cholesterol     History of abnormal cervical Pap smear     Hypertension     Hypertension     Hypothyroidism     Impaired functional mobility, balance, gait, and endurance     Labial cyst     Muscle weakness     Type 2 diabetes mellitus     Urinary incontinence     Vaginal itching     Yeast infection      Past Surgical History:   Procedure Laterality Date    CARPAL TUNNEL RELEASE      CATARACT EXTRACTION Right 2022    CATARACT EXTRACTION, BILATERAL Left     CHOLECYSTECTOMY      EYE SURGERY      HYSTERECTOMY      OOPHORECTOMY      TUBAL ABDOMINAL LIGATION  1979      General Information       Row Name 24 0916          Physical Therapy Time and Intention    Document Type evaluation  -CM     Mode of Treatment physical therapy;individual therapy  -CM       Row Name 24 0916          General Information    Patient Profile Reviewed yes  -CM     Prior Level of Function independent:;all household mobility;ADL's;home management;driving  ind no AD  -CM     Existing Precautions/Restrictions fall;right;hip, anterior;other (see comments)  RLE WBAT  -CM     Barriers to Rehab none identified  -CM       Row Name 24 0916          Living Environment    People in Home spouse  -CM       Row  Name 02/05/24 0916          Home Main Entrance    Number of Stairs, Main Entrance five  -CM     Stair Railings, Main Entrance railings on both sides of stairs  -CM       Row Name 02/05/24 0916          Stairs Within Home, Primary    Number of Stairs, Within Home, Primary none  -CM       Row Name 02/05/24 0916          Cognition    Orientation Status (Cognition) oriented x 4  -CM       Row Name 02/05/24 0916          Safety Issues, Functional Mobility    Safety Issues Affecting Function (Mobility) insight into deficits/self-awareness;safety precaution awareness;safety precautions follow-through/compliance  -CM     Impairments Affecting Function (Mobility) balance;endurance/activity tolerance;pain;sensation/sensory awareness;shortness of breath;strength  -CM               User Key  (r) = Recorded By, (t) = Taken By, (c) = Cosigned By      Initials Name Provider Type    Bessie Chaudhari, PT Physical Therapist                   Mobility       Row Name 02/05/24 0918          Bed Mobility    Bed Mobility supine-sit  -CM     Supine-Sit [place] (Bed Mobility) standby assist  -CM     Assistive Device (Bed Mobility) head of bed elevated  -CM     Comment, (Bed Mobility) patient reports she has vertigo, she denied dizziness at EOB, but did require some additional time for positional changes  -CM       Row Name 02/05/24 0918          Sit-Stand Transfer    Sit-Stand [place] (Transfers) contact guard;1 person assist;verbal cues  -CM     Assistive Device (Sit-Stand Transfers) walker, front-wheeled  -CM     Comment, (Sit-Stand Transfer) patient demo'd safe hand placement without cues from EOB, cues for grab bar use in bathroom  -CM       Row Name 02/05/24 0918          Gait/Stairs (Locomotion)    [place] Level (Gait) contact guard;1 person assist;verbal cues  -CM     Assistive Device (Gait) walker, front-wheeled  -CM     Patient was able to Ambulate yes  -CM     Distance in Feet (Gait) 60  -CM      Deviations/Abnormal Patterns (Gait) bilateral deviations;lela decreased;gait speed decreased;stride length decreased  -CM     Bilateral Gait Deviations forward flexed posture  -CM     Comment, (Gait/Stairs) Patient ambulated in faria with step through gait pattern. Good heel strike and sequencing with AD. Cues provided for upright posture with relaxed shoulders as well as PLB as patient tends to hold her breath. Distance limited by fatigue/feelings of weakness. No LOB or buckling with mobility.  -CM       Row Name 02/05/24 0918          Mobility    Extremity Weight-bearing Status right lower extremity  -CM     Right Lower Extremity (Weight-bearing Status) weight-bearing as tolerated (WBAT)  -CM               User Key  (r) = Recorded By, (t) = Taken By, (c) = Cosigned By      Initials Name Provider Type    Bessie Chaudhari, PT Physical Therapist                   Obj/Interventions       Row Name 02/05/24 0921          Range of Motion Comprehensive    General Range of Motion bilateral lower extremity ROM WFL  -CM       Row Name 02/05/24 0921          Strength Comprehensive (MMT)    General Manual Muscle Testing (MMT) Assessment lower extremity strength deficits identified  -CM     Comment, General Manual Muscle Testing (MMT) Assessment RLE grossly 3+/5, LLE no deficits  -CM       Row Name 02/05/24 0921          Motor Skills    Therapeutic Exercise hip;knee;ankle  -CM       Row Name 02/05/24 0921          Hip (Therapeutic Exercise)    Hip (Therapeutic Exercise) isometric exercises;strengthening exercise  -CM     Hip Isometrics (Therapeutic Exercise) bilateral;gluteal sets;10 repetitions;3 second hold  -CM     Hip Strengthening (Therapeutic Exercise) right;aBduction;aDduction;supine;heel slides;10 repetitions  -CM       Row Name 02/05/24 0921          Knee (Therapeutic Exercise)    Knee (Therapeutic Exercise) isometric exercises;strengthening exercise  -CM     Knee Isometrics (Therapeutic Exercise)  bilateral;quad sets;10 repetitions;3 second hold  -CM     Knee Strengthening (Therapeutic Exercise) right;LAQ (long arc quad);10 repetitions  -CM       Row Name 02/05/24 0921          Ankle (Therapeutic Exercise)    Ankle (Therapeutic Exercise) AROM (active range of motion)  -CM     Ankle AROM (Therapeutic Exercise) bilateral;plantarflexion;dorsiflexion;10 repetitions  -CM       Row Name 02/05/24 0921          Balance    Balance Assessment sitting static balance;standing static balance;standing dynamic balance  -CM     Static Sitting Balance standby assist  -CM     Position, Sitting Balance unsupported;sitting edge of bed  -CM     Static Standing Balance contact guard  -CM     Dynamic Standing Balance contact guard  -CM     Position/Device Used, Standing Balance supported;walker, front-wheeled  -CM     Comment, Balance no LOB or buckling  -CM       Row Name 02/05/24 0921          Sensory Assessment (Somatosensory)    Sensory Assessment (Somatosensory) bilateral LE  -CM     Bilateral LE Sensory Assessment impaired;other (see comments)  absent from ball of foot to toes, intact at heel proximally bilaterally  -CM               User Key  (r) = Recorded By, (t) = Taken By, (c) = Cosigned By      Initials Name Provider Type    CM Bessie Carpenter, PT Physical Therapist                   Goals/Plan       Row Name 02/05/24 0926          Bed Mobility Goal 1 (PT)    Activity/Assistive Device (Bed Mobility Goal 1, PT) sit to supine/supine to sit  -CM     [place] Level/Cues Needed (Bed Mobility Goal 1, PT) independent  -CM     Time Frame (Bed Mobility Goal 1, PT) long term goal (LTG);10 days  -CM     Progress/Outcomes (Bed Mobility Goal 1, PT) new goal  -CM       Row Name 02/05/24 0926          Transfer Goal 1 (PT)    Activity/Assistive Device (Transfer Goal 1, PT) sit-to-stand/stand-to-sit;bed-to-chair/chair-to-bed  -CM     [place] Level/Cues Needed (Transfer Goal 1, PT) standby assist  -CM     Time Frame  (Transfer Goal 1, PT) long term goal (LTG);10 days  -CM     Progress/Outcome (Transfer Goal 1, PT) new goal  -CM       Row Name 02/05/24 0926          Gait Training Goal 1 (PT)    Activity/Assistive Device (Gait Training Goal 1, PT) gait (walking locomotion);assistive device use  -CM     [place] Level (Gait Training Goal 1, PT) standby assist  -CM     Distance (Gait Training Goal 1, PT) 350'  -CM     Time Frame (Gait Training Goal 1, PT) long term goal (LTG);10 days  -CM     Progress/Outcome (Gait Training Goal 1, PT) new goal  -CM       Row Name 02/05/24 0926          Stairs Goal 1 (PT)    Activity/Assistive Device (Stairs Goal 1, PT) ascending stairs;descending stairs;using handrail, left;using handrail, right  -CM     [place] Level/Cues Needed (Stairs Goal 1, PT) contact guard required  -CM     Number of Stairs (Stairs Goal 1, PT) 5  -CM     Time Frame (Stairs Goal 1, PT) long term goal (LTG);10 days  -CM       Row Name 02/05/24 0926          Therapy Assessment/Plan (PT)    Planned Therapy Interventions (PT) balance training;bed mobility training;gait training;home exercise program;stretching;strengthening;stair training;ROM (range of motion);postural re-education;patient/family education;transfer training  -CM               User Key  (r) = Recorded By, (t) = Taken By, (c) = Cosigned By      Initials Name Provider Type    Bessie Chaudhari, PT Physical Therapist                   Clinical Impression       Row Name 02/05/24 0922          Pain    Pretreatment Pain Rating 0/10 - no pain  -CM     Posttreatment Pain Rating 0/10 - no pain  -CM       Row Name 02/05/24 0922          Plan of Care Review    Plan of Care Reviewed With patient  -CM     Outcome Evaluation Patient presents s/p R ant [place] with deficits in balance, strength, and endurance. She was able to ambulate 60' CGA with FWW with no LOB or buckling and gave good effort with BLE therex. Patient is mobilizing below her baseline indicating IPPT  intervention. Recommend D/C home with assist and OPPT. Patient will need a FWW at D/C and will need to complete stair training prior to D/C. Will plan to initiate stair training this afternoon.  -CM       Row Name 02/05/24 0922          Therapy Assessment/Plan (PT)    Rehab Potential (PT) good, to achieve stated therapy goals  -CM     Criteria for Skilled Interventions Met (PT) yes;meets criteria;skilled treatment is necessary  -CM     Therapy Frequency (PT) 2 times/day  -CM       Row Name 02/05/24 0922          Vital Signs    Pre Systolic BP Rehab 126  -CM     Pre Treatment Diastolic BP 56  -CM     Pretreatment Heart Rate (beats/min) 84  -CM     Posttreatment Heart Rate (beats/min) 73  -CM     O2 Delivery Pre Treatment room air  -CM     O2 Delivery Intra Treatment room air  -CM     Post SpO2 (%) 92  -CM     O2 Delivery Post Treatment room air  -CM     Pre Patient Position Supine  -CM     Intra Patient Position Standing  -CM     Post Patient Position Sitting  -CM       Row Name 02/05/24 0922          Positioning and Restraints    Pre-Treatment Position in bed  -CM     Post Treatment Position chair  -CM     In Chair reclined;call light within reach;encouraged to call for assist;exit alarm on;waffle cushion;compression device;notified ns  -               User Key  (r) = Recorded By, (t) = Taken By, (c) = Cosigned By      Initials Name Provider Type    Bessie Chaudhari, PT Physical Therapist                   Outcome Measures       Row Name 02/05/24 0927 02/04/24 2203       How much help from another person do you currently need...    Turning from your back to your side while in flat bed without using bedrails? 3  -CM 2  -LB    Moving from lying on back to sitting on the side of a flat bed without bedrails? 3  -CM 2  -LB    Moving to and from a bed to a chair (including a wheelchair)? 3  -CM 2  -LB    Standing up from a chair using your arms (e.g., wheelchair, bedside chair)? 3  -CM 1  -LB    Climbing 3-5  steps with a railing? 3  -CM 1  -LB    To walk in hospital room? 3  -CM 1  -LB    AM-PAC 6 Clicks Score (PT) 18  -CM 9  -LB    Highest Level of Mobility Goal 6 --> Walk 10 steps or more  -CM 3 --> Sit at edge of bed  -LB      Row Name 02/05/24 0927          Functional Assessment    Outcome Measure Options AM-PAC 6 Clicks Basic Mobility (PT)  -CM               User Key  (r) = Recorded By, (t) = Taken By, (c) = Cosigned By      Initials Name Provider Type    LB Eugenia Chavez, RN Registered Nurse    Bessie Chaudhari, PT Physical Therapist                                 Physical Therapy Education       Title: PT OT SLP Therapies (Done)       Topic: Physical Therapy (Done)       Point: Mobility training (Done)       Learning Progress Summary             Patient Acceptance, E, VU by CM at 2/5/2024 0927                         Point: Home exercise program (Done)       Learning Progress Summary             Patient Acceptance, E, VU by CM at 2/5/2024 0927                         Point: Body mechanics (Done)       Learning Progress Summary             Patient Acceptance, E, VU by CM at 2/5/2024 0927                         Point: Precautions (Done)       Learning Progress Summary             Patient Acceptance, E, VU by CM at 2/5/2024 0927                                         User Key       Initials Effective Dates Name Provider Type Discipline    GINGER 09/22/22 -  Bessie Carpenter, JOSLYN Physical Therapist PT                  PT Recommendation and Plan  Planned Therapy Interventions (PT): balance training, bed mobility training, gait training, home exercise program, stretching, strengthening, stair training, ROM (range of motion), postural re-education, patient/family education, transfer training  Plan of Care Reviewed With: patient  Outcome Evaluation: Patient presents s/p R ant [place] with deficits in balance, strength, and endurance. She was able to ambulate 60' CGA with FWW with no LOB or buckling and gave good  effort with BLE therex. Patient is mobilizing below her baseline indicating IPPT intervention. Recommend D/C home with assist and OPPT. Patient will need a FWW at D/C and will need to complete stair training prior to D/C. Will plan to initiate stair training this afternoon.     Time Calculation:   PT Evaluation Complexity  History, PT Evaluation Complexity: 3 or more personal factors and/or comorbidities  Examination of Body Systems (PT Eval Complexity): total of 3 or more elements  Clinical Presentation (PT Evaluation Complexity): stable  Clinical Decision Making (PT Evaluation Complexity): low complexity  Overall Complexity (PT Evaluation Complexity): low complexity     PT Charges       Row Name 02/05/24 0927             Time Calculation    Start Time 0830  -CM      PT Received On 02/05/24  -CM      PT Goal Re-Cert Due Date 02/15/24  -CM         Timed Charges    22064 - PT Therapeutic Exercise Minutes 13  -CM      22133 - PT Therapeutic Activity Minutes 10  -CM         Untimed Charges    PT Eval/Re-eval Minutes 40  -CM         Total Minutes    Timed Charges Total Minutes 23  -CM      Untimed Charges Total Minutes 40  -CM       Total Minutes 63  -CM                User Key  (r) = Recorded By, (t) = Taken By, (c) = Cosigned By      Initials Name Provider Type    Bessie Chaudhari, PT Physical Therapist                  Therapy Charges for Today       Code Description Service Date Service Provider Modifiers Qty    1195088 HC PT THER PROC EA 15 MIN 2/5/2024 Bessie Carpenter, PT GP 1    42447797494 HC PT THERAPEUTIC ACT EA 15 MIN 2/5/2024 Bessie Carpenter, PT GP 1    69960437975 HC PT EVAL LOW COMPLEXITY 3 2/5/2024 Bessie Carpenter, PT GP 1            PT G-Codes  Outcome Measure Options: AM-PAC 6 Clicks Basic Mobility (PT)  AM-PAC 6 Clicks Score (PT): 18  PT Discharge Summary  Anticipated Discharge Disposition (PT): home with assist, home with outpatient therapy services    Bessie Carpenter  PT  2/5/2024

## 2024-02-05 NOTE — PLAN OF CARE
Goal Outcome Evaluation:  Plan of Care Reviewed With: patient, spouse, daughter        Progress: improving  Outcome Evaluation: Patient able to progress ambulation distance to 70' CGA with FWW and navigated 5 steps with CGA per her home setup. Printed HEP reviewed with patient, , and daughter. No LOB or buckling with any activity. IPPT remains indicated to address mobility below baseline. Continue to recommend D/C home with assist and OPPT.      Anticipated Discharge Disposition (PT): home with assist, home with outpatient therapy services

## 2024-02-05 NOTE — PROGRESS NOTES
"  Orthopedic Progress Note      Patient: Pari Howell  YOB: 1950     Date of Admission: 2/4/2024  1:40 PM Medical Record Number: [id number]     Attending Physician: Jessi Harvey DO    Status Post:  Procedure(s):  TOTAL HIP ARTHROPLASTY ANTERIOR Post Operative Day Number: 1    Subjective : No new orthopaedic complaints     Pain Relief: some relief with present medication.     Systemic Complaints: No Complaints  Vitals:    02/04/24 2235 02/05/24 0252 02/05/24 0730 02/05/24 0832   BP: 152/71 144/72 123/61 126/56   BP Location: Left arm Left arm Left arm    Patient Position: Lying Lying Lying    Pulse:  90 82 90   Resp:  16 16    Temp:  98.3 °F (36.8 °C) 98.1 °F (36.7 °C)    TempSrc:  Oral Oral    SpO2: 93% 92% 95%        Physical Exam: 73 y.o. female    General Appearance:       Alert, cooperative, in no acute distress                  Extremities:    Dressing Clean, Dry and Intact             No clinical sign of DVT        Able to do good movements of digits    Pulses:   Pulses palpable and equal bilaterally           Diagnostic Tests:     Results from last 7 days   Lab Units 02/05/24  0338 02/04/24  1405   WBC 10*3/mm3 14.00* 9.45   HEMOGLOBIN g/dL 13.2 15.6   HEMATOCRIT % 38.1 45.0   PLATELETS 10*3/mm3 232 261     Results from last 7 days   Lab Units 02/05/24  0338 02/04/24  1818 02/04/24  1428   SODIUM mmol/L 137 138 136   POTASSIUM mmol/L 3.9 3.8 3.2*   CHLORIDE mmol/L 101 102 101   CO2 mmol/L 23.0 25.0 25.0   BUN mg/dL 10 10 11   CREATININE mg/dL 0.71 0.72 0.66   GLUCOSE mg/dL 177* 124* 122*   CALCIUM mg/dL 9.0 9.6 9.3         No results found for: \"URICACID\"  No results found for: \"CRYSTAL\"  Microbiology Results (last 10 days)       ** No results found for the last 240 hours. **          XR Hip With or Without Pelvis 2 - 3 View Right    Result Date: 2/5/2024  Postoperative changes status post hip arthroplasty. Near-anatomic alignment is noted. There is no evidence for periimplant fracture. " Electronically Signed: Jeremy Benites MD  2/5/2024 7:20 AM EST  Workstation ID: [id number]    XR Hip With or Without Pelvis 2 - 3 View Right    Result Date: 2/4/2024  Impression: 1.Transverse impacted fracture involving the proximal right femoral head-neck junction. There is impaction of the diaphyseal fracture fragment into the femoral head fracture fragment. The articulation the right hip remains intact without dislocation. Electronically Signed: Jeremy Benites MD  2/4/2024 2:43 PM EST  Workstation ID: [id number]    XR Chest 1 View    Result Date: 2/4/2024  No evidence for acute cardiopulmonary process. Electronically Signed: Jeremy Benites MD  2/4/2024 2:40 PM EST  Workstation ID: [id number]       Current Medications:  Scheduled Meds:acetaminophen, 1,000 mg, Oral, Q8H  amLODIPine, 10 mg, Oral, Daily  aspirin, 81 mg, Oral, Q12H  ceFAZolin, 2,000 mg, Intravenous, Q8H  losartan, 100 mg, Oral, Q24H   And  hydroCHLOROthiazide, 12.5 mg, Oral, Q24H  insulin regular, 2-9 Units, Subcutaneous, Q6H  levothyroxine, 75 mcg, Oral, QAM AC  meloxicam, 15 mg, Oral, Daily  pantoprazole, 40 mg, Oral, Q AM  pravastatin, 40 mg, Oral, Q PM  senna-docusate sodium, 2 tablet, Oral, BID  sodium chloride, 10 mL, Intravenous, Q12H      Continuous Infusions:lactated ringers, 100 mL/hr, Last Rate: Stopped (02/05/24 0609)      PRN Meds:.  acetaminophen **OR** acetaminophen **OR** acetaminophen    senna-docusate sodium **AND** polyethylene glycol **AND** bisacodyl **AND** bisacodyl    Calcium Replacement - Follow Nurse / BPA Driven Protocol    dextrose    dextrose    glucagon (human recombinant)    HYDROmorphone **AND** naloxone    HYDROmorphone **AND** naloxone    ketorolac    Magnesium Standard Dose Replacement - Follow Nurse / BPA Driven Protocol    ondansetron ODT **OR** ondansetron    oxyCODONE    oxyCODONE    Phosphorus Replacement - Follow Nurse / BPA Driven Protocol    Potassium Replacement - Follow Nurse / BPA Driven Protocol     [COMPLETED] Insert Peripheral IV **AND** sodium chloride    sodium chloride    sodium chloride    traMADol    Assessment: Status post  No admission procedures for hospital encounter.    Patient Active Problem List   Diagnosis    Type 2 diabetes mellitus without complication, without long-term current use of insulin    Other specified glaucoma    Hyperlipidemia LDL goal <70    Vitamin D deficiency    Irritable bowel syndrome with diarrhea    Hypothyroidism (acquired)    Acute respiratory insufficiency    Hip fracture       PLAN:   Continues current post-op course  Anticoagulation: Aspirin started will need 6 week course   Mobilize with PT as tolerated per protocol  F/u with me in 6 weeks     Weight Bearing: WBAT  Discharge Plan: OK to plan for discharge in  today to home  from orthopaedic perspective.    Doni Evans MD    Date: 2/5/2024    Time: 08:38 EST

## 2024-02-06 ENCOUNTER — READMISSION MANAGEMENT (OUTPATIENT)
Dept: CALL CENTER | Facility: HOSPITAL | Age: 74
End: 2024-02-06
Payer: MEDICARE

## 2024-02-06 VITALS
SYSTOLIC BLOOD PRESSURE: 145 MMHG | OXYGEN SATURATION: 95 % | TEMPERATURE: 98.1 F | HEART RATE: 68 BPM | RESPIRATION RATE: 18 BRPM | DIASTOLIC BLOOD PRESSURE: 66 MMHG

## 2024-02-06 LAB
ANION GAP SERPL CALCULATED.3IONS-SCNC: 10 MMOL/L (ref 5–15)
BACTERIA SPEC AEROBE CULT: NO GROWTH
BASOPHILS # BLD AUTO: 0.06 10*3/MM3 (ref 0–0.2)
BASOPHILS NFR BLD AUTO: 0.6 % (ref 0–1.5)
BUN SERPL-MCNC: 13 MG/DL (ref 8–23)
BUN/CREAT SERPL: 18.8 (ref 7–25)
CALCIUM SPEC-SCNC: 9.2 MG/DL (ref 8.6–10.5)
CHLORIDE SERPL-SCNC: 101 MMOL/L (ref 98–107)
CO2 SERPL-SCNC: 26 MMOL/L (ref 22–29)
CREAT SERPL-MCNC: 0.69 MG/DL (ref 0.57–1)
DEPRECATED RDW RBC AUTO: 39.6 FL (ref 37–54)
EGFRCR SERPLBLD CKD-EPI 2021: 91.8 ML/MIN/1.73
EOSINOPHIL # BLD AUTO: 0.95 10*3/MM3 (ref 0–0.4)
EOSINOPHIL NFR BLD AUTO: 10.2 % (ref 0.3–6.2)
ERYTHROCYTE [DISTWIDTH] IN BLOOD BY AUTOMATED COUNT: 12.2 % (ref 12.3–15.4)
GLUCOSE BLDC GLUCOMTR-MCNC: 121 MG/DL (ref 70–130)
GLUCOSE BLDC GLUCOMTR-MCNC: 122 MG/DL (ref 70–130)
GLUCOSE BLDC GLUCOMTR-MCNC: 127 MG/DL (ref 70–130)
GLUCOSE SERPL-MCNC: 133 MG/DL (ref 65–99)
HCT VFR BLD AUTO: 35 % (ref 34–46.6)
HGB BLD-MCNC: 12.2 G/DL (ref 12–15.9)
IMM GRANULOCYTES # BLD AUTO: 0.03 10*3/MM3 (ref 0–0.05)
IMM GRANULOCYTES NFR BLD AUTO: 0.3 % (ref 0–0.5)
LYMPHOCYTES # BLD AUTO: 1.91 10*3/MM3 (ref 0.7–3.1)
LYMPHOCYTES NFR BLD AUTO: 20.4 % (ref 19.6–45.3)
MCH RBC QN AUTO: 31.2 PG (ref 26.6–33)
MCHC RBC AUTO-ENTMCNC: 34.9 G/DL (ref 31.5–35.7)
MCV RBC AUTO: 89.5 FL (ref 79–97)
MONOCYTES # BLD AUTO: 0.61 10*3/MM3 (ref 0.1–0.9)
MONOCYTES NFR BLD AUTO: 6.5 % (ref 5–12)
NEUTROPHILS NFR BLD AUTO: 5.79 10*3/MM3 (ref 1.7–7)
NEUTROPHILS NFR BLD AUTO: 62 % (ref 42.7–76)
NRBC BLD AUTO-RTO: 0 /100 WBC (ref 0–0.2)
PLATELET # BLD AUTO: 205 10*3/MM3 (ref 140–450)
PMV BLD AUTO: 9.8 FL (ref 6–12)
POTASSIUM SERPL-SCNC: 3.7 MMOL/L (ref 3.5–5.2)
RBC # BLD AUTO: 3.91 10*6/MM3 (ref 3.77–5.28)
SODIUM SERPL-SCNC: 137 MMOL/L (ref 136–145)
WBC NRBC COR # BLD AUTO: 9.35 10*3/MM3 (ref 3.4–10.8)

## 2024-02-06 PROCEDURE — 85025 COMPLETE CBC W/AUTO DIFF WBC: CPT | Performed by: INTERNAL MEDICINE

## 2024-02-06 PROCEDURE — 82948 REAGENT STRIP/BLOOD GLUCOSE: CPT

## 2024-02-06 PROCEDURE — 80048 BASIC METABOLIC PNL TOTAL CA: CPT | Performed by: ORTHOPAEDIC SURGERY

## 2024-02-06 PROCEDURE — 97535 SELF CARE MNGMENT TRAINING: CPT

## 2024-02-06 PROCEDURE — 97530 THERAPEUTIC ACTIVITIES: CPT

## 2024-02-06 PROCEDURE — 97116 GAIT TRAINING THERAPY: CPT

## 2024-02-06 PROCEDURE — 97110 THERAPEUTIC EXERCISES: CPT

## 2024-02-06 PROCEDURE — 99239 HOSP IP/OBS DSCHRG MGMT >30: CPT | Performed by: INTERNAL MEDICINE

## 2024-02-06 RX ORDER — OXYCODONE HYDROCHLORIDE 5 MG/1
5 TABLET ORAL EVERY 4 HOURS PRN
Qty: 18 TABLET | Refills: 0 | Status: SHIPPED | OUTPATIENT
Start: 2024-02-06

## 2024-02-06 RX ORDER — ASPIRIN 81 MG/1
81 TABLET ORAL EVERY 12 HOURS SCHEDULED
Qty: 84 TABLET | Refills: 0 | Status: SHIPPED | OUTPATIENT
Start: 2024-02-06 | End: 2024-03-19

## 2024-02-06 RX ORDER — MELOXICAM 15 MG/1
15 TABLET ORAL DAILY PRN
Qty: 7 TABLET | Refills: 0 | Status: SHIPPED | OUTPATIENT
Start: 2024-02-06

## 2024-02-06 RX ADMIN — LOSARTAN POTASSIUM 100 MG: 50 TABLET, FILM COATED ORAL at 09:12

## 2024-02-06 RX ADMIN — ASPIRIN 81 MG: 81 TABLET, COATED ORAL at 09:13

## 2024-02-06 RX ADMIN — OXYCODONE HYDROCHLORIDE 10 MG: 10 TABLET ORAL at 13:27

## 2024-02-06 RX ADMIN — OXYCODONE HYDROCHLORIDE 5 MG: 5 TABLET ORAL at 01:33

## 2024-02-06 RX ADMIN — ACETAMINOPHEN 1000 MG: 500 TABLET ORAL at 13:27

## 2024-02-06 RX ADMIN — AMLODIPINE BESYLATE 10 MG: 10 TABLET ORAL at 09:13

## 2024-02-06 RX ADMIN — PANTOPRAZOLE SODIUM 40 MG: 40 TABLET, DELAYED RELEASE ORAL at 05:00

## 2024-02-06 RX ADMIN — OXYCODONE HYDROCHLORIDE 10 MG: 10 TABLET ORAL at 09:12

## 2024-02-06 RX ADMIN — ACETAMINOPHEN 1000 MG: 500 TABLET ORAL at 05:00

## 2024-02-06 RX ADMIN — SENNOSIDES AND DOCUSATE SODIUM 2 TABLET: 8.6; 5 TABLET ORAL at 09:13

## 2024-02-06 RX ADMIN — LEVOTHYROXINE SODIUM 75 MCG: 0.07 TABLET ORAL at 05:00

## 2024-02-06 RX ADMIN — MELOXICAM 15 MG: 15 TABLET ORAL at 09:12

## 2024-02-06 RX ADMIN — HYDROCHLOROTHIAZIDE 12.5 MG: 12.5 CAPSULE ORAL at 09:12

## 2024-02-06 NOTE — DISCHARGE SUMMARY
[facility] Medicine Services  DISCHARGE SUMMARY    Patient Name: Pari Howell  : 1950  MRN: [id number]    Date of Admission: 2024  1:40 PM  Date of Discharge:  2024  Primary Care Physician: Radha Gregg APRN    Consults       Date and Time Order Name Status Description    2024 10:30 PM Inpatient Consult to Hospitalist              Hospital Course     Presenting Problem: hip Fracture    Active Hospital Problems    Diagnosis  POA    **Hip fracture [S72.009A]  Yes      Resolved Hospital Problems   No resolved problems to display.          Hospital Course:  Pari Howell is a 73 y.o. female with PMH of hypothyroidism, HTN, DM II with neuropathy that presented after a fall.      Right Hip Fracture:  - s/p repair w/Dr. Evans  - WBAT, f/u with ortho in 6 weeks  - ASA for DVT ppx x 6 week course  - PRN pain control at discharge  - PT/OT, plan home w/outpatient PT     Isolated Fever:  -Tmax 102.9, no further fevers, infectious w/u is negative     DM II   - resume home regimen     HTN  HLD  - resume home meds     Hypokalemia:  - resolved    Discharge Follow Up Recommendations for outpatient labs/diagnostics:   - f/u with Dr. Evans in 6 weeks    Day of Discharge     HPI:   Patient resting in bed. No issues overnight. Having some pain in right knee and right lateral part of her leg. Otherwise feels ready for discharge home.    Review of Systems  Gen- No fevers, chills  CV- No chest pain, palpitations  Resp- No cough, dyspnea  GI- No N/V/D, abd pain    Vital Signs:   Temp:  [97.9 °F (36.6 °C)-98.1 °F (36.7 °C)] 98.1 °F (36.7 °C)  Heart Rate:  [57-92] 68  Resp:  [16-18] 18  BP: (126-166)/(56-70) 145/66      Physical Exam:  Constitutional: No acute distress, awake, alert  HENT: NCAT, mucous membranes moist  Respiratory: Clear to auscultation bilaterally, respiratory effort normal   Cardiovascular: RRR, no murmurs, rubs, or gallops  Gastrointestinal: soft, nontender,  nondistended  Musculoskeletal: No bilateral ankle edema, right hip incision is c/d/i  Psychiatric: Appropriate affect, cooperative  Neurologic: Oriented x 3, speech clear, no focal deficits  Skin: No rashes      Pertinent  and/or Most Recent Results     LAB RESULTS:      Lab 02/06/24  0504 02/05/24  0338 02/04/24  1405   WBC 9.35 14.00* 9.45   HEMOGLOBIN 12.2 13.2 15.6   HEMATOCRIT 35.0 38.1 45.0   PLATELETS 205 232 261   NEUTROS ABS 5.79 12.83* 6.32   IMMATURE GRANS (ABS) 0.03 0.08* 0.07*   LYMPHS ABS 1.91 0.70 2.06   MONOS ABS 0.61 0.36 0.48   EOS ABS 0.95* 0.01 0.47*   MCV 89.5 89.2 87.4         Lab 02/06/24  0504 02/05/24  0338 02/04/24  1818 02/04/24  1428   SODIUM 137 137 138 136   POTASSIUM 3.7 3.9 3.8 3.2*   CHLORIDE 101 101 102 101   CO2 26.0 23.0 25.0 25.0   ANION GAP 10.0 13.0 11.0 10.0   BUN 13 10 10 11   CREATININE 0.69 0.71 0.72 0.66   EGFR 91.8 89.9 88.4 92.8   GLUCOSE 133* 177* 124* 122*   CALCIUM 9.2 9.0 9.6 9.3         Lab 02/04/24  1428   TOTAL PROTEIN 7.3   ALBUMIN 4.2   GLOBULIN 3.1   ALT (SGPT) 17   AST (SGOT) 18   BILIRUBIN 0.6   ALK PHOS 68                     Brief Urine Lab Results  (Last result in the past 365 days)        Color   Clarity   Blood   Leuk Est   Nitrite   Protein   CREAT   Urine HCG        02/05/24 1130 Yellow   Clear   Negative   Small (1+)   Negative   Negative                 Microbiology Results (last 10 days)       Procedure Component Value - Date/Time    Urine Culture - Urine, Urine, Clean Catch [[id number]]  (Normal) Collected: 02/05/24 1130    Lab Status: Final result Specimen: Urine, Clean Catch Updated: 02/06/24 1027     Urine Culture No growth    Respiratory Panel PCR w/COVID-19(SARS-CoV-2) [facility] In-House, NP Swab in UTM/VTM, 2 HR TAT - Swab, Nasopharynx [[id number]]  (Normal) Collected: 02/05/24 1050    Lab Status: Final result Specimen: Swab from Nasopharynx Updated: 02/05/24 1152     ADENOVIRUS, PCR Not Detected     Coronavirus 229E Not Detected      Coronavirus HKU1 Not Detected     Coronavirus NL63 Not Detected     Coronavirus OC43 Not Detected     COVID19 Not Detected     Human Metapneumovirus Not Detected     Human Rhinovirus/Enterovirus Not Detected     Influenza A PCR Not Detected     Influenza B PCR Not Detected     Parainfluenza Virus 1 Not Detected     Parainfluenza Virus 2 Not Detected     Parainfluenza Virus 3 Not Detected     Parainfluenza Virus 4 Not Detected     RSV, PCR Not Detected     Bordetella pertussis pcr Not Detected     Bordetella parapertussis PCR Not Detected     Chlamydophila pneumoniae PCR Not Detected     Mycoplasma pneumo by PCR Not Detected    Narrative:      In the setting of a positive respiratory panel with a viral infection PLUS a negative procalcitonin without other underlying concern for bacterial infection, consider observing off antibiotics or discontinuation of antibiotics and continue supportive care. If the respiratory panel is positive for atypical bacterial infection (Bordetella pertussis, Chlamydophila pneumoniae, or Mycoplasma pneumoniae), consider antibiotic de-escalation to target atypical bacterial infection.            XR Hip With or Without Pelvis 2 - 3 View Right    Result Date: 2/5/2024  XR HIP W OR WO PELVIS 2-3 VIEW RIGHT Date of Exam: 2/4/2024 10:00 PM EST Indication: Post hip replacement Comparison: 2/4/2024 at 1424 hours FINDINGS: Postoperative changes are noted status post hip arthroplasty. Near anatomic alignment is observed. There is no evidence for periimplant fracture. Surgical changes are seen within the surrounding soft tissues.     Postoperative changes status post hip arthroplasty. Near-anatomic alignment is noted. There is no evidence for periimplant fracture. Electronically Signed: Jeremy Benites MD  2/5/2024 7:20 AM EST  Workstation ID: [id number]    FL C Arm During Surgery    Result Date: 2/4/2024  This procedure was auto-finalized with no dictation required.    XR Hip With or Without  Pelvis 2 - 3 View Right    Result Date: 2/4/2024  XR HIP W OR WO PELVIS 2-3 VIEW RIGHT Date of Exam: 2/4/2024 2:12 PM EST Indication: fall/pain Comparison: None available. Findings: There is a transverse impacted fracture involving the proximal right femoral head neck junction. There is impaction of the dominant distal diaphyseal fracture fragment into the femoral head fracture fragment. The articulation of the hip remains intact without dislocation. There does not appear to be significant involvement of the trochanteric portion of the femur. The left hip is intact. The pubic rami and sacral alae are intact. The SI joints and pubic symphysis are intact. Mild age-related changes are noted. 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 Vascular calcifications are noted. Phleboliths are observed.     Impression: 1.Transverse impacted fracture involving the proximal right femoral head-neck junction. There is impaction of the diaphyseal fracture fragment into the femoral head fracture fragment. The articulation the right hip remains intact without dislocation. Electronically Signed: Jeremy Benites MD  2/4/2024 2:43 PM EST  Workstation ID: [id number]    XR Chest 1 View    Result Date: 2/4/2024  XR CHEST 1 VW Date of Exam: 2/4/2024 2:12 PM EST Indication: preop Comparison: 11/7/2023 FINDINGS: No new consolidations or pleural effusions are observed. The cardiac silhouette and mediastinum are stable. No acute osseous abnormalities are identified.     No evidence for acute cardiopulmonary process. Electronically Signed: Jeremy Benites MD  2/4/2024 2:40 PM EST  Workstation ID: [id number]                 Plan for Follow-up of Pending Labs/Results:     Discharge Details        Discharge Medications        New Medications        Instructions Start Date   aspirin 81 MG EC tablet   81 mg, Oral, Every 12 Hours Scheduled      meloxicam 15 MG tablet  Commonly known as: MOBIC   15 mg, Oral, Daily PRN      oxyCODONE 5 MG immediate  release tablet  Commonly known as: ROXICODONE   5 mg, Oral, Every 4 Hours PRN             Changes to Medications        Instructions Start Date   latanoprost 0.005 % ophthalmic solution  Commonly known as: XALATAN  What changed:   how to take this  when to take this   1 drop, Ophthalmic, Daily      levothyroxine 75 MCG tablet  Commonly known as: SYNTHROID, LEVOTHROID  What changed: when to take this   75 mcg, Oral, Daily             Continue These Medications        Instructions Start Date   amLODIPine 5 MG tablet  Commonly known as: NORVASC   TAKE 2 TABLETS EVERY DAY      ascorbic acid 1000 MG tablet  Commonly known as: VITAMIN C   1,000 mg, Oral, Daily, OTC      cetirizine 10 MG tablet  Commonly known as: zyrTEC   10 mg, Oral, Daily, OTC      diphenhydrAMINE 25 mg capsule  Commonly known as: BENADRYL   25 mg, Oral, Nightly PRN, OTC      docusate sodium 100 MG capsule  Commonly known as: COLACE   100 mg, Oral, Every Night at Bedtime      Elderberry 500 MG capsule   1 capsule, Oral, Daily, OTC      losartan-hydrochlorothiazide 100-12.5 MG per tablet  Commonly known as: HYZAAR   TAKE 1 TABLET EVERY DAY      multivitamin with minerals tablet tablet   1 tablet, Oral, Daily, OTC      NON FORMULARY   Specialty Compounded Cream for neuropathy of feet. Cream contains lidocaine, gabapentin, ketoprofen, ibuprofen and clonidine per patient. Apply to bilateral feet at bedtime      nystatin [id number] UNIT/GM cream  Commonly known as: MYCOSTATIN   Topical, 2 Times Daily      Ozempic (0.25 or 0.5 MG/DOSE) 2 MG/3ML solution pen-injector  Generic drug: Semaglutide(0.25 or 0.5MG/DOS)   0.5 mg, Subcutaneous, Weekly      pantoprazole 20 MG EC tablet  Commonly known as: PROTONIX   TAKE 1 TABLET EVERY DAY      pravastatin 40 MG tablet  Commonly known as: PRAVACHOL   40 mg, Oral, Every Evening      timolol 0.5 % ophthalmic solution  Commonly known as: TIMOPTIC   1 drop, Both Eyes, 2 Times Daily      triamcinolone 0.025 % cream  Commonly  known as: KENALOG   1 application , Topical, 2 Times Daily      vitamin B-12 100 MCG tablet  Commonly known as: CYANOCOBALAMIN   100 mcg, Oral, Daily, OTC               Allergies   Allergen Reactions    Shellfish-Derived Products Rash         Discharge Disposition:  Home or Self Care    Diet:  Hospital:  Diet Order   Procedures    Diet: Regular/House Diet; Texture: Regular Texture (IDDSI 7); Fluid Consistency: Thin (IDDSI 0)            Activity:      Restrictions or Other Recommendations:  - WBAT       CODE STATUS:    Code Status and Medical Interventions:   Ordered at: 02/04/24 6480     Level Of Support Discussed With:    Patient     Code Status (Patient has no pulse and is not breathing):    CPR (Attempt to Resuscitate)     Medical Interventions (Patient has pulse or is breathing):    Full       No future appointments.    Additional Instructions for the Follow-ups that You Need to Schedule       Ambulatory Referral to Physical Therapy Evaluate and treat, Ortho; Full weight bearing (as tolerated.)   As directed      Specialty needed: Evaluate and treat Ortho   Weight Bearing Status: Full weight bearing (as tolerated.)   Follow-up needed: Yes                      Jessi Harvey DO  02/06/24      Time Spent on Discharge:  I spent  32  minutes on this discharge activity which included: face-to-face encounter with the patient, reviewing the data in the system, coordination of the care with the nursing staff as well as consultants, documentation, and entering orders.

## 2024-02-06 NOTE — PLAN OF CARE
Problem: Adult Inpatient Plan of Care  Goal: Plan of Care Review  Outcome: Ongoing, Progressing  Goal: Patient-Specific Goal (Individualized)  Outcome: Ongoing, Progressing  Goal: Absence of Hospital-Acquired Illness or Injury  Outcome: Ongoing, Progressing  Intervention: Identify and Manage Fall Risk  Description: Perform standard risk assessment on admission using a validated tool or comprehensive approach appropriate to the patient; reassess fall risk frequently, with change in status or transfer to another level of care.  Communicate fall injury risk to interprofessional healthcare team.  Determine need for increased observation, equipment and environmental modification, such as low bed, signage and supportive, nonskid footwear.  Adjust safety measures to individual developmental age, stage and identified risk factors.  Reinforce the importance of safety and physical activity with patient and family.  Perform regular intentional rounding to assess need for position change, pain assessment and personal needs, including assistance with toileting.  Recent Flowsheet Documentation  Taken 2/6/2024 0400 by Ekta Ruffin RN  Safety Promotion/Fall Prevention: activity supervised  Taken 2/6/2024 0200 by Ekta Ruffin RN  Safety Promotion/Fall Prevention: activity supervised  Taken 2/6/2024 0130 by Ekta Ruffin RN  Safety Promotion/Fall Prevention: activity supervised  Taken 2/6/2024 0045 by Ekta Ruffin RN  Safety Promotion/Fall Prevention: activity supervised  Taken 2/5/2024 2158 by Ekta Ruffin RN  Safety Promotion/Fall Prevention: activity supervised  Taken 2/5/2024 2114 by Ekta Ruffin RN  Safety Promotion/Fall Prevention: activity supervised  Taken 2/5/2024 1945 by Ekta Ruffin RN  Safety Promotion/Fall Prevention: activity supervised  Taken 2/5/2024 1920 by Ekta Ruffin RN  Safety Promotion/Fall Prevention: activity supervised  Intervention: Prevent Skin  Injury  Description: Perform a screening for skin injury risk, such as pressure or moisture associated skin damage on admission and at regular intervals throughout hospital stay.  Keep all areas of skin (especially folds) clean and dry.  Maintain adequate skin hydration.  Relieve and redistribute pressure and protect bony prominences; implement measures based on patient-specific risk factors.  Match turning and repositioning schedule to clinical condition.  Encourage weight shift frequently; assist with reposition if unable to complete independently.  Float heels off bed; avoid pressure on the Achilles tendon.  Keep skin free from extended contact with medical devices.  Encourage functional activity and mobility, as early as tolerated.  Use aids (e.g., slide boards, mechanical lift) during transfer.  Recent Flowsheet Documentation  Taken 2/6/2024 0400 by Ekta Ruffin RN  Body Position: position changed independently  Skin Protection: adhesive use limited  Taken 2/6/2024 0200 by Ekta Ruffin RN  Body Position: position changed independently  Skin Protection: adhesive use limited  Taken 2/6/2024 0130 by Ekta Ruffin RN  Body Position: position changed independently  Skin Protection: adhesive use limited  Taken 2/6/2024 0045 by Ekta Ruffin RN  Body Position: position changed independently  Skin Protection: adhesive use limited  Taken 2/5/2024 2158 by Ekta Ruffin RN  Body Position: position changed independently  Skin Protection: adhesive use limited  Taken 2/5/2024 2114 by Ekta Ruffin RN  Body Position: position changed independently  Skin Protection: adhesive use limited  Taken 2/5/2024 1945 by Ekta Ruffin RN  Body Position: position changed independently  Skin Protection: adhesive use limited  Taken 2/5/2024 1920 by Ekta Ruffin RN  Body Position: position changed independently  Skin Protection: adhesive use limited  Intervention: Prevent and Manage VTE  (Venous Thromboembolism) Risk  Description: Assess for VTE (venous thromboembolism) risk.  Encourage and assist with early ambulation.  Initiate and maintain compression or other therapy, as indicated, based on identified risk in accordance with organizational protocol and provider order.  Encourage both active and passive leg exercises while in bed, if unable to ambulate.  Recent Flowsheet Documentation  Taken 2/6/2024 0400 by Ekta Ruffin RN  Activity Management: activity encouraged  VTE Prevention/Management:   sequential compression devices on   bilateral  Taken 2/6/2024 0200 by Ekta Ruffin RN  Activity Management: activity encouraged  VTE Prevention/Management:   sequential compression devices on   bilateral  Taken 2/6/2024 0130 by Ekta Ruffin RN  Activity Management:   activity encouraged   ambulated in room  VTE Prevention/Management:   sequential compression devices on   bilateral  Taken 2/6/2024 0045 by Ekta Ruffin RN  Activity Management: activity encouraged  VTE Prevention/Management:   sequential compression devices on   bilateral  Taken 2/5/2024 2158 by Ekta Ruffin RN  Activity Management: activity encouraged  VTE Prevention/Management:   sequential compression devices on   bilateral  Taken 2/5/2024 2114 by Ekta Ruffin RN  Activity Management: activity encouraged  VTE Prevention/Management:   sequential compression devices on   bilateral  Taken 2/5/2024 2000 by Ekta Ruffin RN  VTE Prevention/Management:   sequential compression devices on   bilateral  Taken 2/5/2024 1945 by Ekta Ruffin RN  Activity Management: activity encouraged  VTE Prevention/Management:   sequential compression devices on   bilateral  Taken 2/5/2024 1920 by Ekta Ruffin RN  Activity Management: activity encouraged  VTE Prevention/Management:   sequential compression devices on   bilateral  Intervention: Prevent Infection  Description: Maintain skin and mucous  membrane integrity; promote hand, oral and pulmonary hygiene.  Optimize fluid balance, nutrition, sleep and glycemic control to maximize infection resistance.  Identify potential sources of infection early to prevent or mitigate progression of infection (e.g., wound, lines, devices).  Evaluate ongoing need for invasive devices; remove promptly when no longer indicated.  Recent Flowsheet Documentation  Taken 2/6/2024 0400 by Ekta Ruffin RN  Infection Prevention:   environmental surveillance performed   personal protective equipment utilized   rest/sleep promoted   single patient room provided   visitors restricted/screened  Taken 2/6/2024 0200 by Ekta Ruffin RN  Infection Prevention:   environmental surveillance performed   personal protective equipment utilized   rest/sleep promoted   single patient room provided   visitors restricted/screened  Taken 2/6/2024 0130 by Ekta Ruffin RN  Infection Prevention:   environmental surveillance performed   personal protective equipment utilized   rest/sleep promoted   single patient room provided   visitors restricted/screened  Taken 2/6/2024 0045 by Ekta Ruffin RN  Infection Prevention:   environmental surveillance performed   personal protective equipment utilized   rest/sleep promoted   single patient room provided   visitors restricted/screened  Taken 2/5/2024 2158 by Ekta Ruffin RN  Infection Prevention:   environmental surveillance performed   personal protective equipment utilized   rest/sleep promoted   single patient room provided   visitors restricted/screened  Taken 2/5/2024 2114 by Ekta Ruffin RN  Infection Prevention:   environmental surveillance performed   personal protective equipment utilized   rest/sleep promoted   single patient room provided   visitors restricted/screened  Taken 2/5/2024 1945 by Ekta Ruffin RN  Infection Prevention:   environmental surveillance performed   personal protective equipment  utilized   rest/sleep promoted   single patient room provided   visitors restricted/screened  Taken 2/5/2024 1920 by Ekta Ruffin RN  Infection Prevention:   environmental surveillance performed   personal protective equipment utilized   rest/sleep promoted   single patient room provided   visitors restricted/screened  Goal: Optimal Comfort and Wellbeing  Outcome: Ongoing, Progressing  Intervention: Provide Person-Centered Care  Description: Use a family-focused approach to care.  Develop trust and rapport by proactively providing information, encouraging questions, addressing concerns and offering reassurance.  Acknowledge emotional response to hospitalization.  Recognize and utilize personal coping strategies.  Honor spiritual and cultural preferences.  Recent Flowsheet Documentation  Taken 2/5/2024 2000 by Ekta Ruffin RN  Trust Relationship/Rapport:   care explained   questions answered   questions encouraged   reassurance provided   thoughts/feelings acknowledged  Goal: Readiness for Transition of Care  Outcome: Ongoing, Progressing   Goal Outcome Evaluation:         Patient alert and oriented X4, pleasant, compliant with treatment regimen. Pivot to BSC X1 assistance, generalized weakness present. Patient given pain medication prn (see eMAR), alternating ice pack and repositioning largely to attribute to pain control. Eager and hopeful to discharge home today (02/06). Ekta Ruffin, JAMILA

## 2024-02-06 NOTE — PLAN OF CARE
Goal Outcome Evaluation:  Plan of Care Reviewed With: patient, daughter        Progress: no change  Outcome Evaluation: During OT tx session, pt lost balance 1x upon inital stand from chair, after which no LOB was noted. Pt continues to be below BL on mobility, self-care and activity tolerance. Will continue w/ current POC. d/c rec is home w/ 24/7 assist.      Anticipated Discharge Disposition (OT): home with 24/7 care

## 2024-02-06 NOTE — PLAN OF CARE
Goal Outcome Evaluation:  Plan of Care Reviewed With: patient        Progress: no change  Outcome Evaluation: Pt continues to present below baseline function d/t acute pain and deficits in R LE strength, balance, and activity tolerance. Pt required CGA for STS and to ambulate 80 ft w/ FWW. No LOB or knee buckling noted. Pt demo good knowledge of stair training and able to verbally recite sequencing. Pt would continue to benefit from skilled IP PT. Recommend home w/ assist and OP PT at d/c.      Anticipated Discharge Disposition (PT): home with assist, home with outpatient therapy services

## 2024-02-06 NOTE — THERAPY TREATMENT NOTE
Patient Name: Pari Howell  : 1950    MRN: [id number]                              Today's Date: 2024       Admit Date: 2024    Visit Dx:     ICD-10-CM ICD-9-CM   1. Closed displaced fracture of right femoral neck  S72.001A 820.8   2. Closed fracture of right hip, initial encounter  S72.001A 820.8     Patient Active Problem List   Diagnosis    Type 2 diabetes mellitus without complication, without long-term current use of insulin    Other specified glaucoma    Hyperlipidemia LDL goal <70    Vitamin D deficiency    Irritable bowel syndrome with diarrhea    Hypothyroidism (acquired)    Acute respiratory insufficiency    Hip fracture     Past Medical History:   Diagnosis Date    Bladder infection     Dyspareunia, female     Glaucoma     H/O sexual problem     High cholesterol     History of abnormal cervical Pap smear     Hypertension     Hypertension     Hypothyroidism     Impaired functional mobility, balance, gait, and endurance     Labial cyst     Muscle weakness     Type 2 diabetes mellitus     Urinary incontinence     Vaginal itching     Yeast infection      Past Surgical History:   Procedure Laterality Date    CARPAL TUNNEL RELEASE      CATARACT EXTRACTION Right 2022    CATARACT EXTRACTION, BILATERAL Left     CHOLECYSTECTOMY      EYE SURGERY      HYSTERECTOMY      OOPHORECTOMY      TOTAL HIP ARTHROPLASTY Right 2024    Procedure: TOTAL HIP ARTHROPLASTY ANTERIOR RIGHT;  Surgeon: Doni Evans MD;  Location: [facility];  Service: Orthopedics;  Laterality: Right;    TUBAL ABDOMINAL LIGATION  1979      General Information       Row Name 24 0915          Physical Therapy Time and Intention    Document Type therapy note (daily note)  -     Mode of Treatment physical therapy  -       Row Name 24 0915          General Information    Patient Profile Reviewed yes  -     Existing Precautions/Restrictions fall;right;hip, anterior;other (see comments)  vertigo,  R LE WBAT  -     Barriers to Rehab none identified  -[facility] Name 02/06/24 0915          Cognition    Orientation Status (Cognition) oriented x 4  -[facility] Name 02/06/24 0915          Safety Issues, Functional Mobility    Safety Issues Affecting Function (Mobility) awareness of need for assistance;insight into deficits/self-awareness;safety precaution awareness  -     Impairments Affecting Function (Mobility) balance;endurance/activity tolerance;pain;sensation/sensory awareness;shortness of breath;strength;range of motion (ROM)  -               User Key  (r) = Recorded By, (t) = Taken By, (c) = Cosigned By      Initials Name Provider Type     Rolanda Brandt, PT Physical Therapist                   Mobility       [facility] Name 02/06/24 0916          Bed Mobility    Bed Mobility scooting/bridging;supine-sit  -     Scooting/Bridging [place] (Bed Mobility) supervision;verbal cues  -     Supine-Sit [place] (Bed Mobility) minimum assist (75% patient effort);verbal cues  [facility]     Assistive Device (Bed Mobility) leg ;head of bed elevated  -     Comment, (Bed Mobility) VCs for hand placement and sequencing. Pt required use of leg  and assist at trunk. Required increased time d/t dizziness w/ positional changes  -[facility] Name 02/06/24 0916          Transfers    Comment, (Transfers) VCs for hand placement and sequencing. Cues to step out R LE prior to transfers.  -[facility] Name 02/06/24 0916          Sit-Stand Transfer    Sit-Stand [place] (Transfers) contact guard;verbal cues  [facility]     Assistive Device (Sit-Stand Transfers) walker, front-wheeled  -     Comment, (Sit-Stand Transfer) STS x1 from EOB, x1 from commode. Cues for use of grab bars in bathroom  -[facility] Name 02/06/24 0916          Gait/Stairs (Locomotion)    [place] Level (Gait) contact guard;1 person assist;verbal cues  [facility]     Assistive Device (Gait) walker, front-wheeled  -     Patient was able to  Ambulate yes  -     Distance in Feet (Gait) 80  -     Deviations/Abnormal Patterns (Gait) bilateral deviations;lela decreased;gait speed decreased;stride length decreased  -     Bilateral Gait Deviations forward flexed posture  -     Right Sided Gait Deviations weight shift ability decreased  -     Comment, (Gait/Stairs) Pt demo step through gait pattern w/ decreased speed. Cues for upright posture and forward gaze. Pt reported increased R knee pain this date and demo decreased WB through R LE. No LOB or knee buckling noted. Distance limited by fatigue. Pt demo good knowledge of stair training and able to verbally recite sequencing.  -       Row Name 02/06/24 0916          Mobility    Extremity Weight-bearing Status right lower extremity  -     Right Lower Extremity (Weight-bearing Status) weight-bearing as tolerated (WBAT)  -               User Key  (r) = Recorded By, (t) = Taken By, (c) = Cosigned By      Initials Name Provider Type     Rolanda Brandt PT Physical Therapist                   Obj/Interventions       Row Name 02/06/24 0922          Motor Skills    Therapeutic Exercise hip;knee;ankle  -[facility] Name 02/06/24 0922          Hip (Therapeutic Exercise)    Hip (Therapeutic Exercise) isometric exercises;strengthening exercise  -     Hip Isometrics (Therapeutic Exercise) bilateral;gluteal sets;supine;10 repetitions  -     Hip Strengthening (Therapeutic Exercise) right;aBduction;aDduction;heel slides;supine;10 repetitions;marching while standing;mini squats;standing;5 repetitions  -       Row Name 02/06/24 0922          Knee (Therapeutic Exercise)    Knee (Therapeutic Exercise) isometric exercises;strengthening exercise  -     Knee Isometrics (Therapeutic Exercise) bilateral;quad sets;supine;10 repetitions  -     Knee Strengthening (Therapeutic Exercise) right;LAQ (long arc quad);sitting;10 repetitions  standing calf raises x5 reps  -       Row Name 02/06/24 0922           Ankle (Therapeutic Exercise)    Ankle (Therapeutic Exercise) AROM (active range of motion)  -     Ankle AROM (Therapeutic Exercise) bilateral;dorsiflexion;plantarflexion;supine;10 repetitions  -[facility] Name 02/06/24 0922          Balance    Balance Assessment sitting static balance;sitting dynamic balance;sit to stand dynamic balance;standing static balance;standing dynamic balance  -     Static Sitting Balance standby assist  -     Dynamic Sitting Balance standby assist  -     Position, Sitting Balance unsupported;sitting edge of bed  -     Sit to Stand Dynamic Balance contact guard;verbal cues  -     Static Standing Balance contact guard;verbal cues  -     Dynamic Standing Balance contact guard;verbal cues  -     Position/Device Used, Standing Balance supported;walker, front-wheeled  -     Balance Interventions sitting;standing;sit to stand;supported;static;dynamic  -     Comment, Balance No LOB or buckling noted  -               User Key  (r) = Recorded By, (t) = Taken By, (c) = Cosigned By      Initials Name Provider Type     Rolanda Brandt, JOSLYN Physical Therapist                   Goals/Plan    No documentation.                  Clinical Impression       [facility] Name 02/06/24 0924          Pain    Pretreatment Pain Rating 5/10  -     Posttreatment Pain Rating 5/10  [facility]     Pain Location - Side/Orientation Right  -     Pain Location generalized  -     Pain Location - knee  -     Pain Intervention(s) Repositioned;Ambulation/increased activity;Cold applied;Cold pack;Elevated  -[facility] Name 02/06/24 0924          Plan of Care Review    Plan of Care Reviewed With patient  -     Progress no change  -     Outcome Evaluation Pt continues to present below baseline function d/t acute pain and deficits in R LE strength, balance, and activity tolerance. Pt required CGA for STS and to ambulate 80 ft w/ FWW. No LOB or knee buckling noted. Pt demo good knowledge of stair training  and able to verbally recite sequencing. Pt would continue to benefit from skilled IP PT. Recommend home w/ assist and OP PT at d/c.  -       Row Name 02/06/24 0924          Therapy Assessment/Plan (PT)    Rehab Potential (PT) good, to achieve stated therapy goals  -     Criteria for Skilled Interventions Met (PT) yes;meets criteria;skilled treatment is necessary  -     Therapy Frequency (PT) 2 times/day  -       Row Name 02/06/24 0924          Vital Signs    Pre Systolic BP Rehab --  RN cleared PT  -     O2 Delivery Pre Treatment room air  -     O2 Delivery Intra Treatment room air  -     O2 Delivery Post Treatment room air  -     Pre Patient Position Supine  -     Intra Patient Position Standing  -     Post Patient Position Sitting  -       Row Name 02/06/24 0924          Positioning and Restraints    Pre-Treatment Position in bed  -     Post Treatment Position chair  -     In Chair reclined;sitting;call light within reach;encouraged to call for assist;exit alarm on;waffle cushion;compression device;legs elevated;notified nsg  cold pack applied  -               User Key  (r) = Recorded By, (t) = Taken By, (c) = Cosigned By      Initials Name Provider Type     Rolanda Brandt, PT Physical Therapist                   Outcome Measures       Row Name 02/06/24 0927 02/06/24 0912       How much help from another person do you currently need...    Turning from your back to your side while in flat bed without using bedrails? 4  - 4  -GJ    Moving from lying on back to sitting on the side of a flat bed without bedrails? 3  - 3  -GJ    Moving to and from a bed to a chair (including a wheelchair)? 3  - 3  -GJ    Standing up from a chair using your arms (e.g., wheelchair, bedside chair)? 3  - 3  -GJ    Climbing 3-5 steps with a railing? 3  - 3  -GJ    To walk in hospital room? 3  - 3  -GJ    AM-PAC 6 Clicks Score (PT) 19  - 19  -GJ    Highest Level of Mobility Goal 6 --> Walk 10  steps or more  - 6 --> Walk 10 steps or more  -      Row Name 02/06/24 0927          Functional Assessment    Outcome Measure Options AM-PAC 6 Clicks Basic Mobility (PT)  -               User Key  (r) = Recorded By, (t) = Taken By, (c) = Cosigned By      Initials Name Provider Type    Thao Horne RN Registered Nurse    Rolanda Carmona, PT Physical Therapist                                 Physical Therapy Education       Title: PT OT SLP Therapies (Done)       Topic: Physical Therapy (Done)       Point: Mobility training (Done)       Learning Progress Summary             Patient Acceptance, E, VU,DU by  at 2/6/2024 0928    Acceptance, E, VU by CM at 2/5/2024 1531    Acceptance, E, VU by CM at 2/5/2024 0927   Family Acceptance, E, VU by CM at 2/5/2024 1531   Significant Other Acceptance, E, VU by CM at 2/5/2024 1531                         Point: Home exercise program (Done)       Learning Progress Summary             Patient Acceptance, E, VU,DU by  at 2/6/2024 0928    Acceptance, E, VU by CM at 2/5/2024 1531    Acceptance, E, VU by CM at 2/5/2024 0927   Family Acceptance, E, VU by CM at 2/5/2024 1531   Significant Other Acceptance, E, VU by CM at 2/5/2024 1531                         Point: Body mechanics (Done)       Learning Progress Summary             Patient Acceptance, E, VU,DU by  at 2/6/2024 0928    Acceptance, E, VU by CM at 2/5/2024 1531    Acceptance, E, VU by CM at 2/5/2024 0927   Family Acceptance, E, VU by CM at 2/5/2024 1531   Significant Other Acceptance, E, VU by CM at 2/5/2024 1531                         Point: Precautions (Done)       Learning Progress Summary             Patient Acceptance, E, VU,DU by  at 2/6/2024 0928    Acceptance, E, VU by CM at 2/5/2024 1531    Acceptance, E, VU by CM at 2/5/2024 0927   Family Acceptance, E, VU by CM at 2/5/2024 1531   Significant Other Acceptance, E, VU by CM at 2/5/2024 1531                                         User Key        Initials Effective Dates Name Provider Type Discipline     09/22/22 -  Bessie Carpenter, PT Physical Therapist PT     09/21/23 -  Rolanda Brandt PT Physical Therapist PT                  PT Recommendation and Plan     Plan of Care Reviewed With: patient  Progress: no change  Outcome Evaluation: Pt continues to present below baseline function d/t acute pain and deficits in R LE strength, balance, and activity tolerance. Pt required CGA for STS and to ambulate 80 ft w/ FWW. No LOB or knee buckling noted. Pt demo good knowledge of stair training and able to verbally recite sequencing. Pt would continue to benefit from skilled IP PT. Recommend home w/ assist and OP PT at d/c.     Time Calculation:         PT Charges       Row Name 02/06/24 0928             Time Calculation    Start Time 0835  -      PT Received On 02/06/24  -      PT Goal Re-Cert Due Date 02/15/24  -         Timed Charges    91706 - PT Therapeutic Exercise Minutes 15  -LH      66600 - Gait Training Minutes  12  -         Total Minutes    Timed Charges Total Minutes 27  -       Total Minutes 27  -                User Key  (r) = Recorded By, (t) = Taken By, (c) = Cosigned By      Initials Name Provider Type     Rolanda Brandt, JOSLYN Physical Therapist                  Therapy Charges for Today       Code Description Service Date Service Provider Modifiers Qty    07972186228 HC PT THER PROC EA 15 MIN 2/6/2024 Rolanda Brandt, PT GP 1    [id number] HC GAIT TRAINING EA 15 MIN 2/6/2024 Rolanda Brandt, PT GP 1            PT G-Codes  Outcome Measure Options: AM-PAC 6 Clicks Basic Mobility (PT)  AM-PAC 6 Clicks Score (PT): 19  AM-PAC 6 Clicks Score (OT): 18  PT Discharge Summary  Anticipated Discharge Disposition (PT): home with assist, home with outpatient therapy services    Rolanda Brandt PT  2/6/2024

## 2024-02-06 NOTE — PAYOR COMM NOTE
"Rosario Gonzalez, JAMILA  Utilization Management  P:[phone]  F:[phone]    Ref #[id number]     Pari Cardenas (73 y.o. Female)       Date of Birth   1950    Social Security Number       Address   [address]  [place] [address]    Home Phone   [phone]    MRN   [id number]       [facility]    Marital Status                               Admission Date   24    Admission Type   Emergency    Admitting Provider   Jessi Harvey DO    Attending Provider   Jessi Harvey DO    Department, Room/Bed   [facility] 3G, S364/1       Discharge Date       Discharge Disposition       Discharge Destination                                 Attending Provider: eJssi Harvey DO    Allergies: Shellfish-derived Products    Isolation: None   Infection: None   Code Status: CPR    Ht: 172.7 cm (67.99\")   Wt: --    Admission Cmt: None   Principal Problem: Hip fracture [S72.009A]                   Active Insurance as of 2024       Primary Coverage       Payor Plan Insurance Group Employer/Plan Group    HUMANA MEDICARE REPLACEMENT HUMANA MED ADV PPO [id number]       Payor Plan Address Payor Plan Phone Number Payor Plan Fax Number Effective Dates    [address] [address] [phone]  2023 - None Entered    Formerly [facility] [id number]         Subscriber Name Subscriber Birth Date Member ID       PARI CARDENAS 1950 [id number]                   History & Physical        Leanna Saleh DO at 24 [facility]3              [facility] Medicine Services  HISTORY AND PHYSICAL    Patient Name: Pari Cardenas  : 1950  MRN: [id number]  Primary Care Physician: Radha Gregg APRN  Date of admission: 2024      Subjective   Subjective     Chief Complaint:  Hip Fracture     HPI:  Pari Cardenas is a 73 y.o. female with PMH of hypothyroidism, HTN, DM II with neuropathy that presented after a fall. She was getting out of her vehicle and her foot got caught " in her purse and she fell. She immediately had pain in her right hip. She currently is having pain in the hip. No chest pain or shortness of breath.   In the ED, XR of hip is right femoral head-neck fracture. Ortho was consulted and she will be taken to OR today.   She will be admitted to the hospitalist service for further treatment and evaluation.       Personal History     Past Medical History:   Diagnosis Date    Bladder infection     Dyspareunia, female     Glaucoma     H/O sexual problem     High cholesterol     History of abnormal cervical Pap smear     Hypertension     Hypertension     Hypothyroidism     Impaired functional mobility, balance, gait, and endurance     Labial cyst     Muscle weakness     Type 2 diabetes mellitus     Urinary incontinence     Vaginal itching     Yeast infection            Past Surgical History:   Procedure Laterality Date    CARPAL TUNNEL RELEASE  2007    CATARACT EXTRACTION Right 09/27/2022    CATARACT EXTRACTION, BILATERAL Left     CHOLECYSTECTOMY  1991    EYE SURGERY  2021    HYSTERECTOMY  2007    OOPHORECTOMY      TUBAL ABDOMINAL LIGATION  11/1979       Family History: family history includes Breast cancer (age of onset: 46) in her daughter; Cancer in her brother and sister; Colon cancer in her maternal uncle; Diabetes in her brother, mother, and sister; Heart attack in her mother; Hyperlipidemia in her mother, sister, and sister; Hypertension in her mother; Prostate cancer in her brother; Stroke in her mother; Thyroid disease in her mother, sister, and sister; Vision loss in her mother, sister, and sister.     Social History:  reports that she has never smoked. She has never used smokeless tobacco. She reports that she does not drink alcohol and does not use drugs.  Social History     Social History Narrative    Not on file       Medications:  Available home medication information reviewed.  Elderberry, Lancets, NON FORMULARY, Semaglutide(0.25 or 0.5MG/DOS), True Metrix Air  Glucose Meter, amLODIPine, cetirizine, diphenhydrAMINE, glucose blood, latanoprost, levothyroxine, losartan-hydrochlorothiazide, multivitamin with minerals, nystatin, pantoprazole, pravastatin, timolol, triamcinolone, vitamin B-12, and vitamin C    Allergies   Allergen Reactions    Shellfish-Derived Products Rash       Objective   Objective     Vital Signs:   Temp:  [98.7 °F (37.1 °C)] 98.7 °F (37.1 °C)  Heart Rate:  [75-89] 80  Resp:  [15] 15  BP: (159-195)/(74-96) 176/87       Physical Exam   Constitutional: Awake, alert  HENT: NCAT, mucous membranes moist  Neck: Supple  Respiratory: Clear to auscultation bilaterally, nonlabored respirations   Cardiovascular: RRR, no murmurs, rubs, or gallops,   Gastrointestinal:  nondistended  Musculoskeletal: No bilateral ankle edema, no clubbing or cyanosis to extremities  Psychiatric: Appropriate affect, cooperative  Neurologic: Oriented x 3,, speech clear  Skin: No rashes      Result Review:  I have personally reviewed the results from the time of this admission to 2/4/2024 16:13 EST and agree with these findings:  []  Laboratory list / accordion  []  Microbiology  []  Radiology  []  EKG/Telemetry   []  Cardiology/Vascular   []  Pathology  []  Old records  []  Other:  Most notable findings include:       LAB RESULTS:      Lab 02/04/24  1405   WBC 9.45   HEMOGLOBIN 15.6   HEMATOCRIT 45.0   PLATELETS 261   NEUTROS ABS 6.32   IMMATURE GRANS (ABS) 0.07*   LYMPHS ABS 2.06   MONOS ABS 0.48   EOS ABS 0.47*   MCV 87.4         Lab 02/04/24  1428   SODIUM 136   POTASSIUM 3.2*   CHLORIDE 101   CO2 25.0   ANION GAP 10.0   BUN 11   CREATININE 0.66   EGFR 92.8   GLUCOSE 122*   CALCIUM 9.3         Lab 02/04/24  1428   TOTAL PROTEIN 7.3   ALBUMIN 4.2   GLOBULIN 3.1   ALT (SGPT) 17   AST (SGOT) 18   BILIRUBIN 0.6   ALK PHOS 68                         Microbiology Results (last 10 days)       ** No results found for the last 240 hours. **            XR Hip With or Without Pelvis 2 - 3 View  Right    Result Date: 2/4/2024  XR HIP W OR WO PELVIS 2-3 VIEW RIGHT Date of Exam: 2/4/2024 2:12 PM EST Indication: fall/pain Comparison: None available. Findings: There is a transverse impacted fracture involving the proximal right femoral head neck junction. There is impaction of the dominant distal diaphyseal fracture fragment into the femoral head fracture fragment. The articulation of the hip remains intact without dislocation. There does not appear to be significant involvement of the trochanteric portion of the femur. The left hip is intact. The pubic rami and sacral alae are intact. The SI joints and pubic symphysis are intact. Mild age-related changes are noted. 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 Vascular calcifications are noted. Phleboliths are observed.     Impression: Impression: 1.Transverse impacted fracture involving the proximal right femoral head-neck junction. There is impaction of the diaphyseal fracture fragment into the femoral head fracture fragment. The articulation the right hip remains intact without dislocation. Electronically Signed: Jeremy Benites MD  2/4/2024 2:43 PM EST  Workstation ID: [id number]    XR Chest 1 View    Result Date: 2/4/2024  XR CHEST 1 VW Date of Exam: 2/4/2024 2:12 PM EST Indication: preop Comparison: 11/7/2023 FINDINGS: No new consolidations or pleural effusions are observed. The cardiac silhouette and mediastinum are stable. No acute osseous abnormalities are identified.     Impression: No evidence for acute cardiopulmonary process. Electronically Signed: Jeremy Benites MD  2/4/2024 2:40 PM EST  Workstation ID: [id number]         Assessment & Plan   Assessment & Plan       Hip fracture      Hip Fracture  -ortho to see, taking to OR today  -NPO    DM II   -NPO currently, Mod SSI    HTN  HLD  -home meds    Hypokalemia  -will give a one time dose of 10 meq  -start replacement           DVT prophylaxis:  No DVT prophylaxis order currently  exists.          CODE STATUS:    There are no questions and answers to display.       Expected Discharge   Expected discharge date/ time has not been documented.     Leanna Saleh DO  02/04/24      Electronically signed by Leanna Saleh DO at 02/04/24 1713          Emergency Department Notes        Lena White RN at 02/04/24 1544           Pari Howell    Nursing Report ED to Floor:  Mental status: A&Ox4  Ambulatory status: normally up independently, bedrest for now unable to move right hip  Oxygen Therapy:  RA  Cardiac Rhythm: NS  Admitted from: ED  Safety Concerns:  None  Social Issues: None; family at bedside and very pleasant  ED Room #:  29    ED Nurse Phone Extension - [phone] or may call [phone].      HPI:   Chief Complaint   Patient presents with    Fall       Past Medical History:  Past Medical History:   Diagnosis Date    Bladder infection     Dyspareunia, female     Glaucoma     H/O sexual problem     High cholesterol     History of abnormal cervical Pap smear     Hypertension     Hypertension     Hypothyroidism     Impaired functional mobility, balance, gait, and endurance     Labial cyst     Muscle weakness     Type 2 diabetes mellitus     Urinary incontinence     Vaginal itching     Yeast infection         Past Surgical History:  Past Surgical History:   Procedure Laterality Date    CARPAL TUNNEL RELEASE  2007    CATARACT EXTRACTION Right 09/27/2022    CATARACT EXTRACTION, BILATERAL Left     CHOLECYSTECTOMY  1991    EYE SURGERY  2021    HYSTERECTOMY  2007    OOPHORECTOMY      TUBAL ABDOMINAL LIGATION  11/1979        Admitting Doctor:   Leanna Saleh DO    Consulting Provider(s):  Consults       No orders found from 1/6/2024 to 2/5/2024.             Admitting Diagnosis:   The encounter diagnosis was Closed displaced fracture of right femoral neck.    Most Recent Vitals:   Vitals:    02/04/24 1430 02/04/24 1500 02/04/24 1530 02/04/24 1544   BP: 159/96 168/74 176/87     Pulse: 85 75 80    Resp:    15   Temp:    98.7 °F (37.1 °C)   TempSrc:    Oral   SpO2: 95% 96% 97%        Active LDAs/IV Access:   Lines, Drains & Airways       Active LDAs       Name Placement date Placement time Site Days    Peripheral IV 02/04/24 1407 Right Antecubital 02/04/24  1407  Antecubital  less than 1                    Labs (abnormal labs have a star):   Labs Reviewed   COMPREHENSIVE METABOLIC PANEL - Abnormal; Notable for the following components:       Result Value    Glucose 122 (*)     Potassium 3.2 (*)     All other components within normal limits    Narrative:     GFR Normal >60  Chronic Kidney Disease <60  Kidney Failure <15    The GFR formula is only valid for adults with stable renal function between ages 18 and 70.   CBC WITH AUTO DIFFERENTIAL - Abnormal; Notable for the following components:    RDW 12.0 (*)     Immature Grans % 0.7 (*)     Eosinophils, Absolute 0.47 (*)     Immature Grans, Absolute 0.07 (*)     All other components within normal limits   CBC AND DIFFERENTIAL    Narrative:     The following orders were created for panel order CBC & Differential.  Procedure                               Abnormality         Status                     ---------                               -----------         ------                     CBC Auto Differential[[id number]]        Abnormal            Final result                 Please view results for these tests on the individual orders.       Meds Given in ED:   Medications   sodium chloride 0.9 % flush 10 mL (has no administration in time range)   Morphine sulfate (PF) injection 4 mg (4 mg Intravenous Given 2/4/24 1407)   ondansetron (ZOFRAN) injection 4 mg (4 mg Intravenous Given 2/4/24 1408)              Electronically signed by Lena White RN at 02/04/24 8288       Enrique Isbell MD at 02/04/24 1402          Subjective   History of Present Illness  73-year-old female who presents for evaluation of right hip pain after a fall.  The  patient reports that she was getting out of a vehicle when she got her foot caught in her purse strap and ultimately fell landing on her right hip.  She now presents with a complaint of pain along the inguinal crease on the right pelvis.  She denies any lateral hip pain.  She has not been able to stand and ambulate.  She did not take any pain medication prior to arrival.  She was delivered here via EMS.  She did not hit her head or lose consciousness.  She does not take any anticoagulation.  No reported chest or abdominal pain.  No reported neck or midline back pain.  No bilateral upper extremity pain or left lower extremity pain.  No previous surgery to her hips or pelvis.  She exhibits normal mentation.  No other acute complaint.      Review of Systems   Constitutional:  Negative for chills, fatigue and fever.   HENT:  Negative for congestion, ear pain, postnasal drip, sinus pressure and sore throat.    Eyes:  Negative for pain, redness and visual disturbance.   Respiratory:  Negative for cough, chest tightness and shortness of breath.    Cardiovascular:  Negative for chest pain, palpitations and leg swelling.   Gastrointestinal:  Negative for abdominal pain, anal bleeding, blood in stool, diarrhea, nausea and vomiting.   Endocrine: Negative for polydipsia and polyuria.   Genitourinary:  Negative for difficulty urinating, dysuria, frequency and urgency.   Musculoskeletal:  Positive for arthralgias. Negative for back pain and neck pain.   Skin:  Negative for pallor and rash.   Allergic/Immunologic: Negative for environmental allergies and immunocompromised state.   Neurological:  Negative for dizziness, weakness and headaches.   Hematological:  Negative for adenopathy.   Psychiatric/Behavioral:  Negative for confusion, self-injury and suicidal ideas. The patient is not nervous/anxious.    All other systems reviewed and are negative.      Past Medical History:   Diagnosis Date    Bladder infection     Dyspareunia,  female     Glaucoma     H/O sexual problem     High cholesterol     History of abnormal cervical Pap smear     Hypertension     Hypertension     Hypothyroidism     Impaired functional mobility, balance, gait, and endurance     Labial cyst     Muscle weakness     Type 2 diabetes mellitus     Urinary incontinence     Vaginal itching     Yeast infection        Allergies   Allergen Reactions    Shellfish-Derived Products Rash       Past Surgical History:   Procedure Laterality Date    CARPAL TUNNEL RELEASE  2007    CATARACT EXTRACTION Right 09/27/2022    CATARACT EXTRACTION, BILATERAL Left     CHOLECYSTECTOMY  1991    EYE SURGERY  2021    HYSTERECTOMY  2007    OOPHORECTOMY      TUBAL ABDOMINAL LIGATION  11/1979       Family History   Problem Relation Age of Onset    Colon cancer Maternal Uncle     Diabetes Mother     Heart attack Mother     Hypertension Mother     Hyperlipidemia Mother     Thyroid disease Mother     Stroke Mother     Vision loss Mother     Cancer Sister     Thyroid disease Sister     Diabetes Brother     Prostate cancer Brother     Cancer Brother         Efren prostrates cancer    Breast cancer Daughter 46    Diabetes Sister     Hyperlipidemia Sister     Thyroid disease Sister     Vision loss Sister     Vision loss Sister     Hyperlipidemia Sister     Ovarian cancer Neg Hx        Social History     Socioeconomic History    Marital status:    Tobacco Use    Smoking status: Never    Smokeless tobacco: Never   Vaping Use    Vaping Use: Never used   Substance and Sexual Activity    Alcohol use: Never    Drug use: Never    Sexual activity: Yes     Partners: Male     Birth control/protection: Surgical, Hysterectomy           Objective   Physical Exam  Vitals and nursing note reviewed.   Constitutional:       General: She is not in acute distress.     Appearance: Normal appearance. She is well-developed. She is not toxic-appearing or diaphoretic.   HENT:      Head: Normocephalic and atraumatic.      Right  Ear: External ear normal.      Left Ear: External ear normal.      Nose: Nose normal.   Eyes:      General: Lids are normal.      Pupils: Pupils are equal, round, and reactive to light.   Neck:      Trachea: No tracheal deviation.   Cardiovascular:      Rate and Rhythm: Normal rate and regular rhythm.      Pulses: No decreased pulses.      Heart sounds: Normal heart sounds. No murmur heard.     No friction rub. No gallop.   Pulmonary:      Effort: Pulmonary effort is normal. No respiratory distress.      Breath sounds: Normal breath sounds. No decreased breath sounds, wheezing, rhonchi or rales.   Abdominal:      General: Bowel sounds are normal.      Palpations: Abdomen is soft.      Tenderness: There is no abdominal tenderness. There is no guarding or rebound.   Musculoskeletal:         General: No deformity. Normal range of motion.      Cervical back: Normal range of motion and neck supple.      Right hip: Tenderness and bony tenderness present. No deformity. Decreased range of motion.   Lymphadenopathy:      Cervical: No cervical adenopathy.   Skin:     General: Skin is warm and dry.      Findings: No rash.   Neurological:      Mental Status: She is alert and oriented to person, place, and time.      Cranial Nerves: No cranial nerve deficit.      Sensory: No sensory deficit.   Psychiatric:         Speech: Speech normal.         Behavior: Behavior normal.         Thought Content: Thought content normal.         Judgment: Judgment normal.         Procedures          ED Course  ED Course as of 02/04/24 1528   Sun Feb 04, 2024   1527 Relatively healthy normally ambulatory female who does not take anticoagulation who suffered mechanical fall.  She does have underlying history of diabetes.  She has a right impacted femoral neck fracture.  Dr. Evans would like to take to the operating room today, and is requesting hospitalist admission. [NS]   1527 ECG 12 Lead Pre-Op / Pre-Procedure [NS]      ED Course User  Index  [NS] Enrique Isbell MD                                             Medical Decision Making  Differential diagnosis includes right hip contusion, right hip fracture, right hip dislocation, pelvic fracture.    X-ray of the right hip shows an impacted right femoral neck fracture.    The patient does not take anticoagulation.  Labs are otherwise nonconcerning.    Chest x-ray shows no acute disease.    EKG independently interpreted by myself shows normal sinus rhythm with no acute ischemic changes.    I discussed the patient with the orthopedic surgeon, Dr. Weber.  He request hospitalist admission.  He will consult for probable surgical intervention today.    Problems Addressed:  Closed displaced fracture of right femoral neck: complicated acute illness or injury with systemic symptoms    Amount and/or Complexity of Data Reviewed  Independent Historian: spouse  External Data Reviewed: labs, radiology and ECG.  Labs: ordered. Decision-making details documented in ED Course.  Radiology: ordered and independent interpretation performed. Decision-making details documented in ED Course.  ECG/medicine tests: ordered and independent interpretation performed. Decision-making details documented in ED Course.    Risk  Prescription drug management.  Decision regarding hospitalization.        Final diagnoses:   Closed displaced fracture of right femoral neck       ED Disposition  ED Disposition       ED Disposition   Decision to Admit    Condition   --    Comment   --               No follow-up provider specified.       Medication List      No changes were made to your prescriptions during this visit.            Enrique Isbell MD  02/04/24 1528      Electronically signed by Enrique Isbell MD at 02/04/24 1528       Current Facility-Administered Medications   Medication Dose Route Frequency Provider Last Rate Last Admin    acetaminophen (TYLENOL) tablet 1,000 mg  1,000 mg Oral Q8H Doni Evans MD    1,000 mg at 02/06/24 0500    amLODIPine (NORVASC) tablet 10 mg  10 mg Oral Daily Doni Evans MD   10 mg at 02/06/24 0913    aspirin EC tablet 81 mg  81 mg Oral Q12H Doni Evans MD   81 mg at 02/06/24 0913    sennosides-docusate (PERICOLACE) 8.6-50 MG per tablet 2 tablet  2 tablet Oral BID Doni Evans MD   2 tablet at 02/06/24 0913    And    polyethylene glycol (MIRALAX) packet 17 g  17 g Oral Daily PRN Doni Evans MD        And    bisacodyl (DULCOLAX) EC tablet 5 mg  5 mg Oral Daily PRN Doni Evans MD        And    bisacodyl (DULCOLAX) suppository 10 mg  10 mg Rectal Daily PRN Doni Evans MD        Calcium Replacement - Follow Nurse / BPA Driven Protocol   Does not apply PRN Doni Evans MD        dextrose (D50W) (25 g/50 mL) IV injection 25 g  25 g Intravenous Q15 Min PRN Doni Evans MD        dextrose (GLUTOSE) oral gel 15 g  15 g Oral Q15 Min PRN Doni Evans MD        glucagon (GLUCAGEN) injection 1 mg  1 mg Intramuscular Q15 Min PRN Doni Evans MD        losartan (COZAAR) tablet 100 mg  100 mg Oral Q24H Doni Evans MD   100 mg at 02/06/24 0912    And    hydroCHLOROthiazide (HYDRODIURIL) oral 12.5 mg  12.5 mg Oral Q24H Doni Evans MD   12.5 mg at 02/06/24 0912    HYDROmorphone (DILAUDID) injection 0.5 mg  0.5 mg Intravenous Q2H PRN Doni Evans MD        And    naloxone (NARCAN) injection 0.1 mg  0.1 mg Intravenous Q5 Min PRN Doni Evans MD        insulin regular (humuLIN R,novoLIN R) injection 2-9 Units  2-9 Units Subcutaneous Q6H Doni Evans MD   2 Units at 02/05/24 1825    ketorolac (TORADOL) injection 15 mg  15 mg Intravenous Q6H PRN Doni Evans MD        lactated ringers infusion  100 mL/hr Intravenous Continuous Doni Evans MD   Stopped at 02/05/24 0609    levothyroxine (SYNTHROID, LEVOTHROID) tablet 75 mcg  75 mcg Oral QAM AC Doni Evans MD   75 mcg at 02/06/24 0500    Magnesium Standard Dose Replacement - Follow Nurse  / BPA Driven Protocol   Does not apply Doni Ugalde MD        meloxicam (MOBIC) tablet 15 mg  15 mg Oral Daily Doni Evans MD   15 mg at 02/06/24 0912    ondansetron ODT (ZOFRAN-ODT) disintegrating tablet 4 mg  4 mg Oral Q6H PRN Doni Evans MD        Or    ondansetron (ZOFRAN) injection 4 mg  4 mg Intravenous Q6H PRN Doni Evans MD        oxyCODONE (ROXICODONE) immediate release tablet 10 mg  10 mg Oral Q4H PRN Jessi Harvey DO   10 mg at 02/06/24 0912    oxyCODONE (ROXICODONE) immediate release tablet 5 mg  5 mg Oral Q4H PRN Doni Evans MD   5 mg at 02/06/24 0133    pantoprazole (PROTONIX) EC tablet 40 mg  40 mg Oral Q AM Doni Evans MD   40 mg at 02/06/24 0500    Phosphorus Replacement - Follow Nurse / BPA Driven Protocol   Does not apply Doni Ugalde MD        Potassium Replacement - Follow Nurse / BPA Driven Protocol   Does not apply Doni Ugalde MD        pravastatin (PRAVACHOL) tablet 40 mg  40 mg Oral Q PM Doni Evans MD   40 mg at 02/05/24 1721    sodium chloride 0.9 % flush 10 mL  10 mL Intravenous PRN Doni Evans MD        sodium chloride 0.9 % flush 10 mL  10 mL Intravenous Q12H Doni Evans MD   10 mL at 02/05/24 1136    sodium chloride 0.9 % flush 10 mL  10 mL Intravenous PRN Doni Evans MD        sodium chloride 0.9 % infusion 40 mL  40 mL Intravenous PRN Doni Evans MD   40 mL at 02/04/24 1750    traMADol (ULTRAM) tablet 50 mg  50 mg Oral Q8H PRN Doni Evans MD   50 mg at 02/05/24 1823     Lab Results (all)       Procedure Component Value Units Date/Time    Basic Metabolic Panel [[id number]]  (Abnormal) Collected: 02/06/24 0504    Specimen: Blood Updated: 02/06/24 0608     Glucose 133 mg/dL      BUN 13 mg/dL      Creatinine 0.69 mg/dL      Sodium 137 mmol/L      Potassium 3.7 mmol/L      Chloride 101 mmol/L      CO2 26.0 mmol/L      Calcium 9.2 mg/dL      BUN/Creatinine Ratio 18.8     Anion Gap 10.0 mmol/L      eGFR 91.8  mL/min/1.73     Narrative:      GFR Normal >60  Chronic Kidney Disease <60  Kidney Failure <15    The GFR formula is only valid for adults with stable renal function between ages 18 and 70.    POC Glucose Once [[id number]]  (Normal) Collected: 02/06/24 0540    Specimen: Blood Updated: 02/06/24 0542     Glucose 121 mg/dL     CBC & Differential [[id number]]  (Abnormal) Collected: 02/06/24 0504    Specimen: Blood Updated: 02/06/24 0537    Narrative:      The following orders were created for panel order CBC & Differential.  Procedure                               Abnormality         Status                     ---------                               -----------         ------                     CBC Auto Differential[[id number]]        Abnormal            Final result                 Please view results for these tests on the individual orders.    CBC Auto Differential [[id number]]  (Abnormal) Collected: 02/06/24 0504    Specimen: Blood Updated: 02/06/24 0537     WBC 9.35 10*3/mm3      RBC 3.91 10*6/mm3      Hemoglobin 12.2 g/dL      Hematocrit 35.0 %      MCV 89.5 fL      MCH 31.2 pg      MCHC 34.9 g/dL      RDW 12.2 %      RDW-SD 39.6 fl      MPV 9.8 fL      Platelets 205 10*3/mm3      Neutrophil % 62.0 %      Lymphocyte % 20.4 %      Monocyte % 6.5 %      Eosinophil % 10.2 %      Basophil % 0.6 %      Immature Grans % 0.3 %      Neutrophils, Absolute 5.79 10*3/mm3      Lymphocytes, Absolute 1.91 10*3/mm3      Monocytes, Absolute 0.61 10*3/mm3      Eosinophils, Absolute 0.95 10*3/mm3      Basophils, Absolute 0.06 10*3/mm3      Immature Grans, Absolute 0.03 10*3/mm3      nRBC 0.0 /100 WBC     POC Glucose Once [[id number]]  (Normal) Collected: 02/06/24 0003    Specimen: Blood Updated: 02/06/24 0004     Glucose 122 mg/dL     POC Glucose Once [[id number]]  (Abnormal) Collected: 02/05/24 1810    Specimen: Blood Updated: 02/05/24 1811     Glucose 187 mg/dL     POC Glucose Once [[id number]]  (Abnormal) Collected: 02/05/24 1153     Specimen: Blood Updated: 02/05/24 1155     Glucose 137 mg/dL     Respiratory Panel PCR w/COVID-19(SARS-CoV-2) [facility] In-House, NP Swab in UTM/VTM, 2 HR TAT - Swab, Nasopharynx [[id number]]  (Normal) Collected: 02/05/24 1050    Specimen: Swab from Nasopharynx Updated: 02/05/24 1152     ADENOVIRUS, PCR Not Detected     Coronavirus 229E Not Detected     Coronavirus HKU1 Not Detected     Coronavirus NL63 Not Detected     Coronavirus OC43 Not Detected     COVID19 Not Detected     Human Metapneumovirus Not Detected     Human Rhinovirus/Enterovirus Not Detected     Influenza A PCR Not Detected     Influenza B PCR Not Detected     Parainfluenza Virus 1 Not Detected     Parainfluenza Virus 2 Not Detected     Parainfluenza Virus 3 Not Detected     Parainfluenza Virus 4 Not Detected     RSV, PCR Not Detected     Bordetella pertussis pcr Not Detected     Bordetella parapertussis PCR Not Detected     Chlamydophila pneumoniae PCR Not Detected     Mycoplasma pneumo by PCR Not Detected    Narrative:      In the setting of a positive respiratory panel with a viral infection PLUS a negative procalcitonin without other underlying concern for bacterial infection, consider observing off antibiotics or discontinuation of antibiotics and continue supportive care. If the respiratory panel is positive for atypical bacterial infection (Bordetella pertussis, Chlamydophila pneumoniae, or Mycoplasma pneumoniae), consider antibiotic de-escalation to target atypical bacterial infection.    Urinalysis, Microscopic Only - Urine, Clean Catch [[id number]]  (Abnormal) Collected: 02/05/24 1130    Specimen: Urine, Clean Catch Updated: 02/05/24 1139     RBC, UA 0-2 /HPF      WBC, UA 21-50 /HPF      Bacteria, UA None Seen /HPF      Squamous Epithelial Cells, UA 3-6 /HPF      Hyaline Casts, UA 0-6 /LPF      Methodology Automated Microscopy    Urine Culture - Urine, Urine, Clean Catch [[id number]] Collected: 02/05/24 1130    Specimen: Urine,  Clean Catch Updated: 02/05/24 1139    Urinalysis With Culture If Indicated - Urine, Clean Catch [[id number]]  (Abnormal) Collected: 02/05/24 1130    Specimen: Urine, Clean Catch Updated: 02/05/24 1139     Color, UA Yellow     Appearance, UA Clear     pH, UA 6.5     Specific Gravity, UA 1.026     Glucose, UA Negative     Ketones, UA Negative     Bilirubin, UA Negative     Blood, UA Negative     Protein, UA Negative     Leuk Esterase, UA Small (1+)     Nitrite, UA Negative     Urobilinogen, UA 0.2 E.U./dL    Narrative:      In absence of clinical symptoms, the presence of pyuria, bacteria, and/or nitrites on the urinalysis result does not correlate with infection.    POC Glucose Once [[id number]]  (Abnormal) Collected: 02/05/24 0740    Specimen: Blood Updated: 02/05/24 0741     Glucose 136 mg/dL     POC Glucose Once [[id number]]  (Abnormal) Collected: 02/05/24 0503    Specimen: Blood Updated: 02/05/24 0504     Glucose 144 mg/dL     Basic Metabolic Panel [[id number]]  (Abnormal) Collected: 02/05/24 0338    Specimen: Blood Updated: 02/05/24 0430     Glucose 177 mg/dL      BUN 10 mg/dL      Creatinine 0.71 mg/dL      Sodium 137 mmol/L      Potassium 3.9 mmol/L      Chloride 101 mmol/L      CO2 23.0 mmol/L      Calcium 9.0 mg/dL      BUN/Creatinine Ratio 14.1     Anion Gap 13.0 mmol/L      eGFR 89.9 mL/min/1.73     Narrative:      GFR Normal >60  Chronic Kidney Disease <60  Kidney Failure <15    The GFR formula is only valid for adults with stable renal function between ages 18 and 70.    CBC Auto Differential [[id number]]  (Abnormal) Collected: 02/05/24 0338    Specimen: Blood Updated: 02/05/24 0419     WBC 14.00 10*3/mm3      RBC 4.27 10*6/mm3      Hemoglobin 13.2 g/dL      Hematocrit 38.1 %      MCV 89.2 fL      MCH 30.9 pg      MCHC 34.6 g/dL      RDW 12.0 %      RDW-SD 38.8 fl      MPV 9.6 fL      Platelets 232 10*3/mm3      Neutrophil % 91.6 %      Lymphocyte % 5.0 %      Monocyte % 2.6 %      Eosinophil % 0.1 %       Basophil % 0.1 %      Immature Grans % 0.6 %      Neutrophils, Absolute 12.83 10*3/mm3      Lymphocytes, Absolute 0.70 10*3/mm3      Monocytes, Absolute 0.36 10*3/mm3      Eosinophils, Absolute 0.01 10*3/mm3      Basophils, Absolute 0.02 10*3/mm3      Immature Grans, Absolute 0.08 10*3/mm3      nRBC 0.0 /100 WBC     POC Glucose Once [[id number]]  (Abnormal) Collected: 02/05/24 0041    Specimen: Blood Updated: 02/05/24 0043     Glucose 206 mg/dL     Basic Metabolic Panel [[id number]]  (Abnormal) Collected: 02/04/24 1818    Specimen: Blood Updated: 02/04/24 1925     Glucose 124 mg/dL      BUN 10 mg/dL      Creatinine 0.72 mg/dL      Sodium 138 mmol/L      Potassium 3.8 mmol/L      Chloride 102 mmol/L      CO2 25.0 mmol/L      Calcium 9.6 mg/dL      BUN/Creatinine Ratio 13.9     Anion Gap 11.0 mmol/L      eGFR 88.4 mL/min/1.73     Narrative:      GFR Normal >60  Chronic Kidney Disease <60  Kidney Failure <15    The GFR formula is only valid for adults with stable renal function between ages 18 and 70.    POC Glucose Once [[id number]]  (Normal) Collected: 02/04/24 1742    Specimen: Blood Updated: 02/04/24 1743     Glucose 122 mg/dL     Comprehensive Metabolic Panel [[id number]]  (Abnormal) Collected: 02/04/24 1428    Specimen: Blood Updated: 02/04/24 1453     Glucose 122 mg/dL      BUN 11 mg/dL      Creatinine 0.66 mg/dL      Sodium 136 mmol/L      Potassium 3.2 mmol/L      Comment: Slight hemolysis detected by analyzer. Result may be falsely elevated.        Chloride 101 mmol/L      CO2 25.0 mmol/L      Calcium 9.3 mg/dL      Total Protein 7.3 g/dL      Albumin 4.2 g/dL      ALT (SGPT) 17 U/L      AST (SGOT) 18 U/L      Alkaline Phosphatase 68 U/L      Total Bilirubin 0.6 mg/dL      Globulin 3.1 gm/dL      Comment: Calculated Result        A/G Ratio 1.4 g/dL      BUN/Creatinine Ratio 16.7     Anion Gap 10.0 mmol/L      eGFR 92.8 mL/min/1.73     Narrative:      GFR Normal >60  Chronic Kidney Disease <60  Kidney Failure  <15    The GFR formula is only valid for adults with stable renal function between ages 18 and 70.    CBC & Differential [[id number]]  (Abnormal) Collected: 02/04/24 1405    Specimen: Blood Updated: 02/04/24 1419    Narrative:      The following orders were created for panel order CBC & Differential.  Procedure                               Abnormality         Status                     ---------                               -----------         ------                     CBC Auto Differential[[id number]]        Abnormal            Final result                 Please view results for these tests on the individual orders.    CBC Auto Differential [[id number]]  (Abnormal) Collected: 02/04/24 1405    Specimen: Blood Updated: 02/04/24 1419     WBC 9.45 10*3/mm3      RBC 5.15 10*6/mm3      Hemoglobin 15.6 g/dL      Hematocrit 45.0 %      MCV 87.4 fL      MCH 30.3 pg      MCHC 34.7 g/dL      RDW 12.0 %      RDW-SD 38.5 fl      MPV 9.4 fL      Platelets 261 10*3/mm3      Neutrophil % 66.9 %      Lymphocyte % 21.8 %      Monocyte % 5.1 %      Eosinophil % 5.0 %      Basophil % 0.5 %      Immature Grans % 0.7 %      Neutrophils, Absolute 6.32 10*3/mm3      Lymphocytes, Absolute 2.06 10*3/mm3      Monocytes, Absolute 0.48 10*3/mm3      Eosinophils, Absolute 0.47 10*3/mm3      Basophils, Absolute 0.05 10*3/mm3      Immature Grans, Absolute 0.07 10*3/mm3      nRBC 0.0 /100 WBC           Imaging Results (All)       Procedure Component Value Units Date/Time    XR Hip With or Without Pelvis 2 - 3 View Right [[id number]] Collected: 02/05/24 0719     Updated: 02/05/24 0723    Narrative:      XR HIP W OR WO PELVIS 2-3 VIEW RIGHT    Date of Exam: 2/4/2024 10:00 PM EST    Indication: Post hip replacement    Comparison: 2/4/2024 at 1424 hours    FINDINGS:  Postoperative changes are noted status post hip arthroplasty. Near anatomic alignment is observed. There is no evidence for periimplant fracture. Surgical changes are seen within the  surrounding soft tissues.      Impression:      Postoperative changes status post hip arthroplasty. Near-anatomic alignment is noted. There is no evidence for periimplant fracture.    Electronically Signed: Jeremy Benites MD    2/5/2024 7:20 AM EST    Workstation ID: [id number]    FL C Arm During Surgery [[id number]] Resulted: 02/04/24 2055     Updated: 02/04/24 2055    Narrative:      This procedure was auto-finalized with no dictation required.    XR Hip With or Without Pelvis 2 - 3 View Right [[id number]] Collected: 02/04/24 1440     Updated: 02/04/24 1446    Narrative:      XR HIP W OR WO PELVIS 2-3 VIEW RIGHT    Date of Exam: 2/4/2024 2:12 PM EST    Indication: fall/pain    Comparison: None available.    Findings:  There is a transverse impacted fracture involving the proximal right femoral head neck junction. There is impaction of the dominant distal diaphyseal fracture fragment into the femoral head fracture fragment. The articulation of the hip remains intact   without dislocation. There does not appear to be significant involvement of the trochanteric portion of the femur.    The left hip is intact. The pubic rami and sacral alae are intact. The SI joints and pubic symphysis are intact. Mild age-related changes are noted. 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 Vascular calcifications are noted. Phleboliths are   observed.      Impression:      Impression:  1.Transverse impacted fracture involving the proximal right femoral head-neck junction. There is impaction of the diaphyseal fracture fragment into the femoral head fracture fragment. The articulation the right hip remains intact without dislocation.      Electronically Signed: Jeremy Benites MD    2/4/2024 2:43 PM EST    Workstation ID: [id number]    XR Chest 1 View [[id number]] Collected: 02/04/24 1439     Updated: 02/04/24 1443    Narrative:      XR CHEST 1 VW    Date of Exam: 2/4/2024 2:12 PM EST    Indication: preop    Comparison:  11/7/2023    FINDINGS:  No new consolidations or pleural effusions are observed. The cardiac silhouette and mediastinum are stable. No acute osseous abnormalities are identified.      Impression:      No evidence for acute cardiopulmonary process.        Electronically Signed: Jeremy Benites MD    2/4/2024 2:40 PM EST    Workstation ID: [id number]          ECG/EMG Results (all)       Procedure Component Value Units Date/Time    ECG 12 Lead Pre-Op / Pre-Procedure [[id number]] Collected: 02/04/24 1417     Updated: 02/04/24 2237     QT Interval 394 ms      QTC Interval 454 ms     Narrative:      Test Reason : Pre-Op / Pre-Procedure  Blood Pressure :   */*   mmHG  Vent. Rate :  80 BPM     Atrial Rate :  80 BPM     P-R Int : 184 ms          QRS Dur :  90 ms      QT Int : 394 ms       P-R-T Axes :   6  21  31 degrees     QTc Int : 454 ms    Normal sinus rhythm  Normal ECG  When compared with ECG of 02-MAY-2016 17:16,  premature ventricular complexes are no longer present  ND interval has decreased  Confirmed by RAVI SEGOVIA ([id number]) on 2/4/2024 10:36:42 PM    Referred By: ED MD           Confirmed By: RAVI SEGOVIA             Operative/Procedure Notes (all)        Doni Evans MD at 02/04/24 2003  Version 1 of 1         TOTAL HIP ARTHROPLASTY ANTERIOR  Procedure Report    Patient Name:  Pari Howell  YOB: 1950    Date of Surgery:  2/4/2024     Indications:    73 y.o. female who was admitted to [facility] following a fall from standing with significant pain and difficulty ambulating. X-rays revealed a displaced femoral neck fracture.    The patient was indicated for a total hip arthroplasty. Likely risks and benefits of the procedure including but not limited to infection, DVT, pulmonary embolism, leg length discrepancy, recurrent dislocation, possibility of injury to nerves and vessels, and periprosthetic fractures have been discussed with the patient and her daughter in  detail. Despite the risks involved, the patient elected to proceed and informed consent was obtained.     Patient Identification:  Patient was seen in the preop, consent was reviewed, operative procedure was identified, and surgical site and thigh marked.        Pre-op Diagnosis:   Closed right hip fracture [[id number]]       Post-Op Diagnosis Codes:     * Closed right hip fracture [S72.001A]    Procedure/CPT® Codes:      Procedure(s):  TOTAL HIP ARTHROPLASTY ANTERIOR    Staff:  Surgeon(s):  Doni Evans MD    Anesthesia: General with Block    Estimated Blood Loss: 200ml    Implants:    Implant Name Type Inv. Item Serial No.  Lot No. LRB No. Used Action   DEV CONTRL TISS STRATAFIX SYMM PDS PLUS NATALI CT-1 45CM - [id number] Implant DEV CONTRL TISS STRATAFIX SYMM PDS PLUS NATALI CT-1 45CM  [employer]  DIV OF J AND J [id number] Right 1 Implanted   SUT NONABS MAXBRAID/PE NMBR2 HC5 38IN WHT [id number] - [id number] Implant SUT NONABS MAXBRAID/PE NMBR2 HC5 38IN WHT 813580  [employer] [id number] Right 2 Implanted   SHLL TRIDENT2 TRITANIUM C/HL 52MM - [id number] Implant SHLL TRIDENT2 TRITANIUM C/HL 52MM  OLGA JANIE [id number] Right 1 Implanted   INSRT HIP TRIDENT X3 0DEG 36MM LILIANE STRL - [id number] Implant INSRT HIP TRIDENT X3 0DEG 36MM LILIANE STRL  OLGA JANIE [id number] Right 1 Implanted   SCRW HEX LP TRIDENT2 6.5X40MM - [id number] Implant SCRW HEX LP TRIDENT2 6.5X40MM  OLGA JANIE [id number] Right 1 Implanted   CMT BONE SIMPLEX/P TMYCIN FDOS 10PK - [id number] Implant CMT BONE SIMPLEX/P TMYCIN FDOS 10PK  OLGA JANIE [id number] Right 1 Implanted   CMT BONE SIMPLEX/P TMYCIN FDOS 10PK - [id number] Implant CMT BONE SIMPLEX/P TMYCIN FDOS 10PK  OLGA JANIE [id number] Right 1 Implanted   STEM FEM/HIP EXETER V40 CMT SS OFFST/44MM SZ2 150MM - [id number] Implant STEM FEM/HIP EXETER V40 CMT SS OFFST/44MM SZ2 150MM  OGLA JANIE [id number] Right 1 Implanted   PLUG BONE IM FEM/HIP [id number] 10MM - [id number] Implant PLUG BONE IM FEM/HIP [id number] 10MM   [employer] [id number] Right 1 Implanted   HD FEM/HIP BIOLOX/DELTA V40 CERAM 36MM MIN2.5 - [id number] Implant HD FEM/HIP BIOLOX/DELTA V40 CERAM 36MM MIN2.5  OLGAZoe Majeste [id number] Right 1 Implanted       Specimen:          None      Findings: see dictation    Complications: none    Description of Procedure:   The patient was transferred to [facility] operating room and preoperative antibiotics were given IV prior to skin incision as well as 1 gram of Tranexemic Acid. Patient received general anesthesia and was transferred to the [place] Table. All bony prominences were padded adequately. The operative hip was then prepped and draped in the usual sterile fashion. Multiple timeouts were done identifying the correct patient, surgical site, and planned procedure.    A skin incision was made overlying the anterior lateral aspect of the hip for about 10 cm just below the lateral to the anterior superior iliac spine. Skin and subcutaneous tissue and fascia were incised in line with the skin incision overlying the tensor fascia michael muscle. The tensor muscle was then retracted laterally and the interval between sartorius and rectus femoris medially and the tensor fascia muscle were developed. The lateral femoral circumflex vessels were identified and were ligated without difficulty. The deep fascia was incised and the capsule of the hip joint was identified. We then placed a soft tissue protecting device deep to the rectus and the tensor. Retractors were then placed extracapsular and the capsule was then incised in a T-shaped manner and the anterior posterior capsules were then tied with #2 FiberWire. Following this, retractors were placed intracapsularly. We did identify the obvious signs of severe osteoarthritis upon incising the capsule. We then made appropriate releases on the inferior medial neck and along the saddle of the femoral neck. Femoral neck remaining was identified and osteotomy was done according  to the preoperative templating with an oscillating saw. The femoral head and cut neck were extracted using a corkscrew without difficulty. The femoral head did have some minor signs of articular cartilage loss and superior wear.    The acetabular cavity was exposed by placing retractors and taking care to protect the anterior vascular structures. The labrum was excised and the pulvinar was excised from the cotyloid fossa. The acetabular cavity was then progressively reamed maintaining the correct inclination and version under image intensifier control. Adequate medialization was obtained. Adequate fit was found to be obtained with the reamer size of 52. The [place] T2 cup size 52 was then impacted into position. This was found to seat satisfactorily with adequate inclination and anteversion. Single screw was placed for additional fixation. Following this, the cup was cleaned and then a neutral liner impacted. Following this, the periarticular tissues were then infiltrated with local anesthetic.    Attention was then directed to the proximal femur. The proximal femur was delivered using a trochanteric hook placed just distal to the vastus tubercle and proximal to the gluteus cheryl tendon. The leg was then externally rotated and gross traction released. Hyperextension and adduction was done using the [place] table. Mechanical lift on the table was utilized to support the femur and appropriate capsular releases were made to expose the medial slope of the greater trochanter. The proximal femur was delivered and the femoral canal was then entered by removing lateral femoral neck with a box chisel by serial broaching. Adequate fit was found to be obtained with the Size 44 N2 150 broach. We then trialed and -2.5 neck was reduced. Fluoroscopic imaging was used to assess leg length and offset was found to be symmetric. Broach trials were then removed. Cement restrictor was placed distally. The femoral canal was prepped with  thorough irrigation followed by a peroxide soak followed by an epinephrine soak. Antibiotic cement was inserted in a retrograde-fashion and pressurization was done prior to implanting the implant. A 44 N2 150 [place] stem was implanted in appropriate anteversion. It sat very similar to our broach trial. We chose the -2.5 neck ceramic. This was then reduced again and fluoroscopy images both lateral and AP of the femur showed the stem to be in good position. AP pelvis showed symmetric leg length and offset. There are no acute fractures. The area was thoroughly irrigated with saline. We did use of more of the injection cocktail. Betadine irrigation was used followed by saline irrigation. Soft tissue hemostasis was secured and second dose of IV TXA was used. Sponge, needle, and instrument counts were correct. FiberWire sutures directly anterior and posterior capsular flaps were tied to each other. 1 g vanco powder placed deep. We then closed the fascial layer with a running #2 STRATAFIX followed by 2-0 vicryl and then monocryl for the skin and Dermabond . Once the Dermabond had dried, Mepilex AG dressing was placed over the top. The patient was then transferred to the recovery room in stable condition. The patient tolerated the procedure well and there were no complications. The patient had adequate distal pulses and good cap refill.    The patient will be mobilized by physical therapy and receive antibiotic prophylaxis postoperatively for 2 more doses given the first 24 hours. The patient was started on DVT prophylaxis with 81 mg aspirin twice daily starting postoperative day #1.    I discussed the satisfactory performance of the procedure with the patient's family and discussed the postoperative management.      Assistant Participation:  Surgeon(s):  Doni Evans MD    Assistant: Hever Lee PA-C assisted with proper preoperative positioning, preoperative templating, determining availability of proper  implants, prepping and draping of patient, manipulation placement of instruments, protection of ligaments and vital soft tissue structures, assistance in maintaining hemostasis and assistance with closure of the wound. Their skills and knowledge of the steps of operation and the desired outcome of each surgical step was crucial, allowing for efficient choreography of surgical procedure, and closure of the wound which lead to reduced surgical time, less blood loss, and less risk of complications for the patient.         Doni Evans MD     Date: 2024  Time: 21:38 EST      Electronically signed by Doni Evans MD at 24 2139          Physician Progress Notes (all)        Jessi Harvey DO at 24 0941              [facility]  PROGRESS NOTE    Patient Name: Pari Howell  : 1950  MRN: [id number]    Date of Admission: 2024  Primary Care Physician: Radha Gregg APRN    Subjective   Subjective     CC:  F/u hip fracture    HPI:  Patient sitting up in bed. Has no pain. Ambulating w/PT. Hopeful to discharge home tomorrow when  will be there to help.      Objective   Objective     Vital Signs:   Temp:  [98.1 °F (36.7 °C)-102.9 °F (39.4 °C)] 98.1 °F (36.7 °C)  Heart Rate:  [] 90  Resp:  [15-16] 16  BP: (123-195)/() 126/56  Flow (L/min):  [1-2] 1     Physical Exam:  Constitutional: No acute distress, awake, alert  HENT: NCAT, mucous membranes moist  Respiratory: Clear to auscultation bilaterally, respiratory effort normal   Cardiovascular: RRR, no murmurs, rubs, or gallops  Gastrointestinal: soft, nontender, nondistended  Musculoskeletal: No bilateral ankle edema  Psychiatric: Appropriate affect, cooperative  Neurologic: Oriented x 3, speech clear, no focal deficits  Skin: No rashes      Results Reviewed:  LAB RESULTS:      Lab 24  0338 24  1405   WBC 14.00* 9.45   HEMOGLOBIN 13.2 15.6   HEMATOCRIT 38.1 45.0   PLATELETS  232 261   NEUTROS ABS 12.83* 6.32   IMMATURE GRANS (ABS) 0.08* 0.07*   LYMPHS ABS 0.70 2.06   MONOS ABS 0.36 0.48   EOS ABS 0.01 0.47*   MCV 89.2 87.4         Lab 02/05/24  0338 02/04/24  1818 02/04/24  1428   SODIUM 137 138 136   POTASSIUM 3.9 3.8 3.2*   CHLORIDE 101 102 101   CO2 23.0 25.0 25.0   ANION GAP 13.0 11.0 10.0   BUN 10 10 11   CREATININE 0.71 0.72 0.66   EGFR 89.9 88.4 92.8   GLUCOSE 177* 124* 122*   CALCIUM 9.0 9.6 9.3         Lab 02/04/24  1428   TOTAL PROTEIN 7.3   ALBUMIN 4.2   GLOBULIN 3.1   ALT (SGPT) 17   AST (SGOT) 18   BILIRUBIN 0.6   ALK PHOS 68                     Brief Urine Lab Results  (Last result in the past 365 days)        Color   Clarity   Blood   Leuk Est   Nitrite   Protein   CREAT   Urine HCG        06/19/23 1027             134.0                 Microbiology Results Abnormal       None            XR Hip With or Without Pelvis 2 - 3 View Right    Result Date: 2/5/2024  XR HIP W OR WO PELVIS 2-3 VIEW RIGHT Date of Exam: 2/4/2024 10:00 PM EST Indication: Post hip replacement Comparison: 2/4/2024 at 1424 hours FINDINGS: Postoperative changes are noted status post hip arthroplasty. Near anatomic alignment is observed. There is no evidence for periimplant fracture. Surgical changes are seen within the surrounding soft tissues.     Impression: Postoperative changes status post hip arthroplasty. Near-anatomic alignment is noted. There is no evidence for periimplant fracture. Electronically Signed: Jeremy Benites MD  2/5/2024 7:20 AM EST  Workstation ID: [id number]    FL C Arm During Surgery    Result Date: 2/4/2024  This procedure was auto-finalized with no dictation required.    XR Hip With or Without Pelvis 2 - 3 View Right    Result Date: 2/4/2024  XR HIP W OR WO PELVIS 2-3 VIEW RIGHT Date of Exam: 2/4/2024 2:12 PM EST Indication: fall/pain Comparison: None available. Findings: There is a transverse impacted fracture involving the proximal right femoral head neck junction. There is  impaction of the dominant distal diaphyseal fracture fragment into the femoral head fracture fragment. The articulation of the hip remains intact without dislocation. There does not appear to be significant involvement of the trochanteric portion of the femur. The left hip is intact. The pubic rami and sacral alae are intact. The SI joints and pubic symphysis are intact. Mild age-related changes are noted. 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 Vascular calcifications are noted. Phleboliths are observed.     Impression: Impression: 1.Transverse impacted fracture involving the proximal right femoral head-neck junction. There is impaction of the diaphyseal fracture fragment into the femoral head fracture fragment. The articulation the right hip remains intact without dislocation. Electronically Signed: Jeremy Benites MD  2/4/2024 2:43 PM EST  Workstation ID: [id number]    XR Chest 1 View    Result Date: 2/4/2024  XR CHEST 1 VW Date of Exam: 2/4/2024 2:12 PM EST Indication: preop Comparison: 11/7/2023 FINDINGS: No new consolidations or pleural effusions are observed. The cardiac silhouette and mediastinum are stable. No acute osseous abnormalities are identified.     Impression: No evidence for acute cardiopulmonary process. Electronically Signed: Jeremy Benites MD  2/4/2024 2:40 PM EST  Workstation ID: [id number]         Current medications:  Scheduled Meds:acetaminophen, 1,000 mg, Oral, Q8H  amLODIPine, 10 mg, Oral, Daily  aspirin, 81 mg, Oral, Q12H  ceFAZolin, 2,000 mg, Intravenous, Q8H  losartan, 100 mg, Oral, Q24H   And  hydroCHLOROthiazide, 12.5 mg, Oral, Q24H  insulin regular, 2-9 Units, Subcutaneous, Q6H  levothyroxine, 75 mcg, Oral, QAM AC  meloxicam, 15 mg, Oral, Daily  pantoprazole, 40 mg, Oral, Q AM  pravastatin, 40 mg, Oral, Q PM  senna-docusate sodium, 2 tablet, Oral, BID  sodium chloride, 10 mL, Intravenous, Q12H      Continuous Infusions:lactated ringers, 100 mL/hr, Last Rate: Stopped  (02/05/24 0609)      PRN Meds:.  senna-docusate sodium **AND** polyethylene glycol **AND** bisacodyl **AND** bisacodyl    Calcium Replacement - Follow Nurse / BPA Driven Protocol    dextrose    dextrose    glucagon (human recombinant)    HYDROmorphone **AND** naloxone    ketorolac    Magnesium Standard Dose Replacement - Follow Nurse / BPA Driven Protocol    ondansetron ODT **OR** ondansetron    oxyCODONE    oxyCODONE    Phosphorus Replacement - Follow Nurse / BPA Driven Protocol    Potassium Replacement - Follow Nurse / BPA Driven Protocol    [COMPLETED] Insert Peripheral IV **AND** sodium chloride    sodium chloride    sodium chloride    traMADol    Assessment & Plan   Assessment & Plan     Active Hospital Problems    Diagnosis  POA    **Hip fracture [S72.009A]  Yes      Resolved Hospital Problems   No resolved problems to display.        Brief Hospital Course to date:  Pari Howell is a 73 y.o. female ith PMH of hypothyroidism, HTN, DM II with neuropathy that presented after a fall.     Right Hip Fracture:  - s/p repair w/Dr. Evans  - WBAT, f/u with ortho in 6 weeks  - ASA for DVT ppx x 6 week course  - PRN pain control  - PT/OT    Fever:  -Tmax 102.9, no further fevers, patient does mention some burning w/urination  - will get UA, respiratory PCR    DM II   - SSI     HTN  HLD  -home meds     Hypokalemia:  - replacement per protocol     Expected Discharge Location and Transportation: Home  Expected Discharge tomorrow  Expected Discharge Date: 2/9/2024; Expected Discharge Time:      DVT prophylaxis:  Mechanical DVT prophylaxis orders are present.         AM-PAC 6 Clicks Score (PT): 18 (02/05/24 2436)    CODE STATUS:   Code Status and Medical Interventions:   Ordered at: 02/04/24 8169     Level Of Support Discussed With:    Patient     Code Status (Patient has no pulse and is not breathing):    CPR (Attempt to Resuscitate)     Medical Interventions (Patient has pulse or is breathing):    Dipak BRIAN  "DO Wes  02/05/24        Electronically signed by Jessi Harvey DO at 02/05/24 0944       Doni Evans MD at 02/05/24 0838            Orthopedic Progress Note      Patient: Pari Howell  YOB: 1950     Date of Admission: 2/4/2024  1:40 PM Medical Record Number: [id number]     Attending Physician: Jessi Harvey DO    Status Post:  Procedure(s):  TOTAL HIP ARTHROPLASTY ANTERIOR Post Operative Day Number: 1    Subjective : No new orthopaedic complaints     Pain Relief: some relief with present medication.     Systemic Complaints: No Complaints  Vitals:    02/04/24 2235 02/05/24 0252 02/05/24 0730 02/05/24 0832   BP: 152/71 144/72 123/61 126/56   BP Location: Left arm Left arm Left arm    Patient Position: Lying Lying Lying    Pulse:  90 82 90   Resp:  16 16    Temp:  98.3 °F (36.8 °C) 98.1 °F (36.7 °C)    TempSrc:  Oral Oral    SpO2: 93% 92% 95%        Physical Exam: 73 y.o. female    General Appearance:       Alert, cooperative, in no acute distress                  Extremities:    Dressing Clean, Dry and Intact             No clinical sign of DVT        Able to do good movements of digits    Pulses:   Pulses palpable and equal bilaterally           Diagnostic Tests:     Results from last 7 days   Lab Units 02/05/24  0338 02/04/24  1405   WBC 10*3/mm3 14.00* 9.45   HEMOGLOBIN g/dL 13.2 15.6   HEMATOCRIT % 38.1 45.0   PLATELETS 10*3/mm3 232 261     Results from last 7 days   Lab Units 02/05/24  0338 02/04/24  1818 02/04/24  1428   SODIUM mmol/L 137 138 136   POTASSIUM mmol/L 3.9 3.8 3.2*   CHLORIDE mmol/L 101 102 101   CO2 mmol/L 23.0 25.0 25.0   BUN mg/dL 10 10 11   CREATININE mg/dL 0.71 0.72 0.66   GLUCOSE mg/dL 177* 124* 122*   CALCIUM mg/dL 9.0 9.6 9.3         No results found for: \"URICACID\"  No results found for: \"CRYSTAL\"  Microbiology Results (last 10 days)       ** No results found for the last 240 hours. **          XR Hip With or Without Pelvis 2 - 3 View Right    Result " Date: 2/5/2024  Postoperative changes status post hip arthroplasty. Near-anatomic alignment is noted. There is no evidence for periimplant fracture. Electronically Signed: Jeremy Benites MD  2/5/2024 7:20 AM EST  Workstation ID: [id number]    XR Hip With or Without Pelvis 2 - 3 View Right    Result Date: 2/4/2024  Impression: 1.Transverse impacted fracture involving the proximal right femoral head-neck junction. There is impaction of the diaphyseal fracture fragment into the femoral head fracture fragment. The articulation the right hip remains intact without dislocation. Electronically Signed: Jeremy Benites MD  2/4/2024 2:43 PM EST  Workstation ID: [id number]    XR Chest 1 View    Result Date: 2/4/2024  No evidence for acute cardiopulmonary process. Electronically Signed: Jeremy Benites MD  2/4/2024 2:40 PM EST  Workstation ID: [id number]       Current Medications:  Scheduled Meds:acetaminophen, 1,000 mg, Oral, Q8H  amLODIPine, 10 mg, Oral, Daily  aspirin, 81 mg, Oral, Q12H  ceFAZolin, 2,000 mg, Intravenous, Q8H  losartan, 100 mg, Oral, Q24H   And  hydroCHLOROthiazide, 12.5 mg, Oral, Q24H  insulin regular, 2-9 Units, Subcutaneous, Q6H  levothyroxine, 75 mcg, Oral, QAM AC  meloxicam, 15 mg, Oral, Daily  pantoprazole, 40 mg, Oral, Q AM  pravastatin, 40 mg, Oral, Q PM  senna-docusate sodium, 2 tablet, Oral, BID  sodium chloride, 10 mL, Intravenous, Q12H      Continuous Infusions:lactated ringers, 100 mL/hr, Last Rate: Stopped (02/05/24 0609)      PRN Meds:.  acetaminophen **OR** acetaminophen **OR** acetaminophen    senna-docusate sodium **AND** polyethylene glycol **AND** bisacodyl **AND** bisacodyl    Calcium Replacement - Follow Nurse / BPA Driven Protocol    dextrose    dextrose    glucagon (human recombinant)    HYDROmorphone **AND** naloxone    HYDROmorphone **AND** naloxone    ketorolac    Magnesium Standard Dose Replacement - Follow Nurse / BPA Driven Protocol    ondansetron ODT **OR** ondansetron    oxyCODONE     oxyCODONE    Phosphorus Replacement - Follow Nurse / BPA Driven Protocol    Potassium Replacement - Follow Nurse / BPA Driven Protocol    [COMPLETED] Insert Peripheral IV **AND** sodium chloride    sodium chloride    sodium chloride    traMADol    Assessment: Status post  No admission procedures for hospital encounter.    Patient Active Problem List   Diagnosis    Type 2 diabetes mellitus without complication, without long-term current use of insulin    Other specified glaucoma    Hyperlipidemia LDL goal <70    Vitamin D deficiency    Irritable bowel syndrome with diarrhea    Hypothyroidism (acquired)    Acute respiratory insufficiency    Hip fracture       PLAN:   Continues current post-op course  Anticoagulation: Aspirin started will need 6 week course   Mobilize with PT as tolerated per protocol  F/u with me in 6 weeks     Weight Bearing: WBAT  Discharge Plan: OK to plan for discharge in  today to home  from orthopaedic perspective.    Doni Evans MD    Date: 2/5/2024    Time: 08:38 EST    Electronically signed by Doni Evans MD at 02/05/24 0839          Consult Notes (all)        Doni Evans MD at 02/04/24 2134                   Orthopedic Consult    Patient: Pari Howell                                           YOB: 1950     Date of Admission: 2/4/2024  1:40 PM            Medical Record Number: [id number]    Attending Physician: Leanna Saleh*    Consulting Physician: Doni Evans MD    Reason for Consult: right hip fracture.    History of Present Illness: 73 y.o. female admitted to [facility] with Hip fracture [S72.009A]  Hip fracture [S72.009A].     The patient was evaluated in the emergency room and was diagnosed with a  hip fracture.   Secondary to the age / multiple medical co morbidities the patient was admitted to the hospitalist.   As I was on call for the emergency room I was consulted for further evaluation and treatment.     The patient was in the  usual state of health and fell from standing height in home, Resulting in sudden onset hip pain and inability to ambulate.   Denies any history of loss of consciousness, headache, vomiting, or seizures.   Denies any other injuries.   The patient is accompanied by  family members  to this hospital visit.     The patient denies any prior  pre-existing pain in the hip.  Patient is a  home/ community ambulator. Patient denies walker/cane assistive device.   The patient  lives at home with  family , is quite active and independent in activities of daily living.  The patient denies history of dementia.    Patient denies any history of: DVT/PE, MRSA, COPD, CHF, CAD, , Dementia or A-Fib.   The patient has history of :Diabetes mellitus  The patient is on anticoagulants:     Past medical history, past surgical history, social history, family history, ALLERGIES, current medications have been reviewed by me.    Past Medical History:   Diagnosis Date    Bladder infection     Dyspareunia, female     Glaucoma     H/O sexual problem     High cholesterol     History of abnormal cervical Pap smear     Hypertension     Hypertension     Hypothyroidism     Impaired functional mobility, balance, gait, and endurance     Labial cyst     Muscle weakness     Type 2 diabetes mellitus     Urinary incontinence     Vaginal itching     Yeast infection      Past Surgical History:   Procedure Laterality Date    CARPAL TUNNEL RELEASE  2007    CATARACT EXTRACTION Right 09/27/2022    CATARACT EXTRACTION, BILATERAL Left     CHOLECYSTECTOMY  1991    EYE SURGERY  2021    HYSTERECTOMY  2007    OOPHORECTOMY      TUBAL ABDOMINAL LIGATION  11/1979     Social History     Occupational History    Not on file   Tobacco Use    Smoking status: Never    Smokeless tobacco: Not on file   Vaping Use    Vaping Use: Never used   Substance and Sexual Activity    Alcohol use: Never    Drug use: Never    Sexual activity: Yes     Partners: Male     Birth control/protection:  Surgical, Hysterectomy      Social History     Social History Narrative    Not on file     Family History   Problem Relation Age of Onset    Colon cancer Maternal Uncle     Diabetes Mother     Heart attack Mother     Hypertension Mother     Hyperlipidemia Mother     Thyroid disease Mother     Stroke Mother     Vision loss Mother     Cancer Sister     Thyroid disease Sister     Diabetes Brother     Prostate cancer Brother     Cancer Brother         Efren prostrates cancer    Breast cancer Daughter 46    Diabetes Sister     Hyperlipidemia Sister     Thyroid disease Sister     Vision loss Sister     Vision loss Sister     Hyperlipidemia Sister     Ovarian cancer Neg Hx         Allergies   Allergen Reactions    Shellfish-Derived Products Rash       Home Medications:  Medications Prior to Admission   Medication Sig Dispense Refill Last Dose    amLODIPine (NORVASC) 5 MG tablet TAKE 2 TABLETS EVERY  tablet 0 2/4/2024    ascorbic acid (VITAMIN C) 1000 MG tablet Take 1 tablet by mouth Daily. OTC   2/4/2024    cetirizine (zyrTEC) 10 MG tablet Take 1 tablet by mouth Daily. OTC   2/4/2024    diphenhydrAMINE (BENADRYL) 25 mg capsule Take 1 capsule by mouth At Night As Needed for Itching. OTC   Past Month    docusate sodium (COLACE) 100 MG capsule Take 1 capsule by mouth every night at bedtime.   2/3/2024    Elderberry 500 MG capsule Take 1 capsule by mouth Daily. OTC   2/4/2024    latanoprost (XALATAN) 0.005 % ophthalmic solution Apply 1 drop to eye Daily. (Patient taking differently: Administer 1 drop to both eyes Every Night.) 7.5 mL 3 2/3/2024    levothyroxine (SYNTHROID, LEVOTHROID) 75 MCG tablet Take 1 tablet by mouth Daily. (Patient taking differently: Take 1 tablet by mouth Every Morning.) 90 tablet 1 2/4/2024    losartan-hydrochlorothiazide (HYZAAR) 100-12.5 MG per tablet TAKE 1 TABLET EVERY DAY 90 tablet 1 2/4/2024    multivitamin with minerals tablet tablet Take 1 tablet by mouth Daily. OTC   2/4/2024    NON  FORMULARY Specialty Compounded Cream for neuropathy of feet. Cream contains lidocaine, gabapentin, ketoprofen, ibuprofen and clonidine per patient. Apply to bilateral feet at bedtime   2/3/2024    nystatin (MYCOSTATIN) 525288 UNIT/GM cream Apply  topically to the appropriate area as directed 2 (Two) Times a Day. 90 g 2 Past Week    pantoprazole (PROTONIX) 20 MG EC tablet TAKE 1 TABLET EVERY DAY 90 tablet 0 2/4/2024    pravastatin (PRAVACHOL) 40 MG tablet Take 1 tablet by mouth Every Evening. 90 tablet 3 2/3/2024    Semaglutide,0.25 or 0.5MG/DOS, (Ozempic, 0.25 or 0.5 MG/DOSE,) 2 MG/3ML solution pen-injector Inject 0.5 mg under the skin into the appropriate area as directed 1 (One) Time Per Week. (Patient taking differently: Inject 0.5 mg under the skin into the appropriate area as directed 1 (One) Time Per Week. Saturday) 9 mL 1 2/3/2024    timolol (TIMOPTIC) 0.5 % ophthalmic solution Administer 1 drop to both eyes 2 (Two) Times a Day.   2/4/2024    triamcinolone (KENALOG) 0.025 % cream Apply 1 application topically to the appropriate area as directed 2 (Two) Times a Day. 15 g 1 Past Month    vitamin B-12 (CYANOCOBALAMIN) 100 MCG tablet Take 1 tablet by mouth Daily. OTC   2/4/2024       Current Medications:  Scheduled Meds:[START ON 2/5/2024] amLODIPine, 10 mg, Oral, Daily  [START ON 2/5/2024] losartan, 100 mg, Oral, Q24H   And  [START ON 2/5/2024] hydroCHLOROthiazide, 12.5 mg, Oral, Q24H  [MAR Hold] insulin regular, 2-9 Units, Subcutaneous, Q6H  [START ON 2/5/2024] levothyroxine, 75 mcg, Oral, QAM AC  [START ON 2/5/2024] pantoprazole, 40 mg, Oral, Q AM  pravastatin, 40 mg, Oral, Q PM  [MAR Hold] senna-docusate sodium, 2 tablet, Oral, BID  [MAR Hold] sodium chloride, 10 mL, Intravenous, Q12H      Continuous Infusions:   PRN Meds:.  [MAR Hold] acetaminophen **OR** [MAR Hold] acetaminophen **OR** [MAR Hold] acetaminophen    [MAR Hold] senna-docusate sodium **AND** [MAR Hold] polyethylene glycol **AND** [MAR Hold]  bisacodyl **AND** [MAR Hold] bisacodyl    [MAR Hold] Calcium Replacement - Follow Nurse / BPA Driven Protocol    [MAR Hold] dextrose    [MAR Hold] dextrose    droperidol **OR** droperidol    fentanyl    [MAR Hold] glucagon (human recombinant)    [MAR Hold] HYDROmorphone **AND** [MAR Hold] naloxone    HYDROmorphone    lactated ringers    [MAR Hold] Magnesium Standard Dose Replacement - Follow Nurse / BPA Driven Protocol    meperidine    [MAR Hold] ondansetron ODT **OR** [MAR Hold] ondansetron    ondansetron    [MAR Hold] Phosphorus Replacement - Follow Nurse / BPA Driven Protocol    Potassium Replacement - Follow Nurse / BPA Driven Protocol    [COMPLETED] Insert Peripheral IV **AND** [MAR Hold] sodium chloride    [MAR Hold] sodium chloride    [MAR Hold] sodium chloride    Review of Systems:   A 12 point system review was reviewed with the patient and from the chart  and is negative except as in history of present illness.      Physical Exam: 73 y.o. female                    Vitals:    02/04/24 1600 02/04/24 1630 02/04/24 1715 02/04/24 1914   BP: (!) 183/109  176/75 171/75   BP Location:   Left arm Left arm   Patient Position:   Lying Lying   Pulse: 84 76 82 98   Resp:   15 16   Temp:   98.2 °F (36.8 °C) (!) 102.9 °F (39.4 °C)   TempSrc:   Oral Temporal   SpO2: 94% 96% 92% 92%        Gait: Could not be tested , patient is nonambulatory.    Mental/HEENT/cardio/skin: The patient's general appearance was well-nourished, well-hydrated, no acute distress.  Orientation was alert and oriented ×3.  The patient's mood was normal.   Pulmonary exam shows normal late exchange, no labored breathing, or shortness of breath.    The skin exam showed normal temperature and color in the area of examination.    Extremities: right   lower extremity positive shortening, positive external rotation, attempted movements of the  hip are painful and restricted. The patient is able to do gentle active range of motion of her toes. Gross  "sensation is intact over the toes.    Pulses: Pulses palpable and equal bilaterally    Diagnostic Tests:    Results from last 7 days   Lab Units 02/04/24  1405   WBC 10*3/mm3 9.45   HEMOGLOBIN g/dL 15.6   HEMATOCRIT % 45.0   PLATELETS 10*3/mm3 261     Results from last 7 days   Lab Units 02/04/24  1818 02/04/24  1428   SODIUM mmol/L 138 136   POTASSIUM mmol/L 3.8 3.2*   CHLORIDE mmol/L 102 101   CO2 mmol/L 25.0 25.0   BUN mg/dL 10 11   CREATININE mg/dL 0.72 0.66   GLUCOSE mg/dL 124* 122*   CALCIUM mg/dL 9.6 9.3         No results found for: \"URICACID\"  No results found for: \"CRYSTAL\"  Microbiology Results (last 10 days)       ** No results found for the last 240 hours. **          XR Hip With or Without Pelvis 2 - 3 View Right    Result Date: 2/4/2024  Impression: 1.Transverse impacted fracture involving the proximal right femoral head-neck junction. There is impaction of the diaphyseal fracture fragment into the femoral head fracture fragment. The articulation the right hip remains intact without dislocation. Electronically Signed: Jeremy Benites MD  2/4/2024 2:43 PM EST  Workstation ID: [id number]    XR Chest 1 View    Result Date: 2/4/2024  No evidence for acute cardiopulmonary process. Electronically Signed: Jeremy Benites MD  2/4/2024 2:40 PM EST  Workstation ID: [id number]     Assessment: right Intracapsular Hip Fracture       Hip fracture      Plan:    Options and alternatives have been discussed in detail with patient and  family.   The patient is indicated for a  right total hip arthroplasty.    The likely,  Risks and benefits of the procedure including but not limited to infection, DVT, pulmonary embolism,  leg length discrepancy, recurrent dislocation, possibility of injury to nerves or vessels, possibility of periprosthetic fractures have been discussed in detail.  Despite the risks involved, The patient and patient's family  would like to proceed.     The patient is being scheduled for a right total hip " arthroplasty by anterior approach at [facility] tentatively for 2/4/ 2024.     Patient will be made NPO.   Obtain informed consent.     The patient's admitting service has seen the patient and the patient is cleared to the operating room.  Npo  Bed rest  Pain control  [facility] dvt ppx    Date: 2/4/2024        CC: Radha Gregg APRN; MD Therese Haney Sarah Kathle*                  Electronically signed by Doni Evans MD at 02/04/24 8960

## 2024-02-06 NOTE — PLAN OF CARE
Problem: Adult Inpatient Plan of Care  Goal: Plan of Care Review  Outcome: Met  Flowsheets (Taken 2/6/2024 1051)  Progress: no change  Plan of Care Reviewed With: patient  Goal: Patient-Specific Goal (Individualized)  Outcome: Met  Goal: Absence of Hospital-Acquired Illness or Injury  Outcome: Met  Intervention: Identify and Manage Fall Risk  Recent Flowsheet Documentation  Taken 2/6/2024 1000 by Thao Hood RN  Safety Promotion/Fall Prevention:   activity supervised   assistive device/personal items within reach   clutter free environment maintained   room organization consistent   safety round/check completed   toileting scheduled  Taken 2/6/2024 0912 by Thao Hood RN  Safety Promotion/Fall Prevention:   activity supervised   assistive device/personal items within reach   clutter free environment maintained   room organization consistent   toileting scheduled   safety round/check completed  Intervention: Prevent Skin Injury  Recent Flowsheet Documentation  Taken 2/6/2024 1000 by Thao Hood RN  Body Position: legs elevated  Taken 2/6/2024 0912 by Thao Hood RN  Body Position: position changed independently  Skin Protection:   adhesive use limited   incontinence pads utilized   tubing/devices free from skin contact  Intervention: Prevent and Manage VTE (Venous Thromboembolism) Risk  Recent Flowsheet Documentation  Taken 2/6/2024 1000 by Thao Hood RN  Activity Management: up in chair  VTE Prevention/Management:   bilateral   sequential compression devices on  Taken 2/6/2024 0912 by Thao Hood RN  Activity Management:   activity encouraged   up in chair  VTE Prevention/Management:   bilateral   sequential compression devices on  Intervention: Prevent Infection  Recent Flowsheet Documentation  Taken 2/6/2024 1000 by Thao Hood RN  Infection Prevention:   environmental surveillance performed   rest/sleep promoted  Taken 2/6/2024 0912 by Thao Hood RN  Infection  Prevention:   environmental surveillance performed   rest/sleep promoted  Goal: Optimal Comfort and Wellbeing  Outcome: Met  Intervention: Monitor Pain and Promote Comfort  Recent Flowsheet Documentation  Taken 2/6/2024 0912 by Thao Hood RN  Pain Management Interventions: see MAR  Intervention: Provide Person-Centered Care  Recent Flowsheet Documentation  Taken 2/6/2024 1000 by Thao Hood RN  Trust Relationship/Rapport: care explained  Taken 2/6/2024 0912 by Thao Hood RN  Trust Relationship/Rapport: care explained  Goal: Readiness for Transition of Care  Outcome: Met     Problem: Pain Acute  Goal: Acceptable Pain Control and Functional Ability  Outcome: Met  Intervention: Prevent or Manage Pain  Recent Flowsheet Documentation  Taken 2/6/2024 1000 by Thao Hood RN  Medication Review/Management: medications reviewed  Taken 2/6/2024 0912 by Thao Hood RN  Medication Review/Management: medications reviewed  Intervention: Develop Pain Management Plan  Recent Flowsheet Documentation  Taken 2/6/2024 0912 by Thao Hood RN  Pain Management Interventions: see MAR     Problem: Skin Injury Risk Increased  Goal: Skin Health and Integrity  Outcome: Met  Intervention: Optimize Skin Protection  Recent Flowsheet Documentation  Taken 2/6/2024 0912 by Thao Hood RN  Pressure Reduction Techniques:   frequent weight shift encouraged   weight shift assistance provided  Pressure Reduction Devices:   pressure-redistributing mattress utilized   positioning supports utilized  Skin Protection:   adhesive use limited   incontinence pads utilized   tubing/devices free from skin contact     Problem: Fall Injury Risk  Goal: Absence of Fall and Fall-Related Injury  Outcome: Met  Intervention: Identify and Manage Contributors  Recent Flowsheet Documentation  Taken 2/6/2024 1000 by Thao Hood RN  Medication Review/Management: medications reviewed  Taken 2/6/2024 0912 by Thao Hood  RN  Medication Review/Management: medications reviewed  Intervention: Promote Injury-Free Environment  Recent Flowsheet Documentation  Taken 2/6/2024 1000 by Thao Hood RN  Safety Promotion/Fall Prevention:   activity supervised   assistive device/personal items within reach   clutter free environment maintained   room organization consistent   safety round/check completed   toileting scheduled  Taken 2/6/2024 0912 by Thao Hood RN  Safety Promotion/Fall Prevention:   activity supervised   assistive device/personal items within reach   clutter free environment maintained   room organization consistent   toileting scheduled   safety round/check completed     Problem: Bleeding (Orthopaedic Fracture)  Goal: Absence of Bleeding  Outcome: Met     Problem: Embolism (Orthopaedic Fracture)  Goal: Absence of Embolism Signs and Symptoms  Outcome: Met  Intervention: Prevent or Manage Embolism Risk  Recent Flowsheet Documentation  Taken 2/6/2024 1000 by Thao Hood RN  VTE Prevention/Management:   bilateral   sequential compression devices on  Taken 2/6/2024 0912 by Thao Hood RN  VTE Prevention/Management:   bilateral   sequential compression devices on     Problem: Fracture Stabilization and Management (Orthopaedic Fracture)  Goal: Fracture Stability  Outcome: Met     Problem: Functional Ability Impaired (Orthopaedic Fracture)  Goal: Optimal Functional Ability  Outcome: Met  Intervention: Optimize Functional Ability  Recent Flowsheet Documentation  Taken 2/6/2024 1000 by Thao Hood RN  Activity Management: up in chair  Positioning/Transfer Devices:   pillows   in use  Taken 2/6/2024 0912 by hTao Hood RN  Activity Management:   activity encouraged   up in chair  Positioning/Transfer Devices:   pillows   in use     Problem: Infection (Orthopaedic Fracture)  Goal: Absence of Infection Signs and Symptoms  Outcome: Met  Intervention: Prevent or Manage Infection  Recent Flowsheet  Documentation  Taken 2/6/2024 1000 by Thao Hood RN  Infection Prevention:   environmental surveillance performed   rest/sleep promoted  Taken 2/6/2024 0912 by Thao Hood RN  Infection Prevention:   environmental surveillance performed   rest/sleep promoted     Problem: Neurovascular Compromise (Orthopaedic Fracture)  Goal: Effective Tissue Perfusion  Outcome: Met     Problem: Pain (Orthopaedic Fracture)  Goal: Acceptable Pain Control  Outcome: Met  Intervention: Manage Acute Orthopaedic-Related Pain  Recent Flowsheet Documentation  Taken 2/6/2024 0912 by Thao Hood RN  Pain Management Interventions: see MAR     Problem: Respiratory Compromise (Orthopaedic Fracture)  Goal: Effective Oxygenation and Ventilation  Outcome: Met  Intervention: Promote Airway Secretion Clearance  Recent Flowsheet Documentation  Taken 2/6/2024 0912 by Thao Hood RN  Cough And Deep Breathing: done independently per patient     Problem: Adjustment to Surgery (Hip Arthroplasty)  Goal: Optimal Coping  Outcome: Met     Problem: Bleeding (Hip Arthroplasty)  Goal: Absence of Bleeding  Outcome: Met     Problem: Bowel Motility Impaired (Hip Arthroplasty)  Goal: Effective Bowel Elimination  Outcome: Met     Problem: Fluid and Electrolyte Imbalance (Hip Arthroplasty)  Goal: Fluid and Electrolyte Balance  Outcome: Met     Problem: Functional Ability Impaired (Hip Arthroplasty)  Goal: Optimal Functional Ability  Outcome: Met  Intervention: Promote Optimal Functional Status  Recent Flowsheet Documentation  Taken 2/6/2024 1000 by Thao Hood RN  Activity Management: up in chair  Taken 2/6/2024 0912 by Thao Hood RN  Activity Management:   activity encouraged   up in chair     Problem: Infection (Hip Arthroplasty)  Goal: Absence of Infection Signs and Symptoms  Outcome: Met     Problem: Neurovascular Compromise (Hip Arthroplasty)  Goal: Intact Neurovascular Status  Outcome: Met     Problem: Ongoing Anesthesia  Effects (Hip Arthroplasty)  Goal: Anesthesia/Sedation Recovery  Outcome: Met  Intervention: Optimize Anesthesia Recovery  Recent Flowsheet Documentation  Taken 2/6/2024 1000 by Thao Hood RN  Safety Promotion/Fall Prevention:   activity supervised   assistive device/personal items within reach   clutter free environment maintained   room organization consistent   safety round/check completed   toileting scheduled  Taken 2/6/2024 0912 by Thao Hood RN  Safety Promotion/Fall Prevention:   activity supervised   assistive device/personal items within reach   clutter free environment maintained   room organization consistent   toileting scheduled   safety round/check completed  Administration (IS): self-administered     Problem: Pain (Hip Arthroplasty)  Goal: Acceptable Pain Control  Outcome: Met  Intervention: Prevent or Manage Pain  Recent Flowsheet Documentation  Taken 2/6/2024 0912 by Thao Hood RN  Pain Management Interventions: see MAR     Problem: Postoperative Nausea and Vomiting (Hip Arthroplasty)  Goal: Nausea and Vomiting Relief  Outcome: Met     Problem: Postoperative Urinary Retention (Hip Arthroplasty)  Goal: Effective Urinary Elimination  Outcome: Met     Problem: Respiratory Compromise (Hip Arthroplasty)  Goal: Effective Oxygenation and Ventilation  Outcome: Met   Goal Outcome Evaluation:  Plan of Care Reviewed With: patient        Progress: no change

## 2024-02-06 NOTE — THERAPY TREATMENT NOTE
Patient Name: Pari Howell  : 1950    MRN: [id number]                              Today's Date: 2024       Admit Date: 2024    Visit Dx:     ICD-10-CM ICD-9-CM   1. Closed displaced fracture of right femoral neck  S72.001A 820.8   2. Closed fracture of right hip, initial encounter  S72.001A 820.8     Patient Active Problem List   Diagnosis    Type 2 diabetes mellitus without complication, without long-term current use of insulin    Other specified glaucoma    Hyperlipidemia LDL goal <70    Vitamin D deficiency    Irritable bowel syndrome with diarrhea    Hypothyroidism (acquired)    Acute respiratory insufficiency    Hip fracture     Past Medical History:   Diagnosis Date    Bladder infection     Dyspareunia, female     Glaucoma     H/O sexual problem     High cholesterol     History of abnormal cervical Pap smear     Hypertension     Hypertension     Hypothyroidism     Impaired functional mobility, balance, gait, and endurance     Labial cyst     Muscle weakness     Type 2 diabetes mellitus     Urinary incontinence     Vaginal itching     Yeast infection      Past Surgical History:   Procedure Laterality Date    CARPAL TUNNEL RELEASE      CATARACT EXTRACTION Right 2022    CATARACT EXTRACTION, BILATERAL Left     CHOLECYSTECTOMY      EYE SURGERY      HYSTERECTOMY      OOPHORECTOMY      TOTAL HIP ARTHROPLASTY Right 2024    Procedure: TOTAL HIP ARTHROPLASTY ANTERIOR RIGHT;  Surgeon: Doni Evans MD;  Location: [facility];  Service: Orthopedics;  Laterality: Right;    TUBAL ABDOMINAL LIGATION  1979      General Information       Row Name 24 1024          OT Time and Intention    Document Type therapy note (daily note)  -KL     Mode of Treatment occupational therapy  -       Row Name 24 1024          General Information    Patient Profile Reviewed yes  -KL     Existing Precautions/Restrictions fall;right;hip, anterior;other (see comments)  vertigo, RLE WBAT   -     Barriers to Rehab none identified  -       Row Name 02/06/24 1024          Cognition    Orientation Status (Cognition) oriented x 4  -       Row Name 02/06/24 1024          Safety Issues, Functional Mobility    Safety Issues Affecting Function (Mobility) safety precaution awareness;safety precautions follow-through/compliance;insight into deficits/self-awareness  -     Impairments Affecting Function (Mobility) balance;endurance/activity tolerance;pain;sensation/sensory awareness;strength  -               User Key  (r) = Recorded By, (t) = Taken By, (c) = Cosigned By      Initials Name Provider Type    Elvira Ayoub OT Occupational Therapist                     Mobility/ADL's       Row Name 02/06/24 1025          Transfers    Comment, (Transfers) Upon initial STS, pt lost balance backward. W/ CGA she was able to regain her balance. Pt and family educated on proper gait belt use and assist to ensure safe t/fs post d/c. Pt and family encouraged to take gait belt once d/c. Pt and family also educated on car and bed t/fs w/ post ant. hip techniuqe and leg . Pt verbalized understanding through teach back.  -       Row Name 02/06/24 1025          Sit-Stand Transfer    Sit-Stand [place] (Transfers) contact guard;verbal cues  -     Assistive Device (Sit-Stand Transfers) walker, front-wheeled  -       Row Name 02/06/24 1025          Stand-Sit Transfer    Stand-Sit [place] (Transfers) contact guard;verbal cues  -     Assistive Device (Stand-Sit Transfers) walker, front-wheeled  -       Row Name 02/06/24 1025          Lower Body Dressing Assessment/Training    [place] Level (Lower Body Dressing) don;socks;pants/bottoms;verbal cues;minimum assist (75% patient effort)  -     Assistive Devices (Lower Body Dressing) reacher;sock-aid  -     Position (Lower Body Dressing) unsupported sitting  -     Comment, (Lower Body Dressing) Reinforced education on sock-aid and reacher. Pt  was able to use AD to assist w/ LB dressing Edward. Pt was able to don slip on shoes RICHEY.  -[facility] Name 02/06/24 1025          Grooming Assessment/Training    [place] Level (Grooming) oral care regimen;contact guard assist  -     Position (Grooming) sink side  -     Comment, (Grooming) Oral hygiene complete standing at sink w/ FWW CGA,  -[facility] Name 02/06/24 1025          Self-Feeding Assessment/Training    Assistive Devices (Feeding) built-up handle utensils  -     Comment, (Feeding) Pt provided w/ built-up utensils d/t decreased .  -               User Key  (r) = Recorded By, (t) = Taken By, (c) = Cosigned By      Initials Name Provider Type    Elvira Ayoub OT Occupational Therapist                   Obj/Interventions       [facility] Name 02/06/24 1031          Sensory Assessment (Somatosensory)    Sensory Assessment (Somatosensory) bilateral UE  -     Bilateral UE Sensory Assessment light touch awareness;impaired  -[facility] Name 02/06/24 1031          Balance    Static Sitting Balance standby assist  -     Dynamic Sitting Balance standby assist  -     Position, Sitting Balance unsupported;sitting in chair  -     Static Standing Balance contact guard  -     Dynamic Standing Balance contact guard  -     Position/Device Used, Standing Balance supported;walker, front-wheeled  -     Balance Interventions sitting;standing;sit to stand;supported;static;dynamic;occupation based/functional task  -               User Key  (r) = Recorded By, (t) = Taken By, (c) = Cosigned By      Initials Name Provider Type    Elvira Ayoub OT Occupational Therapist                   Goals/Plan    No documentation.                  Clinical Impression       Row Name 02/06/24 1033          Pain Assessment    Pretreatment Pain Rating 0/10 - no pain  -     Posttreatment Pain Rating 0/10 - no pain  -[facility] Name 02/06/24 1033          Plan of Care Review    Plan of Care Reviewed With  patient;daughter  -     Progress no change  -     Outcome Evaluation During OT tx session, pt lost balance 1x upon inital stand from chair, after which no LOB was noted. Pt continues to be below BL on mobility, self-care and activity tolerance. Will continue w/ current POC. d/c rec is home w/ 24/7 assist.  -       Row Name 02/06/24 1033          Therapy Plan Review/Discharge Plan (OT)    Anticipated Discharge Disposition (OT) home with 24/7 care  -       Row Name 02/06/24 1033          Vital Signs    Pre Patient Position Sitting  -KL     Intra Patient Position Standing  -KL     Post Patient Position Sitting  -       Row Name 02/06/24 1033          Positioning and Restraints    Pre-Treatment Position sitting in chair/recliner  -KL     Post Treatment Position chair  -KL     In Chair notified nsg;reclined;sitting;call light within reach;encouraged to call for assist;exit alarm on;with family/caregiver;waffle cushion;legs elevated  SCD and ice reapplied  -KL               User Key  (r) = Recorded By, (t) = Taken By, (c) = Cosigned By      Initials Name Provider Type    Elvira Ayoub, OT Occupational Therapist                   Outcome Measures       Row Name 02/06/24 1036          How much help from another is currently needed...    Putting on and taking off regular lower body clothing? 3  -KL     Bathing (including washing, rinsing, and drying) 3  -KL     Toileting (which includes using toilet bed pan or urinal) 4  -KL     Putting on and taking off regular upper body clothing 4  -KL     Taking care of personal grooming (such as brushing teeth) 4  -KL     Eating meals 4  -KL     AM-PAC 6 Clicks Score (OT) 22  -KL       Row Name 02/06/24 0927 02/06/24 0912       How much help from another person do you currently need...    Turning from your back to your side while in flat bed without using bedrails? 4  -LH 4  -GJ    Moving from lying on back to sitting on the side of a flat bed without bedrails? 3  -LH 3  -GJ     Moving to and from a bed to a chair (including a wheelchair)? 3  - 3  -GJ    Standing up from a chair using your arms (e.g., wheelchair, bedside chair)? 3  - 3  -GJ    Climbing 3-5 steps with a railing? 3  - 3  -GJ    To walk in hospital room? 3  - 3  -GJ    AM-PAC 6 Clicks Score (PT) 19  - 19  -GJ    Highest Level of Mobility Goal 6 --> Walk 10 steps or more  - 6 --> Walk 10 steps or more  -      Row Name 02/06/24 1036 02/06/24 0927       Functional Assessment    Outcome Measure Options AM-PAC 6 Clicks Daily Activity (OT)  - AM-PAC 6 Clicks Basic Mobility (PT)  -              User Key  (r) = Recorded By, (t) = Taken By, (c) = Cosigned By      Initials Name Provider Type     Thao Hood, RN Registered Nurse     Rolanda Brandt, PT Physical Therapist    Elvira Ayoub, OT Occupational Therapist                    Occupational Therapy Education       Title: PT OT SLP Therapies (Done)       Topic: Occupational Therapy (Done)       Point: ADL training (Done)       Description:   Instruct learner(s) on proper safety adaptation and remediation techniques during self care or transfers.   Instruct in proper use of assistive devices.                  Learning Progress Summary             Patient Acceptance, E,TB,D, VU,NR by  at 2/6/2024 1036    Eager, E,TB,D,H, DU,VU by AR at 2/5/2024 1129   Family Acceptance, E,TB,D, VU,NR by  at 2/6/2024 1036    Eager, E,TB,D,H, DU,VU by AR at 2/5/2024 1129                         Point: Home exercise program (Done)       Description:   Instruct learner(s) on appropriate technique for monitoring, assisting and/or progressing therapeutic exercises/activities.                  Learning Progress Summary             Patient Eager, E,TB,D,H, DU,VU by AR at 2/5/2024 1129   Family Eager, E,TB,D,H, DU,VU by AR at 2/5/2024 1129                         Point: Precautions (Done)       Description:   Instruct learner(s) on prescribed precautions during self-care and  functional transfers.                  Learning Progress Summary             Patient Acceptance, E,TB,D, VU,NR by  at 2/6/2024 1036    Eager, E,TB,D,H, DU,VU by AR at 2/5/2024 1129   Family Acceptance, E,TB,D, VU,NR by  at 2/6/2024 1036    Eager, E,TB,D,H, DU,VU by AR at 2/5/2024 1129                         Point: Body mechanics (Done)       Description:   Instruct learner(s) on proper positioning and spine alignment during self-care, functional mobility activities and/or exercises.                  Learning Progress Summary             Patient Acceptance, E,TB,D, VU,NR by  at 2/6/2024 1036    Eager, E,TB,D,H, DU,VU by AR at 2/5/2024 1129   Family Acceptance, E,TB,D, VU,NR by  at 2/6/2024 1036    Eager, E,TB,D,H, DU,VU by AR at 2/5/2024 1129                                         User Key       Initials Effective Dates Name Provider Type Discipline    AR 07/11/23 -  Laxmi Prabhakar OT Occupational Therapist OT     02/05/24 -  Elvira Noel OT Occupational Therapist OT                  OT Recommendation and Plan     Plan of Care Review  Plan of Care Reviewed With: patient, daughter  Progress: no change  Outcome Evaluation: During OT tx session, pt lost balance 1x upon inital stand from chair, after which no LOB was noted. Pt continues to be below BL on mobility, self-care and activity tolerance. Will continue w/ current POC. d/c rec is home w/ 24/7 assist.     Time Calculation:         Time Calculation- OT       Row Name 02/06/24 1036 02/06/24 0928          Time Calculation- OT    OT Start Time 0928  -KL --     OT Received On 02/06/24  - --        Timed Charges    75549 - Gait Training Minutes  -- 12  -LH     54596 - OT Therapeutic Activity Minutes 14  -KL --     80241 - OT Self Care/Mgmt Minutes 25  -KL --        Total Minutes    Timed Charges Total Minutes 39  -KL 12  -LH      Total Minutes 39  -KL 12  -LH               User Key  (r) = Recorded By, (t) = Taken By, (c) = Cosigned By      Initials  Name Provider Type     Rolanda Brandt, PT Physical Therapist    Elvira Ayoub OT Occupational Therapist                  Therapy Charges for Today       Code Description Service Date Service Provider Modifiers Qty    [id number] HC OT THERAPEUTIC ACT EA 15 MIN 2/6/2024 Elvira Noel OT GO 1    [id number]  OT SELF CARE/MGMT/TRAIN EA 15 MIN 2/6/2024 Elvira Noel OT GO 2                 Elvira Noel OT  2/6/2024

## 2024-02-07 NOTE — PAYOR COMM NOTE
"Rosario Gonzalez, RN  Utilization Management  P:[phone]  F:[phone]    Auth# [id number]     Pari Cardenas (73 y.o. Female)       Date of Birth   1950    Social Security Number       Address   [address] [place] [address]    Home Phone   [phone]    MRN   [id number]       M[employer]onism   [facility]    Marital Status                               Admission Date   24    Admission Type   Emergency    Admitting Provider   Jessi Harvey DO    Attending Provider       Department, Room/Bed   [facility] 3G, S364/1       Discharge Date   2024    Discharge Disposition   Home or Self Care    Discharge Destination                                 Attending Provider: (none)   Allergies: Shellfish-derived Products    Isolation: None   Infection: None   Code Status: Prior    Ht: 172.7 cm (67.99\")   Wt: --    Admission Cmt: None   Principal Problem: Hip fracture [S72.009A]                   Active Insurance as of 2024       Primary Coverage       Payor Plan Insurance Group Employer/Plan Group    HUMANA MEDICARE REPLACEMENT HUMANA MED ADV PPO [id number]       Payor Plan Address Payor Plan Phone Number Payor Plan Fax Number Effective Dates    [address] [address] [phone]  2023 - None Entered    [facility] [place] [address]         Subscriber Name Subscriber Birth Date Member ID       PARI CARDENAS 1950 [id number]                     Emergency Contacts        (Rel.) Home Phone Work Phone Mobile Phone    sandeep cardenas (Spouse) [phone] -- [phone]                 Discharge Summary        Jessi Harvey DO at 24 [address] [place] [address] Medicine Services  DISCHARGE SUMMARY    Patient Name: Pari Cardenas  : 1950  MRN: [id number]    Date of Admission: 2024  1:40 PM  Date of Discharge:  2024  Primary Care Physician: Radha Gregg APRN    Consults       Date and Time Order Name Status Description    2024 " 10:30 PM Inpatient Consult to Hospitalist              Hospital Course     Presenting Problem: hip Fracture    Active Hospital Problems    Diagnosis  POA    **Hip fracture [S72.009A]  Yes      Resolved Hospital Problems   No resolved problems to display.          Hospital Course:  Pari Howell is a 73 y.o. female with PMH of hypothyroidism, HTN, DM II with neuropathy that presented after a fall.      Right Hip Fracture:  - s/p repair w/Dr. Evans  - WBAT, f/u with ortho in 6 weeks  - ASA for DVT ppx x 6 week course  - PRN pain control at discharge  - PT/OT, plan home w/outpatient PT     Isolated Fever:  -Tmax 102.9, no further fevers, infectious w/u is negative     DM II   - resume home regimen     HTN  HLD  - resume home meds     Hypokalemia:  - resolved    Discharge Follow Up Recommendations for outpatient labs/diagnostics:   - f/u with Dr. Evans in 6 weeks    Day of Discharge     HPI:   Patient resting in bed. No issues overnight. Having some pain in right knee and right lateral part of her leg. Otherwise feels ready for discharge home.    Review of Systems  Gen- No fevers, chills  CV- No chest pain, palpitations  Resp- No cough, dyspnea  GI- No N/V/D, abd pain    Vital Signs:   Temp:  [97.9 °F (36.6 °C)-98.1 °F (36.7 °C)] 98.1 °F (36.7 °C)  Heart Rate:  [57-92] 68  Resp:  [16-18] 18  BP: (126-166)/(56-70) 145/66      Physical Exam:  Constitutional: No acute distress, awake, alert  HENT: NCAT, mucous membranes moist  Respiratory: Clear to auscultation bilaterally, respiratory effort normal   Cardiovascular: RRR, no murmurs, rubs, or gallops  Gastrointestinal: soft, nontender, nondistended  Musculoskeletal: No bilateral ankle edema, right hip incision is c/d/i  Psychiatric: Appropriate affect, cooperative  Neurologic: Oriented x 3, speech clear, no focal deficits  Skin: No rashes      Pertinent  and/or Most Recent Results     LAB RESULTS:      Lab 02/06/24  0504 02/05/24  0338 02/04/24  1405   WBC 9.35 14.00*  9.45   HEMOGLOBIN 12.2 13.2 15.6   HEMATOCRIT 35.0 38.1 45.0   PLATELETS 205 232 261   NEUTROS ABS 5.79 12.83* 6.32   IMMATURE GRANS (ABS) 0.03 0.08* 0.07*   LYMPHS ABS 1.91 0.70 2.06   MONOS ABS 0.61 0.36 0.48   EOS ABS 0.95* 0.01 0.47*   MCV 89.5 89.2 87.4         Lab 02/06/24  0504 02/05/24  0338 02/04/24  1818 02/04/24  1428   SODIUM 137 137 138 136   POTASSIUM 3.7 3.9 3.8 3.2*   CHLORIDE 101 101 102 101   CO2 26.0 23.0 25.0 25.0   ANION GAP 10.0 13.0 11.0 10.0   BUN 13 10 10 11   CREATININE 0.69 0.71 0.72 0.66   EGFR 91.8 89.9 88.4 92.8   GLUCOSE 133* 177* 124* 122*   CALCIUM 9.2 9.0 9.6 9.3         Lab 02/04/24  1428   TOTAL PROTEIN 7.3   ALBUMIN 4.2   GLOBULIN 3.1   ALT (SGPT) 17   AST (SGOT) 18   BILIRUBIN 0.6   ALK PHOS 68                     Brief Urine Lab Results  (Last result in the past 365 days)        Color   Clarity   Blood   Leuk Est   Nitrite   Protein   CREAT   Urine HCG        02/05/24 1130 Yellow   Clear   Negative   Small (1+)   Negative   Negative                 Microbiology Results (last 10 days)       Procedure Component Value - Date/Time    Urine Culture - Urine, Urine, Clean Catch [[id number]]  (Normal) Collected: 02/05/24 1130    Lab Status: Final result Specimen: Urine, Clean Catch Updated: 02/06/24 1027     Urine Culture No growth    Respiratory Panel PCR w/COVID-19(SARS-CoV-2) SADIE/UMU/BOONE/PAD/COR/ELOISA In-House, NP Swab in UTM/VTM, 2 HR TAT - Swab, Nasopharynx [[id number]]  (Normal) Collected: 02/05/24 1050    Lab Status: Final result Specimen: Swab from Nasopharynx Updated: 02/05/24 1152     ADENOVIRUS, PCR Not Detected     Coronavirus 229E Not Detected     Coronavirus HKU1 Not Detected     Coronavirus NL63 Not Detected     Coronavirus OC43 Not Detected     COVID19 Not Detected     Human Metapneumovirus Not Detected     Human Rhinovirus/Enterovirus Not Detected     Influenza A PCR Not Detected     Influenza B PCR Not Detected     Parainfluenza Virus 1 Not Detected     Parainfluenza  Virus 2 Not Detected     Parainfluenza Virus 3 Not Detected     Parainfluenza Virus 4 Not Detected     RSV, PCR Not Detected     Bordetella pertussis pcr Not Detected     Bordetella parapertussis PCR Not Detected     Chlamydophila pneumoniae PCR Not Detected     Mycoplasma pneumo by PCR Not Detected    Narrative:      In the setting of a positive respiratory panel with a viral infection PLUS a negative procalcitonin without other underlying concern for bacterial infection, consider observing off antibiotics or discontinuation of antibiotics and continue supportive care. If the respiratory panel is positive for atypical bacterial infection (Bordetella pertussis, Chlamydophila pneumoniae, or Mycoplasma pneumoniae), consider antibiotic de-escalation to target atypical bacterial infection.            XR Hip With or Without Pelvis 2 - 3 View Right    Result Date: 2/5/2024  XR HIP W OR WO PELVIS 2-3 VIEW RIGHT Date of Exam: 2/4/2024 10:00 PM EST Indication: Post hip replacement Comparison: 2/4/2024 at 1424 hours FINDINGS: Postoperative changes are noted status post hip arthroplasty. Near anatomic alignment is observed. There is no evidence for periimplant fracture. Surgical changes are seen within the surrounding soft tissues.     Postoperative changes status post hip arthroplasty. Near-anatomic alignment is noted. There is no evidence for periimplant fracture. Electronically Signed: Jeremy Benites MD  2/5/2024 7:20 AM EST  Workstation ID: [id number]    FL C Arm During Surgery    Result Date: 2/4/2024  This procedure was auto-finalized with no dictation required.    XR Hip With or Without Pelvis 2 - 3 View Right    Result Date: 2/4/2024  XR HIP W OR WO PELVIS 2-3 VIEW RIGHT Date of Exam: 2/4/2024 2:12 PM EST Indication: fall/pain Comparison: None available. Findings: There is a transverse impacted fracture involving the proximal right femoral head neck junction. There is impaction of the dominant distal diaphyseal fracture  fragment into the femoral head fracture fragment. The articulation of the hip remains intact without dislocation. There does not appear to be significant involvement of the trochanteric portion of the femur. The left hip is intact. The pubic rami and sacral alae are intact. The SI joints and pubic symphysis are intact. Mild age-related changes are noted. 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 Vascular calcifications are noted. Phleboliths are observed.     Impression: 1.Transverse impacted fracture involving the proximal right femoral head-neck junction. There is impaction of the diaphyseal fracture fragment into the femoral head fracture fragment. The articulation the right hip remains intact without dislocation. Electronically Signed: Jeremy Benites MD  2/4/2024 2:43 PM EST  Workstation ID: [id number]    XR Chest 1 View    Result Date: 2/4/2024  XR CHEST 1 VW Date of Exam: 2/4/2024 2:12 PM EST Indication: preop Comparison: 11/7/2023 FINDINGS: No new consolidations or pleural effusions are observed. The cardiac silhouette and mediastinum are stable. No acute osseous abnormalities are identified.     No evidence for acute cardiopulmonary process. Electronically Signed: Jeremy Benites MD  2/4/2024 2:40 PM EST  Workstation ID: [id number]                 Plan for Follow-up of Pending Labs/Results:     Discharge Details        Discharge Medications        New Medications        Instructions Start Date   aspirin 81 MG EC tablet   81 mg, Oral, Every 12 Hours Scheduled      meloxicam 15 MG tablet  Commonly known as: MOBIC   15 mg, Oral, Daily PRN      oxyCODONE 5 MG immediate release tablet  Commonly known as: ROXICODONE   5 mg, Oral, Every 4 Hours PRN             Changes to Medications        Instructions Start Date   latanoprost 0.005 % ophthalmic solution  Commonly known as: XALATAN  What changed:   how to take this  when to take this   1 drop, Ophthalmic, Daily      levothyroxine 75 MCG tablet  Commonly  known as: SYNTHROID, LEVOTHROID  What changed: when to take this   75 mcg, Oral, Daily             Continue These Medications        Instructions Start Date   amLODIPine 5 MG tablet  Commonly known as: NORVASC   TAKE 2 TABLETS EVERY DAY      ascorbic acid 1000 MG tablet  Commonly known as: VITAMIN C   1,000 mg, Oral, Daily, OTC      cetirizine 10 MG tablet  Commonly known as: zyrTEC   10 mg, Oral, Daily, OTC      diphenhydrAMINE 25 mg capsule  Commonly known as: BENADRYL   25 mg, Oral, Nightly PRN, OTC      docusate sodium 100 MG capsule  Commonly known as: COLACE   100 mg, Oral, Every Night at Bedtime      Elderberry 500 MG capsule   1 capsule, Oral, Daily, OTC      losartan-hydrochlorothiazide 100-12.5 MG per tablet  Commonly known as: HYZAAR   TAKE 1 TABLET EVERY DAY      multivitamin with minerals tablet tablet   1 tablet, Oral, Daily, OTC      NON FORMULARY   Specialty Compounded Cream for neuropathy of feet. Cream contains lidocaine, gabapentin, ketoprofen, ibuprofen and clonidine per patient. Apply to bilateral feet at bedtime      nystatin 021320 UNIT/GM cream  Commonly known as: MYCOSTATIN   Topical, 2 Times Daily      Ozempic (0.25 or 0.5 MG/DOSE) 2 MG/3ML solution pen-injector  Generic drug: Semaglutide(0.25 or 0.5MG/DOS)   0.5 mg, Subcutaneous, Weekly      pantoprazole 20 MG EC tablet  Commonly known as: PROTONIX   TAKE 1 TABLET EVERY DAY      pravastatin 40 MG tablet  Commonly known as: PRAVACHOL   40 mg, Oral, Every Evening      timolol 0.5 % ophthalmic solution  Commonly known as: TIMOPTIC   1 drop, Both Eyes, 2 Times Daily      triamcinolone 0.025 % cream  Commonly known as: KENALOG   1 application , Topical, 2 Times Daily      vitamin B-12 100 MCG tablet  Commonly known as: CYANOCOBALAMIN   100 mcg, Oral, Daily, OTC               Allergies   Allergen Reactions    Shellfish-Derived Products Rash         Discharge Disposition:  Home or Self Care    Diet:  Hospital:  Diet Order   Procedures    Diet:  Regular/House Diet; Texture: Regular Texture (IDDSI 7); Fluid Consistency: Thin (IDDSI 0)            Activity:      Restrictions or Other Recommendations:  - WBAT       CODE STATUS:    Code Status and Medical Interventions:   Ordered at: 02/04/24 2230     Level Of Support Discussed With:    Patient     Code Status (Patient has no pulse and is not breathing):    CPR (Attempt to Resuscitate)     Medical Interventions (Patient has pulse or is breathing):    Full       No future appointments.    Additional Instructions for the Follow-ups that You Need to Schedule       Ambulatory Referral to Physical Therapy Evaluate and treat, Ortho; Full weight bearing (as tolerated.)   As directed      Specialty needed: Evaluate and treat Ortho   Weight Bearing Status: Full weight bearing (as tolerated.)   Follow-up needed: Yes                      Jessi Harvey DO  02/06/24      Time Spent on Discharge:  I spent  32  minutes on this discharge activity which included: face-to-face encounter with the patient, reviewing the data in the system, coordination of the care with the nursing staff as well as consultants, documentation, and entering orders.            Electronically signed by Jessi Harvey DO at 02/06/24 2062

## 2024-02-07 NOTE — OUTREACH NOTE
Prep Survey      Flowsheet Row Responses   [facility] facility patient discharged from? [facility]   Is LACE score < 7 ? No   Eligibility Readm Mgmt   Discharge diagnosis Total hip arthroplasty   Does the patient have one of the following disease processes/diagnoses(primary or secondary)? Total Joint Replacement   Does the patient have Home health ordered? No   Is there a DME ordered? No   Prep survey completed? Yes            RACHELLE CESPEDES - Registered Nurse

## 2024-02-15 ENCOUNTER — READMISSION MANAGEMENT (OUTPATIENT)
Dept: CALL CENTER | Facility: HOSPITAL | Age: 74
End: 2024-02-15
Payer: MEDICARE

## 2024-02-23 ENCOUNTER — READMISSION MANAGEMENT (OUTPATIENT)
Dept: CALL CENTER | Facility: HOSPITAL | Age: 74
End: 2024-02-23
Payer: MEDICARE

## 2024-02-23 NOTE — OUTREACH NOTE
Total Joint Week 2 Survey      Flowsheet Row Responses   [facility] patient discharged from? [facility]   Does the patient have one of the following disease processes/diagnoses(primary or secondary)? Total Joint Replacement   Joint surgery performed? Hip   Week 2 attempt successful? Yes   Call start time 1805   Call end time 1812   Discharge diagnosis Total hip arthroplasty   Does the patient have all medications related to this admission filled (includes all antibiotics, pain medications, etc.) Yes   Is the patient taking all medications as directed (includes completed medication regime)? Yes   Is the patient able to teach back alternate methods of pain control? Ice, Correct alignment, Short, frequent activity, Reposition   Does the patient have a follow up appointment with their surgeon? Yes   Has the patient kept scheduled appointments due by today? N/A   Has home health visited the patient within 72 hours of discharge? N/A   Psychosocial issues? No   Has the patient began therapy sessions (either in the home or as an out patient)? Yes   If the patient has started attending therapy, what post op day did they begin to attend (either in home or as an out patient)?   Outpt PT going well   Does the patient have a wound vac in place? N/A   Has the patient fallen since discharge? No   Did the patient receive a copy of their discharge instructions? Yes   Nursing interventions Reviewed instructions with patient   What is the patient's perception of their functional status since discharge? Improving   Is the patient able to teach back signs and symptoms of infection? Shortness of breath or chest pain, Severe discomfort or pain, Blisters around incision, Increased swelling or redness around incision (not associated with surgical edema), Incisional drainage, Temp >100.4 for 24h or longer   Is the patient able to teach back how to prevent infection? Check incision daily, Wash hands before and after touching incision,  Eat well-balanced diet, No tub baths, hot tub or swimming   Is the patient able to teach back signs and symptoms of DVT? Redness in calf, Area hot to touch, Shortness of breath or chest pain, Severe pain in calf, Swelling in calf   Is the patient able to teach back home safety measures? Ability to shower, Modifications with ADLs such as dressing, cooking, toileting   Did the patient implement home safety suggestions from pre-surgery classes if attended? N/A   Is the patient/caregiver able to teach back the hierarchy of who to call/visit for symptoms/problems? PCP, Specialist, Home health nurse, Urgent Care, ED, 911 Yes   Week 2 call completed? Yes   Call end time 1812            Ivy VALLADARES - Registered Nurse

## 2024-03-06 ENCOUNTER — TELEPHONE (OUTPATIENT)
Dept: ENDOCRINOLOGY | Facility: CLINIC | Age: 74
End: 2024-03-06
Payer: MEDICARE

## 2024-03-12 ENCOUNTER — READMISSION MANAGEMENT (OUTPATIENT)
Dept: CALL CENTER | Facility: HOSPITAL | Age: 74
End: 2024-03-12
Payer: MEDICARE

## 2024-03-12 NOTE — OUTREACH NOTE
Total Joint Month 1 Survey      Flowsheet Row Responses   [facility] patient discharged from? [facility]   Does the patient have one of the following disease processes/diagnoses(primary or secondary)? Total Joint Replacement   Joint surgery performed? Hip   Month 1 attempt successful? Yes   Call start time 0859   Call end time 0903   Has the patient been back in either the hospital or Emergency Department since discharge? No   Is the patient taking all medications as directed (includes completed medication regime)? Yes   Has the patient kept scheduled appointments due by today? Yes   Is the patient still receiving Home Health Services? N/A   Home health comments Out Pt therapy one more visit   Is the patient still attending therapy sessions(either in the home or as an outpatient)? Yes   Has the patient fallen since discharge? No   What is the patient's perception of their functional status since discharge? Improving   Is the patient able to teach back signs and symptoms of infection? Temp >100.4 for 24h or longer, Blisters around incision, Increased swelling or redness around incision (not associated with surgical edema), Incisional drainage, Severe discomfort or pain   Is the patient/caregiver able to teach back the hierarchy of who to call/visit for symptoms/problems? PCP, Specialist, Home health nurse, Urgent Care, ED, 911 Yes   Month 1 call completed? Yes   Graduated Yes   Graduated/Revoked comments Pt doing well. No needs.   Call end time 0903            DEE KWON - Registered Nurse

## 2024-04-01 ENCOUNTER — OFFICE VISIT (OUTPATIENT)
Dept: ENDOCRINOLOGY | Facility: CLINIC | Age: 74
End: 2024-04-01
Payer: MEDICARE

## 2024-04-01 VITALS
HEIGHT: 68 IN | BODY MASS INDEX: 25.61 KG/M2 | HEART RATE: 80 BPM | WEIGHT: 169 LBS | OXYGEN SATURATION: 97 % | DIASTOLIC BLOOD PRESSURE: 80 MMHG | SYSTOLIC BLOOD PRESSURE: 142 MMHG

## 2024-04-01 DIAGNOSIS — E11.42 TYPE 2 DIABETES MELLITUS WITH DIABETIC POLYNEUROPATHY, WITHOUT LONG-TERM CURRENT USE OF INSULIN: Primary | ICD-10-CM

## 2024-04-01 DIAGNOSIS — E03.9 HYPOTHYROIDISM (ACQUIRED): ICD-10-CM

## 2024-04-01 DIAGNOSIS — E78.2 MIXED HYPERLIPIDEMIA: ICD-10-CM

## 2024-04-01 LAB
ALBUMIN SERPL-MCNC: 4.5 G/DL (ref 3.5–5.2)
ALBUMIN/GLOB SERPL: 1.7 G/DL
ALP SERPL-CCNC: 62 U/L (ref 39–117)
ALT SERPL-CCNC: 15 U/L (ref 1–33)
AST SERPL-CCNC: 13 U/L (ref 1–32)
BILIRUB SERPL-MCNC: 0.5 MG/DL (ref 0–1.2)
BUN SERPL-MCNC: 16 MG/DL (ref 8–23)
BUN/CREAT SERPL: 18.4 (ref 7–25)
CALCIUM SERPL-MCNC: 9.9 MG/DL (ref 8.6–10.5)
CHLORIDE SERPL-SCNC: 103 MMOL/L (ref 98–107)
CHOLEST SERPL-MCNC: 206 MG/DL (ref 0–200)
CO2 SERPL-SCNC: 26.2 MMOL/L (ref 22–29)
CREAT SERPL-MCNC: 0.87 MG/DL (ref 0.57–1)
EGFRCR SERPLBLD CKD-EPI 2021: 70.5 ML/MIN/1.73
ERYTHROCYTE [DISTWIDTH] IN BLOOD BY AUTOMATED COUNT: 12.6 % (ref 12.3–15.4)
EXPIRATION DATE: NORMAL
EXPIRATION DATE: NORMAL
GLOBULIN SER CALC-MCNC: 2.7 GM/DL
GLUCOSE BLDC GLUCOMTR-MCNC: 112 MG/DL (ref 70–130)
GLUCOSE SERPL-MCNC: 105 MG/DL (ref 65–99)
HBA1C MFR BLD: 5.5 % (ref 4.5–5.7)
HCT VFR BLD AUTO: 40.6 % (ref 34–46.6)
HDLC SERPL-MCNC: 47 MG/DL (ref 40–60)
HGB BLD-MCNC: 13.5 G/DL (ref 12–15.9)
LDLC SERPL CALC-MCNC: 127 MG/DL (ref 0–100)
Lab: NORMAL
Lab: NORMAL
MCH RBC QN AUTO: 30.1 PG (ref 26.6–33)
MCHC RBC AUTO-ENTMCNC: 33.3 G/DL (ref 31.5–35.7)
MCV RBC AUTO: 90.4 FL (ref 79–97)
PLATELET # BLD AUTO: 292 10*3/MM3 (ref 140–450)
POTASSIUM SERPL-SCNC: 3.9 MMOL/L (ref 3.5–5.2)
PROT SERPL-MCNC: 7.2 G/DL (ref 6–8.5)
RBC # BLD AUTO: 4.49 10*6/MM3 (ref 3.77–5.28)
SODIUM SERPL-SCNC: 141 MMOL/L (ref 136–145)
TRIGL SERPL-MCNC: 179 MG/DL (ref 0–150)
TSH SERPL DL<=0.005 MIU/L-ACNC: 2.74 UIU/ML (ref 0.27–4.2)
VLDLC SERPL CALC-MCNC: 32 MG/DL (ref 5–40)
WBC # BLD AUTO: 9.25 10*3/MM3 (ref 3.4–10.8)

## 2024-04-01 PROCEDURE — 82947 ASSAY GLUCOSE BLOOD QUANT: CPT | Performed by: INTERNAL MEDICINE

## 2024-04-01 PROCEDURE — 1159F MED LIST DOCD IN RCRD: CPT | Performed by: INTERNAL MEDICINE

## 2024-04-01 PROCEDURE — 3044F HG A1C LEVEL LT 7.0%: CPT | Performed by: INTERNAL MEDICINE

## 2024-04-01 PROCEDURE — 1160F RVW MEDS BY RX/DR IN RCRD: CPT | Performed by: INTERNAL MEDICINE

## 2024-04-01 PROCEDURE — 99214 OFFICE O/P EST MOD 30 MIN: CPT | Performed by: INTERNAL MEDICINE

## 2024-04-01 PROCEDURE — 36415 COLL VENOUS BLD VENIPUNCTURE: CPT | Performed by: INTERNAL MEDICINE

## 2024-04-01 PROCEDURE — G2211 COMPLEX E/M VISIT ADD ON: HCPCS | Performed by: INTERNAL MEDICINE

## 2024-04-01 PROCEDURE — 83036 HEMOGLOBIN GLYCOSYLATED A1C: CPT | Performed by: INTERNAL MEDICINE

## 2024-04-01 RX ORDER — SEMAGLUTIDE 0.68 MG/ML
0.25 INJECTION, SOLUTION SUBCUTANEOUS WEEKLY
Start: 2024-04-01

## 2024-04-01 NOTE — PATIENT INSTRUCTIONS
Patients with diabetes should have a yearly eye exam. Please be sure to schedule this at least once yearly and have the eye exam report faxed to [phone].

## 2024-04-01 NOTE — PROGRESS NOTES
"Chief Complaint   Patient presents with    Diabetes          HPI   Pari Howell is a 73 y.o. female had concerns including Diabetes.    She is checking blood sugars few  times per day.  Current medications for diabetes include ozempic 0.5 mg weekly.  Has nausea on this dose. Weight is down about 9 lbs since November 2023.   Actos was stopped 6/2023 as A1c was 5.7, no longer needed.    Fell and fractured hip. Had surgery. No longer in PT. Also had PNA in 11/2023.    The following portions of the patient's history were reviewed and updated as appropriate: allergies, current medications, past family history, past medical history, past social history, past surgical history, and problem list.      Review of Systems   Constitutional: Negative.    Endocrine: Negative.         Physical Exam  Vitals reviewed.   Constitutional:       Appearance: Normal appearance.   Cardiovascular:      Rate and Rhythm: Normal rate.      Pulses:           Dorsalis pedis pulses are 2+ on the right side and 2+ on the left side.   Pulmonary:      Effort: Pulmonary effort is normal.   Feet:      Right foot:      Skin integrity: Skin integrity normal.      Left foot:      Skin integrity: Skin integrity normal.      Comments: Diabetic Foot Exam Performed and Monofilament Test Performed    Monofilament 2-3/5 bilaterally  Neurological:      General: No focal deficit present.      Mental Status: She is alert. Mental status is at baseline.   Psychiatric:         Mood and Affect: Mood normal.         Behavior: Behavior normal.        /80 (BP Location: Right arm, Patient Position: Sitting, Cuff Size: Adult)   Pulse 80   Ht 172.7 cm (68\")   Wt 76.7 kg (169 lb)   LMP  (LMP Unknown)   SpO2 97%   BMI 25.70 kg/m²      Labs and imaging    CMP:  Lab Results   Component Value Date    BUN 13 02/06/2024    CREATININE 0.69 02/06/2024    EGFR 91.8 02/06/2024    BCR 18.8 02/06/2024     02/06/2024    K 3.7 02/06/2024    CO2 26.0 02/06/2024    " CALCIUM 9.2 02/06/2024    ALBUMIN 4.2 02/04/2024    BILITOT 0.6 02/04/2024    ALKPHOS 68 02/04/2024    AST 18 02/04/2024    ALT 17 02/04/2024     Lipid Panel:  Lab Results   Component Value Date    TRIG 172 (H) 11/10/2022    HDL 46 11/10/2022    VLDL 31 11/10/2022     (H) 11/10/2022     HbA1c:  Lab Results   Component Value Date    HGBA1C 5.5 04/01/2024    HGBA1C 5.7 06/19/2023     Glucose:  Lab Results   Component Value Date    POCGLU 112 04/01/2024     Microalbumin:  Lab Results   Component Value Date    MALBCRERATIO 20 06/19/2023     TSH:  Lab Results   Component Value Date    TSH 1.910 06/19/2023    TSH 2.590 11/10/2022    FREET4 1.58 06/19/2023    FREET4 1.53 06/04/2021       Assessment and plan  Diagnoses and all orders for this visit:    1. Type 2 diabetes mellitus with diabetic polyneuropathy, without long-term current use of insulin (Primary)  Controlled with A1c down to 5.5. Complicated by retinopathy and neuropathy.   No metformin due to intolerance at lowest dose. SGLT-2 inhibitors have been avoided due to history of recurrent yeast infections. Actos no longer needed.   Not tolerating 0.5 mg Ozempic. Decrease to 0.25 mg weekly. Getting through PAP.   MF updated today. Ophtho exam UTD and pt will have the report sent here.   Labs today.  -     POC Glucose, Blood  -     POC Glycosylated Hemoglobin (Hb A1C)  -     Semaglutide,0.25 or 0.5MG/DOS, (Ozempic, 0.25 or 0.5 MG/DOSE,) 2 MG/3ML solution pen-injector; Inject 0.25 mg under the skin into the appropriate area as directed 1 (One) Time Per Week.  -     CBC (No Diff)  -     Comprehensive Metabolic Panel  -     Lipid Panel  -     Microalbumin / Creatinine Urine Ratio - Urine, Clean Catch    2. Mixed hyperlipidemia  Intolerant to alternate statins. On pravastatin 40 mg daily. Labs today.    3. Hypothyroidism (acquired)  On levothyroxine 75 mcg daily. Clinically euthyroid. TSH today.   -     TSH         Return in about 6 months (around 10/1/2024) for  next scheduled follow up. The patient was instructed to contact the clinic with any interval questions or concerns.    Electronically signed by: Gloria Santoro DO   Endocrinologist    Please note that portions of this note were completed with a voice recognition program.

## 2024-04-02 LAB
ALBUMIN/CREAT UR: 6 MG/G CREAT (ref 0–29)
CREAT UR-MCNC: 135.4 MG/DL
MICROALBUMIN UR-MCNC: 8.3 UG/ML

## 2024-04-17 ENCOUNTER — TRANSCRIBE ORDERS (OUTPATIENT)
Dept: ADMINISTRATIVE | Facility: HOSPITAL | Age: 74
End: 2024-04-17
Payer: MEDICARE

## 2024-04-17 DIAGNOSIS — Z12.31 VISIT FOR SCREENING MAMMOGRAM: Primary | ICD-10-CM

## 2024-06-12 ENCOUNTER — HOSPITAL ENCOUNTER (OUTPATIENT)
Dept: MAMMOGRAPHY | Facility: HOSPITAL | Age: 74
Discharge: HOME | End: 2024-06-12
Payer: MEDICARE

## 2024-06-12 DIAGNOSIS — Z12.31 VISIT FOR SCREENING MAMMOGRAM: ICD-10-CM

## 2024-06-12 PROCEDURE — 77067 SCR MAMMO BI INCL CAD: CPT

## 2024-06-12 PROCEDURE — 77063 BREAST TOMOSYNTHESIS BI: CPT

## 2024-07-19 ENCOUNTER — APPOINTMENT (OUTPATIENT)
Dept: GENERAL RADIOLOGY | Facility: HOSPITAL | Age: 74
DRG: 956 | End: 2024-07-19
Payer: MEDICARE

## 2024-07-19 ENCOUNTER — HOSPITAL ENCOUNTER (INPATIENT)
Facility: HOSPITAL | Age: 74
LOS: 5 days | Discharge: ANOTHER HOSPITAL | DRG: 956 | End: 2024-07-25
Attending: EMERGENCY MEDICINE | Admitting: INTERNAL MEDICINE
Payer: MEDICARE

## 2024-07-19 DIAGNOSIS — I10 ESSENTIAL HYPERTENSION: ICD-10-CM

## 2024-07-19 DIAGNOSIS — S72.012A SUBCAPITAL FRACTURE OF HIP, LEFT, CLOSED, INITIAL ENCOUNTER: Primary | ICD-10-CM

## 2024-07-19 DIAGNOSIS — S62.102B LEFT WRIST FRACTURE, OPEN, INITIAL ENCOUNTER: ICD-10-CM

## 2024-07-19 PROCEDURE — 73110 X-RAY EXAM OF WRIST: CPT

## 2024-07-19 PROCEDURE — 25010000002 HYDROMORPHONE 1 MG/ML SOLUTION: Performed by: EMERGENCY MEDICINE

## 2024-07-19 PROCEDURE — 71045 X-RAY EXAM CHEST 1 VIEW: CPT

## 2024-07-19 PROCEDURE — 99285 EMERGENCY DEPT VISIT HI MDM: CPT

## 2024-07-19 PROCEDURE — 73502 X-RAY EXAM HIP UNI 2-3 VIEWS: CPT

## 2024-07-19 RX ORDER — SODIUM CHLORIDE 0.9 % (FLUSH) 0.9 %
10 SYRINGE (ML) INJECTION AS NEEDED
Status: DISCONTINUED | OUTPATIENT
Start: 2024-07-19 | End: 2024-07-25 | Stop reason: HOSPADM

## 2024-07-19 RX ADMIN — HYDROMORPHONE HYDROCHLORIDE 1 MG: 1 INJECTION, SOLUTION INTRAMUSCULAR; INTRAVENOUS; SUBCUTANEOUS at 23:14

## 2024-07-20 ENCOUNTER — APPOINTMENT (OUTPATIENT)
Dept: CT IMAGING | Facility: HOSPITAL | Age: 74
DRG: 956 | End: 2024-07-20
Payer: MEDICARE

## 2024-07-20 ENCOUNTER — ANESTHESIA EVENT CONVERTED (OUTPATIENT)
Dept: ANESTHESIOLOGY | Facility: HOSPITAL | Age: 74
DRG: 956 | End: 2024-07-20
Payer: MEDICARE

## 2024-07-20 ENCOUNTER — APPOINTMENT (OUTPATIENT)
Dept: GENERAL RADIOLOGY | Facility: HOSPITAL | Age: 74
DRG: 956 | End: 2024-07-20
Payer: MEDICARE

## 2024-07-20 ENCOUNTER — ANESTHESIA EVENT (OUTPATIENT)
Dept: PERIOP | Facility: HOSPITAL | Age: 74
End: 2024-07-20
Payer: MEDICARE

## 2024-07-20 ENCOUNTER — ANESTHESIA (OUTPATIENT)
Dept: PERIOP | Facility: HOSPITAL | Age: 74
End: 2024-07-20
Payer: MEDICARE

## 2024-07-20 PROBLEM — S52.90XA RADIAL FRACTURE: Status: ACTIVE | Noted: 2024-07-20

## 2024-07-20 PROBLEM — S62.102B LEFT WRIST FRACTURE, OPEN, INITIAL ENCOUNTER: Status: ACTIVE | Noted: 2024-07-19

## 2024-07-20 PROBLEM — E11.9 T2DM (TYPE 2 DIABETES MELLITUS): Status: ACTIVE | Noted: 2024-07-20

## 2024-07-20 PROBLEM — S72.90XA FEMUR FRACTURE: Status: ACTIVE | Noted: 2024-07-20

## 2024-07-20 PROBLEM — I10 HTN (HYPERTENSION): Status: ACTIVE | Noted: 2024-07-20

## 2024-07-20 LAB
ABO GROUP BLD: NORMAL
ABO GROUP BLD: NORMAL
ALBUMIN SERPL-MCNC: 4.2 G/DL (ref 3.5–5.2)
ALBUMIN/GLOB SERPL: 1.4 G/DL
ALP SERPL-CCNC: 64 U/L (ref 39–117)
ALT SERPL W P-5'-P-CCNC: 26 U/L (ref 1–33)
ANION GAP SERPL CALCULATED.3IONS-SCNC: 12 MMOL/L (ref 5–15)
ANION GAP SERPL CALCULATED.3IONS-SCNC: 13 MMOL/L (ref 5–15)
AST SERPL-CCNC: 28 U/L (ref 1–32)
BASOPHILS # BLD AUTO: 0.04 10*3/MM3 (ref 0–0.2)
BASOPHILS NFR BLD AUTO: 0.3 % (ref 0–1.5)
BILIRUB SERPL-MCNC: 0.4 MG/DL (ref 0–1.2)
BLD GP AB SCN SERPL QL: NEGATIVE
BUN SERPL-MCNC: 17 MG/DL (ref 8–23)
BUN SERPL-MCNC: 18 MG/DL (ref 8–23)
BUN/CREAT SERPL: 23.3 (ref 7–25)
BUN/CREAT SERPL: 24.7 (ref 7–25)
CALCIUM SPEC-SCNC: 9 MG/DL (ref 8.6–10.5)
CALCIUM SPEC-SCNC: 9.4 MG/DL (ref 8.6–10.5)
CHLORIDE SERPL-SCNC: 100 MMOL/L (ref 98–107)
CHLORIDE SERPL-SCNC: 101 MMOL/L (ref 98–107)
CO2 SERPL-SCNC: 23 MMOL/L (ref 22–29)
CO2 SERPL-SCNC: 24 MMOL/L (ref 22–29)
CREAT SERPL-MCNC: 0.73 MG/DL (ref 0.57–1)
CREAT SERPL-MCNC: 0.73 MG/DL (ref 0.57–1)
DEPRECATED RDW RBC AUTO: 39.8 FL (ref 37–54)
EGFRCR SERPLBLD CKD-EPI 2021: 86.4 ML/MIN/1.73
EGFRCR SERPLBLD CKD-EPI 2021: 86.4 ML/MIN/1.73
EOSINOPHIL # BLD AUTO: 0.04 10*3/MM3 (ref 0–0.4)
EOSINOPHIL NFR BLD AUTO: 0.3 % (ref 0.3–6.2)
ERYTHROCYTE [DISTWIDTH] IN BLOOD BY AUTOMATED COUNT: 12.3 % (ref 12.3–15.4)
GLOBULIN UR ELPH-MCNC: 3 GM/DL
GLUCOSE BLDC GLUCOMTR-MCNC: 117 MG/DL (ref 70–130)
GLUCOSE BLDC GLUCOMTR-MCNC: 137 MG/DL (ref 70–130)
GLUCOSE BLDC GLUCOMTR-MCNC: 198 MG/DL (ref 70–130)
GLUCOSE BLDC GLUCOMTR-MCNC: 240 MG/DL (ref 70–130)
GLUCOSE SERPL-MCNC: 167 MG/DL (ref 65–99)
GLUCOSE SERPL-MCNC: 183 MG/DL (ref 65–99)
HBA1C MFR BLD: 6 % (ref 4.8–5.6)
HCT VFR BLD AUTO: 42.6 % (ref 34–46.6)
HGB BLD-MCNC: 14.5 G/DL (ref 12–15.9)
IMM GRANULOCYTES # BLD AUTO: 0.09 10*3/MM3 (ref 0–0.05)
IMM GRANULOCYTES NFR BLD AUTO: 0.6 % (ref 0–0.5)
LYMPHOCYTES # BLD AUTO: 1.16 10*3/MM3 (ref 0.7–3.1)
LYMPHOCYTES NFR BLD AUTO: 7.5 % (ref 19.6–45.3)
MCH RBC QN AUTO: 30 PG (ref 26.6–33)
MCHC RBC AUTO-ENTMCNC: 34 G/DL (ref 31.5–35.7)
MCV RBC AUTO: 88 FL (ref 79–97)
MONOCYTES # BLD AUTO: 0.83 10*3/MM3 (ref 0.1–0.9)
MONOCYTES NFR BLD AUTO: 5.4 % (ref 5–12)
NEUTROPHILS NFR BLD AUTO: 13.31 10*3/MM3 (ref 1.7–7)
NEUTROPHILS NFR BLD AUTO: 85.9 % (ref 42.7–76)
NRBC BLD AUTO-RTO: 0 /100 WBC (ref 0–0.2)
PLATELET # BLD AUTO: 269 10*3/MM3 (ref 140–450)
PMV BLD AUTO: 9.7 FL (ref 6–12)
POTASSIUM SERPL-SCNC: 3.9 MMOL/L (ref 3.5–5.2)
POTASSIUM SERPL-SCNC: 4.3 MMOL/L (ref 3.5–5.2)
PROT SERPL-MCNC: 7.2 G/DL (ref 6–8.5)
RBC # BLD AUTO: 4.84 10*6/MM3 (ref 3.77–5.28)
RH BLD: POSITIVE
RH BLD: POSITIVE
SODIUM SERPL-SCNC: 136 MMOL/L (ref 136–145)
SODIUM SERPL-SCNC: 137 MMOL/L (ref 136–145)
T&S EXPIRATION DATE: NORMAL
WBC NRBC COR # BLD AUTO: 15.47 10*3/MM3 (ref 3.4–10.8)

## 2024-07-20 PROCEDURE — 25010000002 HYDROMORPHONE 1 MG/ML SOLUTION: Performed by: FAMILY MEDICINE

## 2024-07-20 PROCEDURE — C1713 ANCHOR/SCREW BN/BN,TIS/BN: HCPCS | Performed by: ORTHOPAEDIC SURGERY

## 2024-07-20 PROCEDURE — 25010000002 ONDANSETRON PER 1 MG

## 2024-07-20 PROCEDURE — 25010000002 DROPERIDOL PER 5 MG

## 2024-07-20 PROCEDURE — 86900 BLOOD TYPING SEROLOGIC ABO: CPT

## 2024-07-20 PROCEDURE — 86901 BLOOD TYPING SEROLOGIC RH(D): CPT

## 2024-07-20 PROCEDURE — 0SRB049 REPLACEMENT OF LEFT HIP JOINT WITH CERAMIC ON POLYETHYLENE SYNTHETIC SUBSTITUTE, CEMENTED, OPEN APPROACH: ICD-10-PCS | Performed by: ORTHOPAEDIC SURGERY

## 2024-07-20 PROCEDURE — 25010000002 CEFAZOLIN PER 500 MG: Performed by: EMERGENCY MEDICINE

## 2024-07-20 PROCEDURE — 25810000003 SODIUM CHLORIDE 0.9 % SOLUTION: Performed by: FAMILY MEDICINE

## 2024-07-20 PROCEDURE — 25010000002 DEXAMETHASONE PER 1 MG

## 2024-07-20 PROCEDURE — 25010000002 BUPIVACAINE (PF) 0.25 % SOLUTION: Performed by: STUDENT IN AN ORGANIZED HEALTH CARE EDUCATION/TRAINING PROGRAM

## 2024-07-20 PROCEDURE — 73502 X-RAY EXAM HIP UNI 2-3 VIEWS: CPT

## 2024-07-20 PROCEDURE — 73200 CT UPPER EXTREMITY W/O DYE: CPT

## 2024-07-20 PROCEDURE — 25010000002 HYDROMORPHONE 1 MG/ML SOLUTION

## 2024-07-20 PROCEDURE — 76000 FLUOROSCOPY <1 HR PHYS/QHP: CPT

## 2024-07-20 PROCEDURE — 93010 ELECTROCARDIOGRAM REPORT: CPT | Performed by: INTERNAL MEDICINE

## 2024-07-20 PROCEDURE — 82948 REAGENT STRIP/BLOOD GLUCOSE: CPT

## 2024-07-20 PROCEDURE — 25010000002 PROPOFOL 10 MG/ML EMULSION

## 2024-07-20 PROCEDURE — 25010000002 HYDROMORPHONE PER 4 MG: Performed by: FAMILY MEDICINE

## 2024-07-20 PROCEDURE — 99222 1ST HOSP IP/OBS MODERATE 55: CPT | Performed by: FAMILY MEDICINE

## 2024-07-20 PROCEDURE — C1776 JOINT DEVICE (IMPLANTABLE): HCPCS | Performed by: ORTHOPAEDIC SURGERY

## 2024-07-20 PROCEDURE — 25010000002 FENTANYL CITRATE (PF) 50 MCG/ML SOLUTION

## 2024-07-20 PROCEDURE — 25010000002 PROPOFOL 10 MG/ML EMULSION: Performed by: EMERGENCY MEDICINE

## 2024-07-20 PROCEDURE — 80053 COMPREHEN METABOLIC PANEL: CPT | Performed by: EMERGENCY MEDICINE

## 2024-07-20 PROCEDURE — 72192 CT PELVIS W/O DYE: CPT

## 2024-07-20 PROCEDURE — 25010000002 ROPIVACAINE HCL-NACL 0.2-0.9 % SOLUTION

## 2024-07-20 PROCEDURE — 25010000002 HYDROMORPHONE 1 MG/ML SOLUTION: Performed by: EMERGENCY MEDICINE

## 2024-07-20 PROCEDURE — 83036 HEMOGLOBIN GLYCOSYLATED A1C: CPT | Performed by: NURSE PRACTITIONER

## 2024-07-20 PROCEDURE — 86850 RBC ANTIBODY SCREEN: CPT | Performed by: FAMILY MEDICINE

## 2024-07-20 PROCEDURE — 25609 OPTX DST RD XART FX/EP SEP3+: CPT | Performed by: PHYSICIAN ASSISTANT

## 2024-07-20 PROCEDURE — 93005 ELECTROCARDIOGRAM TRACING: CPT | Performed by: NURSE PRACTITIONER

## 2024-07-20 PROCEDURE — 25010000002 VANCOMYCIN 1 G RECONSTITUTED SOLUTION: Performed by: ORTHOPAEDIC SURGERY

## 2024-07-20 PROCEDURE — 86900 BLOOD TYPING SEROLOGIC ABO: CPT | Performed by: FAMILY MEDICINE

## 2024-07-20 PROCEDURE — 63710000001 INSULIN REGULAR HUMAN PER 5 UNITS: Performed by: ORTHOPAEDIC SURGERY

## 2024-07-20 PROCEDURE — 0PSJ04Z REPOSITION LEFT RADIUS WITH INTERNAL FIXATION DEVICE, OPEN APPROACH: ICD-10-PCS | Performed by: ORTHOPAEDIC SURGERY

## 2024-07-20 PROCEDURE — 25010000002 CEFAZOLIN PER 500 MG: Performed by: FAMILY MEDICINE

## 2024-07-20 PROCEDURE — 25010000002 FENTANYL CITRATE (PF) 100 MCG/2ML SOLUTION

## 2024-07-20 PROCEDURE — 85025 COMPLETE CBC W/AUTO DIFF WBC: CPT | Performed by: EMERGENCY MEDICINE

## 2024-07-20 PROCEDURE — 25010000002 SUGAMMADEX 200 MG/2ML SOLUTION

## 2024-07-20 PROCEDURE — 86901 BLOOD TYPING SEROLOGIC RH(D): CPT | Performed by: FAMILY MEDICINE

## 2024-07-20 PROCEDURE — 25010000002 CEFAZOLIN PER 500 MG

## 2024-07-20 PROCEDURE — 99222 1ST HOSP IP/OBS MODERATE 55: CPT | Performed by: ORTHOPAEDIC SURGERY

## 2024-07-20 PROCEDURE — 63710000001 INSULIN LISPRO (HUMAN) PER 5 UNITS: Performed by: ORTHOPAEDIC SURGERY

## 2024-07-20 PROCEDURE — 25010000002 CEFAZOLIN PER 500 MG: Performed by: ORTHOPAEDIC SURGERY

## 2024-07-20 PROCEDURE — 25810000003 LACTATED RINGERS PER 1000 ML

## 2024-07-20 PROCEDURE — 25609 OPTX DST RD XART FX/EP SEP3+: CPT | Performed by: ORTHOPAEDIC SURGERY

## 2024-07-20 DEVICE — IMPLANTABLE DEVICE: Type: IMPLANTABLE DEVICE | Site: WRIST | Status: FUNCTIONAL

## 2024-07-20 DEVICE — SCRW GEMINUS PA NL TI 3.5X12MM: Type: IMPLANTABLE DEVICE | Site: WRIST | Status: FUNCTIONAL

## 2024-07-20 DEVICE — IMPLANTABLE DEVICE: Type: IMPLANTABLE DEVICE | Site: HIP | Status: FUNCTIONAL

## 2024-07-20 DEVICE — DEV CONTRL TISS STRATAFIX SYMM PDS PLUS VIL CT-1 45CM: Type: IMPLANTABLE DEVICE | Site: HIP | Status: FUNCTIONAL

## 2024-07-20 DEVICE — PLT DIST/RAD GEMINUS VOLR STD 3HL LT: Type: IMPLANTABLE DEVICE | Site: WRIST | Status: FUNCTIONAL

## 2024-07-20 DEVICE — SHLL TRIDENT2 TRITANIUM C/HL 52MM: Type: IMPLANTABLE DEVICE | Site: HIP | Status: FUNCTIONAL

## 2024-07-20 DEVICE — SCRW HEX LP TRIDENT2 6.5X35MM: Type: IMPLANTABLE DEVICE | Site: HIP | Status: FUNCTIONAL

## 2024-07-20 DEVICE — PEG GEMINUS THRD LK TI 2.3X20MM: Type: IMPLANTABLE DEVICE | Site: WRIST | Status: FUNCTIONAL

## 2024-07-20 DEVICE — PLUG BONE IM FEM/HIP EXETERV40 12MM: Type: IMPLANTABLE DEVICE | Site: HIP | Status: FUNCTIONAL

## 2024-07-20 DEVICE — HD FEM/HIP BIOLOX/DELTA V40 CERAM 36MM MIN2.5: Type: IMPLANTABLE DEVICE | Site: HIP | Status: FUNCTIONAL

## 2024-07-20 DEVICE — SCRW GEMINUS PA NL TI 3.5X11MM: Type: IMPLANTABLE DEVICE | Site: WRIST | Status: FUNCTIONAL

## 2024-07-20 DEVICE — SUT NONABS MAXBRAID/PE NMBR2 HC5 38IN WHT 900333: Type: IMPLANTABLE DEVICE | Site: HIP | Status: FUNCTIONAL

## 2024-07-20 DEVICE — INSRT HIP TRIDENT X3 0DEG 36MM SZE STRL: Type: IMPLANTABLE DEVICE | Site: HIP | Status: FUNCTIONAL

## 2024-07-20 DEVICE — SCRW GEMINUS PA NL 3TI .5X13MM: Type: IMPLANTABLE DEVICE | Site: WRIST | Status: FUNCTIONAL

## 2024-07-20 RX ORDER — BUPIVACAINE HYDROCHLORIDE 2.5 MG/ML
INJECTION, SOLUTION EPIDURAL; INFILTRATION; INTRACAUDAL
Status: COMPLETED | OUTPATIENT
Start: 2024-07-20 | End: 2024-07-20

## 2024-07-20 RX ORDER — NICOTINE POLACRILEX 4 MG
15 LOZENGE BUCCAL
Status: DISCONTINUED | OUTPATIENT
Start: 2024-07-20 | End: 2024-07-20

## 2024-07-20 RX ORDER — CETIRIZINE HYDROCHLORIDE 5 MG/1
5 TABLET ORAL DAILY
COMMUNITY

## 2024-07-20 RX ORDER — SODIUM CHLORIDE 0.9 % (FLUSH) 0.9 %
3-10 SYRINGE (ML) INJECTION AS NEEDED
Status: DISCONTINUED | OUTPATIENT
Start: 2024-07-20 | End: 2024-07-20 | Stop reason: HOSPADM

## 2024-07-20 RX ORDER — SODIUM CHLORIDE 9 MG/ML
40 INJECTION, SOLUTION INTRAVENOUS AS NEEDED
Status: DISCONTINUED | OUTPATIENT
Start: 2024-07-20 | End: 2024-07-20 | Stop reason: HOSPADM

## 2024-07-20 RX ORDER — NALOXONE HCL 0.4 MG/ML
0.4 VIAL (ML) INJECTION AS NEEDED
Status: DISCONTINUED | OUTPATIENT
Start: 2024-07-20 | End: 2024-07-20 | Stop reason: HOSPADM

## 2024-07-20 RX ORDER — AMLODIPINE BESYLATE 10 MG/1
10 TABLET ORAL DAILY
Status: DISCONTINUED | OUTPATIENT
Start: 2024-07-20 | End: 2024-07-25 | Stop reason: HOSPADM

## 2024-07-20 RX ORDER — SODIUM CHLORIDE 0.9 % (FLUSH) 0.9 %
3 SYRINGE (ML) INJECTION EVERY 12 HOURS SCHEDULED
Status: DISCONTINUED | OUTPATIENT
Start: 2024-07-20 | End: 2024-07-20 | Stop reason: HOSPADM

## 2024-07-20 RX ORDER — PHENYLEPHRINE HCL IN 0.9% NACL 1 MG/10 ML
SYRINGE (ML) INTRAVENOUS AS NEEDED
Status: DISCONTINUED | OUTPATIENT
Start: 2024-07-20 | End: 2024-07-20 | Stop reason: SURG

## 2024-07-20 RX ORDER — HYDROCODONE BITARTRATE AND ACETAMINOPHEN 7.5; 325 MG/1; MG/1
1 TABLET ORAL EVERY 6 HOURS PRN
Status: DISCONTINUED | OUTPATIENT
Start: 2024-07-20 | End: 2024-07-24

## 2024-07-20 RX ORDER — IBUPROFEN 600 MG/1
1 TABLET ORAL
Status: DISCONTINUED | OUTPATIENT
Start: 2024-07-20 | End: 2024-07-25 | Stop reason: HOSPADM

## 2024-07-20 RX ORDER — IBUPROFEN 600 MG/1
1 TABLET ORAL
Status: DISCONTINUED | OUTPATIENT
Start: 2024-07-20 | End: 2024-07-20

## 2024-07-20 RX ORDER — FENTANYL CITRATE 50 UG/ML
INJECTION, SOLUTION INTRAMUSCULAR; INTRAVENOUS
Status: COMPLETED | OUTPATIENT
Start: 2024-07-20 | End: 2024-07-20

## 2024-07-20 RX ORDER — FENTANYL CITRATE 50 UG/ML
INJECTION, SOLUTION INTRAMUSCULAR; INTRAVENOUS
Status: DISPENSED
Start: 2024-07-20 | End: 2024-07-21

## 2024-07-20 RX ORDER — ASPIRIN 81 MG/1
81 TABLET ORAL EVERY 12 HOURS SCHEDULED
Status: DISCONTINUED | OUTPATIENT
Start: 2024-07-21 | End: 2024-07-25 | Stop reason: HOSPADM

## 2024-07-20 RX ORDER — ONDANSETRON 2 MG/ML
INJECTION INTRAMUSCULAR; INTRAVENOUS AS NEEDED
Status: DISCONTINUED | OUTPATIENT
Start: 2024-07-20 | End: 2024-07-20 | Stop reason: SURG

## 2024-07-20 RX ORDER — SODIUM CHLORIDE 9 MG/ML
40 INJECTION, SOLUTION INTRAVENOUS AS NEEDED
Status: DISCONTINUED | OUTPATIENT
Start: 2024-07-20 | End: 2024-07-25 | Stop reason: HOSPADM

## 2024-07-20 RX ORDER — LOSARTAN POTASSIUM 50 MG/1
50 TABLET ORAL
Status: CANCELLED | OUTPATIENT
Start: 2024-07-20

## 2024-07-20 RX ORDER — ROCURONIUM BROMIDE 10 MG/ML
INJECTION, SOLUTION INTRAVENOUS AS NEEDED
Status: DISCONTINUED | OUTPATIENT
Start: 2024-07-20 | End: 2024-07-20 | Stop reason: SURG

## 2024-07-20 RX ORDER — DEXTROSE MONOHYDRATE 25 G/50ML
25 INJECTION, SOLUTION INTRAVENOUS
Status: DISCONTINUED | OUTPATIENT
Start: 2024-07-20 | End: 2024-07-25 | Stop reason: HOSPADM

## 2024-07-20 RX ORDER — NITROGLYCERIN 0.4 MG/1
0.4 TABLET SUBLINGUAL
Status: DISCONTINUED | OUTPATIENT
Start: 2024-07-20 | End: 2024-07-25 | Stop reason: HOSPADM

## 2024-07-20 RX ORDER — FENTANYL CITRATE 50 UG/ML
50 INJECTION, SOLUTION INTRAMUSCULAR; INTRAVENOUS
Status: DISCONTINUED | OUTPATIENT
Start: 2024-07-20 | End: 2024-07-20 | Stop reason: HOSPADM

## 2024-07-20 RX ORDER — SODIUM CHLORIDE 0.9 % (FLUSH) 0.9 %
10 SYRINGE (ML) INJECTION EVERY 12 HOURS SCHEDULED
Status: DISCONTINUED | OUTPATIENT
Start: 2024-07-20 | End: 2024-07-25 | Stop reason: HOSPADM

## 2024-07-20 RX ORDER — LEVOTHYROXINE SODIUM 0.07 MG/1
75 TABLET ORAL
Status: DISCONTINUED | OUTPATIENT
Start: 2024-07-20 | End: 2024-07-25 | Stop reason: HOSPADM

## 2024-07-20 RX ORDER — HYDRALAZINE HYDROCHLORIDE 20 MG/ML
5 INJECTION INTRAMUSCULAR; INTRAVENOUS
Status: DISCONTINUED | OUTPATIENT
Start: 2024-07-20 | End: 2024-07-20 | Stop reason: HOSPADM

## 2024-07-20 RX ORDER — CEFAZOLIN SODIUM 1 G/3ML
INJECTION, POWDER, FOR SOLUTION INTRAMUSCULAR; INTRAVENOUS AS NEEDED
Status: DISCONTINUED | OUTPATIENT
Start: 2024-07-20 | End: 2024-07-20 | Stop reason: SURG

## 2024-07-20 RX ORDER — BISACODYL 10 MG
10 SUPPOSITORY, RECTAL RECTAL DAILY PRN
Status: DISCONTINUED | OUTPATIENT
Start: 2024-07-20 | End: 2024-07-25 | Stop reason: HOSPADM

## 2024-07-20 RX ORDER — TRANEXAMIC ACID 10 MG/ML
1000 INJECTION, SOLUTION INTRAVENOUS
Qty: 100 ML | Refills: 0 | Status: DISCONTINUED | OUTPATIENT
Start: 2024-07-20 | End: 2024-07-20 | Stop reason: HOSPADM

## 2024-07-20 RX ORDER — TIMOLOL MALEATE 5 MG/ML
1 SOLUTION/ DROPS OPHTHALMIC 2 TIMES DAILY
Status: DISCONTINUED | OUTPATIENT
Start: 2024-07-20 | End: 2024-07-25 | Stop reason: HOSPADM

## 2024-07-20 RX ORDER — LABETALOL HYDROCHLORIDE 5 MG/ML
5 INJECTION, SOLUTION INTRAVENOUS
Status: DISCONTINUED | OUTPATIENT
Start: 2024-07-20 | End: 2024-07-20 | Stop reason: HOSPADM

## 2024-07-20 RX ORDER — SODIUM CHLORIDE AND POTASSIUM CHLORIDE 150; 900 MG/100ML; MG/100ML
50 INJECTION, SOLUTION INTRAVENOUS CONTINUOUS
Status: DISCONTINUED | OUTPATIENT
Start: 2024-07-20 | End: 2024-07-25 | Stop reason: HOSPADM

## 2024-07-20 RX ORDER — PROPOFOL 10 MG/ML
VIAL (ML) INTRAVENOUS AS NEEDED
Status: DISCONTINUED | OUTPATIENT
Start: 2024-07-20 | End: 2024-07-20 | Stop reason: SURG

## 2024-07-20 RX ORDER — BUPIVACAINE HYDROCHLORIDE 2.5 MG/ML
INJECTION, SOLUTION EPIDURAL; INFILTRATION; INTRACAUDAL
Status: COMPLETED
Start: 2024-07-20 | End: 2024-07-20

## 2024-07-20 RX ORDER — SODIUM CHLORIDE 9 MG/ML
100 INJECTION, SOLUTION INTRAVENOUS CONTINUOUS
Status: ACTIVE | OUTPATIENT
Start: 2024-07-20 | End: 2024-07-21

## 2024-07-20 RX ORDER — DOCUSATE SODIUM 100 MG/1
100 CAPSULE, LIQUID FILLED ORAL 2 TIMES DAILY PRN
Status: DISCONTINUED | OUTPATIENT
Start: 2024-07-20 | End: 2024-07-25 | Stop reason: HOSPADM

## 2024-07-20 RX ORDER — TRANEXAMIC ACID 10 MG/ML
1000 INJECTION, SOLUTION INTRAVENOUS
Qty: 100 ML | Refills: 0 | Status: COMPLETED | OUTPATIENT
Start: 2024-07-20 | End: 2024-07-20

## 2024-07-20 RX ORDER — PRAVASTATIN SODIUM 40 MG
40 TABLET ORAL EVERY EVENING
Status: DISCONTINUED | OUTPATIENT
Start: 2024-07-20 | End: 2024-07-25 | Stop reason: HOSPADM

## 2024-07-20 RX ORDER — SODIUM CHLORIDE 0.9 % (FLUSH) 0.9 %
10 SYRINGE (ML) INJECTION AS NEEDED
Status: DISCONTINUED | OUTPATIENT
Start: 2024-07-20 | End: 2024-07-25 | Stop reason: HOSPADM

## 2024-07-20 RX ORDER — LIDOCAINE HYDROCHLORIDE 10 MG/ML
INJECTION, SOLUTION EPIDURAL; INFILTRATION; INTRACAUDAL; PERINEURAL AS NEEDED
Status: DISCONTINUED | OUTPATIENT
Start: 2024-07-20 | End: 2024-07-20 | Stop reason: SURG

## 2024-07-20 RX ORDER — IPRATROPIUM BROMIDE AND ALBUTEROL SULFATE 2.5; .5 MG/3ML; MG/3ML
3 SOLUTION RESPIRATORY (INHALATION) ONCE AS NEEDED
Status: DISCONTINUED | OUTPATIENT
Start: 2024-07-20 | End: 2024-07-20 | Stop reason: HOSPADM

## 2024-07-20 RX ORDER — LOSARTAN POTASSIUM 50 MG/1
50 TABLET ORAL
Status: DISCONTINUED | OUTPATIENT
Start: 2024-07-21 | End: 2024-07-25 | Stop reason: HOSPADM

## 2024-07-20 RX ORDER — PANTOPRAZOLE SODIUM 40 MG/1
40 TABLET, DELAYED RELEASE ORAL DAILY
Status: DISCONTINUED | OUTPATIENT
Start: 2024-07-20 | End: 2024-07-25 | Stop reason: HOSPADM

## 2024-07-20 RX ORDER — DROPERIDOL 2.5 MG/ML
0.62 INJECTION, SOLUTION INTRAMUSCULAR; INTRAVENOUS
Status: DISCONTINUED | OUTPATIENT
Start: 2024-07-20 | End: 2024-07-20 | Stop reason: HOSPADM

## 2024-07-20 RX ORDER — DROPERIDOL 2.5 MG/ML
0.62 INJECTION, SOLUTION INTRAMUSCULAR; INTRAVENOUS ONCE AS NEEDED
Status: DISCONTINUED | OUTPATIENT
Start: 2024-07-20 | End: 2024-07-20 | Stop reason: HOSPADM

## 2024-07-20 RX ORDER — HYDROMORPHONE HYDROCHLORIDE 1 MG/ML
0.5 INJECTION, SOLUTION INTRAMUSCULAR; INTRAVENOUS; SUBCUTANEOUS
Status: DISCONTINUED | OUTPATIENT
Start: 2024-07-20 | End: 2024-07-24

## 2024-07-20 RX ORDER — ONDANSETRON 2 MG/ML
4 INJECTION INTRAMUSCULAR; INTRAVENOUS EVERY 6 HOURS PRN
Status: DISCONTINUED | OUTPATIENT
Start: 2024-07-20 | End: 2024-07-25 | Stop reason: HOSPADM

## 2024-07-20 RX ORDER — MORPHINE SULFATE 2 MG/ML
1 INJECTION, SOLUTION INTRAMUSCULAR; INTRAVENOUS
Status: DISCONTINUED | OUTPATIENT
Start: 2024-07-20 | End: 2024-07-25 | Stop reason: HOSPADM

## 2024-07-20 RX ORDER — FENTANYL CITRATE 50 UG/ML
INJECTION, SOLUTION INTRAMUSCULAR; INTRAVENOUS AS NEEDED
Status: DISCONTINUED | OUTPATIENT
Start: 2024-07-20 | End: 2024-07-20 | Stop reason: SURG

## 2024-07-20 RX ORDER — EPHEDRINE SULFATE 50 MG/ML
INJECTION INTRAVENOUS AS NEEDED
Status: DISCONTINUED | OUTPATIENT
Start: 2024-07-20 | End: 2024-07-20 | Stop reason: SURG

## 2024-07-20 RX ORDER — ONDANSETRON 4 MG/1
4 TABLET, ORALLY DISINTEGRATING ORAL EVERY 6 HOURS PRN
Status: DISCONTINUED | OUTPATIENT
Start: 2024-07-20 | End: 2024-07-25 | Stop reason: HOSPADM

## 2024-07-20 RX ORDER — LIDOCAINE HYDROCHLORIDE 10 MG/ML
INJECTION, SOLUTION EPIDURAL; INFILTRATION; INTRACAUDAL; PERINEURAL
Status: DISPENSED
Start: 2024-07-20 | End: 2024-07-20

## 2024-07-20 RX ORDER — TRANEXAMIC ACID 10 MG/ML
INJECTION, SOLUTION INTRAVENOUS AS NEEDED
Status: DISCONTINUED | OUTPATIENT
Start: 2024-07-20 | End: 2024-07-20 | Stop reason: SURG

## 2024-07-20 RX ORDER — PANTOPRAZOLE SODIUM 40 MG/10ML
INJECTION, POWDER, LYOPHILIZED, FOR SOLUTION INTRAVENOUS
Status: DISPENSED
Start: 2024-07-20 | End: 2024-07-20

## 2024-07-20 RX ORDER — ONDANSETRON 4 MG/1
4 TABLET, ORALLY DISINTEGRATING ORAL EVERY 6 HOURS PRN
Status: DISCONTINUED | OUTPATIENT
Start: 2024-07-20 | End: 2024-07-20

## 2024-07-20 RX ORDER — SODIUM CHLORIDE, SODIUM LACTATE, POTASSIUM CHLORIDE, CALCIUM CHLORIDE 600; 310; 30; 20 MG/100ML; MG/100ML; MG/100ML; MG/100ML
INJECTION, SOLUTION INTRAVENOUS CONTINUOUS PRN
Status: DISCONTINUED | OUTPATIENT
Start: 2024-07-20 | End: 2024-07-20 | Stop reason: SURG

## 2024-07-20 RX ORDER — SODIUM CHLORIDE 0.9 % (FLUSH) 0.9 %
3 SYRINGE (ML) INJECTION EVERY 12 HOURS SCHEDULED
Status: DISCONTINUED | OUTPATIENT
Start: 2024-07-20 | End: 2024-07-25 | Stop reason: HOSPADM

## 2024-07-20 RX ORDER — ONDANSETRON 2 MG/ML
4 INJECTION INTRAMUSCULAR; INTRAVENOUS ONCE AS NEEDED
Status: DISCONTINUED | OUTPATIENT
Start: 2024-07-20 | End: 2024-07-20 | Stop reason: HOSPADM

## 2024-07-20 RX ORDER — VANCOMYCIN HYDROCHLORIDE 1 G/20ML
INJECTION, POWDER, LYOPHILIZED, FOR SOLUTION INTRAVENOUS AS NEEDED
Status: DISCONTINUED | OUTPATIENT
Start: 2024-07-20 | End: 2024-07-20 | Stop reason: HOSPADM

## 2024-07-20 RX ORDER — NICOTINE POLACRILEX 4 MG
15 LOZENGE BUCCAL
Status: DISCONTINUED | OUTPATIENT
Start: 2024-07-20 | End: 2024-07-25 | Stop reason: HOSPADM

## 2024-07-20 RX ORDER — FENTANYL CITRATE 50 UG/ML
INJECTION, SOLUTION INTRAMUSCULAR; INTRAVENOUS
Status: COMPLETED
Start: 2024-07-20 | End: 2024-07-20

## 2024-07-20 RX ORDER — POLYETHYLENE GLYCOL 3350 17 G/17G
17 POWDER, FOR SOLUTION ORAL DAILY PRN
Status: DISCONTINUED | OUTPATIENT
Start: 2024-07-20 | End: 2024-07-25 | Stop reason: HOSPADM

## 2024-07-20 RX ORDER — ONDANSETRON 2 MG/ML
4 INJECTION INTRAMUSCULAR; INTRAVENOUS EVERY 6 HOURS PRN
Status: DISCONTINUED | OUTPATIENT
Start: 2024-07-20 | End: 2024-07-20

## 2024-07-20 RX ORDER — DEXAMETHASONE SODIUM PHOSPHATE 4 MG/ML
INJECTION, SOLUTION INTRA-ARTICULAR; INTRALESIONAL; INTRAMUSCULAR; INTRAVENOUS; SOFT TISSUE AS NEEDED
Status: DISCONTINUED | OUTPATIENT
Start: 2024-07-20 | End: 2024-07-20 | Stop reason: SURG

## 2024-07-20 RX ORDER — ROPIVACAINE HYDROCHLORIDE 2 MG/ML
INJECTION, SOLUTION EPIDURAL; INFILTRATION; PERINEURAL CONTINUOUS
Status: DISCONTINUED | OUTPATIENT
Start: 2024-07-20 | End: 2024-07-25 | Stop reason: HOSPADM

## 2024-07-20 RX ORDER — DEXTROSE MONOHYDRATE 25 G/50ML
25 INJECTION, SOLUTION INTRAVENOUS
Status: DISCONTINUED | OUTPATIENT
Start: 2024-07-20 | End: 2024-07-20

## 2024-07-20 RX ORDER — SODIUM CHLORIDE 9 MG/ML
100 INJECTION, SOLUTION INTRAVENOUS CONTINUOUS
Status: DISCONTINUED | OUTPATIENT
Start: 2024-07-20 | End: 2024-07-20

## 2024-07-20 RX ORDER — HYDROMORPHONE HYDROCHLORIDE 1 MG/ML
0.5 INJECTION, SOLUTION INTRAMUSCULAR; INTRAVENOUS; SUBCUTANEOUS
Status: DISCONTINUED | OUTPATIENT
Start: 2024-07-20 | End: 2024-07-20 | Stop reason: HOSPADM

## 2024-07-20 RX ORDER — AMOXICILLIN 250 MG
2 CAPSULE ORAL 2 TIMES DAILY PRN
Status: DISCONTINUED | OUTPATIENT
Start: 2024-07-20 | End: 2024-07-25 | Stop reason: HOSPADM

## 2024-07-20 RX ORDER — HEPARIN SODIUM 5000 [USP'U]/ML
5000 INJECTION, SOLUTION INTRAVENOUS; SUBCUTANEOUS EVERY 8 HOURS SCHEDULED
Status: DISCONTINUED | OUTPATIENT
Start: 2024-07-20 | End: 2024-07-20

## 2024-07-20 RX ORDER — SODIUM CHLORIDE, SODIUM LACTATE, POTASSIUM CHLORIDE, AND CALCIUM CHLORIDE .6; .31; .03; .02 G/100ML; G/100ML; G/100ML; G/100ML
9 INJECTION, SOLUTION INTRAVENOUS CONTINUOUS
Status: DISCONTINUED | OUTPATIENT
Start: 2024-07-20 | End: 2024-07-20

## 2024-07-20 RX ORDER — INSULIN LISPRO 100 [IU]/ML
2-7 INJECTION, SOLUTION INTRAVENOUS; SUBCUTANEOUS
Status: DISCONTINUED | OUTPATIENT
Start: 2024-07-20 | End: 2024-07-25 | Stop reason: HOSPADM

## 2024-07-20 RX ORDER — SODIUM CHLORIDE 0.9 % (FLUSH) 0.9 %
3-10 SYRINGE (ML) INJECTION AS NEEDED
Status: DISCONTINUED | OUTPATIENT
Start: 2024-07-20 | End: 2024-07-25 | Stop reason: HOSPADM

## 2024-07-20 RX ORDER — LATANOPROST 50 UG/ML
1 SOLUTION/ DROPS OPHTHALMIC DAILY
Status: DISCONTINUED | OUTPATIENT
Start: 2024-07-20 | End: 2024-07-25 | Stop reason: HOSPADM

## 2024-07-20 RX ORDER — CETIRIZINE HYDROCHLORIDE 10 MG/1
5 TABLET ORAL DAILY
Status: DISCONTINUED | OUTPATIENT
Start: 2024-07-20 | End: 2024-07-25 | Stop reason: HOSPADM

## 2024-07-20 RX ORDER — NALOXONE HCL 0.4 MG/ML
0.4 VIAL (ML) INJECTION
Status: DISCONTINUED | OUTPATIENT
Start: 2024-07-20 | End: 2024-07-25 | Stop reason: HOSPADM

## 2024-07-20 RX ORDER — DROPERIDOL 2.5 MG/ML
INJECTION, SOLUTION INTRAMUSCULAR; INTRAVENOUS
Status: COMPLETED
Start: 2024-07-20 | End: 2024-07-20

## 2024-07-20 RX ORDER — PANTOPRAZOLE SODIUM 40 MG/10ML
40 INJECTION, POWDER, LYOPHILIZED, FOR SOLUTION INTRAVENOUS ONCE
Status: DISCONTINUED | OUTPATIENT
Start: 2024-07-20 | End: 2024-07-20 | Stop reason: HOSPADM

## 2024-07-20 RX ORDER — LOSARTAN POTASSIUM 50 MG/1
100 TABLET ORAL
Status: DISCONTINUED | OUTPATIENT
Start: 2024-07-20 | End: 2024-07-20

## 2024-07-20 RX ORDER — BISACODYL 5 MG/1
5 TABLET, DELAYED RELEASE ORAL DAILY PRN
Status: DISCONTINUED | OUTPATIENT
Start: 2024-07-20 | End: 2024-07-25 | Stop reason: HOSPADM

## 2024-07-20 RX ORDER — MAGNESIUM HYDROXIDE 1200 MG/15ML
LIQUID ORAL AS NEEDED
Status: DISCONTINUED | OUTPATIENT
Start: 2024-07-20 | End: 2024-07-20 | Stop reason: HOSPADM

## 2024-07-20 RX ORDER — PROPOFOL 10 MG/ML
200 VIAL (ML) INTRAVENOUS ONCE
Status: COMPLETED | OUTPATIENT
Start: 2024-07-20 | End: 2024-07-20

## 2024-07-20 RX ADMIN — HYDROMORPHONE HYDROCHLORIDE 0.5 MG: 1 INJECTION, SOLUTION INTRAMUSCULAR; INTRAVENOUS; SUBCUTANEOUS at 16:43

## 2024-07-20 RX ADMIN — Medication 100 MCG: at 15:04

## 2024-07-20 RX ADMIN — CEFAZOLIN 1 G: 1 INJECTION, POWDER, FOR SOLUTION INTRAMUSCULAR; INTRAVENOUS at 14:25

## 2024-07-20 RX ADMIN — ONDANSETRON 4 MG: 2 INJECTION INTRAMUSCULAR; INTRAVENOUS at 15:19

## 2024-07-20 RX ADMIN — BUPIVACAINE HYDROCHLORIDE 30 ML: 2.5 INJECTION, SOLUTION EPIDURAL; INFILTRATION; INTRACAUDAL; PERINEURAL at 11:20

## 2024-07-20 RX ADMIN — PROPOFOL 150 MG: 10 INJECTION, EMULSION INTRAVENOUS at 11:44

## 2024-07-20 RX ADMIN — SUGAMMADEX 200 MG: 100 INJECTION, SOLUTION INTRAVENOUS at 15:43

## 2024-07-20 RX ADMIN — Medication 100 MCG: at 11:50

## 2024-07-20 RX ADMIN — SODIUM CHLORIDE 100 ML/HR: 9 INJECTION, SOLUTION INTRAVENOUS at 03:15

## 2024-07-20 RX ADMIN — BUPIVACAINE HYDROCHLORIDE 30 ML: 2.5 INJECTION, SOLUTION EPIDURAL; INFILTRATION; INTRACAUDAL at 11:15

## 2024-07-20 RX ADMIN — INSULIN LISPRO 2 UNITS: 100 INJECTION, SOLUTION INTRAVENOUS; SUBCUTANEOUS at 21:25

## 2024-07-20 RX ADMIN — INSULIN HUMAN 3 UNITS: 100 INJECTION, SOLUTION PARENTERAL at 17:59

## 2024-07-20 RX ADMIN — PROPOFOL 30 MG: 10 INJECTION, EMULSION INTRAVENOUS at 15:59

## 2024-07-20 RX ADMIN — AMLODIPINE BESYLATE 10 MG: 10 TABLET ORAL at 09:37

## 2024-07-20 RX ADMIN — FENTANYL CITRATE 50 MCG: 50 INJECTION, SOLUTION INTRAMUSCULAR; INTRAVENOUS at 16:55

## 2024-07-20 RX ADMIN — EPHEDRINE SULFATE 10 MG: 50 INJECTION INTRAVENOUS at 12:03

## 2024-07-20 RX ADMIN — ROCURONIUM BROMIDE 30 MG: 10 INJECTION INTRAVENOUS at 13:31

## 2024-07-20 RX ADMIN — SODIUM CHLORIDE, POTASSIUM CHLORIDE, SODIUM LACTATE AND CALCIUM CHLORIDE: 600; 310; 30; 20 INJECTION, SOLUTION INTRAVENOUS at 15:49

## 2024-07-20 RX ADMIN — SODIUM CHLORIDE, POTASSIUM CHLORIDE, SODIUM LACTATE AND CALCIUM CHLORIDE: 600; 310; 30; 20 INJECTION, SOLUTION INTRAVENOUS at 11:39

## 2024-07-20 RX ADMIN — CEFAZOLIN 1 G: 1 INJECTION, POWDER, FOR SOLUTION INTRAMUSCULAR; INTRAVENOUS at 11:49

## 2024-07-20 RX ADMIN — SODIUM CHLORIDE 2 G: 900 INJECTION INTRAVENOUS at 17:58

## 2024-07-20 RX ADMIN — Medication 100 MCG: at 15:21

## 2024-07-20 RX ADMIN — ROCURONIUM BROMIDE 70 MG: 10 INJECTION INTRAVENOUS at 11:45

## 2024-07-20 RX ADMIN — Medication 100 MCG: at 14:00

## 2024-07-20 RX ADMIN — DROPERIDOL 0.62 MG: 2.5 INJECTION, SOLUTION INTRAMUSCULAR; INTRAVENOUS at 16:56

## 2024-07-20 RX ADMIN — ROCURONIUM BROMIDE 50 MG: 10 INJECTION INTRAVENOUS at 14:00

## 2024-07-20 RX ADMIN — Medication 1000 MG: at 16:30

## 2024-07-20 RX ADMIN — FENTANYL CITRATE 100 MCG: 50 INJECTION, SOLUTION INTRAMUSCULAR; INTRAVENOUS at 11:15

## 2024-07-20 RX ADMIN — HYDROMORPHONE HYDROCHLORIDE 1 MG: 1 INJECTION, SOLUTION INTRAMUSCULAR; INTRAVENOUS; SUBCUTANEOUS at 02:14

## 2024-07-20 RX ADMIN — TRANEXAMIC ACID 1000 MG: 10 INJECTION, SOLUTION INTRAVENOUS at 11:53

## 2024-07-20 RX ADMIN — Medication 150 MCG: at 16:00

## 2024-07-20 RX ADMIN — Medication 150 MCG: at 11:44

## 2024-07-20 RX ADMIN — DEXAMETHASONE SODIUM PHOSPHATE 4 MG: 4 INJECTION INTRA-ARTICULAR; INTRALESIONAL; INTRAMUSCULAR; INTRAVENOUS; SOFT TISSUE at 11:52

## 2024-07-20 RX ADMIN — PROPOFOL 200 MG: 10 INJECTION, EMULSION INTRAVENOUS at 01:37

## 2024-07-20 RX ADMIN — TRANEXAMIC ACID 1000 MG: 10 INJECTION, SOLUTION INTRAVENOUS at 15:16

## 2024-07-20 RX ADMIN — HYDROMORPHONE HYDROCHLORIDE 0.5 MG: 1 INJECTION, SOLUTION INTRAMUSCULAR; INTRAVENOUS; SUBCUTANEOUS at 05:07

## 2024-07-20 RX ADMIN — PRAVASTATIN SODIUM 40 MG: 40 TABLET ORAL at 21:24

## 2024-07-20 RX ADMIN — FENTANYL CITRATE 100 MCG: 50 INJECTION, SOLUTION INTRAMUSCULAR; INTRAVENOUS at 11:44

## 2024-07-20 RX ADMIN — SODIUM CHLORIDE 2000 MG: 900 INJECTION INTRAVENOUS at 02:25

## 2024-07-20 RX ADMIN — SODIUM CHLORIDE 2000 MG: 900 INJECTION INTRAVENOUS at 09:32

## 2024-07-20 RX ADMIN — EPHEDRINE SULFATE 10 MG: 50 INJECTION INTRAVENOUS at 11:57

## 2024-07-20 RX ADMIN — HYDROMORPHONE HYDROCHLORIDE 0.5 MG: 1 INJECTION, SOLUTION INTRAMUSCULAR; INTRAVENOUS; SUBCUTANEOUS at 09:12

## 2024-07-20 RX ADMIN — Medication 100 MCG: at 11:56

## 2024-07-20 RX ADMIN — LIDOCAINE HYDROCHLORIDE 100 MG: 10 INJECTION, SOLUTION EPIDURAL; INFILTRATION; INTRACAUDAL; PERINEURAL at 11:44

## 2024-07-20 NOTE — PROGRESS NOTES
[facility] Medicine Services  ADMISSION FOLLOW-UP NOTE          Patient admitted after midnight, H&P by my partner performed earlier on today's date reviewed.  Interim findings, labs, and charting also reviewed.        [facility] Problem List has been managed and updated to include any new diagnoses:  Active Hospital Problems    Diagnosis  POA    **Femur fracture [S72.90XA]  Yes    T2DM (type 2 diabetes mellitus) [E11.9]  Yes    HTN (hypertension) [I10]  Yes    Radial fracture [S52.90XA]  Yes    Left wrist fracture, open, initial encounter [S62.102B]  Unknown    Hypothyroidism (acquired) [E03.9]  Yes    Hyperlipidemia LDL goal <70 [E78.5]  Yes      Resolved Hospital Problems   No resolved problems to display.         ADDITIONAL PLAN:  - detailed assessment and plan from admission reviewed  - 74 yr old female with hx of HTN, HLD, hypothyroidism, T2DM, IBS who presented to the ED w/ c/o a fall.  Imaging shows left femur fracture and left distal radius fracture.  She underwent repair of left wrist and left hip on 7/20.      Left femur fracture   Left distal radius fracture   -2/2 mechanical fall   -CT pelvis impacted subcapital fracture of the proximal left femur   - CT LUE shows comminuted fracture of the distal radius with displacement. There is a displaced ulnar styloid fracture.  -Ortho evaluated  -IVF  -PRN pain medications, antiemetics        T2DM  -hem A1c 6.0  -on Semaglutide   -Continue SSI    Neuropathy  -use cream from compound pharmacy to help     HTN   HLD   -continue routine Norvasc  -on losartan-HCTZ; losartan continued at half dose, HCTZ held for now   -continued statin       Hypothyroidism   -continue synthroid       Expected Discharge  Expected Discharge Date: 7/22/2024; Expected Discharge Time:      Amanda Elias DO  07/20/24       Subjective:    Pt intubated, resting in nad. Events of last 24 hrs reviewed with staff and records. Son and family have placed limited Code status now. Objective:  Pt resting in nad   BP (!) 96/54   Pulse 74   Temp 98.6 °F (37 °C) (Bladder)   Resp 11   Ht 6' (1.829 m)   Wt 255 lb (115.7 kg)   SpO2 100%   BMI 34.58 kg/m²   HEENT no adenopathy no bruits  ET tube in place   Heart:  RRR, no murmurs, gallops, or rubs.   Lungs:  Diminished bs in all fields   Abd: bowel sounds present, nontender, nondistended, no masses  Extrem:  No clubbing, cyanosis, +edema and erythema lower   WBC/Hgb/Hct/Plts:  10.8/8.6/28.1/389 (01/30 6565) basic metabolic panel   Recent Labs     01/30/21  0412 01/29/21  0552 01/28/21  0427   WBC 10.8 12.9* 12.3*   HGB 8.6* 8.8* 9.1*   HCT 28.1* 28.3* 29.5*   MCV 88.9 87.9 87.3    407 459*     Lab Results   Component Value Date     01/29/2021    K 3.5 01/29/2021    CL 95 01/29/2021    CO2 33 01/29/2021    BUN 17 01/29/2021    CREATININE 0.7 01/29/2021    GLUCOSE 133 01/29/2021    GLUCOSE 138 03/26/2011    CALCIUM 7.5 01/29/2021        Assessment:    Patient Active Problem List   Diagnosis    Severe sepsis with septic shock (HCC)    Dementia ([address]    Metabolic encephalopathy    Diabetes mellitus type 2, uncontrolled ([address]    Hyperlipidemia LDL goal <100    Essential hypertension    Venous ulcer of left leg (HCC)    Non-pressure chronic ulcer of left lower leg with fat layer exposed ([address]    Non-pressure chronic ulcer left lower leg, limited to breakdown skin (HCC)    Severe protein-calorie malnutrition (HCC)    Non-pressure chronic ulcer right lower leg, limited to breakdown skin (HCC)    Pressure injury of contiguous region involving back, buttock, and hip, stage 2 (HCC)    Pressure injury of calf, stage 2 ([address]    HCAP (healthcare-associated pneumonia)    Paroxysmal atrial fibrillation (HCC)    Acute respiratory failure with hypoxia [facility]    Acute deep vein thrombosis (DVT) of femoral vein of left lower extremity (HCC)    Leg swelling    Respiratory failure [facility]    Palliative care encounter    DNR no code (do not resuscitate)    Goals of care, counseling/discussion       Plan:  Cont to wean  Critical care  Abx per ID team  Monitor labs         Lynetteda Sample  6:10 AM  1/30/2021

## 2024-07-20 NOTE — ANESTHESIA PROCEDURE NOTES
Airway  Urgency: elective    Date/Time: 7/20/2024 11:46 AM  Airway not difficult    General Information and Staff    Patient location during procedure: OR  CRNA/CAA: Grant Elias CRNA    Indications and Patient Condition  Indications for airway management: airway protection    Preoxygenated: yes  MILS not maintained throughout  Mask difficulty assessment: 1 - vent by mask    Final Airway Details  Final airway type: endotracheal airway      Successful airway: ETT  Cuffed: yes   Successful intubation technique: direct laryngoscopy  Facilitating devices/methods: intubating stylet  Endotracheal tube insertion site: oral  Blade: Misti  Blade size: 3  ETT size (mm): 7.0  Cormack-Lehane Classification: grade I - full view of glottis  Placement verified by: chest auscultation and capnometry   Cuff volume (mL): 10  Measured from: lips  ETT/EBT  to lips (cm): 21  Number of attempts at approach: 1  Assessment: lips, teeth, and gum same as pre-op and atraumatic intubation    Additional Comments  Negative epigastric sounds, Breath sound equal bilaterally with symmetric chest rise and fall

## 2024-07-20 NOTE — ANESTHESIA PROCEDURE NOTES
Left Infraclavicular Cath      Patient reassessed immediately prior to procedure    Patient location during procedure: pre-op  Start time: 7/20/2024 11:15 AM  Reason for block: at surgeon's request and post-op pain management  Performed by  Anesthesiologist: Tyson Hood MD CRNA/CAA: Grant Elias CRNA  Preanesthetic Checklist  Completed: patient identified, IV checked, site marked, risks and benefits discussed, surgical consent, monitors and equipment checked, pre-op evaluation and timeout performed  Prep:  Pt Position: supine  Sterile barriers:cap, gloves, mask and washed/disinfected hands  Prep: ChloraPrep  Patient monitoring: blood pressure monitoring, continuous pulse oximetry and EKG  Procedure    Sedation: yes  Performed under: local infiltration  Guidance:ultrasound guided    ULTRASOUND INTERPRETATION.  Using ultrasound guidance a 20 G gauge needle was placed in close proximity to the brachial plexus nerve, at which point, under ultrasound guidance anesthetic was injected in the area of the nerve and spread of the anesthesia was seen on ultrasound in close proximity thereto.  There were no abnormalities seen on ultrasound; a digital image was taken; and the patient tolerated the procedure with no complications. Images:still images obtained, printed/placed on chart    Laterality:left  Block Type:infraclavicular  Injection Technique:catheter  Needle Type:Tuohy and echogenic  Needle Gauge:18 G  Resistance on Injection: none  Catheter Size:20 G  Cath Depth at skin: 9 cm    Medications Used: fentaNYL citrate (PF) (SUBLIMAZE) injection - Intravenous   100 mcg - 7/20/2024 11:15:00 AM  bupivacaine PF (MARCAINE) 0.25 % injection - Injection   30 mL - 7/20/2024 11:15:00 AM      Medications  Preservative Free Saline:3ml    Post Assessment  Injection Assessment: negative aspiration for heme, no paresthesia on injection and incremental injection  Patient Tolerance:comfortable throughout  "block  Complications:no  Additional Notes  CATHETER  The affected upper extremity was abducted and rotated 90 degrees at the shoulder, within the patients range of motion. A high-frequency linear transducer, with sterile cover, was placed just medial to the coracoid process, distal third of the clavicle, and inferior to the clavicle. Keeping the transducer is in a parasagittal plane (cephalad to caudad), scanning medially to laterally. The Pectoralis major and minor, brachial plexus, axillary artery and vein, rib and pleura where visualized. The insertion site was prepped and draped in sterile fashion. Skin and cutaneous tissue was infiltrated with 2-5 ml of 1% Lidocaine. Using ultrasound-guidance, an 18-gauge Contiplex Ultra 360 Touhy needle was advanced in plane lateral to the axillary artery and lateral cord of the brachial plexus. Preservative-free normal saline was utilized for hydro-dissection of tissue, advancement of Touhy needle, and to confirm final needle placement posterior to the axillary artery and posterior cord of the brachial plexus. Local anesthetic injection spread, in incremental 3-5 ml injections, was confirmed. Aspiration every 5 ml to prevent intravascular injection. Injection was completed with negative aspiration of blood and negative intravascular injection. Injection pressures were normal with minimal resistance. A 20-gauge Contiplex Echo catheter was placed through the needle and advance out the tip of the Touhy 1-3 cm. The Touhy needle was then removed, and final catheter position verified at the 5-6 o'clock position to the axillary artery and posterior cord. The catheter was secured in usual fashion with skin glue, benzoin, steri-strips, CHG tegaderm and Label noting \"Nerve Block Catheter\". Jerk tape applied at yellow connector and catheter connection.    Performed by: Grant Elias, CRNA          "

## 2024-07-20 NOTE — CONSULTS
Orthopaedic Consult Note      Patient Care Team:  Radha Gregg APRN as PCP - General (Nurse Practitioner)  Gloria Santoro DO as Consulting Physician (Endocrinology)    Chief complaint   Left hip pain and left wrist pain    Subjective .     History of present illness:    Patient is a 74-year-old female who went out on her deck last night and lost her balance and fell injuring her left wrist and left hip.  In February 2024, sustained another ground-level fall and a right femoral neck fracture requiring acute CHEYENNE by Dr. Evans.  She did outstanding from that procedure.  She is left-hand dominant.  Went directly to the ER after her injury and underwent a reduction procedure.  Had a small poke hole laterally on the ulnar side of the wrist.  Denies numbness or tingling in her hand this morning.  Says she feels a stinging sensation in her hand.    Review of Systems:  All systems reviewed are negative except for what is stated in the HPI       History  Family History   Problem Relation Age of Onset    Colon cancer Maternal Uncle     Diabetes Mother     Heart attack Mother     Hypertension Mother     Hyperlipidemia Mother     Thyroid disease Mother     Stroke Mother     Vision loss Mother     Cancer Sister     Thyroid disease Sister     Diabetes Brother     Prostate cancer Brother     Cancer Brother         Efren prostrates cancer    Breast cancer Daughter 46    Diabetes Sister     Hyperlipidemia Sister     Thyroid disease Sister     Vision loss Sister     Vision loss Sister     Hyperlipidemia Sister     Ovarian cancer Neg Hx      Social History     Socioeconomic History    Marital status:    Tobacco Use    Smoking status: Never   Vaping Use    Vaping status: Never Used   Substance and Sexual Activity    Alcohol use: Never    Drug use: Never    Sexual activity: Yes     Partners: Male     Birth control/protection: Surgical, Hysterectomy     Past Surgical History:   Procedure Laterality Date    CARPAL  TUNNEL RELEASE  2007    CATARACT EXTRACTION Right 09/27/2022    CATARACT EXTRACTION, BILATERAL Left     CHOLECYSTECTOMY  1991    EYE SURGERY  2021    HIP FRACTURE SURGERY Right     HYSTERECTOMY  2007    OOPHORECTOMY      TOTAL HIP ARTHROPLASTY Right 02/04/2024    Procedure: TOTAL HIP ARTHROPLASTY ANTERIOR RIGHT;  Surgeon: Doni Evans MD;  Location: [facility];  Service: Orthopedics;  Laterality: Right;    TUBAL ABDOMINAL LIGATION  11/1979     Past Medical History:   Diagnosis Date    Bladder infection     Dyspareunia, female     Glaucoma     H/O sexual problem     High cholesterol     History of abnormal cervical Pap smear     Hypertension     Hypertension     Hypothyroidism     Impaired functional mobility, balance, gait, and endurance     Labial cyst     Muscle weakness     Type 2 diabetes mellitus     Urinary incontinence     Vaginal itching     Yeast infection      Allergies   Allergen Reactions    Shellfish-Derived Products Rash       Current Facility-Administered Medications:     amLODIPine (NORVASC) tablet 10 mg, 10 mg, Oral, Daily, Enma Erazo APRN    sennosides-docusate (PERICOLACE) 8.6-50 MG per tablet 2 tablet, 2 tablet, Oral, BID PRN **AND** polyethylene glycol (MIRALAX) packet 17 g, 17 g, Oral, Daily PRN **AND** bisacodyl (DULCOLAX) EC tablet 5 mg, 5 mg, Oral, Daily PRN **AND** bisacodyl (DULCOLAX) suppository 10 mg, 10 mg, Rectal, Daily PRN, Dakota Valiente DO    Calcium Replacement - Follow Nurse / BPA Driven Protocol, , Does not apply, PRN, Dakota Valiente DO    ceFAZolin 2000 mg IVPB in 100 mL NS (MBP), 2,000 mg, Intravenous, Q8H, Dakota Valiente DO    cetirizine (zyrTEC) tablet 5 mg, 5 mg, Oral, Daily, Enma Erazo APRN    dextrose (D50W) (25 g/50 mL) IV injection 25 g, 25 g, Intravenous, Q15 Min PRN, Enma Erazo APRN    dextrose (GLUTOSE) oral gel 15 g, 15 g, Oral, Q15 Min PRN, Enma Erazo APRN    glucagon (GLUCAGEN) injection 1 mg, 1 mg, Intramuscular, Q15 Min PRN, Castro  ELIZABETH Norwood    HYDROmorphone (DILAUDID) injection 0.5 mg, 0.5 mg, Intravenous, Q2H PRN, Dakota Valiente DO, 0.5 mg at 07/20/24 0507    insulin regular (humuLIN R,novoLIN R) injection 2-7 Units, 2-7 Units, Subcutaneous, Q6H, Enma Erazo APRN    latanoprost (XALATAN) 0.005 % ophthalmic solution 1 drop, 1 drop, Both Eyes, Daily, Enma Erazo APRN    levothyroxine (SYNTHROID, LEVOTHROID) tablet 75 mcg, 75 mcg, Oral, Q AM, Enma Erazo APRN    losartan (COZAAR) tablet 100 mg, 100 mg, Oral, Q24H, Enma Erazo APRN    Magnesium Standard Dose Replacement - Follow Nurse / BPA Driven Protocol, , Does not apply, PRYANI, Dakota Valiente DO    nitroglycerin (NITROSTAT) SL tablet 0.4 mg, 0.4 mg, Sublingual, Q5 Min PRN, Dakota Valiente DO    ondansetron ODT (ZOFRAN-ODT) disintegrating tablet 4 mg, 4 mg, Oral, Q6H PRN **OR** ondansetron (ZOFRAN) injection 4 mg, 4 mg, Intravenous, Q6H PRN, Dakota Valiente DO    pantoprazole (PROTONIX) EC tablet 40 mg, 40 mg, Oral, Daily, nEma Erazo APRN    Phosphorus Replacement - Follow Nurse / BPA Driven Protocol, , Does not apply, PRRocco LOMELI John, DO    Potassium Replacement - Follow Nurse / BPA Driven Protocol, , Does not apply, PRNRocco John, DO    pravastatin (PRAVACHOL) tablet 40 mg, 40 mg, Oral, Q PM, Enma Erazo APRN    [COMPLETED] Insert Peripheral IV, , , Once **AND** sodium chloride 0.9 % flush 10 mL, 10 mL, Intravenous, PRN, Enrique Isbell MD    sodium chloride 0.9 % flush 10 mL, 10 mL, Intravenous, Q12H, Dakota Valiente DO    sodium chloride 0.9 % flush 10 mL, 10 mL, Intravenous, PRN, Dakota Valiente DO    sodium chloride 0.9 % infusion 40 mL, 40 mL, Intravenous, PRN, Dakota Valiente DO    sodium chloride 0.9 % infusion, 100 mL/hr, Intravenous, Continuous, Dakota Valiente DO, Last Rate: 100 mL/hr at 07/20/24 0315, 100 mL/hr at 07/20/24 0315    timolol (TIMOPTIC) 0.5 % ophthalmic solution 1 drop, 1 drop, Both Eyes, BID, Enma Erazo,  APRN    Objective     Vital Signs   Temp:  [98.2 °F (36.8 °C)-98.9 °F (37.2 °C)] 98.2 °F (36.8 °C)  Heart Rate:  [] 97  Resp:  [18] 18  BP: (160-180)/() 160/72      Physical Exam:   Left hip: Patient has intact EHL, FHL tibialis anterior and gastrocsoleus.  She has good capillary refill in her toes.  Palpable dorsalis pedis pulse.  Patient has mild pain with gentle hip flexion.  Internal and external rotation elicits significant pain.    Left wrist: Postreduction splint in place.  EPL is intact.  Patient has grossly intact sensation in the median, radial, and ulnar nerve distributions to light touch.  Brisk capillary refill is present in her digits.    Labs:  Lab Results (last 24 hours)       Procedure Component Value Units Date/Time    POC Glucose Once [[id number]]  (Abnormal) Collected: 07/20/24 0543    Specimen: Blood Updated: 07/20/24 0544     Glucose 137 mg/dL     Hemoglobin A1c [[id number]]  (Abnormal) Collected: 07/20/24 0335    Specimen: Blood Updated: 07/20/24 0426     Hemoglobin A1C 6.00 %     Narrative:      Hemoglobin A1C Ranges:    Increased Risk for Diabetes  5.7% to 6.4%  Diabetes                     >= 6.5%  Diabetic Goal                < 7.0%    Basic Metabolic Panel [[id number]]  (Abnormal) Collected: 07/20/24 0335    Specimen: Blood Updated: 07/20/24 0424     Glucose 167 mg/dL      BUN 17 mg/dL      Creatinine 0.73 mg/dL      Sodium 137 mmol/L      Potassium 3.9 mmol/L      Comment: Slight hemolysis detected by analyzer. Result may be falsely elevated.        Chloride 100 mmol/L      CO2 24.0 mmol/L      Calcium 9.4 mg/dL      BUN/Creatinine Ratio 23.3     Anion Gap 13.0 mmol/L      eGFR 86.4 mL/min/1.73     Narrative:      GFR Normal >60  Chronic Kidney Disease <60  Kidney Failure <15    The GFR formula is only valid for adults with stable renal function between ages 18 and 70.    CBC & Differential [[id number]]  (Abnormal) Collected: 07/20/24 0335    Specimen: Blood Updated: 07/20/24  0405    Narrative:      The following orders were created for panel order CBC & Differential.  Procedure                               Abnormality         Status                     ---------                               -----------         ------                     CBC Auto Differential[[id number]]        Abnormal            Final result                 Please view results for these tests on the individual orders.    CBC Auto Differential [[id number]]  (Abnormal) Collected: 07/20/24 0335    Specimen: Blood Updated: 07/20/24 0405     WBC 15.47 10*3/mm3      RBC 4.84 10*6/mm3      Hemoglobin 14.5 g/dL      Hematocrit 42.6 %      MCV 88.0 fL      MCH 30.0 pg      MCHC 34.0 g/dL      RDW 12.3 %      RDW-SD 39.8 fl      MPV 9.7 fL      Platelets 269 10*3/mm3      Neutrophil % 85.9 %      Lymphocyte % 7.5 %      Monocyte % 5.4 %      Eosinophil % 0.3 %      Basophil % 0.3 %      Immature Grans % 0.6 %      Neutrophils, Absolute 13.31 10*3/mm3      Lymphocytes, Absolute 1.16 10*3/mm3      Monocytes, Absolute 0.83 10*3/mm3      Eosinophils, Absolute 0.04 10*3/mm3      Basophils, Absolute 0.04 10*3/mm3      Immature Grans, Absolute 0.09 10*3/mm3      nRBC 0.0 /100 WBC     Comprehensive Metabolic Panel [[id number]]  (Abnormal) Collected: 07/20/24 0044    Specimen: Blood Updated: 07/20/24 0113     Glucose 183 mg/dL      BUN 18 mg/dL      Creatinine 0.73 mg/dL      Sodium 136 mmol/L      Potassium 4.3 mmol/L      Comment: Specimen hemolyzed.  Result may be falsely elevated.        Chloride 101 mmol/L      CO2 23.0 mmol/L      Calcium 9.0 mg/dL      Total Protein 7.2 g/dL      Albumin 4.2 g/dL      ALT (SGPT) 26 U/L      Comment: Specimen hemolyzed.  Result may  be falsely elevated.        AST (SGOT) 28 U/L      Alkaline Phosphatase 64 U/L      Total Bilirubin 0.4 mg/dL      Globulin 3.0 gm/dL      Comment: Calculated Result        A/G Ratio 1.4 g/dL      BUN/Creatinine Ratio 24.7     Anion Gap 12.0 mmol/L      eGFR 86.4  mL/min/1.73     Narrative:      GFR Normal >60  Chronic Kidney Disease <60  Kidney Failure <15    The GFR formula is only valid for adults with stable renal function between ages 18 and 70.            Imaging:  We have reviewed and interpreted imaging of the patient's left wrist including plain films and postreduction CT.  We have also looked at plain films of the left hip and a CT of the left hip.  In the wrist, patient appears to have a very comminuted fracture of the distal radius and ulnar styloid.  Postreduction films appear to be improved with regards to alignment overall.    Review of the imaging of the left hip shows a valgus impacted left femoral neck fracture.  On the lateral and sagittal imaging, concerned about some posterior displacement of the femoral head relative to the anterior aspect of the femoral neck.  There is some comminution at the fracture site.        Assessment & Plan   74-year-old female with a complex injury involving the left distal radius and ulna and left femoral neck.  I had a lengthy discussion with the patient and her  (via speaker phone) this morning.  Given how well she did on the contralateral side with CHEYENNE and the possibility of failure with percutaneous screw fixation, I recommend that she undergo acute left CHEYENNE.  Will contact Dr. Evans to inquire about his availability today or tomorrow.  Will discuss the case with him.  With regards to her left wrist, I believe she is going to require ORIF as well.  This is her dominant hand.  Significant comminution at the fracture site.  Polytrauma as well.  Risk, benefits, potential hazards, typical recovery and rehab as well as reasonable alternatives to open reduction and internal fixation of the left distal radius were discussed with her.  We will proceed with surgery either this morning or during this hospitalization depending on the timing of her hip fracture surgery.  Continue n.p.o. and holding chemical DVT prophylaxis.   Patient does have type 2 diabetes with a recent hemoglobin A1c of 6.      Femur fracture    Hyperlipidemia LDL goal <70    Hypothyroidism (acquired)    T2DM (type 2 diabetes mellitus)    HTN (hypertension)    Radial fracture        I discussed the patient's findings and my recommendations as well as treatment options and alternatives with patient, family, and nursing staff.  Surgical versus non surgical treatment options were discussed as well.  We reviewed the nature of the planned procedure including the risks, benefits and potential complications.  Understanding was expressed and the decision was made to proceed with surgery.      Devan Modi MD  07/20/24  07:53 EDT

## 2024-07-20 NOTE — CONSULTS
Orthopedic Consult    Patient: Pari Howell                                           YOB: 1950     Date of Admission: 7/19/2024 10:29 PM            Medical Record Number: [id number]    Attending Physician: Amanda Elias,*    Consulting Physician: Doni Evans MD    Reason for Consult: Left hip fracture.    History of Present Illness: 74 y.o. female admitted to [facility] with Femur fracture [S72.90XA].     The patient was evaluated in the emergency room and was diagnosed with a  hip fracture.   Secondary to the age / multiple medical co morbidities the patient was admitted to the hospitalist.   I was not the orthopedic surgeon on-call however the orthopedic surgeon on-call asked me to assist in managing this patient's femoral neck fracture as she has been a previous patient of mine I replaced her right hip for femoral neck fracture in February of this year    The patient was in the usual state of health and fell from standing height in home, Resulting in sudden onset hip pain and inability to ambulate.   Denies any history of loss of consciousness, headache, vomiting, or seizures.   Denies any other injuries.   The patient is accompanied by  family members  to this hospital visit.     The patient denies any prior  pre-existing pain in the hip.  Patient is a  home/ community ambulator. Patient denies  walker/cane assistive device.   The patient  lives at home with , is quite active and independent in activities of daily living.  The patient denies history of dementia.    Patient denies any history of: DVT/PE, MRSA, COPD, CHF, CAD, , Dementia or A-Fib.   The patient has history of :Diabetes mellitus  The patient is on anticoagulants: none    Past medical history, past surgical history, social history, family history, ALLERGIES, current medications have been reviewed by me.    Past Medical History:   Diagnosis Date    Bladder infection     Dyspareunia, female      Glaucoma     H/O sexual problem     High cholesterol     History of abnormal cervical Pap smear     Hypertension     Hypertension     Hypothyroidism     Impaired functional mobility, balance, gait, and endurance     Labial cyst     Muscle weakness     Type 2 diabetes mellitus     Urinary incontinence     Vaginal itching     Yeast infection      Past Surgical History:   Procedure Laterality Date    CARPAL TUNNEL RELEASE  2007    CATARACT EXTRACTION Right 09/27/2022    CATARACT EXTRACTION, BILATERAL Left     CHOLECYSTECTOMY  1991    EYE SURGERY  2021    HIP FRACTURE SURGERY Right     HYSTERECTOMY  2007    OOPHORECTOMY      TOTAL HIP ARTHROPLASTY Right 02/04/2024    Procedure: TOTAL HIP ARTHROPLASTY ANTERIOR RIGHT;  Surgeon: Doni Evans MD;  Location: [facility];  Service: Orthopedics;  Laterality: Right;    TUBAL ABDOMINAL LIGATION  11/1979     Social History     Occupational History    Not on file   Tobacco Use    Smoking status: Never    Smokeless tobacco: Not on file   Vaping Use    Vaping status: Never Used   Substance and Sexual Activity    Alcohol use: Never    Drug use: Never    Sexual activity: Yes     Partners: Male     Birth control/protection: Surgical, Hysterectomy      Social History     Social History Narrative    Not on file     Family History   Problem Relation Age of Onset    Colon cancer Maternal Uncle     Diabetes Mother     Heart attack Mother     Hypertension Mother     Hyperlipidemia Mother     Thyroid disease Mother     Stroke Mother     Vision loss Mother     Cancer Sister     Thyroid disease Sister     Diabetes Brother     Prostate cancer Brother     Cancer Brother         Efren prostrates cancer    Breast cancer Daughter 46    Diabetes Sister     Hyperlipidemia Sister     Thyroid disease Sister     Vision loss Sister     Vision loss Sister     Hyperlipidemia Sister     Ovarian cancer Neg Hx         Allergies   Allergen Reactions    Shellfish-Derived Products Rash       Home  Medications:  Medications Prior to Admission   Medication Sig Dispense Refill Last Dose    amLODIPine (NORVASC) 5 MG tablet TAKE 2 TABLETS EVERY  tablet 0 7/19/2024    levothyroxine (SYNTHROID, LEVOTHROID) 75 MCG tablet Take 1 tablet by mouth Daily. (Patient taking differently: Take 1 tablet by mouth Every Morning.) 90 tablet 1 7/19/2024    losartan-hydrochlorothiazide (HYZAAR) 100-12.5 MG per tablet TAKE 1 TABLET EVERY DAY 90 tablet 1 7/19/2024    multivitamin with minerals tablet tablet Take 1 tablet by mouth Daily. OTC   7/19/2024    NON FORMULARY Specialty Compounded Cream for neuropathy of feet. Cream contains lidocaine, gabapentin, ketoprofen, ibuprofen and clonidine per patient. Apply to bilateral feet at bedtime   7/19/2024    pantoprazole (PROTONIX) 20 MG EC tablet TAKE 1 TABLET EVERY DAY 90 tablet 0 7/19/2024    timolol (TIMOPTIC) 0.5 % ophthalmic solution Administer 1 drop to both eyes 2 (Two) Times a Day.   7/19/2024    vitamin B-12 (CYANOCOBALAMIN) 100 MCG tablet Take 1 tablet by mouth Daily. OTC   7/19/2024    cetirizine (zyrTEC) 5 MG tablet Take 1 tablet by mouth Daily.       diphenhydrAMINE (BENADRYL) 25 mg capsule Take 1 capsule by mouth At Night As Needed for Itching. OTC       latanoprost (XALATAN) 0.005 % ophthalmic solution Apply 1 drop to eye Daily. (Patient taking differently: Apply 1 drop to eye(s) as directed by provider 2 (Two) Times a Day.) 7.5 mL 3     pravastatin (PRAVACHOL) 40 MG tablet Take 1 tablet by mouth Every Evening. 90 tablet 3     Semaglutide,0.25 or 0.5MG/DOS, (Ozempic, 0.25 or 0.5 MG/DOSE,) 2 MG/3ML solution pen-injector Inject 0.25 mg under the skin into the appropriate area as directed 1 (One) Time Per Week. (Patient taking differently: Inject 0.5 mg under the skin into the appropriate area as directed 1 (One) Time Per Week.)       vitamin D3 125 MCG (5000 UT) capsule capsule Take 1 capsule by mouth Daily.          Current Medications:  Scheduled Meds:amLODIPine, 10  mg, Oral, Daily  ceFAZolin, 2,000 mg, Intravenous, Q8H  cetirizine, 5 mg, Oral, Daily  [Transfer Hold] insulin regular, 2-7 Units, Subcutaneous, Q6H  latanoprost, 1 drop, Both Eyes, Daily  levothyroxine, 75 mcg, Oral, Q AM  lidocaine PF 1%, , ,   [START ON 7/21/2024] losartan, 50 mg, Oral, Q24H  pantoprazole, , ,   pantoprazole, 40 mg, Oral, Daily  pantoprazole, 40 mg, Intravenous, Once  pravastatin, 40 mg, Oral, Q PM  sodium chloride, 10 mL, Intravenous, Q12H  timolol, 1 drop, Both Eyes, BID  tranexamic acid, 1,000 mg, Intravenous, On Call to OR      Continuous Infusions:lactated ringers, 9 mL/hr  ropivacaine,   sodium chloride, 100 mL/hr      PRN Meds:.  senna-docusate sodium **AND** polyethylene glycol **AND** bisacodyl **AND** bisacodyl    Calcium Replacement - Follow Nurse / BPA Driven Protocol    chlorhexidine 0.05% in sterile water    [Transfer Hold] dextrose    [Transfer Hold] dextrose    [Transfer Hold] glucagon (human recombinant)    [Transfer Hold] HYDROcodone-acetaminophen    HYDROmorphone    lidocaine PF 1%    Magnesium Standard Dose Replacement - Follow Nurse / BPA Driven Protocol    nitroglycerin    Non-Formulary / Patient Supplied Medication    ondansetron ODT **OR** ondansetron    pantoprazole    Phosphorus Replacement - Follow Nurse / BPA Driven Protocol    Potassium Replacement - Follow Nurse / BPA Driven Protocol    sodium chloride    [COMPLETED] Insert Peripheral IV **AND** [Transfer Hold] sodium chloride    sodium chloride    sodium chloride    sodium chloride    sterile water    vancomycin    Review of Systems:   A 12 point system review was reviewed with the patient and from the chart  and is negative except as in history of present illness.      Physical Exam: 74 y.o. female                    Vitals:    07/20/24 0309 07/20/24 0700 07/20/24 0937 07/20/24 1050   BP: 160/72 153/73 146/69 173/81   BP Location: Right arm Right arm  Right arm   Patient Position: Lying Lying  Lying   Pulse: 97 80  "81 101   Resp: 18 16  16   Temp: 98.2 °F (36.8 °C) 98 °F (36.7 °C)  99.8 °F (37.7 °C)   TempSrc: Oral Oral  Temporal   SpO2: 92% 99%  94%   Weight: 78.2 kg (172 lb 6.4 oz)      Height: 172.7 cm (68\")           Physical exam deferred    Diagnostic Tests:    Results from last 7 days   Lab Units 07/20/24  0335   WBC 10*3/mm3 15.47*   HEMOGLOBIN g/dL 14.5   HEMATOCRIT % 42.6   PLATELETS 10*3/mm3 269     Results from last 7 days   Lab Units 07/20/24  0335 07/20/24  0044   SODIUM mmol/L 137 136   POTASSIUM mmol/L 3.9 4.3   CHLORIDE mmol/L 100 101   CO2 mmol/L 24.0 23.0   BUN mg/dL 17 18   CREATININE mg/dL 0.73 0.73   GLUCOSE mg/dL 167* 183*   CALCIUM mg/dL 9.4 9.0         No results found for: \"URICACID\"  No results found for: \"CRYSTAL\"  Microbiology Results (last 10 days)       ** No results found for the last 240 hours. **          CT Pelvis Without Contrast    Result Date: 7/20/2024  Impression: Impacted subcapital fracture of the proximal left femur. Electronically Signed: Doni Vazquez MD  7/20/2024 4:41 AM EDT  Workstation ID: [id number]    CT Upper Extremity Left Without Contrast    Result Date: 7/20/2024  Impression: Limited exam. There is a comminuted fracture of the distal radius with displacement. There is a displaced ulnar styloid fracture. Electronically Signed: Doni Vazquez MD  7/20/2024 4:37 AM EDT  Workstation ID: [id number]    XR Chest 1 View    Result Date: 7/19/2024  Impression: No active disease Electronically Signed: Doni Vazquez MD  7/19/2024 11:36 PM EDT  Workstation ID: [id number]    XR Wrist 3+ View Left    Result Date: 7/19/2024  Impression: Comminuted and displaced fracture of the distal radius with disruption of the radiocarpal's. Electronically Signed: Doni Vazquez MD  7/19/2024 11:34 PM EDT  Workstation ID: [id number]    XR Hip With or Without Pelvis 2 - 3 View Left    Result Date: 7/19/2024  Impression: Fracture of the subcapital left femur Electronically Signed: Doni Vazquez MD  7/19/2024 11:31 PM " EDT  Workstation ID: [id number]     Assessment: Left intracapsular Hip Fracture.     Femur fracture    Hyperlipidemia LDL goal <70    Hypothyroidism (acquired)    T2DM (type 2 diabetes mellitus)    HTN (hypertension)    Radial fracture    Left wrist fracture, open, initial encounter      Plan:    Options and alternatives have been discussed in detail with family.   Patient had a type I open fracture of the distal radius as well that was managed by the on-call surgeon Dr. Demond Alejo.  He did take her to the operating room before I was able to arrive at the hospital.  He did discussed with the patient that she would need a total hip replacement for her left displaced femoral neck fracture went over the risk benefits and alternatives the patient voiced understanding of this and wished to undergo surgery for her distal radius and hip at the same setting and did not want to delay that in order to speak with me she was well aware of the risks involved in surgery as she had previously undergone the surgery to several months ago with me and felt comfortable with me doing the surgery and did not feel like she needed to delay her open wrist fracture treatment in order to have a discussion with me about risk benefits alternatives.  Upon my arrival to the hospital I did go over the procedure with the patient's family    The patient is indicated for a left total hip arthroplasty.    The likely,  Risks and benefits of the procedure including but not limited to infection, DVT, pulmonary embolism,  leg length discrepancy, recurrent dislocation, possibility of injury to nerves or vessels, possibility of periprosthetic fractures have been discussed in detail.  Despite the risks involved, The   patient's family  would like to proceed.     The patient is being scheduled for a left  total hip arthroplasty by anterior approach at [facility] tentatively for 7/20/ 2024.     Patient will be made NPO.   Obtain informed consent.     The  patient's admitting service has seen the patient and the patient is cleared to the operating room.    Date: 7/20/2024    Doni Evans MD    CC: Radha Gregg, ELIZABETH; MD Curt Haney, Amanda Sanders,*

## 2024-07-20 NOTE — ED PROVIDER NOTES
Subjective   History of Present Illness  74-year-old female who presents for evaluation of left wrist pain and left hip pain after a fall.  The patient had recent surgical intervention in February, 5 months ago on the right hip due to a fracture.  The patient states that she tripped going outside onto the deck and try to catch herself with an outstretched left arm.  She also struck her left hip.  She did not hit her head or lose consciousness.  She does not take any anticoagulation.  She is awake and alert currently.  No complaint of neck or midline back pain.  No chest pain or abdominal pain.  She denies fever or infectious symptoms.  No previous surgical intervention or injuries to the left wrist or hip in the past.      Review of Systems   Constitutional:  Negative for chills, fatigue and fever.   HENT:  Negative for congestion, ear pain, postnasal drip, sinus pressure and sore throat.    Eyes:  Negative for pain, redness and visual disturbance.   Respiratory:  Negative for cough, chest tightness and shortness of breath.    Cardiovascular:  Negative for chest pain, palpitations and leg swelling.   Gastrointestinal:  Negative for abdominal pain, anal bleeding, blood in stool, diarrhea, nausea and vomiting.   Endocrine: Negative for polydipsia and polyuria.   Genitourinary:  Negative for difficulty urinating, dysuria, frequency and urgency.   Musculoskeletal:  Positive for arthralgias. Negative for back pain and neck pain.   Skin:  Positive for wound. Negative for pallor and rash.   Allergic/Immunologic: Negative for environmental allergies and immunocompromised state.   Neurological:  Negative for dizziness, weakness and headaches.   Hematological:  Negative for adenopathy.   Psychiatric/Behavioral:  Negative for confusion, self-injury and suicidal ideas. The patient is not nervous/anxious.    All other systems reviewed and are negative.      Past Medical History:   Diagnosis Date    Bladder infection      Dyspareunia, female     Glaucoma     H/O sexual problem     High cholesterol     History of abnormal cervical Pap smear     Hypertension     Hypertension     Hypothyroidism     Impaired functional mobility, balance, gait, and endurance     Labial cyst     Muscle weakness     Type 2 diabetes mellitus     Urinary incontinence     Vaginal itching     Yeast infection        Allergies   Allergen Reactions    Shellfish-Derived Products Rash       Past Surgical History:   Procedure Laterality Date    CARPAL TUNNEL RELEASE  2007    CATARACT EXTRACTION Right 09/27/2022    CATARACT EXTRACTION, BILATERAL Left     CHOLECYSTECTOMY  1991    EYE SURGERY  2021    HIP FRACTURE SURGERY Right     HYSTERECTOMY  2007    OOPHORECTOMY      TOTAL HIP ARTHROPLASTY Right 02/04/2024    Procedure: TOTAL HIP ARTHROPLASTY ANTERIOR RIGHT;  Surgeon: Doni Evans MD;  Location: [facility];  Service: Orthopedics;  Laterality: Right;    TUBAL ABDOMINAL LIGATION  11/1979       Family History   Problem Relation Age of Onset    Colon cancer Maternal Uncle     Diabetes Mother     Heart attack Mother     Hypertension Mother     Hyperlipidemia Mother     Thyroid disease Mother     Stroke Mother     Vision loss Mother     Cancer Sister     Thyroid disease Sister     Diabetes Brother     Prostate cancer Brother     Cancer Brother         Efren prostrates cancer    Breast cancer Daughter 46    Diabetes Sister     Hyperlipidemia Sister     Thyroid disease Sister     Vision loss Sister     Vision loss Sister     Hyperlipidemia Sister     Ovarian cancer Neg Hx        Social History     Socioeconomic History    Marital status:    Tobacco Use    Smoking status: Never   Vaping Use    Vaping status: Never Used   Substance and Sexual Activity    Alcohol use: Never    Drug use: Never    Sexual activity: Yes     Partners: Male     Birth control/protection: Surgical, Hysterectomy           Objective   Physical Exam  Vitals and nursing note reviewed.    Constitutional:       General: She is not in acute distress.     Appearance: Normal appearance. She is well-developed. She is not toxic-appearing or diaphoretic.   HENT:      Head: Normocephalic and atraumatic.      Right Ear: External ear normal.      Left Ear: External ear normal.      Nose: Nose normal.   Eyes:      General: Lids are normal.      Pupils: Pupils are equal, round, and reactive to light.   Neck:      Trachea: No tracheal deviation.   Cardiovascular:      Rate and Rhythm: Normal rate and regular rhythm.      Pulses: No decreased pulses.      Heart sounds: Normal heart sounds. No murmur heard.     No friction rub. No gallop.   Pulmonary:      Effort: Pulmonary effort is normal. No respiratory distress.      Breath sounds: Normal breath sounds. No decreased breath sounds, wheezing, rhonchi or rales.   Abdominal:      General: Bowel sounds are normal.      Palpations: Abdomen is soft.      Tenderness: There is no abdominal tenderness. There is no guarding or rebound.   Musculoskeletal:         General: Normal range of motion.      Left wrist: Swelling, deformity and bony tenderness present.      Cervical back: Normal range of motion and neck supple.      Left hip: Tenderness and bony tenderness present. No deformity.      Comments: Abrasions over the ulnar side of the left wrist concerning for possible open fracture.  No bleeding or drainage of fluid observed at this time.   Lymphadenopathy:      Cervical: No cervical adenopathy.   Skin:     General: Skin is warm and dry.      Findings: No rash.   Neurological:      Mental Status: She is alert and oriented to person, place, and time.      Cranial Nerves: No cranial nerve deficit.      Sensory: No sensory deficit.   Psychiatric:         Speech: Speech normal.         Behavior: Behavior normal.         Thought Content: Thought content normal.         Judgment: Judgment normal.         Procedural Sedation    Date/Time: 7/20/2024 1:35 AM    Performed by:  Enrique Isbell MD  Authorized by: Enrique Isbell MD    Consent:     Consent obtained:  Verbal and written    Consent given by:  Patient and spouse    Risks discussed:  Inadequate sedation, respiratory compromise necessitating ventilatory assistance and intubation, prolonged hypoxia resulting in organ damage, allergic reaction and dysrhythmia    Alternatives discussed:  Analgesia without sedation and regional anesthesia  [place] protocol:     Procedure explained and questions answered to patient or proxy's satisfaction: yes      Relevant documents present and verified: yes      Test results available: yes      Imaging studies available: yes      Required blood products, implants, devices, and special equipment available: yes      Site/side marked: yes      Immediately prior to procedure, a time out was called: yes      Patient identity confirmed:  Verbally with patient and arm band  Indications:     Procedure performed:  Fracture reduction    Procedure necessitating sedation performed by:  Physician performing sedation    Intended level of sedation:  Moderate  Pre-sedation assessment:     Time since last food or drink:  Greater than 6 h ours    ASA classification: class 2 - patient with mild systemic disease      Mouth openin finger widths    Mallampati score:  II - soft palate, uvula, fauces visible    Neck mobility: normal      Pre-sedation assessments completed and reviewed: airway patency, anesthesia/sedation history, cardiovascular function, hydration status, mental status, nausea/vomiting, pain level and respiratory function      History of difficult intubation: no    Immediate pre-procedure details:     Reassessment: Patient reassessed immediately prior to procedure      Reviewed: vital signs and relevant labs/tests      Verified: bag valve mask available, emergency equipment available, intubation equipment available, IV patency confirmed, oxygen available and suction available     Procedure details (see MAR for exact dosages):     Sedation start time:  7/20/2024 1:35 AM    Preoxygenation:  Nonrebreather mask    Sedation:  Propofol    Analgesia:  Hydromorphone    Intra-procedure monitoring:  Blood pressure monitoring, cardiac monitor, continuous capnometry, continuous pulse oximetry, frequent vital sign checks and frequent LOC assessments    Intra-procedure events: none      Sedation end time:  7/20/2024 2:00 AM    Total sedation time (minutes):  25  Post-procedure details:     Attendance: Constant attendance by certified staff until patient recovered      Recovery: Patient returned to pre-procedure baseline      Estimated blood loss (see I/O flowsheets): no      Complications:  None    Post-sedation assessments completed and reviewed: airway patency, cardiovascular function, hydration status, mental status, nausea/vomiting and respiratory function      Specimens recovered:  None    Patient is stable for discharge or admission: yes      Procedure completion:  Tolerated well, no immediate complications  FX Dislocation    Date/Time: 7/20/2024 1:35 AM    Performed by: nErique Isbell MD  Authorized by: Enrique Isbell MD    Consent:     Consent obtained:  Verbal and written    Consent given by:  Patient and spouse    Risks, benefits, and alternatives were discussed: yes      Risks discussed:  Nerve damage, pain and vascular damage    Alternatives discussed:  No treatment and alternative treatment  Universal protocol:     Procedure explained and questions answered to patient or proxy's satisfaction: yes      Relevant documents present and verified: yes      Test results available: yes      Imaging studies available: yes      Required blood products, implants, devices, and special equipment available: yes      Site/side marked: yes      Immediately prior to procedure, a time out was called: yes      Patient identity confirmed:  Verbally with patient and arm band  Injury:     Injury  location:  Forearm    Forearm injury location:  L forearm    Forearm fracture type: distal radius and ulnar styloid    Pre-procedure details:     Distal neurologic exam:  Normal    Distal perfusion: distal pulses strong and brisk capillary refill      Range of motion: reduced    Sedation:     Sedation type:  Moderate sedation  Anesthesia:     Anesthesia method:  None  Procedure details:     Manipulation performed: yes      Reduction successful: yes      X-ray confirmed reduction: yes      Immobilization:  Splint    Splint type:  Sugar tong    Supplies used:  Plaster    Attestation: Splint applied and adjusted personally by me    Post-procedure details:     Distal neurologic exam:  Normal    Distal perfusion: distal pulses strong and brisk capillary refill      Range of motion: improved      Procedure completion:  Tolerated well, no immediate complications             ED Course  ED Course as of 07/20/24 1128   Sat Jul 20, 2024   0120 The patient suffered mechanical fall as she was exiting her house onto the deck.  She tried to catch herself on her outstretched left hand.  She has a low-grade open comminuted left wrist fracture.  I am getting ready to do sedation and reduction and splinting.  She also has a left subcapital hip fracture.  I discussed both fractures with Dr. Demond Alejo of orthopedic surgery who will consult on the patient.  The patient should remain n.p.o. after midnight.  She does not take anticoagulation. [NS]      ED Course User Index  [NS] Enrique Isbell MD                                             Medical Decision Making  Differential diagnosis includes left wrist fracture wrist dislocation, left hip fracture pelvic fracture    Labs were nonrevealing.  X-ray of the left hip shows a left subcapital femur fracture.    X-ray of the left wrist shows a comminuted distal radius and ulnar styloid fracture.    These findings were discussed with the on-call orthopedic surgeon, Dr. Demond Alejo, who  has ordered CT imaging and recommends admission for further evaluation and surgical planning.    I performed a sedation, and reduction and splinting of the left wrist.  The patient was neurovascular intact after the reduction procedure.    The patient received pain medication multiple times throughout the ER course.    I discussed the patient with the hospitalist, who will consult on the patient to determine status of admission.    Problems Addressed:  Left wrist fracture, open, initial encounter: complicated acute illness or injury with systemic symptoms  Subcapital fracture of hip, left, closed, initial encounter: complicated acute illness or injury with systemic symptoms    Amount and/or Complexity of Data Reviewed  Independent Historian: spouse     Details:  and daughter provide additional information  External Data Reviewed: labs and radiology.  Labs: ordered. Decision-making details documented in ED Course.  Radiology: ordered and independent interpretation performed. Decision-making details documented in ED Course.    Risk  OTC drugs.  Prescription drug management.  Decision regarding hospitalization.        Final diagnoses:   Subcapital fracture of hip, left, closed, initial encounter   Left wrist fracture, open, initial encounter       ED Disposition  ED Disposition       ED Disposition   Decision to Admit    Condition   --    Comment   Level of Care: Telemetry [5]   Diagnosis: Femur fracture [[id number]]   Admitting Physician: PILI AMES [[id number]]   Attending Physician: PILI AMES [[id number]]   Certification: I Certify That Inpatient Hospital Services Are Medically Necessary For Greater Than 2 Midnights                 No follow-up provider specified.       Medication List        ASK your doctor about these medications      cetirizine 5 MG tablet  Commonly known as: zyrTEC  Ask about: Which instructions should I use?                 Enrique Isbell MD  07/20/24 7600

## 2024-07-20 NOTE — PLAN OF CARE
Problem: Skin Injury Risk Increased  Goal: Skin Health and Integrity  Outcome: Ongoing, Progressing  Intervention: Optimize Skin Protection  Description: Perform a full pressure injury risk assessment, as indicated by screening, upon admission to care unit.  Reassess skin (injury risk, full inspection) frequently (e.g., scheduled interval, with change in condition) to provide optimal early detection and prevention.  Maintain adequate tissue perfusion (e.g., encourage fluid balance; avoid crossing legs, constrictive clothing or devices) to promote tissue oxygenation.  Maintain head of bed at lowest degree of elevation tolerated, considering medical condition and other restrictions.  Avoid positioning onto an area that remains reddened.  Minimize incontinence and moisture (e.g., toileting schedule; moisture-wicking pad, diaper or incontinence collection device; skin moisture barrier).  Cleanse skin promptly and gently when soiled utilizing a pH-balanced cleanser.  Relieve and redistribute pressure (e.g., scheduled position changes, weight shifts, use of support surface, medical device repositioning, protective dressing application, use of positioning device, microclimate control, use of pressure-injury-monitor  Encourage increased activity, such as sitting in a chair at the bedside or early mobilization, when able to tolerate.   Goal Outcome Evaluation:

## 2024-07-20 NOTE — ED NOTES
Pari Howell    Nursing Report ED to Floor:  Mental status: GCS 15  Ambulatory status: did not ambulate in ED  Oxygen Therapy:  RA  Cardiac Rhythm: NSR  Admitted from: home  Safety Concerns:  fall risk  Social Issues: none  ED Room #:  18    ED Nurse Phone Extension - [phone] or may call [phone].      HPI:   Chief Complaint   Patient presents with    Fall    Wrist Injury       Past Medical History:  Past Medical History:   Diagnosis Date    Bladder infection     Dyspareunia, female     Glaucoma     H/O sexual problem     High cholesterol     History of abnormal cervical Pap smear     Hypertension     Hypertension     Hypothyroidism     Impaired functional mobility, balance, gait, and endurance     Labial cyst     Muscle weakness     Type 2 diabetes mellitus     Urinary incontinence     Vaginal itching     Yeast infection         Past Surgical History:  Past Surgical History:   Procedure Laterality Date    CARPAL TUNNEL RELEASE  2007    CATARACT EXTRACTION Right 09/27/2022    CATARACT EXTRACTION, BILATERAL Left     CHOLECYSTECTOMY  1991    EYE SURGERY  2021    HIP FRACTURE SURGERY Right     HYSTERECTOMY  2007    OOPHORECTOMY      TOTAL HIP ARTHROPLASTY Right 02/04/2024    Procedure: TOTAL HIP ARTHROPLASTY ANTERIOR RIGHT;  Surgeon: Doni Evans MD;  Location: [facility];  Service: Orthopedics;  Laterality: Right;    TUBAL ABDOMINAL LIGATION  11/1979        Admitting Doctor:   Dakota Valiente DO    Consulting Provider(s):  Consults       No orders found for last 30 day(s).             Admitting Diagnosis:   The primary encounter diagnosis was Subcapital fracture of hip, left, closed, initial encounter. A diagnosis of Left wrist fracture, open, initial encounter was also pertinent to this visit.    Most Recent Vitals:   Vitals:    07/20/24 0151 07/20/24 0154 07/20/24 0157 07/20/24 0200   BP:       BP Location:       Patient Position:       Pulse: 97 94 96 90   Resp:       Temp:       TempSrc:       SpO2: 100% 100% 96% 96%    Weight:       Height:           Active LDAs/IV Access:   Lines, Drains & Airways       Active LDAs       Name Placement date Placement time Site Days    Peripheral IV 07/19/24 2259 Right Antecubital 07/19/24 2259  Antecubital  less than 1                    Labs (abnormal labs have a star):   Labs Reviewed   COMPREHENSIVE METABOLIC PANEL - Abnormal; Notable for the following components:       Result Value    Glucose 183 (*)     All other components within normal limits    Narrative:     GFR Normal >60  Chronic Kidney Disease <60  Kidney Failure <15    The GFR formula is only valid for adults with stable renal function between ages 18 and 70.   CBC WITH AUTO DIFFERENTIAL   CBC AND DIFFERENTIAL    Narrative:     The following orders were created for panel order CBC & Differential.  Procedure                               Abnormality         Status                     ---------                               -----------         ------                     CBC Auto Differential[[id number]]                                                         Please view results for these tests on the individual orders.       Meds Given in ED:   Medications   sodium chloride 0.9 % flush 10 mL (has no administration in time range)   ceFAZolin 2000 mg IVPB in 100 mL NS (MBP) (has no administration in time range)   HYDROmorphone (DILAUDID) injection 1 mg (1 mg Intravenous Given 7/19/24 2314)   Propofol (DIPRIVAN) injection 200 mg (200 mg Intravenous Given by Other 7/20/24 0137)           Last NIH score:                                                          Dysphagia screening results:  Patient Factors Component (Dysphagia:Stroke or Rule-out)  Best Eye Response: 4-->(E4) spontaneous (07/19/24 2231)  Best Motor Response: 6-->(M6) obeys commands (07/19/24 2231)  Best Verbal Response: 5-->(V5) oriented (07/19/24 2231)  [place] Coma Scale Score: 15 (07/19/24 2231)     [place] Coma Scale:  No data recorded     CIWA:        Restraint Type:             Isolation Status:  No active isolations

## 2024-07-20 NOTE — OP NOTE
TOTAL HIP ARTHROPLASTY ANTERIOR  Procedure Report    Patient Name:  Pari Howell  YOB: 1950    Date of Surgery:  7/20/2024     Indications:    74 y.o. female who was admitted to [facility] following a fall from standing with significant pain and difficulty ambulating. X-rays revealed a displaced femoral neck fracture.    The patient was indicated for a total hip arthroplasty. Likely risks and benefits of the procedure including but not limited to infection, DVT, pulmonary embolism, leg length discrepancy, recurrent dislocation, possibility of injury to nerves and vessels, and periprosthetic fractures have been discussed with the patient and her daughter in detail. Despite the risks involved, the patient elected to proceed and informed consent was obtained.     Patient also had a ipsilateral open distal radius fracture that was managed by Dr. Demond Alejo    Patient Identification:  Patient was seen in the preop, consent was reviewed, operative procedure was identified, and surgical site and thigh marked.      Pre-op Diagnosis:   Left femoral neck fracture       Postop diagnosis:  Same    Procedure/CPT® Codes:      Procedure(s):  OPEN REDUCTION INTERNAL FIXATION DISTAL RADIUS - LEFT  TOTAL HIP ARTHROPLASTY ANTERIOR    Staff:  Surgeon(s):  Doni Evans MD Talwalkar, Janak Ramesh, MD    Anesthesia: General with Block    Estimated Blood Loss: 250 mL    Implants:    Implant Name Type Inv. Item Serial No.  Lot No. LRB No. Used Action   DEV CONTRL TISS STRATAFIX SYMM PDS PLUS NATALI CT-1 45CM - [id number] Implant DEV CONTRL TISS STRATAFIX SYMM PDS PLUS NATALI CT-1 45CM  [employer] [id number] Left 1 Implanted   SUT NONABS MAXBRAID/PE NMBR2 HC5 38IN T [id number] - [id number] Implant SUT NONABS MAXBRAID/PE NMBR2 HC5 38IN T [id number]  [employer] [id number] Left 1 Implanted   SUT NONABS MAXBRAID/PE NMBR2 HC5 38IN T 393451 - [id number] Implant SUT NONABS MAXBRAID/PE  NMBR2 HC5 38IN T 396338  [facility] [id number] Left 1 Implanted   PLT DIST/RAD GEMINUS VOLR STD 3HL LT - [id number] Implant PLT DIST/RAD GEMINUS VOLR STD 3HL LT  [employer]KELET[employer]YNAM[employer]  Left 1 Implanted   INSRT HIP TRIDENT X3 0DEG 36MM LILIANE STRL - [id number] Implant INSRT HIP TRIDENT X3 0DEG 36MM LILIANE STRL  [employer]GA [employer]AN[employer] [id number] Left 1 Implanted   SHLL TRIDENT2 TRITANIUM C/HL 52MM - [id number] Implant SHLL TRIDENT2 TRITANIUM C/HL 52MM  [employer] JANIE [id number] Left 1 Implanted   SCRW HEX LP TRIDENT2 6.5X35MM - [id number] Implant SCRW HEX LP TRIDENT2 6.5X35MM  OLGA JANIE [id number] Left 1 Implanted   CMT BONE SIMPLEX/P TMYCIN FDOS 10PK - [id number] Implant CMT BONE SIMPLEX/P TMYCIN FDOS 10PK  OLGA JANIE [id number] Left 2 Implanted   PLUG BONE IM FEM/HIP [id number] 12MM - [id number] Implant PLUG BONE IM FEM/HIP [id number] 12MM  [employer]GA JANIE [id number] Left 1 Implanted   STEM FEM/HIP EXETER V40 CMT SS OFFST/44MM SZ2 150MM - [id number] Implant STEM FEM/HIP EXETER V40 CMT SS OFFST/44MM SZ2 150MM  [employer]GA JANIE [id number] Left 1 Implanted   HD FEM/HIP BIOLOX/DELTA V40 CERAM 36MM MIN2.5 - [id number] Implant HD FEM/HIP BIOLOX/DELTA V40 CERAM 36MM MIN2.5  OLGA JANIE [id number] Left 1 Implanted   SCRW GEMINUS PA NL TI 3.5X12MM - [id number] Implant SCRW GEMINUS PA NL TI 3.5X12MM  [employer]KELET[employer]YNAM[employer]  Left 1 Implanted   PEG VOLR GEMINUS SMOTH LK TI 2X20MM - [id number] Implant PEG VOLR GEMINUS SMOTH LK TI 2X20MM  [employer]  Left 6 Implanted   PEG GEMINUS THRD LK TI 2.3X20MM - [id number] Implant PEG GEMINUS THRD LK TI 2.3X20MM  S[employer]YNAM[employer]  Left 1 Implanted   PEG VOLR GEMINUS SMOTH LK TI 2X23MM - [id number] Implant PEG VOLR GEMINUS SMOTH LK TI 2X23MM  [employer]  Left 1 Implanted   SCRW GEMINUS PA NL TI 3.5X11MM - [id number] Implant SCRW GEMINUS PA NL TI 3.5X11MM  SKELET[employer]YNAM[employer]  Left 1 Implanted   SCRW GEMINUS PA NL 3TI .5X13MM - [id number] Implant SCRW GEMINUS PA NL 3TI .5X13MM  SKELET[employer]YNAM[employer]  Left 1 Implanted        Specimen:          None      Findings: see dictation    Complications: none    Description of Procedure:   The patient was transferred to [facility] operating room and preoperative antibiotics were given IV prior to skin incision as well as 1 gram of Tranexemic Acid. Patient received general anesthesia and was transferred to the [place] Table. All bony prominences were padded adequately. The operative hip was then prepped and draped in the usual sterile fashion. Multiple timeouts were done identifying the correct patient, surgical site, and planned procedure.    A skin incision was made overlying the anterior lateral aspect of the hip for about 10 cm just below the lateral to the anterior superior iliac spine. Skin and subcutaneous tissue and fascia were incised in line with the skin incision overlying the tensor fascia michael muscle. The tensor muscle was then retracted laterally and the interval between sartorius and rectus femoris medially and the tensor fascia muscle were developed. The lateral femoral circumflex vessels were identified and were ligated without difficulty. The deep fascia was incised and the capsule of the hip joint was identified. We then placed a soft tissue protecting device deep to the rectus and the tensor. Retractors were then placed extracapsular and the capsule was then incised in a T-shaped manner and the anterior posterior capsules were then tied with #2 FiberWire. Following this, retractors were placed intracapsularly. We did identify the obvious signs of severe osteoarthritis upon incising the capsule. We then made appropriate releases on the inferior medial neck and along the saddle of the femoral neck. Femoral neck remaining was identified and osteotomy was done according to the preoperative templating with an oscillating saw. The femoral head and cut neck were extracted using a corkscrew without difficulty. The femoral head did have some minor signs of articular  cartilage loss and superior wear.    The acetabular cavity was exposed by placing retractors and taking care to protect the anterior vascular structures. The labrum was excised and the pulvinar was excised from the cotyloid fossa. The acetabular cavity was then progressively reamed maintaining the correct inclination and version under image intensifier control. Adequate medialization was obtained. Adequate fit was found to be obtained with the reamer size of 52. The Raymond T2 cup size 52 was then impacted into position. This was found to seat satisfactorily with adequate inclination and anteversion. Single screw was placed for additional fixation. Following this, the cup was cleaned and then a neutral liner impacted. Following this, the periarticular tissues were then infiltrated with local anesthetic.    Attention was then directed to the proximal femur. The proximal femur was delivered using a trochanteric hook placed just distal to the vastus tubercle and proximal to the gluteus cheryl tendon. The leg was then externally rotated and gross traction released. Hyperextension and adduction was done using the Lamar table. Mechanical lift on the table was utilized to support the femur and appropriate capsular releases were made to expose the medial slope of the greater trochanter. The proximal femur was delivered and the femoral canal was then entered by removing lateral femoral neck with a box chisel by serial broaching. Adequate fit was found to be obtained with the Size 44 N2 150 broach. We then trialed and -2.5 neck was reduced. Fluoroscopic imaging was used to assess leg length and offset was found to be symmetric. Broach trials were then removed. Cement restrictor was placed distally. The femoral canal was prepped with thorough irrigation followed by a peroxide soak followed by an epinephrine soak. Antibiotic cement was inserted in a retrograde-fashion and pressurization was done prior to implanting the implant.  A 44 N2 150 [place] stem was implanted in appropriate anteversion. It sat very similar to our broach trial. We chose the -2.5 neck ceramic. This was then reduced again and fluoroscopy images both lateral and AP of the femur showed the stem to be in good position. AP pelvis showed symmetric leg length and offset. There are no acute fractures. The area was thoroughly irrigated with saline. We did use of more of the injection cocktail. Betadine irrigation was used followed by saline irrigation. Soft tissue hemostasis was secured and second dose of IV TXA was used. Sponge, needle, and instrument counts were correct. FiberWire sutures directly anterior and posterior capsular flaps were tied to each other. 1 g vanco powder placed deep. We then closed the fascial layer with a running #2 STRATAFIX followed by 2-0 vicryl and then monocryl for the skin and Dermabond . Once the Dermabond had dried, Mepilex AG dressing was placed over the top. The patient was then transferred to the recovery room in stable condition. The patient tolerated the procedure well and there were no complications. The patient had adequate distal pulses and good cap refill.    The patient will be mobilized by physical therapy and receive antibiotic prophylaxis postoperatively for 2 more doses given the first 24 hours. The patient was started on DVT prophylaxis with 81 mg aspirin twice daily starting postoperative day #1.  Due to the ipsilateral distal radius fracture Dr. Alejo I discussed the patient to be platform weightbearing with a walker through the left upper extremity to allow weight through the elbow and through the wrist    I discussed the satisfactory performance of the procedure with the patient's family and discussed the postoperative management.      Assistant Participation:  Surgeon(s):  Doni Evans MD Talwalkar, Janak Ramesh, MD Tina Trent, PA assisted with proper preoperative positioning, preoperative templating, determining  availability of proper implants, prepping and draping of patient, manipulation placement of instruments, protection of ligaments and vital soft tissue structures, assistance in maintaining hemostasis and assistance with closure of the wound. Their skills and knowledge of the steps of operation and the desired outcome of each surgical step was crucial, allowing for efficient choreography of surgical procedure, and closure of the wound which lead to reduced surgical time, less blood loss, and less risk of complications for the patient.         Doni Evnas MD     Date: 7/20/2024  Time: 16:02 EDT

## 2024-07-20 NOTE — ANESTHESIA PROCEDURE NOTES
Left Fascia iliac SS      Patient reassessed immediately prior to procedure    Start time: 7/20/2024 11:20 AM  Reason for block: at surgeon's request and post-op pain management  Performed by  CRNA/CAA: Grant Elias CRNA  Assisted by: Janina Chávez RN  Preanesthetic Checklist  Completed: patient identified, IV checked, site marked, risks and benefits discussed, surgical consent, monitors and equipment checked, pre-op evaluation and timeout performed  Prep:  Pt Position: supine  Sterile barriers:cap, gloves, mask and washed/disinfected hands  Prep: ChloraPrep  Patient monitoring: blood pressure monitoring, continuous pulse oximetry and EKG  Procedure    Sedation: yes  Performed under: local infiltration  Guidance:ultrasound guided    ULTRASOUND INTERPRETATION.  Using ultrasound guidance a 20 G gauge needle was placed in close proximity to the nerve, at which point, under ultrasound guidance anesthetic was injected in the area of the nerve and spread of the anesthesia was seen on ultrasound in close proximity thereto.  There were no abnormalities seen on ultrasound; a digital image was taken; and the patient tolerated the procedure with no complications. Images:still images obtained, printed/placed on chart    Laterality:left  Block Type:fascia iliaca compartment  Injection Technique:single-shot  Needle Type:echogenic and Tuohy  Needle Gauge:18 G  Resistance on Injection: none  Catheter Size:20 G (20g)    Medications Used: bupivacaine PF (MARCAINE) 0.25 % injection - Injection   30 mL - 7/20/2024 11:20:00 AM      Medications  Preservative Free Saline:5ml    Post Assessment  Injection Assessment: negative aspiration for heme, no paresthesia on injection and incremental injection  Patient Tolerance:comfortable throughout block  Complications:no  Additional Notes  CKAFASCIAILIACA: SINGLE shot   A high-frequency linear transducer, with sterile cover, was placed in parasagittal plane on top of the Anterior  "Superior Iliac Spine (ASIS) and moved medially to identify the Internal Oblique muscle, Sartorius muscle, Iliacus Muscle, Fascia Iliaca (FI) and Fascia Latae. The insertion site was prepped and draped in sterile fashion. Skin and cutaneous tissue was infiltrated with 2-5 ml of 1% Lidocaine. Using ultrasound-guidance, a 20-gauge B-Moraes 4\" Ultraplex 360 non-stimulating echogenic needle was advanced in plane from caudad to cephalad. Preservative-free normal saline was utilized for hydro-dissection of tissue, advancement of needle, and to confirm final needle placement below FI. Local anesthetic in incremental 3-5 ml injections. Aspiration every 5 ml to prevent intravascular injection. Injection was completed with negative aspiration of blood and negative intravascular injection. Injection pressures were normal with minimal resistance.   Performed by: Grant Elias, CRNA            "

## 2024-07-20 NOTE — ANESTHESIA POSTPROCEDURE EVALUATION
Patient: Pari Howell    Procedure Summary       Date: 07/20/24 Room / Location:  [facility] OR  /  [facility] OR    Anesthesia Start: 1139 Anesthesia Stop: 1612    Procedures:       OPEN REDUCTION INTERNAL FIXATION DISTAL RADIUS - LEFT (Left: Wrist)      TOTAL HIP ARTHROPLASTY ANTERIOR (Left: Hip) Diagnosis:       Left wrist fracture, open, initial encounter      (Left wrist fracture, open, initial encounter [S62.102B])    Surgeons: Devan Modi MD; Doni Evans MD Provider: Tyson Hood MD    Anesthesia Type: general with block ASA Status: 2            Anesthesia Type: general with block    Vitals  Vitals Value Taken Time   /55 07/20/24 1612   Temp 98.4 °F (36.9 °C) 07/20/24 1612   Pulse 90 07/20/24 1612   Resp 12 07/20/24 1612   SpO2 95 % 07/20/24 1612           Post Anesthesia Care and Evaluation    Patient location during evaluation: PACU  Patient participation: complete - patient participated  Level of consciousness: awake and alert  Pain score: 0  Pain management: adequate    Airway patency: patent  Anesthetic complications: No anesthetic complications  PONV Status: none  Cardiovascular status: hemodynamically stable and acceptable  Respiratory status: nonlabored ventilation, acceptable and nasal cannula  Hydration status: acceptable

## 2024-07-20 NOTE — PLAN OF CARE
Goal Outcome Evaluation:  Plan of Care Reviewed With: patient        Progress: no change  Outcome Evaluation: Remains sinus on the monitor. Glucoses 240 upon return from PACU. see MAR. Dressing to left hip and left wrist CDI. Pain managed with nerve cath . Pt sleeping between care but arounses easily.

## 2024-07-20 NOTE — OP NOTE
Orthopaedics Operative Report     PREOPERATIVE DIAGNOSIS: Left displaced intra-articular grade 1 open distal radius and ulna fracture     POSTOPERATIVE DIAGNOSIS: Same     PROCEDURE PERFORMED: Open reduction and internal fixation left intra-articular three-part distal radius fracture     CPT: 88826     SURGEON: Devan Modi MD     ANESTHESIA:  General with Block     TOURNIQUET TIME: 83 minutes     ESTIMATED BLOOD LOSS: Minimal     COMPLICATIONS: None apparent.     IMPLANTS: Skeletal Dynamics Geminus plate     PREOPERATIVE ANTIBIOTICS: Kefzol     REFERRING PHYSICIAN: ELIZABETH Amaya     INDICATIONS:      DESCRIPTION OF PROCEDURE: After informed consent was obtained, the patient was taken to the operating room.  A block had been placed in the holding area.  After the smooth induction of general anesthesia, the left upper extremity was prepped and draped in the usual fashion for this type of procedure.  We performed timeout to verify the site and the procedure to be performed.  We verified that the patient had received their IV antibiotics and exsanguinated the left upper extremity using an Esmarch bandage and inflated tourniquet to 250 mmHg.  We made our standard FCR approach to the distal radius.  Identified the FCR tendon and the sheath was incised and the tendon and the FPL was swept ulnarly to expose the pronator quadratus.  The pronator was taken off the volar surface of the distal radius near the watershed line distally and with a sleeve of muscular remnant radially for repair at the end of the case.  We then opened the first dorsal compartment and released the brachioradialis off the radial styloid.  We then exposed and identified our fracture which appeared to be intraarticular and in greater than 3 fragments which was then reduced.   A small stab incision was made adjacent to the radial styloid and blunt dissection taken down to the radial styloid..  We brought in mini C arm to verify that the  fracture was appropriately reduced.  Once this was performed, we chose the appropriate sized plate and this was provisionally applied to the shaft through the slotted hole.  We then placed our K wires through the radial styloid and through the ulnar head of the plate per the surgical technique and verified that the plate was appropriately placed.  Once minor adjustments were made, we then began our distal fixation.  7 total points of fixation were obtained distally once the K wires were removed.  2 more points of fixation were then placed within the shaft.  We again brought in mini C arm and ranged the wrist under C arm imaging and this verified that the fracture was stable.  We then thoroughly irrigated the incision.  We verified that our drill guides had all been removed from the plate.  We closed our pronator using Vicryl suture.  We then held direct pressure and deflated tourniquet and obtained hemostasis.  We then closed the subcutaneous layer using interrupted 3-0 Vicryl and the skin was run using 3-0 nylon.  Sterile dressings were applied to the incision and then the patient was placed in a sugar-tong splint in neutral wrist dorsiflexion.  All counts were correct postoperatively.  I performed the case.      First assistant: Yumi Gonzalez PA-C.      The skilled assistance of the above noted first assistant was necessary during this complex surgical procedure.  The surgical assistant assisted with every aspect of the operation including, but not limited to, proper and safe positioning of the patient, obtaining adequate surgical exposure, manipulation of surgical instruments, suture management, surgical knot tying when necessary, the continual process of hemostasis during the procedure itself in addition to surgical wound closure and removal of the patient from the operating table and returning the patient back to the [facility].  The assistance of the surgical assistant allowed me to perform the most  sensitive and technical potions of this operation using 2 hands, thus enhancing efficiency and patient safety.  This would not be possible without the help of a skilled assistant familiar with the procedure and capable of safely performing the aforementioned tasks.     POSTOPERATIVE PLAN:  1. Weight bearing status: Weight-bear as tolerated left upper extremity through the elbow  2.  Regional and oxycodone for pain control  3.  Follow-up in the office in 2 weeks time for wound check and suture removal.  Plan on putting her into a short arm cast for 2 weeks and then at 4 weeks, if everything looks okay radiographically, initiate removable splinting and gentle range of motion exercises.  4.  Patient will be weightbearing as tolerated left lower extremity after her left [place] to be done after this procedure  5.  Elevate upper extremity at or above the heart  6.  PT and OT  7.  Further care from hospitalist service.     Devan Modi M.D.*

## 2024-07-20 NOTE — ANESTHESIA PREPROCEDURE EVALUATION
Anesthesia Evaluation     Patient summary reviewed and Nursing notes reviewed   no history of anesthetic complications:   NPO Solid Status: > 8 hours  NPO Liquid Status: > 2 hours           Airway   Mallampati: III  TM distance: >3 FB  Neck ROM: full  Possible difficult intubation  Dental - normal exam     Pulmonary - negative pulmonary ROS and normal exam    breath sounds clear to auscultation  Cardiovascular - normal exam    Rhythm: regular  Rate: normal    (+) hypertension, hyperlipidemia      Neuro/Psych- negative ROS  GI/Hepatic/Renal/Endo    (+) GERD, diabetes mellitus (On ozempic; a1c 6.0%), thyroid problem hypothyroidism    ROS Comment: IBS    Musculoskeletal     Abdominal    Substance History - negative use     OB/GYN          Other   arthritis,     ROS/Med Hx Other: Ozempic - last dose 7/15/24    Fall resulting in a complex injury involving the left distal radius and ulna and left femoral neck    Hgb 14.5  K 3.9                Anesthesia Plan    ASA 2     general with block     intravenous induction     Anesthetic plan, risks, benefits, and alternatives have been provided, discussed and informed consent has been obtained with: patient.    Plan discussed with CRNA.    CODE STATUS:    Code Status (Patient has no pulse and is not breathing): CPR (Attempt to Resuscitate)  Medical Interventions (Patient has pulse or is breathing): Full Support

## 2024-07-21 LAB
ALBUMIN SERPL-MCNC: 3.5 G/DL (ref 3.5–5.2)
ANION GAP SERPL CALCULATED.3IONS-SCNC: 15 MMOL/L (ref 5–15)
BASOPHILS # BLD AUTO: 0.08 10*3/MM3 (ref 0–0.2)
BASOPHILS NFR BLD AUTO: 0.7 % (ref 0–1.5)
BUN SERPL-MCNC: 11 MG/DL (ref 8–23)
BUN/CREAT SERPL: 16.7 (ref 7–25)
CALCIUM SPEC-SCNC: 8.9 MG/DL (ref 8.6–10.5)
CHLORIDE SERPL-SCNC: 105 MMOL/L (ref 98–107)
CO2 SERPL-SCNC: 19 MMOL/L (ref 22–29)
CREAT SERPL-MCNC: 0.66 MG/DL (ref 0.57–1)
DEPRECATED RDW RBC AUTO: 41.8 FL (ref 37–54)
EGFRCR SERPLBLD CKD-EPI 2021: 92.2 ML/MIN/1.73
EOSINOPHIL # BLD AUTO: 0.93 10*3/MM3 (ref 0–0.4)
EOSINOPHIL NFR BLD AUTO: 7.8 % (ref 0.3–6.2)
ERYTHROCYTE [DISTWIDTH] IN BLOOD BY AUTOMATED COUNT: 12.9 % (ref 12.3–15.4)
GLUCOSE BLDC GLUCOMTR-MCNC: 134 MG/DL (ref 70–130)
GLUCOSE BLDC GLUCOMTR-MCNC: 143 MG/DL (ref 70–130)
GLUCOSE BLDC GLUCOMTR-MCNC: 151 MG/DL (ref 70–130)
GLUCOSE BLDC GLUCOMTR-MCNC: 152 MG/DL (ref 70–130)
GLUCOSE SERPL-MCNC: 175 MG/DL (ref 65–99)
HCT VFR BLD AUTO: 32.2 % (ref 34–46.6)
HGB BLD-MCNC: 11.2 G/DL (ref 12–15.9)
IMM GRANULOCYTES # BLD AUTO: 0.09 10*3/MM3 (ref 0–0.05)
IMM GRANULOCYTES NFR BLD AUTO: 0.8 % (ref 0–0.5)
LYMPHOCYTES # BLD AUTO: 1.85 10*3/MM3 (ref 0.7–3.1)
LYMPHOCYTES NFR BLD AUTO: 15.5 % (ref 19.6–45.3)
MCH RBC QN AUTO: 30.9 PG (ref 26.6–33)
MCHC RBC AUTO-ENTMCNC: 34.8 G/DL (ref 31.5–35.7)
MCV RBC AUTO: 88.7 FL (ref 79–97)
MONOCYTES # BLD AUTO: 1.05 10*3/MM3 (ref 0.1–0.9)
MONOCYTES NFR BLD AUTO: 8.8 % (ref 5–12)
NEUTROPHILS NFR BLD AUTO: 66.4 % (ref 42.7–76)
NEUTROPHILS NFR BLD AUTO: 7.96 10*3/MM3 (ref 1.7–7)
NRBC BLD AUTO-RTO: 0 /100 WBC (ref 0–0.2)
PHOSPHATE SERPL-MCNC: 2.8 MG/DL (ref 2.5–4.5)
PLATELET # BLD AUTO: 192 10*3/MM3 (ref 140–450)
PMV BLD AUTO: 10 FL (ref 6–12)
POTASSIUM SERPL-SCNC: 4.7 MMOL/L (ref 3.5–5.2)
RBC # BLD AUTO: 3.63 10*6/MM3 (ref 3.77–5.28)
SODIUM SERPL-SCNC: 139 MMOL/L (ref 136–145)
WBC NRBC COR # BLD AUTO: 11.96 10*3/MM3 (ref 3.4–10.8)

## 2024-07-21 PROCEDURE — 25010000002 CEFAZOLIN PER 500 MG: Performed by: ORTHOPAEDIC SURGERY

## 2024-07-21 PROCEDURE — 97166 OT EVAL MOD COMPLEX 45 MIN: CPT

## 2024-07-21 PROCEDURE — 80069 RENAL FUNCTION PANEL: CPT | Performed by: ORTHOPAEDIC SURGERY

## 2024-07-21 PROCEDURE — 97110 THERAPEUTIC EXERCISES: CPT

## 2024-07-21 PROCEDURE — 85025 COMPLETE CBC W/AUTO DIFF WBC: CPT | Performed by: FAMILY MEDICINE

## 2024-07-21 PROCEDURE — 97530 THERAPEUTIC ACTIVITIES: CPT

## 2024-07-21 PROCEDURE — 25010000002 SODIUM CHLORIDE 0.9 % WITH KCL 20 MEQ 20-0.9 MEQ/L-% SOLUTION: Performed by: ORTHOPAEDIC SURGERY

## 2024-07-21 PROCEDURE — 63710000001 INSULIN LISPRO (HUMAN) PER 5 UNITS: Performed by: ORTHOPAEDIC SURGERY

## 2024-07-21 PROCEDURE — 82948 REAGENT STRIP/BLOOD GLUCOSE: CPT

## 2024-07-21 PROCEDURE — 99232 SBSQ HOSP IP/OBS MODERATE 35: CPT | Performed by: INTERNAL MEDICINE

## 2024-07-21 PROCEDURE — 97162 PT EVAL MOD COMPLEX 30 MIN: CPT

## 2024-07-21 RX ADMIN — SENNOSIDES AND DOCUSATE SODIUM 2 TABLET: 50; 8.6 TABLET ORAL at 09:22

## 2024-07-21 RX ADMIN — PRAVASTATIN SODIUM 40 MG: 40 TABLET ORAL at 17:33

## 2024-07-21 RX ADMIN — SODIUM CHLORIDE AND POTASSIUM CHLORIDE 50 ML/HR: .9; .15 SOLUTION INTRAVENOUS at 21:43

## 2024-07-21 RX ADMIN — DOCUSATE SODIUM 100 MG: 100 CAPSULE, LIQUID FILLED ORAL at 09:22

## 2024-07-21 RX ADMIN — HYDROCODONE BITARTRATE AND ACETAMINOPHEN 1 TABLET: 7.5; 325 TABLET ORAL at 09:22

## 2024-07-21 RX ADMIN — INSULIN LISPRO 2 UNITS: 100 INJECTION, SOLUTION INTRAVENOUS; SUBCUTANEOUS at 21:44

## 2024-07-21 RX ADMIN — HYDROCODONE BITARTRATE AND ACETAMINOPHEN 1 TABLET: 7.5; 325 TABLET ORAL at 03:15

## 2024-07-21 RX ADMIN — LOSARTAN POTASSIUM 50 MG: 50 TABLET, FILM COATED ORAL at 08:31

## 2024-07-21 RX ADMIN — AMLODIPINE BESYLATE 10 MG: 10 TABLET ORAL at 08:30

## 2024-07-21 RX ADMIN — TIMOLOL MALEATE 1 DROP: 5 SOLUTION/ DROPS OPHTHALMIC at 21:46

## 2024-07-21 RX ADMIN — HYDROCODONE BITARTRATE AND ACETAMINOPHEN 1 TABLET: 7.5; 325 TABLET ORAL at 17:37

## 2024-07-21 RX ADMIN — TIMOLOL MALEATE 1 DROP: 5 SOLUTION/ DROPS OPHTHALMIC at 09:00

## 2024-07-21 RX ADMIN — CETIRIZINE HYDROCHLORIDE 5 MG: 10 TABLET, FILM COATED ORAL at 03:52

## 2024-07-21 RX ADMIN — LEVOTHYROXINE SODIUM 75 MCG: 0.07 TABLET ORAL at 03:52

## 2024-07-21 RX ADMIN — SODIUM CHLORIDE 2 G: 900 INJECTION INTRAVENOUS at 02:20

## 2024-07-21 RX ADMIN — ASPIRIN 81 MG: 81 TABLET, COATED ORAL at 08:36

## 2024-07-21 RX ADMIN — SENNOSIDES AND DOCUSATE SODIUM 2 TABLET: 50; 8.6 TABLET ORAL at 23:05

## 2024-07-21 RX ADMIN — INSULIN LISPRO 2 UNITS: 100 INJECTION, SOLUTION INTRAVENOUS; SUBCUTANEOUS at 17:33

## 2024-07-21 RX ADMIN — Medication: at 18:48

## 2024-07-21 RX ADMIN — Medication 3 ML: at 21:46

## 2024-07-21 RX ADMIN — ASPIRIN 81 MG: 81 TABLET, COATED ORAL at 21:43

## 2024-07-21 RX ADMIN — BISACODYL 5 MG: 5 TABLET, COATED ORAL at 23:05

## 2024-07-21 RX ADMIN — LATANOPROST 1 DROP: 50 SOLUTION OPHTHALMIC at 21:45

## 2024-07-21 RX ADMIN — PANTOPRAZOLE SODIUM 40 MG: 40 TABLET, DELAYED RELEASE ORAL at 08:30

## 2024-07-21 RX ADMIN — HYDROCODONE BITARTRATE AND ACETAMINOPHEN 1 TABLET: 7.5; 325 TABLET ORAL at 23:28

## 2024-07-21 NOTE — PROGRESS NOTES
"  Orthopedic Progress Note      Patient: Pari Howell  YOB: 1950     Date of Admission: 7/19/2024 10:29 PM Medical Record Number: [id number]     Attending Physician: Amanda Elias,*    Status Post:  Procedure(s):  OPEN REDUCTION INTERNAL FIXATION DISTAL RADIUS - LEFT  TOTAL HIP ARTHROPLASTY ANTERIOR Post Operative Day Number: 1    Subjective : No new orthopaedic complaints     Pain Relief: some relief with present medication.     Systemic Complaints: No Complaints  Vitals:    07/21/24 0243 07/21/24 0655 07/21/24 0830 07/21/24 1044   BP: 127/63 129/66 125/67 122/67   BP Location: Right arm Right arm  Right arm   Patient Position: Lying Lying  Sitting   Pulse: 86 77 86 79   Resp: 16 18  18   Temp:  98.1 °F (36.7 °C)  97.7 °F (36.5 °C)   TempSrc:  Oral  Oral   SpO2: 96% (!) 88%  93%   Weight:       Height:           Physical Exam: 74 y.o. female    General Appearance:       Alert, cooperative, in no acute distress                  Extremities:    Dressing Clean, Dry and Intact             No clinical sign of DVT        Able to do good movements of digits    Pulses:   Pulses palpable and equal bilaterally           Diagnostic Tests:     Results from last 7 days   Lab Units 07/20/24  0335   WBC 10*3/mm3 15.47*   HEMOGLOBIN g/dL 14.5   HEMATOCRIT % 42.6   PLATELETS 10*3/mm3 269     Results from last 7 days   Lab Units 07/21/24  0923 07/20/24  0335 07/20/24  0044   SODIUM mmol/L 139 137 136   POTASSIUM mmol/L 4.7 3.9 4.3   CHLORIDE mmol/L 105 100 101   CO2 mmol/L 19.0* 24.0 23.0   BUN mg/dL 11 17 18   CREATININE mg/dL 0.66 0.73 0.73   GLUCOSE mg/dL 175* 167* 183*   CALCIUM mg/dL 8.9 9.4 9.0         No results found for: \"URICACID\"  No results found for: \"CRYSTAL\"  Microbiology Results (last 10 days)       ** No results found for the last 240 hours. **          XR Hip With or Without Pelvis 2 - 3 View Left    Result Date: 7/20/2024  Impression: Total left hip prosthesis in anatomic alignment. No " new bony abnormality seen. Electronically Signed: Jasmeet Carrasco MD  7/20/2024 4:53 PM EDT  Workstation ID: [id number]    CT Pelvis Without Contrast    Result Date: 7/20/2024  Impression: Impacted subcapital fracture of the proximal left femur. Electronically Signed: Doni Vazquez MD  7/20/2024 4:41 AM EDT  Workstation ID: [id number]    CT Upper Extremity Left Without Contrast    Result Date: 7/20/2024  Impression: Limited exam. There is a comminuted fracture of the distal radius with displacement. There is a displaced ulnar styloid fracture. Electronically Signed: Doni Vazquez MD  7/20/2024 4:37 AM EDT  Workstation ID: [id number]    XR Chest 1 View    Result Date: 7/19/2024  Impression: No active disease Electronically Signed: Doni Vazquez MD  7/19/2024 11:36 PM EDT  Workstation ID: [id number]    XR Wrist 3+ View Left    Result Date: 7/19/2024  Impression: Comminuted and displaced fracture of the distal radius with disruption of the radiocarpal's. Electronically Signed: Doni Vazquez MD  7/19/2024 11:34 PM EDT  Workstation ID: [id number]    XR Hip With or Without Pelvis 2 - 3 View Left    Result Date: 7/19/2024  Impression: Fracture of the subcapital left femur Electronically Signed: Doni Vazquez MD  7/19/2024 11:31 PM EDT  Workstation ID: [id number]       Current Medications:  Scheduled Meds:[START ON 7/22/2024] Pharmacy Consult, , Does not apply, Daily  amLODIPine, 10 mg, Oral, Daily  aspirin, 81 mg, Oral, Q12H  cetirizine, 5 mg, Oral, Daily  insulin lispro, 2-7 Units, Subcutaneous, 4x Daily AC & at Bedtime  latanoprost, 1 drop, Both Eyes, Daily  levothyroxine, 75 mcg, Oral, Q AM  losartan, 50 mg, Oral, Q24H  pantoprazole, 40 mg, Oral, Daily  pravastatin, 40 mg, Oral, Q PM  sodium chloride, 10 mL, Intravenous, Q12H  sodium chloride, 3 mL, Intravenous, Q12H  timolol, 1 drop, Both Eyes, BID      Continuous Infusions:ropivacaine,   sodium chloride 0.9 % with KCl 20 mEq, 50 mL/hr      PRN Meds:.  senna-docusate sodium **AND**  polyethylene glycol **AND** bisacodyl **AND** bisacodyl    Calcium Replacement - Follow Nurse / BPA Driven Protocol    Pharmacy Consult    dextrose    dextrose    docusate sodium    glucagon (human recombinant)    HYDROcodone-acetaminophen    HYDROmorphone    Magnesium Standard Dose Replacement - Follow Nurse / BPA Driven Protocol    Morphine **AND** naloxone    nitroglycerin    ondansetron ODT **OR** ondansetron    Phosphorus Replacement - Follow Nurse / BPA Driven Protocol    Potassium Replacement - Follow Nurse / BPA Driven Protocol    [COMPLETED] Insert Peripheral IV **AND** sodium chloride    sodium chloride    sodium chloride    sodium chloride    sodium chloride    Assessment: Status post  No admission procedures for hospital encounter.    Patient Active Problem List   Diagnosis    Type 2 diabetes mellitus without complication, without long-term current use of insulin    Other specified glaucoma    Hyperlipidemia LDL goal <70    Vitamin D deficiency    Irritable bowel syndrome with diarrhea    Hypothyroidism (acquired)    Acute respiratory insufficiency    Hip fracture    Femur fracture    T2DM (type 2 diabetes mellitus)    HTN (hypertension)    Radial fracture    Left wrist fracture, open, initial encounter       PLAN:   Continues current post-op course  Anticoagulation: Aspirin started  Mobilize with PT as tolerated per protocol    Weight Bearing: WBAT, Platform L UE  Discharge Plan: OK to plan for discharge in  tomorrow to rehab  from orthopaedic perspective.    Doni Evans MD    Date: 7/21/2024    Time: 13:35 EDT

## 2024-07-21 NOTE — THERAPY EVALUATION
Patient Name: Pari Howell  : 1950    MRN: [id number]                              Today's Date: 2024       Admit Date: 2024    Visit Dx:     ICD-10-CM ICD-9-CM   1. Subcapital fracture of hip, left, closed, initial encounter  S72.012A 820.09   2. Left wrist fracture, open, initial encounter  S62.102B 814.10     Patient Active Problem List   Diagnosis    Type 2 diabetes mellitus without complication, without long-term current use of insulin    Other specified glaucoma    Hyperlipidemia LDL goal <70    Vitamin D deficiency    Irritable bowel syndrome with diarrhea    Hypothyroidism (acquired)    Acute respiratory insufficiency    Hip fracture    Femur fracture    T2DM (type 2 diabetes mellitus)    HTN (hypertension)    Radial fracture    Left wrist fracture, open, initial encounter     Past Medical History:   Diagnosis Date    Bladder infection     Dyspareunia, female     Glaucoma     H/O sexual problem     High cholesterol     History of abnormal cervical Pap smear     Hypertension     Hypertension     Hypothyroidism     Impaired functional mobility, balance, gait, and endurance     Labial cyst     Muscle weakness     Type 2 diabetes mellitus     Urinary incontinence     Vaginal itching     Yeast infection      Past Surgical History:   Procedure Laterality Date    CARPAL TUNNEL RELEASE  2007    CATARACT EXTRACTION Right 2022    CATARACT EXTRACTION, BILATERAL Left     CHOLECYSTECTOMY  1991    EYE SURGERY      HIP FRACTURE SURGERY Right     HYSTERECTOMY  2007    OOPHORECTOMY      TOTAL HIP ARTHROPLASTY Right 2024    Procedure: TOTAL HIP ARTHROPLASTY ANTERIOR RIGHT;  Surgeon: Doni Evans MD;  Location: [facility];  Service: Orthopedics;  Laterality: Right;    TUBAL ABDOMINAL LIGATION  1979      General Information       Row Name 24 0953          OT Time and Intention    Document Type evaluation  -KF     Mode of Treatment occupational therapy  -KF       Row Name 24  0953          General Information    Patient Profile Reviewed yes  -KF     Prior Level of Function independent:;all household mobility;community mobility;bed mobility;ADL's;driving  Independent prior, no AD. History of falls causing R [place] (2/2024).  -KF     Existing Precautions/Restrictions fall   s/p L [place]- WBAT LLE; s/p L wrist ORIF in simple sling- NWB L wrist and WBAT L elbow; infraclavicular nerve catheter  -KF     Barriers to Rehab medically complex;previous functional deficit;physical barrier  -KF       Row Name 07/21/24 0953          Occupational Profile    Environmental Supports and Barriers (Occupational Profile) Available DME: RWx, SPC, walk-in shower with seat, BSC, elevated commode  -KF       Row Name 07/21/24 0953          Living Environment    People in Home spouse  -KF       Row Name 07/21/24 0953          Home Main Entrance    Number of Stairs, Main Entrance two  -KF     Stair Railings, Main Entrance railing on right side (ascending)  -KF       Row Name 07/21/24 0953          Stairs Within Home, Primary    Number of Stairs, Within Home, Primary none  -KF       Row Name 07/21/24 0953          Cognition    Orientation Status (Cognition) oriented x 3  -KF       Row Name 07/21/24 0953          Safety Issues, Functional Mobility    Safety Issues Affecting Function (Mobility) awareness of need for assistance;insight into deficits/self-awareness;judgment;positioning of assistive device;problem-solving;safety precaution awareness;safety precautions follow-through/compliance;sequencing abilities  -KF     Impairments Affecting Function (Mobility) balance;endurance/activity tolerance;strength;grasp;pain;range of motion (ROM);sensation/sensory awareness  -KF               User Key  (r) = Recorded By, (t) = Taken By, (c) = Cosigned By      Initials Name Provider Type    KF Manju Mack OT Occupational Therapist                     Mobility/ADL's       Row Name 07/21/24 0956          Bed Mobility    Bed  Mobility supine-sit  -     Supine-Sit [place] (Bed Mobility) moderate assist (50% patient effort);2 person assist;verbal cues;nonverbal cues (demo/gesture)  -     Assistive Device (Bed Mobility) bed rails;draw sheet;head of bed elevated  -     Comment, (Bed Mobility) Verbal/tactile cues for sequence with assistance given at LLE and trunk. Pt initially presented with a strong posterior and right lateral lean requiring mod/maxA, however pt able to progress to CGA/Edward with time.  -       Row Name 07/21/24 0956          Transfers    Transfers bed-chair transfer;sit-stand transfer;stand-sit transfer  -     Comment, (Transfers) Verbal cues for safe hand placement, step sequence and maintaining WBing precautions  -       Row Name 07/21/24 0956          Bed-Chair Transfer    Bed-Chair Campbell (Transfers) minimum assist (75% patient effort);2 person assist;verbal cues  -     Assistive Device (Bed-Chair Transfers) walker, rolling platform  -       Row Name 07/21/24 0956          Sit-Stand Transfer    Sit-Stand Campbell (Transfers) minimum assist (75% patient effort);2 person assist;verbal cues  -     Assistive Device (Sit-Stand Transfers) walker, rolling platform  -       Row Name 07/21/24 0956          Stand-Sit Transfer    Stand-Sit [place] (Transfers) minimum assist (75% patient effort);2 person assist;verbal cues  -     Assistive Device (Stand-Sit Transfers) walker, rolling platform  -       Row Name 07/21/24 0956          Functional Mobility    Functional Mobility- Comment Defer to PT this date  -       Row Name 07/21/24 0956          Activities of Daily Living    BADL Assessment/Intervention bathing;upper body dressing;lower body dressing  -       Row Name 07/21/24 0956          Mobility    Extremity Weight-bearing Status left lower extremity;left upper extremity  -     Left Upper Extremity (Weight-bearing Status) other (see comments)  WBAT through elbow, NWB at wrist   -KF     Left Lower Extremity (Weight-bearing Status) weight-bearing as tolerated (WBAT)  -       Row Name 07/21/24 0956          Bathing Assessment/Intervention    Comment, (Bathing) Pt educated on nerve catheter precautions including no showering while in place. Pt verbalized understanding.  -       Row Name 07/21/24 0956          Upper Body Dressing Assessment/Training    [place] Level (Upper Body Dressing) doff;don;other (see comments);dependent (less than 25% patient effort)  sling  -KF     Position (Upper Body Dressing) edge of bed sitting  -KF     Comment, (Upper Body Dressing) Pt educated LUE precautions, sling management and ADL retraining. Sling doffed/donned depedently today. Recommend additional education in future session.  -       Row Name 07/21/24 0956          Lower Body Dressing Assessment/Training    [place] Level (Lower Body Dressing) don;socks;dependent (less than 25% patient effort)  -KF     Position (Lower Body Dressing) sitting up in bed  -KF               User Key  (r) = Recorded By, (t) = Taken By, (c) = Cosigned By      Initials Name Provider Type    KF Manju Mack OT Occupational Therapist                   Obj/Interventions       Row Name 07/21/24 1001          Sensory Assessment (Somatosensory)    Sensory Assessment (Somatosensory) UE sensation intact  -     Sensory Assessment BLE neuropathy  -[facility] Name 07/21/24 1001          Range of Motion Comprehensive    General Range of Motion upper extremity range of motion deficits identified  -     Comment, General Range of Motion L shoulder NT; elbow/wrist/hand NT; RUE WFL  -[facility] Name 07/21/24 1001          Strength Comprehensive (MMT)    General Manual Muscle Testing (MMT) Assessment upper extremity strength deficits identified  -     Comment, General Manual Muscle Testing (MMT) Assessment LUE NT; RUE grossly 3+/5  -[facility] Name 07/21/24 1001          Balance    Balance Assessment sitting static  balance;sitting dynamic balance;sit to stand dynamic balance;standing static balance;standing dynamic balance  -KF     Static Sitting Balance contact guard  -KF     Dynamic Sitting Balance moderate assist  -KF     Position, Sitting Balance unsupported;sitting edge of bed  -KF     Sit to Stand Dynamic Balance minimal assist;2-person assist;verbal cues  -KF     Static Standing Balance minimal assist;2-person assist  -KF     Dynamic Standing Balance minimal assist;2-person assist  -KF     Position/Device Used, Standing Balance supported;walker, platform  -KF     Balance Interventions sitting;standing;sit to stand;supported;static;dynamic;occupation based/functional task  -KF               User Key  (r) = Recorded By, (t) = Taken By, (c) = Cosigned By      Initials Name Provider Type    KF Manju Mack OT Occupational Therapist                   Goals/Plan       Row Name 07/21/24 1006          Transfer Goal 1 (OT)    Activity/Assistive Device (Transfer Goal 1, OT) commode;bed-to-chair/chair-to-bed  -KF     [place] Level/Cues Needed (Transfer Goal 1, OT) supervision required  -KF     Time Frame (Transfer Goal 1, OT) long term goal (LTG);10 days  -KF     Progress/Outcome (Transfer Goal 1, OT) goal ongoing  -KF       Row Name 07/21/24 1006          Dressing Goal 1 (OT)    Activity/Device (Dressing Goal 1, OT) upper body dressing  -KF     [place]/Cues Needed (Dressing Goal 1, OT) set-up required  -KF     Time Frame (Dressing Goal 1, OT) short term goal (STG);5 days  -KF     Progress/Outcome (Dressing Goal 1, OT) goal ongoing  -KF       Row Name 07/21/24 1006          Toileting Goal 1 (OT)    Activity/Device (Toileting Goal 1, OT) adjust/manage clothing;perform perineal hygiene  -KF     [place] Level/Cues Needed (Toileting Goal 1, OT) supervision required  -KF     Time Frame (Toileting Goal 1, OT) long term goal (LTG);10 days  -KF     Progress/Outcome (Toileting Goal 1, OT) goal ongoing  -KF       Row Name  07/21/24 1006          Grooming Goal 1 (OT)    Activity/Device (Grooming Goal 1, OT) hair care;oral care;wash face, hands  -KF     [place] (Grooming Goal 1, OT) set-up required  -KF     Time Frame (Grooming Goal 1, OT) long term goal (LTG);10 days  -KF     Progress/Outcome (Grooming Goal 1, OT) goal ongoing  -KF       Row Name 07/21/24 1006          Therapy Assessment/Plan (OT)    Planned Therapy Interventions (OT) activity tolerance training;adaptive equipment training;BADL retraining;functional balance retraining;occupation/activity based interventions;patient/caregiver education/training;ROM/therapeutic exercise;strengthening exercise;transfer/mobility retraining  -KF               User Key  (r) = Recorded By, (t) = Taken By, (c) = Cosigned By      Initials Name Provider Type    KF Manju Mack, OT Occupational Therapist                   Clinical Impression       Row Name 07/21/24 1003          Pain Assessment    Pretreatment Pain Rating 0/10 - no pain  -KF     Posttreatment Pain Rating 0/10 - no pain  -KF     Pain Intervention(s) Cold applied;Repositioned;Ambulation/increased activity  -KF       Row Name 07/21/24 1003          Plan of Care Review    Plan of Care Reviewed With patient  -KF     Progress no change  -KF     Outcome Evaluation OT evaluation completed. The pt presents below her functional baseline with acute pain, WBing restrictions to the LUE, balance deficits, generalized weakness, and decreased activity tolerance. Pt was educated on WBing precautions, sling management, nerve catheter care, and ADL retraining. Pt performed supine to sit transfer with modA x2. Pt stood and took steps to the chair using platform RWx with Edward x2 and a close chair follow. The pt will benefit from continued IP OT services to increase the pt's safety and independence during ADLs and functional mobility. Recommend a d/c to IRF for best outcome.  -KF       Row Name 07/21/24 1003          Therapy Assessment/Plan  (OT)    Rehab Potential (OT) good, to achieve stated therapy goals  -KF     Criteria for Skilled Therapeutic Interventions Met (OT) yes;skilled treatment is necessary  -KF     Therapy Frequency (OT) daily  -KF     Predicted Duration of Therapy Intervention (OT) 5 days  -KF       Row Name 07/21/24 1003          Therapy Plan Review/Discharge Plan (OT)    Anticipated Discharge Disposition (OT) inpatient rehabilitation facility  -KF       Row Name 07/21/24 1003          Vital Signs    Pre Systolic BP Rehab 129  -KF     Pre Treatment Diastolic BP 66  -KF     Pretreatment Heart Rate (beats/min) 97  -KF     Pre SpO2 (%) 95  2L  -KF     O2 Delivery Pre Treatment nasal cannula  -KF     Intra SpO2 (%) 94  -KF     O2 Delivery Intra Treatment room air  -KF     Post SpO2 (%) 94  -KF     O2 Delivery Post Treatment room air  -KF     Pre Patient Position Supine  -KF     Intra Patient Position Standing  -KF     Post Patient Position Sitting  -KF       Row Name 07/21/24 1003          Positioning and Restraints    Pre-Treatment Position in bed  -KF     Post Treatment Position chair  -KF     In Chair notified nsg;reclined;call light within reach;encouraged to call for assist;exit alarm on;with nsg;with PT  -KF               User Key  (r) = Recorded By, (t) = Taken By, (c) = Cosigned By      Initials Name Provider Type    KF Manju Mack, JANIS Occupational Therapist                   Outcome Measures       Row Name 07/21/24 1007          How much help from another is currently needed...    Putting on and taking off regular lower body clothing? 1  -KF     Bathing (including washing, rinsing, and drying) 2  -KF     Toileting (which includes using toilet bed pan or urinal) 2  -KF     Putting on and taking off regular upper body clothing 2  -KF     Taking care of personal grooming (such as brushing teeth) 3  -KF     Eating meals 3  -KF     AM-PAC 6 Clicks Score (OT) 13  -KF       Row Name 07/21/24 1007          Functional Assessment     Outcome Measure Options AM-PAC 6 Clicks Daily Activity (OT)  -               User Key  (r) = Recorded By, (t) = Taken By, (c) = Cosigned By      Initials Name Provider Type     Manju Mack OT Occupational Therapist                    Occupational Therapy Education       Title: PT OT SLP Therapies (In Progress)       Topic: Occupational Therapy (In Progress)       Point: ADL training (In Progress)       Description:   Instruct learner(s) on proper safety adaptation and remediation techniques during self care or transfers.   Instruct in proper use of assistive devices.                  Learning Progress Summary             Patient Acceptance, E,TB, NR by  at 7/21/2024 0800                         Point: Home exercise program (Not Started)       Description:   Instruct learner(s) on appropriate technique for monitoring, assisting and/or progressing therapeutic exercises/activities.                  Learner Progress:  Not documented in this visit.              Point: Precautions (In Progress)       Description:   Instruct learner(s) on prescribed precautions during self-care and functional transfers.                  Learning Progress Summary             Patient Acceptance, E,TB, NR by  at 7/21/2024 0800                         Point: Body mechanics (In Progress)       Description:   Instruct learner(s) on proper positioning and spine alignment during self-care, functional mobility activities and/or exercises.                  Learning Progress Summary             Patient Acceptance, E,TB, NR by  at 7/21/2024 0800                                         User Key       Initials Effective Dates Name Provider Type [facility] 08/09/23 -  Manju Mack OT Occupational Therapist OT                  OT Recommendation and Plan  Planned Therapy Interventions (OT): activity tolerance training, adaptive equipment training, BADL retraining, functional balance retraining, occupation/activity based  interventions, patient/caregiver education/training, ROM/therapeutic exercise, strengthening exercise, transfer/mobility retraining  Therapy Frequency (OT): daily  Plan of Care Review  Plan of Care Reviewed With: patient  Progress: no change  Outcome Evaluation: OT evaluation completed. The pt presents below her functional baseline with acute pain, WBing restrictions to the LUE, balance deficits, generalized weakness, and decreased activity tolerance. Pt was educated on WBing precautions, sling management, nerve catheter care, and ADL retraining. Pt performed supine to sit transfer with modA x2. Pt stood and took steps to the chair using platform RWx with Edward x2 and a close chair follow. The pt will benefit from continued IP OT services to increase the pt's safety and independence during ADLs and functional mobility. Recommend a d/c to IRF for best outcome.     Time Calculation:   Evaluation Complexity (OT)  Review Occupational Profile/Medical/Therapy History Complexity: expanded/moderate complexity  Assessment, Occupational Performance/Identification of Deficit Complexity: 3-5 performance deficits  Clinical Decision Making Complexity (OT): detailed assessment/moderate complexity  Overall Complexity of Evaluation (OT): moderate complexity     Time Calculation- OT       Row Name 07/21/24 1009             Time Calculation- OT    OT Start Time 0800  -KF      OT Received On 07/21/24  -KF      OT Goal Re-Cert Due Date 07/31/24  -KF         Timed Charges    14223 - OT Therapeutic Activity Minutes 5  -KF      05879 - OT Self Care/Mgmt Minutes 4  -KF         Untimed Charges    OT Eval/Re-eval Minutes 46  -KF         Total Minutes    Timed Charges Total Minutes 9  -KF      Untimed Charges Total Minutes 46  -KF       Total Minutes 55  -KF                User Key  (r) = Recorded By, (t) = Taken By, (c) = Cosigned By      Initials Name Provider Type    Manju Willis OT Occupational Therapist                  Therapy  Charges for Today       Code Description Service Date Service Provider Modifiers Qty    [id number]  OT EVAL MOD COMPLEXITY 4 7/21/2024 Manju Mack OT GO 1    [id number]  OT THERAPEUTIC ACT EA 15 MIN 7/21/2024 Manju Mack OT GO 1                 Manju Mack OT  7/21/2024

## 2024-07-21 NOTE — THERAPY TREATMENT NOTE
Patient Name: Pari Howell  : 1950    MRN: [id number]                              Today's Date: 2024       Admit Date: 2024    Visit Dx:     ICD-10-CM ICD-9-CM   1. Subcapital fracture of hip, left, closed, initial encounter  S72.012A 820.09   2. Left wrist fracture, open, initial encounter  S62.102B 814.10     Patient Active Problem List   Diagnosis    Type 2 diabetes mellitus without complication, without long-term current use of insulin    Other specified glaucoma    Hyperlipidemia LDL goal <70    Vitamin D deficiency    Irritable bowel syndrome with diarrhea    Hypothyroidism (acquired)    Acute respiratory insufficiency    Hip fracture    Femur fracture    T2DM (type 2 diabetes mellitus)    HTN (hypertension)    Radial fracture    Left wrist fracture, open, initial encounter     Past Medical History:   Diagnosis Date    Bladder infection     Dyspareunia, female     Glaucoma     H/O sexual problem     High cholesterol     History of abnormal cervical Pap smear     Hypertension     Hypertension     Hypothyroidism     Impaired functional mobility, balance, gait, and endurance     Labial cyst     Muscle weakness     Type 2 diabetes mellitus     Urinary incontinence     Vaginal itching     Yeast infection      Past Surgical History:   Procedure Laterality Date    CARPAL TUNNEL RELEASE  2007    CATARACT EXTRACTION Right 2022    CATARACT EXTRACTION, BILATERAL Left     CHOLECYSTECTOMY  1991    EYE SURGERY      HIP FRACTURE SURGERY Right     HYSTERECTOMY  2007    OOPHORECTOMY      TOTAL HIP ARTHROPLASTY Right 2024    Procedure: TOTAL HIP ARTHROPLASTY ANTERIOR RIGHT;  Surgeon: Doni Evans MD;  Location: [facility];  Service: Orthopedics;  Laterality: Right;    TUBAL ABDOMINAL LIGATION  1979      General Information       Row Name 24 1534 24 1002       Physical Therapy Time and Intention    Document Type therapy note (daily note)  -AB evaluation  -AB    Mode of  Treatment physical therapy  -AB physical therapy  -AB      Row Name 07/21/24 1534 07/21/24 1002       General Information    Patient Profile Reviewed yes  -AB yes  -AB    Prior Level of Function -- independent:;all household mobility;community mobility;gait;transfer;bed mobility;ADL's;driving  Independent prior, no AD. History of falls causing R CHEYENNE (2/2024). Not using AD.  -AB    Existing Precautions/Restrictions fall;other (see comments)  s/p L CHEYENNE- WBAT LLE; s/p L wrist ORIF in simple sling- NWB L wrist and WBAT L elbow , infra clavicular nerve cath  -AB fall;other (see comments)   s/p L [place]- WBAT LLE; s/p L wrist ORIF in simple sling- NWB L wrist and WBAT L elbow  -AB    Barriers to Rehab medically complex;previous functional deficit;physical barrier  -AB medically complex;previous functional deficit;physical barrier  -AB      Row Name 07/21/24 1002          Living Environment    People in Home spouse  -AB       Row Name 07/21/24 1002          Home Main Entrance    Number of Stairs, Main Entrance two  -AB     Stair Railings, Main Entrance railing on right side (ascending)  -AB       Row Name 07/21/24 1002          Stairs Within Home, Primary    Number of Stairs, Within Home, Primary none  -AB       Row Name 07/21/24 1534 07/21/24 1002       Cognition    Orientation Status (Cognition) oriented x 4  -AB oriented x 4  -AB      Row Name 07/21/24 1534 07/21/24 1002       Safety Issues, Functional Mobility    Safety Issues Affecting Function (Mobility) awareness of need for assistance;insight into deficits/self-awareness;safety precaution awareness;safety precautions follow-through/compliance;sequencing abilities;problem-solving;judgment  -AB awareness of need for assistance;insight into deficits/self-awareness;safety precaution awareness;safety precautions follow-through/compliance;sequencing abilities;problem-solving;judgment  -AB    Impairments Affecting Function (Mobility) balance;endurance/activity  tolerance;strength;grasp;pain;range of motion (ROM);sensation/sensory awareness  -AB balance;endurance/activity tolerance;strength;grasp;pain;range of motion (ROM);sensation/sensory awareness  -AB    Comment, Safety Issues/Impairments (Mobility) -- Requires consistent re-direction to task  -AB              User Key  (r) = Recorded By, (t) = Taken By, (c) = Cosigned By      Initials Name Provider Type    AB Ronda Thomas, PT Physical Therapist                   Mobility       Row Name 07/21/24 1535 07/21/24 1003       Bed Mobility    Bed Mobility -- supine-sit;scooting/bridging  -AB    Scooting/Bridging [place] (Bed Mobility) -- moderate assist (50% patient effort);2 person assist;verbal cues;nonverbal cues (demo/gesture)  -AB    Supine-Sit [place] (Bed Mobility) -- moderate assist (50% patient effort);2 person assist;verbal cues;nonverbal cues (demo/gesture)  -AB    Assistive Device (Bed Mobility) -- bed rails;draw sheet;head of bed elevated  -AB    Comment, (Bed Mobility) Pt deferred all OOB activity d/t pain and fatigue.  -AB Assist provided at trunk and with LLE in order to sit EOB. Pt initially with strong posterior lean. Able to correct with time and tactile cues.  -AB      Row Name 07/21/24 1535 07/21/24 1003       Transfers    Comment, (Transfers) Agreeable to BLE exercises in supine.  -AB Cues for hand placement, sequencing, and safety. Educated on WBing precautions.  -AB      Row Name 07/21/24 1003          Sit-Stand Transfer    Sit-Stand [place] (Transfers) 2 person assist;verbal cues;nonverbal cues (demo/gesture);moderate assist (50% patient effort)  -AB     Assistive Device (Sit-Stand Transfers) walker, rolling platform  -AB     Comment, (Sit-Stand Transfer) Pt required assist to place LUE on platform walker. Strong boost provided to stand as pt initially limiting WBing through LLE.  -AB       Row Name 07/21/24 1003          Gait/Stairs (Locomotion)    [place] Level (Gait) minimum  assist (75% patient effort);2 person assist;verbal cues;nonverbal cues (demo/gesture);1 person to manage equipment  -AB     Assistive Device (Gait) walker, rolling platform  -AB     Patient was able to Ambulate yes  -AB     Distance in Feet (Gait) 15  -AB     Deviations/Abnormal Patterns (Gait) bilateral deviations;lela decreased;gait speed decreased;stride length decreased;base of support, narrow;antalgic  -AB     Bilateral Gait Deviations forward flexed posture;heel strike decreased  -AB     Left Sided Gait Deviations weight shift ability decreased  -AB     [place] Level (Stairs) unable to assess  -AB     Comment, (Gait/Stairs) Pt ambulated with step-to gait pattern, slowed lela, and decreased weight acceptance onto LLE. Poor foot clearance noted carlee during swing phase. Cues provided for upright posture, walker positioning, and re-direction to task. No overt LOB or knee buckling. Min A to steady. +1 for close chair follow. Further activity limited by pain and fatigue.  -AB       Row Name 07/21/24 1535 07/21/24 1003       Mobility    Extremity Weight-bearing Status left lower extremity;left upper extremity  -AB left lower extremity;left upper extremity  -AB    Left Upper Extremity (Weight-bearing Status) other (see comments)  WBAT through elbow, NWB at wrist  -AB other (see comments)  WBAT through elbow, NWB at wrist  -AB    Left Lower Extremity (Weight-bearing Status) weight-bearing as tolerated (WBAT)  -AB weight-bearing as tolerated (WBAT)  -AB              User Key  (r) = Recorded By, (t) = Taken By, (c) = Cosigned By      Initials Name Provider Type    AB Ronda Thomas, PT Physical Therapist                   Obj/Interventions       Row Name 07/21/24 1010          Range of Motion Comprehensive    General Range of Motion bilateral lower extremity ROM WFL  -AB     Comment, General Range of Motion BLE WFL; AROM of LLE limited by pain.  -AB       Row Name 07/21/24 1010          Strength Comprehensive  (MMT)    General Manual Muscle Testing (MMT) Assessment lower extremity strength deficits identified  -AB     Comment, General Manual Muscle Testing (MMT) Assessment RLE grossly 4/5; LLE grossly 3+/5  -AB       Row Name 07/21/24 1536 07/21/24 1010       Motor Skills    Therapeutic Exercise hip;knee;ankle  -AB knee;hip;ankle  -AB      Row Name 07/21/24 1536 07/21/24 1010       Hip (Therapeutic Exercise)    Hip (Therapeutic Exercise) -- isometric exercises;strengthening exercise  -AB    Hip Isometrics (Therapeutic Exercise) bilateral;gluteal sets;10 repetitions  -AB bilateral;gluteal sets;10 repetitions  -AB    Hip Strengthening (Therapeutic Exercise) left;aBduction;10 repetitions  mod A  -AB left;aBduction;10 repetitions  mod A provided  -AB      Row Name 07/21/24 1536 07/21/24 1010       Knee (Therapeutic Exercise)    Knee (Therapeutic Exercise) -- isometric exercises;strengthening exercise  -AB    Knee Isometrics (Therapeutic Exercise) left;quad sets;10 repetitions  -AB left;quad sets;10 repetitions  -AB    Knee Strengthening (Therapeutic Exercise) left;SLR (straight leg raise);heel slides;10 repetitions  mod A for SLR  -AB left;SLR (straight leg raise);LAQ (long arc quad);heel slides;10 repetitions  Mod A for SLR  -AB      Row Name 07/21/24 1536 07/21/24 1010       Ankle (Therapeutic Exercise)    Ankle (Therapeutic Exercise) -- AROM (active range of motion)  -AB    Ankle AROM (Therapeutic Exercise) bilateral;dorsiflexion;plantarflexion;10 repetitions  -AB bilateral;dorsiflexion;plantarflexion;10 repetitions  -AB      Row Name 07/21/24 1536 07/21/24 1010       Balance    Balance Assessment -- sitting static balance;sitting dynamic balance;standing static balance;standing dynamic balance  -AB    Static Sitting Balance -- contact guard  -AB    Dynamic Sitting Balance -- moderate assist  -AB    Position, Sitting Balance -- unsupported;sitting edge of bed  -AB    Static Standing Balance -- minimal assist  -AB     Dynamic Standing Balance -- minimal assist;2-person assist;verbal cues;non-verbal cues (demo/gesture)  -AB    Position/Device Used, Standing Balance -- supported;walker, platform  -AB    Balance Interventions -- sitting;standing;sit to stand;supported;dynamic;static;occupation based/functional task  -AB    Comment, Balance [place]  -AB No overt LOB. Min A to steady in standing.  -AB              User Key  (r) = Recorded By, (t) = Taken By, (c) = Cosigned By      Initials Name Provider Type    AB Ronda Thomas, PT Physical Therapist                   Goals/Plan       Row Name 07/21/24 1022          Bed Mobility Goal 1 (PT)    Activity/Assistive Device (Bed Mobility Goal 1, PT) sit to supine;supine to sit  -AB     [place] Level/Cues Needed (Bed Mobility Goal 1, PT) contact guard required  -AB     Time Frame (Bed Mobility Goal 1, PT) short term goal (STG);5 days  -AB       Row Name 07/21/24 1022          Transfer Goal 1 (PT)    Activity/Assistive Device (Transfer Goal 1, PT) sit-to-stand/stand-to-sit;bed-to-chair/chair-to-bed;walker, platform  -AB     [place] Level/Cues Needed (Transfer Goal 1, PT) contact guard required  -AB     Time Frame (Transfer Goal 1, PT) long term goal (LTG);10 days  -AB       Row Name 07/21/24 1022          Gait Training Goal 1 (PT)    Activity/Assistive Device (Gait Training Goal 1, PT) gait (walking locomotion);assistive device use;walker, platform  -AB     [place] Level (Gait Training Goal 1, PT) contact guard required  -AB     Distance (Gait Training Goal 1, PT) 50  -AB     Time Frame (Gait Training Goal 1, PT) long term goal (LTG);10 days  -AB       Row Name 07/21/24 1022          Patient Education Goal (PT)    Activity (Patient Education Goal, PT) HEP  -AB     [place]/Cues/Accuracy (Memory Goal 2, PT) independent  -AB     Time Frame (Patient Education Goal, PT) long term goal (LTG);10 days  -AB       Row Name 07/21/24 1022          Therapy Assessment/Plan (PT)     Planned Therapy Interventions (PT) balance training;bed mobility training;gait training;home exercise program;patient/family education;postural re-education;transfer training;stretching;strengthening;ROM (range of motion)  -AB               User Key  (r) = Recorded By, (t) = Taken By, (c) = Cosigned By      Initials Name Provider Type    AB Ronda Thomas, PT Physical Therapist                   Clinical Impression       Row Name 07/21/24 1536 07/21/24 1014       Pain    Pretreatment Pain Rating -- 0/10 - no pain  -AB    Posttreatment Pain Rating -- 0/10 - no pain  -AB    Pain Location - Side/Orientation -- Left  -AB    Pain Location -- generalized  -AB    Pain Location - -- hip  -AB    Pre/Posttreatment Pain Comment -- No pain at rest. Increased pain with activity, reports burning sensation  -AB    Pain Intervention(s) Elevated;Repositioned  -AB Ambulation/increased activity;Elevated;Repositioned;Cold applied  -AB    Additional Documentation Pain Scale: FACES Pre/Post-Treatment (Group)  -AB Pain Scale: Numbers Pre/Post-Treatment (Group)  -AB      Row Name 07/21/24 1536          Pain Scale: FACES Pre/Post-Treatment    Pain: FACES Scale, Pretreatment 2-->hurts little bit  -AB     Posttreatment Pain Rating 4-->hurts little more  -AB     Pain Location - Side/Orientation Left  -AB     Pain Location generalized  -AB     Pain Location - hip  -AB       Row Name 07/21/24 1536 07/21/24 1014       Plan of Care Review    Plan of Care Reviewed With patient;spouse;daughter  -AB patient  -AB    Progress no change  -AB no change  -AB    Outcome Evaluation Pt continues to present below baseline with acute pain, impaired balance, and generalized weakness. Pt deferred all EOB/OOB activity d/t pain and fatigue. Good participation with BLE therex. Further IPPT is warrented. PT will progress as able per POC.  -AB PT eval completed. Pt presents below her functional baseline with acute pain, impaired balance, decreased endurance, and  generalized weakness. WB restrictions reviewed, pt verbalized understanding. Pt ambulated 15' with min Ax2+1 and platform walker. No overt LOB but pt is generally unsteady. Further IPPT is warrented. PT rec d/c to IPR for best functional outcomes.  -AB      Row Name 07/21/24 1014          Therapy Assessment/Plan (PT)    Patient/Family Therapy Goals Statement (PT) Get better  -AB     Rehab Potential (PT) good, to achieve stated therapy goals  -AB     Criteria for Skilled Interventions Met (PT) yes;meets criteria;skilled treatment is necessary  -AB     Therapy Frequency (PT) 2 times/day  -AB     Predicted Duration of Therapy Intervention (PT) 10 days  -AB       Row Name 07/21/24 1536 07/21/24 1014       Vital Signs    Pre Systolic BP Rehab 135  -  -AB    Pre Treatment Diastolic BP 84  -AB 66  -AB    Pre SpO2 (%) -- 96  -AB    O2 Delivery Pre Treatment room air  -AB nasal cannula  -AB    Intra SpO2 (%) -- 94  -AB    O2 Delivery Intra Treatment room air  -AB room air  -AB    Post SpO2 (%) -- 94  -AB    O2 Delivery Post Treatment room air  -AB room air  -AB    Pre Patient Position Supine  -AB Supine  -AB    Intra Patient Position -- Standing  -AB    Post Patient Position -- Sitting  -AB      Row Name 07/21/24 1536 07/21/24 1014       Positioning and Restraints    Pre-Treatment Position in bed  -AB in bed  -AB    Post Treatment Position bed  -AB chair  -AB    In Bed notified nsg;supine;call light within reach;encouraged to call for assist;exit alarm on;with family/caregiver  -AB --    In Chair -- notified nsg;reclined;sitting;call light within reach;encouraged to call for assist;exit alarm on;legs elevated;waffle cushion;compression device;on mechanical lift sling  -AB              User Key  (r) = Recorded By, (t) = Taken By, (c) = Cosigned By      Initials Name Provider Type    AB Ronda Thomas, PT Physical Therapist                   Outcome Measures       Row Name 07/21/24 1538 07/21/24 1023       How much help  from another person do you currently need...    Turning from your back to your side while in flat bed without using bedrails? 2  -AB 2  -AB    Moving from lying on back to sitting on the side of a flat bed without bedrails? 2  -AB 2  -AB    Moving to and from a bed to a chair (including a wheelchair)? 3  -AB 3  -AB    Standing up from a chair using your arms (e.g., wheelchair, bedside chair)? 2  -AB 2  -AB    Climbing 3-5 steps with a railing? 1  -AB 1  -AB    To walk in hospital room? 3  -AB 3  -AB    AM-PAC 6 Clicks Score (PT) 13  -AB 13  -AB    Highest Level of Mobility Goal 4 --> Transfer to chair/commode  -AB 4 --> Transfer to chair/commode  -AB      Row Name 07/21/24 0836          How much help from another person do you currently need...    Turning from your back to your side while in flat bed without using bedrails? 2  -MK     Moving from lying on back to sitting on the side of a flat bed without bedrails? 2  -MK     Moving to and from a bed to a chair (including a wheelchair)? 2  -MK     Standing up from a chair using your arms (e.g., wheelchair, bedside chair)? 2  -MK     Climbing 3-5 steps with a railing? 1  -MK     To walk in hospital room? 1  -MK     AM-PAC 6 Clicks Score (PT) 10  -MK     Highest Level of Mobility Goal 4 --> Transfer to chair/commode  -MK       Row Name 07/21/24 1023          PADD    Diagnosis 2  -AB     Gender 1  -AB     Age Group 1  -AB     Gait Distance 0  -AB     Assist Level 0  -AB     Home Support 3  -AB     PADD Score 7  -AB     Patient Preference home with home health  -AB     Prediction by PADD Score extended rehabilitation  -AB       Row Name 07/21/24 1538 07/21/24 1023       Functional Assessment    Outcome Measure Options AM-PAC 6 Clicks Basic Mobility (PT)  -AB AM-PAC 6 Clicks Basic Mobility (PT);PADD  -AB      Row Name 07/21/24 1007          Functional Assessment    Outcome Measure Options AM-PAC 6 Clicks Daily Activity (OT)  -KF               User Key  (r) = Recorded By,  (t) = Taken By, (c) = Cosigned By      Initials Name Provider Type    Jaclyn Ravi, RN Registered Nurse    AB Ronda Thomas, PT Physical Therapist    Manju Willis, OT Occupational Therapist                                 Physical Therapy Education       Title: PT OT SLP Therapies (In Progress)       Topic: Physical Therapy (Done)       Point: Mobility training (Done)       Learning Progress Summary             Patient Acceptance, E,D, VU,NR by AB at 7/21/2024 1539    Acceptance, E,D,H, VU,NR by AB at 7/21/2024 1024                         Point: Home exercise program (Done)       Learning Progress Summary             Patient Acceptance, E,D, VU,NR by AB at 7/21/2024 1539    Acceptance, E,D,H, VU,NR by AB at 7/21/2024 1024                         Point: Body mechanics (Done)       Learning Progress Summary             Patient Acceptance, E,D, VU,NR by AB at 7/21/2024 1539    Acceptance, E,D,H, VU,NR by AB at 7/21/2024 1024                         Point: Precautions (Done)       Learning Progress Summary             Patient Acceptance, E,D, VU,NR by AB at 7/21/2024 1539    Acceptance, E,D,H, VU,NR by AB at 7/21/2024 1024                                         User Key       Initials Effective Dates Name Provider Type Discipline    AB 09/22/22 -  Ronda Thomas, PT Physical Therapist PT                  PT Recommendation and Plan  Planned Therapy Interventions (PT): balance training, bed mobility training, gait training, home exercise program, patient/family education, postural re-education, transfer training, stretching, strengthening, ROM (range of motion)  Plan of Care Reviewed With: patient, spouse, daughter  Progress: no change  Outcome Evaluation: Pt continues to present below baseline with acute pain, impaired balance, and generalized weakness. Pt deferred all EOB/OOB activity d/t pain and fatigue. Good participation with BLE therex. Further IPPT is warrented. PT will progress as able per  POC.     Time Calculation:   PT Evaluation Complexity  History, PT Evaluation Complexity: 3 or more personal factors and/or comorbidities  Examination of Body Systems (PT Eval Complexity): total of 3 or more elements  Clinical Presentation (PT Evaluation Complexity): evolving  Clinical Decision Making (PT Evaluation Complexity): moderate complexity  Overall Complexity (PT Evaluation Complexity): moderate complexity     PT Charges       Row Name 07/21/24 1539 07/21/24 1024          Time Calculation    Start Time 1505  -AB 0805  -AB     PT Received On 07/21/24  -AB 07/21/24  -AB     PT Goal Re-Cert Due Date -- 07/31/24  -AB        Timed Charges    31741 - PT Therapeutic Exercise Minutes 10  -AB 10  -AB        Untimed Charges    PT Eval/Re-eval Minutes -- 52  -AB        Total Minutes    Timed Charges Total Minutes 10  -AB 10  -AB     Untimed Charges Total Minutes -- 52  -AB      Total Minutes 10  -AB 62  -AB               User Key  (r) = Recorded By, (t) = Taken By, (c) = Cosigned By      Initials Name Provider Type    AB Ronda Thomas, PT Physical Therapist                  Therapy Charges for Today       Code Description Service Date Service Provider Modifiers Qty    27121041315 HC PT THER PROC EA 15 MIN 7/21/2024 Ronda Thomas, PT GP 1    36430187731 HC PT EVAL MOD COMPLEXITY 4 7/21/2024 Ronda Thomas, PT GP 1    55890359026 HC PT THER PROC EA 15 MIN 7/21/2024 Ronda Thomas, PT GP 1            PT G-Codes  Outcome Measure Options: AM-PAC 6 Clicks Basic Mobility (PT)  AM-PAC 6 Clicks Score (PT): 13  AM-PAC 6 Clicks Score (OT): 13  PT Discharge Summary  Anticipated Discharge Disposition (PT): inpatient rehabilitation facility    Ronda Thomas PT  7/21/2024

## 2024-07-21 NOTE — PROGRESS NOTES
"          Orthopaedic Surgery Progress Note      LOS: 1 day   Patient Care Team:  Radha Gregg APRN as PCP - General (Nurse Practitioner)  Gloria Santoro DO as Consulting Physician (Endocrinology)    POD 1    Subjective     Interval History:   Patient underwent ORIF of her comminuted left distal radius fracture with I&D and also anterior left CHEYENNE with Dr. Evans yesterday in the operating room.  This morning she is doing quite well.  No major complaints.  She is working with therapy when I come in to interview her.  Denies chest pain or shortness of breath.    Objective     Vital Signs:  Temp (24hrs), Av.3 °F (36.8 °C), Min:97.3 °F (36.3 °C), Max:99.8 °F (37.7 °C)    /66 (BP Location: Right arm, Patient Position: Lying)   Pulse 77   Temp 98.1 °F (36.7 °C) (Oral)   Resp 18   Ht 172.7 cm (68\")   Wt 78.2 kg (172 lb 6.4 oz)   LMP  (LMP Unknown)   SpO2 (!) 88%   BMI 26.21 kg/m²     Labs:  Lab Results (last 24 hours)       Procedure Component Value Units Date/Time    POC Glucose Once [[id number]]  (Abnormal) Collected: 24 0722    Specimen: Blood Updated: 24 0747     Glucose 143 mg/dL     POC Glucose Once [[id number]]  (Abnormal) Collected: 24 2110    Specimen: Blood Updated: 24 2112     Glucose 198 mg/dL     POC Glucose Once [[id number]]  (Abnormal) Collected: 24 1734    Specimen: Blood Updated: 24 1739     Glucose 240 mg/dL     POC Glucose Once [[id number]]  (Normal) Collected: 24 1132    Specimen: Blood Updated: 24 1143     Glucose 117 mg/dL             Physical Exam:  Patient tells me her fingers are little tingly.  She has grossly intact sensation to light touch in the median, radial, and ulnar nerve distributions.  No EPL or EDC function this morning secondary to the nerve block  Calf is soft and nontender.  She is able to wiggle her toes and move her ankle.    Assessment & Plan   Postop day #1 status post ORIF left distal radius and anterior left " [place] for femoral neck fracture    -- Weight-bear as tolerated left lower extremity.  Weight-bear as tolerated left upper extremity through the elbow  -- PT and OT this morning  -- After her right hip fracture, patient went home with home health services.  They are interested in trying to do that again if possible.  We will see how she does with physical therapy  --Incentive spirometry  -- Upon discharge, patient will need to follow-up with me in 2 weeks for wound check and suture removal.  She will be placed into a short arm cast at that time and we will initiate formal occupational hand therapy at around the 4-week point if healing and x-rays are satisfactory.    Devan Modi MD  07/21/24  08:30 EDT

## 2024-07-21 NOTE — CASE MANAGEMENT/SOCIAL WORK
Discharge Planning Assessment  [facility]     Patient Name: Pari Howell  MRN: [id number]  Today's Date: 7/21/2024    Admit Date: 7/19/2024    Plan: IDP   Discharge Needs Assessment       Row Name 07/21/24 1250       Living Environment    People in Home spouse    Name(s) of People in Home Aldo    Current Living Arrangements home    Potentially Unsafe Housing Conditions unable to assess    In the past 12 months has the electric, gas, oil, or water company threatened to shut off services in your home? No    Primary Care Provided by self    Provides Primary Care For no one    Family Caregiver if Needed spouse    Family Caregiver Names Aldo    Quality of Family Relationships supportive;involved;helpful    Able to Return to Prior Arrangements yes       Resource/Environmental Concerns    Resource/Environmental Concerns home accessibility    Home Accessibility Concerns stairs to enter home  5    Transportation Concerns none       Transportation Needs    In the past 12 months, has lack of transportation kept you from medical appointments or from getting medications? no    In the past 12 months, has lack of transportation kept you from meetings, work, or from getting things needed for daily living? No       Food Insecurity    Within the past 12 months, you worried that your food would run out before you got the money to buy more. Never true    Within the past 12 months, the food you bought just didn't last and you didn't have money to get more. Never true       Transition Planning    Patient/Family Anticipates Transition to inpatient rehabilitation facility    Patient/Family Anticipated Services at Transition none    Transportation Anticipated family or friend will provide       Discharge Needs Assessment    Readmission Within the Last 30 Days no previous admission in last 30 days    Equipment Currently Used at Home walker, rolling;wheelchair;bath bench;commode    Concerns to be Addressed no discharge needs  identified;denies needs/concerns at this time    Anticipated Changes Related to Illness none    Equipment Needed After Discharge none                   Discharge Plan       Row Name 07/21/24 1251       Plan    Plan IDP    Roadmap to Recovery Yes    Patient/Family in Agreement with Plan yes    Provided Post Acute Provider List? Yes    Post Acute Provider List Inpatient Rehab    Provided Post Acute Provider Quality & Resource List? Yes    Post Acute Provider Quality and Resource List Inpatient Rehab    Delivered To Patient;Support Person    Support Person daughter    Method of Delivery In person    Plan Comments Spoke with patient at bedside for IDP. Lives in house in [place] with spouse. IADL. Cane, raised toilet seat, SC,RW. No HH/OPPT. PCP Radha Gregg. [employer] Medicare Replacement with scripts filled at [facility]. Pt given roadmap to recovery and SNF list for pt choice. Will discuss with spouse and let CM know if she wants to go to SNF vs HH. CM will cont to follow.    Final Discharge Disposition Code 30 - still a patient                  Continued Care and Services - Admitted Since 7/19/2024    No active coordination exists for this encounter.       Expected Discharge Date and Time       Expected Discharge Date Expected Discharge Time    Jul 22, 2024            Demographic Summary       Row Name 07/21/24 1250       General Information    Admission Type inpatient    Arrived From emergency department    Referral Source admission list    Reason for Consult discharge planning    Preferred Language English                   Functional Status       Row Name 07/21/24 1250       Functional Status    Usual Activity Tolerance good    Current Activity Tolerance good       Physical Activity    On average, how many days per week do you engage in moderate to strenuous exercise (like a brisk walk)? 0 days    On average, how many minutes do you engage in exercise at this level? 0 min    Number of minutes of  exercise per week 0       Functional Status, IADL    Medications independent    Meal Preparation independent    Housekeeping independent    Laundry independent    Shopping independent                   Psychosocial    No documentation.                  Abuse/Neglect    No documentation.                  Legal    No documentation.                  Substance Abuse    No documentation.                  Patient Forms    No documentation.                     Alicia Jacob RN

## 2024-07-21 NOTE — PLAN OF CARE
Goal Outcome Evaluation:  Plan of Care Reviewed With: patient, spouse, daughter        Progress: no change  Outcome Evaluation: Pt continues to present below baseline with acute pain, impaired balance, and generalized weakness. Pt deferred all EOB/OOB activity d/t pain and fatigue. Good participation with BLE therex. Further IPPT is warrented. PT will progress as able per POC.      Anticipated Discharge Disposition (PT): inpatient rehabilitation facility

## 2024-07-21 NOTE — CASE MANAGEMENT/SOCIAL WORK
Continued Stay Note  [facility]     Patient Name: Pari Howell  MRN: [id number]  Today's Date: 7/21/2024    Admit Date: 7/19/2024    Plan: IDP   Discharge Plan       Row Name 07/21/24 2569       Plan    Plan Comments Spoke with patient, spouse, and daughter at bedside per their request. Pt would like to go to SNF and referral locations discussed with attempt made to place referrals. CM will cont to follow up.    Final Discharge Disposition Code 03 - skilled nursing facility (SNF)      Row Name 07/21/24 4745       Plan    Plan IDP    Roadmap to Recovery Yes    Patient/Family in Agreement with Plan yes    Provided Post Acute Provider List? Yes    Post Acute Provider List Inpatient Rehab    Provided Post Acute Provider Quality & Resource List? Yes    Post Acute Provider Quality and Resource List Inpatient Rehab    Delivered To Patient;Support Person    Support Person daughter    Method of Delivery In person    Plan Comments Spoke with patient at bedside for IDP. Lives in house in [place] with spouse. IADL. Cane, raised toilet seat, SC,RW. No HH/OPPT. PCP Radha Gregg. [employer] Medicare Replacement with scripts filled at [facility]. Pt given roadmap to recovery and SNF list for pt choice. Will discuss with spouse and let CM know if she wants to go to SNF vs HH. CM will cont to follow.    Final Discharge Disposition Code 30 - still a patient                   Discharge Codes    No documentation.                 Expected Discharge Date and Time       Expected Discharge Date Expected Discharge Time    Jul 22, 2024               Alicia Jacob RN

## 2024-07-21 NOTE — PLAN OF CARE
Goal Outcome Evaluation:  Plan of Care Reviewed With: patient        Progress: no change  Outcome Evaluation: OT evaluation completed. The pt presents below her functional baseline with acute pain, WBing restrictions to the LUE, balance deficits, generalized weakness, and decreased activity tolerance. Pt was educated on WBing precautions, sling management, nerve catheter care, and ADL retraining. Pt performed supine to sit transfer with modA x2. Pt stood and took steps to the chair using platform RWx with Edward x2 and a close chair follow. The pt will benefit from continued IP OT services to increase the pt's safety and independence during ADLs and functional mobility. Recommend a d/c to IRF for best outcome.      Anticipated Discharge Disposition (OT): inpatient rehabilitation facility

## 2024-07-21 NOTE — PLAN OF CARE
Goal Outcome Evaluation:  Plan of Care Reviewed With: patient        Progress: no change  Outcome Evaluation: PT eval completed. Pt presents below her functional baseline with acute pain, impaired balance, decreased endurance, and generalized weakness. WB restrictions reviewed, pt verbalized understanding. Pt ambulated 15' with min Ax2+1 and platform walker. No overt LOB but pt is generally unsteady. Further IPPT is warrented. PT rec d/c to [facility] for best functional outcomes.      Anticipated Discharge Disposition (PT): inpatient rehabilitation facility

## 2024-07-21 NOTE — THERAPY EVALUATION
Patient Name: Pari Howell  : 1950    MRN: [id number]                              Today's Date: 2024       Admit Date: 2024    Visit Dx:     ICD-10-CM ICD-9-CM   1. Subcapital fracture of hip, left, closed, initial encounter  S72.012A 820.09   2. Left wrist fracture, open, initial encounter  S62.102B 814.10     Patient Active Problem List   Diagnosis    Type 2 diabetes mellitus without complication, without long-term current use of insulin    Other specified glaucoma    Hyperlipidemia LDL goal <70    Vitamin D deficiency    Irritable bowel syndrome with diarrhea    Hypothyroidism (acquired)    Acute respiratory insufficiency    Hip fracture    Femur fracture    T2DM (type 2 diabetes mellitus)    HTN (hypertension)    Radial fracture    Left wrist fracture, open, initial encounter     Past Medical History:   Diagnosis Date    Bladder infection     Dyspareunia, female     Glaucoma     H/O sexual problem     High cholesterol     History of abnormal cervical Pap smear     Hypertension     Hypertension     Hypothyroidism     Impaired functional mobility, balance, gait, and endurance     Labial cyst     Muscle weakness     Type 2 diabetes mellitus     Urinary incontinence     Vaginal itching     Yeast infection      Past Surgical History:   Procedure Laterality Date    CARPAL TUNNEL RELEASE  2007    CATARACT EXTRACTION Right 2022    CATARACT EXTRACTION, BILATERAL Left     CHOLECYSTECTOMY  1991    EYE SURGERY      HIP FRACTURE SURGERY Right     HYSTERECTOMY  2007    OOPHORECTOMY      TOTAL HIP ARTHROPLASTY Right 2024    Procedure: TOTAL HIP ARTHROPLASTY ANTERIOR RIGHT;  Surgeon: Doni Evans MD;  Location: [facility];  Service: Orthopedics;  Laterality: Right;    TUBAL ABDOMINAL LIGATION  1979      General Information       Row Name 24 1002          Physical Therapy Time and Intention    Document Type evaluation  -AB     Mode of Treatment physical therapy  -AB       Row Name  07/21/24 1002          General Information    Patient Profile Reviewed yes  -AB     Prior Level of Function independent:;all household mobility;community mobility;gait;transfer;bed mobility;ADL's;driving  Independent prior, no AD. History of falls causing R [place] (2/2024). Not using AD.  -AB     Existing Precautions/Restrictions fall;other (see comments)   s/p L [place]- WBAT LLE; s/p L wrist ORIF in simple sling- NWB L wrist and WBAT L elbow  -AB     Barriers to Rehab medically complex;previous functional deficit;physical barrier  -AB       Row Name 07/21/24 1002          Living Environment    People in Home spouse  -AB       Row Name 07/21/24 1002          Home Main Entrance    Number of Stairs, Main Entrance two  -AB     Stair Railings, Main Entrance railing on right side (ascending)  -AB       Row Name 07/21/24 1002          Stairs Within Home, Primary    Number of Stairs, Within Home, Primary none  -AB       Row Name 07/21/24 1002          Cognition    Orientation Status (Cognition) oriented x 4  -AB       Row Name 07/21/24 1002          Safety Issues, Functional Mobility    Safety Issues Affecting Function (Mobility) awareness of need for assistance;insight into deficits/self-awareness;safety precaution awareness;safety precautions follow-through/compliance;sequencing abilities;problem-solving;judgment  -AB     Impairments Affecting Function (Mobility) balance;endurance/activity tolerance;strength;grasp;pain;range of motion (ROM);sensation/sensory awareness  -AB     Comment, Safety Issues/Impairments (Mobility) Requires consistent re-direction to task  -AB               User Key  (r) = Recorded By, (t) = Taken By, (c) = Cosigned By      Initials Name Provider Type    AB Ronda Thomas, PT Physical Therapist                   Mobility       Row Name 07/21/24 1003          Bed Mobility    Bed Mobility supine-sit;scooting/bridging  -AB     Scooting/Bridging [place] (Bed Mobility) moderate assist (50% patient  effort);2 person assist;verbal cues;nonverbal cues (demo/gesture)  -AB     Supine-Sit [place] (Bed Mobility) moderate assist (50% patient effort);2 person assist;verbal cues;nonverbal cues (demo/gesture)  -AB     Assistive Device (Bed Mobility) bed rails;draw sheet;head of bed elevated  -AB     Comment, (Bed Mobility) Assist provided at trunk and with LLE in order to sit EOB. Pt initially with strong posterior lean. Able to correct with time and tactile cues.  -AB       Row Name 07/21/24 1003          Transfers    Comment, (Transfers) Cues for hand placement, sequencing, and safety. Educated on WBing precautions.  -AB       Row Name 07/21/24 1003          Sit-Stand Transfer    Sit-Stand [place] (Transfers) 2 person assist;verbal cues;nonverbal cues (demo/gesture);moderate assist (50% patient effort)  -AB     Assistive Device (Sit-Stand Transfers) walker, rolling platform  -AB     Comment, (Sit-Stand Transfer) Pt required assist to place LUE on platform walker. Strong boost provided to stand as pt initially limiting WBing through LLE.  -AB       Row Name 07/21/24 1003          Gait/Stairs (Locomotion)    [place] Level (Gait) minimum assist (75% patient effort);2 person assist;verbal cues;nonverbal cues (demo/gesture);1 person to manage equipment  -AB     Assistive Device (Gait) walker, rolling platform  -AB     Patient was able to Ambulate yes  -AB     Distance in Feet (Gait) 15  -AB     Deviations/Abnormal Patterns (Gait) bilateral deviations;lela decreased;gait speed decreased;stride length decreased;base of support, narrow;antalgic  -AB     Bilateral Gait Deviations forward flexed posture;heel strike decreased  -AB     Left Sided Gait Deviations weight shift ability decreased  -AB     [place] Level (Stairs) unable to assess  -AB     Comment, (Gait/Stairs) Pt ambulated with step-to gait pattern, slowed lela, and decreased weight acceptance onto LLE. Poor foot clearance noted carlee during  swing phase. Cues provided for upright posture, walker positioning, and re-direction to task. No overt LOB or knee buckling. Min A to steady. +1 for close chair follow. Further activity limited by pain and fatigue.  -AB       Row Name 07/21/24 1003          Mobility    Extremity Weight-bearing Status left lower extremity;left upper extremity  -AB     Left Upper Extremity (Weight-bearing Status) other (see comments)  WBAT through elbow, NWB at wrist  -AB     Left Lower Extremity (Weight-bearing Status) weight-bearing as tolerated (WBAT)  -AB               User Key  (r) = Recorded By, (t) = Taken By, (c) = Cosigned By      Initials Name Provider Type    AB Ronda Thomas, PT Physical Therapist                   Obj/Interventions       Row Name 07/21/24 1010          Range of Motion Comprehensive    General Range of Motion bilateral lower extremity ROM WFL  -AB     Comment, General Range of Motion BLE WFL; AROM of LLE limited by pain.  -AB       Row Name 07/21/24 1010          Strength Comprehensive (MMT)    General Manual Muscle Testing (MMT) Assessment lower extremity strength deficits identified  -AB     Comment, General Manual Muscle Testing (MMT) Assessment RLE grossly 4/5; LLE grossly 3+/5  -AB       Row Name 07/21/24 1010          Motor Skills    Therapeutic Exercise knee;hip;ankle  -AB       Row Name 07/21/24 1010          Hip (Therapeutic Exercise)    Hip (Therapeutic Exercise) isometric exercises;strengthening exercise  -AB     Hip Isometrics (Therapeutic Exercise) bilateral;gluteal sets;10 repetitions  -AB     Hip Strengthening (Therapeutic Exercise) left;aBduction;10 repetitions  mod A provided  -AB       Row Name 07/21/24 1010          Knee (Therapeutic Exercise)    Knee (Therapeutic Exercise) isometric exercises;strengthening exercise  -AB     Knee Isometrics (Therapeutic Exercise) left;quad sets;10 repetitions  -AB     Knee Strengthening (Therapeutic Exercise) left;SLR (straight leg raise);LAQ (long arc  quad);heel slides;10 repetitions  Mod A for SLR  -AB       Row Name 07/21/24 1010          Ankle (Therapeutic Exercise)    Ankle (Therapeutic Exercise) AROM (active range of motion)  -AB     Ankle AROM (Therapeutic Exercise) bilateral;dorsiflexion;plantarflexion;10 repetitions  -AB       Row Name 07/21/24 1010          Balance    Balance Assessment sitting static balance;sitting dynamic balance;standing static balance;standing dynamic balance  -AB     Static Sitting Balance contact guard  -AB     Dynamic Sitting Balance moderate assist  -AB     Position, Sitting Balance unsupported;sitting edge of bed  -AB     Static Standing Balance minimal assist  -AB     Dynamic Standing Balance minimal assist;2-person assist;verbal cues;non-verbal cues (demo/gesture)  -AB     Position/Device Used, Standing Balance supported;walker, platform  -AB     Balance Interventions sitting;standing;sit to stand;supported;dynamic;static;occupation based/functional task  -AB     Comment, Balance No overt LOB. Min A to steady in standing.  -AB               User Key  (r) = Recorded By, (t) = Taken By, (c) = Cosigned By      Initials Name Provider Type    AB Ronda Thomas, PT Physical Therapist                   Goals/Plan       Row Name 07/21/24 1022          Bed Mobility Goal 1 (PT)    Activity/Assistive Device (Bed Mobility Goal 1, PT) sit to supine;supine to sit  -AB     [place] Level/Cues Needed (Bed Mobility Goal 1, PT) contact guard required  -AB     Time Frame (Bed Mobility Goal 1, PT) short term goal (STG);5 days  -AB       Row Name 07/21/24 1022          Transfer Goal 1 (PT)    Activity/Assistive Device (Transfer Goal 1, PT) sit-to-stand/stand-to-sit;bed-to-chair/chair-to-bed;walker, platform  -AB     [place] Level/Cues Needed (Transfer Goal 1, PT) contact guard required  -AB     Time Frame (Transfer Goal 1, PT) long term goal (LTG);10 days  -AB       Row Name 07/21/24 1022          Gait Training Goal 1 (PT)     Activity/Assistive Device (Gait Training Goal 1, PT) gait (walking locomotion);assistive device use;walker, platform  -AB     [place] Level (Gait Training Goal 1, PT) contact guard required  -AB     Distance (Gait Training Goal 1, PT) 50  -AB     Time Frame (Gait Training Goal 1, PT) long term goal (LTG);10 days  -AB       Row Name 07/21/24 1022          Patient Education Goal (PT)    Activity (Patient Education Goal, PT) HEP  -AB     [place]/Cues/Accuracy (Memory Goal 2, PT) independent  -AB     Time Frame (Patient Education Goal, PT) long term goal (LTG);10 days  -AB       Row Name 07/21/24 1022          Therapy Assessment/Plan (PT)    Planned Therapy Interventions (PT) balance training;bed mobility training;gait training;home exercise program;patient/family education;postural re-education;transfer training;stretching;strengthening;ROM (range of motion)  -AB               User Key  (r) = Recorded By, (t) = Taken By, (c) = Cosigned By      Initials Name Provider Type    AB Ronda Thomas, PT Physical Therapist                   Clinical Impression       Row Name 07/21/24 1014          Pain    Pretreatment Pain Rating 0/10 - no pain  -AB     Posttreatment Pain Rating 0/10 - no pain  -AB     Pain Location - Side/Orientation Left  -AB     Pain Location generalized  -AB     Pain Location - hip  -AB     Pre/Posttreatment Pain Comment No pain at rest. Increased pain with activity, reports burning sensation  -AB     Pain Intervention(s) Ambulation/increased activity;Elevated;Repositioned;Cold applied  -AB     Additional Documentation Pain Scale: Numbers Pre/Post-Treatment (Group)  -AB       Row Name 07/21/24 1014          Plan of Care Review    Plan of Care Reviewed With patient  -AB     Progress no change  -AB     Outcome Evaluation PT eval completed. Pt presents below her functional baseline with acute pain, impaired balance, decreased endurance, and generalized weakness. WB restrictions reviewed, pt  verbalized understanding. Pt ambulated 15' with min Ax2+1 and platform walker. No overt LOB but pt is generally unsteady. Further IPPT is warrented. PT rec d/c to IPR for best functional outcomes.  -AB       Row Name 07/21/24 1014          Therapy Assessment/Plan (PT)    Patient/Family Therapy Goals Statement (PT) Get better  -AB     Rehab Potential (PT) good, to achieve stated therapy goals  -AB     Criteria for Skilled Interventions Met (PT) yes;meets criteria;skilled treatment is necessary  -AB     Therapy Frequency (PT) 2 times/day  -AB     Predicted Duration of Therapy Intervention (PT) 10 days  -AB       Row Name 07/21/24 1014          Vital Signs    Pre Systolic BP Rehab 129  -AB     Pre Treatment Diastolic BP 66  -AB     Pre SpO2 (%) 96  -AB     O2 Delivery Pre Treatment nasal cannula  -AB     Intra SpO2 (%) 94  -AB     O2 Delivery Intra Treatment room air  -AB     Post SpO2 (%) 94  -AB     O2 Delivery Post Treatment room air  -AB     Pre Patient Position Supine  -AB     Intra Patient Position Standing  -AB     Post Patient Position Sitting  -AB       Row Name 07/21/24 1014          Positioning and Restraints    Pre-Treatment Position in bed  -AB     Post Treatment Position chair  -AB     In Chair notified nsg;reclined;sitting;call light within reach;encouraged to call for assist;exit alarm on;legs elevated;waffle cushion;compression device;on mechanical lift sling  -AB               User Key  (r) = Recorded By, (t) = Taken By, (c) = Cosigned By      Initials Name Provider Type    AB Ronda Thomas, PT Physical Therapist                   Outcome Measures       Row Name 07/21/24 1023          How much help from another person do you currently need...    Turning from your back to your side while in flat bed without using bedrails? 2  -AB     Moving from lying on back to sitting on the side of a flat bed without bedrails? 2  -AB     Moving to and from a bed to a chair (including a wheelchair)? 3  -AB      Standing up from a chair using your arms (e.g., wheelchair, bedside chair)? 2  -AB     Climbing 3-5 steps with a railing? 1  -AB     To walk in hospital room? 3  -AB     AM-PAC 6 Clicks Score (PT) 13  -AB     Highest Level of Mobility Goal 4 --> Transfer to chair/commode  -AB       Row Name 07/21/24 1023          PADD    Diagnosis 2  -AB     Gender 1  -AB     Age Group 1  -AB     Gait Distance 0  -AB     Assist Level 0  -AB     Home Support 3  -AB     PADD Score 7  -AB     Patient Preference home with home health  -AB     Prediction by PADD Score extended rehabilitation  -AB       Row Name 07/21/24 1023 07/21/24 1007       Functional Assessment    Outcome Measure Options AM-PAC 6 Clicks Basic Mobility (PT);PADD  -AB AM-PAC 6 Clicks Daily Activity (OT)  -KF              User Key  (r) = Recorded By, (t) = Taken By, (c) = Cosigned By      Initials Name Provider Type    AB Ronda Thomas, PT Physical Therapist    Manju Willis, OT Occupational Therapist                                 Physical Therapy Education       Title: PT OT SLP Therapies (In Progress)       Topic: Physical Therapy (Done)       Point: Mobility training (Done)       Learning Progress Summary             Patient Acceptance, E,D,H, VU,NR by AB at 7/21/2024 1024                         Point: Home exercise program (Done)       Learning Progress Summary             Patient Acceptance, E,D,H, VU,NR by AB at 7/21/2024 1024                         Point: Body mechanics (Done)       Learning Progress Summary             Patient Acceptance, E,D,H, VU,NR by AB at 7/21/2024 1024                         Point: Precautions (Done)       Learning Progress Summary             Patient Acceptance, E,D,H, VU,NR by AB at 7/21/2024 1024                                         User Key       Initials Effective Dates Name Provider Type Discipline    AB 09/22/22 -  Ronda Thomas, PT Physical Therapist PT                  PT Recommendation and Plan  Planned Therapy  Interventions (PT): balance training, bed mobility training, gait training, home exercise program, patient/family education, postural re-education, transfer training, stretching, strengthening, ROM (range of motion)  Plan of Care Reviewed With: patient  Progress: no change  Outcome Evaluation: PT eval completed. Pt presents below her functional baseline with acute pain, impaired balance, decreased endurance, and generalized weakness. WB restrictions reviewed, pt verbalized understanding. Pt ambulated 15' with min Ax2+1 and platform walker. No overt LOB but pt is generally unsteady. Further IPPT is warrented. PT rec d/c to IPR for best functional outcomes.     Time Calculation:   PT Evaluation Complexity  History, PT Evaluation Complexity: 3 or more personal factors and/or comorbidities  Examination of Body Systems (PT Eval Complexity): total of 3 or more elements  Clinical Presentation (PT Evaluation Complexity): evolving  Clinical Decision Making (PT Evaluation Complexity): moderate complexity  Overall Complexity (PT Evaluation Complexity): moderate complexity     PT Charges       Row Name 07/21/24 1024             Time Calculation    Start Time 0805  -AB      PT Received On 07/21/24  -AB      PT Goal Re-Cert Due Date 07/31/24  -AB         Timed Charges    75826 - PT Therapeutic Exercise Minutes 10  -AB         Untimed Charges    PT Eval/Re-eval Minutes 52  -AB         Total Minutes    Timed Charges Total Minutes 10  -AB      Untimed Charges Total Minutes 52  -AB       Total Minutes 62  -AB                User Key  (r) = Recorded By, (t) = Taken By, (c) = Cosigned By      Initials Name Provider Type    AB Ronda Thomas, PT Physical Therapist                  Therapy Charges for Today       Code Description Service Date Service Provider Modifiers Qty    64260047953 HC PT THER PROC EA 15 MIN 7/21/2024 Ronda Thomas, PT GP 1    33146701465 HC PT EVAL MOD COMPLEXITY 4 7/21/2024 Ronda Thomas, PT GP 1             PT G-Codes  Outcome Measure Options: AM-PAC 6 Clicks Basic Mobility (PT), PADD  AM-PAC 6 Clicks Score (PT): 13  AM-PAC 6 Clicks Score (OT): 13  PT Discharge Summary  Anticipated Discharge Disposition (PT): inpatient rehabilitation facility    Ronda Thomas, PT  7/21/2024

## 2024-07-21 NOTE — PLAN OF CARE
Goal Outcome Evaluation:  Plan of Care Reviewed With: patient        Progress: no change  Outcome Evaluation: Remains sinus on the monitor Glucoses 143, 134, and 152. Dressings to hip and left arm CDI. Sling to Left arm. Worked with PT see PT notes. Up to recliner and tolerates well. Pain managed with nerve cath and oral medications.

## 2024-07-21 NOTE — PROGRESS NOTES
[place]    Acute pain service Inpatient Progress Note    Patient Name: Pari Howell  :  1950  MRN:  [id number]        Acute Pain  Service Inpatient Progress Note:    Analgesia:Good  Pain Score:3/10  LOC: alert and awake  Resp Status: room air  Cardiac: VS stable  Side Effects:None  Catheter Site:clean, dressing intact and dry  Cath type: peripheral nerve cath(InfuSystem)  Volume: 1mL,5ml, 5ml InfuSystem Pump.  Catheter Plan:Catheter to remain Insitu and Continue catheter infusion rate unchanged  Comments:

## 2024-07-21 NOTE — PROGRESS NOTES
[facility]  PROGRESS NOTE    Patient Name: Pari Howell  : 1950  MRN: [id number]    Date of Admission: 2024  Primary Care Physician: Radha Gregg APRN    Subjective   Subjective     CC:  Fall, fx    HPI:  Had multiple issues with her IV and attempts to replace 1 overnight.  Stuck several times and finally had IV placed with ultrasound.  Complaining of arm soreness and sharp pain in her hip from surgery      Objective   Objective     Vital Signs:   Temp:  [97.3 °F (36.3 °C)-98.6 °F (37 °C)] 97.7 °F (36.5 °C)  Heart Rate:  [] 79  Resp:  [12-23] 18  BP: (107-150)/(55-76) 122/67  Flow (L/min):  [2-6] 2     Physical Exam:  Constitutional: No acute distress, awake, alert  HENT: NCAT, mucous membranes moist  Respiratory: Respiratory effort normal   Cardiovascular: RRR, no murmurs, rubs, or gallops  Musculoskeletal: Trace LE edema, sensation intact, left upper extremity in sling  Psychiatric: Appropriate affect, cooperative  Neurologic: Oriented x 3, speech clear  Skin: No rashes      Results Reviewed:  LAB RESULTS:      Lab 24  0335   WBC 15.47*   HEMOGLOBIN 14.5   HEMATOCRIT 42.6   PLATELETS 269   NEUTROS ABS 13.31*   IMMATURE GRANS (ABS) 0.09*   LYMPHS ABS 1.16   MONOS ABS 0.83   EOS ABS 0.04   MCV 88.0         Lab 24  0923 24  0335 24  0044   SODIUM 139 137 136   POTASSIUM 4.7 3.9 4.3   CHLORIDE 105 100 101   CO2 19.0* 24.0 23.0   ANION GAP 15.0 13.0 12.0   BUN 11 17 18   CREATININE 0.66 0.73 0.73   EGFR 92.2 86.4 86.4   GLUCOSE 175* 167* 183*   CALCIUM 8.9 9.4 9.0   PHOSPHORUS 2.8  --   --    HEMOGLOBIN A1C  --  6.00*  --          Lab 24  0923 24  0044   TOTAL PROTEIN  --  7.2   ALBUMIN 3.5 4.2   GLOBULIN  --  3.0   ALT (SGPT)  --  26   AST (SGOT)  --  28   BILIRUBIN  --  0.4   ALK PHOS  --  64                 Lab 24  0429 24  0335   ABO TYPING A A   RH TYPING Positive Positive   ANTIBODY SCREEN  --   Negative         Brief Urine Lab Results  (Last result in the past 365 days)        Color   Clarity   Blood   Leuk Est   Nitrite   Protein   CREAT   Urine HCG        04/01/24 0815             135.4                 Microbiology Results Abnormal       None            XR Hip With or Without Pelvis 2 - 3 View Left    Result Date: 7/20/2024  XR HIP W OR WO PELVIS 2-3 VIEW LEFT Date of Exam: 7/20/2024 4:17 PM EDT Indication: Postop Comparison: 7/19/2024 Findings: Total left hip prosthesis is now seen, components in anatomic alignment. There is expected postop soft tissue air. Right hip prosthesis remains in anatomic alignment. No abnormal lucency is seen around the prosthetic components. Bony structures appear intact.     Impression: Impression: Total left hip prosthesis in anatomic alignment. No new bony abnormality seen. Electronically Signed: Jasmeet Carrasco MD  7/20/2024 4:53 PM EDT  Workstation ID: [id number]    FL C Arm During Surgery    Result Date: 7/20/2024  This procedure was auto-finalized with no dictation required.    Left Fascia iliac SS    Result Date: 7/20/2024  Grant Elias CRNA     7/20/2024 11:59 AM Left Fascia iliac SS Patient reassessed immediately prior to procedure Start time: 7/20/2024 11:20 AM Reason for block: at surgeon's request and post-op pain management Performed by CRNA/CAA: Grant Elias CRNA Assisted by: Janina Chávez RN Preanesthetic Checklist Completed: patient identified, IV checked, site marked, risks and benefits discussed, surgical consent, monitors and equipment checked, pre-op evaluation and timeout performed Prep: Pt Position: supine Sterile barriers:cap, gloves, mask and washed/disinfected hands Prep: ChloraPrep Patient monitoring: blood pressure monitoring, continuous pulse oximetry and EKG Procedure Sedation: yes Performed under: local infiltration Guidance:ultrasound guided ULTRASOUND INTERPRETATION.  Using ultrasound guidance a 20 G gauge needle was placed in close  "proximity to the nerve, at which point, under ultrasound guidance anesthetic was injected in the area of the nerve and spread of the anesthesia was seen on ultrasound in close proximity thereto.  There were no abnormalities seen on ultrasound; a digital image was taken; and the patient tolerated the procedure with no complications. Images:still images obtained, printed/placed on chart Laterality:left Block Type:fascia iliaca compartment Injection Technique:single-shot Needle Type:echogenic and Tuohy Needle Gauge:18 G Resistance on Injection: none Catheter Size:20 G (20g) Medications Used: bupivacaine PF (MARCAINE) 0.25 % injection - Injection  30 mL - 7/20/2024 11:20:00 AM Medications Preservative Free Saline:5ml Post Assessment Injection Assessment: negative aspiration for heme, no paresthesia on injection and incremental injection Patient Tolerance:comfortable throughout block Complications:no Additional Notes CKAFASCIAILIACA: SINGLE shot A high-frequency linear transducer, with sterile cover, was placed in parasagittal plane on top of the Anterior Superior Iliac Spine (ASIS) and moved medially to identify the Internal Oblique muscle, Sartorius muscle, Iliacus Muscle, Fascia Iliaca (FI) and Fascia Latae. The insertion site was prepped and draped in sterile fashion. Skin and cutaneous tissue was infiltrated with 2-5 ml of 1% Lidocaine. Using ultrasound-guidance, a 20-gauge B-Moraes 4\" Ultraplex 360 non-stimulating echogenic needle was advanced in plane from caudad to cephalad. Preservative-free normal saline was utilized for hydro-dissection of tissue, advancement of needle, and to confirm final needle placement below FI. Local anesthetic in incremental 3-5 ml injections. Aspiration every 5 ml to prevent intravascular injection. Injection was completed with negative aspiration of blood and negative intravascular injection. Injection pressures were normal with minimal resistance. Performed by: Grant Elias, CRNA "     left Infraclavicular Cath    Result Date: 7/20/2024  Grant Elias CRNA     7/20/2024 12:00 PM Left Infraclavicular Cath Patient reassessed immediately prior to procedure Patient location during procedure: pre-op Start time: 7/20/2024 11:15 AM Reason for block: at surgeon's request and post-op pain management Performed by Anesthesiologist: Tyson Hood MD CRNA/CAA: Grant Elias CRNA Preanesthetic Checklist Completed: patient identified, IV checked, site marked, risks and benefits discussed, surgical consent, monitors and equipment checked, pre-op evaluation and timeout performed Prep: Pt Position: supine Sterile barriers:cap, gloves, mask and washed/disinfected hands Prep: ChloraPrep Patient monitoring: blood pressure monitoring, continuous pulse oximetry and EKG Procedure Sedation: yes Performed under: local infiltration Guidance:ultrasound guided ULTRASOUND INTERPRETATION.  Using ultrasound guidance a 20 G gauge needle was placed in close proximity to the brachial plexus nerve, at which point, under ultrasound guidance anesthetic was injected in the area of the nerve and spread of the anesthesia was seen on ultrasound in close proximity thereto.  There were no abnormalities seen on ultrasound; a digital image was taken; and the patient tolerated the procedure with no complications. Images:still images obtained, printed/placed on chart Laterality:left Block Type:infraclavicular Injection Technique:catheter Needle Type:Tuohy and echogenic Needle Gauge:18 G Resistance on Injection: none Catheter Size:20 G Cath Depth at skin: 9 cm Medications Used: fentaNYL citrate (PF) (SUBLIMAZE) injection - Intravenous  100 mcg - 7/20/2024 11:15:00 AM bupivacaine PF (MARCAINE) 0.25 % injection - Injection  30 mL - 7/20/2024 11:15:00 AM Medications Preservative Free Saline:3ml Post Assessment Injection Assessment: negative aspiration for heme, no paresthesia on injection and incremental injection  "Patient Tolerance:comfortable throughout block Complications:no Additional Notes CATHETER The affected upper extremity was abducted and rotated 90 degrees at the shoulder, within the patients range of motion. A high-frequency linear transducer, with sterile cover, was placed just medial to the coracoid process, distal third of the clavicle, and inferior to the clavicle. Keeping the transducer is in a parasagittal plane (cephalad to caudad), scanning medially to laterally. The Pectoralis major and minor, brachial plexus, axillary artery and vein, rib and pleura where visualized. The insertion site was prepped and draped in sterile fashion. Skin and cutaneous tissue was infiltrated with 2-5 ml of 1% Lidocaine. Using ultrasound-guidance, an 18-gauge Contiplex Ultra 360 Touhy needle was advanced in plane lateral to the axillary artery and lateral cord of the brachial plexus. Preservative-free normal saline was utilized for hydro-dissection of tissue, advancement of Touhy needle, and to confirm final needle placement posterior to the axillary artery and posterior cord of the brachial plexus. Local anesthetic injection spread, in incremental 3-5 ml injections, was confirmed. Aspiration every 5 ml to prevent intravascular injection. Injection was completed with negative aspiration of blood and negative intravascular injection. Injection pressures were normal with minimal resistance. A 20-gauge MiniVaxiplex Echo catheter was placed through the needle and advance out the tip of the Touhy 1-3 cm. The Touhy needle was then removed, and final catheter position verified at the 5-6 o'clock position to the axillary artery and posterior cord. The catheter was secured in usual fashion with skin glue, benzoin, steri-strips, CHG tegaderm and Label noting \"Nerve Block Catheter\". Jerk tape applied at yellow connector and catheter connection. Performed by: Grant Elias CRNA    CT Pelvis Without Contrast    Result Date: 7/20/2024  CT " PELVIS WO CONTRAST Date of Exam: 7/20/2024 2:17 AM EDT Indication: eval left hip fracture. Comparison: None available. Technique: Axial CT images were obtained of the pelvis without contrast administration.  Reconstructed coronal and sagittal images were also obtained. Automated exposure control and iterative construction methods were used. Findings: There is an impacted subcapital fracture of the proximal left femur. There is no evidence of dislocation of the femoral head. There does appear to be a component of the subcapital fracture which may involve the lateral aspect of the femoral neck. The superior to inferior pubic rami are intact. There are postoperative changes from right total hip arthroplasty. There is subchondral cystic change of the lateral acetabulum     Impression: Impression: Impacted subcapital fracture of the proximal left femur. Electronically Signed: Doni Vazquez MD  7/20/2024 4:41 AM EDT  Workstation ID: [id number]    CT Upper Extremity Left Without Contrast    Result Date: 7/20/2024  CT UPPER EXTREMITY LEFT WO CONTRAST Date of Exam: 7/20/2024 2:17 AM EDT Indication: evaluate wrist fracture. Comparison: None available. Technique: Axial CT images were obtained of the left upper extremity without contrast administration.  Reconstructed coronal and sagittal images were also obtained. Automated exposure control and iterative construction methods were used. Findings: The exam is limited based upon the position patient's restrictive CT scan and the poor x-ray penetration of the osseous structures. Again demonstrated is a comminuted fracture of the distal radius with displacement. There is a displaced ulnar styloid fracture. There is mild improvement in the alignment of the distal radius fracture with an interval placement of a plaster splint. The scaphoid appears intact. No discrete displaced carpal fracture is identified though again limited by the poor technique.     Impression: Impression: Limited  exam. There is a comminuted fracture of the distal radius with displacement. There is a displaced ulnar styloid fracture. Electronically Signed: Doni Vazquez MD  7/20/2024 4:37 AM EDT  Workstation ID: [id number]    XR Chest 1 View    Result Date: 7/19/2024  XR CHEST 1 VW Date of Exam: 7/19/2024 11:02 PM EDT Indication: preop Comparison: Chest radiograph dated February 4, 2024 Findings: There are no airspace consolidations. No pleural fluid. No pneumothorax. The pulmonary vasculature appears within normal limits. The cardiac and mediastinal silhouette appear unremarkable. No acute osseous abnormality identified.     Impression: Impression: No active disease Electronically Signed: Doni Vazquez MD  7/19/2024 11:36 PM EDT  Workstation ID: [id number]    XR Wrist 3+ View Left    Result Date: 7/19/2024  XR WRIST 3+ VW LEFT Date of Exam: 7/19/2024 11:01 PM EDT Indication: fall/pain Comparison: None available. Findings: There is a comminuted and displaced fracture of the distal radius. There is a displaced fracture of the ulnar styloid. There is dislocation of the radiocarpal joint in the radial direction. There is widening of the scaphoid trapezium interval.     Impression: Impression: Comminuted and displaced fracture of the distal radius with disruption of the radiocarpal's. Electronically Signed: Doni Vazquez MD  7/19/2024 11:34 PM EDT  Workstation ID: [id number]    XR Hip With or Without Pelvis 2 - 3 View Left    Result Date: 7/19/2024  XR HIP W OR WO PELVIS 2-3 VIEW LEFT Date of Exam: 7/19/2024 11:01 PM EDT Indication: fall/pain Comparison: AP pelvis dated 2/4/2024 Findings: There is an acute minimally displaced fracture of the subcapital proximal left femur. There is evidence of a previous right total hip arthroplasty. There are no lytic or destructive bony lesions.     Impression: Impression: Fracture of the subcapital left femur Electronically Signed: Doni Vazquez MD  7/19/2024 11:31 PM EDT  Workstation ID: [id number]          Current medications:  Scheduled Meds:[START ON 7/22/2024] Pharmacy Consult, , Does not apply, Daily  amLODIPine, 10 mg, Oral, Daily  aspirin, 81 mg, Oral, Q12H  cetirizine, 5 mg, Oral, Daily  insulin lispro, 2-7 Units, Subcutaneous, 4x Daily AC & at Bedtime  latanoprost, 1 drop, Both Eyes, Daily  levothyroxine, 75 mcg, Oral, Q AM  losartan, 50 mg, Oral, Q24H  pantoprazole, 40 mg, Oral, Daily  pravastatin, 40 mg, Oral, Q PM  sodium chloride, 10 mL, Intravenous, Q12H  sodium chloride, 3 mL, Intravenous, Q12H  timolol, 1 drop, Both Eyes, BID      Continuous Infusions:ropivacaine,   sodium chloride 0.9 % with KCl 20 mEq, 50 mL/hr      PRN Meds:.  senna-docusate sodium **AND** polyethylene glycol **AND** bisacodyl **AND** bisacodyl    Calcium Replacement - Follow Nurse / BPA Driven Protocol    Pharmacy Consult    dextrose    dextrose    docusate sodium    glucagon (human recombinant)    HYDROcodone-acetaminophen    HYDROmorphone    Magnesium Standard Dose Replacement - Follow Nurse / BPA Driven Protocol    Morphine **AND** naloxone    nitroglycerin    ondansetron ODT **OR** ondansetron    Phosphorus Replacement - Follow Nurse / BPA Driven Protocol    Potassium Replacement - Follow Nurse / BPA Driven Protocol    [COMPLETED] Insert Peripheral IV **AND** sodium chloride    sodium chloride    sodium chloride    sodium chloride    sodium chloride    Assessment & Plan   Assessment & Plan     Active Hospital Problems    Diagnosis  POA    **Femur fracture [S72.90XA]  Yes    T2DM (type 2 diabetes mellitus) [E11.9]  Yes    HTN (hypertension) [I10]  Yes    Radial fracture [S52.90XA]  Yes    Left wrist fracture, open, initial encounter [S62.102B]  Unknown    Hypothyroidism (acquired) [E03.9]  Yes    Hyperlipidemia LDL goal <70 [E78.5]  Yes      Resolved Hospital Problems   No resolved problems to display.        Brief Hospital Course to date:  aPri Howell is a 74 y.o. female with hx of HTN, HLD, hypothyroidism, T2DM, IBS  who presented to the ED w/ c/o a fall.  Imaging shows left femur fracture and left distal radius fracture.  She underwent repair of left wrist and left hip on 7/20.        Left femur fracture   Left distal radius fracture   -2/2 mechanical fall   -CT pelvis impacted subcapital fracture of the proximal left femur   - CT LUE shows comminuted fracture of the distal radius with displacement. There is a displaced ulnar styloid fracture.  -Ortho evaluated  -IVF  -PRN pain medications, antiemetics   -s/p ORIF distal left radius and total hip anterior arthroplasty with Dr. Evans/Lane        T2DM  -hem A1c 6.0  -on Semaglutide   -Continue SSI     Neuropathy  -uses cream from compound pharmacy to help     HTN   HLD   -continue routine Norvasc  -on losartan-HCTZ; losartan continued at half dose, HCTZ held for now   -continued statin        Hypothyroidism   -continue synthroid       Expected Discharge Location and Transportation: [facility]  Expected Discharge   Expected Discharge Date: 7/22/2024; Expected Discharge Time:      VTE Prophylaxis:  Mechanical VTE prophylaxis orders are present.         AM-PAC 6 Clicks Score (PT): 13 (07/21/24 1023)    CODE STATUS:   Code Status and Medical Interventions:   Ordered at: 07/20/24 [id number]     Level Of Support Discussed With:    Patient     Code Status (Patient has no pulse and is not breathing):    CPR (Attempt to Resuscitate)     Medical Interventions (Patient has pulse or is breathing):    Full Support       Amanda Elias, DO  07/21/24

## 2024-07-22 LAB
GLUCOSE BLDC GLUCOMTR-MCNC: 136 MG/DL (ref 70–130)
GLUCOSE BLDC GLUCOMTR-MCNC: 141 MG/DL (ref 70–130)
GLUCOSE BLDC GLUCOMTR-MCNC: 143 MG/DL (ref 70–130)
GLUCOSE BLDC GLUCOMTR-MCNC: 146 MG/DL (ref 70–130)
QT INTERVAL: 394 MS
QTC INTERVAL: 465 MS

## 2024-07-22 PROCEDURE — 82948 REAGENT STRIP/BLOOD GLUCOSE: CPT

## 2024-07-22 PROCEDURE — 97116 GAIT TRAINING THERAPY: CPT

## 2024-07-22 PROCEDURE — 97530 THERAPEUTIC ACTIVITIES: CPT

## 2024-07-22 PROCEDURE — 99024 POSTOP FOLLOW-UP VISIT: CPT | Performed by: ORTHOPAEDIC SURGERY

## 2024-07-22 PROCEDURE — 97110 THERAPEUTIC EXERCISES: CPT

## 2024-07-22 PROCEDURE — 99232 SBSQ HOSP IP/OBS MODERATE 35: CPT | Performed by: NURSE PRACTITIONER

## 2024-07-22 RX ORDER — SIMETHICONE 80 MG
80 TABLET,CHEWABLE ORAL 4 TIMES DAILY PRN
Status: DISCONTINUED | OUTPATIENT
Start: 2024-07-22 | End: 2024-07-25 | Stop reason: HOSPADM

## 2024-07-22 RX ADMIN — Medication 3 ML: at 20:56

## 2024-07-22 RX ADMIN — HYDROCODONE BITARTRATE AND ACETAMINOPHEN 1 TABLET: 7.5; 325 TABLET ORAL at 05:24

## 2024-07-22 RX ADMIN — LATANOPROST 1 DROP: 50 SOLUTION OPHTHALMIC at 20:57

## 2024-07-22 RX ADMIN — PRAVASTATIN SODIUM 40 MG: 40 TABLET ORAL at 17:28

## 2024-07-22 RX ADMIN — PANTOPRAZOLE SODIUM 40 MG: 40 TABLET, DELAYED RELEASE ORAL at 08:11

## 2024-07-22 RX ADMIN — ASPIRIN 81 MG: 81 TABLET, COATED ORAL at 20:57

## 2024-07-22 RX ADMIN — Medication: at 13:47

## 2024-07-22 RX ADMIN — HYDROCODONE BITARTRATE AND ACETAMINOPHEN 1 TABLET: 7.5; 325 TABLET ORAL at 20:57

## 2024-07-22 RX ADMIN — ASPIRIN 81 MG: 81 TABLET, COATED ORAL at 08:11

## 2024-07-22 RX ADMIN — LOSARTAN POTASSIUM 50 MG: 50 TABLET, FILM COATED ORAL at 08:11

## 2024-07-22 RX ADMIN — SENNOSIDES AND DOCUSATE SODIUM 2 TABLET: 50; 8.6 TABLET ORAL at 20:57

## 2024-07-22 RX ADMIN — SENNOSIDES AND DOCUSATE SODIUM 2 TABLET: 50; 8.6 TABLET ORAL at 08:28

## 2024-07-22 RX ADMIN — POLYETHYLENE GLYCOL 3350 17 G: 17 POWDER, FOR SOLUTION ORAL at 08:28

## 2024-07-22 RX ADMIN — CETIRIZINE HYDROCHLORIDE 5 MG: 10 TABLET, FILM COATED ORAL at 08:11

## 2024-07-22 RX ADMIN — BISACODYL 5 MG: 5 TABLET, COATED ORAL at 20:57

## 2024-07-22 RX ADMIN — LEVOTHYROXINE SODIUM 75 MCG: 0.07 TABLET ORAL at 04:53

## 2024-07-22 RX ADMIN — AMLODIPINE BESYLATE 10 MG: 10 TABLET ORAL at 08:11

## 2024-07-22 RX ADMIN — TIMOLOL MALEATE 1 DROP: 5 SOLUTION/ DROPS OPHTHALMIC at 08:14

## 2024-07-22 RX ADMIN — TIMOLOL MALEATE 1 DROP: 5 SOLUTION/ DROPS OPHTHALMIC at 20:58

## 2024-07-22 RX ADMIN — DOCUSATE SODIUM 100 MG: 100 CAPSULE, LIQUID FILLED ORAL at 08:28

## 2024-07-22 RX ADMIN — HYDROCODONE BITARTRATE AND ACETAMINOPHEN 1 TABLET: 7.5; 325 TABLET ORAL at 11:23

## 2024-07-22 NOTE — PROGRESS NOTES
[facility] Medicine Services  PROGRESS NOTE    Patient Name: Pari Howell  : 1950  MRN: [id number]    Date of Admission: 2024  Primary Care Physician: Radha Gregg APRN    Subjective   Subjective     CC:  Fall     HPI:  Patient is resting in bed in NAD. She states pain controlled. Tolerating diet. No acute events overnight per nursing.       Objective   Objective     Vital Signs:   Temp:  [98 °F (36.7 °C)-99.4 °F (37.4 °C)] 98 °F (36.7 °C)  Heart Rate:  [80-94] 84  Resp:  [16-18] 18  BP: (129-138)/(56-69) 138/69     Physical Exam:  Constitutional: No acute distress, awake, alert  HENT: NCAT, mucous membranes moist  Respiratory: Clear to auscultation bilaterally, respiratory effort normal room air   Cardiovascular: RRR, no murmurs, rubs, or gallops  Gastrointestinal: Positive bowel sounds, soft, nontender, nondistended  Musculoskeletal: No bilateral ankle edema, left arm with sling and nerve block,   Psychiatric: Appropriate affect, cooperative  Neurologic: Oriented x 3, strength symmetric in all extremities, Cranial Nerves grossly intact to confrontation, speech clear  Skin: No rashes      Results Reviewed:  LAB RESULTS:      Lab 24  1505 24  0335   WBC 11.96* 15.47*   HEMOGLOBIN 11.2* 14.5   HEMATOCRIT 32.2* 42.6   PLATELETS 192 269   NEUTROS ABS 7.96* 13.31*   IMMATURE GRANS (ABS) 0.09* 0.09*   LYMPHS ABS 1.85 1.16   MONOS ABS 1.05* 0.83   EOS ABS 0.93* 0.04   MCV 88.7 88.0         Lab 24  0923 24  0335 24  0044   SODIUM 139 137 136   POTASSIUM 4.7 3.9 4.3   CHLORIDE 105 100 101   CO2 19.0* 24.0 23.0   ANION GAP 15.0 13.0 12.0   BUN 11 17 18   CREATININE 0.66 0.73 0.73   EGFR 92.2 86.4 86.4   GLUCOSE 175* 167* 183*   CALCIUM 8.9 9.4 9.0   PHOSPHORUS 2.8  --   --    HEMOGLOBIN A1C  --  6.00*  --          Lab 24  0923 24  0044   TOTAL PROTEIN  --  7.2   ALBUMIN 3.5 4.2   GLOBULIN  --  3.0   ALT (SGPT)  --  26   AST (SGOT)  --  28    BILIRUBIN  --  0.4   ALK PHOS  --  64                 Lab 07/20/24  0429 07/20/24  0335   ABO TYPING A A   RH TYPING Positive Positive   ANTIBODY SCREEN  --  Negative         Brief Urine Lab Results  (Last result in the past 365 days)        Color   Clarity   Blood   Leuk Est   Nitrite   Protein   CREAT   Urine HCG        04/01/24 0815             135.4                 Microbiology Results Abnormal       None            XR Hip With or Without Pelvis 2 - 3 View Left    Result Date: 7/20/2024  XR HIP W OR WO PELVIS 2-3 VIEW LEFT Date of Exam: 7/20/2024 4:17 PM EDT Indication: Postop Comparison: 7/19/2024 Findings: Total left hip prosthesis is now seen, components in anatomic alignment. There is expected postop soft tissue air. Right hip prosthesis remains in anatomic alignment. No abnormal lucency is seen around the prosthetic components. Bony structures appear intact.     Impression: Impression: Total left hip prosthesis in anatomic alignment. No new bony abnormality seen. Electronically Signed: Jasmeet Carrasco MD  7/20/2024 4:53 PM EDT  Workstation ID: [id number]    FL C Arm During Surgery    Result Date: 7/20/2024  This procedure was auto-finalized with no dictation required.    Left Fascia iliac SS    Result Date: 7/20/2024  Grant Elias CRNA     7/20/2024 11:59 AM Left Fascia iliac SS Patient reassessed immediately prior to procedure Start time: 7/20/2024 11:20 AM Reason for block: at surgeon's request and post-op pain management Performed by CRNA/CAA: Grant Elias CRNA Assisted by: Janina Chávez RN Preanesthetic Checklist Completed: patient identified, IV checked, site marked, risks and benefits discussed, surgical consent, monitors and equipment checked, pre-op evaluation and timeout performed Prep: Pt Position: supine Sterile barriers:cap, gloves, mask and washed/disinfected hands Prep: ChloraPrep Patient monitoring: blood pressure monitoring, continuous pulse oximetry and EKG Procedure Sedation: yes  "Performed under: local infiltration Guidance:ultrasound guided ULTRASOUND INTERPRETATION.  Using ultrasound guidance a 20 G gauge needle was placed in close proximity to the nerve, at which point, under ultrasound guidance anesthetic was injected in the area of the nerve and spread of the anesthesia was seen on ultrasound in close proximity thereto.  There were no abnormalities seen on ultrasound; a digital image was taken; and the patient tolerated the procedure with no complications. Images:still images obtained, printed/placed on chart Laterality:left Block Type:fascia iliaca compartment Injection Technique:single-shot Needle Type:echogenic and Tuohy Needle Gauge:18 G Resistance on Injection: none Catheter Size:20 G (20g) Medications Used: bupivacaine PF (MARCAINE) 0.25 % injection - Injection  30 mL - 7/20/2024 11:20:00 AM Medications Preservative Free Saline:5ml Post Assessment Injection Assessment: negative aspiration for heme, no paresthesia on injection and incremental injection Patient Tolerance:comfortable throughout block Complications:no Additional Notes CKAFASCIAILIACA: SINGLE shot A high-frequency linear transducer, with sterile cover, was placed in parasagittal plane on top of the Anterior Superior Iliac Spine (ASIS) and moved medially to identify the Internal Oblique muscle, Sartorius muscle, Iliacus Muscle, Fascia Iliaca (FI) and Fascia Latae. The insertion site was prepped and draped in sterile fashion. Skin and cutaneous tissue was infiltrated with 2-5 ml of 1% Lidocaine. Using ultrasound-guidance, a 20-gauge B-Moraes 4\" Ultraplex 360 non-stimulating echogenic needle was advanced in plane from caudad to cephalad. Preservative-free normal saline was utilized for hydro-dissection of tissue, advancement of needle, and to confirm final needle placement below FI. Local anesthetic in incremental 3-5 ml injections. Aspiration every 5 ml to prevent intravascular injection. Injection was completed with " negative aspiration of blood and negative intravascular injection. Injection pressures were normal with minimal resistance. Performed by: Grant Elias CRNA          Current medications:  Scheduled Meds:Pharmacy Consult, , Does not apply, Daily  amLODIPine, 10 mg, Oral, Daily  aspirin, 81 mg, Oral, Q12H  cetirizine, 5 mg, Oral, Daily  insulin lispro, 2-7 Units, Subcutaneous, 4x Daily AC & at Bedtime  latanoprost, 1 drop, Both Eyes, Daily  levothyroxine, 75 mcg, Oral, Q AM  losartan, 50 mg, Oral, Q24H  pantoprazole, 40 mg, Oral, Daily  pravastatin, 40 mg, Oral, Q PM  sodium chloride, 10 mL, Intravenous, Q12H  sodium chloride, 3 mL, Intravenous, Q12H  timolol, 1 drop, Both Eyes, BID      Continuous Infusions:ropivacaine,   sodium chloride 0.9 % with KCl 20 mEq, 50 mL/hr, Last Rate: 50 mL/hr (07/21/24 2143)      PRN Meds:.  senna-docusate sodium **AND** polyethylene glycol **AND** bisacodyl **AND** bisacodyl    Calcium Replacement - Follow Nurse / BPA Driven Protocol    Pharmacy Consult    dextrose    dextrose    docusate sodium    glucagon (human recombinant)    HYDROcodone-acetaminophen    HYDROmorphone    Magnesium Standard Dose Replacement - Follow Nurse / BPA Driven Protocol    Morphine **AND** naloxone    nitroglycerin    ondansetron ODT **OR** ondansetron    Phosphorus Replacement - Follow Nurse / BPA Driven Protocol    Potassium Replacement - Follow Nurse / BPA Driven Protocol    [COMPLETED] Insert Peripheral IV **AND** sodium chloride    sodium chloride    sodium chloride    sodium chloride    sodium chloride    Assessment & Plan   Assessment & Plan     Active Hospital Problems    Diagnosis  POA    **Femur fracture [S72.90XA]  Yes    T2DM (type 2 diabetes mellitus) [E11.9]  Yes    HTN (hypertension) [I10]  Yes    Radial fracture [S52.90XA]  Yes    Left wrist fracture, open, initial encounter [S62.102B]  Unknown    Hypothyroidism (acquired) [E03.9]  Yes    Hyperlipidemia LDL goal <70 [E78.5]  Yes       Resolved Hospital Problems   No resolved problems to display.        Brief Hospital Course to date:  Pari Howell is a 74 y.o. female  with hx of HTN, HLD, hypothyroidism, T2DM, IBS who presented to the ED w/ c/o a fall.  Imaging shows left femur fracture and left distal radius fracture.  She underwent repair of left wrist and left hip on 7/20.     Plan was partially entered by my partner and I have reviewed and updated as appropriate on 7/22/24     Left femur fracture   Left distal radius fracture   -2/2 mechanical fall   -CT pelvis impacted subcapital fracture of the proximal left femur   - CT LUE shows comminuted fracture of the distal radius with displacement. There is a displaced ulnar styloid fracture.  -Ortho evaluated  -IVF  -PRN pain medications, antiemetics   -s/p ORIF distal left radius and total hip anterior arthroplasty with Dr. Evans/Lane        T2DM  -hem A1c 6.0  -on Semaglutide   -Continue SSI     Neuropathy  -uses cream from compound pharmacy to help     HTN   HLD   -continue routine Norvasc  -on losartan-HCTZ; losartan continued at half dose, HCTZ held for now   -continued statin        Hypothyroidism   -continue synthroid     Expected Discharge Location and Transportation: rehab   Expected Discharge   Expected Discharge Date: 7/23/2024; Expected Discharge Time:      VTE Prophylaxis:  Mechanical VTE prophylaxis orders are present.         AM-PAC 6 Clicks Score (PT): 10 (07/22/24 0811)    CODE STATUS:   Code Status and Medical Interventions:   Ordered at: 07/20/24 9490     Level Of Support Discussed With:    Patient     Code Status (Patient has no pulse and is not breathing):    CPR (Attempt to Resuscitate)     Medical Interventions (Patient has pulse or is breathing):    Full Support       Hayley Norman, ELIZABETH  07/22/24

## 2024-07-22 NOTE — PROGRESS NOTES
"  Orthopedic Progress Note      Patient: Pari Howell  YOB: 1950     Date of Admission: 7/19/2024 10:29 PM Medical Record Number: [id number]     Attending Physician: Jessi Harvey DO    Status Post:  Procedure(s):  OPEN REDUCTION INTERNAL FIXATION DISTAL RADIUS - LEFT  TOTAL HIP ARTHROPLASTY ANTERIOR Post Operative Day Number: 2    Subjective : No new orthopaedic complaints     Pain Relief: some relief with present medication.     Systemic Complaints: No Complaints  Vitals:    07/21/24 2328 07/22/24 0340 07/22/24 0646 07/22/24 0811   BP:  133/66 131/64 138/69   BP Location:  Right arm Right arm    Patient Position:  Lying Lying    Pulse:  88 80 84   Resp:  16 18    Temp:  98.5 °F (36.9 °C) 98 °F (36.7 °C)    TempSrc:  Oral Oral    SpO2: 94% 92%     Weight:       Height:           Physical Exam: 74 y.o. female    General Appearance:       Alert, cooperative, in no acute distress                  Extremities:    Dressing Clean, Dry and Intact             No clinical sign of DVT        Able to do good movements of digits    Pulses:   Pulses palpable and equal bilaterally           Diagnostic Tests:     Results from last 7 days   Lab Units 07/21/24  1505 07/20/24  0335   WBC 10*3/mm3 11.96* 15.47*   HEMOGLOBIN g/dL 11.2* 14.5   HEMATOCRIT % 32.2* 42.6   PLATELETS 10*3/mm3 192 269     Results from last 7 days   Lab Units 07/21/24  0923 07/20/24  0335 07/20/24  0044   SODIUM mmol/L 139 137 136   POTASSIUM mmol/L 4.7 3.9 4.3   CHLORIDE mmol/L 105 100 101   CO2 mmol/L 19.0* 24.0 23.0   BUN mg/dL 11 17 18   CREATININE mg/dL 0.66 0.73 0.73   GLUCOSE mg/dL 175* 167* 183*   CALCIUM mg/dL 8.9 9.4 9.0         No results found for: \"URICACID\"  No results found for: \"CRYSTAL\"  Microbiology Results (last 10 days)       ** No results found for the last 240 hours. **          XR Hip With or Without Pelvis 2 - 3 View Left    Result Date: 7/20/2024  Impression: Total left hip prosthesis in anatomic alignment. No new " bony abnormality seen. Electronically Signed: Jasmeet Carrasco MD  7/20/2024 4:53 PM EDT  Workstation ID: [id number]    CT Pelvis Without Contrast    Result Date: 7/20/2024  Impression: Impacted subcapital fracture of the proximal left femur. Electronically Signed: Doni [facility] MD  7/20/2024 4:41 AM EDT  Workstation ID: [id number]    CT Upper Extremity Left Without Contrast    Result Date: 7/20/2024  Impression: Limited exam. There is a comminuted fracture of the distal radius with displacement. There is a displaced ulnar styloid fracture. Electronically Signed: Doni [facility] MD  7/20/2024 4:37 AM EDT  Workstation ID: [id number]    XR Chest 1 View    Result Date: 7/19/2024  Impression: No active disease Electronically Signed: Doni [facility] MD  7/19/2024 11:36 PM EDT  Workstation ID: [id number]    XR Wrist 3+ View Left    Result Date: 7/19/2024  Impression: Comminuted and displaced fracture of the distal radius with disruption of the radiocarpal's. Electronically Signed: Doni [facility] MD  7/19/2024 11:34 PM EDT  Workstation ID: [id number]    XR Hip With or Without Pelvis 2 - 3 View Left    Result Date: 7/19/2024  Impression: Fracture of the subcapital left femur Electronically Signed: Doni [facility] MD  7/19/2024 11:31 PM EDT  Workstation ID: [id number]       Current Medications:  Scheduled Meds:Pharmacy Consult, , Does not apply, Daily  amLODIPine, 10 mg, Oral, Daily  aspirin, 81 mg, Oral, Q12H  cetirizine, 5 mg, Oral, Daily  insulin lispro, 2-7 Units, Subcutaneous, 4x Daily AC & at Bedtime  latanoprost, 1 drop, Both Eyes, Daily  levothyroxine, 75 mcg, Oral, Q AM  losartan, 50 mg, Oral, Q24H  pantoprazole, 40 mg, Oral, Daily  pravastatin, 40 mg, Oral, Q PM  sodium chloride, 10 mL, Intravenous, Q12H  sodium chloride, 3 mL, Intravenous, Q12H  timolol, 1 drop, Both Eyes, BID      Continuous Infusions:ropivacaine,   sodium chloride 0.9 % with KCl 20 mEq, 50 mL/hr, Last Rate: 50 mL/hr (07/21/24 8706)      PRN Meds:.  senna-docusate sodium  **AND** polyethylene glycol **AND** bisacodyl **AND** bisacodyl    Calcium Replacement - Follow Nurse / BPA Driven Protocol    Pharmacy Consult    dextrose    dextrose    docusate sodium    glucagon (human recombinant)    HYDROcodone-acetaminophen    HYDROmorphone    Magnesium Standard Dose Replacement - Follow Nurse / BPA Driven Protocol    Morphine **AND** naloxone    nitroglycerin    ondansetron ODT **OR** ondansetron    Phosphorus Replacement - Follow Nurse / BPA Driven Protocol    Potassium Replacement - Follow Nurse / BPA Driven Protocol    simethicone    [COMPLETED] Insert Peripheral IV **AND** sodium chloride    sodium chloride    sodium chloride    sodium chloride    sodium chloride    Assessment: Status post  No admission procedures for hospital encounter.    Patient Active Problem List   Diagnosis    Type 2 diabetes mellitus without complication, without long-term current use of insulin    Other specified glaucoma    Hyperlipidemia LDL goal <70    Vitamin D deficiency    Irritable bowel syndrome with diarrhea    Hypothyroidism (acquired)    Acute respiratory insufficiency    Hip fracture    Femur fracture    T2DM (type 2 diabetes mellitus)    HTN (hypertension)    Radial fracture    Left wrist fracture, open, initial encounter       PLAN:   Continues current post-op course  Anticoagulation: Aspirin started  Mobilize with PT as tolerated per protocol    Weight Bearing: WBAT, Platform L UE  Discharge Plan: OK to plan for discharge in  tomorrow to rehab  from orthopaedic perspective.    Doni Evans MD    Date: 7/22/2024    Time: 14:16 EDT

## 2024-07-22 NOTE — PLAN OF CARE
Goal Outcome Evaluation:  Plan of Care Reviewed With: patient        Progress: improving     The patient is pleasant, alert and oriented x4. Slept on and off throughout the night. Pain managed with PRN [place] as ordered , [employer], ice and repositioning. Voiding spontaneously via purewick. VSS on room air. Normal sinus rhythm on telemetry monitor. Dressings to left hip and left arm are CDI.

## 2024-07-22 NOTE — CASE MANAGEMENT/SOCIAL WORK
Continued Stay Note  [facility]     Patient Name: Pari Howell  MRN: [id number]  Today's Date: 7/22/2024    Admit Date: 7/19/2024    Plan: Rehab   Discharge Plan       Row Name 07/22/24 1144       Plan    Plan Rehab    Patient/Family in Agreement with Plan yes    Plan Comments Referrals for inpatient rehab sent to Angelica with [facility], Abdullahi with [facility], and Leanna with [facility]. Patient will need prior authorization for insurance approval once bed is offered. CM will continue to follow.    Final Discharge Disposition Code 03 - skilled nursing facility (SNF)                   Discharge Codes    No documentation.                 Expected Discharge Date and Time       Expected Discharge Date Expected Discharge Time    Jul 23, 2024               Karina Zendejas RN

## 2024-07-22 NOTE — THERAPY TREATMENT NOTE
Patient Name: Pari Howell  : 1950    MRN: [id number]                              Today's Date: 2024       Admit Date: 2024    Visit Dx:     ICD-10-CM ICD-9-CM   1. Subcapital fracture of hip, left, closed, initial encounter  S72.012A 820.09   2. Left wrist fracture, open, initial encounter  S62.102B 814.10     Patient Active Problem List   Diagnosis    Type 2 diabetes mellitus without complication, without long-term current use of insulin    Other specified glaucoma    Hyperlipidemia LDL goal <70    Vitamin D deficiency    Irritable bowel syndrome with diarrhea    Hypothyroidism (acquired)    Acute respiratory insufficiency    Hip fracture    Femur fracture    T2DM (type 2 diabetes mellitus)    HTN (hypertension)    Radial fracture    Left wrist fracture, open, initial encounter     Past Medical History:   Diagnosis Date    Bladder infection     Dyspareunia, female     Glaucoma     H/O sexual problem     High cholesterol     History of abnormal cervical Pap smear     Hypertension     Hypertension     Hypothyroidism     Impaired functional mobility, balance, gait, and endurance     Labial cyst     Muscle weakness     Type 2 diabetes mellitus     Urinary incontinence     Vaginal itching     Yeast infection      Past Surgical History:   Procedure Laterality Date    CARPAL TUNNEL RELEASE  2007    CATARACT EXTRACTION Right 2022    CATARACT EXTRACTION, BILATERAL Left     CHOLECYSTECTOMY  1991    EYE SURGERY      HIP FRACTURE SURGERY Right     HYSTERECTOMY  2007    OOPHORECTOMY      TOTAL HIP ARTHROPLASTY Right 2024    Procedure: TOTAL HIP ARTHROPLASTY ANTERIOR RIGHT;  Surgeon: Doni Evans MD;  Location: [facility];  Service: Orthopedics;  Laterality: Right;    TUBAL ABDOMINAL LIGATION  1979      General Information       Row Name 24 1646 24 1254       Physical Therapy Time and Intention    Document Type therapy note (daily note) (P)   -HM therapy note (daily note)   -CK (r)  (t) CK (c)    Mode of Treatment individual therapy;physical therapy (P)   -HM physical therapy;individual therapy  -CK (r)  (t) CK (c)      Row Name 07/22/24 1646 07/22/24 1254       General Information    Patient Profile Reviewed yes (P)   -HM yes  -CK (r)  (t) CK (c)    Existing Precautions/Restrictions fall;other (see comments) (P)   s/p L [place]- WBAT LLE; s/p L wrist ORIF in simple sling- NWB L wrist and WBAT L elbow , infra clavicular nerve cath  - fall;other (see comments)  s/p L [place]- WBAT LLE; s/p L wrist ORIF in simple sling- NWB L wrist and WBAT L elbow , infra clavicular nerve cath  -CK (r)  (t) CK (c)    Barriers to Rehab medically complex;previous functional deficit;physical barrier (P)   -HM medically complex;previous functional deficit;physical barrier  -CK (r)  (t) CK (c)      Row Name 07/22/24 1646 07/22/24 1254       Cognition    Orientation Status (Cognition) oriented to;person (P)   -HM oriented to;person  -CK (r)  (t) CK (c)      Row Name 07/22/24 1646 07/22/24 1254       Safety Issues, Functional Mobility    Safety Issues Affecting Function (Mobility) awareness of need for assistance;insight into deficits/self-awareness;safety precaution awareness;safety precautions follow-through/compliance;sequencing abilities (P)   -HM awareness of need for assistance;insight into deficits/self-awareness;safety precaution awareness;safety precautions follow-through/compliance;sequencing abilities;problem-solving  -CK (r)  (t) CK (c)    Impairments Affecting Function (Mobility) balance;endurance/activity tolerance;strength;grasp;pain;range of motion (ROM);sensation/sensory awareness (P)   -HM balance;endurance/activity tolerance;strength;grasp;pain;range of motion (ROM);sensation/sensory awareness  -CK (r)  (t) CK (c)    Comment, Safety Issues/Impairments (Mobility) Alert and following commands (P)   -HM Aware and following commands  -CK (r)  (t) CK (c)              User Key  (r) =  Recorded By, (t) = Taken By, (c) = Cosigned By      Initials Name Provider Type    CK Bessie Vidal, PT Physical Therapist     Taryn Caba, PT Student PT Student                   Mobility       Row Name 07/22/24 1646 07/22/24 1256       Bed Mobility    Bed Mobility supine-sit;scooting/bridging;sit-supine (P)   - supine-sit;scooting/bridging  -CK (r)  (t) CK (c)    Scooting/Bridging [place] (Bed Mobility) 2 person assist;verbal cues;nonverbal cues (demo/gesture);maximum assist (25% patient effort) (P)   - 2 person assist;verbal cues;nonverbal cues (demo/gesture);maximum assist (25% patient effort)  -CK (r)  (t) CK (c)    Supine-Sit [place] (Bed Mobility) moderate assist (50% patient effort);2 person assist;verbal cues;nonverbal cues (demo/gesture) (P)   - moderate assist (50% patient effort);2 person assist;verbal cues;nonverbal cues (demo/gesture)  -CK (r)  (t) CK (c)    Sit-Supine [place] (Bed Mobility) maximum assist (25% patient effort);2 person assist;verbal cues;nonverbal cues (demo/gesture) (P)   - --    Assistive Device (Bed Mobility) bed rails;draw sheet;head of bed elevated (P)   - bed rails;draw sheet;head of bed elevated  - (r)  (t) CK (c)    Comment, (Bed Mobility) Pt required Max A to advance LLE to EOB as well as Max A at trunk and hips to achieve upright. Continued VC this session for assistance with core as well as utilizzing RUE and RLE to assist once in sitting. (P)   - Pt able to progress RLE to EOB but required physical assistance with LLE to EOB. Pt with VC to use RUE to reach for handrail and use core to assist with rising. Pt required assist at hips and trunk to achieve EOB. Pt required VC to use UE and RLE to maintain upright sitting secondary to pt with strong posterior lean.  - (r)  (t) CK (c)      Row Name 07/22/24 1646 07/22/24 1256       Transfers    Comment, (Transfers) VC for hand placement  and sequencing. (P)   - VC for hand  placement and sequencing. STS x 2 ( 1x from EOB and 1x from BSC)  -CK (r) HM (t) CK (c)      Row Name 07/22/24 1646 07/22/24 1256       Sit-Stand Transfer    Sit-Stand [place] (Transfers) 2 person assist;verbal cues;nonverbal cues (demo/gesture);moderate assist (50% patient effort) (P)   -HM 2 person assist;verbal cues;nonverbal cues (demo/gesture);moderate assist (50% patient effort)  -CK (r) HM (t) CK (c)    Assistive Device (Sit-Stand Transfers) walker, rolling platform (P)   -HM walker, rolling platform  -CK (r) HM (t) CK (c)    Comment, (Sit-Stand Transfer) Requires boost to stand. Pt continues to require assistance putting LUE onto platform walker. (P)   -HM Pt required boost to standing secondary to avoid LLE weight bearing. Once standing pt required assistance to place LUE onto platform walker. VC for hand placement and sequencing.  -CK (r) HM (t) CK (c)      Row Name 07/22/24 1646 07/22/24 1256       Gait/Stairs (Locomotion)    [place] Level (Gait) minimum assist (75% patient effort);verbal cues;nonverbal cues (demo/gesture);1 person to manage equipment (P)   -HM minimum assist (75% patient effort);2 person assist;verbal cues;nonverbal cues (demo/gesture);1 person to manage equipment  -CK (r) HM (t) CK (c)    Assistive Device (Gait) walker, rolling platform (P)   -HM walker, rolling platform  -CK (r) HM (t) CK (c)    Distance in Feet (Gait) 100 (P)   -HM 15  -CK (r) HM (t) CK (c)    Deviations/Abnormal Patterns (Gait) bilateral deviations;lela decreased;gait speed decreased;stride length decreased;base of support, narrow;antalgic (P)   -HM bilateral deviations;lela decreased;gait speed decreased;stride length decreased;base of support, narrow;antalgic  -CK (r) HM (t) CK (c)    Bilateral Gait Deviations forward flexed posture;heel strike decreased (P)   -HM forward flexed posture;heel strike decreased  -CK (r) HM (t) CK (c)    Left Sided Gait Deviations weight shift ability decreased (P)    - weight shift ability decreased  -CK (r)  (t) CK (c)    Comment, (Gait/Stairs) Pt ambulated with a step through gait pattern with difficulty clearing LLE through swing phase with no LOB. Pt required assistance throughout managing AD with physical assistance and VC. No LOB or knee buckling. Pt required one standing rest break secondary to UE fatigue. (P)   - Pt able to ambulate with a step through gait pattern. Pt with increased diffulty clearing LLE this session with non LOB or knee buckling seen. VC for increased step length. Pt able to walk to door in room until requiring to sit on BSC. Upon standing pt became dizzy and required to sit in recliner. BP assessed and stable at 116/67. Further ambulation limited by dizziness this session.  -CK (r)  (t) CK (c)      Row Name 07/22/24 1646 07/22/24 1256       Mobility    Extremity Weight-bearing Status left lower extremity;left upper extremity (P)   - left lower extremity;left upper extremity  -CK (r)  (t) CK (c)    Left Upper Extremity (Weight-bearing Status) other (see comments) (P)   WBAT through elbow, NWB at wrist  -HM other (see comments)  WBAT through elbow, NWB at wrist  -CK (r) HM (t) CK (c)    Left Lower Extremity (Weight-bearing Status) weight-bearing as tolerated (WBAT) (P)   -HM weight-bearing as tolerated (WBAT)  -CK (r)  (t) CK (c)              User Key  (r) = Recorded By, (t) = Taken By, (c) = Cosigned By      Initials Name Provider Type    CK Bessie Vidal, PT Physical Therapist     Taryn Caba, PT Student PT Student                   Obj/Interventions       Row Name 07/22/24 1305          Motor Skills    Therapeutic Exercise hip;knee;ankle  -CK (r)  (t) CK (c)       Row Name 07/22/24 1305          Hip (Therapeutic Exercise)    Hip (Therapeutic Exercise) strengthening exercise  -CK (r)  (t) CK (c)     Hip Strengthening (Therapeutic Exercise) left;aBduction;aDduction;heel slides;10 repetitions  Max A required  -CK (r)   (t) CK (c)       Row Name 07/22/24 1305          Knee (Therapeutic Exercise)    Knee (Therapeutic Exercise) isometric exercises  -CK (r) HM (t) CK (c)     Knee Isometrics (Therapeutic Exercise) bilateral;quad sets;10 repetitions;3 second hold  -CK (r) HM (t) CK (c)     Knee Strengthening (Therapeutic Exercise) left;SAQ (short arc quad);10 repetitions  -CK (r) HM (t) CK (c)       Row Name 07/22/24 1305          Ankle (Therapeutic Exercise)    Ankle (Therapeutic Exercise) AROM (active range of motion)  -CK (r) HM (t) CK (c)     Ankle AROM (Therapeutic Exercise) bilateral;dorsiflexion;plantarflexion;10 repetitions  -CK (r) HM (t) CK (c)       Row Name 07/22/24 1652 07/22/24 1305       Balance    Balance Assessment sitting static balance;sitting dynamic balance;sit to stand dynamic balance;standing static balance;standing dynamic balance (P)   -HM sitting static balance;sitting dynamic balance;sit to stand dynamic balance;standing dynamic balance;standing static balance  -CK (r) HM (t) CK (c)    Static Sitting Balance minimal assist;verbal cues (P)   -HM minimal assist;verbal cues  -CK (r) HM (t) CK (c)    Dynamic Sitting Balance minimal assist;verbal cues (P)   -HM minimal assist;verbal cues  -CK (r) HM (t) CK (c)    Position, Sitting Balance unsupported;sitting edge of bed (P)   -HM unsupported;sitting edge of bed  -CK (r) HM (t) CK (c)    Sit to Stand Dynamic Balance moderate assist;2-person assist;verbal cues (P)   -HM moderate assist;2-person assist;verbal cues  -CK (r) HM (t) CK (c)    Static Standing Balance minimal assist;verbal cues (P)   -HM verbal cues;minimal assist  -CK (r) HM (t) CK (c)    Dynamic Standing Balance minimal assist (P)   -HM 2-person assist;1 person to manage equipment;verbal cues;minimal assist  -CK (r) HM (t) CK (c)    Position/Device Used, Standing Balance walker, platform;supported (P)   -HM supported;walker, front-wheeled  -CK (r) HM (t) CK (c)    Balance Interventions  sitting;standing;sit to stand;occupation based/functional task (P)   -HM sitting;standing;sit to stand;occupation based/functional task  -CK (r) HM (t) CK (c)    Comment, Balance No LOB or buckling (P)   - No LOB or buckling  -CK (r) HM (t) CK (c)              User Key  (r) = Recorded By, (t) = Taken By, (c) = Cosigned By      Initials Name Provider Type    CK Bessie Vidal, PT Physical Therapist     Taryn Caba, PT Student PT Student                   Goals/Plan    No documentation.                  Clinical Impression       Row Name 07/22/24 1655 07/22/24 1307       Pain    Pretreatment Pain Rating 1/10 (P)   - 9/10  -CK (r)  (t) CK (c)    Posttreatment Pain Rating 1/10 (P)   - 4/10  -CK (r) HM (t) CK (c)    Pain Location - Side/Orientation Left (P)   - Left  -CK (r)  (t) CK (c)    Pain Location generalized (P)   - generalized  -CK (r)  (t) CK (c)    Pain Location - hip (P)   - hip  -CK (r)  (t) CK (c)    Pre/Posttreatment Pain Comment -- Pt reported pain in LUE and LLE. Pt treated for pain with medication by NSG during session  -CK (r)  (t) CK (c)    Pain Intervention(s) Repositioned;Elevated;Ambulation/increased activity (P)   - Repositioned;Elevated;Ambulation/increased activity  -CK (r)  (t) CK (c)      Row Name 07/22/24 1655 07/22/24 1305       Plan of Care Review    Plan of Care Reviewed With patient;family (P)   - patient;family  -CK (r)  (t) CK (c)    Progress improving (P)   - no change  -CK (r)  (t) CK (c)    Outcome Evaluation Pt able to increase distance ambulated to 100' with Min A and chair follow. Pt presents below baseline with deficits in strength, balance, pain, and poor activity tolerance. Further IPPT warranted during admission. PT recommended DC to IRF for best outcomes. (P)   - Pt was able to tolerate in room ambulation of 15' with Min A x 2 with further ambulation limited by dizziness. Pt presents  below baseline with deficits in strength,  balance, pain, and poor activity tolerance. Further IPPT warranted during admission. PT recommended DC to IRF for best outcomes.  -CK (r) HM (t) CK (c)      Row Name 07/22/24 1655 07/22/24 1307       Vital Signs    Pre Systolic BP Rehab -- 138  -CK (r) HM (t) CK (c)    Pre Treatment Diastolic BP -- 69  -CK (r) HM (t) CK (c)    Post Systolic BP Rehab -- 116  -CK (r) HM (t) CK (c)    Post Treatment Diastolic BP -- 67  -CK (r) HM (t) CK (c)    Pretreatment Heart Rate (beats/min) 88 (P)   -HM 94  -CK (r) HM (t) CK (c)    Posttreatment Heart Rate (beats/min) 87 (P)   -HM 75  -CK (r) HM (t) CK (c)    Pre SpO2 (%) 97 (P)   -HM 92  -CK (r) HM (t) CK (c)    O2 Delivery Pre Treatment nasal cannula (P)   -HM --    Post SpO2 (%) 95 (P)   -HM 91  -CK (r) HM (t) CK (c)    O2 Delivery Post Treatment nasal cannula (P)   -HM --    Pre Patient Position Supine (P)   -HM Supine  -CK (r) HM (t) CK (c)    Post Patient Position Supine (P)   -HM Sitting  -CK (r) HM (t) CK (c)      Row Name 07/22/24 1655 07/22/24 1307       Positioning and Restraints    Pre-Treatment Position in bed (P)   - in bed  -CK (r) HM (t) CK (c)    Post Treatment Position bed (P)   - chair  -CK (r) HM (t) CK (c)    In Bed notified nsg;sitting;call light within reach;encouraged to call for assist;exit alarm on;with family/caregiver;LUE elevated;SCD pump applied;legs elevated (P)   - --    In Chair -- notified nsg;reclined;call light within reach;encouraged to call for assist;exit alarm on;with family/caregiver;on mechanical lift sling;with brace;waffle cushion;compression device;LUE elevated;legs elevated  -CK (r) HM (t) CK (c)              User Key  (r) = Recorded By, (t) = Taken By, (c) = Cosigned By      Initials Name Provider Type    CK Bessie Vidal, PT Physical Therapist    Taryn Tran, PT Student PT Student                   Outcome Measures       Row Name 07/22/24 1658 07/22/24 9979       How much help from another person do you currently  need...    Turning from your back to your side while in flat bed without using bedrails? 2 (P)   - 2  -CK (r) HM (t) CK (c)    Moving from lying on back to sitting on the side of a flat bed without bedrails? 2 (P)   -HM 2  -CK (r) HM (t) CK (c)    Moving to and from a bed to a chair (including a wheelchair)? 2 (P)   - 2  -CK (r) HM (t) CK (c)    Standing up from a chair using your arms (e.g., wheelchair, bedside chair)? 2 (P)   - 2  -CK (r) HM (t) CK (c)    Climbing 3-5 steps with a railing? 1 (P)   - 1  -CK (r) HM (t) CK (c)    To walk in hospital room? 3 (P)   - 2  -CK (r) HM (t) CK (c)    AM-PAC 6 Clicks Score (PT) 12 (P)   - 11  -CK (r) HM (t)    Highest Level of Mobility Goal 4 --> Transfer to chair/commode (P)   - 4 --> Transfer to chair/commode  -CK (r) HM (t)      Row Name 07/22/24 0811          How much help from another person do you currently need...    Turning from your back to your side while in flat bed without using bedrails? 2  -MK     Moving from lying on back to sitting on the side of a flat bed without bedrails? 2  -MK     Moving to and from a bed to a chair (including a wheelchair)? 2  -MK     Standing up from a chair using your arms (e.g., wheelchair, bedside chair)? 2  -MK     Climbing 3-5 steps with a railing? 1  -MK     To walk in hospital room? 1  -MK     AM-PAC 6 Clicks Score (PT) 10  -MK     Highest Level of Mobility Goal 4 --> Transfer to chair/commode  -MK       Row Name 07/22/24 1629 07/22/24 1316       Functional Assessment    Outcome Measure Options AM-PAC 6 Clicks Basic Mobility (PT) (P)   -HM AM-PAC 6 Clicks Basic Mobility (PT)  -CK (r) HM (t) CK (c)              User Key  (r) = Recorded By, (t) = Taken By, (c) = Cosigned By      Initials Name Provider Type    Jaclyn Ravi, RN Registered Nurse    Bessie Diego, PT Physical Therapist    Taryn Tran, PT Student PT Student                                 Physical Therapy Education       Title: PT  OT SLP Therapies (In Progress)       Topic: Physical Therapy (Done)       Point: Mobility training (Done)       Learning Progress Summary             Patient Acceptance, E, VU by  at 7/22/2024 1659    Acceptance, E, VU by  at 7/22/2024 1317    Comment: Pt educated on importance of ambulation during admission    Acceptance, E,D, VU,NR by AB at 7/21/2024 1539    Acceptance, E,D,H, VU,NR by AB at 7/21/2024 1024   Family Acceptance, E, VU by HM at 7/22/2024 1659    Acceptance, E, VU by HM at 7/22/2024 1317    Comment: Pt educated on importance of ambulation during admission                         Point: Home exercise program (Done)       Learning Progress Summary             Patient Acceptance, E, VU by  at 7/22/2024 1659    Acceptance, E, VU by  at 7/22/2024 1317    Comment: Pt educated on importance of ambulation during admission    Acceptance, E,D, VU,NR by AB at 7/21/2024 1539    Acceptance, E,D,H, VU,NR by AB at 7/21/2024 1024   Family Acceptance, E, VU by  at 7/22/2024 1659    Acceptance, E, VU by  at 7/22/2024 1317    Comment: Pt educated on importance of ambulation during admission                         Point: Body mechanics (Done)       Learning Progress Summary             Patient Acceptance, E, VU by  at 7/22/2024 1659    Acceptance, E, VU by  at 7/22/2024 1317    Comment: Pt educated on importance of ambulation during admission    Acceptance, E,D, VU,NR by AB at 7/21/2024 1539    Acceptance, E,D,H, VU,NR by AB at 7/21/2024 1024   Family Acceptance, E, VU by  at 7/22/2024 1659    Acceptance, E, VU by  at 7/22/2024 1317    Comment: Pt educated on importance of ambulation during admission                         Point: Precautions (Done)       Learning Progress Summary             Patient Acceptance, E, VU by  at 7/22/2024 1659    Acceptance, E, VU by  at 7/22/2024 1317    Comment: Pt educated on importance of ambulation during admission    Acceptance, E,D, VU,NR by AB at 7/21/2024  1539    Acceptance, E,D,H, VU,NR by AB at 7/21/2024 1024   Family Acceptance, E, VU by  at 7/22/2024 1659    Acceptance, E, VU by  at 7/22/2024 1317    Comment: Pt educated on importance of ambulation during admission                                         User Key       Initials Effective Dates Name Provider Type Discipline    AB 09/22/22 -  Ronda Thomas, PT Physical Therapist PT     05/07/24 -  Taryn Caba, PT Student PT Student PT                  PT Recommendation and Plan     Plan of Care Reviewed With: (P) patient, family  Progress: (P) improving  Outcome Evaluation: (P) Pt able to increase distance ambulated to 100' with Min A and chair follow. Pt presents below baseline with deficits in strength, balance, pain, and poor activity tolerance. Further IPPT warranted during admission. PT recommended DC to IRF for best outcomes.     Time Calculation:         PT Charges       Row Name 07/22/24 1611 07/22/24 1118          Time Calculation    Start Time 1611 (P)   - 1118  -CK (r) HM (t) CK (c)     PT Received On 07/22/24 (P)   - 07/22/24  -CK (r) HM (t) CK (c)     PT Goal Re-Cert Due Date 07/31/24 (P)   - 07/31/24  -CK (r) HM (t) CK (c)        Timed Charges    74317 - PT Therapeutic Exercise Minutes -- 10  -CK (r) HM (t) CK (c)     31631 - Gait Training Minutes  10 (P)   - 15  -CK (r) HM (t) CK (c)     95596 - PT Therapeutic Activity Minutes 24 (P)   - 17  -CK (r) HM (t) CK (c)        Total Minutes    Timed Charges Total Minutes 34 (P)   - 42  -CK (r) HM (t)      Total Minutes 34 (P)   - 42  -CK (r) HM (t)               User Key  (r) = Recorded By, (t) = Taken By, (c) = Cosigned By      Initials Name Provider Type    CK Bessie Vidal, PT Physical Therapist     Taryn Caba, PT Student PT Student                  Therapy Charges for Today       Code Description Service Date Service Provider Modifiers Qty    15866884036 HC PT THER PROC EA 15 MIN 7/22/2024 Taryn Caba, PT  Student GP 1    25949657384 HC GAIT TRAINING EA 15 MIN 7/22/2024 Taryn Caba, PT Student GP 1    43866694159 HC PT THERAPEUTIC ACT EA 15 MIN 7/22/2024 Taryn Caba, PT Student GP 1    64308784753 HC GAIT TRAINING EA 15 MIN 7/22/2024 Taryn Caba, PT Student GP 1    75674815854 HC PT THERAPEUTIC ACT EA 15 MIN 7/22/2024 Taryn Caba, PT Student GP 1            PT G-Codes  Outcome Measure Options: (P) AM-PAC 6 Clicks Basic Mobility (PT)  AM-PAC 6 Clicks Score (PT): (P) 12  AM-PAC 6 Clicks Score (OT): 13  PT Discharge Summary  Anticipated Discharge Disposition (PT): (P) inpatient rehabilitation facility    Taryn Caba, PT Student  7/22/2024

## 2024-07-22 NOTE — PLAN OF CARE
Goal Outcome Evaluation:  Plan of Care Reviewed With: (P) patient, family        Progress: (P) no change  Outcome Evaluation: (P) Pt was able to tolerate in room ambulation of 15' with Min A x 2 with further ambulation limited by dizziness. Pt presents  below baseline with deficits in strength, balance, pain, and poor activity tolerance. Further IPPT warranted during admission. PT recommended DC to IRF for best outcomes.      Anticipated Discharge Disposition (PT): (P) inpatient rehabilitation facility

## 2024-07-22 NOTE — PROGRESS NOTES
"          Clinical Nutrition Assessment     Patient Name: Pari Howell  YOB: 1950  MRN: [id number]  Date of Encounter: 07/22/24 14:03 EDT  Admission date: 7/19/2024  Reason for Visit: MST score 2+, Unintentional weight loss, Reduced oral intake    Assessment   Nutrition Assessment   Admission Diagnosis:  Femur fracture [S72.90XA]    Problem List:    Femur fracture    Hyperlipidemia LDL goal <70    Hypothyroidism (acquired)    T2DM (type 2 diabetes mellitus)    HTN (hypertension)    Radial fracture    Left wrist fracture, open, initial encounter      PMH:   She  has a past medical history of Bladder infection, Dyspareunia, female, Glaucoma, H/O sexual problem, High cholesterol, History of abnormal cervical Pap smear, Hypertension, Hypertension, Hypothyroidism, Impaired functional mobility, balance, gait, and endurance, Labial cyst, Muscle weakness, Type 2 diabetes mellitus, Urinary incontinence, Vaginal itching, and Yeast infection.    PSH:  She  has a past surgical history that includes Tubal ligation (11/1979); Cholecystectomy (1991); Carpal tunnel release (2007); Oophorectomy; Hysterectomy (2007); Eye surgery (2021); Cataract extraction, bilateral (Left); Cataract extraction (Right, 09/27/2022); Total hip arthroplasty (Right, 02/04/2024); and Hip fracture surgery (Right).    Applicable Nutrition History:       Anthropometrics     Height: Height: 172.7 cm (68\")  Last Filed Weight: Weight: 78.2 kg (172 lb 6.4 oz) (07/20/24 0309)  Method: Weight Method: Bed scale  BMI: BMI (Calculated): 26.2    UBW:  180# per patient ~ 2 months ago  Weight change: weight loss of 8 lbs (4%) over 12 month(s)    Significant?  No    Nutrition Focused Physical Exam    Date:  7/22       Pt does not meet criteria for malnutrition diagnosis, at this time.      Subjective   Reported/Observed/Food/Nutrition Related History:     Pt sitting in chair at time of visit,  at bedside. Pt reported a fall and hip fracture in " February of this year, states that she has been eating less since then, however EMR does not reflect significant weight loss. Pt states she has been eating ~50-75% of what intake had been prior to fall in February. Notes that she has been taking Ozempic for the last 3 years. Denies N/V, reports some constipation; pt on bowel regimen. Last BM Friday. Pt reports caffeine intolerance and shellfish allergy.    Current Nutrition Prescription   PO: Diet: Diabetic; Consistent Carbohydrate; Fluid Consistency: Thin (IDDSI 0)  Oral Nutrition Supplement: N/A  Intake: 2 Days: 63% x 2 meals  Observed pt tray @ lunch, 25% consumed    Assessment & Plan   Nutrition Diagnosis   Date:  7/22            Updated:    Problem No nutrition diagnosis at this time    Etiology    Signs/Symptoms          Goal:   Nutrition to support treatment and Establish PO      Nutrition Intervention      Follow treatment progress, Care plan reviewed, Advise alternate selection, Advised available snacks, Interview for preferences, Menu provided, Encourage intake    Follow and encourage PO intake.  Pt declines any ONS at this time.    Monitoring/Evaluation:   Per protocol, I&O, PO intake, Weight, Symptoms, POC/GOC    Eliza Myers RD eligible  Time Spent: 30 min

## 2024-07-22 NOTE — PROGRESS NOTES
"          Orthopaedic Surgery Progress Note      LOS: 2 days   Patient Care Team:  Radha Gregg APRN as PCP - General (Nurse Practitioner)  Gloria Santoro DO as Consulting Physician (Endocrinology)    POD 2    Subjective     Interval History:   Patient doing well this morning.  Says she tried to get up with physical therapy and felt weak.  Was able to walk to the door.  Having more sensation in her hand.  Denies chest pain or shortness of breath    Objective     Vital Signs:  Temp (24hrs), Av.4 °F (36.9 °C), Min:97.7 °F (36.5 °C), Max:99.4 °F (37.4 °C)    /64 (BP Location: Right arm, Patient Position: Lying)   Pulse 80   Temp 98 °F (36.7 °C) (Oral)   Resp 18   Ht 172.7 cm (68\")   Wt 78.2 kg (172 lb 6.4 oz)   LMP  (LMP Unknown)   SpO2 92%   BMI 26.21 kg/m²     Labs:  Lab Results (last 24 hours)       Procedure Component Value Units Date/Time    POC Glucose Once [[id number]]  (Abnormal) Collected: 24 0712    Specimen: Blood Updated: 24 07     Glucose 143 mg/dL     POC Glucose Once [[id number]]  (Abnormal) Collected: 24    Specimen: Blood Updated: 24     Glucose 151 mg/dL     POC Glucose Once [[id number]]  (Abnormal) Collected: 24 1603    Specimen: Blood Updated: 24 1604     Glucose 152 mg/dL     CBC & Differential [[id number]]  (Abnormal) Collected: 24 0923    Specimen: Blood Updated: 24 1520    Narrative:      The following orders were created for panel order CBC & Differential.  Procedure                               Abnormality         Status                     ---------                               -----------         ------                     CBC Auto Differential[[id number]]        Abnormal            Final result               Scan Slide[[id number]]                                                                    Please view results for these tests on the individual orders.    CBC Auto Differential [[id number]]  (Abnormal) " Collected: 07/21/24 1505    Specimen: Blood Updated: 07/21/24 1520     WBC 11.96 10*3/mm3      RBC 3.63 10*6/mm3      Hemoglobin 11.2 g/dL      Hematocrit 32.2 %      MCV 88.7 fL      MCH 30.9 pg      MCHC 34.8 g/dL      RDW 12.9 %      RDW-SD 41.8 fl      MPV 10.0 fL      Platelets 192 10*3/mm3      Neutrophil % 66.4 %      Lymphocyte % 15.5 %      Monocyte % 8.8 %      Eosinophil % 7.8 %      Basophil % 0.7 %      Immature Grans % 0.8 %      Neutrophils, Absolute 7.96 10*3/mm3      Lymphocytes, Absolute 1.85 10*3/mm3      Monocytes, Absolute 1.05 10*3/mm3      Eosinophils, Absolute 0.93 10*3/mm3      Basophils, Absolute 0.08 10*3/mm3      Immature Grans, Absolute 0.09 10*3/mm3      nRBC 0.0 /100 WBC     POC Glucose Once [[id number]]  (Abnormal) Collected: 07/21/24 1140    Specimen: Blood Updated: 07/21/24 1142     Glucose 134 mg/dL     Renal Function Panel [[id number]]  (Abnormal) Collected: 07/21/24 0923    Specimen: Blood Updated: 07/21/24 1028     Glucose 175 mg/dL      BUN 11 mg/dL      Creatinine 0.66 mg/dL      Sodium 139 mmol/L      Potassium 4.7 mmol/L      Comment: Specimen 1+ hemolyzed.  Result may be falsely elevated.        Chloride 105 mmol/L      CO2 19.0 mmol/L      Calcium 8.9 mg/dL      Albumin 3.5 g/dL      Phosphorus 2.8 mg/dL      Anion Gap 15.0 mmol/L      BUN/Creatinine Ratio 16.7     eGFR 92.2 mL/min/1.73     Narrative:      GFR Normal >60  Chronic Kidney Disease <60  Kidney Failure <15    The GFR formula is only valid for adults with stable renal function between ages 18 and 70.            Physical Exam:  EPL and EDC are intact this morning.  Grossly intact sensation to light touch over the median, radial, and ulnar nerve distributions.  Sugar-tong splint in place.    Assessment & Plan   Postop day #2 status post ORIF comminuted left distal radius fracture and anterior left [place] for femoral neck fracture    --Skilled nursing facility referral  -- Weight-bear as tolerated left upper extremity  through elbow.  Weight-bear as tolerated left lower extremity  -- Continue PT and OT  -- Baby aspirin twice daily for DVT prophylaxis  -- Upon discharge, patient will need follow-up with me in about 2 weeks for wound check and suture removal and placement into a short arm cast.  We will also check her left hip incision at that time.    Devan Modi MD  07/22/24  08:12 EDT

## 2024-07-22 NOTE — PROGRESS NOTES
Psychiatric    Acute pain service Inpatient Progress Note    Patient Name: Pari Howell  :  1950  MRN:  [id number]        Acute Pain  Service Inpatient Progress Note:    Analgesia:Good  LOC: alert and awake  Resp Status: room air  Cardiac: VS stable  Side Effects:None  Catheter Site:clean, dressing intact and dry  Cath type: peripheral nerve cath(InfuSystem)  Infusion rate: Ext/Pop: Basal: 1ml/hr, PIB: 5ml q 2 h, PCA: 5 ml q 30 min (1mL,5ml, 5ml InfuSystem Pump)  Catheter Plan:Catheter to remain Insitu and Continue catheter infusion rate unchanged  Comments: The neuro assessment of the operative extremity includes the ability to do finger flexion and extension; includes the ability to do wrist flexion and extension, includes the ability to do elbow flexion and extension.  The neuro exam of the patient includes sensory function throughout the operative extremity.    Pt sitting in bed, states has periodic pain relieved with po meds.  Has utilized pca dose with effectiveness. Thank you

## 2024-07-22 NOTE — PLAN OF CARE
Goal Outcome Evaluation:  Plan of Care Reviewed With: (P) patient, family        Progress: (P) improving  Outcome Evaluation: (P) Pt able to increase distance ambulated to 100' with Min A and chair follow. Pt presents below baseline with deficits in strength, balance, pain, and poor activity tolerance. Further IPPT warranted during admission. PT recommended DC to IRF for best outcomes.      Anticipated Discharge Disposition (PT): (P) inpatient rehabilitation facility

## 2024-07-22 NOTE — PLAN OF CARE
Goal Outcome Evaluation:  Plan of Care Reviewed With: patient        Progress: no change  Outcome Evaluation: Glucoses 143, 146, and 141.  Remains sinus on the monitor. Sling to left arm is CDI. Dressing to left hip is CDI. Pain managed with oral medication and nerve cath. Worked with PT today see PT notes. Up to recliner and tolerates well. O2 at 1.5 liters today to keep sats low 90's.  Sats dropped to upper 80's while napping. Waiting a discharge plan when ready.

## 2024-07-22 NOTE — THERAPY TREATMENT NOTE
Patient Name: Pari Howell  : 1950    MRN: [id number]                              Today's Date: 2024       Admit Date: 2024    Visit Dx:     ICD-10-CM ICD-9-CM   1. Subcapital fracture of hip, left, closed, initial encounter  S72.012A 820.09   2. Left wrist fracture, open, initial encounter  S62.102B 814.10     Patient Active Problem List   Diagnosis    Type 2 diabetes mellitus without complication, without long-term current use of insulin    Other specified glaucoma    Hyperlipidemia LDL goal <70    Vitamin D deficiency    Irritable bowel syndrome with diarrhea    Hypothyroidism (acquired)    Acute respiratory insufficiency    Hip fracture    Femur fracture    T2DM (type 2 diabetes mellitus)    HTN (hypertension)    Radial fracture    Left wrist fracture, open, initial encounter     Past Medical History:   Diagnosis Date    Bladder infection     Dyspareunia, female     Glaucoma     H/O sexual problem     High cholesterol     History of abnormal cervical Pap smear     Hypertension     Hypertension     Hypothyroidism     Impaired functional mobility, balance, gait, and endurance     Labial cyst     Muscle weakness     Type 2 diabetes mellitus     Urinary incontinence     Vaginal itching     Yeast infection      Past Surgical History:   Procedure Laterality Date    CARPAL TUNNEL RELEASE  2007    CATARACT EXTRACTION Right 2022    CATARACT EXTRACTION, BILATERAL Left     CHOLECYSTECTOMY  1991    EYE SURGERY      HIP FRACTURE SURGERY Right     HYSTERECTOMY  2007    OOPHORECTOMY      TOTAL HIP ARTHROPLASTY Right 2024    Procedure: TOTAL HIP ARTHROPLASTY ANTERIOR RIGHT;  Surgeon: Doni Evans MD;  Location: [facility];  Service: Orthopedics;  Laterality: Right;    TUBAL ABDOMINAL LIGATION  1979      General Information       Row Name 24 1254          Physical Therapy Time and Intention    Document Type therapy note (daily note)  -CK (r) HM (t) CK (c)     Mode of Treatment  physical therapy;individual therapy  -CK (r)  (t) CK (c)       Row Name 07/22/24 1254          General Information    Patient Profile Reviewed yes  -CK (r)  (t) CK (c)     Existing Precautions/Restrictions fall;other (see comments)  s/p L [place]- WBAT LLE; s/p L wrist ORIF in simple sling- NWB L wrist and WBAT L elbow , infra clavicular nerve cath  -CK (r)  (t) CK (c)     Barriers to Rehab medically complex;previous functional deficit;physical barrier  -CK (r)  (t) CK (c)       Row Name 07/22/24 1254          Cognition    Orientation Status (Cognition) oriented to;person  -CK (r)  (t) CK (c)       Row Name 07/22/24 1254          Safety Issues, Functional Mobility    Safety Issues Affecting Function (Mobility) awareness of need for assistance;insight into deficits/self-awareness;safety precaution awareness;safety precautions follow-through/compliance;sequencing abilities;problem-solving  -CK (r)  (t) CK (c)     Impairments Affecting Function (Mobility) balance;endurance/activity tolerance;strength;grasp;pain;range of motion (ROM);sensation/sensory awareness  -CK (r)  (t) CK (c)     Comment, Safety Issues/Impairments (Mobility) Aware and following commands  -CK (r)  (t) CK (c)               User Key  (r) = Recorded By, (t) = Taken By, (c) = Cosigned By      Initials Name Provider Type    CK Bessie Vidal, PT Physical Therapist     Taryn Caba, PT Student PT Student                   Mobility       Row Name 07/22/24 1259          Bed Mobility    Bed Mobility supine-sit;scooting/bridging  -CK (r)  (t) CK (c)     Scooting/Bridging [place] (Bed Mobility) 2 person assist;verbal cues;nonverbal cues (demo/gesture);maximum assist (25% patient effort)  -CK (r) HM (t) CK (c)     Supine-Sit [place] (Bed Mobility) moderate assist (50% patient effort);2 person assist;verbal cues;nonverbal cues (demo/gesture)  -CK (r)  (t) CK (c)     Assistive Device (Bed Mobility) bed rails;draw sheet;head  of bed elevated  -CK (r) HM (t) CK (c)     Comment, (Bed Mobility) Pt able to progress RLE to EOB but required physical assistance with LLE to EOB. Pt with VC to use RUE to reach for handrail and use core to assist with rising. Pt required assist at hips and trunk to achieve EOB. Pt required VC to use UE and RLE to maintain upright sitting secondary to pt with strong posterior lean.  -CK (r) HM (t) CK (c)       Row Name 07/22/24 1256          Transfers    Comment, (Transfers) VC for hand placement and sequencing. STS x 2 ( 1x from EOB and 1x from BSC)  -CK (r) HM (t) CK (c)       Row Name 07/22/24 1256          Sit-Stand Transfer    Sit-Stand [place] (Transfers) 2 person assist;verbal cues;nonverbal cues (demo/gesture);moderate assist (50% patient effort)  -CK (r) HM (t) CK (c)     Assistive Device (Sit-Stand Transfers) walker, rolling platform  -CK (r) HM (t) CK (c)     Comment, (Sit-Stand Transfer) Pt required boost to standing secondary to avoid LLE weight bearing. Once standing pt required assistance to place LUE onto platform walker. VC for hand placement and sequencing.  -CK (r) HM (t) CK (c)       Row Name 07/22/24 1256          Gait/Stairs (Locomotion)    [place] Level (Gait) minimum assist (75% patient effort);2 person assist;verbal cues;nonverbal cues (demo/gesture);1 person to manage equipment  -CK (r) HM (t) CK (c)     Assistive Device (Gait) walker, rolling platform  -CK (r) HM (t) CK (c)     Distance in Feet (Gait) 15  -CK (r) HM (t) CK (c)     Deviations/Abnormal Patterns (Gait) bilateral deviations;lela decreased;gait speed decreased;stride length decreased;base of support, narrow;antalgic  -CK (r) HM (t) CK (c)     Bilateral Gait Deviations forward flexed posture;heel strike decreased  -CK (r) HM (t) CK (c)     Left Sided Gait Deviations weight shift ability decreased  -CK (r) HM (t) CK (c)     Comment, (Gait/Stairs) Pt able to ambulate with a step through gait pattern. Pt with  increased diffulty clearing LLE this session with non LOB or knee buckling seen. VC for increased step length. Pt able to walk to door in room until requiring to sit on BSC. Upon standing pt became dizzy and required to sit in recliner. BP assessed and stable at 116/67. Further ambulation limited by dizziness this session.  -CK (r) HM (t) CK (c)       Row Name 07/22/24 1256          Mobility    Extremity Weight-bearing Status left lower extremity;left upper extremity  -CK (r) HM (t) CK (c)     Left Upper Extremity (Weight-bearing Status) other (see comments)  WBAT through elbow, NWB at wrist  -CK (r) HM (t) CK (c)     Left Lower Extremity (Weight-bearing Status) weight-bearing as tolerated (WBAT)  -CK (r) HM (t) CK (c)               User Key  (r) = Recorded By, (t) = Taken By, (c) = Cosigned By      Initials Name Provider Type    CK Bessie Vidal, PT Physical Therapist    Taryn Tran, PT Student PT Student                   Obj/Interventions       Row Name 07/22/24 1305          Motor Skills    Therapeutic Exercise hip;knee;ankle  -CK (r) HM (t) CK (c)       Row Name 07/22/24 1305          Hip (Therapeutic Exercise)    Hip (Therapeutic Exercise) strengthening exercise  -CK (r) HM (t) CK (c)     Hip Strengthening (Therapeutic Exercise) left;aBduction;aDduction;heel slides;10 repetitions  Max A required  -CK (r) HM (t) CK (c)       Row Name 07/22/24 1305          Knee (Therapeutic Exercise)    Knee (Therapeutic Exercise) isometric exercises  -CK (r) HM (t) CK (c)     Knee Isometrics (Therapeutic Exercise) bilateral;quad sets;10 repetitions;3 second hold  -CK (r) HM (t) CK (c)     Knee Strengthening (Therapeutic Exercise) left;SAQ (short arc quad);10 repetitions  -CK (r) HM (t) CK (c)       Row Name 07/22/24 1305          Ankle (Therapeutic Exercise)    Ankle (Therapeutic Exercise) AROM (active range of motion)  -CK (r) HM (t) CK (c)     Ankle AROM (Therapeutic Exercise)  bilateral;dorsiflexion;plantarflexion;10 repetitions  -CK (r) HM (t) CK (c)       Row Name 07/22/24 1305          Balance    Balance Assessment sitting static balance;sitting dynamic balance;sit to stand dynamic balance;standing dynamic balance;standing static balance  -CK (r) HM (t) CK (c)     Static Sitting Balance minimal assist;verbal cues  -CK (r) HM (t) CK (c)     Dynamic Sitting Balance minimal assist;verbal cues  -CK (r) HM (t) CK (c)     Position, Sitting Balance unsupported;sitting edge of bed  -CK (r) HM (t) CK (c)     Sit to Stand Dynamic Balance moderate assist;2-person assist;verbal cues  -CK (r) HM (t) CK (c)     Static Standing Balance verbal cues;minimal assist  -CK (r) HM (t) CK (c)     Dynamic Standing Balance 2-person assist;1 person to manage equipment;verbal cues;minimal assist  -CK (r) HM (t) CK (c)     Position/Device Used, Standing Balance supported;walker, front-wheeled  -CK (r) HM (t) CK (c)     Balance Interventions sitting;standing;sit to stand;occupation based/functional task  -CK (r) HM (t) CK (c)     Comment, Balance No LOB or buckling  -CK (r) HM (t) CK (c)               User Key  (r) = Recorded By, (t) = Taken By, (c) = Cosigned By      Initials Name Provider Type    CK Bessie Vidal, PT Physical Therapist     Taryn Caba, PT Student PT Student                   Goals/Plan    No documentation.                  Clinical Impression       Row Name 07/22/24 1507          Pain    Pretreatment Pain Rating 9/10  -CK (r) HM (t) CK (c)     Posttreatment Pain Rating 4/10  -CK (r) HM (t) CK (c)     Pain Location - Side/Orientation Left  -CK (r) HM (t) CK (c)     Pain Location generalized  -CK (r) HM (t) CK (c)     Pain Location - hip  -CK (r) HM (t) CK (c)     Pre/Posttreatment Pain Comment Pt reported pain in LUE and LLE. Pt treated for pain with medication by NSG during session  -CK (r) HM (t) CK (c)     Pain Intervention(s) Repositioned;Elevated;Ambulation/increased activity   -CK (r) HM (t) CK (c)       Row Name 07/22/24 1307          Plan of Care Review    Plan of Care Reviewed With patient;family  -CK (r) HM (t) CK (c)     Progress no change  -CK (r) HM (t) CK (c)     Outcome Evaluation Pt was able to tolerate in room ambulation of 15' with Min A x 2 with further ambulation limited by dizziness. Pt presents  below baseline with deficits in strength, balance, pain, and poor activity tolerance. Further IPPT warranted during admission. PT recommended DC to IRF for best outcomes.  -CK (r) HM (t) CK (c)       Row Name 07/22/24 1307          Vital Signs    Pre Systolic BP Rehab 138  -CK (r) HM (t) CK (c)     Pre Treatment Diastolic BP 69  -CK (r) HM (t) CK (c)     Post Systolic BP Rehab 116  -CK (r) HM (t) CK (c)     Post Treatment Diastolic BP 67  -CK (r) HM (t) CK (c)     Pretreatment Heart Rate (beats/min) 94  -CK (r) HM (t) CK (c)     Posttreatment Heart Rate (beats/min) 75  -CK (r) HM (t) CK (c)     Pre SpO2 (%) 92  -CK (r) HM (t) CK (c)     Post SpO2 (%) 91  -CK (r) HM (t) CK (c)     Pre Patient Position Supine  -CK (r) HM (t) CK (c)     Post Patient Position Sitting  -CK (r) HM (t) CK (c)       Row Name 07/22/24 1307          Positioning and Restraints    Pre-Treatment Position in bed  -CK (r) HM (t) CK (c)     Post Treatment Position chair  -CK (r) HM (t) CK (c)     In Chair notified nsg;reclined;call light within reach;encouraged to call for assist;exit alarm on;with family/caregiver;on mechanical lift sling;with brace;waffle cushion;compression device;LUE elevated;legs elevated  -CK (r) HM (t) CK (c)               User Key  (r) = Recorded By, (t) = Taken By, (c) = Cosigned By      Initials Name Provider Type    CK Bessie Vidal, PT Physical Therapist    Taryn Tran, PT Student PT Student                   Outcome Measures       Row Name 07/22/24 1316 07/22/24 0811       How much help from another person do you currently need...    Turning from your back to your side  while in flat bed without using bedrails? 2  -CK (r) HM (t) CK (c) 2  -MK    Moving from lying on back to sitting on the side of a flat bed without bedrails? 2  -CK (r) HM (t) CK (c) 2  -MK    Moving to and from a bed to a chair (including a wheelchair)? 2  -CK (r) HM (t) CK (c) 2  -MK    Standing up from a chair using your arms (e.g., wheelchair, bedside chair)? 2  -CK (r) HM (t) CK (c) 2  -MK    Climbing 3-5 steps with a railing? 1  -CK (r) HM (t) CK (c) 1  -MK    To walk in hospital room? 2  -CK (r) HM (t) CK (c) 1  -MK    AM-PAC 6 Clicks Score (PT) 11  -CK (r) HM (t) 10  -MK    Highest Level of Mobility Goal 4 --> Transfer to chair/commode  -CK (r) HM (t) 4 --> Transfer to chair/commode  -MK      Row Name 07/22/24 1316          Functional Assessment    Outcome Measure Options AM-PAC 6 Clicks Basic Mobility (PT)  -CK (r) HM (t) CK (c)               User Key  (r) = Recorded By, (t) = Taken By, (c) = Cosigned By      Initials Name Provider Type    Jaclyn Ravi, RN Registered Nurse    Bessie Diego, PT Physical Therapist     Taryn Caba, PT Student PT Student                                 Physical Therapy Education       Title: PT OT SLP Therapies (In Progress)       Topic: Physical Therapy (Done)       Point: Mobility training (Done)       Learning Progress Summary             Patient Acceptance, E, VU by  at 7/22/2024 1317    Comment: Pt educated on importance of ambulation during admission    Acceptance, E,D, VU,NR by AB at 7/21/2024 1539    Acceptance, E,D,H, VU,NR by AB at 7/21/2024 1024   Family Acceptance, E, VU by  at 7/22/2024 1317    Comment: Pt educated on importance of ambulation during admission                         Point: Home exercise program (Done)       Learning Progress Summary             Patient Acceptance, E, VU by  at 7/22/2024 1317    Comment: Pt educated on importance of ambulation during admission    Acceptance, E,D, VU,NR by AB at 7/21/2024 7052     Acceptance, E,D,H, VU,NR by AB at 7/21/2024 1024   Family Acceptance, E, VU by  at 7/22/2024 1317    Comment: Pt educated on importance of ambulation during admission                         Point: Body mechanics (Done)       Learning Progress Summary             Patient Acceptance, E, VU by  at 7/22/2024 1317    Comment: Pt educated on importance of ambulation during admission    Acceptance, E,D, VU,NR by AB at 7/21/2024 1539    Acceptance, E,D,H, VU,NR by AB at 7/21/2024 1024   Family Acceptance, E, VU by  at 7/22/2024 1317    Comment: Pt educated on importance of ambulation during admission                         Point: Precautions (Done)       Learning Progress Summary             Patient Acceptance, E, VU by  at 7/22/2024 1317    Comment: Pt educated on importance of ambulation during admission    Acceptance, E,D, VU,NR by AB at 7/21/2024 1539    Acceptance, E,D,H, VU,NR by AB at 7/21/2024 1024   Family Acceptance, E, VU by  at 7/22/2024 1317    Comment: Pt educated on importance of ambulation during admission                                         User Key       Initials Effective Dates Name Provider Type Discipline    AB 09/22/22 -  Ronda Thomas, PT Physical Therapist PT     05/07/24 -  Taryn Caba, PT Student PT Student PT                  PT Recommendation and Plan     Plan of Care Reviewed With: patient, family  Progress: no change  Outcome Evaluation: Pt was able to tolerate in room ambulation of 15' with Min A x 2 with further ambulation limited by dizziness. Pt presents  below baseline with deficits in strength, balance, pain, and poor activity tolerance. Further IPPT warranted during admission. PT recommended DC to IRF for best outcomes.     Time Calculation:         PT Charges       Row Name 07/22/24 1118             Time Calculation    Start Time 1118  -CK (r) HM (t) CK (c)      PT Received On 07/22/24  -CK (r) HM (t) CK (c)      PT Goal Re-Cert Due Date 07/31/24  -CK (r) HM  (t) CK (c)         Timed Charges    70161 - PT Therapeutic Exercise Minutes 10  -CK (r) HM (t) CK (c)      53920 - Gait Training Minutes  15  -CK (r) HM (t) CK (c)      93779 - PT Therapeutic Activity Minutes 17  -CK (r) HM (t) CK (c)         Total Minutes    Timed Charges Total Minutes 42  -CK (r) HM (t)       Total Minutes 42  -CK (r) HM (t)                User Key  (r) = Recorded By, (t) = Taken By, (c) = Cosigned By      Initials Name Provider Type    CK Bessie Vidal, PT Physical Therapist     Taryn Caba, PT Student PT Student                  Therapy Charges for Today       Code Description Service Date Service Provider Modifiers Qty    23495001407 HC PT THER PROC EA 15 MIN 7/22/2024 Taryn Caba, PT Student GP 1    92708931164 HC GAIT TRAINING EA 15 MIN 7/22/2024 Taryn Caba, PT Student GP 1    75874440208 HC PT THERAPEUTIC ACT EA 15 MIN 7/22/2024 Taryn Caba, PT Student GP 1            PT G-Codes  Outcome Measure Options: AM-PAC 6 Clicks Basic Mobility (PT)  AM-PAC 6 Clicks Score (PT): 11  AM-PAC 6 Clicks Score (OT): 13  PT Discharge Summary  Anticipated Discharge Disposition (PT): inpatient rehabilitation facility    Taryn Caba PT Student  7/22/2024

## 2024-07-23 LAB
GLUCOSE BLDC GLUCOMTR-MCNC: 136 MG/DL (ref 70–130)
GLUCOSE BLDC GLUCOMTR-MCNC: 142 MG/DL (ref 70–130)
GLUCOSE BLDC GLUCOMTR-MCNC: 172 MG/DL (ref 70–130)
GLUCOSE BLDC GLUCOMTR-MCNC: 183 MG/DL (ref 70–130)

## 2024-07-23 PROCEDURE — 97110 THERAPEUTIC EXERCISES: CPT

## 2024-07-23 PROCEDURE — 99024 POSTOP FOLLOW-UP VISIT: CPT | Performed by: ORTHOPAEDIC SURGERY

## 2024-07-23 PROCEDURE — 63710000001 INSULIN LISPRO (HUMAN) PER 5 UNITS: Performed by: ORTHOPAEDIC SURGERY

## 2024-07-23 PROCEDURE — 97116 GAIT TRAINING THERAPY: CPT

## 2024-07-23 PROCEDURE — 97530 THERAPEUTIC ACTIVITIES: CPT

## 2024-07-23 PROCEDURE — 82948 REAGENT STRIP/BLOOD GLUCOSE: CPT

## 2024-07-23 PROCEDURE — 99232 SBSQ HOSP IP/OBS MODERATE 35: CPT | Performed by: INTERNAL MEDICINE

## 2024-07-23 RX ADMIN — INSULIN LISPRO 2 UNITS: 100 INJECTION, SOLUTION INTRAVENOUS; SUBCUTANEOUS at 08:03

## 2024-07-23 RX ADMIN — TIMOLOL MALEATE 1 DROP: 5 SOLUTION/ DROPS OPHTHALMIC at 08:05

## 2024-07-23 RX ADMIN — PRAVASTATIN SODIUM 40 MG: 40 TABLET ORAL at 16:50

## 2024-07-23 RX ADMIN — TIMOLOL MALEATE 1 DROP: 5 SOLUTION/ DROPS OPHTHALMIC at 20:31

## 2024-07-23 RX ADMIN — LOSARTAN POTASSIUM 50 MG: 50 TABLET, FILM COATED ORAL at 08:04

## 2024-07-23 RX ADMIN — PANTOPRAZOLE SODIUM 40 MG: 40 TABLET, DELAYED RELEASE ORAL at 08:04

## 2024-07-23 RX ADMIN — LEVOTHYROXINE SODIUM 75 MCG: 0.07 TABLET ORAL at 06:04

## 2024-07-23 RX ADMIN — ASPIRIN 81 MG: 81 TABLET, COATED ORAL at 20:29

## 2024-07-23 RX ADMIN — Medication 3 ML: at 08:04

## 2024-07-23 RX ADMIN — Medication 10 ML: at 20:30

## 2024-07-23 RX ADMIN — HYDROCODONE BITARTRATE AND ACETAMINOPHEN 1 TABLET: 7.5; 325 TABLET ORAL at 20:29

## 2024-07-23 RX ADMIN — Medication 10 ML: at 08:04

## 2024-07-23 RX ADMIN — HYDROCODONE BITARTRATE AND ACETAMINOPHEN 1 TABLET: 7.5; 325 TABLET ORAL at 09:55

## 2024-07-23 RX ADMIN — CETIRIZINE HYDROCHLORIDE 5 MG: 10 TABLET, FILM COATED ORAL at 08:04

## 2024-07-23 RX ADMIN — LATANOPROST 1 DROP: 50 SOLUTION OPHTHALMIC at 20:29

## 2024-07-23 RX ADMIN — INSULIN LISPRO 2 UNITS: 100 INJECTION, SOLUTION INTRAVENOUS; SUBCUTANEOUS at 16:50

## 2024-07-23 RX ADMIN — ASPIRIN 81 MG: 81 TABLET, COATED ORAL at 08:04

## 2024-07-23 RX ADMIN — BISACODYL 10 MG: 10 SUPPOSITORY RECTAL at 06:06

## 2024-07-23 RX ADMIN — AMLODIPINE BESYLATE 10 MG: 10 TABLET ORAL at 08:04

## 2024-07-23 NOTE — THERAPY TREATMENT NOTE
Patient Name: Pari Howell  : 1950    MRN: [id number]                              Today's Date: 2024       Admit Date: 2024    Visit Dx:     ICD-10-CM ICD-9-CM   1. Subcapital fracture of hip, left, closed, initial encounter  S72.012A 820.09   2. Left wrist fracture, open, initial encounter  S62.102B 814.10     Patient Active Problem List   Diagnosis    Type 2 diabetes mellitus without complication, without long-term current use of insulin    Other specified glaucoma    Hyperlipidemia LDL goal <70    Vitamin D deficiency    Irritable bowel syndrome with diarrhea    Hypothyroidism (acquired)    Acute respiratory insufficiency    Hip fracture    Femur fracture    T2DM (type 2 diabetes mellitus)    HTN (hypertension)    Radial fracture    Left wrist fracture, open, initial encounter     Past Medical History:   Diagnosis Date    Bladder infection     Dyspareunia, female     Glaucoma     H/O sexual problem     High cholesterol     History of abnormal cervical Pap smear     Hypertension     Hypertension     Hypothyroidism     Impaired functional mobility, balance, gait, and endurance     Labial cyst     Muscle weakness     Type 2 diabetes mellitus     Urinary incontinence     Vaginal itching     Yeast infection      Past Surgical History:   Procedure Laterality Date    CARPAL TUNNEL RELEASE      CATARACT EXTRACTION Right 2022    CATARACT EXTRACTION, BILATERAL Left     CHOLECYSTECTOMY  1991    EYE SURGERY      HIP FRACTURE SURGERY Right     HYSTERECTOMY      OOPHORECTOMY      TOTAL HIP ARTHROPLASTY Right 2024    Procedure: TOTAL HIP ARTHROPLASTY ANTERIOR RIGHT;  Surgeon: Doni Evans MD;  Location: [facility] OR;  Service: Orthopedics;  Laterality: Right;    TOTAL HIP ARTHROPLASTY Left 2024    Procedure: TOTAL HIP ARTHROPLASTY ANTERIOR LEFT;  Surgeon: Doni Evans MD;  Location:  [facility] OR;  Service: Orthopedics;  Laterality: Left;    TUBAL ABDOMINAL LIGATION  1979     WRIST FRACTURE SURGERY Left 7/20/2024    Procedure: OPEN REDUCTION INTERNAL FIXATION DISTAL RADIUS - LEFT;  Surgeon: Devan Modi MD;  Location: [facility];  Service: Orthopedics;  Laterality: Left;      General Information       Row Name 07/23/24 0949          Physical Therapy Time and Intention    Document Type therapy note (daily note) (P)   -     Mode of Treatment individual therapy;physical therapy (P)   -       Row Name 07/23/24 0949          General Information    Patient Profile Reviewed yes (P)   -     Existing Precautions/Restrictions fall;other (see comments) (P)   s/p L [place]- WBAT LLE; s/p L wrist ORIF in simple sling- NWB L wrist and WBAT L elbow , infra clavicular nerve cath  -     Barriers to Rehab medically complex;previous functional deficit;physical barrier (P)   -       Row Name 07/23/24 0949          Cognition    Orientation Status (Cognition) oriented x 3 (P)   -       Row Name 07/23/24 0949          Safety Issues, Functional Mobility    Safety Issues Affecting Function (Mobility) awareness of need for assistance;insight into deficits/self-awareness;safety precaution awareness;safety precautions follow-through/compliance;sequencing abilities;problem-solving (P)   -     Impairments Affecting Function (Mobility) balance;endurance/activity tolerance;strength;grasp;pain;range of motion (ROM);sensation/sensory awareness (P)   -     Comment, Safety Issues/Impairments (Mobility) Alert and following commands (P)   -               User Key  (r) = Recorded By, (t) = Taken By, (c) = Cosigned By      Initials Name Provider Type     Taryn Caba, PT Student PT Student                   Mobility       Row Name 07/23/24 0955          Bed Mobility    Bed Mobility supine-sit;scooting/bridging (P)   -     Scooting/Bridging [place] (Bed Mobility) 2 person assist;verbal cues;nonverbal cues (demo/gesture);maximum assist (25% patient effort) (P)   -     Supine-Sit  [place] (Bed Mobility) moderate assist (50% patient effort);2 person assist;verbal cues;nonverbal cues (demo/gesture) (P)   -HM     Assistive Device (Bed Mobility) bed rails;draw sheet;head of bed elevated (P)   -HM     Comment, (Bed Mobility) Pt able to initiate LLE to EOB with leg  but continues to require Mod A of 2 with trunk and hips with poor ability to adjust self to maintain upright once sitting. Pt requires tactile cues to use RLE and RUE to balance once sitting EOB. (P)   -       Row Name 07/23/24 0955          Transfers    Comment, (Transfers) VC for hand placement and sequencing. Physical assistance required with UE on platform walker. (P)   -       Row Name 07/23/24 0955          Sit-Stand Transfer    Sit-Stand [place] (Transfers) 2 person assist;verbal cues;nonverbal cues (demo/gesture);moderate assist (50% patient effort) (P)   -HM     Assistive Device (Sit-Stand Transfers) walker, rolling platform (P)   -HM     Comment, (Sit-Stand Transfer) Continues to require boost to stand. VC for pushing from bed as well. (P)   -       Row Name 07/23/24 0955          Gait/Stairs (Locomotion)    [place] Level (Gait) minimum assist (75% patient effort);verbal cues;nonverbal cues (demo/gesture) (P)   -HM     Assistive Device (Gait) walker, rolling platform (P)   -HM     Distance in Feet (Gait) 35 (P)   -HM     Deviations/Abnormal Patterns (Gait) bilateral deviations;lela decreased;gait speed decreased;stride length decreased;base of support, narrow;antalgic (P)   -HM     Bilateral Gait Deviations forward flexed posture;heel strike decreased (P)   -HM     Left Sided Gait Deviations weight shift ability decreased (P)   -HM     [place] Level (Stairs) unable to assess (P)   -HM     Comment, (Gait/Stairs) Pt continues to have difficulty clearing LLE with swing phase. Pt required two standing rest breaks secondary to RUE pain and L hip pain. Pt requires CGA at with walking but requires  Min A with AD management throughout. Further ambulation limited by pain and fatigue. No LOB or knee buckling (P)   -       Row Name 07/23/24 0955          Mobility    Extremity Weight-bearing Status left lower extremity;left upper extremity (P)   -     Left Upper Extremity (Weight-bearing Status) other (see comments) (P)   WBAT through elbow, NWB at wrist  -     Left Lower Extremity (Weight-bearing Status) weight-bearing as tolerated (WBAT) (P)   -               User Key  (r) = Recorded By, (t) = Taken By, (c) = Cosigned By      Initials Name Provider Type     Taryn Caba, PT Student PT Student                   Obj/Interventions       Row Name 07/23/24 1002          Motor Skills    Therapeutic Exercise hip;knee;ankle (P)   -       Row Name 07/23/24 1002          Hip (Therapeutic Exercise)    Hip (Therapeutic Exercise) isometric exercises;strengthening exercise (P)   -     Hip Isometrics (Therapeutic Exercise) bilateral;gluteal sets;3 second hold;10 repetitions (P)   -     Hip Strengthening (Therapeutic Exercise) bilateral;aBduction;aDduction;heel slides;10 repetitions (P)   Physical assiistance with LLE  -       Row Name 07/23/24 1002          Knee (Therapeutic Exercise)    Knee (Therapeutic Exercise) isometric exercises;strengthening exercise (P)   -     Knee Isometrics (Therapeutic Exercise) bilateral;quad sets;3 second hold;10 repetitions (P)   -     Knee Strengthening (Therapeutic Exercise) bilateral;LAQ (long arc quad);10 repetitions (P)   -       Row Name 07/23/24 1002          Ankle (Therapeutic Exercise)    Ankle (Therapeutic Exercise) AROM (active range of motion) (P)   -     Ankle AROM (Therapeutic Exercise) bilateral;dorsiflexion;plantarflexion;10 repetitions (P)   -       Row Name 07/23/24 1002          Balance    Balance Assessment sitting static balance;sitting dynamic balance;sit to stand dynamic balance;standing static balance;standing dynamic balance (P)   -      Static Sitting Balance moderate assist;verbal cues;non-verbal cues (demo/gesture) (P)   -     Dynamic Sitting Balance moderate assist;verbal cues;non-verbal cues (demo/gesture) (P)   -HM     Position, Sitting Balance unsupported;sitting edge of bed (P)   -HM     Sit to Stand Dynamic Balance moderate assist;2-person assist;non-verbal cues (demo/gesture) (P)   -HM     Static Standing Balance contact guard;verbal cues (P)   -HM     Dynamic Standing Balance verbal cues;contact guard (P)   -     Position/Device Used, Standing Balance supported;walker, platform (P)   -HM     Balance Interventions sitting;standing;sit to stand;occupation based/functional task (P)   -     Comment, Balance No LOB or buckling (P)   -               User Key  (r) = Recorded By, (t) = Taken By, (c) = Cosigned By      Initials Name Provider Type     Taryn Caba, PT Student PT Student                   Goals/Plan    No documentation.                  Clinical Impression       Row Name 07/23/24 1005          Pain    Pretreatment Pain Rating 0/10 - no pain (P)   -     Posttreatment Pain Rating 0/10 - no pain (P)   -HM     Pain Location - Side/Orientation Left (P)   -HM     Pain Location generalized (P)   -     Pain Location - hip (P)   -     Pain Intervention(s) Repositioned;Elevated;Ambulation/increased activity (P)   -       Row Name 07/23/24 1005          Plan of Care Review    Plan of Care Reviewed With patient (P)   -     Progress no change (P)   -     Outcome Evaluation Pt ambulated 35' this session with Min A and platform walker with further ambulation limited by pain. Pt presents below baseline with deficits in pain, activity tolerance, balance, and strength. Further IPPT warranted during admission. PT recommended DC to IRF. (P)   -       Row Name 07/23/24 1005          Vital Signs    Pre Systolic BP Rehab 145 (P)   -HM     Pre Treatment Diastolic BP 72 (P)   -     Pretreatment Heart Rate (beats/min) 89 (P)    -HM     Posttreatment Heart Rate (beats/min) 85 (P)   -HM     Pre SpO2 (%) 95 (P)   -HM     O2 Delivery Pre Treatment nasal cannula (P)   -HM     Intra SpO2 (%) 92 (P)   -HM     O2 Delivery Intra Treatment room air (P)   -HM     Post SpO2 (%) 95 (P)   -HM     O2 Delivery Post Treatment nasal cannula (P)   -HM     Pre Patient Position Supine (P)   -HM     Intra Patient Position Sitting (P)   -HM     Post Patient Position Sitting (P)   -HM       Row Name 07/23/24 1005          Positioning and Restraints    Pre-Treatment Position in bed (P)   -HM     Post Treatment Position chair (P)   -HM     In Chair notified nsg;reclined;call light within reach;encouraged to call for assist;exit alarm on;with brace;on mechanical lift sling;waffle cushion;compression device;LUE elevated;legs elevated (P)   -HM               User Key  (r) = Recorded By, (t) = Taken By, (c) = Cosigned By      Initials Name Provider Type     Taryn Caba, PT Student PT Student                   Outcome Measures       Row Name 07/23/24 1009          How much help from another person do you currently need...    Turning from your back to your side while in flat bed without using bedrails? 2 (P)   -HM     Moving from lying on back to sitting on the side of a flat bed without bedrails? 2 (P)   -HM     Moving to and from a bed to a chair (including a wheelchair)? 2 (P)   -HM     Standing up from a chair using your arms (e.g., wheelchair, bedside chair)? 2 (P)   -HM     Climbing 3-5 steps with a railing? 1 (P)   -HM     To walk in hospital room? 3 (P)   -HM     AM-PAC 6 Clicks Score (PT) 12 (P)   -HM     Highest Level of Mobility Goal 4 --> Transfer to chair/commode (P)   -HM       Row Name 07/23/24 1009          Functional Assessment    Outcome Measure Options AM-PAC 6 Clicks Basic Mobility (PT) (P)   -HM               User Key  (r) = Recorded By, (t) = Taken By, (c) = Cosigned By      Initials Name Provider Type     Taryn Caba, PT Student PT  Student                                 Physical Therapy Education       Title: PT OT SLP Therapies (In Progress)       Topic: Physical Therapy (Done)       Point: Mobility training (Done)       Learning Progress Summary             Patient Acceptance, E, VU by  at 7/23/2024 1010    Comment: Pt educated on importance of sitting during admission    Acceptance, E, VU by  at 7/22/2024 1659    Acceptance, E, VU by  at 7/22/2024 1317    Comment: Pt educated on importance of ambulation during admission    Acceptance, E,D, VU,NR by AB at 7/21/2024 1539    Acceptance, E,D,H, VU,NR by AB at 7/21/2024 1024   Family Acceptance, E, VU by  at 7/22/2024 1659    Acceptance, E, VU by  at 7/22/2024 1317    Comment: Pt educated on importance of ambulation during admission                         Point: Home exercise program (Done)       Learning Progress Summary             Patient Acceptance, E, VU by  at 7/23/2024 1010    Comment: Pt educated on importance of sitting during admission    Acceptance, E, VU by  at 7/22/2024 1659    Acceptance, E, VU by  at 7/22/2024 1317    Comment: Pt educated on importance of ambulation during admission    Acceptance, E,D, VU,NR by AB at 7/21/2024 1539    Acceptance, E,D,H, VU,NR by AB at 7/21/2024 1024   Family Acceptance, E, VU by  at 7/22/2024 1659    Acceptance, E, VU by  at 7/22/2024 1317    Comment: Pt educated on importance of ambulation during admission                         Point: Body mechanics (Done)       Learning Progress Summary             Patient Acceptance, E, VU by  at 7/23/2024 1010    Comment: Pt educated on importance of sitting during admission    Acceptance, E, VU by  at 7/22/2024 1659    Acceptance, E, VU by  at 7/22/2024 1317    Comment: Pt educated on importance of ambulation during admission    Acceptance, E,D, VU,NR by AB at 7/21/2024 1539    Acceptance, E,D,H, VU,NR by AB at 7/21/2024 1024   Family Acceptance, E, VU by  at 7/22/2024 1659     Acceptance, E, VU by  at 7/22/2024 1317    Comment: Pt educated on importance of ambulation during admission                         Point: Precautions (Done)       Learning Progress Summary             Patient Acceptance, E, VU by  at 7/23/2024 1010    Comment: Pt educated on importance of sitting during admission    Acceptance, E, VU by  at 7/22/2024 1659    Acceptance, E, VU by  at 7/22/2024 1317    Comment: Pt educated on importance of ambulation during admission    Acceptance, E,D, VU,NR by AB at 7/21/2024 1539    Acceptance, E,D,H, VU,NR by AB at 7/21/2024 1024   Family Acceptance, E, VU by  at 7/22/2024 1659    Acceptance, E, VU by  at 7/22/2024 1317    Comment: Pt educated on importance of ambulation during admission                                         User Key       Initials Effective Dates Name Provider Type Discipline    AB 09/22/22 -  Ronda Thomas, PT Physical Therapist PT     05/07/24 -  Taryn Caba, PT Student PT Student PT                  PT Recommendation and Plan     Plan of Care Reviewed With: (P) patient  Progress: (P) no change  Outcome Evaluation: (P) Pt ambulated 35' this session with Min A and platform walker with further ambulation limited by pain. Pt presents below baseline with deficits in pain, activity tolerance, balance, and strength. Further IPPT warranted during admission. PT recommended DC to IRF.     Time Calculation:         PT Charges       Row Name 07/23/24 0831             Time Calculation    Start Time 0831 (P)   -      PT Received On 07/23/24 (P)   -      PT Goal Re-Cert Due Date 07/31/24 (P)   -         Timed Charges    89287 - PT Therapeutic Exercise Minutes 15 (P)   -      65465 - Gait Training Minutes  8 (P)   -      62224 - PT Therapeutic Activity Minutes 16 (P)   -         Total Minutes    Timed Charges Total Minutes 39 (P)   -       Total Minutes 39 (P)   -                User Key  (r) = Recorded By, (t) = Taken By, (c) =  Cosigned By      Initials Name Provider Type     Taryn Caba, PT Student PT Student                  Therapy Charges for Today       Code Description Service Date Service Provider Modifiers Qty    57362150961 HC PT THER PROC EA 15 MIN 7/22/2024 Taryn Caba, PT Student GP 1    45389106321 HC GAIT TRAINING EA 15 MIN 7/22/2024 Taryn Caba, PT Student GP 1    64989008802 HC PT THERAPEUTIC ACT EA 15 MIN 7/22/2024 Taryn Caba, PT Student GP 1    71478268126 HC GAIT TRAINING EA 15 MIN 7/22/2024 Taryn Caba, PT Student GP 1    07745968981 HC PT THERAPEUTIC ACT EA 15 MIN 7/22/2024 Taryn Caba, PT Student GP 1    35264618586 HC PT THER PROC EA 15 MIN 7/23/2024 Taryn Caba, PT Student GP 1    88790106104 HC GAIT TRAINING EA 15 MIN 7/23/2024 Taryn Caba, PT Student GP 1    82335403979 HC PT THERAPEUTIC ACT EA 15 MIN 7/23/2024 Taryn Caba, PT Student GP 1    97374255436 HC PT THER SUPP EA 15 MIN 7/23/2024 Taryn Caba, PT Student GP 2            PT G-Codes  Outcome Measure Options: (P) AM-PAC 6 Clicks Basic Mobility (PT)  AM-PAC 6 Clicks Score (PT): (P) 12  AM-PAC 6 Clicks Score (OT): 13  PT Discharge Summary  Anticipated Discharge Disposition (PT): (P) inpatient rehabilitation facility    Taryn Caba PT Student  7/23/2024

## 2024-07-23 NOTE — PLAN OF CARE
Goal Outcome Evaluation:  Plan of Care Reviewed With: patient        Progress: improving     The patient is pleasant, alert and oriented x4. VSS on 1.5 liters of oxygen through nasal cannula. Normal sinus on the telemetry monitor. Pain controlled with ice packs, repositioning and PRN Norco as ordered. Voiding spontaneously via purewick. Dressing to left hip and left arm are CDI. Left arm sling in place. The patient was able to pivot to the bedside commode this morning and had a large bowel movement.

## 2024-07-23 NOTE — PLAN OF CARE
Goal Outcome Evaluation:  Plan of Care Reviewed With: patient, family        Progress: no change  Outcome Evaluation: Patient able to ambulate 75' [place] with platform walker this afternoon, continues to give good effort with all therapy interventions, but limited by deficits in strength, endurance, balance, and strength. IPPT remains indicated to address current deficits. Recommend D/C to [facility].      Anticipated Discharge Disposition (PT): inpatient rehabilitation facility

## 2024-07-23 NOTE — CASE MANAGEMENT/SOCIAL WORK
Continued Stay Note  [facility]     Patient Name: Pari Howell  MRN: [id number]  Today's Date: 7/23/2024    Admit Date: 7/19/2024    Plan: [facility]   Discharge Plan       Row Name 07/23/24 0931       Plan    Plan [facility]    Patient/Family in Agreement with Plan yes    Plan Comments Spoke with Angelica at [facility] and she requested that we start the insurance precert today. CM sent the email to Beth Weaver and Julieta. CM will continue to follow.    Final Discharge Disposition Code 03 - skilled nursing facility (SNF)                   Discharge Codes    No documentation.                 Expected Discharge Date and Time       Expected Discharge Date Expected Discharge Time    Jul 23, 2024               Castro Floyd RN

## 2024-07-23 NOTE — PROGRESS NOTES
"          Orthopaedic Surgery Progress Note      LOS: 3 days   Patient Care Team:  Radha Gregg APRN as PCP - General (Nurse Practitioner)  Gloria Santoro DO as Consulting Physician (Endocrinology)    POD 3    Subjective     Interval History:   Patient had large bowel movement yesterday and feels much better overall.  Used her PCA once only yesterday.  Denies chest pain or shortness of breath.    Objective     Vital Signs:  Temp (24hrs), Av.9 °F (37.2 °C), Min:98.2 °F (36.8 °C), Max:99.8 °F (37.7 °C)    /72 (BP Location: Right arm, Patient Position: Lying)   Pulse 96   Temp 98.2 °F (36.8 °C) (Oral)   Resp 16   Ht 172.7 cm (68\")   Wt 78.2 kg (172 lb 6.4 oz)   LMP  (LMP Unknown)   SpO2 94%   BMI 26.21 kg/m²     Labs:  Lab Results (last 24 hours)       Procedure Component Value Units Date/Time    POC Glucose Once [[id number]]  (Abnormal) Collected: 24 0650    Specimen: Blood Updated: 24 0654     Glucose 183 mg/dL     POC Glucose Once [[id number]]  (Abnormal) Collected: 24    Specimen: Blood Updated: 24     Glucose 136 mg/dL     POC Glucose Once [[id number]]  (Abnormal) Collected: 24 1636    Specimen: Blood Updated: 24 1638     Glucose 141 mg/dL     POC Glucose Once [[id number]]  (Abnormal) Collected: 24 1120    Specimen: Blood Updated: 24 1123     Glucose 146 mg/dL             Physical Exam:  Surgical splint in place.  EPL and EDC are intact.      Assessment & Plan   Postop day #3 status post ORIF comminuted left distal radius fracture, grade 1 open, and anterior left [place] for femoral neck fracture    --Discharge planning  -- Patient ready from an orthopedic standpoint for discharge  -- Weight-bear as tolerated left upper extremity through elbow and weight-bear as tolerated left lower extremity  -- Baby aspirin twice daily for DVT prophylaxis  -- Upon discharge, patient will need follow-up with me in around 2 weeks for wound check and " suture removal and placement into a short arm cast.  We will also check her left hip incision at that time.    Devan Modi MD  07/23/24  07:56 EDT

## 2024-07-23 NOTE — PROGRESS NOTES
[facility]    Acute pain service Inpatient Progress Note    Patient Name: Pari Howell  :  1950  MRN:  [id number]        Acute Pain  Service Inpatient Progress Note:    Analgesia:Fair  Pain Score:6/10  LOC: alert and awake  Resp Status: room air  Cardiac: VS stable  Side Effects:None  Catheter Site:clean, dressing intact and dry  Cath type: peripheral nerve cath(InfuSystem)  Volume: 1mL,5ml, 5ml InfuSystem Pump.  Dosing/Volume: ropivacaine 0.2%  Catheter Plan:Catheter to remain Insitu and Continue catheter infusion rate unchanged  Comments: The neuro assessment of the operative extremity includes the ability to do finger flexion and extension; includes the ability to do wrist flexion and extension, includes the ability to do elbow flexion and extension.  The neuro exam of the patient includes sensory function throughout the operative extremity.

## 2024-07-23 NOTE — PROGRESS NOTES
[facility] Medicine Services  PROGRESS NOTE    Patient Name: Pari Howell  : 1950  MRN: [id number]    Date of Admission: 2024  Primary Care Physician: Radha Gregg APRN    Subjective   Subjective     CC:  Fall     HPI:  Patient sitting up in bed. Complains of pain. About to work with physical therapy.      Objective   Objective     Vital Signs:   Temp:  [98.2 °F (36.8 °C)-99.8 °F (37.7 °C)] 98.4 °F (36.9 °C)  Heart Rate:  [78-96] 78  Resp:  [16-18] 16  BP: (121-152)/(56-95) 121/56  Flow (L/min):  [1.5] 1.5     Physical Exam:  Constitutional: No acute distress, awake, alert  HENT: NCAT, mucous membranes moist  Respiratory: Clear to auscultation bilaterally, respiratory effort normal   Cardiovascular: RRR, no murmurs, rubs, or gallops  Gastrointestinal: Positive bowel sounds, soft, nontender, nondistended  Musculoskeletal: Left arm in sling  Psychiatric: Appropriate affect, cooperative  Neurologic: Oriented x 3, no focal deficits  Skin: No rashes        Results Reviewed:  LAB RESULTS:      Lab 24  1505 24  0335   WBC 11.96* 15.47*   HEMOGLOBIN 11.2* 14.5   HEMATOCRIT 32.2* 42.6   PLATELETS 192 269   NEUTROS ABS 7.96* 13.31*   IMMATURE GRANS (ABS) 0.09* 0.09*   LYMPHS ABS 1.85 1.16   MONOS ABS 1.05* 0.83   EOS ABS 0.93* 0.04   MCV 88.7 88.0         Lab 24  0923 24  0335 24  0044   SODIUM 139 137 136   POTASSIUM 4.7 3.9 4.3   CHLORIDE 105 100 101   CO2 19.0* 24.0 23.0   ANION GAP 15.0 13.0 12.0   BUN 11 17 18   CREATININE 0.66 0.73 0.73   EGFR 92.2 86.4 86.4   GLUCOSE 175* 167* 183*   CALCIUM 8.9 9.4 9.0   PHOSPHORUS 2.8  --   --    HEMOGLOBIN A1C  --  6.00*  --          Lab 24  0923 24  0044   TOTAL PROTEIN  --  7.2   ALBUMIN 3.5 4.2   GLOBULIN  --  3.0   ALT (SGPT)  --  26   AST (SGOT)  --  28   BILIRUBIN  --  0.4   ALK PHOS  --  64                 Lab 24  0429 24  0335   ABO TYPING A A   RH TYPING Positive Positive    ANTIBODY SCREEN  --  Negative         Brief Urine Lab Results  (Last result in the past 365 days)        Color   Clarity   Blood   Leuk Est   Nitrite   Protein   CREAT   Urine HCG        04/01/24 0815             135.4                 Microbiology Results Abnormal       None            No radiology results from the last 24 hrs        Current medications:  Scheduled Meds:Pharmacy Consult, , Does not apply, Daily  amLODIPine, 10 mg, Oral, Daily  aspirin, 81 mg, Oral, Q12H  cetirizine, 5 mg, Oral, Daily  insulin lispro, 2-7 Units, Subcutaneous, 4x Daily AC & at Bedtime  latanoprost, 1 drop, Both Eyes, Daily  levothyroxine, 75 mcg, Oral, Q AM  losartan, 50 mg, Oral, Q24H  pantoprazole, 40 mg, Oral, Daily  pravastatin, 40 mg, Oral, Q PM  sodium chloride, 10 mL, Intravenous, Q12H  sodium chloride, 3 mL, Intravenous, Q12H  timolol, 1 drop, Both Eyes, BID      Continuous Infusions:ropivacaine,   sodium chloride 0.9 % with KCl 20 mEq, 50 mL/hr, Last Rate: 50 mL/hr (07/21/24 2143)      PRN Meds:.  senna-docusate sodium **AND** polyethylene glycol **AND** bisacodyl **AND** bisacodyl    Calcium Replacement - Follow Nurse / BPA Driven Protocol    Pharmacy Consult    dextrose    dextrose    docusate sodium    glucagon (human recombinant)    HYDROcodone-acetaminophen    HYDROmorphone    Magnesium Standard Dose Replacement - Follow Nurse / BPA Driven Protocol    Morphine **AND** naloxone    nitroglycerin    ondansetron ODT **OR** ondansetron    Phosphorus Replacement - Follow Nurse / BPA Driven Protocol    Potassium Replacement - Follow Nurse / BPA Driven Protocol    simethicone    [COMPLETED] Insert Peripheral IV **AND** sodium chloride    sodium chloride    sodium chloride    sodium chloride    sodium chloride    Assessment & Plan   Assessment & Plan     Active Hospital Problems    Diagnosis  POA    **Femur fracture [S72.90XA]  Yes    T2DM (type 2 diabetes mellitus) [E11.9]  Yes    HTN (hypertension) [I10]  Yes    Radial  fracture [S52.90XA]  Yes    Left wrist fracture, open, initial encounter [S62.102B]  Unknown    Hypothyroidism (acquired) [E03.9]  Yes    Hyperlipidemia LDL goal <70 [E78.5]  Yes      Resolved Hospital Problems   No resolved problems to display.        Brief Hospital Course to date:  Pari Howell is a 74 y.o. female  with hx of HTN, HLD, hypothyroidism, T2DM, IBS who presented to the ED w/ c/o a fall.  Imaging shows left femur fracture and left distal radius fracture.  She underwent repair of left wrist and left hip on 7/20.     Left femur fracture   Left distal radius fracture   -2/2 mechanical fall   -CT pelvis impacted subcapital fracture of the proximal left femur   - CT LUE shows comminuted fracture of the distal radius with displacement  -PRN pain medications  -s/p ORIF distal left radius and anterior [place] with Dr. Evans/Lane on 7/20  - baby ASA BID for DVT ppx  - will need f/u with Dr. Sherman 2 weeks post-discharge for wound check/suture removal and placement in short arm case     T2DM  -hem A1c 6.0  -on Semaglutide   -Continue SSI     Neuropathy  -uses cream from compound pharmacy to help     HTN   HLD   -continue routine Norvasc  -on losartan-HCTZ; losartan continued at half dose, HCTZ held for now   -continued statin     Hypothyroidism   -continue synthroid     Expected Discharge Location and Transportation: rehab   Expected Discharge   Expected Discharge Date: 7/23/2024; Expected Discharge Time:      VTE Prophylaxis:  Mechanical VTE prophylaxis orders are present.         AM-PAC 6 Clicks Score (PT): 12 (07/23/24 1009)    CODE STATUS:   Code Status and Medical Interventions:   Ordered at: 07/20/24 9462     Level Of Support Discussed With:    Patient     Code Status (Patient has no pulse and is not breathing):    CPR (Attempt to Resuscitate)     Medical Interventions (Patient has pulse or is breathing):    Full Support       Jessi Harvey, DO  07/23/24

## 2024-07-23 NOTE — THERAPY TREATMENT NOTE
Patient Name: Pari Howell  : 1950    MRN: [id number]                              Today's Date: 2024       Admit Date: 2024    Visit Dx:     ICD-10-CM ICD-9-CM   1. Subcapital fracture of hip, left, closed, initial encounter  S72.012A 820.09   2. Left wrist fracture, open, initial encounter  S62.102B 814.10     Patient Active Problem List   Diagnosis    Type 2 diabetes mellitus without complication, without long-term current use of insulin    Other specified glaucoma    Hyperlipidemia LDL goal <70    Vitamin D deficiency    Irritable bowel syndrome with diarrhea    Hypothyroidism (acquired)    Acute respiratory insufficiency    Hip fracture    Femur fracture    T2DM (type 2 diabetes mellitus)    HTN (hypertension)    Radial fracture    Left wrist fracture, open, initial encounter     Past Medical History:   Diagnosis Date    Bladder infection     Dyspareunia, female     Glaucoma     H/O sexual problem     High cholesterol     History of abnormal cervical Pap smear     Hypertension     Hypertension     Hypothyroidism     Impaired functional mobility, balance, gait, and endurance     Labial cyst     Muscle weakness     Type 2 diabetes mellitus     Urinary incontinence     Vaginal itching     Yeast infection      Past Surgical History:   Procedure Laterality Date    CARPAL TUNNEL RELEASE      CATARACT EXTRACTION Right 2022    CATARACT EXTRACTION, BILATERAL Left     CHOLECYSTECTOMY  1991    EYE SURGERY      HIP FRACTURE SURGERY Right     HYSTERECTOMY      OOPHORECTOMY      TOTAL HIP ARTHROPLASTY Right 2024    Procedure: TOTAL HIP ARTHROPLASTY ANTERIOR RIGHT;  Surgeon: Doni Evans MD;  Location: [facility] OR;  Service: Orthopedics;  Laterality: Right;    TOTAL HIP ARTHROPLASTY Left 2024    Procedure: TOTAL HIP ARTHROPLASTY ANTERIOR LEFT;  Surgeon: Doni Evans MD;  Location:  [facility] OR;  Service: Orthopedics;  Laterality: Left;    TUBAL ABDOMINAL LIGATION  1979     WRIST FRACTURE SURGERY Left 7/20/2024    Procedure: OPEN REDUCTION INTERNAL FIXATION DISTAL RADIUS - LEFT;  Surgeon: Devan Modi MD;  Location: [facility];  Service: Orthopedics;  Laterality: Left;      General Information       Row Name 07/23/24 1615 07/23/24 0949       Physical Therapy Time and Intention    Document Type therapy note (daily note)  -CK therapy note (daily note)  -CK (r) HM (t) CK (c)    Mode of Treatment individual therapy;physical therapy  -CK individual therapy;physical therapy  -CK (r) HM (t) CK (c)      Row Name 07/23/24 1615 07/23/24 0949       General Information    Patient Profile Reviewed yes  -CK yes  -CK (r) HM (t) CK (c)    Existing Precautions/Restrictions fall;left;hip, anterior;other (see comments)  LLE s/p ant [place] WBAT; LUE s/p ORIF WBAT through elbow, infra clavicular nerve cath  -CK fall;other (see comments)  s/p L [place]- WBAT LLE; s/p L wrist ORIF in simple sling- NWB L wrist and WBAT L elbow , infra clavicular nerve cath  -CK (r) HM (t) CK (c)    Barriers to Rehab medically complex;previous functional deficit;physical barrier  -CK medically complex;previous functional deficit;physical barrier  -CK (r) HM (t) CK (c)      Row Name 07/23/24 1615 07/23/24 0949       Cognition    Orientation Status (Cognition) oriented x 3  -CK oriented x 3  -CK (r) HM (t) CK (c)      Row Name 07/23/24 1615 07/23/24 0949       Safety Issues, Functional Mobility    Safety Issues Affecting Function (Mobility) awareness of need for assistance;insight into deficits/self-awareness;safety precaution awareness;safety precautions follow-through/compliance;problem-solving;sequencing abilities  -CK awareness of need for assistance;insight into deficits/self-awareness;safety precaution awareness;safety precautions follow-through/compliance;sequencing abilities;problem-solving  -CK (r) HM (t) CK (c)    Impairments Affecting Function (Mobility) balance;endurance/activity  tolerance;strength;grasp;pain;range of motion (ROM);sensation/sensory awareness  -CK balance;endurance/activity tolerance;strength;grasp;pain;range of motion (ROM);sensation/sensory awareness  -CK (r) HM (t) CK (c)    Comment, Safety Issues/Impairments (Mobility) -- Alert and following commands  -CK (r) HM (t) CK (c)              User Key  (r) = Recorded By, (t) = Taken By, (c) = Cosigned By      Initials Name Provider Type    CK Bessie Vidal, PT Physical Therapist     Taryn Caba, PT Student PT Student                   Mobility       Row Name 07/23/24 1617 07/23/24 0926       Bed Mobility    Bed Mobility supine-sit  -CK supine-sit;scooting/bridging  -CK (r) HM (t) CK (c)    Scooting/Bridging [place] (Bed Mobility) -- 2 person assist;verbal cues;nonverbal cues (demo/gesture);maximum assist (25% patient effort)  -CK (r) HM (t) CK (c)    Supine-Sit [place] (Bed Mobility) moderate assist (50% patient effort);2 person assist;verbal cues;nonverbal cues (demo/gesture)  -CK moderate assist (50% patient effort);2 person assist;verbal cues;nonverbal cues (demo/gesture)  -CK (r) HM (t) CK (c)    Assistive Device (Bed Mobility) head of bed elevated;draw sheet;leg   -CK bed rails;draw sheet;head of bed elevated  -CK (r) HM (t) CK (c)    Comment, (Bed Mobility) patient with improved ability to utilize leg  to bring LLE off EOB, still requiring modAx2 to lift trunk from HOB and scoot hips to EOB  -CK Pt able to initiate LLE to EOB with leg  but continues to require Mod A of 2 with trunk and hips with poor ability to adjust self to maintain upright once sitting. Pt requires tactile cues to use RLE and RUE to balance once sitting EOB.  -CK (r) HM (t) CK (c)      Row Name 07/23/24 1617 07/23/24 0962       Transfers    Comment, (Transfers) Built up washcloth added to R side of platform walker for comfort with patient c/o R wrist carpal tunnel pain  -CK VC for hand placement and sequencing.  Physical assistance required with UE on platform walker.  -CK (r) HM (t) CK (c)      Row Name 07/23/24 1617 07/23/24 0955       Sit-Stand Transfer    Sit-Stand [place] (Transfers) 2 person assist;verbal cues;nonverbal cues (demo/gesture);moderate assist (50% patient effort)  -CK 2 person assist;verbal cues;nonverbal cues (demo/gesture);moderate assist (50% patient effort)  -CK (r) HM (t) CK (c)    Assistive Device (Sit-Stand Transfers) walker, rolling platform  -CK walker, rolling platform  -CK (r) HM (t) CK (c)    Comment, (Sit-Stand Transfer) cues for safe hand placement pushing up from bed, modA to clear hips from bed and to assist LUE into platform on walker  -CK Continues to require boost to stand. VC for pushing from bed as well.  -CK (r) HM (t) CK (c)      Row Name 07/23/24 1617 07/23/24 0955       Gait/Stairs (Locomotion)    [place] Level (Gait) minimum assist (75% patient effort);1 person assist;verbal cues  -CK minimum assist (75% patient effort);verbal cues;nonverbal cues (demo/gesture)  -CK (r) HM (t) CK (c)    Assistive Device (Gait) walker, rolling platform  -CK walker, rolling platform  -CK (r) HM (t) CK (c)    Distance in Feet (Gait) 75  -CK 35  -CK (r) HM (t) CK (c)    Deviations/Abnormal Patterns (Gait) bilateral deviations;lela decreased;gait speed decreased;stride length decreased;base of support, narrow;antalgic  -CK bilateral deviations;lela decreased;gait speed decreased;stride length decreased;base of support, narrow;antalgic  -CK (r) HM (t) CK (c)    Bilateral Gait Deviations forward flexed posture;heel strike decreased  -CK forward flexed posture;heel strike decreased  -CK (r) HM (t) CK (c)    Left Sided Gait Deviations weight shift ability decreased  -CK weight shift ability decreased  -CK (r) HM (t) CK (c)    [place] Level (Stairs) -- unable to assess  -CK (r) HM (t) CK (c)    Comment, (Gait/Stairs) Patient ambulated in faria with step through gait pattern, improved  ability to clear LLE, but still antalgic. Patient still requiring Edward for management of AD and for 1 LOB where she let go of AD. She had no buckling, but did become fatigued quickly and utilized chair follow/ride back to room.  -CK Pt continues to have difficulty clearing LLE with swing phase. Pt required two standing rest breaks secondary to RUE pain and L hip pain. Pt requires CGA at with walking but requires Min A with AD management throughout. Further ambulation limited by pain and fatigue. No LOB or knee buckling  -CK (r) HM (t) CK (c)      Row Name 07/23/24 1617 07/23/24 0955       Mobility    Extremity Weight-bearing Status left lower extremity;left upper extremity  -CK left lower extremity;left upper extremity  -CK (r) HM (t) CK (c)    Left Upper Extremity (Weight-bearing Status) other (see comments)  WBAT through elbow  -CK other (see comments)  WBAT through elbow, NWB at wrist  -CK (r) HM (t) CK (c)    Left Lower Extremity (Weight-bearing Status) weight-bearing as tolerated (WBAT)  -CK weight-bearing as tolerated (WBAT)  -CK (r) HM (t) CK (c)              User Key  (r) = Recorded By, (t) = Taken By, (c) = Cosigned By      Initials Name Provider Type    CK Bessie Vidal, PT Physical Therapist     Taryn Caba, PT Student PT Student                   Obj/Interventions       Row Name 07/23/24 1002          Motor Skills    Therapeutic Exercise hip;knee;ankle  -CK (r) HM (t) CK (c)       Row Name 07/23/24 1002          Hip (Therapeutic Exercise)    Hip (Therapeutic Exercise) isometric exercises;strengthening exercise  -CK (r) HM (t) CK (c)     Hip Isometrics (Therapeutic Exercise) bilateral;gluteal sets;3 second hold;10 repetitions  -CK (r) HM (t) CK (c)     Hip Strengthening (Therapeutic Exercise) bilateral;aBduction;aDduction;heel slides;10 repetitions  Physical assiistance with LLE  -CK (r) HM (t) CK (c)       Row Name 07/23/24 1002          Knee (Therapeutic Exercise)    Knee (Therapeutic  Exercise) isometric exercises;strengthening exercise  -CK (r) HM (t) CK (c)     Knee Isometrics (Therapeutic Exercise) bilateral;quad sets;3 second hold;10 repetitions  -CK (r) HM (t) CK (c)     Knee Strengthening (Therapeutic Exercise) bilateral;LAQ (long arc quad);10 repetitions  -CK (r) HM (t) CK (c)       Row Name 07/23/24 1002          Ankle (Therapeutic Exercise)    Ankle (Therapeutic Exercise) AROM (active range of motion)  -CK (r) HM (t) CK (c)     Ankle AROM (Therapeutic Exercise) bilateral;dorsiflexion;plantarflexion;10 repetitions  -CK (r) HM (t) CK (c)       Row Name 07/23/24 1621 07/23/24 1002       Balance    Balance Assessment sitting static balance;standing static balance;standing dynamic balance  -CK sitting static balance;sitting dynamic balance;sit to stand dynamic balance;standing static balance;standing dynamic balance  -CK (r) HM (t) CK (c)    Static Sitting Balance minimal assist  -CK moderate assist;verbal cues;non-verbal cues (demo/gesture)  -CK (r) HM (t) CK (c)    Dynamic Sitting Balance -- moderate assist;verbal cues;non-verbal cues (demo/gesture)  -CK (r) HM (t) CK (c)    Position, Sitting Balance unsupported;sitting edge of bed  -CK unsupported;sitting edge of bed  -CK (r) HM (t) CK (c)    Sit to Stand Dynamic Balance -- moderate assist;2-person assist;non-verbal cues (demo/gesture)  -CK (r) HM (t) CK (c)    Static Standing Balance contact guard  -CK contact guard;verbal cues  -CK (r) HM (t) CK (c)    Dynamic Standing Balance minimal assist  -CK verbal cues;contact guard  -CK (r) HM (t) CK (c)    Position/Device Used, Standing Balance supported;walker, platform  -CK supported;walker, platform  -CK (r) HM (t) CK (c)    Balance Interventions -- sitting;standing;sit to stand;occupation based/functional task  -CK (r) HM (t) CK (c)    Comment, Balance no buckling, 1 LOB when patient let go of walker Edward to correct  -CK No LOB or buckling  -CK (r) HM (t) CK (c)              User Key  (r) =  Recorded By, (t) = Taken By, (c) = Cosigned By      Initials Name Provider Type    CK Bessie Vidal, PT Physical Therapist    HM Taryn Caba, PT Student PT Student                   Goals/Plan    No documentation.                  Clinical Impression       Row Name 07/23/24 1623 07/23/24 1005       Pain    Pretreatment Pain Rating 0/10 - no pain  -CK 0/10 - no pain  -CK (r) HM (t) CK (c)    Posttreatment Pain Rating 0/10 - no pain  -CK 0/10 - no pain  -CK (r) HM (t) CK (c)    Pain Location - Side/Orientation -- Left  -CK (r) HM (t) CK (c)    Pain Location -- generalized  -CK (r) HM (t) CK (c)    Pain Location - -- hip  -CK (r) HM (t) CK (c)    Pain Intervention(s) -- Repositioned;Elevated;Ambulation/increased activity  -CK (r) HM (t) CK (c)      Row Name 07/23/24 1623 07/23/24 1005       Plan of Care Review    Plan of Care Reviewed With patient;family  -CK patient  -CK (r) HM (t) CK (c)    Progress no change  -CK no change  -CK (r) HM (t) CK (c)    Outcome Evaluation Patient able to ambulate 75' Edward with platform walker this afternoon, continues to give good effort with all therapy interventions, but limited by deficits in strength, endurance, balance, and strength. IPPT remains indicated to address current deficits. Recommend D/C to IPR.  -CK Pt ambulated 35' this session with Min A and platform walker with further ambulation limited by pain. Pt presents below baseline with deficits in pain, activity tolerance, balance, and strength. Further IPPT warranted during admission. PT recommended DC to IRF.  -CK (r) HM (t) CK (c)      Row Name 07/23/24 1623 07/23/24 1005       Vital Signs    Pre Systolic BP Rehab -- 145  -CK (r) HM (t) CK (c)    Pre Treatment Diastolic BP -- 72  -CK (r) HM (t) CK (c)    Pretreatment Heart Rate (beats/min) -- 89  -CK (r) HM (t) CK (c)    Posttreatment Heart Rate (beats/min) -- 85  -CK (r) HM (t) CK (c)    Pre SpO2 (%) 96  -CK 95  -CK (r) HM (t) CK (c)    O2 Delivery Pre  Treatment nasal cannula  -CK nasal cannula  -CK (r) HM (t) CK (c)    Intra SpO2 (%) 94  -CK 92  -CK (r) HM (t) CK (c)    O2 Delivery Intra Treatment room air  -CK room air  -CK (r) HM (t) CK (c)    Post SpO2 (%) 95  -CK 95  -CK (r) HM (t) CK (c)    O2 Delivery Post Treatment nasal cannula  -CK nasal cannula  -CK (r) HM (t) CK (c)    Pre Patient Position Supine  -CK Supine  -CK (r) HM (t) CK (c)    Intra Patient Position Sitting  -CK Sitting  -CK (r) HM (t) CK (c)    Post Patient Position Sitting  -CK Sitting  -CK (r) HM (t) CK (c)      Row Name 07/23/24 1623 07/23/24 1005       Positioning and Restraints    Pre-Treatment Position in bed  -CK in bed  -CK (r) HM (t) CK (c)    Post Treatment Position chair  -CK chair  -CK (r) HM (t) CK (c)    In Chair reclined;call light within reach;encouraged to call for assist;exit alarm on;with family/caregiver;waffle cushion;on mechanical lift sling;heels elevated;compression device;LUE elevated;with brace;notified nsg  LUE in sling  -CK notified nsg;reclined;call light within reach;encouraged to call for assist;exit alarm on;with brace;on mechanical lift sling;waffle cushion;compression device;LUE elevated;legs elevated  -CK (r) HM (t) CK (c)              User Key  (r) = Recorded By, (t) = Taken By, (c) = Cosigned By      Initials Name Provider Type    CK Bessie Vidal, PT Physical Therapist    HM Taryn Caba, PT Student PT Student                   Outcome Measures       Row Name 07/23/24 1625 07/23/24 1009       How much help from another person do you currently need...    Turning from your back to your side while in flat bed without using bedrails? 2  -CK 2  -CK (r) HM (t) CK (c)    Moving from lying on back to sitting on the side of a flat bed without bedrails? 2  -CK 2  -CK (r) HM (t) CK (c)    Moving to and from a bed to a chair (including a wheelchair)? 2  -CK 2  -CK (r) HM (t) CK (c)    Standing up from a chair using your arms (e.g., wheelchair, bedside chair)?  2  -CK 2  -CK (r) HM (t) CK (c)    Climbing 3-5 steps with a railing? 1  -CK 1  -CK (r) HM (t) CK (c)    To walk in hospital room? 3  -CK 3  -CK (r) HM (t) CK (c)    AM-PAC 6 Clicks Score (PT) 12  -CK 12  -CK (r) HM (t)    Highest Level of Mobility Goal 4 --> Transfer to chair/commode  -CK 4 --> Transfer to chair/commode  -CK (r) HM (t)      Row Name 07/23/24 1625 07/23/24 1009       Functional Assessment    Outcome Measure Options AM-PAC 6 Clicks Basic Mobility (PT)  -CK AM-PAC 6 Clicks Basic Mobility (PT)  -CK (r) HM (t) CK (c)              User Key  (r) = Recorded By, (t) = Taken By, (c) = Cosigned By      Initials Name Provider Type    CK Bessie Vidal, PT Physical Therapist    Taryn Tran, PT Student PT Student                                 Physical Therapy Education       Title: PT OT SLP Therapies (In Progress)       Topic: Physical Therapy (Done)       Point: Mobility training (Done)       Learning Progress Summary             Patient Acceptance, E, VU by  at 7/23/2024 1625    Acceptance, E, VU by  at 7/23/2024 1010    Comment: Pt educated on importance of sitting during admission    Acceptance, E, VU by  at 7/22/2024 1659    Acceptance, E, VU by  at 7/22/2024 1317    Comment: Pt educated on importance of ambulation during admission    Acceptance, E,D, VU,NR by AB at 7/21/2024 1539    Acceptance, E,D,H, VU,NR by AB at 7/21/2024 1024   Family Acceptance, E, VU by  at 7/23/2024 1625    Acceptance, E, VU by  at 7/22/2024 1659    Acceptance, E, VU by  at 7/22/2024 1317    Comment: Pt educated on importance of ambulation during admission                         Point: Home exercise program (Done)       Learning Progress Summary             Patient Acceptance, E, VU by  at 7/23/2024 1010    Comment: Pt educated on importance of sitting during admission    Acceptance, E, VU by  at 7/22/2024 1659    Acceptance, E, VU by  at 7/22/2024 1317    Comment: Pt educated on importance of  ambulation during admission    Acceptance, E,D, VU,NR by AB at 7/21/2024 1539    Acceptance, E,D,H, VU,NR by AB at 7/21/2024 1024   Family Acceptance, E, VU by  at 7/22/2024 1659    Acceptance, E, VU by  at 7/22/2024 1317    Comment: Pt educated on importance of ambulation during admission                         Point: Body mechanics (Done)       Learning Progress Summary             Patient Acceptance, E, VU by CK at 7/23/2024 1625    Acceptance, E, VU by HM at 7/23/2024 1010    Comment: Pt educated on importance of sitting during admission    Acceptance, E, VU by HM at 7/22/2024 1659    Acceptance, E, VU by HM at 7/22/2024 1317    Comment: Pt educated on importance of ambulation during admission    Acceptance, E,D, VU,NR by AB at 7/21/2024 1539    Acceptance, E,D,H, VU,NR by AB at 7/21/2024 1024   Family Acceptance, E, VU by CK at 7/23/2024 1625    Acceptance, E, VU by  at 7/22/2024 1659    Acceptance, E, VU by  at 7/22/2024 1317    Comment: Pt educated on importance of ambulation during admission                         Point: Precautions (Done)       Learning Progress Summary             Patient Acceptance, E, VU by CK at 7/23/2024 1625    Acceptance, E, VU by  at 7/23/2024 1010    Comment: Pt educated on importance of sitting during admission    Acceptance, E, VU by HM at 7/22/2024 1659    Acceptance, E, VU by  at 7/22/2024 1317    Comment: Pt educated on importance of ambulation during admission    Acceptance, E,D, VU,NR by AB at 7/21/2024 1539    Acceptance, E,D,H, VU,NR by AB at 7/21/2024 1024   Family Acceptance, E, VU by CK at 7/23/2024 1625    Acceptance, E, VU by  at 7/22/2024 1659    Acceptance, E, VU by  at 7/22/2024 1317    Comment: Pt educated on importance of ambulation during admission                                         User Key       Initials Effective Dates Name Provider Type Discipline    AB 09/22/22 -  Ronda Thomas, PT Physical Therapist PT    CK 02/06/24 -  Marcy,  Bessie, PT Physical Therapist PT     05/07/24 -  Taryn Caba, PT Student PT Student PT                  PT Recommendation and Plan     Plan of Care Reviewed With: patient, family  Progress: no change  Outcome Evaluation: Patient able to ambulate 75' Edward with platform walker this afternoon, continues to give good effort with all therapy interventions, but limited by deficits in strength, endurance, balance, and strength. IPPT remains indicated to address current deficits. Recommend D/C to IPR.     Time Calculation:         PT Charges       Row Name 07/23/24 1626 07/23/24 0831          Time Calculation    Start Time 1541  -CK 0831  -CK (r) HM (t) CK (c)     PT Received On 07/23/24  -CK 07/23/24  -CK (r) HM (t) CK (c)     PT Goal Re-Cert Due Date 07/31/24  -CK 07/31/24  -CK (r) HM (t) CK (c)        Timed Charges    63771 - PT Therapeutic Exercise Minutes -- 15  -CK (r) HM (t) CK (c)     96853 - Gait Training Minutes  20  -CK 8  -CK (r) HM (t) CK (c)     22259 - PT Therapeutic Activity Minutes 10  -CK 16  -CK (r) HM (t) CK (c)        Total Minutes    Timed Charges Total Minutes 30  -CK 39  -CK (r) HM (t)      Total Minutes 30  -CK 39  -CK (r) HM (t)               User Key  (r) = Recorded By, (t) = Taken By, (c) = Cosigned By      Initials Name Provider Type    CK Bessie Vidal, PT Physical Therapist     Taryn Caba, PT Student PT Student                  Therapy Charges for Today       Code Description Service Date Service Provider Modifiers Qty    [id number] HC GAIT TRAINING EA 15 MIN 7/23/2024 Bessie Vidal, PT GP 1    [id number] HC PT THERAPEUTIC ACT EA 15 MIN 7/23/2024 Bessie Vidal, PT GP 1            PT G-Codes  Outcome Measure Options: AM-PAC 6 Clicks Basic Mobility (PT)  AM-PAC 6 Clicks Score (PT): 12  AM-PAC 6 Clicks Score (OT): 13  PT Discharge Summary  Anticipated Discharge Disposition (PT): inpatient rehabilitation facility    Bessie Vidal PT  7/23/2024

## 2024-07-23 NOTE — PLAN OF CARE
Goal Outcome Evaluation:  Plan of Care Reviewed With: (P) patient        Progress: (P) no change  Outcome Evaluation: (P) Pt ambulated 35' this session with Min A and platform walker with further ambulation limited by pain. Pt presents below baseline with deficits in pain, activity tolerance, balance, and strength. Further IPPT warranted during admission. PT recommended DC to IRF.      Anticipated Discharge Disposition (PT): (P) inpatient rehabilitation facility

## 2024-07-24 LAB
GLUCOSE BLDC GLUCOMTR-MCNC: 147 MG/DL (ref 70–130)
GLUCOSE BLDC GLUCOMTR-MCNC: 165 MG/DL (ref 70–130)
GLUCOSE BLDC GLUCOMTR-MCNC: 166 MG/DL (ref 70–130)
GLUCOSE BLDC GLUCOMTR-MCNC: 201 MG/DL (ref 70–130)

## 2024-07-24 PROCEDURE — 97530 THERAPEUTIC ACTIVITIES: CPT

## 2024-07-24 PROCEDURE — 99232 SBSQ HOSP IP/OBS MODERATE 35: CPT | Performed by: INTERNAL MEDICINE

## 2024-07-24 PROCEDURE — 82948 REAGENT STRIP/BLOOD GLUCOSE: CPT

## 2024-07-24 PROCEDURE — 97535 SELF CARE MNGMENT TRAINING: CPT

## 2024-07-24 PROCEDURE — 97116 GAIT TRAINING THERAPY: CPT

## 2024-07-24 PROCEDURE — 97110 THERAPEUTIC EXERCISES: CPT

## 2024-07-24 PROCEDURE — 63710000001 INSULIN LISPRO (HUMAN) PER 5 UNITS: Performed by: ORTHOPAEDIC SURGERY

## 2024-07-24 PROCEDURE — 99024 POSTOP FOLLOW-UP VISIT: CPT | Performed by: ORTHOPAEDIC SURGERY

## 2024-07-24 RX ORDER — HYDROCODONE BITARTRATE AND ACETAMINOPHEN 5; 325 MG/1; MG/1
1 TABLET ORAL EVERY 6 HOURS PRN
Status: DISCONTINUED | OUTPATIENT
Start: 2024-07-24 | End: 2024-07-25 | Stop reason: HOSPADM

## 2024-07-24 RX ADMIN — Medication 10 ML: at 08:44

## 2024-07-24 RX ADMIN — PRAVASTATIN SODIUM 40 MG: 40 TABLET ORAL at 17:05

## 2024-07-24 RX ADMIN — LEVOTHYROXINE SODIUM 75 MCG: 0.07 TABLET ORAL at 06:32

## 2024-07-24 RX ADMIN — TIMOLOL MALEATE 1 DROP: 5 SOLUTION/ DROPS OPHTHALMIC at 08:44

## 2024-07-24 RX ADMIN — Medication 3 ML: at 20:05

## 2024-07-24 RX ADMIN — ASPIRIN 81 MG: 81 TABLET, COATED ORAL at 20:01

## 2024-07-24 RX ADMIN — ASPIRIN 81 MG: 81 TABLET, COATED ORAL at 08:43

## 2024-07-24 RX ADMIN — INSULIN LISPRO 2 UNITS: 100 INJECTION, SOLUTION INTRAVENOUS; SUBCUTANEOUS at 20:04

## 2024-07-24 RX ADMIN — PANTOPRAZOLE SODIUM 40 MG: 40 TABLET, DELAYED RELEASE ORAL at 08:43

## 2024-07-24 RX ADMIN — LOSARTAN POTASSIUM 50 MG: 50 TABLET, FILM COATED ORAL at 08:43

## 2024-07-24 RX ADMIN — HYDROCODONE BITARTRATE AND ACETAMINOPHEN 1 TABLET: 7.5; 325 TABLET ORAL at 08:44

## 2024-07-24 RX ADMIN — AMLODIPINE BESYLATE 10 MG: 10 TABLET ORAL at 08:43

## 2024-07-24 RX ADMIN — Medication 10 ML: at 20:05

## 2024-07-24 RX ADMIN — CETIRIZINE HYDROCHLORIDE 5 MG: 10 TABLET, FILM COATED ORAL at 08:43

## 2024-07-24 RX ADMIN — TIMOLOL MALEATE 1 DROP: 5 SOLUTION/ DROPS OPHTHALMIC at 20:05

## 2024-07-24 RX ADMIN — INSULIN LISPRO 3 UNITS: 100 INJECTION, SOLUTION INTRAVENOUS; SUBCUTANEOUS at 12:11

## 2024-07-24 RX ADMIN — HYDROCODONE BITARTRATE AND ACETAMINOPHEN 1 TABLET: 5; 325 TABLET ORAL at 20:01

## 2024-07-24 RX ADMIN — Medication 3 ML: at 08:44

## 2024-07-24 NOTE — THERAPY TREATMENT NOTE
Patient Name: Pari Howell  : 1950    MRN: [id number]                              Today's Date: 2024       Admit Date: 2024    Visit Dx:     ICD-10-CM ICD-9-CM   1. Subcapital fracture of hip, left, closed, initial encounter  S72.012A 820.09   2. Left wrist fracture, open, initial encounter  S62.102B 814.10     Patient Active Problem List   Diagnosis    Type 2 diabetes mellitus without complication, without long-term current use of insulin    Other specified glaucoma    Hyperlipidemia LDL goal <70    Vitamin D deficiency    Irritable bowel syndrome with diarrhea    Hypothyroidism (acquired)    Acute respiratory insufficiency    Hip fracture    Femur fracture    T2DM (type 2 diabetes mellitus)    HTN (hypertension)    Radial fracture    Left wrist fracture, open, initial encounter     Past Medical History:   Diagnosis Date    Bladder infection     Dyspareunia, female     Glaucoma     H/O sexual problem     High cholesterol     History of abnormal cervical Pap smear     Hypertension     Hypertension     Hypothyroidism     Impaired functional mobility, balance, gait, and endurance     Labial cyst     Muscle weakness     Type 2 diabetes mellitus     Urinary incontinence     Vaginal itching     Yeast infection      Past Surgical History:   Procedure Laterality Date    CARPAL TUNNEL RELEASE      CATARACT EXTRACTION Right 2022    CATARACT EXTRACTION, BILATERAL Left     CHOLECYSTECTOMY  1991    EYE SURGERY      HIP FRACTURE SURGERY Right     HYSTERECTOMY      OOPHORECTOMY      TOTAL HIP ARTHROPLASTY Right 2024    Procedure: TOTAL HIP ARTHROPLASTY ANTERIOR RIGHT;  Surgeon: Doni Evans MD;  Location: [facility] OR;  Service: Orthopedics;  Laterality: Right;    TOTAL HIP ARTHROPLASTY Left 2024    Procedure: TOTAL HIP ARTHROPLASTY ANTERIOR LEFT;  Surgeon: Doni Evans MD;  Location:  [facility] OR;  Service: Orthopedics;  Laterality: Left;    TUBAL ABDOMINAL LIGATION  1979     WRIST FRACTURE SURGERY Left 7/20/2024    Procedure: OPEN REDUCTION INTERNAL FIXATION DISTAL RADIUS - LEFT;  Surgeon: Devan Modi MD;  Location: [facility];  Service: Orthopedics;  Laterality: Left;      General Information       Row Name 07/24/24 1349 07/24/24 0944       Physical Therapy Time and Intention    Document Type therapy note (daily note)  -CK (r) HM (t) CK (c) therapy note (daily note)  -CK (r) HM (t) CK (c)    Mode of Treatment individual therapy;physical therapy  -CK (r) HM (t) CK (c) individual therapy;physical therapy  -CK (r) HM (t) CK (c)      Row Name 07/24/24 1349 07/24/24 0944       General Information    Patient Profile Reviewed yes  -CK (r) HM (t) CK (c) yes  -CK (r) HM (t) CK (c)    Existing Precautions/Restrictions fall;left;hip, anterior;other (see comments)  s/p L [place]- WBAT LLE; s/p L wrist ORIF in simple sling- NWB L wrist and WBAT L elbow , infra clavicular nerve cath  -CK (r) HM (t) CK (c) fall;left;hip, anterior;other (see comments)  LLE s/p ant [place] WBAT; LUE s/p ORIF WBAT through elbow, infra clavicular nerve cath  -CK (r) HM (t) CK (c)    Barriers to Rehab medically complex;previous functional deficit;physical barrier  -CK (r) HM (t) CK (c) medically complex;previous functional deficit;physical barrier  -CK (r) HM (t) CK (c)      Row Name 07/24/24 1349 07/24/24 0944       Cognition    Orientation Status (Cognition) oriented x 3  -CK (r) HM (t) CK (c) oriented x 3  -CK (r) HM (t) CK (c)      Row Name 07/24/24 1349 07/24/24 0944       Safety Issues, Functional Mobility    Safety Issues Affecting Function (Mobility) at risk behavior observed;insight into deficits/self-awareness;safety precaution awareness;safety precautions follow-through/compliance;sequencing abilities;problem-solving  -CK (r) HM (t) CK (c) awareness of need for assistance;insight into deficits/self-awareness;safety precaution awareness;safety precautions follow-through/compliance;sequencing  abilities;problem-solving  -CK (r) HM (t) CK (c)    Impairments Affecting Function (Mobility) balance;endurance/activity tolerance;strength;grasp;pain;range of motion (ROM);sensation/sensory awareness  -CK (r)  (t) CK (c) balance;endurance/activity tolerance;strength;grasp;pain;range of motion (ROM);sensation/sensory awareness  -CK (r)  (t) CK (c)    Comment, Safety Issues/Impairments (Mobility) Alert and following commands  -CK (r) HM (t) CK (c) Alert and following commands  -CK (r)  (t) CK (c)              User Key  (r) = Recorded By, (t) = Taken By, (c) = Cosigned By      Initials Name Provider Type    CK Bessie Vidal, PT Physical Therapist    Taryn Tran, PT Student PT Student                   Mobility       Row Name 07/24/24 1352 07/24/24 0944       Bed Mobility    Bed Mobility supine-sit;scooting/bridging;sit-supine  -CK (r)  (t) CK (c) supine-sit;scooting/bridging  -CK (r)  (t) CK (c)    Scooting/Bridging [place] (Bed Mobility) verbal cues;standby assist  -CK (r)  (t) CK (c) 2 person assist;verbal cues;nonverbal cues (demo/gesture);maximum assist (25% patient effort)  -CK (r) HM (t) CK (c)    Supine-Sit [place] (Bed Mobility) standby assist;verbal cues  -CK (r)  (t) CK (c) moderate assist (50% patient effort);2 person assist;verbal cues;nonverbal cues (demo/gesture)  -CK (r) HM (t) CK (c)    Sit-Supine [place] (Bed Mobility) moderate assist (50% patient effort);verbal cues  -CK (r)  (t) CK (c) --    Assistive Device (Bed Mobility) head of bed elevated;draw sheet;leg   -CK (r)  (t) CK (c) head of bed elevated;draw sheet;leg   -CK (r)  (t) CK (c)    Comment, (Bed Mobility) Pt able to advance LLE to EOB with use of leg  and RLE. Pt able to get trunk upright for supine to sit with increased time to complete task with VC throughout for hand placement and sequencing. Pt required Mod A at LE with sit to supine with good effort to use RLE to lift  LLE to bed.  -CK (r) HM (t) CK (c) Pt able to advance LLE to EOB this session utilizing leg  and RLE. Pt with good effort this session to rise without support at trunk but was unssuccessful required Mod A at trunk and hips. Pt continues to require Max A to scoot to EOB.  -CK (r) HM (t) CK (c)      Row Name 07/24/24 1352 07/24/24 0944       Transfers    Comment, (Transfers) VC for hand placement and sequencing. Pt did not require physical assist with placing of LUE onto platfor walker this session.  -CK (r) HM (t) CK (c) STS x 1 from EOB with VC for sequencing with poor retention seen from session to session. Pt continues to require assist placing LUE on platform walker  -CK (r) HM (t) CK (c)      Row Name 07/24/24 1352 07/24/24 0944       Sit-Stand Transfer    Sit-Stand [place] (Transfers) minimum assist (75% patient effort);2 person assist;verbal cues  -CK (r) HM (t) CK (c) 2 person assist;verbal cues;nonverbal cues (demo/gesture);moderate assist (50% patient effort)  -CK (r) HM (t) CK (c)    Assistive Device (Sit-Stand Transfers) walker, rolling platform  -CK (r) HM (t) CK (c) walker, rolling platform  -CK (r) HM (t) CK (c)    Comment, (Sit-Stand Transfer) Pt able to complete STS from EOB with VC for placement of RLE and RUE. Pt required bed height be raised prior to standing  -CK (r) HM (t) CK (c) VC for hand placement and sequencing as well as coming up in a midline position secondary to large lean to right .  -CK (r) HM (t) CK (c)      Row Name 07/24/24 1352 07/24/24 0944       Gait/Stairs (Locomotion)    [place] Level (Gait) contact guard;verbal cues;1 person to manage equipment  -CK (r) HM (t) CK (c) contact guard;verbal cues;1 person to manage equipment  -CK (r) HM (t) CK (c)    Assistive Device (Gait) walker, rolling platform  -CK (r) HM (t) CK (c) walker, rolling platform  -CK (r) HM (t) CK (c)    Distance in Feet (Gait) 140  -CK (r) HM (t) CK (c) 120  -CK (r) HM (t) CK (c)     Deviations/Abnormal Patterns (Gait) bilateral deviations;lela decreased;gait speed decreased;stride length decreased;base of support, narrow;antalgic  -CK (r) HM (t) CK (c) bilateral deviations;lela decreased;gait speed decreased;stride length decreased;base of support, narrow;antalgic  -CK (r) HM (t) CK (c)    Bilateral Gait Deviations forward flexed posture;heel strike decreased  -CK (r) HM (t) CK (c) forward flexed posture;heel strike decreased  -CK (r) HM (t) CK (c)    Left Sided Gait Deviations weight shift ability decreased  -CK (r) HM (t) CK (c) weight shift ability decreased  -CK (r) HM (t) CK (c)    [place] Level (Stairs) unable to assess  -CK (r) HM (t) CK (c) unable to assess  -CK (r) HM (t) CK (c)    Comment, (Gait/Stairs) Pt able to ambulated with increased gait speed from previous session but still significantly below baseline. Pt amb with a step through gait pattern with 2 standing rest breaks. No LOB or knee buckling this session with further ambulation limited by pain and  fatigue  -CK (r) HM (t) CK (c) Pt ambulated with a step through gait pattern with several instances of resting with L knee in a flex position to avoid LLE weightbearing in standing. Pt with better ability to manage AD this session but still continues to ambulate with a slow gait speed. No LOB this session.  -CK (r) HM (t) CK (c)      Row Name 07/24/24 1352 07/24/24 0987       Mobility    Extremity Weight-bearing Status left lower extremity;left upper extremity  -CK (r) HM (t) CK (c) left lower extremity;left upper extremity  -CK (r) HM (t) CK (c)    Left Upper Extremity (Weight-bearing Status) other (see comments)  WBAT through elbow, NWB at wrist  -CK (r) HM (t) CK (c) other (see comments)  WBAT through elbow  -CK (r) HM (t) CK (c)    Left Lower Extremity (Weight-bearing Status) weight-bearing as tolerated (WBAT)  -CK (r) HM (t) CK (c) weight-bearing as tolerated (WBAT)  -CK (r) HM (t) CK (c)              User Key  (r)  = Recorded By, (t) = Taken By, (c) = Cosigned By      Initials Name Provider Type    CK Bessie Vidal, PT Physical Therapist    Taryn Tran, PT Student PT Student                   Obj/Interventions       Row Name 07/24/24 1359          Motor Skills    Therapeutic Exercise hip;knee;ankle  -CK (r) HM (t) CK (c)       Row Name 07/24/24 1359          Hip (Therapeutic Exercise)    Hip (Therapeutic Exercise) isometric exercises;strengthening exercise  -CK (r) HM (t) CK (c)     Hip Isometrics (Therapeutic Exercise) bilateral;gluteal sets;3 second hold;10 repetitions  -CK (r) HM (t) CK (c)     Hip Strengthening (Therapeutic Exercise) bilateral;aBduction;aDduction;heel slides  Min A LLE  -CK (r) HM (t) CK (c)       Row Name 07/24/24 1359          Knee (Therapeutic Exercise)    Knee (Therapeutic Exercise) isometric exercises;strengthening exercise  -CK (r) HM (t) CK (c)     Knee Isometrics (Therapeutic Exercise) quad sets;3 second hold;10 repetitions;left  -CK (r) HM (t) CK (c)     Knee Strengthening (Therapeutic Exercise) left;SAQ (short arc quad);10 repetitions  -CK (r) HM (t) CK (c)       Row Name 07/24/24 1359          Ankle (Therapeutic Exercise)    Ankle (Therapeutic Exercise) AROM (active range of motion)  -CK (r) HM (t) CK (c)     Ankle AROM (Therapeutic Exercise) bilateral;dorsiflexion;plantarflexion;10 repetitions  -CK (r) HM (t) CK (c)       Row Name 07/24/24 1359 07/24/24 0956       Balance    Balance Assessment sitting static balance;sitting dynamic balance;sit to stand dynamic balance;standing static balance;standing dynamic balance  -CK (r) HM (t) CK (c) sitting static balance;sitting dynamic balance;sit to stand dynamic balance;standing static balance;standing dynamic balance  -CK (r) HM (t) CK (c)    Static Sitting Balance contact guard  -CK (r) HM (t) CK (c) contact guard  -CK (r) HM (t) CK (c)    Dynamic Sitting Balance contact guard  -CK (r) HM (t) CK (c) minimal assist;verbal cues  -CK (r)  HM (t) CK (c)    Position, Sitting Balance unsupported;sitting edge of bed  -CK (r) HM (t) CK (c) unsupported;sitting edge of bed  -CK (r) HM (t) CK (c)    Sit to Stand Dynamic Balance minimal assist;2-person assist;verbal cues  -CK (r) HM (t) CK (c) moderate assist;2-person assist;verbal cues  -CK (r) HM (t) CK (c)    Static Standing Balance contact guard  -CK (r) HM (t) CK (c) contact guard  -CK (r) HM (t) CK (c)    Dynamic Standing Balance contact guard  -CK (r) HM (t) CK (c) contact guard;1 person to manage equipment;verbal cues  -CK (r) HM (t) CK (c)    Position/Device Used, Standing Balance supported;walker, front-wheeled  -CK (r) HM (t) CK (c) supported;walker, platform  -CK (r) HM (t) CK (c)    Balance Interventions sitting;standing;sit to stand;occupation based/functional task  -CK (r) HM (t) CK (c) sitting;standing;sit to stand;occupation based/functional task  -CK (r) HM (t) CK (c)    Comment, Balance Pt with increased sitting balance this session with no LOB in standing  -CK (r) HM (t) CK (c) No buckling or LOB. Pt with improved sitting balance this session  -CK (r) HM (t) CK (c)              User Key  (r) = Recorded By, (t) = Taken By, (c) = Cosigned By      Initials Name Provider Type    CK Bessie Vidal, PT Physical Therapist     Taryn Caba, PT Student PT Student                   Goals/Plan    No documentation.                  Clinical Impression       Row Name 07/24/24 1401 07/24/24 0958       Pain    Pretreatment Pain Rating 0/10 - no pain  -CK (r) HM (t) CK (c) 0/10 - no pain  -CK (r) HM (t) CK (c)    Posttreatment Pain Rating 0/10 - no pain  -CK (r) HM (t) CK (c) 0/10 - no pain  -CK (r) HM (t) CK (c)      Row Name 07/24/24 1401 07/24/24 0958       Plan of Care Review    Plan of Care Reviewed With patient;spouse  -CK (r) HM (t) CK (c) patient  -CK (r) HM (t) CK (c)    Progress improving  -CK (r) HM (t) CK (c) improving  -CK (r) HM (t) CK (c)    Outcome Evaluation Pt able to increase  ambulation this session to 140' with CGA and FWW. Pt with good effort and less asssistance needed for bed mobility and ther-ex this session. Pt continues to present below baseline with deficits in strength, pain, activity tolerance, and balance with further IPPT warranted. PT recommended DC to IRF.  -CK (r) HM (t) CK (c) Pt able to increase ambulation this session to 120' with CGA and platform walker. Pt continues to present with deficits in strength, balance, and activity tolerance. Further IPPT warranted during admission. PT recommended DC to IRF for best outcomes.  -CK (r) HM (t) CK (c)      Row Name 07/24/24 1401 07/24/24 0958       Vital Signs    Pre Systolic BP Rehab 110  -CK (r) HM (t) CK (c) 149  -CK (r) HM (t) CK (c)    Pre Treatment Diastolic BP 54  -CK (r) HM (t) CK (c) 67  -CK (r) HM (t) CK (c)    Pretreatment Heart Rate (beats/min) 73  -CK (r) HM (t) CK (c) 87  -CK (r) HM (t) CK (c)    Posttreatment Heart Rate (beats/min) 73  -CK (r) HM (t) CK (c) 88  -CK (r) HM (t) CK (c)    Pre SpO2 (%) 93  -CK (r) HM (t) CK (c) 97  -CK (r) HM (t) CK (c)    O2 Delivery Pre Treatment room air  -CK (r) HM (t) CK (c) nasal cannula  -CK (r) HM (t) CK (c)    Post SpO2 (%) 97  -CK (r) HM (t) CK (c) --    O2 Delivery Post Treatment room air  -CK (r) HM (t) CK (c) --    Pre Patient Position Supine  -CK (r) HM (t) CK (c) Supine  -CK (r) HM (t) CK (c)    Intra Patient Position Standing  -CK (r) HM (t) CK (c) Standing  -CK (r) HM (t) CK (c)    Post Patient Position Supine  -CK (r) HM (t) CK (c) Sitting  -CK (r) HM (t) CK (c)      Row Name 07/24/24 1401 07/24/24 0958       Positioning and Restraints    Pre-Treatment Position in bed  -CK (r) HM (t) CK (c) in bed  -CK (r) HM (t) CK (c)    Post Treatment Position bed  -CK (r) HM (t) CK (c) bsc  -CK (r) HM (t) CK (c)    In Bed notified nsg;supine;call light within reach;encouraged to call for assist;exit alarm on;with family/caregiver;SCD pump applied;legs elevated  -CK (r) HM (t) CK  (c) --    On BS commode -- notified nsg;sitting;call light within reach;encouraged to call for assist;LUE elevated  -CK (r) HM (t) CK (c)              User Key  (r) = Recorded By, (t) = Taken By, (c) = Cosigned By      Initials Name Provider Type    CK Bessie Vidal, PT Physical Therapist    HM Taryn Caba, PT Student PT Student                   Outcome Measures       Row Name 07/24/24 1404 07/24/24 1001       How much help from another person do you currently need...    Turning from your back to your side while in flat bed without using bedrails? 2  -CK (r) HM (t) CK (c) 2  -CK (r) HM (t) CK (c)    Moving from lying on back to sitting on the side of a flat bed without bedrails? 2  -CK (r) HM (t) CK (c) 2  -CK (r) HM (t) CK (c)    Moving to and from a bed to a chair (including a wheelchair)? 3  -CK (r) HM (t) CK (c) 2  -CK (r) HM (t) CK (c)    Standing up from a chair using your arms (e.g., wheelchair, bedside chair)? 3  -CK (r) HM (t) CK (c) 2  -CK (r) HM (t) CK (c)    Climbing 3-5 steps with a railing? 2  -CK (r) HM (t) CK (c) 1  -CK (r) HM (t) CK (c)    To walk in hospital room? 3  -CK (r) HM (t) CK (c) 2  -CK (r) HM (t) CK (c)    AM-PAC 6 Clicks Score (PT) 15  -CK (r) HM (t) 11  -CK (r) HM (t)    Highest Level of Mobility Goal 4 --> Transfer to chair/commode  -CK (r) HM (t) 4 --> Transfer to chair/commode  -CK (r) HM (t)      Row Name 07/24/24 1404 07/24/24 1319       Functional Assessment    Outcome Measure Options AM-PAC 6 Clicks Basic Mobility (PT)  -CK (r) HM (t) CK (c) AM-PAC 6 Clicks Daily Activity (OT)  -TIM      Row Name 07/24/24 1001          Functional Assessment    Outcome Measure Options AM-PAC 6 Clicks Basic Mobility (PT)  -CK (r) HM (t) CK (c)               User Key  (r) = Recorded By, (t) = Taken By, (c) = Cosigned By      Initials Name Provider Type    CK Bessie Vidal, PT Physical Therapist    KF Manju Mack, OT Occupational Therapist    HM Taryn Caba, PT Student PT  Student                                 Physical Therapy Education       Title: PT OT SLP Therapies (In Progress)       Topic: Physical Therapy (Done)       Point: Mobility training (Done)       Learning Progress Summary             Patient Acceptance, E, VU by  at 7/24/2024 1405    Acceptance, E, VU by  at 7/24/2024 1001    Comment: Pt educated on the importance of mobility during admission    Acceptance, E, VU by CK at 7/23/2024 1625    Acceptance, E, VU by  at 7/23/2024 1010    Comment: Pt educated on importance of sitting during admission    Acceptance, E, VU by HM at 7/22/2024 1659    Acceptance, E, VU by  at 7/22/2024 1317    Comment: Pt educated on importance of ambulation during admission    Acceptance, E,D, VU,NR by AB at 7/21/2024 1539    Acceptance, E,D,H, VU,NR by AB at 7/21/2024 1024   Family Acceptance, E, VU by CK at 7/23/2024 1625    Acceptance, E, VU by  at 7/22/2024 1659    Acceptance, E, VU by  at 7/22/2024 1317    Comment: Pt educated on importance of ambulation during admission                         Point: Home exercise program (Done)       Learning Progress Summary             Patient Acceptance, E, VU by  at 7/24/2024 1405    Acceptance, E, VU by  at 7/23/2024 1010    Comment: Pt educated on importance of sitting during admission    Acceptance, E, VU by HM at 7/22/2024 1659    Acceptance, E, VU by  at 7/22/2024 1317    Comment: Pt educated on importance of ambulation during admission    Acceptance, E,D, VU,NR by AB at 7/21/2024 1539    Acceptance, E,D,H, VU,NR by AB at 7/21/2024 1024   Family Acceptance, E, VU by  at 7/22/2024 1659    Acceptance, E, VU by  at 7/22/2024 1317    Comment: Pt educated on importance of ambulation during admission                         Point: Body mechanics (Done)       Learning Progress Summary             Patient Acceptance, E, VU by  at 7/24/2024 1405    Acceptance, E, VU by  at 7/24/2024 1001    Comment: Pt educated on the  importance of mobility during admission    Acceptance, E, VU by CK at 7/23/2024 1625    Acceptance, E, VU by  at 7/23/2024 1010    Comment: Pt educated on importance of sitting during admission    Acceptance, E, VU by  at 7/22/2024 1659    Acceptance, E, VU by  at 7/22/2024 1317    Comment: Pt educated on importance of ambulation during admission    Acceptance, E,D, VU,NR by AB at 7/21/2024 1539    Acceptance, E,D,H, VU,NR by AB at 7/21/2024 1024   Family Acceptance, E, VU by CK at 7/23/2024 1625    Acceptance, E, VU by  at 7/22/2024 1659    Acceptance, E, VU by  at 7/22/2024 1317    Comment: Pt educated on importance of ambulation during admission                         Point: Precautions (Done)       Learning Progress Summary             Patient Acceptance, E, VU by  at 7/24/2024 1405    Acceptance, E, VU by  at 7/24/2024 1001    Comment: Pt educated on the importance of mobility during admission    Acceptance, E, VU by CK at 7/23/2024 1625    Acceptance, E, VU by  at 7/23/2024 1010    Comment: Pt educated on importance of sitting during admission    Acceptance, E, VU by  at 7/22/2024 1659    Acceptance, E, VU by  at 7/22/2024 1317    Comment: Pt educated on importance of ambulation during admission    Acceptance, E,D, VU,NR by AB at 7/21/2024 1539    Acceptance, E,D,H, VU,NR by AB at 7/21/2024 1024   Family Acceptance, E, VU by CK at 7/23/2024 1625    Acceptance, E, VU by  at 7/22/2024 1659    Acceptance, E, VU by  at 7/22/2024 1317    Comment: Pt educated on importance of ambulation during admission                                         User Key       Initials Effective Dates Name Provider Type Discipline    AB 09/22/22 -  Ronda Thomas, PT Physical Therapist PT    CK 02/06/24 -  Bessie Vidal, PT Physical Therapist PT     05/07/24 -  Taryn Caba, PT Student PT Student PT                  PT Recommendation and Plan     Plan of Care Reviewed With: patient,  spouse  Progress: improving  Outcome Evaluation: Pt able to increase ambulation this session to 140' with CGA and FWW. Pt with good effort and less asssistance needed for bed mobility and ther-ex this session. Pt continues to present below baseline with deficits in strength, pain, activity tolerance, and balance with further IPPT warranted. PT recommended DC to IRF.     Time Calculation:         PT Charges       Row Name 07/24/24 1310 07/24/24 0842          Time Calculation    Start Time 1310  -CK (r) HM (t) CK (c) 0842  -CK (r) HM (t) CK (c)     PT Received On 07/24/24  -CK (r) HM (t) CK (c) 07/24/24  -CK (r) HM (t) CK (c)     PT Goal Re-Cert Due Date 07/31/24  -CK (r) HM (t) CK (c) 07/31/24  -CK (r) HM (t) CK (c)        Timed Charges    25780 - PT Therapeutic Exercise Minutes 15  -CK (r) HM (t) CK (c) --     93941 - Gait Training Minutes  10  -CK (r) HM (t) CK (c) 15  -CK (r) HM (t) CK (c)     46057 - PT Therapeutic Activity Minutes 13  -CK (r) HM (t) CK (c) 17  -CK (r) HM (t) CK (c)        Total Minutes    Timed Charges Total Minutes 38  -CK (r) HM (t) 32  -CK (r) HM (t)      Total Minutes 38  -CK (r) HM (t) 32  -CK (r) HM (t)               User Key  (r) = Recorded By, (t) = Taken By, (c) = Cosigned By      Initials Name Provider Type    CK Bessie Vidal, PT Physical Therapist    HM Taryn Caba, PT Student PT Student                  Therapy Charges for Today       Code Description Service Date Service Provider Modifiers Qty    95576964318  PT THER PROC EA 15 MIN 7/23/2024 Taryn Caba, PT Student GP 1    [id number]  GAIT TRAINING EA 15 MIN 7/23/2024 Taryn Caba, PT Student GP 1    [id number]  PT THERAPEUTIC ACT EA 15 MIN 7/23/2024 Taryn Caba, PT Student GP 1    [id number]  PT THER SUPP EA 15 MIN 7/23/2024 Taryn Caba, PT Student GP 2    [id number]  GAIT TRAINING EA 15 MIN 7/24/2024 Taryn Caba, PT Student GP 1    [id number]  PT THERAPEUTIC ACT EA 15 MIN  7/24/2024 Taryn Caba, PT Student GP 1    [id number] HC PT THER PROC EA 15 MIN 7/24/2024 Taryn Caba, PT Student GP 1    [id number] HC GAIT TRAINING EA 15 MIN 7/24/2024 Taryn Caba, PT Student GP 1    [id number] HC PT THERAPEUTIC ACT EA 15 MIN 7/24/2024 Taryn Caba, PT Student GP 1            PT G-Codes  Outcome Measure Options: AM-PAC 6 Clicks Basic Mobility (PT)  AM-PAC 6 Clicks Score (PT): 15  AM-PAC 6 Clicks Score (OT): 14  PT Discharge Summary  Anticipated Discharge Disposition (PT): inpatient rehabilitation facility    Taryn Caba PT Student  7/24/2024

## 2024-07-24 NOTE — PLAN OF CARE
Goal Outcome Evaluation:  Plan of Care Reviewed With: patient        Progress: improving     The patient slept well throughout the night. VSS on 2 liters nasal cannula. NSR on telemetry monitor. Voiding spontaneously via purewick. Pain controlled well with PRN Norco as ordered, infublock block and ice. Left arm sling in place. Ace wrap on left arm and wrist is CDI. Optifoam dressing to left hip is CDI.

## 2024-07-24 NOTE — PLAN OF CARE
Goal Outcome Evaluation:  Plan of Care Reviewed With: patient        Progress: no change  Outcome Evaluation: Pt with good participation during OT session with encouragement provided. Pt performed STS x1 from the chair with mod/maxA x1, requiring increased time for anterior weight shifting. Pt performed sink side grooming ADLs with Edward. Pt declined ambulation at this time due to fatigue. The pt remains well below her functional baseline with WBing restrictions at the L wrist, acute pain, generalized weakness, decreased activity tolerance, and balance deficits warranting continued IP OT services. Continue to recommend a d/c to IRF for best outcome.      Anticipated Discharge Disposition (OT): inpatient rehabilitation facility

## 2024-07-24 NOTE — PROGRESS NOTES
"          Orthopaedic Surgery Progress Note      LOS: 4 days   Patient Care Team:  Radha Gregg APRN as PCP - General (Nurse Practitioner)  Gloria Santoro DO as Consulting Physician (Endocrinology)    POD 4    Subjective     Interval History:   Patient doing well this morning.  In a recliner.   at the bedside.  Denies chest pain or shortness of breath.  Ready to go to [facility] today.    Objective     Vital Signs:  Temp (24hrs), Av.9 °F (37.2 °C), Min:98.3 °F (36.8 °C), Max:100.6 °F (38.1 °C)    /67 (BP Location: Right arm, Patient Position: Lying)   Pulse 85   Temp 98.6 °F (37 °C) (Oral)   Resp 16   Ht 172.7 cm (68\")   Wt 78.2 kg (172 lb 6.4 oz)   LMP  (LMP Unknown)   SpO2 96%   BMI 26.21 kg/m²     Labs:  Lab Results (last 24 hours)       Procedure Component Value Units Date/Time    POC Glucose Once [[id number]]  (Abnormal) Collected: 24 0745    Specimen: Blood Updated: 24 0747     Glucose 165 mg/dL     POC Glucose Once [[id number]]  (Abnormal) Collected: 24    Specimen: Blood Updated: 24     Glucose 136 mg/dL     POC Glucose Once [[id number]]  (Abnormal) Collected: 24 1610    Specimen: Blood Updated: 24 1611     Glucose 172 mg/dL     POC Glucose Once [[id number]]  (Abnormal) Collected: 24 1102    Specimen: Blood Updated: 24 1103     Glucose 142 mg/dL             Physical Exam:  Digits are pink and warm with brisk capillary refill of less than 3 seconds.  EPL is intact.  EDC is intact.  Grossly intact sensation to light touch in the median, radial, and ulnar nerve distributions.    Assessment & Plan   Postop day #4 status post ORIF comminuted left distal radius fracture, grade 1 open, and anterior left CHEYENNE for femoral neck fracture    --Patient to be transferred to [facility]  -- Weight-bear as tolerated left upper extremity through elbow, weight-bear as tolerated left lower extremity  -- Baby aspirin twice daily for DVT " prophylaxis  -- Patient will need follow-up with me 8/1/2024 for wound check and suture removal and x-rays.  If everything looks okay, plan on short arm cast for 2 to 3 weeks.  Once there is radiographic signs of appropriate healing, we can put her into a removable bracing and begin occupational hand therapy at [facility].      Devan Modi MD  07/24/24  10:59 EDT

## 2024-07-24 NOTE — PLAN OF CARE
Goal Outcome Evaluation:  Plan of Care Reviewed With: (P) patient        Progress: (P) improving  Outcome Evaluation: (P) Pt able to increase ambulation this session to 120' with CGA and platform walker. Pt continues to present with deficits in strength, balance, and activity tolerance. Further IPPT warranted during admission. PT recommended DC to IRF for best outcomes.      Anticipated Discharge Disposition (PT): (P) inpatient rehabilitation facility

## 2024-07-24 NOTE — CASE MANAGEMENT/SOCIAL WORK
Case Management Discharge Note      Final Note: Patient has been accepted and insurance has been approved for patient to go to skilled rehab at [facility] per liaison on Thursday, 7/25, if patient is medically ready. RN to call report to SRU @ [phone]. [facility] @ 11:30am. Patient will need to be at the [address] by 11:15am for . Patient and spouse both agreeable with this DC plan to [facility] for skilled rehab on 7/25.    Provided Post Acute Provider List?: Yes  Post Acute Provider List: Inpatient Rehab  Provided Post Acute Provider Quality & Resource List?: Yes  Post Acute Provider Quality and Resource List: Inpatient Rehab  Delivered To: Patient, Support Person  Support Person: daughter  Method of Delivery: In person    Selected Continued Care - Admitted Since 7/19/2024       Destination Coordination complete.      Service Provider Selected Services Address Phone Fax Patient Preferred    [facility] [address] [address] [phone] --              Durable Medical Equipment    No services have been selected for the patient.                Dialysis/Infusion    No services have been selected for the patient.                Home Medical Care    No services have been selected for the patient.                Therapy    No services have been selected for the patient.                Community Resources    No services have been selected for the patient.                Community & DME    No services have been selected for the patient.                         Final Discharge Disposition Code: 03 - skilled nursing facility (SNF)

## 2024-07-24 NOTE — PLAN OF CARE
Problem: Adult Inpatient Plan of Care  Goal: Absence of Hospital-Acquired Illness or Injury  Outcome: Ongoing, Progressing  Intervention: Identify and Manage Fall Risk  Recent Flowsheet Documentation  Taken 7/24/2024 1404 by Prakash Self RN  Safety Promotion/Fall Prevention:   activity supervised   assistive device/personal items within reach   fall prevention program maintained   safety round/check completed  Taken 7/24/2024 1200 by Prakash Self RN  Safety Promotion/Fall Prevention:   activity supervised   assistive device/personal items within reach   fall prevention program maintained   safety round/check completed  Taken 7/24/2024 1001 by Prakash Self RN  Safety Promotion/Fall Prevention:   activity supervised   assistive device/personal items within reach   fall prevention program maintained   safety round/check completed  Taken 7/24/2024 0800 by Prakash Self RN  Safety Promotion/Fall Prevention:   activity supervised   assistive device/personal items within reach   fall prevention program maintained   safety round/check completed  Intervention: Prevent Skin Injury  Recent Flowsheet Documentation  Taken 7/24/2024 1404 by Prakash Self RN  Body Position:   turned   right  Taken 7/24/2024 1200 by Prakash Self RN  Body Position: legs elevated  Taken 7/24/2024 1001 by Prakash Self RN  Body Position: legs elevated  Taken 7/24/2024 0800 by Prakash Self RN  Body Position:   sitting up in bed   legs elevated  Skin Protection:   adhesive use limited   transparent dressing maintained   tubing/devices free from skin contact  Intervention: Prevent and Manage VTE (Venous Thromboembolism) Risk  Recent Flowsheet Documentation  Taken 7/24/2024 1404 by Prakash Self RN  Activity Management: back to bed  Taken 7/24/2024 1200 by Prakash Self RN  Activity Management: up in chair  Taken 7/24/2024 1001 by Prakash Self RN  Activity Management: activity encouraged  Taken 7/24/2024  0800 by Prakash Self RN  Activity Management: activity encouraged  VTE Prevention/Management:   bilateral   sequential compression devices on  Range of Motion: active ROM (range of motion) encouraged  Intervention: Prevent Infection  Recent Flowsheet Documentation  Taken 7/24/2024 1404 by Prakash Self RN  Infection Prevention: environmental surveillance performed  Taken 7/24/2024 1200 by Prakash Self RN  Infection Prevention: environmental surveillance performed  Taken 7/24/2024 1001 by Prakash Self RN  Infection Prevention: environmental surveillance performed  Taken 7/24/2024 0800 by Prakash Self RN  Infection Prevention: environmental surveillance performed  Goal: Optimal Comfort and Wellbeing  Outcome: Ongoing, Progressing  Intervention: Monitor Pain and Promote Comfort  Recent Flowsheet Documentation  Taken 7/24/2024 0844 by Prakash Self RN  Pain Management Interventions:   position adjusted   see MAR  Intervention: Provide Person-Centered Care  Recent Flowsheet Documentation  Taken 7/24/2024 0800 by Prakash Self RN  Trust Relationship/Rapport:   care explained   choices provided   emotional support provided     Problem: Diabetes Comorbidity  Goal: Blood Glucose Level Within Targeted Range  Outcome: Ongoing, Progressing  Intervention: Monitor and Manage Glycemia  Recent Flowsheet Documentation  Taken 7/24/2024 0800 by Prakash Self RN  Glycemic Management: blood glucose monitored     Problem: Fall Injury Risk  Goal: Absence of Fall and Fall-Related Injury  Outcome: Ongoing, Progressing  Intervention: Identify and Manage Contributors  Recent Flowsheet Documentation  Taken 7/24/2024 1001 by Prakash Self RN  Medication Review/Management: medications reviewed  Taken 7/24/2024 0800 by Prakash Self RN  Medication Review/Management: medications reviewed  Intervention: Promote Injury-Free Environment  Recent Flowsheet Documentation  Taken 7/24/2024 1404 by Prakash Self  PEARL, RN  Safety Promotion/Fall Prevention:   activity supervised   assistive device/personal items within reach   fall prevention program maintained   safety round/check completed  Taken 7/24/2024 1200 by Prakash Self RN  Safety Promotion/Fall Prevention:   activity supervised   assistive device/personal items within reach   fall prevention program maintained   safety round/check completed  Taken 7/24/2024 1001 by Prakash Self RN  Safety Promotion/Fall Prevention:   activity supervised   assistive device/personal items within [facility]   fall prevention program maintained   safety round/check completed  Taken 7/24/2024 0800 by Prakash Self, RN  Safety Promotion/Fall Prevention:   activity supervised   assistive device/personal items within [facility]   fall prevention program maintained   safety round/check completed   Goal Outcome Evaluation:

## 2024-07-24 NOTE — PROGRESS NOTES
[facility]    Acute pain service Inpatient Progress Note    Patient Name: Pari Howell  :  1950  MRN:  [id number]        Acute Pain  Service Inpatient Progress Note:    Analgesia:Good  Pain Score:1/10  LOC: alert and awake  Resp Status: room air  Cardiac: VS stable  Side Effects:None  Catheter Site:clean, dressing intact and dry  Cath type: peripheral nerve cath(InfuSystem)  Infusion rate: Ext/Pop: Basal: 1ml/hr, PIB: 5ml q 2 h, PCA: 5 ml q 30 min (1mL,5ml, 5ml InfuSystem Pump)  Catheter Plan:Catheter to remain Insitu and Continue catheter infusion rate unchanged  Comments: The neuro assessment of the operative extremity includes the ability to do finger flexion and extension; includes the ability to do wrist flexion and extension, includes the ability to do elbow flexion and extension.  The neuro exam of the patient includes sensory function throughout the operative extremity.

## 2024-07-24 NOTE — PLAN OF CARE
Goal Outcome Evaluation:  Plan of Care Reviewed With: patient, spouse        Progress: improving  Outcome Evaluation: Pt able to increase ambulation this session to 140' with CGA and FWW. Pt with good effort and less asssistance needed for bed mobility and ther-ex this session. Pt continues to present below baseline with deficits in strength, pain, activity tolerance, and balance with further IPPT warranted. PT recommended DC to IRF.      Anticipated Discharge Disposition (PT): inpatient rehabilitation facility

## 2024-07-24 NOTE — PROGRESS NOTES
[facility] Medicine Services  PROGRESS NOTE    Patient Name: Pari Howell  : 1950  MRN: [id number]    Date of Admission: 2024  Primary Care Physician: Radha Gregg APRN    Subjective   Subjective     CC:  Fall     HPI:  Patient resting in bed. About to work with PT. No changes overnight.      Objective   Objective     Vital Signs:   Temp:  [98.3 °F (36.8 °C)-100.6 °F (38.1 °C)] 98.6 °F (37 °C)  Heart Rate:  [75-89] 85  Resp:  [16] 16  BP: (128-149)/(58-67) 149/67  Flow (L/min):  [2] 2     Physical Exam:  Constitutional: No acute distress, awake, alert  HENT: NCAT, mucous membranes moist  Respiratory: Clear to auscultation bilaterally, respiratory effort normal   Cardiovascular: RRR, no murmurs, rubs, or gallops  Gastrointestinal: Positive bowel sounds, soft, nontender, nondistended  Musculoskeletal: Left arm in sling  Psychiatric: Appropriate affect, cooperative  Neurologic: Oriented x 3, no focal deficits  Skin: No rashes    No change from     Results Reviewed:  LAB RESULTS:      Lab 24  1505 24  0335   WBC 11.96* 15.47*   HEMOGLOBIN 11.2* 14.5   HEMATOCRIT 32.2* 42.6   PLATELETS 192 269   NEUTROS ABS 7.96* 13.31*   IMMATURE GRANS (ABS) 0.09* 0.09*   LYMPHS ABS 1.85 1.16   MONOS ABS 1.05* 0.83   EOS ABS 0.93* 0.04   MCV 88.7 88.0         Lab 24  0923 24  0335 24  0044   SODIUM 139 137 136   POTASSIUM 4.7 3.9 4.3   CHLORIDE 105 100 101   CO2 19.0* 24.0 23.0   ANION GAP 15.0 13.0 12.0   BUN 11 17 18   CREATININE 0.66 0.73 0.73   EGFR 92.2 86.4 86.4   GLUCOSE 175* 167* 183*   CALCIUM 8.9 9.4 9.0   PHOSPHORUS 2.8  --   --    HEMOGLOBIN A1C  --  6.00*  --          Lab 24  0923 24  0044   TOTAL PROTEIN  --  7.2   ALBUMIN 3.5 4.2   GLOBULIN  --  3.0   ALT (SGPT)  --  26   AST (SGOT)  --  28   BILIRUBIN  --  0.4   ALK PHOS  --  64                 Lab 24  0429 24  0335   ABO TYPING A A   RH TYPING Positive Positive    ANTIBODY SCREEN  --  Negative         Brief Urine Lab Results  (Last result in the past 365 days)        Color   Clarity   Blood   Leuk Est   Nitrite   Protein   CREAT   Urine HCG        04/01/24 0815             135.4                 Microbiology Results Abnormal       None            No radiology results from the last 24 hrs        Current medications:  Scheduled Meds:Pharmacy Consult, , Does not apply, Daily  amLODIPine, 10 mg, Oral, Daily  aspirin, 81 mg, Oral, Q12H  cetirizine, 5 mg, Oral, Daily  insulin lispro, 2-7 Units, Subcutaneous, 4x Daily AC & at Bedtime  latanoprost, 1 drop, Both Eyes, Daily  levothyroxine, 75 mcg, Oral, Q AM  losartan, 50 mg, Oral, Q24H  pantoprazole, 40 mg, Oral, Daily  pravastatin, 40 mg, Oral, Q PM  sodium chloride, 10 mL, Intravenous, Q12H  sodium chloride, 3 mL, Intravenous, Q12H  timolol, 1 drop, Both Eyes, BID      Continuous Infusions:ropivacaine,   sodium chloride 0.9 % with KCl 20 mEq, 50 mL/hr, Last Rate: 50 mL/hr (07/21/24 2143)      PRN Meds:.  senna-docusate sodium **AND** polyethylene glycol **AND** bisacodyl **AND** bisacodyl    Calcium Replacement - Follow Nurse / BPA Driven Protocol    Pharmacy Consult    dextrose    dextrose    docusate sodium    glucagon (human recombinant)    HYDROcodone-acetaminophen    HYDROmorphone    Magnesium Standard Dose Replacement - Follow Nurse / BPA Driven Protocol    Morphine **AND** naloxone    nitroglycerin    ondansetron ODT **OR** ondansetron    Phosphorus Replacement - Follow Nurse / BPA Driven Protocol    Potassium Replacement - Follow Nurse / BPA Driven Protocol    simethicone    [COMPLETED] Insert Peripheral IV **AND** sodium chloride    sodium chloride    sodium chloride    sodium chloride    sodium chloride    Assessment & Plan   Assessment & Plan     Active Hospital Problems    Diagnosis  POA    **Femur fracture [S72.90XA]  Yes    T2DM (type 2 diabetes mellitus) [E11.9]  Yes    HTN (hypertension) [I10]  Yes    Radial  fracture [S52.90XA]  Yes    Left wrist fracture, open, initial encounter [S62.102B]  Unknown    Hypothyroidism (acquired) [E03.9]  Yes    Hyperlipidemia LDL goal <70 [E78.5]  Yes      Resolved Hospital Problems   No resolved problems to display.        Brief Hospital Course to date:  Pari Howell is a 74 y.o. female  with hx of HTN, HLD, hypothyroidism, T2DM, IBS who presented to the ED w/ c/o a fall.  Imaging shows left femur fracture and left distal radius fracture.  She underwent repair of left wrist and left hip on 7/20.     Left femur fracture   Left distal radius fracture   -2/2 mechanical fall   -CT pelvis impacted subcapital fracture of the proximal left femur   - CT LUE shows comminuted fracture of the distal radius with displacement  -PRN pain medications  -s/p ORIF distal left radius and anterior CHEYENNE with Dr. Evans/Lane on 7/20  - baby ASA BID for DVT ppx  - will need f/u with Dr. Sherman 2 weeks post-discharge for wound check/suture removal and placement in short arm case     T2DM  -hem A1c 6.0  -on Semaglutide   -Continue SSI     Neuropathy  -uses cream from compound pharmacy to help     HTN   HLD   -continue routine Norvasc  -on losartan-HCTZ; losartan continued at half dose, HCTZ held for now   -continued statin     Hypothyroidism   -continue synthroid     Expected Discharge Location and Transportation:  pending  Expected Discharge   Expected Discharge Date: 7/25/2024; Expected Discharge Time:      VTE Prophylaxis:  Mechanical VTE prophylaxis orders are present.         AM-PAC 6 Clicks Score (PT): 11 (07/24/24 1001)    CODE STATUS:   Code Status and Medical Interventions:   Ordered at: 07/20/24 0888     Level Of Support Discussed With:    Patient     Code Status (Patient has no pulse and is not breathing):    CPR (Attempt to Resuscitate)     Medical Interventions (Patient has pulse or is breathing):    Full Support       Jessi Harevy, DO  07/24/24

## 2024-07-24 NOTE — THERAPY TREATMENT NOTE
Patient Name: aPri Howell  : 1950    MRN: [id number]                              Today's Date: 2024       Admit Date: 2024    Visit Dx:     ICD-10-CM ICD-9-CM   1. Subcapital fracture of hip, left, closed, initial encounter  S72.012A 820.09   2. Left wrist fracture, open, initial encounter  S62.102B 814.10     Patient Active Problem List   Diagnosis    Type 2 diabetes mellitus without complication, without long-term current use of insulin    Other specified glaucoma    Hyperlipidemia LDL goal <70    Vitamin D deficiency    Irritable bowel syndrome with diarrhea    Hypothyroidism (acquired)    Acute respiratory insufficiency    Hip fracture    Femur fracture    T2DM (type 2 diabetes mellitus)    HTN (hypertension)    Radial fracture    Left wrist fracture, open, initial encounter     Past Medical History:   Diagnosis Date    Bladder infection     Dyspareunia, female     Glaucoma     H/O sexual problem     High cholesterol     History of abnormal cervical Pap smear     Hypertension     Hypertension     Hypothyroidism     Impaired functional mobility, balance, gait, and endurance     Labial cyst     Muscle weakness     Type 2 diabetes mellitus     Urinary incontinence     Vaginal itching     Yeast infection      Past Surgical History:   Procedure Laterality Date    CARPAL TUNNEL RELEASE      CATARACT EXTRACTION Right 2022    CATARACT EXTRACTION, BILATERAL Left     CHOLECYSTECTOMY  1991    EYE SURGERY      HIP FRACTURE SURGERY Right     HYSTERECTOMY      OOPHORECTOMY      TOTAL HIP ARTHROPLASTY Right 2024    Procedure: TOTAL HIP ARTHROPLASTY ANTERIOR RIGHT;  Surgeon: Doni Evans MD;  Location: [facility] OR;  Service: Orthopedics;  Laterality: Right;    TOTAL HIP ARTHROPLASTY Left 2024    Procedure: TOTAL HIP ARTHROPLASTY ANTERIOR LEFT;  Surgeon: Doni Evans MD;  Location:  [facility] OR;  Service: Orthopedics;  Laterality: Left;    TUBAL ABDOMINAL LIGATION  1979     WRIST FRACTURE SURGERY Left 7/20/2024    Procedure: OPEN REDUCTION INTERNAL FIXATION DISTAL RADIUS - LEFT;  Surgeon: Devan Modi MD;  Location: [facility];  Service: Orthopedics;  Laterality: Left;      General Information       Row Name 07/24/24 1312          OT Time and Intention    Document Type therapy note (daily note)  -KF     Mode of Treatment occupational therapy;individual therapy  -KF       Row Name 07/24/24 1312          General Information    Patient Profile Reviewed yes  -KF     Existing Precautions/Restrictions fall;left;hip, anterior;other (see comments)  s/p L [place]- WBAT LLE; s/p L wrist ORIF in simple sling- NWB L wrist and WBAT L elbow , infra clavicular nerve cath  -KF     Barriers to Rehab medically complex;previous functional deficit;physical barrier  -       Row Name 07/24/24 1312          Cognition    Orientation Status (Cognition) oriented x 3  -       Row Name 07/24/24 1312          Safety Issues, Functional Mobility    Safety Issues Affecting Function (Mobility) awareness of need for assistance;insight into deficits/self-awareness;positioning of assistive device;judgment;problem-solving;safety precaution awareness;safety precautions follow-through/compliance;sequencing abilities  -KF     Impairments Affecting Function (Mobility) balance;endurance/activity tolerance;strength;grasp;pain;range of motion (ROM);sensation/sensory awareness  -KF               User Key  (r) = Recorded By, (t) = Taken By, (c) = Cosigned By      Initials Name Provider Type    KF Manju Mack OT Occupational Therapist                     Mobility/ADL's       Row Name 07/24/24 1314          Bed Mobility    Comment, (Bed Mobility) Pt found and left up in chair.  -       Row Name 07/24/24 1314          Transfers    Transfers sit-stand transfer;stand-sit transfer  -     Comment, (Transfers) Verbal cues for hand placement and upright posture  -KF       Row Name 07/24/24 1314          Sit-Stand  Transfer    Sit-Stand Burt (Transfers) moderate assist (50% patient effort);maximum assist (25% patient effort);1 person assist;verbal cues;nonverbal cues (demo/gesture)  -     Assistive Device (Sit-Stand Transfers) walker, rolling platform  -     Comment, (Sit-Stand Transfer) STS x1 from chair while sink side  -       Row Name 07/24/24 1314          Stand-Sit Transfer    Stand-Sit Burt (Transfers) moderate assist (50% patient effort);1 person assist;verbal cues;nonverbal cues (demo/gesture)  -     Assistive Device (Stand-Sit Transfers) walker, rolling platform  -       Row Name 07/24/24 1314          Functional Mobility    Functional Mobility- Comment Pt deferred additional mobility due to fatigue.  -[facility] Name 07/24/24 1314          Activities of Daily Living    BADL Assessment/Intervention upper body dressing;grooming;lower body dressing  -[facility] Name 07/24/24 1314          Mobility    Extremity Weight-bearing Status left lower extremity;left upper extremity  -     Left Upper Extremity (Weight-bearing Status) other (see comments)  WBAT through elbow, NWB at wrist  -     Left Lower Extremity (Weight-bearing Status) weight-bearing as tolerated (WBAT)  -       Row Name 07/24/24 1314          Upper Body Dressing Assessment/Training    Burt Level (Upper Body Dressing) don;doff;moderate assist (50% patient effort)  -     Position (Upper Body Dressing) supported sitting  -     Comment, (Upper Body Dressing) Pt educated on sling management/wear and care. Pt verbalized and demonstrated understanding.  -       Row Name 07/24/24 1314          Lower Body Dressing Assessment/Training    Burt Level (Lower Body Dressing) don;doff;shoes/slippers;dependent (less than 25% patient effort)  -     Position (Lower Body Dressing) supported sitting  -[facility] Name 07/24/24 1314          Grooming Assessment/Training    Burt Level (Grooming) oral care  regimen;wash face, hands;minimum assist (75% patient effort)  -KF     Oral Care teeth brushed - regular toothbrush  -KF     Position (Grooming) sink side;supported sitting  -KF               User Key  (r) = Recorded By, (t) = Taken By, (c) = Cosigned By      Initials Name Provider Type    KF Manju Mack OT Occupational Therapist                   Obj/Interventions       Row Name 07/24/24 1316          Balance    Balance Assessment sitting static balance;sitting dynamic balance;sit to stand dynamic balance;standing static balance;standing dynamic balance  -KF     Static Sitting Balance contact guard  -KF     Dynamic Sitting Balance minimal assist  -KF     Position, Sitting Balance unsupported;sitting in chair  -KF     Sit to Stand Dynamic Balance moderate assist;maximum assist;1-person assist;verbal cues  -KF     Static Standing Balance minimal assist;1-person assist  -KF     Dynamic Standing Balance minimal assist;1-person assist  -KF     Position/Device Used, Standing Balance supported;walker, platform  -KF     Balance Interventions sitting;standing;sit to stand;supported;static;dynamic;occupation based/functional task  -KF     Comment, Balance Slight posterior lean noted throughout standing  -KF               User Key  (r) = Recorded By, (t) = Taken By, (c) = Cosigned By      Initials Name Provider Type    Manju Willis, JANIS Occupational Therapist                   Goals/Plan    No documentation.                  Clinical Impression       Row Name 07/24/24 1317          Pain Assessment    Pretreatment Pain Rating 0/10 - no pain  -KF     Posttreatment Pain Rating 0/10 - no pain  -KF     Pain Intervention(s) Repositioned;Ambulation/increased activity  -KF       Row Name 07/24/24 1317          Plan of Care Review    Plan of Care Reviewed With patient  -KF     Progress no change  -KF     Outcome Evaluation Pt with good participation during OT session with encouragement provided. Pt performed STS x1 from the  chair with mod/maxA x1, requiring increased time for anterior weight shifting. Pt performed sink side grooming ADLs with Edward. Pt declined ambulation at this time due to fatigue. The pt remains well below her functional baseline with WBing restrictions at the L wrist, acute pain, generalized weakness, decreased activity tolerance, and balance deficits warranting continued IP OT services. Continue to recommend a d/c to IRF for best outcome.  -       Row Name 07/24/24 1317          Therapy Assessment/Plan (OT)    Rehab Potential (OT) good, to achieve stated therapy goals  -KF     Criteria for Skilled Therapeutic Interventions Met (OT) yes;skilled treatment is necessary  -KF     Therapy Frequency (OT) daily  -KF       Row Name 07/24/24 1317          Therapy Plan Review/Discharge Plan (OT)    Anticipated Discharge Disposition (OT) inpatient rehabilitation facility  -       Row Name 07/24/24 1317          Vital Signs    Pre Systolic BP Rehab 101  -KF     Pre Treatment Diastolic BP 59  -KF     Pretreatment Heart Rate (beats/min) 69  -KF     Pre SpO2 (%) 95  -KF     O2 Delivery Pre Treatment nasal cannula  -KF     Intra SpO2 (%) 94  -KF     O2 Delivery Intra Treatment room air  -KF     Pre Patient Position Sitting  -KF     Intra Patient Position Standing  -KF     Post Patient Position Sitting  -KF       Row Name 07/24/24 1317          Positioning and Restraints    Pre-Treatment Position sitting in chair/recliner  -KF     Post Treatment Position chair  -KF     In Chair notified nsg;reclined;call light within reach;encouraged to call for assist;exit alarm on;with family/caregiver;waffle cushion;with brace;legs elevated  pt declined SCDS  -KF               User Key  (r) = Recorded By, (t) = Taken By, (c) = Cosigned By      Initials Name Provider Type    Manju Willis, OT Occupational Therapist                   Outcome Measures       Row Name 07/24/24 8113          How much help from another is currently needed...     Putting on and taking off regular lower body clothing? 2  -KF     Bathing (including washing, rinsing, and drying) 2  -KF     Toileting (which includes using toilet bed pan or urinal) 2  -KF     Putting on and taking off regular upper body clothing 2  -KF     Taking care of personal grooming (such as brushing teeth) 3  -KF     Eating meals 3  -KF     AM-PAC 6 Clicks Score (OT) 14  -KF       Row Name 07/24/24 1001          How much help from another person do you currently need...    Turning from your back to your side while in flat bed without using bedrails? 2  -CK (r) HM (t) CK (c)     Moving from lying on back to sitting on the side of a flat bed without bedrails? 2  -CK (r) HM (t) CK (c)     Moving to and from a bed to a chair (including a wheelchair)? 2  -CK (r) HM (t) CK (c)     Standing up from a chair using your arms (e.g., wheelchair, bedside chair)? 2  -CK (r) HM (t) CK (c)     Climbing 3-5 steps with a railing? 1  -CK (r) HM (t) CK (c)     To walk in hospital room? 2  -CK (r) HM (t) CK (c)     AM-PAC 6 Clicks Score (PT) 11  -CK (r) HM (t)     Highest Level of Mobility Goal 4 --> Transfer to chair/commode  -CK (r) HM (t)       Row Name 07/24/24 1319 07/24/24 1001       Functional Assessment    Outcome Measure Options AM-PAC 6 Clicks Daily Activity (OT)  -KF AM-PAC 6 Clicks Basic Mobility (PT)  -CK (r) HM (t) CK (c)              User Key  (r) = Recorded By, (t) = Taken By, (c) = Cosigned By      Initials Name Provider Type    CK Bessie Vidal, PT Physical Therapist    KF Manju Mack, OT Occupational Therapist    HM Taryn Caba, PT Student PT Student                    Occupational Therapy Education       Title: PT OT SLP Therapies (In Progress)       Topic: Occupational Therapy (In Progress)       Point: ADL training (Done)       Description:   Instruct learner(s) on proper safety adaptation and remediation techniques during self care or transfers.   Instruct in proper use of assistive  devices.                  Learning Progress Summary             Patient Acceptance, E,TB, VU,DU by  at 7/24/2024 0948    Acceptance, E,TB, NR by  at 7/21/2024 0800                         Point: Home exercise program (Not Started)       Description:   Instruct learner(s) on appropriate technique for monitoring, assisting and/or progressing therapeutic exercises/activities.                  Learner Progress:  Not documented in this visit.              Point: Precautions (Done)       Description:   Instruct learner(s) on prescribed precautions during self-care and functional transfers.                  Learning Progress Summary             Patient Acceptance, E,TB, VU,DU by  at 7/24/2024 0948    Acceptance, E,TB, NR by  at 7/21/2024 0800                         Point: Body mechanics (Done)       Description:   Instruct learner(s) on proper positioning and spine alignment during self-care, functional mobility activities and/or exercises.                  Learning Progress Summary             Patient Acceptance, E,TB, VU,DU by  at 7/24/2024 0948    Acceptance, E,TB, NR by  at 7/21/2024 0800                                         User Key       Initials Effective Dates Name Provider Type Discipline     08/09/23 -  Manju Mack OT Occupational Therapist OT                  OT Recommendation and Plan  Planned Therapy Interventions (OT): activity tolerance training, adaptive equipment training, BADL retraining, functional balance retraining, occupation/activity based interventions, patient/caregiver education/training, ROM/therapeutic exercise, strengthening exercise, transfer/mobility retraining  Therapy Frequency (OT): daily  Plan of Care Review  Plan of Care Reviewed With: patient  Progress: no change  Outcome Evaluation: Pt with good participation during OT session with encouragement provided. Pt performed STS x1 from the chair with mod/maxA x1, requiring increased time for anterior weight shifting.  Pt performed sink side grooming ADLs with Edward. Pt declined ambulation at this time due to fatigue. The pt remains well below her functional baseline with WBing restrictions at the L wrist, acute pain, generalized weakness, decreased activity tolerance, and balance deficits warranting continued IP OT services. Continue to recommend a d/c to IRF for best outcome.     Time Calculation:   Evaluation Complexity (OT)  Review Occupational Profile/Medical/Therapy History Complexity: expanded/moderate complexity  Assessment, Occupational Performance/Identification of Deficit Complexity: 3-5 performance deficits  Clinical Decision Making Complexity (OT): detailed assessment/moderate complexity  Overall Complexity of Evaluation (OT): moderate complexity     Time Calculation- OT       Row Name 07/24/24 1320 07/24/24 0842          Time Calculation- OT    OT Start Time 0948 -KF --     OT Received On 07/24/24 -KF --     OT Goal Re-Cert Due Date 07/31/24 -KF --        Timed Charges    54999 - Gait Training Minutes  -- 15  -CK (r) HM (t) CK (c)     90424 - OT Therapeutic Activity Minutes 7 -KF --     74826 - OT Self Care/Mgmt Minutes 16 -KF --        Total Minutes    Timed Charges Total Minutes 23 -KF 15  -CK (r) HM (t)      Total Minutes 23  -KF 15  -CK (r) HM (t)               User Key  (r) = Recorded By, (t) = Taken By, (c) = Cosigned By      Initials Name Provider Type    CK Bessie Vidal, PT Physical Therapist    KF Manju Mack OT Occupational Therapist    HM Taryn Caba, PT Student PT Student                  Therapy Charges for Today       Code Description Service Date Service Provider Modifiers Qty    37139856967 HC OT THERAPEUTIC ACT EA 15 MIN 7/24/2024 Manju Mack OT GO 1    [id number] HC OT SELF CARE/MGMT/TRAIN EA 15 MIN 7/24/2024 Manju Mack OT GO 1                 Manju Mack OT  7/24/2024

## 2024-07-24 NOTE — THERAPY TREATMENT NOTE
Patient Name: Pari Howell  : 1950    MRN: [id number]                              Today's Date: 2024       Admit Date: 2024    Visit Dx:     ICD-10-CM ICD-9-CM   1. Subcapital fracture of hip, left, closed, initial encounter  S72.012A 820.09   2. Left wrist fracture, open, initial encounter  S62.102B 814.10     Patient Active Problem List   Diagnosis    Type 2 diabetes mellitus without complication, without long-term current use of insulin    Other specified glaucoma    Hyperlipidemia LDL goal <70    Vitamin D deficiency    Irritable bowel syndrome with diarrhea    Hypothyroidism (acquired)    Acute respiratory insufficiency    Hip fracture    Femur fracture    T2DM (type 2 diabetes mellitus)    HTN (hypertension)    Radial fracture    Left wrist fracture, open, initial encounter     Past Medical History:   Diagnosis Date    Bladder infection     Dyspareunia, female     Glaucoma     H/O sexual problem     High cholesterol     History of abnormal cervical Pap smear     Hypertension     Hypertension     Hypothyroidism     Impaired functional mobility, balance, gait, and endurance     Labial cyst     Muscle weakness     Type 2 diabetes mellitus     Urinary incontinence     Vaginal itching     Yeast infection      Past Surgical History:   Procedure Laterality Date    CARPAL TUNNEL RELEASE      CATARACT EXTRACTION Right 2022    CATARACT EXTRACTION, BILATERAL Left     CHOLECYSTECTOMY  1991    EYE SURGERY      HIP FRACTURE SURGERY Right     HYSTERECTOMY      OOPHORECTOMY      TOTAL HIP ARTHROPLASTY Right 2024    Procedure: TOTAL HIP ARTHROPLASTY ANTERIOR RIGHT;  Surgeon: Doni Evans MD;  Location: [facility] OR;  Service: Orthopedics;  Laterality: Right;    TOTAL HIP ARTHROPLASTY Left 2024    Procedure: TOTAL HIP ARTHROPLASTY ANTERIOR LEFT;  Surgeon: Doni Evans MD;  Location:  [facility] OR;  Service: Orthopedics;  Laterality: Left;    TUBAL ABDOMINAL LIGATION  1979     WRIST FRACTURE SURGERY Left 7/20/2024    Procedure: OPEN REDUCTION INTERNAL FIXATION DISTAL RADIUS - LEFT;  Surgeon: Devan Modi MD;  Location: [facility];  Service: Orthopedics;  Laterality: Left;      General Information       Row Name 07/24/24 0944          Physical Therapy Time and Intention    Document Type therapy note (daily note) (P)   -     Mode of Treatment individual therapy;physical therapy (P)   -       Row Name 07/24/24 0944          General Information    Patient Profile Reviewed yes (P)   -     Existing Precautions/Restrictions fall;left;hip, anterior;other (see comments) (P)   LLE s/p ant [place] WBAT; LUE s/p [place]IF WBAT through elbow, infra clavicular nerve cath  -     Barriers to Rehab medically complex;previous functional deficit;physical barrier (P)   -       Row Name 07/24/24 0944          Cognition    Orientation Status (Cognition) oriented x 3 (P)   -       Row Name 07/24/24 0944          Safety Issues, Functional Mobility    Safety Issues Affecting Function (Mobility) awareness of need for assistance;insight into deficits/self-awareness;safety precaution awareness;safety precautions follow-through/compliance;sequencing abilities;problem-solving (P)   -     Impairments Affecting Function (Mobility) balance;endurance/activity tolerance;strength;grasp;pain;range of motion (ROM);sensation/sensory awareness (P)   -     Comment, Safety Issues/Impairments (Mobility) Alert and following commands (P)   -               User Key  (r) = Recorded By, (t) = Taken By, (c) = Cosigned By      Initials Name Provider Type     Taryn Caba, PT Student PT Student                   Mobility       Row Name 07/24/24 0944          Bed Mobility    Bed Mobility supine-sit;scooting/bridging (P)   -     Scooting/Bridging [place] (Bed Mobility) 2 person assist;verbal cues;nonverbal cues (demo/gesture);maximum assist (25% patient effort) (P)   -     Supine-Sit [place] (Bed  Mobility) moderate assist (50% patient effort);2 person assist;verbal cues;nonverbal cues (demo/gesture) (P)   -HM     Assistive Device (Bed Mobility) head of bed elevated;draw sheet;leg  (P)   -HM     Comment, (Bed Mobility) Pt able to advance LLE to EOB this session utilizing leg  and RLE. Pt with good effort this session to rise without support at trunk but was unssuccessful required Mod A at trunk and hips. Pt continues to require Max A to scoot to EOB. (P)   -HM       Row Name 07/24/24 0944          Transfers    Comment, (Transfers) STS x 1 from EOB with VC for sequencing with poor retention seen from session to session. Pt continues to require assist placing LUE on platform walker (P)   -HM       Row Name 07/24/24 0944          Sit-Stand Transfer    Sit-Stand [place] (Transfers) 2 person assist;verbal cues;nonverbal cues (demo/gesture);moderate assist (50% patient effort) (P)   -HM     Assistive Device (Sit-Stand Transfers) walker, rolling platform (P)   -HM     Comment, (Sit-Stand Transfer) VC for hand placement and sequencing as well as coming up in a midline position secondary to large lean to right . (P)   -HM       Row Name 07/24/24 0944          Gait/Stairs (Locomotion)    [place] Level (Gait) contact guard;verbal cues;1 person to manage equipment (P)   -HM     Assistive Device (Gait) walker, rolling platform (P)   -HM     Distance in Feet (Gait) 120 (P)   -HM     Deviations/Abnormal Patterns (Gait) bilateral deviations;lela decreased;gait speed decreased;stride length decreased;base of support, narrow;antalgic (P)   -HM     Bilateral Gait Deviations forward flexed posture;heel strike decreased (P)   -HM     Left Sided Gait Deviations weight shift ability decreased (P)   -HM     [place] Level (Stairs) unable to assess (P)   -HM     Comment, (Gait/Stairs) Pt ambulated with a step through gait pattern with several instances of resting with L knee in a flex position to avoid  LLE weightbearing in standing. Pt with better ability to manage AD this session but still continues to ambulate with a slow gait speed. No LOB this session. (P)   -       Row Name 07/24/24 0944          Mobility    Extremity Weight-bearing Status left lower extremity;left upper extremity (P)   -HM     Left Upper Extremity (Weight-bearing Status) other (see comments) (P)   WBAT through elbow  -     Left Lower Extremity (Weight-bearing Status) weight-bearing as tolerated (WBAT) (P)   -               User Key  (r) = Recorded By, (t) = Taken By, (c) = Cosigned By      Initials Name Provider Type     Taryn Caba, PT Student PT Student                   Obj/Interventions       Row Name 07/24/24 0956          Balance    Balance Assessment sitting static balance;sitting dynamic balance;sit to stand dynamic balance;standing static balance;standing dynamic balance (P)   -     Static Sitting Balance contact guard (P)   -     Dynamic Sitting Balance minimal assist;verbal cues (P)   -     Position, Sitting Balance unsupported;sitting edge of bed (P)   -     Sit to Stand Dynamic Balance moderate assist;2-person assist;verbal cues (P)   -     Static Standing Balance contact guard (P)   -     Dynamic Standing Balance contact guard;1 person to manage equipment;verbal cues (P)   -     Position/Device Used, Standing Balance supported;walker, platform (P)   -     Balance Interventions sitting;standing;sit to stand;occupation based/functional task (P)   -     Comment, Balance No buckling or LOB. Pt with improved sitting balance this session (P)   -               User Key  (r) = Recorded By, (t) = Taken By, (c) = Cosigned By      Initials Name Provider Type     Taryn Caba, PT Student PT Student                   Goals/Plan    No documentation.                  Clinical Impression       Row Name 07/24/24 0958          Pain    Pretreatment Pain Rating 0/10 - no pain (P)   -     Posttreatment  Pain Rating 0/10 - no pain (P)   -HM       Row Name 07/24/24 0958          Plan of Care Review    Plan of Care Reviewed With patient (P)   -HM     Progress improving (P)   -HM     Outcome Evaluation Pt able to increase ambulation this session to 120' with CGA and platform walker. Pt continues to present with deficits in strength, balance, and activity tolerance. Further IPPT warranted during admission. PT recommended DC to IRF for best outcomes. (P)   -HM       Row Name 07/24/24 0958          Vital Signs    Pre Systolic BP Rehab 149 (P)   -HM     Pre Treatment Diastolic BP 67 (P)   -HM     Pretreatment Heart Rate (beats/min) 87 (P)   -HM     Posttreatment Heart Rate (beats/min) 88 (P)   -HM     Pre SpO2 (%) 97 (P)   -HM     O2 Delivery Pre Treatment nasal cannula (P)   -HM     Pre Patient Position Supine (P)   -HM     Intra Patient Position Standing (P)   -HM     Post Patient Position Sitting (P)   -       Row Name 07/24/24 0958          Positioning and Restraints    Pre-Treatment Position in bed (P)   -HM     Post Treatment Position bsc (P)   -HM     On BS commode notified nsg;sitting;call light within reach;encouraged to call for assist;LUE elevated (P)   -HM               User Key  (r) = Recorded By, (t) = Taken By, (c) = Cosigned By      Initials Name Provider Type     Taryn Caba, PT Student PT Student                   Outcome Measures       Row Name 07/24/24 1001          How much help from another person do you currently need...    Turning from your back to your side while in flat bed without using bedrails? 2 (P)   -HM     Moving from lying on back to sitting on the side of a flat bed without bedrails? 2 (P)   -HM     Moving to and from a bed to a chair (including a wheelchair)? 2 (P)   -HM     Standing up from a chair using your arms (e.g., wheelchair, bedside chair)? 2 (P)   -HM     Climbing 3-5 steps with a railing? 1 (P)   -HM     To walk in hospital room? 2 (P)   -HM     AM-PAC 6 Clicks Score  (PT) 11 (P)   -     Highest Level of Mobility Goal 4 --> Transfer to chair/commode (P)   -       Row Name 07/24/24 1001          Functional Assessment    Outcome Measure Options AM-PAC 6 Clicks Basic Mobility (PT) (P)   -               User Key  (r) = Recorded By, (t) = Taken By, (c) = Cosigned By      Initials Name Provider Type     Taryn Caba, PT Student PT Student                                 Physical Therapy Education       Title: PT OT SLP Therapies (In Progress)       Topic: Physical Therapy (Done)       Point: Mobility training (Done)       Learning Progress Summary             Patient Acceptance, E, VU by  at 7/24/2024 1001    Comment: Pt educated on the importance of mobility during admission    Acceptance, E, VU by CK at 7/23/2024 1625    Acceptance, E, VU by  at 7/23/2024 1010    Comment: Pt educated on importance of sitting during admission    Acceptance, E, VU by  at 7/22/2024 1659    Acceptance, E, VU by  at 7/22/2024 1317    Comment: Pt educated on importance of ambulation during admission    Acceptance, E,D, VU,NR by AB at 7/21/2024 1539    Acceptance, E,D,H, VU,NR by AB at 7/21/2024 1024   Family Acceptance, E, VU by CK at 7/23/2024 1625    Acceptance, E, VU by  at 7/22/2024 1659    Acceptance, E, VU by  at 7/22/2024 1317    Comment: Pt educated on importance of ambulation during admission                         Point: Home exercise program (Done)       Learning Progress Summary             Patient Acceptance, E, VU by  at 7/23/2024 1010    Comment: Pt educated on importance of sitting during admission    Acceptance, E, VU by  at 7/22/2024 1659    Acceptance, E, VU by  at 7/22/2024 1317    Comment: Pt educated on importance of ambulation during admission    Acceptance, E,D, VU,NR by AB at 7/21/2024 1539    Acceptance, E,D,H, VU,NR by AB at 7/21/2024 1024   Family Acceptance, E, VU by  at 7/22/2024 1659    Acceptance, E, VU by  at 7/22/2024 1317    Comment: Pt  educated on importance of ambulation during admission                         Point: Body mechanics (Done)       Learning Progress Summary             Patient Acceptance, E, VU by HM at 7/24/2024 1001    Comment: Pt educated on the importance of mobility during admission    Acceptance, E, VU by CK at 7/23/2024 1625    Acceptance, E, VU by HM at 7/23/2024 1010    Comment: Pt educated on importance of sitting during admission    Acceptance, E, VU by HM at 7/22/2024 1659    Acceptance, E, VU by HM at 7/22/2024 1317    Comment: Pt educated on importance of ambulation during admission    Acceptance, E,D, VU,NR by AB at 7/21/2024 1539    Acceptance, E,D,H, VU,NR by AB at 7/21/2024 1024   Family Acceptance, E, VU by CK at 7/23/2024 1625    Acceptance, E, VU by HM at 7/22/2024 1659    Acceptance, E, VU by HM at 7/22/2024 1317    Comment: Pt educated on importance of ambulation during admission                         Point: Precautions (Done)       Learning Progress Summary             Patient Acceptance, E, VU by HM at 7/24/2024 1001    Comment: Pt educated on the importance of mobility during admission    Acceptance, E, VU by CK at 7/23/2024 1625    Acceptance, E, VU by  at 7/23/2024 1010    Comment: Pt educated on importance of sitting during admission    Acceptance, E, VU by HM at 7/22/2024 1659    Acceptance, E, VU by HM at 7/22/2024 1317    Comment: Pt educated on importance of ambulation during admission    Acceptance, E,D, VU,NR by AB at 7/21/2024 1539    Acceptance, E,D,H, VU,NR by AB at 7/21/2024 1024   Family Acceptance, E, VU by CK at 7/23/2024 1625    Acceptance, E, VU by  at 7/22/2024 1659    Acceptance, E, VU by  at 7/22/2024 1317    Comment: Pt educated on importance of ambulation during admission                                         User Key       Initials Effective Dates Name Provider Type Discipline    AB 09/22/22 -  Ronda Thomas, PT Physical Therapist PT    CK 02/06/24 -  Bessie Vidal  PT Physical Therapist PT     05/07/24 -  Taryn Caba, PT Student PT Student PT                  PT Recommendation and Plan     Plan of Care Reviewed With: (P) patient  Progress: (P) improving  Outcome Evaluation: (P) Pt able to increase ambulation this session to 120' with CGA and platform walker. Pt continues to present with deficits in strength, balance, and activity tolerance. Further IPPT warranted during admission. PT recommended DC to IRF for best outcomes.     Time Calculation:         PT Charges       Row Name 07/24/24 0842             Time Calculation    Start Time 0842 (P)   -      PT Received On 07/24/24 (P)   -      PT Goal Re-Cert Due Date 07/31/24 (P)   -         Timed Charges    47402 - Gait Training Minutes  15 (P)   -      32763 - PT Therapeutic Activity Minutes 17 (P)   -         Total Minutes    Timed Charges Total Minutes 32 (P)   -       Total Minutes 32 (P)   -                User Key  (r) = Recorded By, (t) = Taken By, (c) = Cosigned By      Initials Name Provider Type     Taryn Caba, PT Student PT Student                  Therapy Charges for Today       Code Description Service Date Service Provider Modifiers Qty    24807038460 HC PT THER PROC EA 15 MIN 7/23/2024 Taryn Caba, PT Student GP 1    40737873340 HC GAIT TRAINING EA 15 MIN 7/23/2024 Taryn Caba, PT Student GP 1    74527802683 HC PT THERAPEUTIC ACT EA 15 MIN 7/23/2024 Taryn Caba, PT Student GP 1    48716300602 HC PT THER SUPP EA 15 MIN 7/23/2024 Taryn Caba, PT Student GP 2    72878107209 HC GAIT TRAINING EA 15 MIN 7/24/2024 Taryn Caba, PT Student GP 1    97513142565 HC PT THERAPEUTIC ACT EA 15 MIN 7/24/2024 Taryn Caba, PT Student GP 1            PT G-Codes  Outcome Measure Options: (P) AM-PAC 6 Clicks Basic Mobility (PT)  AM-PAC 6 Clicks Score (PT): (P) 11  AM-PAC 6 Clicks Score (OT): 13  PT Discharge Summary  Anticipated Discharge Disposition (PT): (P) inpatient  rehabilitation facility    Taryn Caba, PT Student  7/24/2024

## 2024-07-25 VITALS
DIASTOLIC BLOOD PRESSURE: 64 MMHG | BODY MASS INDEX: 26.13 KG/M2 | RESPIRATION RATE: 16 BRPM | HEART RATE: 73 BPM | SYSTOLIC BLOOD PRESSURE: 130 MMHG | WEIGHT: 172.4 LBS | OXYGEN SATURATION: 96 % | HEIGHT: 68 IN | TEMPERATURE: 98 F

## 2024-07-25 LAB — GLUCOSE BLDC GLUCOMTR-MCNC: 145 MG/DL (ref 70–130)

## 2024-07-25 PROCEDURE — 99239 HOSP IP/OBS DSCHRG MGMT >30: CPT | Performed by: INTERNAL MEDICINE

## 2024-07-25 PROCEDURE — 82948 REAGENT STRIP/BLOOD GLUCOSE: CPT

## 2024-07-25 PROCEDURE — 99024 POSTOP FOLLOW-UP VISIT: CPT | Performed by: ORTHOPAEDIC SURGERY

## 2024-07-25 PROCEDURE — 97116 GAIT TRAINING THERAPY: CPT

## 2024-07-25 PROCEDURE — 97530 THERAPEUTIC ACTIVITIES: CPT

## 2024-07-25 RX ORDER — HYDROCODONE BITARTRATE AND ACETAMINOPHEN 5; 325 MG/1; MG/1
1 TABLET ORAL EVERY 6 HOURS PRN
Start: 2024-07-25 | End: 2024-07-29

## 2024-07-25 RX ORDER — ASPIRIN 81 MG/1
81 TABLET ORAL EVERY 12 HOURS SCHEDULED
Start: 2024-07-25

## 2024-07-25 RX ADMIN — CETIRIZINE HYDROCHLORIDE 5 MG: 10 TABLET, FILM COATED ORAL at 08:40

## 2024-07-25 RX ADMIN — TIMOLOL MALEATE 1 DROP: 5 SOLUTION/ DROPS OPHTHALMIC at 08:41

## 2024-07-25 RX ADMIN — LOSARTAN POTASSIUM 50 MG: 50 TABLET, FILM COATED ORAL at 08:40

## 2024-07-25 RX ADMIN — AMLODIPINE BESYLATE 10 MG: 10 TABLET ORAL at 08:39

## 2024-07-25 RX ADMIN — Medication 10 ML: at 08:41

## 2024-07-25 RX ADMIN — Medication 3 ML: at 08:41

## 2024-07-25 RX ADMIN — ASPIRIN 81 MG: 81 TABLET, COATED ORAL at 08:40

## 2024-07-25 RX ADMIN — PANTOPRAZOLE SODIUM 40 MG: 40 TABLET, DELAYED RELEASE ORAL at 08:40

## 2024-07-25 RX ADMIN — HYDROCODONE BITARTRATE AND ACETAMINOPHEN 1 TABLET: 5; 325 TABLET ORAL at 10:01

## 2024-07-25 RX ADMIN — LEVOTHYROXINE SODIUM 75 MCG: 0.07 TABLET ORAL at 06:23

## 2024-07-25 NOTE — DISCHARGE INSTR - ACTIVITY
Patient can be weightbearing as tolerated on her left lower extremity platform weightbearing left upper extremity with walker nonweightbearing to the wrist

## 2024-07-25 NOTE — PLAN OF CARE
Goal Outcome Evaluation:  Plan of Care Reviewed With: (P) patient, spouse        Progress: (P) improving  Outcome Evaluation: (P) Pt amb 120' this session with CGA and platform walker. Pt requires less assist this session with transfers with improving balance. Pt presents below baseline with deficits in pain, strength, activity tolerance, and balance. Further IPPT warranted during admission. PT recommended DC to IRF.      Anticipated Discharge Disposition (PT): (P) inpatient rehabilitation facility

## 2024-07-25 NOTE — THERAPY TREATMENT NOTE
Patient Name: Pari Hoewll  : 1950    MRN: [id number]                              Today's Date: 2024       Admit Date: 2024    Visit Dx:     ICD-10-CM ICD-9-CM   1. Subcapital fracture of hip, left, closed, initial encounter  S72.012A 820.09   2. Left wrist fracture, open, initial encounter  S62.102B 814.10     Patient Active Problem List   Diagnosis    Type 2 diabetes mellitus without complication, without long-term current use of insulin    Other specified glaucoma    Hyperlipidemia LDL goal <70    Vitamin D deficiency    Irritable bowel syndrome with diarrhea    Hypothyroidism (acquired)    Acute respiratory insufficiency    Hip fracture    Femur fracture    T2DM (type 2 diabetes mellitus)    HTN (hypertension)    Radial fracture    Left wrist fracture, open, initial encounter     Past Medical History:   Diagnosis Date    Bladder infection     Dyspareunia, female     Glaucoma     H/O sexual problem     High cholesterol     History of abnormal cervical Pap smear     Hypertension     Hypertension     Hypothyroidism     Impaired functional mobility, balance, gait, and endurance     Labial cyst     Muscle weakness     Type 2 diabetes mellitus     Urinary incontinence     Vaginal itching     Yeast infection      Past Surgical History:   Procedure Laterality Date    CARPAL TUNNEL RELEASE      CATARACT EXTRACTION Right 2022    CATARACT EXTRACTION, BILATERAL Left     CHOLECYSTECTOMY  1991    EYE SURGERY      HIP FRACTURE SURGERY Right     HYSTERECTOMY      OOPHORECTOMY      TOTAL HIP ARTHROPLASTY Right 2024    Procedure: TOTAL HIP ARTHROPLASTY ANTERIOR RIGHT;  Surgeon: Doni Evans MD;  Location: [facility] OR;  Service: Orthopedics;  Laterality: Right;    TOTAL HIP ARTHROPLASTY Left 2024    Procedure: TOTAL HIP ARTHROPLASTY ANTERIOR LEFT;  Surgeon: Doni Evans MD;  Location:  [facility] OR;  Service: Orthopedics;  Laterality: Left;    TUBAL ABDOMINAL LIGATION  1979     WRIST FRACTURE SURGERY Left 7/20/2024    Procedure: OPEN REDUCTION INTERNAL FIXATION DISTAL RADIUS - LEFT;  Surgeon: Devan Modi MD;  Location: [facility];  Service: Orthopedics;  Laterality: Left;      General Information       Row Name 07/25/24 0911          Physical Therapy Time and Intention    Document Type therapy note (daily note)  -CK (r) HM (t) CK (c)     Mode of Treatment individual therapy;physical therapy  -CK (r) HM (t) CK (c)       Row Name 07/25/24 0911          General Information    Patient Profile Reviewed yes  -CK (r) HM (t) CK (c)     Existing Precautions/Restrictions fall;left;hip, anterior;other (see comments)  s/p L [place]- WBAT LLE; s/p L wrist ORIF in simple sling- NWB L wrist and WBAT L elbow , infra clavicular nerve cath  Taken  -CK (r) HM (t) CK (c)     Barriers to Rehab medically complex;previous functional deficit;physical barrier  -CK (r) HM (t) CK (c)       Row Name 07/25/24 0911          Cognition    Orientation Status (Cognition) oriented x 3  -CK (r) HM (t) CK (c)       Row Name 07/25/24 0911          Safety Issues, Functional Mobility    Safety Issues Affecting Function (Mobility) awareness of need for assistance;insight into deficits/self-awareness;safety precautions follow-through/compliance;safety precaution awareness;sequencing abilities;problem-solving  -CK (r) HM (t) CK (c)     Impairments Affecting Function (Mobility) balance;endurance/activity tolerance;strength;grasp;pain;range of motion (ROM);sensation/sensory awareness  -CK (r) HM (t) CK (c)     Comment, Safety Issues/Impairments (Mobility) Alert and following commands  -CK (r) HM (t) CK (c)               User Key  (r) = Recorded By, (t) = Taken By, (c) = Cosigned By      Initials Name Provider Type    CK Bessie Vidal, PT Physical Therapist     Taryn Caba, PT Student PT Student                   Mobility       Row Name 07/25/24 0911          Bed Mobility    Comment, (Bed Mobility) Pt sitting EOB  upon entry  -CK (r) HM (t) CK (c)       Row Name 07/25/24 0911          Transfers    Comment, (Transfers) STS x 2 (1 x from EOB, 1x from BSC)  -CK (r) HM (t) CK (c)       Row Name 07/25/24 0911          Sit-Stand Transfer    Sit-Stand [place] (Transfers) minimum assist (75% patient effort);verbal cues  -CK (r) HM (t) CK (c)     Assistive Device (Sit-Stand Transfers) walker, rolling platform  -CK (r) HM (t) CK (c)     Comment, (Sit-Stand Transfer) Pt with VC for hand placement and sequencing. Pt requires raised bed height for more ease with standing.  -CK (r) HM (t) CK (c)       Row Name 07/25/24 0911          Gait/Stairs (Locomotion)    [place] Level (Gait) contact guard;verbal cues  -CK (r) HM (t) CK (c)     Assistive Device (Gait) walker, rolling platform  -CK (r) HM (t) CK (c)     Distance in Feet (Gait) 120  -CK (r) HM (t) CK (c)     Deviations/Abnormal Patterns (Gait) bilateral deviations;lela decreased;gait speed decreased;stride length decreased;base of support, narrow;antalgic  -CK (r) HM (t) CK (c)     Bilateral Gait Deviations forward flexed posture;heel strike decreased  -CK (r) HM (t) CK (c)     Left Sided Gait Deviations weight shift ability decreased  -CK (r) HM (t) CK (c)     [place] Level (Stairs) unable to assess  -CK (r) HM (t) CK (c)     Comment, (Gait/Stairs) Pt ambulated in faria with a step through gait pattern. Pt required 1 standing rest break this session. No Lob or knee buckling. VC for upright posture and increased gait speed. Further ambulation limited by pain and poor activity tolerance  -CK (r) HM (t) CK (c)       Row Name 07/25/24 0911          Mobility    Extremity Weight-bearing Status left lower extremity;left upper extremity  -CK (r) HM (t) CK (c)     Left Upper Extremity (Weight-bearing Status) other (see comments)  WBAT through elbow, NWB at wrist  -CK (r) HM (t) CK (c)     Left Lower Extremity (Weight-bearing Status) weight-bearing as tolerated (WBAT)  -CK (r)  HM (t) CK (c)               User Key  (r) = Recorded By, (t) = Taken By, (c) = Cosigned By      Initials Name Provider Type    Bessie Diego, PT Physical Therapist    Taryn Tran, PT Student PT Student                   Obj/Interventions       Row Name 07/25/24 0915          Balance    Balance Assessment sitting static balance;sitting dynamic balance;sit to stand dynamic balance;standing static balance;standing dynamic balance  -CK (r) HM (t) CK (c)     Static Sitting Balance supervision  -CK (r) HM (t) CK (c)     Dynamic Sitting Balance standby assist  -CK (r) HM (t) CK (c)     Position, Sitting Balance unsupported;sitting edge of bed;other (see comments)  BSC  -CK (r) HM (t) CK (c)     Sit to Stand Dynamic Balance minimal assist;verbal cues  -CK (r) HM (t) CK (c)     Static Standing Balance standby assist  -CK (r) HM (t) CK (c)     Dynamic Standing Balance contact guard  -CK (r) HM (t) CK (c)     Position/Device Used, Standing Balance supported;walker, front-wheeled  -CK (r) HM (t) CK (c)     Balance Interventions sitting;standing;sit to stand;occupation based/functional task  -CK (r) HM (t) CK (c)     Comment, Balance No LOB or knee buckling  -CK (r) HM (t) CK (c)               User Key  (r) = Recorded By, (t) = Taken By, (c) = Cosigned By      Initials Name Provider Type    Bessie Diego, PT Physical Therapist    Taryn Tran, PT Student PT Student                   Goals/Plan    No documentation.                  Clinical Impression       Row Name 07/25/24 0916          Pain    Pretreatment Pain Rating 3/10  -CK (r) HM (t) CK (c)     Posttreatment Pain Rating 3/10  -CK (r) HM (t) CK (c)     Pain Location generalized  -CK (r) HM (t) CK (c)     Pain Location - hip  -CK (r) HM (t) CK (c)     Pain Intervention(s) Repositioned;Elevated;Ambulation/increased activity  -CK (r) HM (t) CK (c)       Row Name 07/25/24 0916          Plan of Care Review    Plan of Care Reviewed With  patient;spouse  -CK (r) HM (t) CK (c)     Progress improving  -CK (r) HM (t) CK (c)     Outcome Evaluation Pt amb 120' this session with CGA and platform walker. Pt requires less assist this session with transfers with improving balance. Pt presents below baseline with deficits in pain, strength, activity tolerance, and balance. Further IPPT warranted during admission. PT recommended DC to IRF.  -CK (r) HM (t) CK (c)       Row Name 07/25/24 0916          Vital Signs    Pre Systolic BP Rehab 130  -CK (r) HM (t) CK (c)     Pre Treatment Diastolic BP 64  -CK (r) HM (t) CK (c)     Pretreatment Heart Rate (beats/min) 89  -CK (r) HM (t) CK (c)     Posttreatment Heart Rate (beats/min) 91  -CK (r) HM (t) CK (c)     Pre SpO2 (%) 94  -CK (r) HM (t) CK (c)     O2 Delivery Pre Treatment room air  -CK (r) HM (t) CK (c)     Post SpO2 (%) 95  -CK (r) HM (t) CK (c)     O2 Delivery Post Treatment room air  -CK (r) HM (t) CK (c)     Pre Patient Position Sitting  -CK (r) HM (t) CK (c)     Intra Patient Position Standing  -CK (r) HM (t) CK (c)     Post Patient Position Sitting  -CK (r) HM (t) CK (c)       Row Name 07/25/24 0916          Positioning and Restraints    Pre-Treatment Position in bed  -CK (r) HM (t) CK (c)     Post Treatment Position chair  -CK (r) HM (t) CK (c)     In Chair notified nsg;reclined;call light within reach;encouraged to call for assist;exit alarm on;with family/caregiver;waffle cushion;legs elevated;with brace  pt declined SCDS  -CK (r) HM (t) CK (c)               User Key  (r) = Recorded By, (t) = Taken By, (c) = Cosigned By      Initials Name Provider Type    CK Bessie Vidal, PT Physical Therapist    HM Taryn Caba, PT Student PT Student                   Outcome Measures       Row Name 07/25/24 0919          How much help from another person do you currently need...    Turning from your back to your side while in flat bed without using bedrails? 3  -CK (r) HM (t) CK (c)     Moving from lying on  back to sitting on the side of a flat bed without bedrails? 3  -CK (r) HM (t) CK (c)     Moving to and from a bed to a chair (including a wheelchair)? 3  -CK (r) HM (t) CK (c)     Standing up from a chair using your arms (e.g., wheelchair, bedside chair)? 3  -CK (r) HM (t) CK (c)     Climbing 3-5 steps with a railing? 2  -CK (r) HM (t) CK (c)     To walk in hospital room? 3  -CK (r) HM (t) CK (c)     AM-PAC 6 Clicks Score (PT) 17  -CK (r) HM (t)     Highest Level of Mobility Goal 5 --> Static standing  -CK (r) HM (t)       Row Name 07/25/24 0919          Functional Assessment    Outcome Measure Options AM-PAC 6 Clicks Basic Mobility (PT)  -CK (r) HM (t) CK (c)               User Key  (r) = Recorded By, (t) = Taken By, (c) = Cosigned By      Initials Name Provider Type    CK Bessie Vidal, PT Physical Therapist     Taryn Caba, PT Student PT Student                                 Physical Therapy Education       Title: PT OT SLP Therapies (In Progress)       Topic: Physical Therapy (Done)       Point: Mobility training (Done)       Learning Progress Summary             Patient Acceptance, E, VU by  at 7/25/2024 0919    Acceptance, E, VU by  at 7/24/2024 1405    Acceptance, E, VU by  at 7/24/2024 1001    Comment: Pt educated on the importance of mobility during admission    Acceptance, E, VU by  at 7/23/2024 1625    Acceptance, E, VU by  at 7/23/2024 1010    Comment: Pt educated on importance of sitting during admission    Acceptance, E, VU by  at 7/22/2024 1659    Acceptance, E, VU by  at 7/22/2024 1317    Comment: Pt educated on importance of ambulation during admission    Acceptance, E,D, VU,NR by AB at 7/21/2024 1539    Acceptance, E,D,H, VU,NR by AB at 7/21/2024 1024   Family Acceptance, E, VU by  at 7/25/2024 0919    Acceptance, E, VU by CK at 7/23/2024 1625    Acceptance, E, VU by HM at 7/22/2024 1659    Acceptance, E, VU by HM at 7/22/2024 1317    Comment: Pt educated on  importance of ambulation during admission                         Point: Home exercise program (Done)       Learning Progress Summary             Patient Acceptance, E, VU by HM at 7/24/2024 1405    Acceptance, E, VU by HM at 7/23/2024 1010    Comment: Pt educated on importance of sitting during admission    Acceptance, E, VU by HM at 7/22/2024 1659    Acceptance, E, VU by HM at 7/22/2024 1317    Comment: Pt educated on importance of ambulation during admission    Acceptance, E,D, VU,NR by AB at 7/21/2024 1539    Acceptance, E,D,H, VU,NR by AB at 7/21/2024 1024   Family Acceptance, E, VU by HM at 7/22/2024 1659    Acceptance, E, VU by HM at 7/22/2024 1317    Comment: Pt educated on importance of ambulation during admission                         Point: Body mechanics (Done)       Learning Progress Summary             Patient Acceptance, E, VU by HM at 7/25/2024 0919    Acceptance, E, VU by HM at 7/24/2024 1405    Acceptance, E, VU by HM at 7/24/2024 1001    Comment: Pt educated on the importance of mobility during admission    Acceptance, E, VU by CK at 7/23/2024 1625    Acceptance, E, VU by HM at 7/23/2024 1010    Comment: Pt educated on importance of sitting during admission    Acceptance, E, VU by HM at 7/22/2024 1659    Acceptance, E, VU by HM at 7/22/2024 1317    Comment: Pt educated on importance of ambulation during admission    Acceptance, E,D, VU,NR by AB at 7/21/2024 1539    Acceptance, E,D,H, VU,NR by AB at 7/21/2024 1024   Family Acceptance, E, VU by HM at 7/25/2024 0919    Acceptance, E, VU by CK at 7/23/2024 1625    Acceptance, E, VU by HM at 7/22/2024 1659    Acceptance, E, VU by HM at 7/22/2024 1317    Comment: Pt educated on importance of ambulation during admission                         Point: Precautions (Done)       Learning Progress Summary             Patient Acceptance, E, VU by HM at 7/25/2024 0919    Acceptance, E, VU by HM at 7/24/2024 1405    Acceptance, E, VU by HM at 7/24/2024 1001     Comment: Pt educated on the importance of mobility during admission    Acceptance, E, VU by CK at 7/23/2024 1625    Acceptance, E, VU by HM at 7/23/2024 1010    Comment: Pt educated on importance of sitting during admission    Acceptance, E, VU by  at 7/22/2024 1659    Acceptance, E, VU by  at 7/22/2024 1317    Comment: Pt educated on importance of ambulation during admission    Acceptance, E,D, VU,NR by AB at 7/21/2024 1539    Acceptance, E,D,H, VU,NR by AB at 7/21/2024 1024   Family Acceptance, E, VU by HM at 7/25/2024 0919    Acceptance, E, VU by CK at 7/23/2024 1625    Acceptance, E, VU by  at 7/22/2024 1659    Acceptance, E, VU by  at 7/22/2024 1317    Comment: Pt educated on importance of ambulation during admission                                         User Key       Initials Effective Dates Name Provider Type Discipline    AB 09/22/22 -  Ronda Thomas, PT Physical Therapist PT     02/06/24 -  Bessie Vidal, PT Physical Therapist PT     05/07/24 -  Taryn Caba, PT Student PT Student PT                  PT Recommendation and Plan     Plan of Care Reviewed With: patient, spouse  Progress: improving  Outcome Evaluation: Pt amb 120' this session with CGA and platform walker. Pt requires less assist this session with transfers with improving balance. Pt presents below baseline with deficits in pain, strength, activity tolerance, and balance. Further IPPT warranted during admission. PT recommended DC to IRF.     Time Calculation:         PT Charges       Row Name 07/25/24 0835             Time Calculation    Start Time 0835  -CK (r) HM (t) CK (c)      PT Received On 07/25/24  -CK (r) HM (t) CK (c)      PT Goal Re-Cert Due Date 08/04/24  -CK (r) HM (t) CK (c)         Timed Charges    01128 - Gait Training Minutes  10  -CK (r) HM (t) CK (c)      22294 - PT Therapeutic Activity Minutes 23  -CK (r) HM (t) CK (c)         Total Minutes    Timed Charges Total Minutes 33  -CK (r) HM (t)        Total Minutes 33  -CK (r) HM (t)                User Key  (r) = Recorded By, (t) = Taken By, (c) = Cosigned By      Initials Name Provider Type    Bessie Diego, PT Physical Therapist     Taryn Caba, PT Student PT Student                  Therapy Charges for Today       Code Description Service Date Service Provider Modifiers Qty    08793972982 HC GAIT TRAINING EA 15 MIN 7/24/2024 Taryn Caba, PT Student GP 1    27609844805 HC PT THERAPEUTIC ACT EA 15 MIN 7/24/2024 Taryn Caba, PT Student GP 1    28507335523 HC PT THER PROC EA 15 MIN 7/24/2024 Taryn Caba, PT Student GP 1    [id number] HC GAIT TRAINING EA 15 MIN 7/24/2024 Taryn Caba, PT Student GP 1    50904164539 HC PT THERAPEUTIC ACT EA 15 MIN 7/24/2024 Taryn Caba, PT Student GP 1    33837212136 HC GAIT TRAINING EA 15 MIN 7/25/2024 Taryn Caba, PT Student GP 1    90569019559 HC PT THERAPEUTIC ACT EA 15 MIN 7/25/2024 Taryn Caba, PT Student GP 1            PT G-Codes  Outcome Measure Options: AM-PAC 6 Clicks Basic Mobility (PT)  AM-PAC 6 Clicks Score (PT): 17  AM-PAC 6 Clicks Score (OT): 14  PT Discharge Summary  Anticipated Discharge Disposition (PT): inpatient rehabilitation facility    Taryn Caba PT Student  7/25/2024

## 2024-07-25 NOTE — DISCHARGE PLACEMENT REQUEST
"Evie Cardenas (74 y.o. Female)       Date of Birth   1950    Social Security Number       Address   [address] [place] [address]    Home Phone   [phone]    MRN   [id number]       [facility]    Marital Status                               Admission Date   24    Admission Type   Emergency    Admitting Provider   Jessi Harvey DO    Attending Provider   Jessi Harvey DO    Department, Room/Bed   [facility] 3G, S366/1       Discharge Date       Discharge Disposition   Short Term Hospital (DC - External)    Discharge Destination                                 Attending Provider: Jessi Harvey DO    Allergies: Caffeine, Shellfish-derived Products    Isolation: None   Infection: None   Code Status: CPR    Ht: 172.7 cm (68\")   Wt: 78.2 kg (172 lb 6.4 oz)    Admission Cmt: None   Principal Problem: Femur fracture [S72.90XA]                   Active Insurance as of 2024       Primary Coverage       Payor Plan Insurance Group Employer/Plan Group    HUMANA MEDICARE REPLACEMENT HUMANA MED ADV PPO [id number]       Payor Plan Address Payor Plan Phone Number Payor Plan Fax Number Effective Dates    [address] [address] [phone]  2023 - None Entered    Formerly [facility] [id number]         Subscriber Name Subscriber Birth Date Member ID       EVIE CARDENAS 1950 [id number]                     Emergency Contacts        (Rel.) Home Phone Work Phone Mobile Phone    sandeep cardenas (Spouse) [phone] -- [phone]                 Discharge Summary        Jessi Harvey DO at [address] [place] [address] Medicine Services  DISCHARGE SUMMARY    Patient Name: Evie Cardenas  : 1950  MRN: [id number]    Date of Admission: 2024 10:29 PM  Date of Discharge:  2024  Primary Care Physician: Radha Gregg APRN    Consults       Date and Time Order Name Status Description    2024  3:02 " AM Inpatient Orthopedic Surgery Consult Completed             Hospital Course     Presenting Problem: fall with Left wrist, L femur fx    Active Hospital Problems    Diagnosis  POA    **Femur fracture [S72.90XA]  Yes    T2DM (type 2 diabetes mellitus) [E11.9]  Yes    HTN (hypertension) [I10]  Yes    Radial fracture [S52.90XA]  Yes    Left wrist fracture, open, initial encounter [S62.102B]  Unknown    Hypothyroidism (acquired) [E03.9]  Yes    Hyperlipidemia LDL goal <70 [E78.5]  Yes      Resolved Hospital Problems   No resolved problems to display.          Hospital Course:  Pari Howell is a 74 y.o. female  with hx of HTN, HLD, hypothyroidism, T2DM, IBS who presented to the ED w/ c/o a fall.  Imaging shows left femur fracture and left distal radius fracture.  She underwent repair of left wrist and left hip on 7/20.     Left femur fracture   Left distal radius fracture   -2/2 mechanical fall   -CT pelvis impacted subcapital fracture of the proximal left femur   - CT LUE shows comminuted fracture of the distal radius with displacement  -s/p ORIF distal left radius and anterior [place] with Dr. Evans/Lane on 7/20  - baby ASA BID for DVT ppx  - WBAT to LLE, WBAT to L elbow  - will need f/u with Dr. Sherman 2 weeks post-discharge for wound check/suture removal and placement in short arm case  - to transfer to [facility] today     T2DM  -hem A1c 6.0  -on Semaglutide      Neuropathy  -uses cream from compound pharmacy      HTN   HLD   -continue routine Norvasc, losartan-HCTZ  -continued statin     Hypothyroidism   -continue synthroid     Discharge Follow Up Recommendations for outpatient labs/diagnostics:   - f/u with Dr. Sherman 8/1 for wound check/suture removal and X-rays  - ASA BID for DVT ppx    Day of Discharge     HPI:   Patient up working with PT. Doing well. Anticipating transfer to rehab today.    Review of Systems  Gen- No fevers, chills  CV- No chest pain, palpitations  Resp- No cough, dyspnea  GI- No  N/V/D, abd pain      Vital Signs:   Temp:  [98 °F (36.7 °C)-98.2 °F (36.8 °C)] 98 °F (36.7 °C)  Heart Rate:  [70-80] 70  Resp:  [16] 16  BP: (109-163)/(65-80) 127/71  Flow (L/min):  [2] 2      Physical Exam:  Constitutional: No acute distress, awake, alert, up walking with PT  HENT: NCAT, mucous membranes moist  Respiratory: respiratory effort normal   Cardiovascular: RRR  Musculoskeletal: Left arm in sling  Psychiatric: Appropriate affect, cooperative  Neurologic: Oriented x 3, no focal deficits  Skin: No rashes      Pertinent  and/or Most Recent Results     LAB RESULTS:      Lab 07/21/24  1505 07/20/24  0335   WBC 11.96* 15.47*   HEMOGLOBIN 11.2* 14.5   HEMATOCRIT 32.2* 42.6   PLATELETS 192 269   NEUTROS ABS 7.96* 13.31*   IMMATURE GRANS (ABS) 0.09* 0.09*   LYMPHS ABS 1.85 1.16   MONOS ABS 1.05* 0.83   EOS ABS 0.93* 0.04   MCV 88.7 88.0         Lab 07/21/24  0923 07/20/24  0335 07/20/24  0044   SODIUM 139 137 136   POTASSIUM 4.7 3.9 4.3   CHLORIDE 105 100 101   CO2 19.0* 24.0 23.0   ANION GAP 15.0 13.0 12.0   BUN 11 17 18   CREATININE 0.66 0.73 0.73   EGFR 92.2 86.4 86.4   GLUCOSE 175* 167* 183*   CALCIUM 8.9 9.4 9.0   PHOSPHORUS 2.8  --   --    HEMOGLOBIN A1C  --  6.00*  --          Lab 07/21/24  0923 07/20/24  0044   TOTAL PROTEIN  --  7.2   ALBUMIN 3.5 4.2   GLOBULIN  --  3.0   ALT (SGPT)  --  26   AST (SGOT)  --  28   BILIRUBIN  --  0.4   ALK PHOS  --  64                 Lab 07/20/24  0429 07/20/24  0335   ABO TYPING A A   RH TYPING Positive Positive   ANTIBODY SCREEN  --  Negative         Brief Urine Lab Results  (Last result in the past 365 days)        Color   Clarity   Blood   Leuk Est   Nitrite   Protein   CREAT   Urine HCG        04/01/24 0815             135.4               Microbiology Results (last 10 days)       ** No results found for the last 240 hours. **            XR Hip With or Without Pelvis 2 - 3 View Left    Result Date: 7/20/2024  XR HIP W OR WO PELVIS 2-3 VIEW LEFT Date of Exam: 7/20/2024  4:17 PM EDT Indication: Postop Comparison: 7/19/2024 Findings: Total left hip prosthesis is now seen, components in anatomic alignment. There is expected postop soft tissue air. Right hip prosthesis remains in anatomic alignment. No abnormal lucency is seen around the prosthetic components. Bony structures appear intact.     Impression: Total left hip prosthesis in anatomic alignment. No new bony abnormality seen. Electronically Signed: Jasmeet Carrasco MD  7/20/2024 4:53 PM EDT  Workstation ID: [id number]    FL C Arm During Surgery    Result Date: 7/20/2024  This procedure was auto-finalized with no dictation required.    Left Fascia iliac SS    Result Date: 7/20/2024  Grant Elias CRNA     7/20/2024 11:59 AM Left Fascia iliac SS Patient reassessed immediately prior to procedure Start time: 7/20/2024 11:20 AM Reason for block: at surgeon's request and post-op pain management Performed by CRNA/CAA: Grant Elias CRNA Assisted by: Janina Chávez RN Preanesthetic Checklist Completed: patient identified, IV checked, site marked, risks and benefits discussed, surgical consent, monitors and equipment checked, pre-op evaluation and timeout performed Prep: Pt Position: supine Sterile barriers:cap, gloves, mask and washed/disinfected hands Prep: ChloraPrep Patient monitoring: blood pressure monitoring, continuous pulse oximetry and EKG Procedure Sedation: yes Performed under: local infiltration Guidance:ultrasound guided ULTRASOUND INTERPRETATION.  Using ultrasound guidance a 20 G gauge needle was placed in close proximity to the nerve, at which point, under ultrasound guidance anesthetic was injected in the area of the nerve and spread of the anesthesia was seen on ultrasound in close proximity thereto.  There were no abnormalities seen on ultrasound; a digital image was taken; and the patient tolerated the procedure with no complications. Images:still images obtained, printed/placed on chart Laterality:left Block  "Type:fascia iliaca compartment Injection Technique:single-shot Needle Type:echogenic and Tuohy Needle Gauge:18 G Resistance on Injection: none Catheter Size:20 G (20g) Medications Used: bupivacaine PF (MARCAINE) 0.25 % injection - Injection  30 mL - 7/20/2024 11:20:00 AM Medications Preservative Free Saline:5ml Post Assessment Injection Assessment: negative aspiration for heme, no paresthesia on injection and incremental injection Patient Tolerance:comfortable throughout block Complications:no Additional Notes CKAFASCIAILIACA: SINGLE shot A high-frequency linear transducer, with sterile cover, was placed in parasagittal plane on top of the Anterior Superior Iliac Spine (ASIS) and moved medially to identify the Internal Oblique muscle, Sartorius muscle, Iliacus Muscle, Fascia Iliaca (FI) and Fascia Latae. The insertion site was prepped and draped in sterile fashion. Skin and cutaneous tissue was infiltrated with 2-5 ml of 1% Lidocaine. Using ultrasound-guidance, a 20-gauge B-Moraes 4\" Ultraplex 360 non-stimulating echogenic needle was advanced in plane from caudad to cephalad. Preservative-free normal saline was utilized for hydro-dissection of tissue, advancement of needle, and to confirm final needle placement below FI. Local anesthetic in incremental 3-5 ml injections. Aspiration every 5 ml to prevent intravascular injection. Injection was completed with negative aspiration of blood and negative intravascular injection. Injection pressures were normal with minimal resistance. Performed by: Grant Elias CRNA     left Infraclavicular Cath    Result Date: 7/20/2024  Grant Elias CRNA     7/20/2024 12:00 PM Left Infraclavicular Cath Patient reassessed immediately prior to procedure Patient location during procedure: pre-op Start time: 7/20/2024 11:15 AM Reason for block: at surgeon's request and post-op pain management Performed by Anesthesiologist: Tyson Hood MD CRNA/CAA: Grant Elias, " CRNA Preanesthetic Checklist Completed: patient identified, IV checked, site marked, risks and benefits discussed, surgical consent, monitors and equipment checked, pre-op evaluation and timeout performed Prep: Pt Position: supine Sterile barriers:cap, gloves, mask and washed/disinfected hands Prep: ChloraPrep Patient monitoring: blood pressure monitoring, continuous pulse oximetry and EKG Procedure Sedation: yes Performed under: local infiltration Guidance:ultrasound guided ULTRASOUND INTERPRETATION.  Using ultrasound guidance a 20 G gauge needle was placed in close proximity to the brachial plexus nerve, at which point, under ultrasound guidance anesthetic was injected in the area of the nerve and spread of the anesthesia was seen on ultrasound in close proximity thereto.  There were no abnormalities seen on ultrasound; a digital image was taken; and the patient tolerated the procedure with no complications. Images:still images obtained, printed/placed on chart Laterality:left Block Type:infraclavicular Injection Technique:catheter Needle Type:Tuohy and echogenic Needle Gauge:18 G Resistance on Injection: none Catheter Size:20 G Cath Depth at skin: 9 cm Medications Used: fentaNYL citrate (PF) (SUBLIMAZE) injection - Intravenous  100 mcg - 7/20/2024 11:15:00 AM bupivacaine PF (MARCAINE) 0.25 % injection - Injection  30 mL - 7/20/2024 11:15:00 AM Medications Preservative Free Saline:3ml Post Assessment Injection Assessment: negative aspiration for heme, no paresthesia on injection and incremental injection Patient Tolerance:comfortable throughout block Complications:no Additional Notes CATHETER The affected upper extremity was abducted and rotated 90 degrees at the shoulder, within the patients range of motion. A high-frequency linear transducer, with sterile cover, was placed just medial to the coracoid process, distal third of the clavicle, and inferior to the clavicle. Keeping the transducer is in a parasagittal  "plane (cephalad to caudad), scanning medially to laterally. The Pectoralis major and minor, brachial plexus, axillary artery and vein, rib and pleura where visualized. The insertion site was prepped and draped in sterile fashion. Skin and cutaneous tissue was infiltrated with 2-5 ml of 1% Lidocaine. Using ultrasound-guidance, an 18-gauge Contiplex Ultra 360 Touhy needle was advanced in plane lateral to the axillary artery and lateral cord of the brachial plexus. Preservative-free normal saline was utilized for hydro-dissection of tissue, advancement of Touhy needle, and to confirm final needle placement posterior to the axillary artery and posterior cord of the brachial plexus. Local anesthetic injection spread, in incremental 3-5 ml injections, was confirmed. Aspiration every 5 ml to prevent intravascular injection. Injection was completed with negative aspiration of blood and negative intravascular injection. Injection pressures were normal with minimal resistance. A 20-gauge Liquid Gridsiplex Echo catheter was placed through the needle and advance out the tip of the Touhy 1-3 cm. The Touhy needle was then removed, and final catheter position verified at the 5-6 o'clock position to the axillary artery and posterior cord. The catheter was secured in usual fashion with skin glue, benzoin, steri-strips, CHG tegaderm and Label noting \"Nerve Block Catheter\". Jerk tape applied at yellow connector and catheter connection. Performed by: Grant Elias CRNA    CT Pelvis Without Contrast    Result Date: 7/20/2024  CT PELVIS WO CONTRAST Date of Exam: 7/20/2024 2:17 AM EDT Indication: eval left hip fracture. Comparison: None available. Technique: Axial CT images were obtained of the pelvis without contrast administration.  Reconstructed coronal and sagittal images were also obtained. Automated exposure control and iterative construction methods were used. Findings: There is an impacted subcapital fracture of the proximal left " femur. There is no evidence of dislocation of the femoral head. There does appear to be a component of the subcapital fracture which may involve the lateral aspect of the femoral neck. The superior to inferior pubic rami are intact. There are postoperative changes from right total hip arthroplasty. There is subchondral cystic change of the lateral acetabulum     Impression: Impacted subcapital fracture of the proximal left femur. Electronically Signed: Doni Vazquez MD  7/20/2024 4:41 AM EDT  Workstation ID: [id number]    CT Upper Extremity Left Without Contrast    Result Date: 7/20/2024  CT UPPER EXTREMITY LEFT WO CONTRAST Date of Exam: 7/20/2024 2:17 AM EDT Indication: evaluate wrist fracture. Comparison: None available. Technique: Axial CT images were obtained of the left upper extremity without contrast administration.  Reconstructed coronal and sagittal images were also obtained. Automated exposure control and iterative construction methods were used. Findings: The exam is limited based upon the position patient's restrictive CT scan and the poor x-ray penetration of the osseous structures. Again demonstrated is a comminuted fracture of the distal radius with displacement. There is a displaced ulnar styloid fracture. There is mild improvement in the alignment of the distal radius fracture with an interval placement of a plaster splint. The scaphoid appears intact. No discrete displaced carpal fracture is identified though again limited by the poor technique.     Impression: Limited exam. There is a comminuted fracture of the distal radius with displacement. There is a displaced ulnar styloid fracture. Electronically Signed: Doni Vazquez MD  7/20/2024 4:37 AM EDT  Workstation ID: [id number]    XR Chest 1 View    Result Date: 7/19/2024  XR CHEST 1 VW Date of Exam: 7/19/2024 11:02 PM EDT Indication: preop Comparison: Chest radiograph dated February 4, 2024 Findings: There are no airspace consolidations. No pleural  fluid. No pneumothorax. The pulmonary vasculature appears within normal limits. The cardiac and mediastinal silhouette appear unremarkable. No acute osseous abnormality identified.     Impression: No active disease Electronically Signed: Doni Vazquez MD  7/19/2024 11:36 PM EDT  Workstation ID: [id number]    XR Wrist 3+ View Left    Result Date: 7/19/2024  XR WRIST 3+ VW LEFT Date of Exam: 7/19/2024 11:01 PM EDT Indication: fall/pain Comparison: None available. Findings: There is a comminuted and displaced fracture of the distal radius. There is a displaced fracture of the ulnar styloid. There is dislocation of the radiocarpal joint in the radial direction. There is widening of the scaphoid trapezium interval.     Impression: Comminuted and displaced fracture of the distal radius with disruption of the radiocarpal's. Electronically Signed: Doni Vazquez MD  7/19/2024 11:34 PM EDT  Workstation ID: [id number]    XR Hip With or Without Pelvis 2 - 3 View Left    Result Date: 7/19/2024  XR HIP W OR WO PELVIS 2-3 VIEW LEFT Date of Exam: 7/19/2024 11:01 PM EDT Indication: fall/pain Comparison: AP pelvis dated 2/4/2024 Findings: There is an acute minimally displaced fracture of the subcapital proximal left femur. There is evidence of a previous right total hip arthroplasty. There are no lytic or destructive bony lesions.     Impression: Fracture of the subcapital left femur Electronically Signed: Doni Vazquez MD  7/19/2024 11:31 PM EDT  Workstation ID: [id number]                 Plan for Follow-up of Pending Labs/Results:     Discharge Details        Discharge Medications        New Medications        Instructions Start Date   aspirin 81 MG EC tablet   81 mg, Oral, Every 12 Hours Scheduled      HYDROcodone-acetaminophen 5-325 MG per tablet  Commonly known as: NORCO   1 tablet, Oral, Every 6 Hours PRN             Changes to Medications        Instructions Start Date   levothyroxine 75 MCG tablet  Commonly known as: SYNTHROID,  LEVOTHROID  What changed: when to take this   75 mcg, Oral, Daily             Continue These Medications        Instructions Start Date   amLODIPine 5 MG tablet  Commonly known as: NORVASC   TAKE 2 TABLETS EVERY DAY      cetirizine 5 MG tablet  Commonly known as: zyrTEC   5 mg, Oral, Daily      diphenhydrAMINE 25 mg capsule  Commonly known as: BENADRYL   25 mg, Oral, Nightly PRN, OTC      latanoprost 0.005 % ophthalmic solution  Commonly known as: XALATAN   1 drop, Ophthalmic, Daily      losartan-hydrochlorothiazide 100-12.5 MG per tablet  Commonly known as: HYZAAR   TAKE 1 TABLET EVERY DAY      multivitamin with minerals tablet tablet   1 tablet, Oral, Daily, OTC      NON FORMULARY   Specialty Compounded Cream for neuropathy of feet. Cream contains lidocaine, gabapentin, ketoprofen, ibuprofen and clonidine per patient. Apply to bilateral feet at bedtime      Ozempic (0.25 or 0.5 MG/DOSE) 2 MG/3ML solution pen-injector  Generic drug: Semaglutide(0.25 or 0.5MG/DOS)   0.25 mg, Subcutaneous, Weekly      pantoprazole 20 MG EC tablet  Commonly known as: PROTONIX   TAKE 1 TABLET EVERY DAY      pravastatin 40 MG tablet  Commonly known as: PRAVACHOL   40 mg, Oral, Every Evening      timolol 0.5 % ophthalmic solution  Commonly known as: TIMOPTIC   1 drop, Both Eyes, 2 Times Daily      vitamin B-12 100 MCG tablet  Commonly known as: CYANOCOBALAMIN   100 mcg, Oral, Daily, OTC      vitamin D3 125 MCG (5000 UT) capsule capsule   5,000 Units, Oral, Daily               Allergies   Allergen Reactions    Caffeine GI Intolerance    Shellfish-Derived Products Rash         Discharge Disposition:  Short Term Hospital (DC - External)    Diet:  Hospital:  Diet Order   Procedures    Diet: Diabetic; Consistent Carbohydrate; Fluid Consistency: Thin (IDDSI 0)            Activity:  - WBAT LUE through elbow, WBAT LLE    Restrictions or Other Recommendations:  - none       CODE STATUS:    Code Status and Medical Interventions:   Ordered at:  07/20/24 1740     Level Of Support Discussed With:    Patient     Code Status (Patient has no pulse and is not breathing):    CPR (Attempt to Resuscitate)     Medical Interventions (Patient has pulse or is breathing):    Full Support       Future Appointments   Date Time Provider Department Center   10/1/2024 10:00 AM Gloria Santoro DO [facility]                 Jessi Harvey DO  07/25/24      Time Spent on Discharge:  I spent  35  minutes on this discharge activity which included: face-to-face encounter with the patient, reviewing the data in the system, coordination of the care with the nursing staff as well as consultants, documentation, and entering orders.            Electronically signed by Jessi Harvey DO at 07/25/24 3629

## 2024-07-25 NOTE — PAYOR COMM NOTE
"Rosario Gonzalez RN  [facility]  Utilization Management  P:[phone]  F:[phone]    Auth # [id number]   Fax 2 of 2    Evie Cardenas (74 y.o. Female)       Date of Birth   1950    Social Security Number       Address   [address] [facility] [address]    Home Phone   [phone]    MRN   [id number]       Denominational   [facility]    Marital Status                               Admission Date   7/19/24    Admission Type   Emergency    Admitting Provider   Jessi Harvey DO    Attending Provider       Department, Room/Bed   [facility] 3G, S366/1       Discharge Date   7/25/2024    Discharge Disposition   [facility] (DC - External)    Discharge Destination                                 Attending Provider: (none)   Allergies: Caffeine, Shellfish-derived Products    Isolation: None   Infection: None   Code Status: Prior    Ht: 172.7 cm (68\")   Wt: 78.2 kg (172 lb 6.4 oz)    Admission Cmt: None   Principal Problem: Femur fracture [S72.90XA]                   Active Insurance as of 7/19/2024       Primary Coverage       Payor Plan Insurance Group Employer/Plan Group    HUMANA MEDICARE REPLACEMENT HUMANA MED ADV PPO [id number]       Payor Plan Address Payor Plan Phone Number Payor Plan Fax Number Effective Dates    [address] [address] [phone]  1/1/2023 - None Entered    [facility] [phone]         Subscriber Name Subscriber Birth Date Member ID       EVIE CARDENAS 1950 [id number]                     Emergency Contacts        (Rel.) Home Phone Work Phone Mobile Phone    sandeep cardenas (Spouse) [phone] -- [phone]                 Consult Notes (all)        Doni Evans MD at 07/20/24 1558                   Orthopedic Consult    Patient: Evie Cardenas                                           YOB: 1950     Date of Admission: 7/19/2024 10:29 PM            Medical Record Number: [id number]    Attending Physician: " Amanda Elias,*    Consulting Physician: Doni Evans MD    Reason for Consult: Left hip fracture.    History of Present Illness: 74 y.o. female admitted to [facility] with Femur fracture [S72.90XA].     The patient was evaluated in the emergency room and was diagnosed with a  hip fracture.   Secondary to the age / multiple medical co morbidities the patient was admitted to the hospitalist.   I was not the orthopedic surgeon on-call however the orthopedic surgeon on-call asked me to assist in managing this patient's femoral neck fracture as she has been a previous patient of mine I replaced her right hip for femoral neck fracture in February of this year    The patient was in the usual state of health and fell from standing height in home, Resulting in sudden onset hip pain and inability to ambulate.   Denies any history of loss of consciousness, headache, vomiting, or seizures.   Denies any other injuries.   The patient is accompanied by  family members  to this hospital visit.     The patient denies any prior  pre-existing pain in the hip.  Patient is a  home/ community ambulator. Patient denies  walker/cane assistive device.   The patient  lives at home with , is quite active and independent in activities of daily living.  The patient denies history of dementia.    Patient denies any history of: DVT/PE, MRSA, COPD, CHF, CAD, , Dementia or A-Fib.   The patient has history of :Diabetes mellitus  The patient is on anticoagulants: none    Past medical history, past surgical history, social history, family history, ALLERGIES, current medications have been reviewed by me.    Past Medical History:   Diagnosis Date    Bladder infection     Dyspareunia, female     Glaucoma     H/O sexual problem     High cholesterol     History of abnormal cervical Pap smear     Hypertension     Hypertension     Hypothyroidism     Impaired functional mobility, balance, gait, and endurance     Labial cyst      Muscle weakness     Type 2 diabetes mellitus     Urinary incontinence     Vaginal itching     Yeast infection      Past Surgical History:   Procedure Laterality Date    CARPAL TUNNEL RELEASE  2007    CATARACT EXTRACTION Right 09/27/2022    CATARACT EXTRACTION, BILATERAL Left     CHOLECYSTECTOMY  1991    EYE SURGERY  2021    HIP FRACTURE SURGERY Right     HYSTERECTOMY  2007    OOPHORECTOMY      TOTAL HIP ARTHROPLASTY Right 02/04/2024    Procedure: TOTAL HIP ARTHROPLASTY ANTERIOR RIGHT;  Surgeon: Doni Evans MD;  Location: [facility];  Service: Orthopedics;  Laterality: Right;    TUBAL ABDOMINAL LIGATION  11/1979     Social History     Occupational History    Not on file   Tobacco Use    Smoking status: Never    Smokeless tobacco: Not on file   Vaping Use    Vaping status: Never Used   Substance and Sexual Activity    Alcohol use: Never    Drug use: Never    Sexual activity: Yes     Partners: Male     Birth control/protection: Surgical, Hysterectomy      Social History     Social History Narrative    Not on file     Family History   Problem Relation Age of Onset    Colon cancer Maternal Uncle     Diabetes Mother     Heart attack Mother     Hypertension Mother     Hyperlipidemia Mother     Thyroid disease Mother     Stroke Mother     Vision loss Mother     Cancer Sister     Thyroid disease Sister     Diabetes Brother     Prostate cancer Brother     Cancer Brother         Efren prostrates cancer    Breast cancer Daughter 46    Diabetes Sister     Hyperlipidemia Sister     Thyroid disease Sister     Vision loss Sister     Vision loss Sister     Hyperlipidemia Sister     Ovarian cancer Neg Hx         Allergies   Allergen Reactions    Shellfish-Derived Products Rash       Home Medications:  Medications Prior to Admission   Medication Sig Dispense Refill Last Dose    amLODIPine (NORVASC) 5 MG tablet TAKE 2 TABLETS EVERY  tablet 0 7/19/2024    levothyroxine (SYNTHROID, LEVOTHROID) 75 MCG tablet Take 1 tablet by  mouth Daily. (Patient taking differently: Take 1 tablet by mouth Every Morning.) 90 tablet 1 7/19/2024    losartan-hydrochlorothiazide (HYZAAR) 100-12.5 MG per tablet TAKE 1 TABLET EVERY DAY 90 tablet 1 7/19/2024    multivitamin with minerals tablet tablet Take 1 tablet by mouth Daily. OTC   7/19/2024    NON FORMULARY Specialty Compounded Cream for neuropathy of feet. Cream contains lidocaine, gabapentin, ketoprofen, ibuprofen and clonidine per patient. Apply to bilateral feet at bedtime   7/19/2024    pantoprazole (PROTONIX) 20 MG EC tablet TAKE 1 TABLET EVERY DAY 90 tablet 0 7/19/2024    timolol (TIMOPTIC) 0.5 % ophthalmic solution Administer 1 drop to both eyes 2 (Two) Times a Day.   7/19/2024    vitamin B-12 (CYANOCOBALAMIN) 100 MCG tablet Take 1 tablet by mouth Daily. OTC   7/19/2024    cetirizine (zyrTEC) 5 MG tablet Take 1 tablet by mouth Daily.       diphenhydrAMINE (BENADRYL) 25 mg capsule Take 1 capsule by mouth At Night As Needed for Itching. OTC       latanoprost (XALATAN) 0.005 % ophthalmic solution Apply 1 drop to eye Daily. (Patient taking differently: Apply 1 drop to eye(s) as directed by provider 2 (Two) Times a Day.) 7.5 mL 3     pravastatin (PRAVACHOL) 40 MG tablet Take 1 tablet by mouth Every Evening. 90 tablet 3     Semaglutide,0.25 or 0.5MG/DOS, (Ozempic, 0.25 or 0.5 MG/DOSE,) 2 MG/3ML solution pen-injector Inject 0.25 mg under the skin into the appropriate area as directed 1 (One) Time Per Week. (Patient taking differently: Inject 0.5 mg under the skin into the appropriate area as directed 1 (One) Time Per Week.)       vitamin D3 125 MCG (5000 UT) capsule capsule Take 1 capsule by mouth Daily.          Current Medications:  Scheduled Meds:amLODIPine, 10 mg, Oral, Daily  ceFAZolin, 2,000 mg, Intravenous, Q8H  cetirizine, 5 mg, Oral, Daily  [Transfer Hold] insulin regular, 2-7 Units, Subcutaneous, Q6H  latanoprost, 1 drop, Both Eyes, Daily  levothyroxine, 75 mcg, Oral, Q AM  lidocaine PF 1%, , ,  "  [START ON 7/21/2024] losartan, 50 mg, Oral, Q24H  pantoprazole, , ,   pantoprazole, 40 mg, Oral, Daily  pantoprazole, 40 mg, Intravenous, Once  pravastatin, 40 mg, Oral, Q PM  sodium chloride, 10 mL, Intravenous, Q12H  timolol, 1 drop, Both Eyes, BID  tranexamic acid, 1,000 mg, Intravenous, On Call to OR      Continuous Infusions:lactated ringers, 9 mL/hr  ropivacaine,   sodium chloride, 100 mL/hr      PRN Meds:.  senna-docusate sodium **AND** polyethylene glycol **AND** bisacodyl **AND** bisacodyl    Calcium Replacement - Follow Nurse / BPA Driven Protocol    chlorhexidine 0.05% in sterile water    [Transfer Hold] dextrose    [Transfer Hold] dextrose    [Transfer Hold] glucagon (human recombinant)    [Transfer Hold] HYDROcodone-acetaminophen    HYDROmorphone    lidocaine PF 1%    Magnesium Standard Dose Replacement - Follow Nurse / BPA Driven Protocol    nitroglycerin    Non-Formulary / Patient Supplied Medication    ondansetron ODT **OR** ondansetron    pantoprazole    Phosphorus Replacement - Follow Nurse / BPA Driven Protocol    Potassium Replacement - Follow Nurse / BPA Driven Protocol    sodium chloride    [COMPLETED] Insert Peripheral IV **AND** [Transfer Hold] sodium chloride    sodium chloride    sodium chloride    sodium chloride    sterile water    vancomycin    Review of Systems:   A 12 point system review was reviewed with the patient and from the chart  and is negative except as in history of present illness.      Physical Exam: 74 y.o. female                    Vitals:    07/20/24 0309 07/20/24 0700 07/20/24 0937 07/20/24 1050   BP: 160/72 153/73 146/69 173/81   BP Location: Right arm Right arm  Right arm   Patient Position: Lying Lying  Lying   Pulse: 97 80 81 101   Resp: 18 16  16   Temp: 98.2 °F (36.8 °C) 98 °F (36.7 °C)  99.8 °F (37.7 °C)   TempSrc: Oral Oral  Temporal   SpO2: 92% 99%  94%   Weight: 78.2 kg (172 lb 6.4 oz)      Height: 172.7 cm (68\")           Physical exam " "deferred    Diagnostic Tests:    Results from last 7 days   Lab Units 07/20/24  0335   WBC 10*3/mm3 15.47*   HEMOGLOBIN g/dL 14.5   HEMATOCRIT % 42.6   PLATELETS 10*3/mm3 269     Results from last 7 days   Lab Units 07/20/24  0335 07/20/24  0044   SODIUM mmol/L 137 136   POTASSIUM mmol/L 3.9 4.3   CHLORIDE mmol/L 100 101   CO2 mmol/L 24.0 23.0   BUN mg/dL 17 18   CREATININE mg/dL 0.73 0.73   GLUCOSE mg/dL 167* 183*   CALCIUM mg/dL 9.4 9.0         No results found for: \"URICACID\"  No results found for: \"CRYSTAL\"  Microbiology Results (last 10 days)       ** No results found for the last 240 hours. **          CT Pelvis Without Contrast    Result Date: 7/20/2024  Impression: Impacted subcapital fracture of the proximal left femur. Electronically Signed: Doni Vazquez MD  7/20/2024 4:41 AM EDT  Workstation ID: [id number]    CT Upper Extremity Left Without Contrast    Result Date: 7/20/2024  Impression: Limited exam. There is a comminuted fracture of the distal radius with displacement. There is a displaced ulnar styloid fracture. Electronically Signed: Doni Vazquez MD  7/20/2024 4:37 AM EDT  Workstation ID: [id number]    XR Chest 1 View    Result Date: 7/19/2024  Impression: No active disease Electronically Signed: Doni Vazquez MD  7/19/2024 11:36 PM EDT  Workstation ID: [id number]    XR Wrist 3+ View Left    Result Date: 7/19/2024  Impression: Comminuted and displaced fracture of the distal radius with disruption of the radiocarpal's. Electronically Signed: Doni Vazquez MD  7/19/2024 11:34 PM EDT  Workstation ID: [id number]    XR Hip With or Without Pelvis 2 - 3 View Left    Result Date: 7/19/2024  Impression: Fracture of the subcapital left femur Electronically Signed: Doni Vazquez MD  7/19/2024 11:31 PM EDT  Workstation ID: [id number]     Assessment: Left intracapsular Hip Fracture.     Femur fracture    Hyperlipidemia LDL goal <70    Hypothyroidism (acquired)    T2DM (type 2 diabetes mellitus)    HTN (hypertension)    Radial " fracture    Left wrist fracture, open, initial encounter      Plan:    Options and alternatives have been discussed in detail with family.   Patient had a type I open fracture of the distal radius as well that was managed by the on-call surgeon Dr. Demond Alejo.  He did take her to the operating room before I was able to arrive at the hospital.  He did discussed with the patient that she would need a total hip replacement for her left displaced femoral neck fracture went over the risk benefits and alternatives the patient voiced understanding of this and wished to undergo surgery for her distal radius and hip at the same setting and did not want to delay that in order to speak with me she was well aware of the risks involved in surgery as she had previously undergone the surgery to several months ago with me and felt comfortable with me doing the surgery and did not feel like she needed to delay her open wrist fracture treatment in order to have a discussion with me about risk benefits alternatives.  Upon my arrival to the hospital I did go over the procedure with the patient's family    The patient is indicated for a left total hip arthroplasty.    The likely,  Risks and benefits of the procedure including but not limited to infection, DVT, pulmonary embolism,  leg length discrepancy, recurrent dislocation, possibility of injury to nerves or vessels, possibility of periprosthetic fractures have been discussed in detail.  Despite the risks involved, The   patient's family  would like to proceed.     The patient is being scheduled for a left  total hip arthroplasty by anterior approach at [facility] tentatively for 7/20/ 2024.     Patient will be made NPO.   Obtain informed consent.     The patient's admitting service has seen the patient and the patient is cleared to the operating room.    Date: 7/20/2024    Doni Evans MD    CC: Radha Gregg, APRN; MD Curt Haney, Amanda Sanders,*                   Electronically signed by Doni Evans MD at 07/20/24 1601       Devan Modi MD at 07/20/24 1266        Consult Orders    1. Inpatient Orthopedic Surgery Consult [[id number]] ordered by Dakota Valiente DO at 07/20/24 0153                         Orthopaedic Consult Note      Patient Care Team:  Radha Gregg APRN as PCP - General (Nurse Practitioner)  Gloria Santoro DO as Consulting Physician (Endocrinology)    Chief complaint   Left hip pain and left wrist pain    Subjective .     History of present illness:    Patient is a 74-year-old female who went out on her deck last night and lost her balance and fell injuring her left wrist and left hip.  In February 2024, sustained another ground-level fall and a right femoral neck fracture requiring acute [place] by Dr. Evans.  She did outstanding from that procedure.  She is left-hand dominant.  Went directly to the ER after her injury and underwent a reduction procedure.  Had a small poke hole laterally on the ulnar side of the wrist.  Denies numbness or tingling in her hand this morning.  Says she feels a stinging sensation in her hand.    Review of Systems:  All systems reviewed are negative except for what is stated in the HPI       History  Family History   Problem Relation Age of Onset    Colon cancer Maternal Uncle     Diabetes Mother     Heart attack Mother     Hypertension Mother     Hyperlipidemia Mother     Thyroid disease Mother     Stroke Mother     Vision loss Mother     Cancer Sister     Thyroid disease Sister     Diabetes Brother     Prostate cancer Brother     Cancer Brother         Efren prostrates cancer    Breast cancer Daughter 46    Diabetes Sister     Hyperlipidemia Sister     Thyroid disease Sister     Vision loss Sister     Vision loss Sister     Hyperlipidemia Sister     Ovarian cancer Neg Hx      Social History     Socioeconomic History    Marital status:    Tobacco Use    Smoking status: Never   Vaping Use     Vaping status: Never Used   Substance and Sexual Activity    Alcohol use: Never    Drug use: Never    Sexual activity: Yes     Partners: Male     Birth control/protection: Surgical, Hysterectomy     Past Surgical History:   Procedure Laterality Date    CARPAL TUNNEL RELEASE  2007    CATARACT EXTRACTION Right 09/27/2022    CATARACT EXTRACTION, BILATERAL Left     CHOLECYSTECTOMY  1991    EYE SURGERY  2021    HIP FRACTURE SURGERY Right     HYSTERECTOMY  2007    OOPHORECTOMY      TOTAL HIP ARTHROPLASTY Right 02/04/2024    Procedure: TOTAL HIP ARTHROPLASTY ANTERIOR RIGHT;  Surgeon: Doni Evans MD;  Location: [facility];  Service: Orthopedics;  Laterality: Right;    TUBAL ABDOMINAL LIGATION  11/1979     Past Medical History:   Diagnosis Date    Bladder infection     Dyspareunia, female     Glaucoma     H/O sexual problem     High cholesterol     History of abnormal cervical Pap smear     Hypertension     Hypertension     Hypothyroidism     Impaired functional mobility, balance, gait, and endurance     Labial cyst     Muscle weakness     Type 2 diabetes mellitus     Urinary incontinence     Vaginal itching     Yeast infection      Allergies   Allergen Reactions    Shellfish-Derived Products Rash       Current Facility-Administered Medications:     amLODIPine (NORVASC) tablet 10 mg, 10 mg, Oral, Daily, Enma Erazo APRN    sennosides-docusate (PERICOLACE) 8.6-50 MG per tablet 2 tablet, 2 tablet, Oral, BID PRN **AND** polyethylene glycol (MIRALAX) packet 17 g, 17 g, Oral, Daily PRN **AND** bisacodyl (DULCOLAX) EC tablet 5 mg, 5 mg, Oral, Daily PRN **AND** bisacodyl (DULCOLAX) suppository 10 mg, 10 mg, Rectal, Daily PRN, Dakota Valiente DO    Calcium Replacement - Follow Nurse / BPA Driven Protocol, , Does not apply, PRN, Dakota Valiente DO    ceFAZolin 2000 mg IVPB in 100 mL NS (MBP), 2,000 mg, Intravenous, Q8H, Dakota Valiente DO    cetirizine (zyrTEC) tablet 5 mg, 5 mg, Oral, Daily, Enma Erazo APRN    dextrose  (D50W) (25 g/50 mL) IV injection 25 g, 25 g, Intravenous, Q15 Min PRN, Enma Erazo APRN    dextrose (GLUTOSE) oral gel 15 g, 15 g, Oral, Q15 Min PRN, Enma Erazo APRN    glucagon (GLUCAGEN) injection 1 mg, 1 mg, Intramuscular, Q15 Min PRN, Enma Erazo APRN    HYDROmorphone (DILAUDID) injection 0.5 mg, 0.5 mg, Intravenous, Q2H PRN, Dakota Valiente DO, 0.5 mg at 07/20/24 0507    insulin regular (humuLIN R,novoLIN R) injection 2-7 Units, 2-7 Units, Subcutaneous, Q6H, Enma Erazo APRN    latanoprost (XALATAN) 0.005 % ophthalmic solution 1 drop, 1 drop, Both Eyes, Daily, Enma Erazo APRN    levothyroxine (SYNTHROID, LEVOTHROID) tablet 75 mcg, 75 mcg, Oral, Q AM, Enma Erazo APRN    losartan (COZAAR) tablet 100 mg, 100 mg, Oral, Q24H, Enma Erazo APRN    Magnesium Standard Dose Replacement - Follow Nurse / BPA Driven Protocol, , Does not apply, Rocco BASS John, DO    nitroglycerin (NITROSTAT) SL tablet 0.4 mg, 0.4 mg, Sublingual, Q5 Min PRN, Dakota Valiente DO    ondansetron ODT (ZOFRAN-ODT) disintegrating tablet 4 mg, 4 mg, Oral, Q6H PRN **OR** ondansetron (ZOFRAN) injection 4 mg, 4 mg, Intravenous, Q6H PRN, Dakota Valiente DO    pantoprazole (PROTONIX) EC tablet 40 mg, 40 mg, Oral, Daily, Enma Erazo APRN    Phosphorus Replacement - Follow Nurse / BPA Driven Protocol, , Does not apply, PRNRocco John, DO    Potassium Replacement - Follow Nurse / BPA Driven Protocol, , Does not apply, PRNRocco John, DO    pravastatin (PRAVACHOL) tablet 40 mg, 40 mg, Oral, Q PM, Enma Erazo APRN    [COMPLETED] Insert Peripheral IV, , , Once **AND** sodium chloride 0.9 % flush 10 mL, 10 mL, Intravenous, PRN, Enrique Isbell MD    sodium chloride 0.9 % flush 10 mL, 10 mL, Intravenous, Q12H, Dakota Valiente DO    sodium chloride 0.9 % flush 10 mL, 10 mL, Intravenous, PRN, Dakota Valiente DO    sodium chloride 0.9 % infusion 40 mL, 40 mL, Intravenous, PRN, Dakota Valiente DO    sodium  chloride 0.9 % infusion, 100 mL/hr, Intravenous, Continuous, Dakota Valiente DO, Last Rate: 100 mL/hr at 07/20/24 0315, 100 mL/hr at 07/20/24 0315    timolol (TIMOPTIC) 0.5 % ophthalmic solution 1 drop, 1 drop, Both Eyes, BID, Enma Erazo, ELIZABETH    Objective     Vital Signs   Temp:  [98.2 °F (36.8 °C)-98.9 °F (37.2 °C)] 98.2 °F (36.8 °C)  Heart Rate:  [] 97  Resp:  [18] 18  BP: (160-180)/() 160/72      Physical Exam:   Left hip: Patient has intact EHL, FHL tibialis anterior and gastrocsoleus.  She has good capillary refill in her toes.  Palpable dorsalis pedis pulse.  Patient has mild pain with gentle hip flexion.  Internal and external rotation elicits significant pain.    Left wrist: Postreduction splint in place.  EPL is intact.  Patient has grossly intact sensation in the median, radial, and ulnar nerve distributions to light touch.  Brisk capillary refill is present in her digits.    Labs:  Lab Results (last 24 hours)       Procedure Component Value Units Date/Time    POC Glucose Once [[id number]]  (Abnormal) Collected: 07/20/24 0543    Specimen: Blood Updated: 07/20/24 0544     Glucose 137 mg/dL     Hemoglobin A1c [[id number]]  (Abnormal) Collected: 07/20/24 0335    Specimen: Blood Updated: 07/20/24 0426     Hemoglobin A1C 6.00 %     Narrative:      Hemoglobin A1C Ranges:    Increased Risk for Diabetes  5.7% to 6.4%  Diabetes                     >= 6.5%  Diabetic Goal                < 7.0%    Basic Metabolic Panel [[id number]]  (Abnormal) Collected: 07/20/24 0335    Specimen: Blood Updated: 07/20/24 0424     Glucose 167 mg/dL      BUN 17 mg/dL      Creatinine 0.73 mg/dL      Sodium 137 mmol/L      Potassium 3.9 mmol/L      Comment: Slight hemolysis detected by analyzer. Result may be falsely elevated.        Chloride 100 mmol/L      CO2 24.0 mmol/L      Calcium 9.4 mg/dL      BUN/Creatinine Ratio 23.3     Anion Gap 13.0 mmol/L      eGFR 86.4 mL/min/1.73     Narrative:      GFR Normal >60  Chronic  Kidney Disease <60  Kidney Failure <15    The GFR formula is only valid for adults with stable renal function between ages 18 and 70.    CBC & Differential [[id number]]  (Abnormal) Collected: 07/20/24 0335    Specimen: Blood Updated: 07/20/24 0405    Narrative:      The following orders were created for panel order CBC & Differential.  Procedure                               Abnormality         Status                     ---------                               -----------         ------                     CBC Auto Differential[[id number]]        Abnormal            Final result                 Please view results for these tests on the individual orders.    CBC Auto Differential [[id number]]  (Abnormal) Collected: 07/20/24 0335    Specimen: Blood Updated: 07/20/24 0405     WBC 15.47 10*3/mm3      RBC 4.84 10*6/mm3      Hemoglobin 14.5 g/dL      Hematocrit 42.6 %      MCV 88.0 fL      MCH 30.0 pg      MCHC 34.0 g/dL      RDW 12.3 %      RDW-SD 39.8 fl      MPV 9.7 fL      Platelets 269 10*3/mm3      Neutrophil % 85.9 %      Lymphocyte % 7.5 %      Monocyte % 5.4 %      Eosinophil % 0.3 %      Basophil % 0.3 %      Immature Grans % 0.6 %      Neutrophils, Absolute 13.31 10*3/mm3      Lymphocytes, Absolute 1.16 10*3/mm3      Monocytes, Absolute 0.83 10*3/mm3      Eosinophils, Absolute 0.04 10*3/mm3      Basophils, Absolute 0.04 10*3/mm3      Immature Grans, Absolute 0.09 10*3/mm3      nRBC 0.0 /100 WBC     Comprehensive Metabolic Panel [[id number]]  (Abnormal) Collected: 07/20/24 0044    Specimen: Blood Updated: 07/20/24 0113     Glucose 183 mg/dL      BUN 18 mg/dL      Creatinine 0.73 mg/dL      Sodium 136 mmol/L      Potassium 4.3 mmol/L      Comment: Specimen hemolyzed.  Result may be falsely elevated.        Chloride 101 mmol/L      CO2 23.0 mmol/L      Calcium 9.0 mg/dL      Total Protein 7.2 g/dL      Albumin 4.2 g/dL      ALT (SGPT) 26 U/L      Comment: Specimen hemolyzed.  Result may  be falsely elevated.         AST (SGOT) 28 U/L      Alkaline Phosphatase 64 U/L      Total Bilirubin 0.4 mg/dL      Globulin 3.0 gm/dL      Comment: Calculated Result        A/G Ratio 1.4 g/dL      BUN/Creatinine Ratio 24.7     Anion Gap 12.0 mmol/L      eGFR 86.4 mL/min/1.73     Narrative:      GFR Normal >60  Chronic Kidney Disease <60  Kidney Failure <15    The GFR formula is only valid for adults with stable renal function between ages 18 and 70.            Imaging:  We have reviewed and interpreted imaging of the patient's left wrist including plain films and postreduction CT.  We have also looked at plain films of the left hip and a CT of the left hip.  In the wrist, patient appears to have a very comminuted fracture of the distal radius and ulnar styloid.  Postreduction films appear to be improved with regards to alignment overall.    Review of the imaging of the left hip shows a valgus impacted left femoral neck fracture.  On the lateral and sagittal imaging, concerned about some posterior displacement of the femoral head relative to the anterior aspect of the femoral neck.  There is some comminution at the fracture site.        Assessment & Plan   74-year-old female with a complex injury involving the left distal radius and ulna and left femoral neck.  I had a lengthy discussion with the patient and her  (via speaker phone) this morning.  Given how well she did on the contralateral side with CHEYENNE and the possibility of failure with percutaneous screw fixation, I recommend that she undergo acute left CHEYENNE.  Will contact Dr. Evans to inquire about his availability today or tomorrow.  Will discuss the case with him.  With regards to her left wrist, I believe she is going to require ORIF as well.  This is her dominant hand.  Significant comminution at the fracture site.  Polytrauma as well.  Risk, benefits, potential hazards, typical recovery and rehab as well as reasonable alternatives to open reduction and internal fixation of the  left distal radius were discussed with her.  We will proceed with surgery either this morning or during this hospitalization depending on the timing of her hip fracture surgery.  Continue n.p.o. and holding chemical DVT prophylaxis.  Patient does have type 2 diabetes with a recent hemoglobin A1c of 6.      Femur fracture    Hyperlipidemia LDL goal <70    Hypothyroidism (acquired)    T2DM (type 2 diabetes mellitus)    HTN (hypertension)    Radial fracture        I discussed the patient's findings and my recommendations as well as treatment options and alternatives with patient, family, and nursing staff.  Surgical versus non surgical treatment options were discussed as well.  We reviewed the nature of the planned procedure including the risks, benefits and potential complications.  Understanding was expressed and the decision was made to proceed with surgery.      Devan Modi MD  24  07:53 EDT                 Electronically signed by Devan Modi MD at 24 0759          Discharge Summary        Jessi Harvey DO at 24 [phone]              [facility] Medicine Services  DISCHARGE SUMMARY    Patient Name: Pari Howell  : 1950  MRN: [id number]    Date of Admission: 2024 10:29 PM  Date of Discharge:  2024  Primary Care Physician: Radha Gregg APRN    Consults       Date and Time Order Name Status Description    2024  3:02 AM Inpatient Orthopedic Surgery Consult Completed             Hospital Course     Presenting Problem: fall with Left wrist, L femur fx    Active Hospital Problems    Diagnosis  POA    **Femur fracture [S72.90XA]  Yes    T2DM (type 2 diabetes mellitus) [E11.9]  Yes    HTN (hypertension) [I10]  Yes    Radial fracture [S52.90XA]  Yes    Left wrist fracture, open, initial encounter [S62.102B]  Unknown    Hypothyroidism (acquired) [E03.9]  Yes    Hyperlipidemia LDL goal <70 [E78.5]  Yes      Resolved Hospital Problems    No resolved problems to display.          Hospital Course:  Pari Howell is a 74 y.o. female  with hx of HTN, HLD, hypothyroidism, T2DM, IBS who presented to the ED w/ c/o a fall.  Imaging shows left femur fracture and left distal radius fracture.  She underwent repair of left wrist and left hip on 7/20.     Left femur fracture   Left distal radius fracture   -2/2 mechanical fall   -CT pelvis impacted subcapital fracture of the proximal left femur   - CT LUE shows comminuted fracture of the distal radius with displacement  -s/p ORIF distal left radius and anterior CHEYENNE with Dr. Evans/Lane on 7/20  - baby ASA BID for DVT ppx  - WBAT to LLE, WBAT to L elbow  - will need f/u with Dr. Sherman 2 weeks post-discharge for wound check/suture removal and placement in short arm case  - to transfer to [facility] today     T2DM  -hem A1c 6.0  -on Semaglutide      Neuropathy  -uses cream from compound pharmacy      HTN   HLD   -continue routine Norvasc, losartan-HCTZ  -continued statin     Hypothyroidism   -continue synthroid     Discharge Follow Up Recommendations for outpatient labs/diagnostics:   - f/u with Dr. Sherman 8/1 for wound check/suture removal and X-rays  - ASA BID for DVT ppx    Day of Discharge     HPI:   Patient up working with PT. Doing well. Anticipating transfer to rehab today.    Review of Systems  Gen- No fevers, chills  CV- No chest pain, palpitations  Resp- No cough, dyspnea  GI- No N/V/D, abd pain      Vital Signs:   Temp:  [98 °F (36.7 °C)-98.2 °F (36.8 °C)] 98 °F (36.7 °C)  Heart Rate:  [70-80] 70  Resp:  [16] 16  BP: (109-163)/(65-80) 127/71  Flow (L/min):  [2] 2      Physical Exam:  Constitutional: No acute distress, awake, alert, up walking with PT  HENT: NCAT, mucous membranes moist  Respiratory: respiratory effort normal   Cardiovascular: RRR  Musculoskeletal: Left arm in sling  Psychiatric: Appropriate affect, cooperative  Neurologic: Oriented x 3, no focal deficits  Skin: No  bintaes      Pertinent  and/or Most Recent Results     LAB RESULTS:      Lab 07/21/24  1505 07/20/24  0335   WBC 11.96* 15.47*   HEMOGLOBIN 11.2* 14.5   HEMATOCRIT 32.2* 42.6   PLATELETS 192 269   NEUTROS ABS 7.96* 13.31*   IMMATURE GRANS (ABS) 0.09* 0.09*   LYMPHS ABS 1.85 1.16   MONOS ABS 1.05* 0.83   EOS ABS 0.93* 0.04   MCV 88.7 88.0         Lab 07/21/24  0923 07/20/24  0335 07/20/24  0044   SODIUM 139 137 136   POTASSIUM 4.7 3.9 4.3   CHLORIDE 105 100 101   CO2 19.0* 24.0 23.0   ANION GAP 15.0 13.0 12.0   BUN 11 17 18   CREATININE 0.66 0.73 0.73   EGFR 92.2 86.4 86.4   GLUCOSE 175* 167* 183*   CALCIUM 8.9 9.4 9.0   PHOSPHORUS 2.8  --   --    HEMOGLOBIN A1C  --  6.00*  --          Lab 07/21/24  0923 07/20/24  0044   TOTAL PROTEIN  --  7.2   ALBUMIN 3.5 4.2   GLOBULIN  --  3.0   ALT (SGPT)  --  26   AST (SGOT)  --  28   BILIRUBIN  --  0.4   ALK PHOS  --  64                 Lab 07/20/24  0429 07/20/24  0335   ABO TYPING A A   RH TYPING Positive Positive   ANTIBODY SCREEN  --  Negative         Brief Urine Lab Results  (Last result in the past 365 days)        Color   Clarity   Blood   Leuk Est   Nitrite   Protein   CREAT   Urine HCG        04/01/24 0815             135.4               Microbiology Results (last 10 days)       ** No results found for the last 240 hours. **            XR Hip With or Without Pelvis 2 - 3 View Left    Result Date: 7/20/2024  XR HIP W OR WO PELVIS 2-3 VIEW LEFT Date of Exam: 7/20/2024 4:17 PM EDT Indication: Postop Comparison: 7/19/2024 Findings: Total left hip prosthesis is now seen, components in anatomic alignment. There is expected postop soft tissue air. Right hip prosthesis remains in anatomic alignment. No abnormal lucency is seen around the prosthetic components. Bony structures appear intact.     Impression: Total left hip prosthesis in anatomic alignment. No new bony abnormality seen. Electronically Signed: Jasmeet Carrasco MD  7/20/2024 4:53 PM EDT  Workstation ID: [id number]    JIMMIE CAVAZOS  Arm During Surgery    Result Date: 7/20/2024  This procedure was auto-finalized with no dictation required.    Left Fascia iliac SS    Result Date: 7/20/2024  Grant Elias CRNA     7/20/2024 11:59 AM Left Fascia iliac SS Patient reassessed immediately prior to procedure Start time: 7/20/2024 11:20 AM Reason for block: at surgeon's request and post-op pain management Performed by CRNA/CAA: Grant Elias, ADAMA Assisted by: Janina Chávez RN Preanesthetic Checklist Completed: patient identified, IV checked, site marked, risks and benefits discussed, surgical consent, monitors and equipment checked, pre-op evaluation and timeout performed Prep: Pt Position: supine Sterile barriers:cap, gloves, mask and washed/disinfected hands Prep: ChloraPrep Patient monitoring: blood pressure monitoring, continuous pulse oximetry and EKG Procedure Sedation: yes Performed under: local infiltration Guidance:ultrasound guided ULTRASOUND INTERPRETATION.  Using ultrasound guidance a 20 G gauge needle was placed in close proximity to the nerve, at which point, under ultrasound guidance anesthetic was injected in the area of the nerve and spread of the anesthesia was seen on ultrasound in close proximity thereto.  There were no abnormalities seen on ultrasound; a digital image was taken; and the patient tolerated the procedure with no complications. Images:still images obtained, printed/placed on chart Laterality:left Block Type:fascia iliaca compartment Injection Technique:single-shot Needle Type:echogenic and Tuohy Needle Gauge:18 G Resistance on Injection: none Catheter Size:20 G (20g) Medications Used: bupivacaine PF (MARCAINE) 0.25 % injection - Injection  30 mL - 7/20/2024 11:20:00 AM Medications Preservative Free Saline:5ml Post Assessment Injection Assessment: negative aspiration for heme, no paresthesia on injection and incremental injection Patient Tolerance:comfortable throughout block Complications:no Additional Notes  "CKAFASCIAILIACA: SINGLE shot A high-frequency linear transducer, with sterile cover, was placed in parasagittal plane on top of the Anterior Superior Iliac Spine (ASIS) and moved medially to identify the Internal Oblique muscle, Sartorius muscle, Iliacus Muscle, Fascia Iliaca (FI) and Fascia Latae. The insertion site was prepped and draped in sterile fashion. Skin and cutaneous tissue was infiltrated with 2-5 ml of 1% Lidocaine. Using ultrasound-guidance, a 20-gauge B-Moraes 4\" Ultraplex 360 non-stimulating echogenic needle was advanced in plane from caudad to cephalad. Preservative-free normal saline was utilized for hydro-dissection of tissue, advancement of needle, and to confirm final needle placement below FI. Local anesthetic in incremental 3-5 ml injections. Aspiration every 5 ml to prevent intravascular injection. Injection was completed with negative aspiration of blood and negative intravascular injection. Injection pressures were normal with minimal resistance. Performed by: Grant Elias CRNA     left Infraclavicular Cath    Result Date: 7/20/2024  Grant Elias CRNA     7/20/2024 12:00 PM Left Infraclavicular Cath Patient reassessed immediately prior to procedure Patient location during procedure: pre-op Start time: 7/20/2024 11:15 AM Reason for block: at surgeon's request and post-op pain management Performed by Anesthesiologist: Tyson Hood MD CRNA/CAA: Grant Elias CRNA Preanesthetic Checklist Completed: patient identified, IV checked, site marked, risks and benefits discussed, surgical consent, monitors and equipment checked, pre-op evaluation and timeout performed Prep: Pt Position: supine Sterile barriers:cap, gloves, mask and washed/disinfected hands Prep: ChloraPrep Patient monitoring: blood pressure monitoring, continuous pulse oximetry and EKG Procedure Sedation: yes Performed under: local infiltration Guidance:ultrasound guided ULTRASOUND INTERPRETATION.  Using " ultrasound guidance a 20 G gauge needle was placed in close proximity to the brachial plexus nerve, at which point, under ultrasound guidance anesthetic was injected in the area of the nerve and spread of the anesthesia was seen on ultrasound in close proximity thereto.  There were no abnormalities seen on ultrasound; a digital image was taken; and the patient tolerated the procedure with no complications. Images:still images obtained, printed/placed on chart Laterality:left Block Type:infraclavicular Injection Technique:catheter Needle Type:Tuohy and echogenic Needle Gauge:18 G Resistance on Injection: none Catheter Size:20 G Cath Depth at skin: 9 cm Medications Used: fentaNYL citrate (PF) (SUBLIMAZE) injection - Intravenous  100 mcg - 7/20/2024 11:15:00 AM bupivacaine PF (MARCAINE) 0.25 % injection - Injection  30 mL - 7/20/2024 11:15:00 AM Medications Preservative Free Saline:3ml Post Assessment Injection Assessment: negative aspiration for heme, no paresthesia on injection and incremental injection Patient Tolerance:comfortable throughout block Complications:no Additional Notes CATHETER The affected upper extremity was abducted and rotated 90 degrees at the shoulder, within the patients range of motion. A high-frequency linear transducer, with sterile cover, was placed just medial to the coracoid process, distal third of the clavicle, and inferior to the clavicle. Keeping the transducer is in a parasagittal plane (cephalad to caudad), scanning medially to laterally. The Pectoralis major and minor, brachial plexus, axillary artery and vein, rib and pleura where visualized. The insertion site was prepped and draped in sterile fashion. Skin and cutaneous tissue was infiltrated with 2-5 ml of 1% Lidocaine. Using ultrasound-guidance, an 18-gauge Contiplex Ultra 360 Touhy needle was advanced in plane lateral to the axillary artery and lateral cord of the brachial plexus. Preservative-free normal saline was utilized  "for hydro-dissection of tissue, advancement of Touhy needle, and to confirm final needle placement posterior to the axillary artery and posterior cord of the brachial plexus. Local anesthetic injection spread, in incremental 3-5 ml injections, was confirmed. Aspiration every 5 ml to prevent intravascular injection. Injection was completed with negative aspiration of blood and negative intravascular injection. Injection pressures were normal with minimal resistance. A 20-gauge Contiplex Echo catheter was placed through the needle and advance out the tip of the Touhy 1-3 cm. The Touhy needle was then removed, and final catheter position verified at the 5-6 o'clock position to the axillary artery and posterior cord. The catheter was secured in usual fashion with skin glue, benzoin, steri-strips, CHG tegaderm and Label noting \"Nerve Block Catheter\". Jerk tape applied at yellow connector and catheter connection. Performed by: Grant Elias CRNA    CT Pelvis Without Contrast    Result Date: 7/20/2024  CT PELVIS WO CONTRAST Date of Exam: 7/20/2024 2:17 AM EDT Indication: eval left hip fracture. Comparison: None available. Technique: Axial CT images were obtained of the pelvis without contrast administration.  Reconstructed coronal and sagittal images were also obtained. Automated exposure control and iterative construction methods were used. Findings: There is an impacted subcapital fracture of the proximal left femur. There is no evidence of dislocation of the femoral head. There does appear to be a component of the subcapital fracture which may involve the lateral aspect of the femoral neck. The superior to inferior pubic rami are intact. There are postoperative changes from right total hip arthroplasty. There is subchondral cystic change of the lateral acetabulum     Impression: Impacted subcapital fracture of the proximal left femur. Electronically Signed: Doni Vazquez MD  7/20/2024 4:41 AM EDT  Workstation ID: " [id number]    CT Upper Extremity Left Without Contrast    Result Date: 7/20/2024  CT UPPER EXTREMITY LEFT WO CONTRAST Date of Exam: 7/20/2024 2:17 AM EDT Indication: evaluate wrist fracture. Comparison: None available. Technique: Axial CT images were obtained of the left upper extremity without contrast administration.  Reconstructed coronal and sagittal images were also obtained. Automated exposure control and iterative construction methods were used. Findings: The exam is limited based upon the position patient's restrictive CT scan and the poor x-ray penetration of the osseous structures. Again demonstrated is a comminuted fracture of the distal radius with displacement. There is a displaced ulnar styloid fracture. There is mild improvement in the alignment of the distal radius fracture with an interval placement of a plaster splint. The scaphoid appears intact. No discrete displaced carpal fracture is identified though again limited by the poor technique.     Impression: Limited exam. There is a comminuted fracture of the distal radius with displacement. There is a displaced ulnar styloid fracture. Electronically Signed: Doni Vazquez MD  7/20/2024 4:37 AM EDT  Workstation ID: [id number]    XR Chest 1 View    Result Date: 7/19/2024  XR CHEST 1 VW Date of Exam: 7/19/2024 11:02 PM EDT Indication: preop Comparison: Chest radiograph dated February 4, 2024 Findings: There are no airspace consolidations. No pleural fluid. No pneumothorax. The pulmonary vasculature appears within normal limits. The cardiac and mediastinal silhouette appear unremarkable. No acute osseous abnormality identified.     Impression: No active disease Electronically Signed: Doni Vazquez MD  7/19/2024 11:36 PM EDT  Workstation ID: [id number]    XR Wrist 3+ View Left    Result Date: 7/19/2024  XR WRIST 3+ VW LEFT Date of Exam: 7/19/2024 11:01 PM EDT Indication: fall/pain Comparison: None available. Findings: There is a comminuted and displaced fracture  of the distal radius. There is a displaced fracture of the ulnar styloid. There is dislocation of the radiocarpal joint in the radial direction. There is widening of the scaphoid trapezium interval.     Impression: Comminuted and displaced fracture of the distal radius with disruption of the radiocarpal's. Electronically Signed: Doni Vazquez MD  7/19/2024 11:34 PM EDT  Workstation ID: [id number]    XR Hip With or Without Pelvis 2 - 3 View Left    Result Date: 7/19/2024  XR HIP W OR WO PELVIS 2-3 VIEW LEFT Date of Exam: 7/19/2024 11:01 PM EDT Indication: fall/pain Comparison: AP pelvis dated 2/4/2024 Findings: There is an acute minimally displaced fracture of the subcapital proximal left femur. There is evidence of a previous right total hip arthroplasty. There are no lytic or destructive bony lesions.     Impression: Fracture of the subcapital left femur Electronically Signed: Doni Vazquez MD  7/19/2024 11:31 PM EDT  Workstation ID: [id number]                 Plan for Follow-up of Pending Labs/Results:     Discharge Details        Discharge Medications        New Medications        Instructions Start Date   aspirin 81 MG EC tablet   81 mg, Oral, Every 12 Hours Scheduled      HYDROcodone-acetaminophen 5-325 MG per tablet  Commonly known as: NORCO   1 tablet, Oral, Every 6 Hours PRN             Changes to Medications        Instructions Start Date   levothyroxine 75 MCG tablet  Commonly known as: SYNTHROID, LEVOTHROID  What changed: when to take this   75 mcg, Oral, Daily             Continue These Medications        Instructions Start Date   amLODIPine 5 MG tablet  Commonly known as: NORVASC   TAKE 2 TABLETS EVERY DAY      cetirizine 5 MG tablet  Commonly known as: zyrTEC   5 mg, Oral, Daily      diphenhydrAMINE 25 mg capsule  Commonly known as: BENADRYL   25 mg, Oral, Nightly PRN, OTC      latanoprost 0.005 % ophthalmic solution  Commonly known as: XALATAN   1 drop, Ophthalmic, Daily      losartan-hydrochlorothiazide  100-12.5 MG per tablet  Commonly known as: HYZAAR   TAKE 1 TABLET EVERY DAY      multivitamin with minerals tablet tablet   1 tablet, Oral, Daily, OTC      NON FORMULARY   Specialty Compounded Cream for neuropathy of feet. Cream contains lidocaine, gabapentin, ketoprofen, ibuprofen and clonidine per patient. Apply to bilateral feet at bedtime      Ozempic (0.25 or 0.5 MG/DOSE) 2 MG/3ML solution pen-injector  Generic drug: Semaglutide(0.25 or 0.5MG/DOS)   0.25 mg, Subcutaneous, Weekly      pantoprazole 20 MG EC tablet  Commonly known as: PROTONIX   TAKE 1 TABLET EVERY DAY      pravastatin 40 MG tablet  Commonly known as: PRAVACHOL   40 mg, Oral, Every Evening      timolol 0.5 % ophthalmic solution  Commonly known as: TIMOPTIC   1 drop, Both Eyes, 2 Times Daily      vitamin B-12 100 MCG tablet  Commonly known as: CYANOCOBALAMIN   100 mcg, Oral, Daily, OTC      vitamin D3 125 MCG (5000 UT) capsule capsule   5,000 Units, Oral, Daily               Allergies   Allergen Reactions    Caffeine GI Intolerance    Shellfish-Derived Products Rash         Discharge Disposition:  Short Term Hospital (DC - External)    Diet:  Hospital:  Diet Order   Procedures    Diet: Diabetic; Consistent Carbohydrate; Fluid Consistency: Thin (IDDSI 0)            Activity:  - WBAT LUE through elbow, WBAT LLE    Restrictions or Other Recommendations:  - none       CODE STATUS:    Code Status and Medical Interventions:   Ordered at: 07/20/24 1740     Level Of Support Discussed With:    Patient     Code Status (Patient has no pulse and is not breathing):    CPR (Attempt to Resuscitate)     Medical Interventions (Patient has pulse or is breathing):    Full Support       Future Appointments   Date Time Provider Department Center   10/1/2024 10:00 AM Gloria Santoro, DO [facility] Harvey DO  07/25/24      Time Spent on Discharge:  I spent  35  minutes on this discharge activity which included: face-to-face encounter  with the patient, reviewing the data in the system, coordination of the care with the nursing staff as well as consultants, documentation, and entering orders.            Electronically signed by Jessi Harvey DO at 07/25/24 [id number]

## 2024-07-25 NOTE — PROGRESS NOTES
[facility]    Acute pain service Inpatient Progress Note    Patient Name: Pari Howell  :  1950  MRN:  [id number]        Acute Pain  Service Inpatient Progress Note:    Analgesia:Good  Pain Score:0/10  LOC: alert and awake  Resp Status: room air  Cardiac: VS stable  Side Effects:None  Catheter Site:clean, dressing intact and dry  Cath type: peripheral nerve cath(InfuSystem)  Volume: 1mL,5ml, 5ml InfuSystem Pump.  Catheter Plan:Catheter to remain Insitu and Continue catheter infusion rate unchanged  Comments: The neuro assessment of the operative extremity includes the ability to do finger flexion and extension; includes the ability to do wrist flexion and extension, includes the ability to do elbow flexion and extension.  The neuro exam of the patient includes sensory function throughout the operative extremity.

## 2024-07-25 NOTE — DISCHARGE SUMMARY
[facility] Medicine Services  DISCHARGE SUMMARY    Patient Name: Pari Howell  : 1950  MRN: [id number]    Date of Admission: 2024 10:29 PM  Date of Discharge:  2024  Primary Care Physician: aRdha Gregg APRN    Consults       Date and Time Order Name Status Description    2024  3:02 AM Inpatient Orthopedic Surgery Consult Completed             Hospital Course     Presenting Problem: fall with Left wrist, L femur fx    Active Hospital Problems    Diagnosis  POA    **Femur fracture [S72.90XA]  Yes    T2DM (type 2 diabetes mellitus) [E11.9]  Yes    HTN (hypertension) [I10]  Yes    Radial fracture [S52.90XA]  Yes    Left wrist fracture, open, initial encounter [S62.102B]  Unknown    Hypothyroidism (acquired) [E03.9]  Yes    Hyperlipidemia LDL goal <70 [E78.5]  Yes      Resolved Hospital Problems   No resolved problems to display.          Hospital Course:  Pari Howell is a 74 y.o. female  with hx of HTN, HLD, hypothyroidism, T2DM, IBS who presented to the ED w/ c/o a fall.  Imaging shows left femur fracture and left distal radius fracture.  She underwent repair of left wrist and left hip on .     Left femur fracture   Left distal radius fracture   -2/2 mechanical fall   -CT pelvis impacted subcapital fracture of the proximal left femur   - CT LUE shows comminuted fracture of the distal radius with displacement  -s/p ORIF distal left radius and anterior CHEYENNE with Dr. Evans/Lane on   - baby ASA BID for DVT ppx  - WBAT to LLE, WBAT to L elbow  - will need f/u with Dr. Sherman 2 weeks post-discharge for wound check/suture removal and placement in short arm case  - to transfer to [facility] today     T2DM  -hem A1c 6.0  -on Semaglutide      Neuropathy  -uses cream from compound pharmacy      HTN   HLD   -continue routine Norvasc, losartan-HCTZ  -continued statin     Hypothyroidism   -continue synthroid     Discharge Follow Up Recommendations for  outpatient labs/diagnostics:   - f/u with Dr. Sherman 8/1 for wound check/suture removal and X-rays  - ASA BID for DVT ppx    Day of Discharge     HPI:   Patient up working with PT. Doing well. Anticipating transfer to rehab today.    Review of Systems  Gen- No fevers, chills  CV- No chest pain, palpitations  Resp- No cough, dyspnea  GI- No N/V/D, abd pain      Vital Signs:   Temp:  [98 °F (36.7 °C)-98.2 °F (36.8 °C)] 98 °F (36.7 °C)  Heart Rate:  [70-80] 70  Resp:  [16] 16  BP: (109-163)/(65-80) 127/71  Flow (L/min):  [2] 2      Physical Exam:  Constitutional: No acute distress, awake, alert, up walking with PT  HENT: NCAT, mucous membranes moist  Respiratory: respiratory effort normal   Cardiovascular: RRR  Musculoskeletal: Left arm in sling  Psychiatric: Appropriate affect, cooperative  Neurologic: Oriented x 3, no focal deficits  Skin: No rashes      Pertinent  and/or Most Recent Results     LAB RESULTS:      Lab 07/21/24  1505 07/20/24  0335   WBC 11.96* 15.47*   HEMOGLOBIN 11.2* 14.5   HEMATOCRIT 32.2* 42.6   PLATELETS 192 269   NEUTROS ABS 7.96* 13.31*   IMMATURE GRANS (ABS) 0.09* 0.09*   LYMPHS ABS 1.85 1.16   MONOS ABS 1.05* 0.83   EOS ABS 0.93* 0.04   MCV 88.7 88.0         Lab 07/21/24  0923 07/20/24  0335 07/20/24  0044   SODIUM 139 137 136   POTASSIUM 4.7 3.9 4.3   CHLORIDE 105 100 101   CO2 19.0* 24.0 23.0   ANION GAP 15.0 13.0 12.0   BUN 11 17 18   CREATININE 0.66 0.73 0.73   EGFR 92.2 86.4 86.4   GLUCOSE 175* 167* 183*   CALCIUM 8.9 9.4 9.0   PHOSPHORUS 2.8  --   --    HEMOGLOBIN A1C  --  6.00*  --          Lab 07/21/24  0923 07/20/24  0044   TOTAL PROTEIN  --  7.2   ALBUMIN 3.5 4.2   GLOBULIN  --  3.0   ALT (SGPT)  --  26   AST (SGOT)  --  28   BILIRUBIN  --  0.4   ALK PHOS  --  64                 Lab 07/20/24  0429 07/20/24  0335   ABO TYPING A A   RH TYPING Positive Positive   ANTIBODY SCREEN  --  Negative         Brief Urine Lab Results  (Last result in the past 365 days)        Color   Clarity    Blood   Leuk Est   Nitrite   Protein   CREAT   Urine HCG        04/01/24 0815             135.4               Microbiology Results (last 10 days)       ** No results found for the last 240 hours. **            XR Hip With or Without Pelvis 2 - 3 View Left    Result Date: 7/20/2024  XR HIP W OR WO PELVIS 2-3 VIEW LEFT Date of Exam: 7/20/2024 4:17 PM EDT Indication: Postop Comparison: 7/19/2024 Findings: Total left hip prosthesis is now seen, components in anatomic alignment. There is expected postop soft tissue air. Right hip prosthesis remains in anatomic alignment. No abnormal lucency is seen around the prosthetic components. Bony structures appear intact.     Impression: Total left hip prosthesis in anatomic alignment. No new bony abnormality seen. Electronically Signed: Jasmeet Carrasco MD  7/20/2024 4:53 PM EDT  Workstation ID: [id number]    FL C Arm During Surgery    Result Date: 7/20/2024  This procedure was auto-finalized with no dictation required.    Left Fascia iliac SS    Result Date: 7/20/2024  Grant Elias CRNA     7/20/2024 11:59 AM Left Fascia iliac SS Patient reassessed immediately prior to procedure Start time: 7/20/2024 11:20 AM Reason for block: at surgeon's request and post-op pain management Performed by CRNA/CAA: Grant Elias CRNA Assisted by: Janina Chávez RN Preanesthetic Checklist Completed: patient identified, IV checked, site marked, risks and benefits discussed, surgical consent, monitors and equipment checked, pre-op evaluation and timeout performed Prep: Pt Position: supine Sterile barriers:cap, gloves, mask and washed/disinfected hands Prep: ChloraPrep Patient monitoring: blood pressure monitoring, continuous pulse oximetry and EKG Procedure Sedation: yes Performed under: local infiltration Guidance:ultrasound guided ULTRASOUND INTERPRETATION.  Using ultrasound guidance a 20 G gauge needle was placed in close proximity to the nerve, at which point, under ultrasound guidance  "anesthetic was injected in the area of the nerve and spread of the anesthesia was seen on ultrasound in close proximity thereto.  There were no abnormalities seen on ultrasound; a digital image was taken; and the patient tolerated the procedure with no complications. Images:still images obtained, printed/placed on chart Laterality:left Block Type:fascia iliaca compartment Injection Technique:single-shot Needle Type:echogenic and Tuohy Needle Gauge:18 G Resistance on Injection: none Catheter Size:20 G (20g) Medications Used: bupivacaine PF (MARCAINE) 0.25 % injection - Injection  30 mL - 7/20/2024 11:20:00 AM Medications Preservative Free Saline:5ml Post Assessment Injection Assessment: negative aspiration for heme, no paresthesia on injection and incremental injection Patient Tolerance:comfortable throughout block Complications:no Additional Notes CKAFASCIAILIACA: SINGLE shot A high-frequency linear transducer, with sterile cover, was placed in parasagittal plane on top of the Anterior Superior Iliac Spine (ASIS) and moved medially to identify the Internal Oblique muscle, Sartorius muscle, Iliacus Muscle, Fascia Iliaca (FI) and Fascia Latae. The insertion site was prepped and draped in sterile fashion. Skin and cutaneous tissue was infiltrated with 2-5 ml of 1% Lidocaine. Using ultrasound-guidance, a 20-gauge B-Moraes 4\" Ultraplex 360 non-stimulating echogenic needle was advanced in plane from caudad to cephalad. Preservative-free normal saline was utilized for hydro-dissection of tissue, advancement of needle, and to confirm final needle placement below FI. Local anesthetic in incremental 3-5 ml injections. Aspiration every 5 ml to prevent intravascular injection. Injection was completed with negative aspiration of blood and negative intravascular injection. Injection pressures were normal with minimal resistance. Performed by: Grant Elias CRNA     left Infraclavicular Cath    Result Date: 7/20/2024  Curt" Grant GILLIAM CRNA     7/20/2024 12:00 PM Left Infraclavicular Cath Patient reassessed immediately prior to procedure Patient location during procedure: pre-op Start time: 7/20/2024 11:15 AM Reason for block: at surgeon's request and post-op pain management Performed by Anesthesiologist: Tyson Hood MD CRNA/CAA: Grant Elias CRNA Preanesthetic Checklist Completed: patient identified, IV checked, site marked, risks and benefits discussed, surgical consent, monitors and equipment checked, pre-op evaluation and timeout performed Prep: Pt Position: supine Sterile barriers:cap, gloves, mask and washed/disinfected hands Prep: ChloraPrep Patient monitoring: blood pressure monitoring, continuous pulse oximetry and EKG Procedure Sedation: yes Performed under: local infiltration Guidance:ultrasound guided ULTRASOUND INTERPRETATION.  Using ultrasound guidance a 20 G gauge needle was placed in close proximity to the brachial plexus nerve, at which point, under ultrasound guidance anesthetic was injected in the area of the nerve and spread of the anesthesia was seen on ultrasound in close proximity thereto.  There were no abnormalities seen on ultrasound; a digital image was taken; and the patient tolerated the procedure with no complications. Images:still images obtained, printed/placed on chart Laterality:left Block Type:infraclavicular Injection Technique:catheter Needle Type:Tuohy and echogenic Needle Gauge:18 G Resistance on Injection: none Catheter Size:20 G Cath Depth at skin: 9 cm Medications Used: fentaNYL citrate (PF) (SUBLIMAZE) injection - Intravenous  100 mcg - 7/20/2024 11:15:00 AM bupivacaine PF (MARCAINE) 0.25 % injection - Injection  30 mL - 7/20/2024 11:15:00 AM Medications Preservative Free Saline:3ml Post Assessment Injection Assessment: negative aspiration for heme, no paresthesia on injection and incremental injection Patient Tolerance:comfortable throughout block Complications:no  "Additional Notes CATHETER The affected upper extremity was abducted and rotated 90 degrees at the shoulder, within the patients range of motion. A high-frequency linear transducer, with sterile cover, was placed just medial to the coracoid process, distal third of the clavicle, and inferior to the clavicle. Keeping the transducer is in a parasagittal plane (cephalad to caudad), scanning medially to laterally. The Pectoralis major and minor, brachial plexus, axillary artery and vein, rib and pleura where visualized. The insertion site was prepped and draped in sterile fashion. Skin and cutaneous tissue was infiltrated with 2-5 ml of 1% Lidocaine. Using ultrasound-guidance, an 18-gauge Contiplex Ultra 360 Touhy needle was advanced in plane lateral to the axillary artery and lateral cord of the brachial plexus. Preservative-free normal saline was utilized for hydro-dissection of tissue, advancement of Touhy needle, and to confirm final needle placement posterior to the axillary artery and posterior cord of the brachial plexus. Local anesthetic injection spread, in incremental 3-5 ml injections, was confirmed. Aspiration every 5 ml to prevent intravascular injection. Injection was completed with negative aspiration of blood and negative intravascular injection. Injection pressures were normal with minimal resistance. A 20-gauge Contiplex Echo catheter was placed through the needle and advance out the tip of the Touhy 1-3 cm. The Touhy needle was then removed, and final catheter position verified at the 5-6 o'clock position to the axillary artery and posterior cord. The catheter was secured in usual fashion with skin glue, benzoin, steri-strips, CHG tegaderm and Label noting \"Nerve Block Catheter\". Jerk tape applied at yellow connector and catheter connection. Performed by: Grant Elias CRNA    CT Pelvis Without Contrast    Result Date: 7/20/2024  CT PELVIS WO CONTRAST Date of Exam: 7/20/2024 2:17 AM EDT Indication: " eval left hip fracture. Comparison: None available. Technique: Axial CT images were obtained of the pelvis without contrast administration.  Reconstructed coronal and sagittal images were also obtained. Automated exposure control and iterative construction methods were used. Findings: There is an impacted subcapital fracture of the proximal left femur. There is no evidence of dislocation of the femoral head. There does appear to be a component of the subcapital fracture which may involve the lateral aspect of the femoral neck. The superior to inferior pubic rami are intact. There are postoperative changes from right total hip arthroplasty. There is subchondral cystic change of the lateral acetabulum     Impression: Impacted subcapital fracture of the proximal left femur. Electronically Signed: Doni Vazquez MD  7/20/2024 4:41 AM EDT  Workstation ID: [id number]    CT Upper Extremity Left Without Contrast    Result Date: 7/20/2024  CT UPPER EXTREMITY LEFT WO CONTRAST Date of Exam: 7/20/2024 2:17 AM EDT Indication: evaluate wrist fracture. Comparison: None available. Technique: Axial CT images were obtained of the left upper extremity without contrast administration.  Reconstructed coronal and sagittal images were also obtained. Automated exposure control and iterative construction methods were used. Findings: The exam is limited based upon the position patient's restrictive CT scan and the poor x-ray penetration of the osseous structures. Again demonstrated is a comminuted fracture of the distal radius with displacement. There is a displaced ulnar styloid fracture. There is mild improvement in the alignment of the distal radius fracture with an interval placement of a plaster splint. The scaphoid appears intact. No discrete displaced carpal fracture is identified though again limited by the poor technique.     Impression: Limited exam. There is a comminuted fracture of the distal radius with displacement. There is a  displaced ulnar styloid fracture. Electronically Signed: Doni Vazquez MD  7/20/2024 4:37 AM EDT  Workstation ID: [id number]    XR Chest 1 View    Result Date: 7/19/2024  XR CHEST 1 VW Date of Exam: 7/19/2024 11:02 PM EDT Indication: preop Comparison: Chest radiograph dated February 4, 2024 Findings: There are no airspace consolidations. No pleural fluid. No pneumothorax. The pulmonary vasculature appears within normal limits. The cardiac and mediastinal silhouette appear unremarkable. No acute osseous abnormality identified.     Impression: No active disease Electronically Signed: Doni Vazquez MD  7/19/2024 11:36 PM EDT  Workstation ID: [id number]    XR Wrist 3+ View Left    Result Date: 7/19/2024  XR WRIST 3+ VW LEFT Date of Exam: 7/19/2024 11:01 PM EDT Indication: fall/pain Comparison: None available. Findings: There is a comminuted and displaced fracture of the distal radius. There is a displaced fracture of the ulnar styloid. There is dislocation of the radiocarpal joint in the radial direction. There is widening of the scaphoid trapezium interval.     Impression: Comminuted and displaced fracture of the distal radius with disruption of the radiocarpal's. Electronically Signed: Doni Vazquez MD  7/19/2024 11:34 PM EDT  Workstation ID: [id number]    XR Hip With or Without Pelvis 2 - 3 View Left    Result Date: 7/19/2024  XR HIP W OR WO PELVIS 2-3 VIEW LEFT Date of Exam: 7/19/2024 11:01 PM EDT Indication: fall/pain Comparison: AP pelvis dated 2/4/2024 Findings: There is an acute minimally displaced fracture of the subcapital proximal left femur. There is evidence of a previous right total hip arthroplasty. There are no lytic or destructive bony lesions.     Impression: Fracture of the subcapital left femur Electronically Signed: Doni Vazquez MD  7/19/2024 11:31 PM EDT  Workstation ID: [id number]                 Plan for Follow-up of Pending Labs/Results:     Discharge Details        Discharge Medications        New  Medications        Instructions Start Date   aspirin 81 MG EC tablet   81 mg, Oral, Every 12 Hours Scheduled      HYDROcodone-acetaminophen 5-325 MG per tablet  Commonly known as: NORCO   1 tablet, Oral, Every 6 Hours PRN             Changes to Medications        Instructions Start Date   levothyroxine 75 MCG tablet  Commonly known as: SYNTHROID, LEVOTHROID  What changed: when to take this   75 mcg, Oral, Daily             Continue These Medications        Instructions Start Date   amLODIPine 5 MG tablet  Commonly known as: NORVASC   TAKE 2 TABLETS EVERY DAY      cetirizine 5 MG tablet  Commonly known as: zyrTEC   5 mg, Oral, Daily      diphenhydrAMINE 25 mg capsule  Commonly known as: BENADRYL   25 mg, Oral, Nightly PRN, OTC      latanoprost 0.005 % ophthalmic solution  Commonly known as: XALATAN   1 drop, Ophthalmic, Daily      losartan-hydrochlorothiazide 100-12.5 MG per tablet  Commonly known as: HYZAAR   TAKE 1 TABLET EVERY DAY      multivitamin with minerals tablet tablet   1 tablet, Oral, Daily, OTC      NON FORMULARY   Specialty Compounded Cream for neuropathy of feet. Cream contains lidocaine, gabapentin, ketoprofen, ibuprofen and clonidine per patient. Apply to bilateral feet at bedtime      Ozempic (0.25 or 0.5 MG/DOSE) 2 MG/3ML solution pen-injector  Generic drug: Semaglutide(0.25 or 0.5MG/DOS)   0.25 mg, Subcutaneous, Weekly      pantoprazole 20 MG EC tablet  Commonly known as: PROTONIX   TAKE 1 TABLET EVERY DAY      pravastatin 40 MG tablet  Commonly known as: PRAVACHOL   40 mg, Oral, Every Evening      timolol 0.5 % ophthalmic solution  Commonly known as: TIMOPTIC   1 drop, Both Eyes, 2 Times Daily      vitamin B-12 100 MCG tablet  Commonly known as: CYANOCOBALAMIN   100 mcg, Oral, Daily, OTC      vitamin D3 125 MCG (5000 UT) capsule capsule   5,000 Units, Oral, Daily               Allergies   Allergen Reactions    Caffeine GI Intolerance    Shellfish-Derived Products Rash         Discharge  Disposition:  Short Term Hospital (DC - External)    Diet:  Hospital:  Diet Order   Procedures    Diet: Diabetic; Consistent Carbohydrate; Fluid Consistency: Thin (IDDSI 0)            Activity:  - WBAT LUE through elbow, WBAT LLE    Restrictions or Other Recommendations:  - none       CODE STATUS:    Code Status and Medical Interventions:   Ordered at: 07/20/24 1740     Level Of Support Discussed With:    Patient     Code Status (Patient has no pulse and is not breathing):    CPR (Attempt to Resuscitate)     Medical Interventions (Patient has pulse or is breathing):    Full Support       Future Appointments   Date Time Provider Department Center   10/1/2024 10:00 AM Gloria Sanotro,  [facility] DO  07/25/24      Time Spent on Discharge:  I spent  35  minutes on this discharge activity which included: face-to-face encounter with the patient, reviewing the data in the system, coordination of the care with the nursing staff as well as consultants, documentation, and entering orders.

## 2024-07-25 NOTE — PROGRESS NOTES
"          Orthopaedic Surgery Progress Note      LOS: 5 days   Patient Care Team:  Radha Gregg APRN as PCP - General (Nurse Practitioner)  Gloria Santoro DO as Consulting Physician (Endocrinology)    POD 5    Subjective     Interval History:   Patient teary-eyed this morning.  A little discouraged that she is not more mobile.  Denies chest pain or shortness of breath.    Objective     Vital Signs:  Temp (24hrs), Av.1 °F (36.7 °C), Min:98 °F (36.7 °C), Max:98.2 °F (36.8 °C)    /71 (BP Location: Left arm, Patient Position: Lying)   Pulse 70   Temp 98 °F (36.7 °C)   Resp 16   Ht 172.7 cm (68\")   Wt 78.2 kg (172 lb 6.4 oz)   LMP  (LMP Unknown)   SpO2 95%   BMI 26.21 kg/m²     Labs:  Lab Results (last 24 hours)       Procedure Component Value Units Date/Time    POC Glucose Once [[id number]]  (Abnormal) Collected: 24 0730    Specimen: Blood Updated: 24 0741     Glucose 145 mg/dL     POC Glucose Once [[id number]]  (Abnormal) Collected: 24 1934    Specimen: Blood Updated: 24 1935     Glucose 166 mg/dL     POC Glucose Once [[id number]]  (Abnormal) Collected: 24 1611    Specimen: Blood Updated: 24 1612     Glucose 147 mg/dL     POC Glucose Once [[id number]]  (Abnormal) Collected: 24 1147    Specimen: Blood Updated: 24 1149     Glucose 201 mg/dL             Physical Exam:  Left upper extremity splint is in place.  Neurovascular exam unchanged    Assessment & Plan   Postop day #5 status post ORIF left comminuted distal radius fracture, grade 1 open and anterior left [place]ENNE for femoral neck fracture    --Discharge planning  -- Discontinue nerve catheter prior to discharge  -- Baby aspirin twice daily for DVT prophylaxis  -- Follow-up with me 2024 for wound check, suture removal, and x-rays.  Everything looks okay, she will go into a fiberglass short arm cast for 2 to 3 weeks and once there are radiographic signs of healing, can initiate occupational hand " therapy at [facility].    Devan Modi MD  07/25/24  08:10 EDT      5

## 2024-07-25 NOTE — PLAN OF CARE
Problem: Adult Inpatient Plan of Care  Goal: Plan of Care Review  Outcome: Ongoing, Progressing  Goal: Patient-Specific Goal (Individualized)  Outcome: Ongoing, Progressing  Goal: Absence of Hospital-Acquired Illness or Injury  Outcome: Ongoing, Progressing  Intervention: Identify and Manage Fall Risk  Description: Perform standard risk assessment on admission using a validated tool or comprehensive approach appropriate to the patient; reassess fall risk frequently, with change in status or transfer to another level of care.  Communicate fall injury risk to interprofessional healthcare team.  Determine need for increased observation, equipment and environmental modification, such as low bed, signage and supportive, nonskid footwear.  Adjust safety measures to individual developmental age, stage and identified risk factors.  Reinforce the importance of safety and physical activity with patient and family.  Perform regular intentional rounding to assess need for position change, pain assessment and personal needs, including assistance with toileting.  Recent Flowsheet Documentation  Taken 7/25/2024 0623 by Ekta Ruffin, RN  Safety Promotion/Fall Prevention:   activity supervised   mobility aid in reach   fall prevention program maintained   nonskid shoes/slippers when out of bed   room organization consistent   safety round/check completed   toileting scheduled  Taken 7/25/2024 0400 by Ekta Ruffin, RN  Safety Promotion/Fall Prevention:   activity supervised   mobility aid in reach   fall prevention program maintained   nonskid shoes/slippers when out of bed   room organization consistent   safety round/check completed   toileting scheduled  Taken 7/25/2024 0200 by Ekta Ruffin, RN  Safety Promotion/Fall Prevention:   activity supervised   mobility aid in reach   fall prevention program maintained   nonskid shoes/slippers when out of bed   room organization consistent   safety round/check  completed   toileting scheduled  Taken 7/25/2024 0019 by Ekta Ruffin RN  Safety Promotion/Fall Prevention:   activity supervised   assistive device/personal items within reach   fall prevention program maintained   lighting adjusted   mobility aid in reach   gait belt   nonskid shoes/slippers when out of bed   room organization consistent   safety round/check completed   toileting scheduled  Taken 7/24/2024 2302 by Ekta Ruffin RN  Safety Promotion/Fall Prevention:   activity supervised   assistive device/personal items within reach   mobility aid in reach   lighting adjusted   fall prevention program maintained   nonskid shoes/slippers when out of bed   room organization consistent   safety round/check completed   toileting scheduled  Taken 7/24/2024 2230 by Ekta Ruffin RN  Safety Promotion/Fall Prevention:   activity supervised   mobility aid in reach   fall prevention program maintained   nonskid shoes/slippers when out of bed   room organization consistent   safety round/check completed   toileting scheduled  Taken 7/24/2024 2001 by Ekta Ruffin RN  Safety Promotion/Fall Prevention:   activity supervised   assistive device/personal items within reach   clutter free environment maintained   fall prevention program maintained   gait belt   lighting adjusted   mobility aid in reach   muscle strengthening facilitated   nonskid shoes/slippers when out of bed   room organization consistent   safety round/check completed  Intervention: Prevent Skin Injury  Description: Perform a screening for skin injury risk, such as pressure or moisture associated skin damage on admission and at regular intervals throughout hospital stay.  Keep all areas of skin (especially folds) clean and dry.  Maintain adequate skin hydration.  Relieve and redistribute pressure and protect bony prominences; implement measures based on patient-specific risk factors.  Match turning and repositioning schedule to clinical  condition.  Encourage weight shift frequently; assist with reposition if unable to complete independently.  Float heels off bed; avoid pressure on the Achilles tendon.  Keep skin free from extended contact with medical devices.  Encourage functional activity and mobility, as early as tolerated.  Use aids (e.g., slide boards, mechanical lift) during transfer.  Recent Flowsheet Documentation  Taken 7/25/2024 0623 by Ekta Ruffin RN  Body Position: position changed independently  Skin Protection:   adhesive use limited   tubing/devices free from skin contact   transparent dressing maintained  Taken 7/25/2024 0400 by Ekta Ruffin RN  Body Position: position changed independently  Skin Protection:   adhesive use limited   tubing/devices free from skin contact   transparent dressing maintained  Taken 7/25/2024 0200 by Ekta Ruffin RN  Body Position: position changed independently  Skin Protection:   adhesive use limited   tubing/devices free from skin contact   transparent dressing maintained  Taken 7/25/2024 0019 by Ekta Ruffin RN  Skin Protection:   adhesive use limited   skin-to-device areas padded   skin-to-skin areas padded   transparent dressing maintained   tubing/devices free from skin contact  Taken 7/24/2024 2302 by Ekta Ruffin RN  Body Position:   turned   left  Skin Protection:   adhesive use limited   skin-to-device areas padded   skin-to-skin areas padded   transparent dressing maintained   tubing/devices free from skin contact  Taken 7/24/2024 2230 by Ekta Ruffin RN  Body Position: sitting up in bed  Skin Protection:   adhesive use limited   tubing/devices free from skin contact   transparent dressing maintained  Taken 7/24/2024 2001 by Ekta Ruffin RN  Body Position:   tilted   right  Skin Protection:   adhesive use limited   hydrocolloids used   incontinence pads utilized   skin-to-skin areas padded   transparent dressing maintained   tubing/devices free  from skin contact  Intervention: Prevent and Manage VTE (Venous Thromboembolism) Risk  Description: Assess for VTE (venous thromboembolism) risk.  Encourage and assist with early ambulation.  Initiate and maintain compression or other therapy, as indicated, based on identified risk in accordance with organizational protocol and provider order.  Encourage both active and passive leg exercises while in bed, if unable to ambulate.  Recent Flowsheet Documentation  Taken 7/25/2024 0623 by Ekta Ruffin RN  Activity Management: activity encouraged  Taken 7/25/2024 0400 by Ekta Ruffin RN  Activity Management: activity encouraged  Taken 7/25/2024 0200 by Ekta Ruffin RN  Activity Management: activity encouraged  Taken 7/25/2024 0019 by Ekta Ruffin RN  Activity Management: activity encouraged  Taken 7/24/2024 2302 by Ekta Ruffin RN  Activity Management: activity encouraged  Taken 7/24/2024 2230 by Ekta Ruffin RN  Activity Management: activity encouraged  Taken 7/24/2024 2001 by Ekta Ruffin RN  Activity Management: activity encouraged  VTE Prevention/Management: (aspirin PO)   patient refused intervention   other (see comments)  Range of Motion: active ROM (range of motion) encouraged  Intervention: Prevent Infection  Description: Maintain skin and mucous membrane integrity; promote hand, oral and pulmonary hygiene.  Optimize fluid balance, nutrition, sleep and glycemic control to maximize infection resistance.  Identify potential sources of infection early to prevent or mitigate progression of infection (e.g., wound, lines, devices).  Evaluate ongoing need for invasive devices; remove promptly when no longer indicated.  Recent Flowsheet Documentation  Taken 7/25/2024 0623 by Ekta Ruffin RN  Infection Prevention:   environmental surveillance performed   single patient room provided  Taken 7/25/2024 0400 by Ekta Ruffin RN  Infection Prevention:    environmental surveillance performed   single patient room provided  Taken 7/25/2024 0200 by Ekta Ruffin RN  Infection Prevention:   environmental surveillance performed   single patient room provided  Taken 7/25/2024 0019 by Ekta Ruffin RN  Infection Prevention:   single patient room provided   environmental surveillance performed  Taken 7/24/2024 2302 by Ekta Ruffin RN  Infection Prevention:   environmental surveillance performed   single patient room provided  Taken 7/24/2024 2230 by Ekta Ruffin RN  Infection Prevention:   environmental surveillance performed   single patient room provided  Goal: Optimal Comfort and Wellbeing  Outcome: Ongoing, Progressing  Intervention: Monitor Pain and Promote Comfort  Description: Assess pain level, treatment efficacy and patient response at regular intervals using a consistent pain scale.  Consider the presence and impact of preexisting chronic pain.  Encourage patient and caregiver involvement in pain assessment, interventions and safety measures.  Recent Flowsheet Documentation  Taken 7/24/2024 2230 by Ekta Ruffin RN  Pain Management Interventions:   pillow support provided   position adjusted   pain pump in use  Taken 7/24/2024 2001 by Ekta Ruffin RN  Pain Management Interventions:   see MAR   pillow support provided   position adjusted   pain pump in use  Intervention: Provide Person-Centered Care  Description: Use a family-focused approach to care.  Develop trust and rapport by proactively providing information, encouraging questions, addressing concerns and offering reassurance.  Acknowledge emotional response to hospitalization.  Recognize and utilize personal coping strategies.  Honor spiritual and cultural preferences.  Recent Flowsheet Documentation  Taken 7/24/2024 2001 by Ekta Ruffin RN  Trust Relationship/Rapport:   care explained   choices provided     Problem: Diabetes Comorbidity  Goal: Blood Glucose  Level Within Targeted Range  Outcome: Ongoing, Progressing     Problem: Skin Injury Risk Increased  Goal: Skin Health and Integrity  Outcome: Ongoing, Progressing  Intervention: Optimize Skin Protection  Description: Perform a full pressure injury risk assessment, as indicated by screening, upon admission to care unit.  Reassess skin (injury risk, full inspection) frequently (e.g., scheduled interval, with change in condition) to provide optimal early detection and prevention.  Maintain adequate tissue perfusion (e.g., encourage fluid balance; avoid crossing legs, constrictive clothing or devices) to promote tissue oxygenation.  Maintain head of bed at lowest degree of elevation tolerated, considering medical condition and other restrictions.  Avoid positioning onto an area that remains reddened.  Minimize incontinence and moisture (e.g., toileting schedule; moisture-wicking pad, diaper or incontinence collection device; skin moisture barrier).  Cleanse skin promptly and gently when soiled utilizing a pH-balanced cleanser.  Relieve and redistribute pressure (e.g., scheduled position changes, weight shifts, use of support surface, medical device repositioning, protective dressing application, use of positioning device, microclimate control, use of pressure-injury-monitor  Encourage increased activity, such as sitting in a chair at the bedside or early mobilization, when able to tolerate.  Recent Flowsheet Documentation  Taken 7/25/2024 0623 by Ekta Ruffin RN  Pressure Reduction Techniques: frequent weight shift encouraged  Head of Bed (HOB) Positioning: hospitals elevated  Pressure Reduction Devices: pressure-redistributing mattress utilized  Skin Protection:   adhesive use limited   tubing/devices free from skin contact   transparent dressing maintained  Taken 7/25/2024 0400 by Ekta Ruffin RN  Pressure Reduction Techniques: frequent weight shift encouraged  Head of Bed (HOB) Positioning: HOB  elevated  Pressure Reduction Devices: pressure-redistributing mattress utilized  Skin Protection:   adhesive use limited   tubing/devices free from skin contact   transparent dressing maintained  Taken 7/25/2024 0200 by Ekta Ruffin RN  Pressure Reduction Techniques: frequent weight shift encouraged  Head of Bed ([facility]) Positioning: [facility] elevated  Pressure Reduction Devices: pressure-redistributing mattress utilized  Skin Protection:   adhesive use limited   tubing/devices free from skin contact   transparent dressing maintained  Taken 7/25/2024 0019 by Ekta Ruffin RN  Pressure Reduction Techniques:   weight shift assistance provided   heels elevated off bed  Head of Bed ([facility]) Positioning: [facility] elevated  Pressure Reduction Devices: pressure-redistributing mattress utilized  Skin Protection:   adhesive use limited   skin-to-device areas padded   skin-to-skin areas padded   transparent dressing maintained   tubing/devices free from skin contact  Taken 7/24/2024 2302 by Ekta Ruffin RN  Pressure Reduction Techniques:   weight shift assistance provided   heels elevated off bed  Head of Bed ([facility]) Positioning: [facility] elevated  Pressure Reduction Devices: pressure-redistributing mattress utilized  Skin Protection:   adhesive use limited   skin-to-device areas padded   skin-to-skin areas padded   transparent dressing maintained   tubing/devices free from skin contact  Taken 7/24/2024 2230 by Ekta Ruffin RN  Pressure Reduction Techniques: frequent weight shift encouraged  Head of Bed ([facility]) Positioning: [facility] elevated  Pressure Reduction Devices: pressure-redistributing mattress utilized  Skin Protection:   adhesive use limited   tubing/devices free from skin contact   transparent dressing maintained  Taken 7/24/2024 2001 by Ekta Ruffin RN  Pressure Reduction Techniques: frequent weight shift encouraged  Head of Bed ([facility]) Positioning: [facility] elevated  Pressure Reduction Devices:  pressure-redistributing mattress utilized  Skin Protection:   adhesive use limited   hydrocolloids used   incontinence pads utilized   skin-to-skin areas padded   transparent dressing maintained   tubing/devices free from skin contact     Problem: Fall Injury Risk  Goal: Absence of Fall and Fall-Related Injury  Outcome: Ongoing, Progressing  Intervention: Identify and Manage Contributors  Description: Develop a fall prevention plan with the patient and caregiver/family.  Provide reorientation, appropriate sensory stimulation and routines with changes in mental status to decrease risk of fall.  Promote use of personal vision and auditory aids.  Assess assistance level required for safe and effective self-care; provide support as needed, such as toileting, mobilization. For age 65 and older, implement timed toileting with assistance.  Encourage physical activity, such as performance of mobility and self-care at highest level of patient ability, multicomponent exercise program and provision of appropriate assistive devices.  If fall occurs, assess the severity of injury; implement fall injury protocol. Determine the cause and revise fall injury prevention plan.  Regularly review medication contribution to fall risk; adjust medication administration times to minimize risk of falling.  Consider risk related to polypharmacy and age.  Balance adequate pain management with potential for oversedation.  Recent Flowsheet Documentation  Taken 7/25/2024 0623 by Ekta Ruffin RN  Medication Review/Management: medications reviewed  Taken 7/25/2024 0400 by Ekta Ruffin RN  Medication Review/Management: medications reviewed  Taken 7/25/2024 0200 by Ekta Ruffin RN  Medication Review/Management: medications reviewed  Taken 7/25/2024 0019 by Ekta Ruffin RN  Medication Review/Management: medications reviewed  Taken 7/24/2024 2302 by Ekta Ruffin RN  Medication Review/Management: medications  reviewed  Taken 7/24/2024 2230 by Ekta Ruffin RN  Medication Review/Management: medications reviewed  Taken 7/24/2024 2001 by Ekta Ruffin RN  Medication Review/Management: medications reviewed  Intervention: Promote Injury-Free Environment  Description: Provide a safe, barrier-free environment that encourages independent activity.  Keep care area uncluttered and well-lighted.  Determine need for increased observation or monitoring.  Avoid use of devices that minimize mobility, such as restraints or indwelling urinary catheter.  Recent Flowsheet Documentation  Taken 7/25/2024 0623 by Ekta Ruffin RN  Safety Promotion/Fall Prevention:   activity supervised   mobility aid in [facility]   fall prevention program maintained   nonskid shoes/slippers when out of bed   room organization consistent   safety round/check completed   toileting scheduled  Taken 7/25/2024 0400 by Ekta Ruffin RN  Safety Promotion/Fall Prevention:   activity supervised   mobility aid in [facility]   fall prevention program maintained   nonskid shoes/slippers when out of bed   room organization consistent   safety round/check completed   toileting scheduled  Taken 7/25/2024 0200 by Ekta Ruffin RN  Safety Promotion/Fall Prevention:   activity supervised   mobility aid in [facility]   fall prevention program maintained   nonskid shoes/slippers when out of bed   room organization consistent   safety round/check completed   toileting scheduled  Taken 7/25/2024 0019 by Ekta Ruffin RN  Safety Promotion/Fall Prevention:   activity supervised   assistive device/personal items within reach   fall prevention program maintained   lighting adjusted   mobility aid in [facility]   gait belt   nonskid shoes/slippers when out of bed   room organization consistent   safety round/check completed   toileting scheduled  Taken 7/24/2024 2302 by Ekta Ruffin RN  Safety Promotion/Fall Prevention:   activity supervised   assistive  device/personal items within reach   mobility aid in reach   lighting adjusted   fall prevention program maintained   nonskid shoes/slippers when out of bed   room organization consistent   safety round/check completed   toileting scheduled  Taken 7/24/2024 2230 by Ekta Ruffin RN  Safety Promotion/Fall Prevention:   activity supervised   mobility aid in reach   fall prevention program maintained   nonskid shoes/slippers when out of bed   room organization consistent   safety round/check completed   toileting scheduled  Taken 7/24/2024 2001 by Ekta Ruffin RN  Safety Promotion/Fall Prevention:   activity supervised   assistive device/personal items within reach   clutter free environment maintained   fall prevention program maintained   gait belt   lighting adjusted   mobility aid in reach   muscle strengthening facilitated   nonskid shoes/slippers when out of bed   room organization consistent   safety round/check completed   Goal Outcome Evaluation:      Patient is alert and oriented X4, pleasant, compliant with treatment regimen. VSS. No acute events overnight. Planning to discharge to [facility] today. Ekta Ruffin RN

## 2024-07-25 NOTE — PAYOR COMM NOTE
"Rosario Gonzalez RN  [facility]  Utilization Management  P:[phone]  F:[phone]    Auth # [id number]   Fax 1 of 2    Evie Cardenas (74 y.o. Female)       Date of Birth   1950    Social Security Number       Address   [address] [facility] [address]    Home Phone   [phone]    MRN   [id number]       [place]   [facility]    Marital Status                               Admission Date   24    Admission Type   Emergency    Admitting Provider   Jessi Harvey DO    Attending Provider       Department, Room/Bed   [facility] 3G, S366/1       Discharge Date   2024    Discharge Disposition   [facility] (DC - External)    Discharge Destination                                 Attending Provider: (none)   Allergies: Caffeine, Shellfish-derived Products    Isolation: None   Infection: None   Code Status: Prior    Ht: 172.7 cm (68\")   Wt: 78.2 kg (172 lb 6.4 oz)    Admission Cmt: None   Principal Problem: Femur fracture [S72.90XA]                   Active Insurance as of 2024       Primary Coverage       Payor Plan Insurance Group Employer/Plan Group    HUMANA MEDICARE REPLACEMENT HUMANA MED ADV PPO [id number]       Payor Plan Address Payor Plan Phone Number Payor Plan Fax Number Effective Dates    [address] [address] [phone]  2023 - None Entered    [facility] [id number]         Subscriber Name Subscriber Birth Date Member ID       EVIE CARDENAS 1950 [id number]                   History & Physical        Dakota Valiente DO at 24 0309              [facility] Medicine Services  HISTORY AND PHYSICAL    Patient Name: Evie Cardenas  : 1950  MRN: [id number]  Primary Care Physician: Radha Gregg APRN  Date of admission: 2024    Subjective  Subjective     Chief Complaint:  Fall     HPI:  Evie Cardenas is a 74 y.o. female w/ a hx of HTN, HLD, hypothyroidism, T2DM, IBS who presented to the ED w/ " c/o a fall.   Pt presented w/ c/o left hip pain and left wrist pain after falling. Pt reports that she was walking outside to her deck when she tripped. In an attempt to catch herself she outstretched her left arm and fell on her left wrist and left hip. Denies LOC/head trauma.   Pt evaluated in the ED. XRAYS c/w left hip fracture and left wrist fracture. Pt admitted to the hospital medicine service for further evaluation.     Review of Systems   Constitutional: Negative.  Negative for chills, fatigue and fever.   HENT: Negative.  Negative for congestion, postnasal drip, rhinorrhea and trouble swallowing.    Eyes: Negative.  Negative for visual disturbance.   Respiratory: Negative.  Negative for cough and shortness of breath.    Cardiovascular: Negative.  Negative for chest pain, palpitations and leg swelling.   Gastrointestinal: Negative.  Negative for abdominal distention, abdominal pain, constipation, diarrhea, nausea and vomiting.   Endocrine: Negative.    Genitourinary: Negative.  Negative for decreased urine volume, difficulty urinating, dysuria, flank pain and frequency.   Musculoskeletal:  Positive for arthralgias. Negative for back pain, myalgias and neck pain.        Hip and wrist pain 2/2 fall.    Skin: Negative.  Negative for wound.   Allergic/Immunologic: Negative.  Negative for immunocompromised state.   Neurological: Negative.  Negative for dizziness, syncope, weakness, light-headedness and headaches.   Hematological: Negative.  Does not bruise/bleed easily.   Psychiatric/Behavioral: Negative.  Negative for confusion.    All other systems reviewed and are negative.     Personal History     Past Medical History:   Diagnosis Date    Bladder infection     Dyspareunia, female     Glaucoma     H/O sexual problem     High cholesterol     History of abnormal cervical Pap smear     Hypertension     Hypertension     Hypothyroidism     Impaired functional mobility, balance, gait, and endurance     Labial cyst      Muscle weakness     Type 2 diabetes mellitus     Urinary incontinence     Vaginal itching     Yeast infection      Past Surgical History:   Procedure Laterality Date    CARPAL TUNNEL RELEASE  2007    CATARACT EXTRACTION Right 09/27/2022    CATARACT EXTRACTION, BILATERAL Left     CHOLECYSTECTOMY  1991    EYE SURGERY  2021    HIP FRACTURE SURGERY Right     HYSTERECTOMY  2007    OOPHORECTOMY      TOTAL HIP ARTHROPLASTY Right 02/04/2024    Procedure: TOTAL HIP ARTHROPLASTY ANTERIOR RIGHT;  Surgeon: Doni Evans MD;  Location: [facility];  Service: Orthopedics;  Laterality: Right;    TUBAL ABDOMINAL LIGATION  11/1979     Family History: family history includes Breast cancer (age of onset: 46) in her daughter; Cancer in her brother and sister; Colon cancer in her maternal uncle; Diabetes in her brother, mother, and sister; Heart attack in her mother; Hyperlipidemia in her mother, sister, and sister; Hypertension in her mother; Prostate cancer in her brother; Stroke in her mother; Thyroid disease in her mother, sister, and sister; Vision loss in her mother, sister, and sister.     Social History:  reports that she has never smoked. She does not have any smokeless tobacco history on file. She reports that she does not drink alcohol and does not use drugs.  Social History     Social History Narrative    Not on file     Medications:  NON FORMULARY, Semaglutide(0.25 or 0.5MG/DOS), amLODIPine, cetirizine, diphenhydrAMINE, latanoprost, levothyroxine, losartan-hydrochlorothiazide, multivitamin with minerals, pantoprazole, pravastatin, timolol, vitamin B-12, and vitamin D3    Allergies   Allergen Reactions    Shellfish-Derived Products Rash     Objective  Objective     Vital Signs:   Temp:  [98.2 °F (36.8 °C)-98.9 °F (37.2 °C)] 98.2 °F (36.8 °C)  Heart Rate:  [] 97  Resp:  [18] 18  BP: (160-180)/() 160/72    Physical Exam     Constitutional: Awake, alert; non-toxic appearing   Eyes: PERRLA, sclerae anicteric, no  conjunctival injection  HENT: NCAT, mucous membranes moist  Neck: Supple, no thyromegaly, no lymphadenopathy, trachea midline  Respiratory: Clear to auscultation bilaterally, nonlabored respirations   Cardiovascular: RRR, no murmurs, rubs, or gallops, no peripheral edema   Gastrointestinal: Positive bowel sounds, soft, nontender, nondistended  Musculoskeletal: Normal ROM RUE and RLE; left wrist/arm w/ stabilizing bandage/wrap; LLE slightly shortened   Psychiatric: Appropriate affect, cooperative  Neurologic: Oriented x 3, strength symmetric in all extremities, Cranial Nerves grossly intact to confrontation, speech clear  Skin: No rashes, lesions or wounds     Result Review:  I have personally reviewed the results from the time of this admission to 7/20/2024 03:42 EDT and agree with these findings:  [x]  Laboratory list / accordion  []  Microbiology  [x]  Radiology  []  EKG/Telemetry   []  Cardiology/Vascular   []  Pathology  [x]  Old records    LAB RESULTS:          Lab 07/20/24  0044   SODIUM 136   POTASSIUM 4.3   CHLORIDE 101   CO2 23.0   ANION GAP 12.0   BUN 18   CREATININE 0.73   EGFR 86.4   GLUCOSE 183*   CALCIUM 9.0         Lab 07/20/24  0044   TOTAL PROTEIN 7.2   ALBUMIN 4.2   GLOBULIN 3.0   ALT (SGPT) 26   AST (SGOT) 28   BILIRUBIN 0.4   ALK PHOS 64                     Brief Urine Lab Results  (Last result in the past 365 days)        Color   Clarity   Blood   Leuk Est   Nitrite   Protein   CREAT   Urine HCG        04/01/24 0815             135.4               Microbiology Results (last 10 days)       ** No results found for the last 240 hours. **          XR Chest 1 View    Result Date: 7/19/2024  XR CHEST 1 VW Date of Exam: 7/19/2024 11:02 PM EDT Indication: preop Comparison: Chest radiograph dated February 4, 2024 Findings: There are no airspace consolidations. No pleural fluid. No pneumothorax. The pulmonary vasculature appears within normal limits. The cardiac and mediastinal silhouette appear  unremarkable. No acute osseous abnormality identified.     Impression: Impression: No active disease Electronically Signed: Doni Vazquez MD  7/19/2024 11:36 PM EDT  Workstation ID: [id number]    XR Wrist 3+ View Left    Result Date: 7/19/2024  XR WRIST 3+ VW LEFT Date of Exam: 7/19/2024 11:01 PM EDT Indication: fall/pain Comparison: None available. Findings: There is a comminuted and displaced fracture of the distal radius. There is a displaced fracture of the ulnar styloid. There is dislocation of the radiocarpal joint in the radial direction. There is widening of the scaphoid trapezium interval.     Impression: Impression: Comminuted and displaced fracture of the distal radius with disruption of the radiocarpal's. Electronically Signed: Doni Vazquez MD  7/19/2024 11:34 PM EDT  Workstation ID: [id number]    XR Hip With or Without Pelvis 2 - 3 View Left    Result Date: 7/19/2024  XR HIP W OR WO PELVIS 2-3 VIEW LEFT Date of Exam: 7/19/2024 11:01 PM EDT Indication: fall/pain Comparison: AP pelvis dated 2/4/2024 Findings: There is an acute minimally displaced fracture of the subcapital proximal left femur. There is evidence of a previous right total hip arthroplasty. There are no lytic or destructive bony lesions.     Impression: Impression: Fracture of the subcapital left femur Electronically Signed: Doni Vazquez MD  7/19/2024 11:31 PM EDT  Workstation ID: [id number]       Assessment & Plan  Assessment & Plan       Femur fracture    Hyperlipidemia LDL goal <70    Hypothyroidism (acquired)    T2DM (type 2 diabetes mellitus)    HTN (hypertension)    Radial fracture    Pari Howell is a 74 y.o. female w/ a hx of HTN, HLD, hypothyroidism, T2DM, IBS who presented to the ED w/ c/o a fall.     **Left femur fracture   **Left distal radius fracture   -2/2 mechanical fall   -xrays obtained   -CT pelvis and LUE obtained; official results pending   -NPO pending Ortho recommendations   -EKG pending, type/screen pending   -IVF  -symptom  mgt; PRN pain medications, antiemetics   -Ortho consult this morning     **T2DM  -hem A1c pending   -on Semaglutide   -FSBG q 6 hours w/ LDSS     **HTN   **HLD   -continue routine Norvasc  -on losartan-HCTZ; losartan continued, HCTZ held for now   -statin continued     **Hypothyroidism   -continue synthroid     DVT prophylaxis:  Mechanical     CODE STATUS:    Code Status (Patient has no pulse and is not breathing): CPR (Attempt to Resuscitate)  Medical Interventions (Patient has pulse or is breathing): Full Support    Expected Discharge(click hyperlink to enter date then refresh the note)  Expected discharge date/ time has not been documented.    This note has been completed as part of a split-shared workflow.     Signature: Electronically signed by ELIZABETH Olson, 07/20/24, 3:42 AM EDT.    Time spent: 55 minutes     Patient is a 74-year-old female who suffered a ground-level fall at home tonight.  As she fell she reached out her left arm trying to stop herself.  This resulted in a distal radius fracture that became compound in the fall.  She also landed on her left hip causing a subcapital femur fracture.    Patient was seen and examined.  I have reviewed the above documentation and agree with that without change.          Electronically signed by Dakota Valiente DO at 07/20/24 0578          Emergency Department Notes        Edna Rowe, RN at 07/20/24 0205           Pari Howell    Nursing Report ED to Floor:  Mental status: GCS 15  Ambulatory status: did not ambulate in ED  Oxygen Therapy:  RA  Cardiac Rhythm: NSR  Admitted from: home  Safety Concerns:  fall risk  Social Issues: none  ED Room #:  18    ED Nurse Phone Extension - [phone] or may call [phone].      HPI:   Chief Complaint   Patient presents with    Fall    Wrist Injury       Past Medical History:  Past Medical History:   Diagnosis Date    Bladder infection     Dyspareunia, female     Glaucoma     H/O sexual problem     High cholesterol      History of abnormal cervical Pap smear     Hypertension     Hypertension     Hypothyroidism     Impaired functional mobility, balance, gait, and endurance     Labial cyst     Muscle weakness     Type 2 diabetes mellitus     Urinary incontinence     Vaginal itching     Yeast infection         Past Surgical History:  Past Surgical History:   Procedure Laterality Date    CARPAL TUNNEL RELEASE  2007    CATARACT EXTRACTION Right 09/27/2022    CATARACT EXTRACTION, BILATERAL Left     CHOLECYSTECTOMY  1991    EYE SURGERY  2021    HIP FRACTURE SURGERY Right     HYSTERECTOMY  2007    OOPHORECTOMY      TOTAL HIP ARTHROPLASTY Right 02/04/2024    Procedure: TOTAL HIP ARTHROPLASTY ANTERIOR RIGHT;  Surgeon: Doni Evans MD;  Location: [facility];  Service: Orthopedics;  Laterality: Right;    TUBAL ABDOMINAL LIGATION  11/1979        Admitting Doctor:   Dakota Valiente DO    Consulting Provider(s):  Consults       No orders found for last 30 day(s).             Admitting Diagnosis:   The primary encounter diagnosis was Subcapital fracture of hip, left, closed, initial encounter. A diagnosis of Left wrist fracture, open, initial encounter was also pertinent to this visit.    Most Recent Vitals:   Vitals:    07/20/24 0151 07/20/24 0154 07/20/24 0157 07/20/24 0200   BP:       BP Location:       Patient Position:       Pulse: 97 94 96 90   Resp:       Temp:       TempSrc:       SpO2: 100% 100% 96% 96%   Weight:       Height:           Active LDAs/IV Access:   Lines, Drains & Airways       Active LDAs       Name Placement date Placement time Site Days    Peripheral IV 07/19/24 2259 Right Antecubital 07/19/24 2259  Antecubital  less than 1                    Labs (abnormal labs have a star):   Labs Reviewed   COMPREHENSIVE METABOLIC PANEL - Abnormal; Notable for the following components:       Result Value    Glucose 183 (*)     All other components within normal limits    Narrative:     GFR Normal >60  Chronic Kidney Disease  <60  Kidney Failure <15    The GFR formula is only valid for adults with stable renal function between ages 18 and 70.   CBC WITH AUTO DIFFERENTIAL   CBC AND DIFFERENTIAL    Narrative:     The following orders were created for panel order CBC & Differential.  Procedure                               Abnormality         Status                     ---------                               -----------         ------                     CBC Auto Differential[[id number]]                                                         Please view results for these tests on the individual orders.       Meds Given in ED:   Medications   sodium chloride 0.9 % flush 10 mL (has no administration in time range)   ceFAZolin 2000 mg IVPB in 100 mL NS (MBP) (has no administration in time range)   HYDROmorphone (DILAUDID) injection 1 mg (1 mg Intravenous Given 7/19/24 2314)   Propofol (DIPRIVAN) injection 200 mg (200 mg Intravenous Given by Other 7/20/24 0137)           Last NIH score:                                                          Dysphagia screening results:  Patient Factors Component (Dysphagia:Stroke or Rule-out)  Best Eye Response: 4-->(E4) spontaneous (07/19/24 2231)  Best Motor Response: 6-->(M6) obeys commands (07/19/24 2231)  Best Verbal Response: 5-->(V5) oriented (07/19/24 2231)  Latisha Coma Scale Score: 15 (07/19/24 2231)     Latisha Coma Scale:  No data recorded     CIWA:        Restraint Type:            Isolation Status:  No active isolations          Electronically signed by Edna Rowe RN at 07/20/24 0204       Enrique Isbell MD at 07/19/24 2316        Procedure Orders    1. Procedural Sedation [[id number]] ordered by Enrique Isbell MD    2. FX Dislocation [[id number]] ordered by Enrique Isbell MD                 Subjective   History of Present Illness  74-year-old female who presents for evaluation of left wrist pain and left hip pain after a fall.  The patient had recent surgical  intervention in February, 5 months ago on the right hip due to a fracture.  The patient states that she tripped going outside onto the deck and try to catch herself with an outstretched left arm.  She also struck her left hip.  She did not hit her head or lose consciousness.  She does not take any anticoagulation.  She is awake and alert currently.  No complaint of neck or midline back pain.  No chest pain or abdominal pain.  She denies fever or infectious symptoms.  No previous surgical intervention or injuries to the left wrist or hip in the past.      Review of Systems   Constitutional:  Negative for chills, fatigue and fever.   HENT:  Negative for congestion, ear pain, postnasal drip, sinus pressure and sore throat.    Eyes:  Negative for pain, redness and visual disturbance.   Respiratory:  Negative for cough, chest tightness and shortness of breath.    Cardiovascular:  Negative for chest pain, palpitations and leg swelling.   Gastrointestinal:  Negative for abdominal pain, anal bleeding, blood in stool, diarrhea, nausea and vomiting.   Endocrine: Negative for polydipsia and polyuria.   Genitourinary:  Negative for difficulty urinating, dysuria, frequency and urgency.   Musculoskeletal:  Positive for arthralgias. Negative for back pain and neck pain.   Skin:  Positive for wound. Negative for pallor and rash.   Allergic/Immunologic: Negative for environmental allergies and immunocompromised state.   Neurological:  Negative for dizziness, weakness and headaches.   Hematological:  Negative for adenopathy.   Psychiatric/Behavioral:  Negative for confusion, self-injury and suicidal ideas. The patient is not nervous/anxious.    All other systems reviewed and are negative.      Past Medical History:   Diagnosis Date    Bladder infection     Dyspareunia, female     Glaucoma     H/O sexual problem     High cholesterol     History of abnormal cervical Pap smear     Hypertension     Hypertension     Hypothyroidism      Impaired functional mobility, balance, gait, and endurance     Labial cyst     Muscle weakness     Type 2 diabetes mellitus     Urinary incontinence     Vaginal itching     Yeast infection        Allergies   Allergen Reactions    Shellfish-Derived Products Rash       Past Surgical History:   Procedure Laterality Date    CARPAL TUNNEL RELEASE  2007    CATARACT EXTRACTION Right 09/27/2022    CATARACT EXTRACTION, BILATERAL Left     CHOLECYSTECTOMY  1991    EYE SURGERY  2021    HIP FRACTURE SURGERY Right     HYSTERECTOMY  2007    OOPHORECTOMY      TOTAL HIP ARTHROPLASTY Right 02/04/2024    Procedure: TOTAL HIP ARTHROPLASTY ANTERIOR RIGHT;  Surgeon: Doni Evans MD;  Location: [facility];  Service: Orthopedics;  Laterality: Right;    TUBAL ABDOMINAL LIGATION  11/1979       Family History   Problem Relation Age of Onset    Colon cancer Maternal Uncle     Diabetes Mother     Heart attack Mother     Hypertension Mother     Hyperlipidemia Mother     Thyroid disease Mother     Stroke Mother     Vision loss Mother     Cancer Sister     Thyroid disease Sister     Diabetes Brother     Prostate cancer Brother     Cancer Brother         Efren prostrates cancer    Breast cancer Daughter 46    Diabetes Sister     Hyperlipidemia Sister     Thyroid disease Sister     Vision loss Sister     Vision loss Sister     Hyperlipidemia Sister     Ovarian cancer Neg Hx        Social History     Socioeconomic History    Marital status:    Tobacco Use    Smoking status: Never   Vaping Use    Vaping status: Never Used   Substance and Sexual Activity    Alcohol use: Never    Drug use: Never    Sexual activity: Yes     Partners: Male     Birth control/protection: Surgical, Hysterectomy           Objective   Physical Exam  Vitals and nursing note reviewed.   Constitutional:       General: She is not in acute distress.     Appearance: Normal appearance. She is well-developed. She is not toxic-appearing or diaphoretic.   HENT:      Head:  Normocephalic and atraumatic.      Right Ear: External ear normal.      Left Ear: External ear normal.      Nose: Nose normal.   Eyes:      General: Lids are normal.      Pupils: Pupils are equal, round, and reactive to light.   Neck:      Trachea: No tracheal deviation.   Cardiovascular:      Rate and Rhythm: Normal rate and regular rhythm.      Pulses: No decreased pulses.      Heart sounds: Normal heart sounds. No murmur heard.     No friction rub. No gallop.   Pulmonary:      Effort: Pulmonary effort is normal. No respiratory distress.      Breath sounds: Normal breath sounds. No decreased breath sounds, wheezing, rhonchi or rales.   Abdominal:      General: Bowel sounds are normal.      Palpations: Abdomen is soft.      Tenderness: There is no abdominal tenderness. There is no guarding or rebound.   Musculoskeletal:         General: Normal range of motion.      Left wrist: Swelling, deformity and bony tenderness present.      Cervical back: Normal range of motion and neck supple.      Left hip: Tenderness and bony tenderness present. No deformity.      Comments: Abrasions over the ulnar side of the left wrist concerning for possible open fracture.  No bleeding or drainage of fluid observed at this time.   Lymphadenopathy:      Cervical: No cervical adenopathy.   Skin:     General: Skin is warm and dry.      Findings: No rash.   Neurological:      Mental Status: She is alert and oriented to person, place, and time.      Cranial Nerves: No cranial nerve deficit.      Sensory: No sensory deficit.   Psychiatric:         Speech: Speech normal.         Behavior: Behavior normal.         Thought Content: Thought content normal.         Judgment: Judgment normal.         Procedural Sedation    Date/Time: 7/20/2024 1:35 AM    Performed by: Enrique Isbell MD  Authorized by: Enrique Isbell MD    Consent:     Consent obtained:  Verbal and written    Consent given by:  Patient and spouse    Risks discussed:   Inadequate sedation, respiratory compromise necessitating ventilatory assistance and intubation, prolonged hypoxia resulting in organ damage, allergic reaction and dysrhythmia    Alternatives discussed:  Analgesia without sedation and regional anesthesia  [place] protocol:     Procedure explained and questions answered to patient or proxy's satisfaction: yes      Relevant documents present and verified: yes      Test results available: yes      Imaging studies available: yes      Required blood products, implants, devices, and special equipment available: yes      Site/side marked: yes      Immediately prior to procedure, a time out was called: yes      Patient identity confirmed:  Verbally with patient and arm band  Indications:     Procedure performed:  Fracture reduction    Procedure necessitating sedation performed by:  Physician performing sedation    Intended level of sedation:  Moderate  Pre-sedation assessment:     Time since last food or drink:  Greater than 6 h ours    ASA classification: class 2 - patient with mild systemic disease      Mouth openin finger widths    Mallampati score:  II - soft palate, uvula, fauces visible    Neck mobility: normal      Pre-sedation assessments completed and reviewed: airway patency, anesthesia/sedation history, cardiovascular function, hydration status, mental status, nausea/vomiting, pain level and respiratory function      History of difficult intubation: no    Immediate pre-procedure details:     Reassessment: Patient reassessed immediately prior to procedure      Reviewed: vital signs and relevant labs/tests      Verified: bag valve mask available, emergency equipment available, intubation equipment available, IV patency confirmed, oxygen available and suction available    Procedure details (see MAR for exact dosages):     Sedation start time:  2024 1:35 AM    Preoxygenation:  Nonrebreather mask    Sedation:  Propofol    Analgesia:  Hydromorphone     Intra-procedure monitoring:  Blood pressure monitoring, cardiac monitor, continuous capnometry, continuous pulse oximetry, frequent vital sign checks and frequent LOC assessments    Intra-procedure events: none      Sedation end time:  7/20/2024 2:00 AM    Total sedation time (minutes):  25  Post-procedure details:     Attendance: Constant attendance by certified staff until patient recovered      Recovery: Patient returned to pre-procedure baseline      Estimated blood loss (see I/O flowsheets): no      Complications:  None    Post-sedation assessments completed and reviewed: airway patency, cardiovascular function, hydration status, mental status, nausea/vomiting and respiratory function      Specimens recovered:  None    Patient is stable for discharge or admission: yes      Procedure completion:  Tolerated well, no immediate complications  FX Dislocation    Date/Time: 7/20/2024 1:35 AM    Performed by: Enrique Isbell MD  Authorized by: Enrique Isbell MD    Consent:     Consent obtained:  Verbal and written    Consent given by:  Patient and spouse    Risks, benefits, and alternatives were discussed: yes      Risks discussed:  Nerve damage, pain and vascular damage    Alternatives discussed:  No treatment and alternative treatment  Universal protocol:     Procedure explained and questions answered to patient or proxy's satisfaction: yes      Relevant documents present and verified: yes      Test results available: yes      Imaging studies available: yes      Required blood products, implants, devices, and special equipment available: yes      Site/side marked: yes      Immediately prior to procedure, a time out was called: yes      Patient identity confirmed:  Verbally with patient and arm band  Injury:     Injury location:  Forearm    Forearm injury location:  L forearm    Forearm fracture type: distal radius and ulnar styloid    Pre-procedure details:     Distal neurologic exam:  Normal    Distal  perfusion: distal pulses strong and brisk capillary refill      Range of motion: reduced    Sedation:     Sedation type:  Moderate sedation  Anesthesia:     Anesthesia method:  None  Procedure details:     Manipulation performed: yes      Reduction successful: yes      X-ray confirmed reduction: yes      Immobilization:  Splint    Splint type:  Sugar tong    Supplies used:  Plaster    Attestation: Splint applied and adjusted personally by me    Post-procedure details:     Distal neurologic exam:  Normal    Distal perfusion: distal pulses strong and brisk capillary refill      Range of motion: improved      Procedure completion:  Tolerated well, no immediate complications            ED Course  ED Course as of 07/20/24 1128   Sat Jul 20, 2024   0120 The patient suffered mechanical fall as she was exiting her house onto the deck.  She tried to catch herself on her outstretched left hand.  She has a low-grade open comminuted left wrist fracture.  I am getting ready to do sedation and reduction and splinting.  She also has a left subcapital hip fracture.  I discussed both fractures with Dr. Demond Alejo of orthopedic surgery who will consult on the patient.  The patient should remain n.p.o. after midnight.  She does not take anticoagulation. [NS]      ED Course User Index  [NS] Enrique Isbell MD                                             Medical Decision Making  Differential diagnosis includes left wrist fracture wrist dislocation, left hip fracture pelvic fracture    Labs were nonrevealing.  X-ray of the left hip shows a left subcapital femur fracture.    X-ray of the left wrist shows a comminuted distal radius and ulnar styloid fracture.    These findings were discussed with the on-call orthopedic surgeon, Dr. Demond Alejo, who has ordered CT imaging and recommends admission for further evaluation and surgical planning.    I performed a sedation, and reduction and splinting of the left wrist.  The patient was  neurovascular intact after the reduction procedure.    The patient received pain medication multiple times throughout the ER course.    I discussed the patient with the hospitalist, who will consult on the patient to determine status of admission.    Problems Addressed:  Left wrist fracture, open, initial encounter: complicated acute illness or injury with systemic symptoms  Subcapital fracture of hip, left, closed, initial encounter: complicated acute illness or injury with systemic symptoms    Amount and/or Complexity of Data Reviewed  Independent Historian: spouse     Details:  and daughter provide additional information  External Data Reviewed: labs and radiology.  Labs: ordered. Decision-making details documented in ED Course.  Radiology: ordered and independent interpretation performed. Decision-making details documented in ED Course.    Risk  OTC drugs.  Prescription drug management.  Decision regarding hospitalization.        Final diagnoses:   Subcapital fracture of hip, left, closed, initial encounter   Left wrist fracture, open, initial encounter       ED Disposition  ED Disposition       ED Disposition   Decision to Admit    Condition   --    Comment   Level of Care: Telemetry [5]   Diagnosis: Femur fracture [112088]   Admitting Physician: PILI AMES [[id number]]   Attending Physician: PILI AMES [[id number]]   Certification: I Certify That Inpatient Hospital Services Are Medically Necessary For Greater Than 2 Midnights                 No follow-up provider specified.       Medication List        ASK your doctor about these medications      cetirizine 5 MG tablet  Commonly known as: zyrTEC  Ask about: Which instructions should I use?                 Enrique Isbell MD  07/20/24 1129      Electronically signed by Enrique Isbell MD at 07/20/24 1129       No current facility-administered medications for this encounter.     Current Outpatient Medications   Medication Sig Dispense Refill     amLODIPine (NORVASC) 5 MG tablet TAKE 2 TABLETS EVERY  tablet 0    aspirin 81 MG EC tablet Take 1 tablet by mouth Every 12 (Twelve) Hours. Indications: VTE Prophylaxis      HYDROcodone-acetaminophen (NORCO) 5-325 MG per tablet Take 1 tablet by mouth Every 6 (Six) Hours As Needed for Moderate Pain for up to 4 days.      levothyroxine (SYNTHROID, LEVOTHROID) 75 MCG tablet Take 1 tablet by mouth Daily. (Patient taking differently: Take 1 tablet by mouth Every Morning.) 90 tablet 1    losartan-hydrochlorothiazide (HYZAAR) 100-12.5 MG per tablet TAKE 1 TABLET EVERY DAY 90 tablet 1    multivitamin with minerals tablet tablet Take 1 tablet by mouth Daily. OTC      NON FORMULARY Specialty Compounded Cream for neuropathy of feet. Cream contains lidocaine, gabapentin, ketoprofen, ibuprofen and clonidine per patient. Apply to bilateral feet at bedtime      pantoprazole (PROTONIX) 20 MG EC tablet TAKE 1 TABLET EVERY DAY 90 tablet 0    timolol (TIMOPTIC) 0.5 % ophthalmic solution Administer 1 drop to both eyes 2 (Two) Times a Day.      vitamin B-12 (CYANOCOBALAMIN) 100 MCG tablet Take 1 tablet by mouth Daily. OTC      cetirizine (zyrTEC) 5 MG tablet Take 1 tablet by mouth Daily.      diphenhydrAMINE (BENADRYL) 25 mg capsule Take 1 capsule by mouth At Night As Needed for Itching. OTC      latanoprost (XALATAN) 0.005 % ophthalmic solution Apply 1 drop to eye Daily. (Patient taking differently: Apply 1 drop to eye(s) as directed by provider 2 (Two) Times a Day.) 7.5 mL 3    pravastatin (PRAVACHOL) 40 MG tablet Take 1 tablet by mouth Every Evening. 90 tablet 3    Semaglutide,0.25 or 0.5MG/DOS, (Ozempic, 0.25 or 0.5 MG/DOSE,) 2 MG/3ML solution pen-injector Inject 0.25 mg under the skin into the appropriate area as directed 1 (One) Time Per Week. (Patient taking differently: Inject 0.5 mg under the skin into the appropriate area as directed 1 (One) Time Per Week.)      vitamin D3 125 MCG (5000 UT) capsule capsule Take 1 capsule  by mouth Daily.       Lab Results (all)       Procedure Component Value Units Date/Time    POC Glucose Once [[id number]]  (Abnormal) Collected: 07/25/24 0730    Specimen: Blood Updated: 07/25/24 0741     Glucose 145 mg/dL     POC Glucose Once [[id number]]  (Abnormal) Collected: 07/24/24 1934    Specimen: Blood Updated: 07/24/24 1935     Glucose 166 mg/dL     POC Glucose Once [[id number]]  (Abnormal) Collected: 07/24/24 1611    Specimen: Blood Updated: 07/24/24 1612     Glucose 147 mg/dL     POC Glucose Once [[id number]]  (Abnormal) Collected: 07/24/24 1147    Specimen: Blood Updated: 07/24/24 1149     Glucose 201 mg/dL     POC Glucose Once [[id number]]  (Abnormal) Collected: 07/24/24 0745    Specimen: Blood Updated: 07/24/24 0747     Glucose 165 mg/dL     POC Glucose Once [[id number]]  (Abnormal) Collected: 07/23/24 2022    Specimen: Blood Updated: 07/23/24 2024     Glucose 136 mg/dL     POC Glucose Once [[id number]]  (Abnormal) Collected: 07/23/24 1610    Specimen: Blood Updated: 07/23/24 1611     Glucose 172 mg/dL     POC Glucose Once [[id number]]  (Abnormal) Collected: 07/23/24 1102    Specimen: Blood Updated: 07/23/24 1103     Glucose 142 mg/dL     POC Glucose Once [[id number]]  (Abnormal) Collected: 07/23/24 0650    Specimen: Blood Updated: 07/23/24 0654     Glucose 183 mg/dL     POC Glucose Once [[id number]]  (Abnormal) Collected: 07/22/24 2008    Specimen: Blood Updated: 07/22/24 2011     Glucose 136 mg/dL     POC Glucose Once [[id number]]  (Abnormal) Collected: 07/22/24 1636    Specimen: Blood Updated: 07/22/24 1638     Glucose 141 mg/dL     POC Glucose Once [[id number]]  (Abnormal) Collected: 07/22/24 1120    Specimen: Blood Updated: 07/22/24 1123     Glucose 146 mg/dL     POC Glucose Once [[id number]]  (Abnormal) Collected: 07/22/24 0712    Specimen: Blood Updated: 07/22/24 0713     Glucose 143 mg/dL     POC Glucose Once [[id number]]  (Abnormal) Collected: 07/21/24 2003    Specimen: Blood Updated: 07/21/24 2004      Glucose 151 mg/dL     POC Glucose Once [[id number]]  (Abnormal) Collected: 07/21/24 1603    Specimen: Blood Updated: 07/21/24 1604     Glucose 152 mg/dL     CBC & Differential [[id number]]  (Abnormal) Collected: 07/21/24 0923    Specimen: Blood Updated: 07/21/24 1520    Narrative:      The following orders were created for panel order CBC & Differential.  Procedure                               Abnormality         Status                     ---------                               -----------         ------                     CBC Auto Differential[[id number]]        Abnormal            Final result               Scan Slide[[id number]]                                                                    Please view results for these tests on the individual orders.    CBC Auto Differential [[id number]]  (Abnormal) Collected: 07/21/24 1505    Specimen: Blood Updated: 07/21/24 1520     WBC 11.96 10*3/mm3      RBC 3.63 10*6/mm3      Hemoglobin 11.2 g/dL      Hematocrit 32.2 %      MCV 88.7 fL      MCH 30.9 pg      MCHC 34.8 g/dL      RDW 12.9 %      RDW-SD 41.8 fl      MPV 10.0 fL      Platelets 192 10*3/mm3      Neutrophil % 66.4 %      Lymphocyte % 15.5 %      Monocyte % 8.8 %      Eosinophil % 7.8 %      Basophil % 0.7 %      Immature Grans % 0.8 %      Neutrophils, Absolute 7.96 10*3/mm3      Lymphocytes, Absolute 1.85 10*3/mm3      Monocytes, Absolute 1.05 10*3/mm3      Eosinophils, Absolute 0.93 10*3/mm3      Basophils, Absolute 0.08 10*3/mm3      Immature Grans, Absolute 0.09 10*3/mm3      nRBC 0.0 /100 WBC     POC Glucose Once [[id number]]  (Abnormal) Collected: 07/21/24 1140    Specimen: Blood Updated: 07/21/24 1142     Glucose 134 mg/dL     Renal Function Panel [[id number]]  (Abnormal) Collected: 07/21/24 0923    Specimen: Blood Updated: 07/21/24 1028     Glucose 175 mg/dL      BUN 11 mg/dL      Creatinine 0.66 mg/dL      Sodium 139 mmol/L      Potassium 4.7 mmol/L      Comment: Specimen 1+ hemolyzed.  Result may be  falsely elevated.        Chloride 105 mmol/L      CO2 19.0 mmol/L      Calcium 8.9 mg/dL      Albumin 3.5 g/dL      Phosphorus 2.8 mg/dL      Anion Gap 15.0 mmol/L      BUN/Creatinine Ratio 16.7     eGFR 92.2 mL/min/1.73     Narrative:      GFR Normal >60  Chronic Kidney Disease <60  Kidney Failure <15    The GFR formula is only valid for adults with stable renal function between ages 18 and 70.    POC Glucose Once [[id number]]  (Abnormal) Collected: 07/21/24 0722    Specimen: Blood Updated: 07/21/24 0747     Glucose 143 mg/dL     POC Glucose Once [[id number]]  (Abnormal) Collected: 07/20/24 2110    Specimen: Blood Updated: 07/20/24 2112     Glucose 198 mg/dL     POC Glucose Once [[id number]]  (Abnormal) Collected: 07/20/24 1734    Specimen: Blood Updated: 07/20/24 1739     Glucose 240 mg/dL     POC Glucose Once [[id number]]  (Normal) Collected: 07/20/24 1132    Specimen: Blood Updated: 07/20/24 1143     Glucose 117 mg/dL     POC Glucose Once [[id number]]  (Abnormal) Collected: 07/20/24 0543    Specimen: Blood Updated: 07/20/24 0544     Glucose 137 mg/dL     Hemoglobin A1c [[id number]]  (Abnormal) Collected: 07/20/24 0335    Specimen: Blood Updated: 07/20/24 0426     Hemoglobin A1C 6.00 %     Narrative:      Hemoglobin A1C Ranges:    Increased Risk for Diabetes  5.7% to 6.4%  Diabetes                     >= 6.5%  Diabetic Goal                < 7.0%    Basic Metabolic Panel [[id number]]  (Abnormal) Collected: 07/20/24 0335    Specimen: Blood Updated: 07/20/24 0424     Glucose 167 mg/dL      BUN 17 mg/dL      Creatinine 0.73 mg/dL      Sodium 137 mmol/L      Potassium 3.9 mmol/L      Comment: Slight hemolysis detected by analyzer. Result may be falsely elevated.        Chloride 100 mmol/L      CO2 24.0 mmol/L      Calcium 9.4 mg/dL      BUN/Creatinine Ratio 23.3     Anion Gap 13.0 mmol/L      eGFR 86.4 mL/min/1.73     Narrative:      GFR Normal >60  Chronic Kidney Disease <60  Kidney Failure <15    The GFR formula is only  valid for adults with stable renal function between ages 18 and 70.    CBC & Differential [[id number]]  (Abnormal) Collected: 07/20/24 0335    Specimen: Blood Updated: 07/20/24 0405    Narrative:      The following orders were created for panel order CBC & Differential.  Procedure                               Abnormality         Status                     ---------                               -----------         ------                     CBC Auto Differential[[id number]]        Abnormal            Final result                 Please view results for these tests on the individual orders.    CBC Auto Differential [[id number]]  (Abnormal) Collected: 07/20/24 0335    Specimen: Blood Updated: 07/20/24 0405     WBC 15.47 10*3/mm3      RBC 4.84 10*6/mm3      Hemoglobin 14.5 g/dL      Hematocrit 42.6 %      MCV 88.0 fL      MCH 30.0 pg      MCHC 34.0 g/dL      RDW 12.3 %      RDW-SD 39.8 fl      MPV 9.7 fL      Platelets 269 10*3/mm3      Neutrophil % 85.9 %      Lymphocyte % 7.5 %      Monocyte % 5.4 %      Eosinophil % 0.3 %      Basophil % 0.3 %      Immature Grans % 0.6 %      Neutrophils, Absolute 13.31 10*3/mm3      Lymphocytes, Absolute 1.16 10*3/mm3      Monocytes, Absolute 0.83 10*3/mm3      Eosinophils, Absolute 0.04 10*3/mm3      Basophils, Absolute 0.04 10*3/mm3      Immature Grans, Absolute 0.09 10*3/mm3      nRBC 0.0 /100 WBC     Comprehensive Metabolic Panel [[id number]]  (Abnormal) Collected: 07/20/24 0044    Specimen: Blood Updated: 07/20/24 0113     Glucose 183 mg/dL      BUN 18 mg/dL      Creatinine 0.73 mg/dL      Sodium 136 mmol/L      Potassium 4.3 mmol/L      Comment: Specimen hemolyzed.  Result may be falsely elevated.        Chloride 101 mmol/L      CO2 23.0 mmol/L      Calcium 9.0 mg/dL      Total Protein 7.2 g/dL      Albumin 4.2 g/dL      ALT (SGPT) 26 U/L      Comment: Specimen hemolyzed.  Result may  be falsely elevated.        AST (SGOT) 28 U/L      Alkaline Phosphatase 64 U/L      Total  Bilirubin 0.4 mg/dL      Globulin 3.0 gm/dL      Comment: Calculated Result        A/G Ratio 1.4 g/dL      BUN/Creatinine Ratio 24.7     Anion Gap 12.0 mmol/L      eGFR 86.4 mL/min/1.73     Narrative:      GFR Normal >60  Chronic Kidney Disease <60  Kidney Failure <15    The GFR formula is only valid for adults with stable renal function between ages 18 and 70.          Imaging Results (All)       Procedure Component Value Units Date/Time    XR Hip With or Without Pelvis 2 - 3 View Left [[id number]] Collected: 07/20/24 1651     Updated: 07/20/24 1656    Narrative:      XR HIP W OR WO PELVIS 2-3 VIEW LEFT    Date of Exam: 7/20/2024 4:17 PM EDT    Indication: Postop    Comparison: 7/19/2024    Findings:  Total left hip prosthesis is now seen, components in anatomic alignment. There is expected postop soft tissue air. Right hip prosthesis remains in anatomic alignment. No abnormal lucency is seen around the prosthetic components. Bony structures appear   intact.          Impression:      Impression:  Total left hip prosthesis in anatomic alignment. No new bony abnormality seen.      Electronically Signed: Jasmeet Carrasco MD    7/20/2024 4:53 PM EDT    Workstation ID: [id number]    FL C Arm During Surgery [[id number]] Resulted: 07/20/24 1600     Updated: 07/20/24 1600    Narrative:      This procedure was auto-finalized with no dictation required.    CT Pelvis Without Contrast [[id number]] Collected: 07/20/24 0437     Updated: 07/20/24 0444    Narrative:      CT PELVIS WO CONTRAST    Date of Exam: 7/20/2024 2:17 AM EDT    Indication: eval left hip fracture.    Comparison: None available.    Technique: Axial CT images were obtained of the pelvis without contrast administration.  Reconstructed coronal and sagittal images were also obtained. Automated exposure control and iterative construction methods were used.      Findings:  There is an impacted subcapital fracture of the proximal left femur. There is no evidence of dislocation  of the femoral head. There does appear to be a component of the subcapital fracture which may involve the lateral aspect of the femoral neck. The   superior to inferior pubic rami are intact. There are postoperative changes from right total hip arthroplasty. There is subchondral cystic change of the lateral acetabulum      Impression:      Impression:  Impacted subcapital fracture of the proximal left femur.        Electronically Signed: Doni Vazquez MD    7/20/2024 4:41 AM EDT    Workstation ID: [id number]    CT Upper Extremity Left Without Contrast [[id number]] Collected: 07/20/24 0433     Updated: 07/20/24 0440    Narrative:      CT UPPER EXTREMITY LEFT WO CONTRAST    Date of Exam: 7/20/2024 2:17 AM EDT    Indication: evaluate wrist fracture.    Comparison: None available.    Technique: Axial CT images were obtained of the left upper extremity without contrast administration.  Reconstructed coronal and sagittal images were also obtained. Automated exposure control and iterative construction methods were used.      Findings:  The exam is limited based upon the position patient's restrictive CT scan and the poor x-ray penetration of the osseous structures. Again demonstrated is a comminuted fracture of the distal radius with displacement. There is a displaced ulnar styloid   fracture. There is mild improvement in the alignment of the distal radius fracture with an interval placement of a plaster splint. The scaphoid appears intact. No discrete displaced carpal fracture is identified though again limited by the poor   technique.      Impression:      Impression:  Limited exam. There is a comminuted fracture of the distal radius with displacement. There is a displaced ulnar styloid fracture.        Electronically Signed: Doni Vazquez MD    7/20/2024 4:37 AM EDT    Workstation ID: [id number]    XR Chest 1 View [[id number]] Collected: 07/19/24 2334     Updated: 07/19/24 2339    Narrative:      XR CHEST 1 VW    Date of Exam:  7/19/2024 11:02 PM EDT    Indication: preop    Comparison: Chest radiograph dated February 4, 2024    Findings:  There are no airspace consolidations. No pleural fluid. No pneumothorax. The pulmonary vasculature appears within normal limits. The cardiac and mediastinal silhouette appear unremarkable. No acute osseous abnormality identified.      Impression:      Impression:  No active disease      Electronically Signed: Doni Vazquez MD    7/19/2024 11:36 PM EDT    Workstation ID: [id number]    XR Wrist 3+ View Left [[id number]] Collected: 07/19/24 2331     Updated: 07/19/24 2337    Narrative:      XR WRIST 3+ VW LEFT    Date of Exam: 7/19/2024 11:01 PM EDT    Indication: fall/pain    Comparison: None available.    Findings:  There is a comminuted and displaced fracture of the distal radius. There is a displaced fracture of the ulnar styloid. There is dislocation of the radiocarpal joint in the radial direction. There is widening of the scaphoid trapezium interval.      Impression:      Impression:  Comminuted and displaced fracture of the distal radius with disruption of the radiocarpal's.      Electronically Signed: Doni Vazquez MD    7/19/2024 11:34 PM EDT    Workstation ID: [id number]    XR Hip With or Without Pelvis 2 - 3 View Left [[id number]] Collected: 07/19/24 2328     Updated: 07/19/24 2334    Narrative:      XR HIP W OR WO PELVIS 2-3 VIEW LEFT    Date of Exam: 7/19/2024 11:01 PM EDT    Indication: fall/pain    Comparison: AP pelvis dated 2/4/2024    Findings:  There is an acute minimally displaced fracture of the subcapital proximal left femur. There is evidence of a previous right total hip arthroplasty. There are no lytic or destructive bony lesions.      Impression:      Impression:  Fracture of the subcapital left femur      Electronically Signed: Doni Vazquez MD    7/19/2024 11:31 PM EDT    Workstation ID: [id number]             Operative/Procedure Notes (all)        Doni Evans MD at 07/20/24 6286   Version 2 of 2         TOTAL HIP ARTHROPLASTY ANTERIOR  Procedure Report    Patient Name:  Pari Howell  YOB: 1950    Date of Surgery:  7/20/2024     Indications:    74 y.o. female who was admitted to [facility] following a fall from standing with significant pain and difficulty ambulating. X-rays revealed a displaced femoral neck fracture.    The patient was indicated for a total hip arthroplasty. Likely risks and benefits of the procedure including but not limited to infection, DVT, pulmonary embolism, leg length discrepancy, recurrent dislocation, possibility of injury to nerves and vessels, and periprosthetic fractures have been discussed with the patient and her daughter in detail. Despite the risks involved, the patient elected to proceed and informed consent was obtained.     Patient also had a ipsilateral open distal radius fracture that was managed by Dr. Demond Alejo    Patient Identification:  Patient was seen in the preop, consent was reviewed, operative procedure was identified, and surgical site and thigh marked.      Pre-op Diagnosis:   Left femoral neck fracture       Postop diagnosis:  Same    Procedure/CPT® Codes:      Procedure(s):  OPEN REDUCTION INTERNAL FIXATION DISTAL RADIUS - LEFT  TOTAL HIP ARTHROPLASTY ANTERIOR    Staff:  Surgeon(s):  Doni Evans MD Talwalkar, Janak Ramesh, MD    Anesthesia: General with Block    Estimated Blood Loss: 250 mL    Implants:    Implant Name Type Inv. Item Serial No.  Lot No. LRB No. Used Action   DEV CONTRL TISS STRATAFIX SYMM PDS PLUS NATALI CT-1 45CM - [id number] Implant DEV CONTRL TISS STRATAFIX SYMM PDS PLUS NATALI CT-1 45CM  [employer]  DIV [employer] [id number] Left 1 Implanted   SUT NONABS MAXBRAID/PE NMBR2 HC5 38IN T 922845 - [id number] Implant SUT NONABS MAXBRAID/PE NMBR2 HC5 38IN WHT 455040  [employer] [id number] Left 1 Implanted   SUT NONABS MAXBRAID/PE NMBR2 HC5 38IN T 762721 - [id number] Implant SUT  NONABS MAXBRAID/PE NMBR2 HC5 38IN WHT 917600  [facility] [id number] Left 1 Implanted   PLT DIST/RAD GEMINUS VOLR STD 3HL LT - [id number] Implant PLT DIST/RAD GEMINUS VOLR STD 3HL LT  SKELETAL DYNAMICS  Left 1 Implanted   INSRT HIP TRIDENT X3 0DEG 36MM LILIANE STRL - [id number] Implant INSRT HIP TRIDENT X3 0DEG 36MM LILIANE STRL  OLGA JANIE [id number] Left 1 Implanted   SHLL TRIDENT2 TRITANIUM C/HL 52MM - [id number] Implant SHLL TRIDENT2 TRITANIUM C/HL 52MM  OLGA JANIE [id number] Left 1 Implanted   SCRW HEX LP TRIDENT2 6.5X35MM - [id number] Implant SCRW HEX LP TRIDENT2 6.5X35MM  OLGA JANIE [id number] Left 1 Implanted   CMT BONE SIMPLEX/P TMYCIN FDOS 10PK - [id number] Implant CMT BONE SIMPLEX/P TMYCIN FDOS 10PK  OLGA JANIE [id number] Left 2 Implanted   PLUG BONE IM FEM/HIP [id number] 12MM - [id number] Implant PLUG BONE IM FEM/HIP [id number] 12MM  OLGA JANIE [id number] Left 1 Implanted   STEM FEM/HIP EXETER V40 CMT SS OFFST/44MM SZ2 150MM - [id number] Implant STEM FEM/HIP EXETER V40 CMT SS OFFST/44MM SZ2 150MM  OLGA JANIE [id number] Left 1 Implanted   HD FEM/HIP BIOLOX/DELTA V40 CERAM 36MM MIN2.5 - [id number] Implant HD FEM/HIP BIOLOX/DELTA V40 CERAM 36MM MIN2.5  OLGA JANIE [id number] Left 1 Implanted   SCRW GEMINUS PA NL TI 3.5X12MM - [id number] Implant SCRW GEMINUS PA NL TI 3.5X12MM  SKELETAL DYNAMICS  Left 1 Implanted   PEG VOLR GEMINUS SMOTH LK TI 2X20MM - [id number] Implant PEG VOLR GEMINUS SMOTH LK TI 2X20MM  SKELETAL DYNAMICS  Left 6 Implanted   PEG GEMINUS THRD LK TI 2.3X20MM - [id number] Implant PEG GEMINUS THRD LK TI 2.3X20MM  SKELETAL DYNAMICS  Left 1 Implanted   PEG VOLR GEMINUS SMOTH LK TI 2X23MM - [id number] Implant PEG VOLR GEMINUS SMOTH LK TI 2X23MM  SKELETAL DYNAMICS  Left 1 Implanted   SCRW GEMINUS PA NL TI 3.5X11MM - [id number] Implant SCRW GEMINUS PA NL TI 3.5X11MM  SKELETAL DYNAMICS  Left 1 Implanted   SCRW GEMINUS PA NL 3TI .5X13MM - [id number] Implant SCRW GEMINUS PA NL 3TI .5X13MM  SKELETAL DYNAMICS  Left 1  Implanted       Specimen:          None      Findings: see dictation    Complications: none    Description of Procedure:   The patient was transferred to [facility] operating room and preoperative antibiotics were given IV prior to skin incision as well as 1 gram of Tranexemic Acid. Patient received general anesthesia and was transferred to the [place] Table. All bony prominences were padded adequately. The operative hip was then prepped and draped in the usual sterile fashion. Multiple timeouts were done identifying the correct patient, surgical site, and planned procedure.    A skin incision was made overlying the anterior lateral aspect of the hip for about 10 cm just below the lateral to the anterior superior iliac spine. Skin and subcutaneous tissue and fascia were incised in line with the skin incision overlying the tensor fascia michael muscle. The tensor muscle was then retracted laterally and the interval between sartorius and rectus femoris medially and the tensor fascia muscle were developed. The lateral femoral circumflex vessels were identified and were ligated without difficulty. The deep fascia was incised and the capsule of the hip joint was identified. We then placed a soft tissue protecting device deep to the rectus and the tensor. Retractors were then placed extracapsular and the capsule was then incised in a T-shaped manner and the anterior posterior capsules were then tied with #2 FiberWire. Following this, retractors were placed intracapsularly. We did identify the obvious signs of severe osteoarthritis upon incising the capsule. We then made appropriate releases on the inferior medial neck and along the saddle of the femoral neck. Femoral neck remaining was identified and osteotomy was done according to the preoperative templating with an oscillating saw. The femoral head and cut neck were extracted using a corkscrew without difficulty. The femoral head did have some minor signs of  articular cartilage loss and superior wear.    The acetabular cavity was exposed by placing retractors and taking care to protect the anterior vascular structures. The labrum was excised and the pulvinar was excised from the cotyloid fossa. The acetabular cavity was then progressively reamed maintaining the correct inclination and version under image intensifier control. Adequate medialization was obtained. Adequate fit was found to be obtained with the reamer size of 52. The [place] T2 cup size 52 was then impacted into position. This was found to seat satisfactorily with adequate inclination and anteversion. Single screw was placed for additional fixation. Following this, the cup was cleaned and then a neutral liner impacted. Following this, the periarticular tissues were then infiltrated with local anesthetic.    Attention was then directed to the proximal femur. The proximal femur was delivered using a trochanteric hook placed just distal to the vastus tubercle and proximal to the gluteus cheryl tendon. The leg was then externally rotated and gross traction released. Hyperextension and adduction was done using the [place] table. Mechanical lift on the table was utilized to support the femur and appropriate capsular releases were made to expose the medial slope of the greater trochanter. The proximal femur was delivered and the femoral canal was then entered by removing lateral femoral neck with a box chisel by serial broaching. Adequate fit was found to be obtained with the Size 44 N2 150 broach. We then trialed and -2.5 neck was reduced. Fluoroscopic imaging was used to assess leg length and offset was found to be symmetric. Broach trials were then removed. Cement restrictor was placed distally. The femoral canal was prepped with thorough irrigation followed by a peroxide soak followed by an epinephrine soak. Antibiotic cement was inserted in a retrograde-fashion and pressurization was done prior to implanting  the implant. A 44 N2 150 [place] stem was implanted in appropriate anteversion. It sat very similar to our broach trial. We chose the -2.5 neck ceramic. This was then reduced again and fluoroscopy images both lateral and AP of the femur showed the stem to be in good position. AP pelvis showed symmetric leg length and offset. There are no acute fractures. The area was thoroughly irrigated with saline. We did use of more of the injection cocktail. Betadine irrigation was used followed by saline irrigation. Soft tissue hemostasis was secured and second dose of IV TXA was used. Sponge, needle, and instrument counts were correct. FiberWire sutures directly anterior and posterior capsular flaps were tied to each other. 1 g vanco powder placed deep. We then closed the fascial layer with a running #2 STRATAFIX followed by 2-0 vicryl and then monocryl for the skin and Dermabond . Once the Dermabond had dried, Mepilex AG dressing was placed over the top. The patient was then transferred to the recovery room in stable condition. The patient tolerated the procedure well and there were no complications. The patient had adequate distal pulses and good cap refill.    The patient will be mobilized by physical therapy and receive antibiotic prophylaxis postoperatively for 2 more doses given the first 24 hours. The patient was started on DVT prophylaxis with 81 mg aspirin twice daily starting postoperative day #1.  Due to the ipsilateral distal radius fracture Dr. Alejo I discussed the patient to be platform weightbearing with a walker through the left upper extremity to allow weight through the elbow and through the wrist    I discussed the satisfactory performance of the procedure with the patient's family and discussed the postoperative management.      Assistant Participation:  Surgeon(s):  Doni Evans MD Talwalkar, Janak Ramesh, MD Tina Trent, PA assisted with proper preoperative positioning, preoperative templating,  determining availability of proper implants, prepping and draping of patient, manipulation placement of instruments, protection of ligaments and vital soft tissue structures, assistance in maintaining hemostasis and assistance with closure of the wound. Their skills and knowledge of the steps of operation and the desired outcome of each surgical step was crucial, allowing for efficient choreography of surgical procedure, and closure of the wound which lead to reduced surgical time, less blood loss, and less risk of complications for the patient.         Doni Evans MD     Date: 7/20/2024  Time: 16:02 EDT      Electronically signed by Doni Evans MD at 07/24/24 0934       Doni Evans MD at 07/20/24 1205  Version 1 of 2         TOTAL HIP ARTHROPLASTY ANTERIOR  Procedure Report    Patient Name:  Pari Howell  YOB: 1950    Date of Surgery:  7/20/2024     Indications:    74 y.o. female who was admitted to [facility] following a fall from standing with significant pain and difficulty ambulating. X-rays revealed a displaced femoral neck fracture.    The patient was indicated for a total hip arthroplasty. Likely risks and benefits of the procedure including but not limited to infection, DVT, pulmonary embolism, leg length discrepancy, recurrent dislocation, possibility of injury to nerves and vessels, and periprosthetic fractures have been discussed with the patient and her daughter in detail. Despite the risks involved, the patient elected to proceed and informed consent was obtained.     Patient also had a ipsilateral open distal radius fracture that was managed by Dr. Demond Alejo    Patient Identification:  Patient was seen in the preop, consent was reviewed, operative procedure was identified, and surgical site and thigh marked.      Pre-op Diagnosis:   Left wrist fracture, open, initial encounter [S62.102B]       Post-Op Diagnosis Codes:     * Left wrist fracture,  open, initial encounter [S62.102B]    Procedure/CPT® Codes:      Procedure(s):  OPEN REDUCTION INTERNAL FIXATION DISTAL RADIUS - LEFT  TOTAL HIP ARTHROPLASTY ANTERIOR    Staff:  Surgeon(s):  Doni Evans MD Talwalkar, Janak Ramesh, MD    Anesthesia: General with Block    Estimated Blood Loss: 250 mL    Implants:    Implant Name Type Inv. Item Serial No.  Lot No. LRB No. Used Action   DEV CONTRL TISS STRATAFIX SYMM PDS PLUS NATALI CT-1 45CM - [id number] Implant DEV CONTRL TISS STRATAFIX SYMM PDS PLUS NATALI CT-1 45CM  [employer]  DIV OF J [employer] J [id number] Left 1 Implanted   SUT NONABS MAXBRAID/PE NMBR2 HC5 38IN WHT 093785 - [id number] Implant SUT NONABS MAXBRAID/PE NMBR2 HC5 38IN WHT 001760  [employer] [id number] Left 1 Implanted   SUT NONABS MAXBRAID/PE NMBR2 HC5 38IN WHT 735142 - [id number] Implant SUT NONABS MAXBRAID/PE NMBR2 HC5 38IN WHT 331678  [employer] [id number] Left 1 Implanted   PLT DIST/RAD GEMINUS VOLR STD 3HL LT - [id number] Implant PLT DIST/RAD GEMINUS VOLR STD 3HL LT  SKELETAL DYNAMICS  Left 1 Implanted   INSRT HIP TRIDENT X3 0DEG 36MM LILINAE STRL - [id number] Implant INSRT HIP TRIDENT X3 0DEG 36MM LILIANE STRL  OLGA [employer] [id number] Left 1 Implanted   SHLL TRIDENT2 TRITANIUM C/HL 52MM - [id number] Implant SHLL TRIDENT2 TRITANIUM C/HL 52MM  OLGA JANIE [id number] Left 1 Implanted   SCRW HEX LP TRIDENT2 6.5X35MM - [id number] Implant SCRW HEX LP TRIDENT2 6.5X35MM  OLGA JANIE [id number] Left 1 Implanted   CMT BONE SIMPLEX/P TMYCIN FDOS 10PK - [id number] Implant CMT BONE SIMPLEX/P TMYCIN FDOS 10PK  OLGA JANIE [id number] Left 2 Implanted   PLUG BONE IM FEM/HIP [id number] 12MM - [id number] Implant PLUG BONE IM FEM/HIP [id number] 12MM  OLGA JANIE [id number] Left 1 Implanted   STEM FEM/HIP EXETER V40 CMT SS OFFST/44MM SZ2 150MM - [id number] Implant STEM FEM/HIP EXETER V40 CMT SS OFFST/44MM SZ2 150MM  OLGA JANIE [id number] Left 1 Implanted   HD FEM/HIP BIOLOX/DELTA V40 CERAM 36MM MIN2.5 - [id number] Implant HD  FEM/HIP BIOLOX/DELTA V40 CERAM 36MM MIN2.5  L[employer] JANIE [id number] Left 1 Implanted   SCRW GEMINUS PA NL TI 3.5X12MM - [id number] Implant SCRW GEMINUS PA NL TI 3.5X12MM  SKELETAL DYNAMICS  Left 1 Implanted   PEG VOLR GEMINUS SMOTH LK TI 2X20MM - [id number] Implant PEG VOLR GEMINUS SMOTH LK TI 2X20MM  SKELETAL DYNAMICS  Left 6 Implanted   PEG GEMINUS THRD LK TI 2.3X20MM - [id number] Implant PEG GEMINUS THRD LK TI 2.3X20MM  SKELETAL DYNAMICS  Left 1 Implanted   PEG VOLR GEMINUS SMOTH LK TI 2X23MM - [id number] Implant PEG VOLR GEMINUS SMOTH LK TI 2X23MM  SKELETAL DYNAMICS  Left 1 Implanted   SCRW GEMINUS PA NL TI 3.5X11MM - [id number] Implant SCRW GEMINUS PA NL TI 3.5X11MM  SKELETAL DYNAMICS  Left 1 Implanted   SCRW GEMINUS PA NL 3TI .5X13MM - [id number] Implant SCRW GEMINUS PA NL 3TI .5X13MM  SKELETAL DYNAMICS  Left 1 Implanted       Specimen:          None      Findings: see dictation    Complications: none    Description of Procedure:   The patient was transferred to [facility] operating room and preoperative antibiotics were given IV prior to skin incision as well as 1 gram of Tranexemic Acid. Patient received general anesthesia and was transferred to the [place] Table. All bony prominences were padded adequately. The operative hip was then prepped and draped in the usual sterile fashion. Multiple timeouts were done identifying the correct patient, surgical site, and planned procedure.    A skin incision was made overlying the anterior lateral aspect of the hip for about 10 cm just below the lateral to the anterior superior iliac spine. Skin and subcutaneous tissue and fascia were incised in line with the skin incision overlying the tensor fascia michael muscle. The tensor muscle was then retracted laterally and the interval between sartorius and rectus femoris medially and the tensor fascia muscle were developed. The lateral femoral circumflex vessels were identified and were ligated without difficulty. The  deep fascia was incised and the capsule of the hip joint was identified. We then placed a soft tissue protecting device deep to the rectus and the tensor. Retractors were then placed extracapsular and the capsule was then incised in a T-shaped manner and the anterior posterior capsules were then tied with #2 FiberWire. Following this, retractors were placed intracapsularly. We did identify the obvious signs of severe osteoarthritis upon incising the capsule. We then made appropriate releases on the inferior medial neck and along the saddle of the femoral neck. Femoral neck remaining was identified and osteotomy was done according to the preoperative templating with an oscillating saw. The femoral head and cut neck were extracted using a corkscrew without difficulty. The femoral head did have some minor signs of articular cartilage loss and superior wear.    The acetabular cavity was exposed by placing retractors and taking care to protect the anterior vascular structures. The labrum was excised and the pulvinar was excised from the cotyloid fossa. The acetabular cavity was then progressively reamed maintaining the correct inclination and version under image intensifier control. Adequate medialization was obtained. Adequate fit was found to be obtained with the reamer size of 52. The [place] T2 cup size 52 was then impacted into position. This was found to seat satisfactorily with adequate inclination and anteversion. Single screw was placed for additional fixation. Following this, the cup was cleaned and then a neutral liner impacted. Following this, the periarticular tissues were then infiltrated with local anesthetic.    Attention was then directed to the proximal femur. The proximal femur was delivered using a trochanteric hook placed just distal to the vastus tubercle and proximal to the gluteus cheryl tendon. The leg was then externally rotated and gross traction released. Hyperextension and adduction was done  using the [place] table. Mechanical lift on the table was utilized to support the femur and appropriate capsular releases were made to expose the medial slope of the greater trochanter. The proximal femur was delivered and the femoral canal was then entered by removing lateral femoral neck with a box chisel by serial broaching. Adequate fit was found to be obtained with the Size 44 N2 150 broach. We then trialed and -2.5 neck was reduced. Fluoroscopic imaging was used to assess leg length and offset was found to be symmetric. Broach trials were then removed. Cement restrictor was placed distally. The femoral canal was prepped with thorough irrigation followed by a peroxide soak followed by an epinephrine soak. Antibiotic cement was inserted in a retrograde-fashion and pressurization was done prior to implanting the implant. A 44 N2 150 [place] stem was implanted in appropriate anteversion. It sat very similar to our broach trial. We chose the -2.5 neck ceramic. This was then reduced again and fluoroscopy images both lateral and AP of the femur showed the stem to be in good position. AP pelvis showed symmetric leg length and offset. There are no acute fractures. The area was thoroughly irrigated with saline. We did use of more of the injection cocktail. Betadine irrigation was used followed by saline irrigation. Soft tissue hemostasis was secured and second dose of IV TXA was used. Sponge, needle, and instrument counts were correct. FiberWire sutures directly anterior and posterior capsular flaps were tied to each other. 1 g vanco powder placed deep. We then closed the fascial layer with a running #2 STRATAFIX followed by 2-0 vicryl and then monocryl for the skin and Dermabond . Once the Dermabond had dried, Mepilex AG dressing was placed over the top. The patient was then transferred to the recovery room in stable condition. The patient tolerated the procedure well and there were no complications. The patient had  adequate distal pulses and good cap refill.    The patient will be mobilized by physical therapy and receive antibiotic prophylaxis postoperatively for 2 more doses given the first 24 hours. The patient was started on DVT prophylaxis with 81 mg aspirin twice daily starting postoperative day #1.  Due to the ipsilateral distal radius fracture Dr. Alejo I discussed the patient to be platform weightbearing with a walker through the left upper extremity to allow weight through the elbow and through the wrist    I discussed the satisfactory performance of the procedure with the patient's family and discussed the postoperative management.      Assistant Participation:  Surgeon(s):  Doni Evans MD Talwalkar, Janak Ramesh, MD Tina Trent, PA assisted with proper preoperative positioning, preoperative templating, determining availability of proper implants, prepping and draping of patient, manipulation placement of instruments, protection of ligaments and vital soft tissue structures, assistance in maintaining hemostasis and assistance with closure of the wound. Their skills and knowledge of the steps of operation and the desired outcome of each surgical step was crucial, allowing for efficient choreography of surgical procedure, and closure of the wound which lead to reduced surgical time, less blood loss, and less risk of complications for the patient.         Doni Evans MD     Date: 7/20/2024  Time: 16:02 EDT      Electronically signed by Doni Evans MD at 07/20/24 9689       Devan Modi MD at 07/20/24 8957  Version 1 of 1         Orthopaedics Operative Report     PREOPERATIVE DIAGNOSIS: Left displaced intra-articular grade 1 open distal radius and ulna fracture     POSTOPERATIVE DIAGNOSIS: Same     PROCEDURE PERFORMED: Open reduction and internal fixation left intra-articular three-part distal radius fracture     CPT: 98667     SURGEON: Devan Modi MD     ANESTHESIA:  General  with Block     TOURNIQUET TIME: 83 minutes     ESTIMATED BLOOD LOSS: Minimal     COMPLICATIONS: None apparent.     IMPLANTS: Skeletal Dynamics Geminus plate     PREOPERATIVE ANTIBIOTICS: Kefzol     REFERRING PHYSICIAN: ELIZABETH Amaya     INDICATIONS:      DESCRIPTION OF PROCEDURE: After informed consent was obtained, the patient was taken to the operating room.  A block had been placed in the holding area.  After the smooth induction of general anesthesia, the left upper extremity was prepped and draped in the usual fashion for this type of procedure.  We performed timeout to verify the site and the procedure to be performed.  We verified that the patient had received their IV antibiotics and exsanguinated the left upper extremity using an Esmarch bandage and inflated tourniquet to 250 mmHg.  We made our standard FCR approach to the distal radius.  Identified the FCR tendon and the sheath was incised and the tendon and the FPL was swept ulnarly to expose the pronator quadratus.  The pronator was taken off the volar surface of the distal radius near the watershed line distally and with a sleeve of muscular remnant radially for repair at the end of the case.  We then opened the first dorsal compartment and released the brachioradialis off the radial styloid.  We then exposed and identified our fracture which appeared to be intraarticular and in greater than 3 fragments which was then reduced.   A small stab incision was made adjacent to the radial styloid and blunt dissection taken down to the radial styloid..  We brought in mini C arm to verify that the fracture was appropriately reduced.  Once this was performed, we chose the appropriate sized plate and this was provisionally applied to the shaft through the slotted hole.  We then placed our K wires through the radial styloid and through the ulnar head of the plate per the surgical technique and verified that the plate was appropriately placed.  Once minor  adjustments were made, we then began our distal fixation.  7 total points of fixation were obtained distally once the K wires were removed.  2 more points of fixation were then placed within the shaft.  We again brought in mini C arm and ranged the wrist under C arm imaging and this verified that the fracture was stable.  We then thoroughly irrigated the incision.  We verified that our drill guides had all been removed from the plate.  We closed our pronator using Vicryl suture.  We then held direct pressure and deflated tourniquet and obtained hemostasis.  We then closed the subcutaneous layer using interrupted 3-0 Vicryl and the skin was run using 3-0 nylon.  Sterile dressings were applied to the incision and then the patient was placed in a sugar-tong splint in neutral wrist dorsiflexion.  All counts were correct postoperatively.  I performed the case.      First assistant: Yumi Gonzalez PA-C.      The skilled assistance of the above noted first assistant was necessary during this complex surgical procedure.  The surgical assistant assisted with every aspect of the operation including, but not limited to, proper and safe positioning of the patient, obtaining adequate surgical exposure, manipulation of surgical instruments, suture management, surgical knot tying when necessary, the continual process of hemostasis during the procedure itself in addition to surgical wound closure and removal of the patient from the operating table and returning the patient back to the [facility].  The assistance of the surgical assistant allowed me to perform the most sensitive and technical potions of this operation using 2 hands, thus enhancing efficiency and patient safety.  This would not be possible without the help of a skilled assistant familiar with the procedure and capable of safely performing the aforementioned tasks.     POSTOPERATIVE PLAN:  1. Weight bearing status: Weight-bear as tolerated left upper extremity  "through the elbow  2.  Regional and oxycodone for pain control  3.  Follow-up in the office in 2 weeks time for wound check and suture removal.  Plan on putting her into a short arm cast for 2 weeks and then at 4 weeks, if everything looks okay radiographically, initiate removable splinting and gentle range of motion exercises.  4.  Patient will be weightbearing as tolerated left lower extremity after her left [place] to be done after this procedure  5.  Elevate upper extremity at or above the heart  6.  PT and OT  7.  Further care from hospitalist service.     Dvean Modi M.D.*           Electronically signed by Devan Modi MD at 24 1429          Physician Progress Notes (all)        Devan Modi MD at 24 0809                    Orthopaedic Surgery Progress Note      LOS: 5 days   Patient Care Team:  Radha Gregg APRN as PCP - General (Nurse Practitioner)  Gloria Santoro DO as Consulting Physician (Endocrinology)    POD 5    Subjective     Interval History:   Patient teary-eyed this morning.  A little discouraged that she is not more mobile.  Denies chest pain or shortness of breath.    Objective     Vital Signs:  Temp (24hrs), Av.1 °F (36.7 °C), Min:98 °F (36.7 °C), Max:98.2 °F (36.8 °C)    /71 (BP Location: Left arm, Patient Position: Lying)   Pulse 70   Temp 98 °F (36.7 °C)   Resp 16   Ht 172.7 cm (68\")   Wt 78.2 kg (172 lb 6.4 oz)   LMP  (LMP Unknown)   SpO2 95%   BMI 26.21 kg/m²     Labs:  Lab Results (last 24 hours)       Procedure Component Value Units Date/Time    POC Glucose Once [[id number]]  (Abnormal) Collected: 24 07    Specimen: Blood Updated: 24 07     Glucose 145 mg/dL     POC Glucose Once [[id number]]  (Abnormal) Collected: 24    Specimen: Blood Updated: 24     Glucose 166 mg/dL     POC Glucose Once [[id number]]  (Abnormal) Collected: 24 161    Specimen: Blood Updated: 24 1612     " Glucose 147 mg/dL     POC Glucose Once [[id number]]  (Abnormal) Collected: 24 1147    Specimen: Blood Updated: 24 1149     Glucose 201 mg/dL             Physical Exam:  Left upper extremity splint is in place.  Neurovascular exam unchanged    Assessment & Plan   Postop day #5 status post ORIF left comminuted distal radius fracture, grade 1 open and anterior left CHEYENNE for femoral neck fracture    --Discharge planning  -- Discontinue nerve catheter prior to discharge  -- Baby aspirin twice daily for DVT prophylaxis  -- Follow-up with me 2024 for wound check, suture removal, and x-rays.  Everything looks okay, she will go into a fiberglass short arm cast for 2 to 3 weeks and once there are radiographic signs of healing, can initiate occupational hand therapy at [facility].    Devan Modi MD  24  08:10 EDT      5    Electronically signed by Devan Modi MD at 24 0811       Jessi Harvey DO at 24 1150              [facility] Medicine Services  PROGRESS NOTE    Patient Name: Pari Howell  : 1950  MRN: [id number]    Date of Admission: 2024  Primary Care Physician: Radha Gregg APRN    Subjective   Subjective     CC:  Fall     HPI:  Patient resting in bed. About to work with PT. No changes overnight.      Objective   Objective     Vital Signs:   Temp:  [98.3 °F (36.8 °C)-100.6 °F (38.1 °C)] 98.6 °F (37 °C)  Heart Rate:  [75-89] 85  Resp:  [16] 16  BP: (128-149)/(58-67) 149/67  Flow (L/min):  [2] 2     Physical Exam:  Constitutional: No acute distress, awake, alert  HENT: NCAT, mucous membranes moist  Respiratory: Clear to auscultation bilaterally, respiratory effort normal   Cardiovascular: RRR, no murmurs, rubs, or gallops  Gastrointestinal: Positive bowel sounds, soft, nontender, nondistended  Musculoskeletal: Left arm in sling  Psychiatric: Appropriate affect, cooperative  Neurologic: Oriented x 3, no focal  deficits  Skin: No rashes    No change from 7/23    Results Reviewed:  LAB RESULTS:      Lab 07/21/24  1505 07/20/24  0335   WBC 11.96* 15.47*   HEMOGLOBIN 11.2* 14.5   HEMATOCRIT 32.2* 42.6   PLATELETS 192 269   NEUTROS ABS 7.96* 13.31*   IMMATURE GRANS (ABS) 0.09* 0.09*   LYMPHS ABS 1.85 1.16   MONOS ABS 1.05* 0.83   EOS ABS 0.93* 0.04   MCV 88.7 88.0         Lab 07/21/24  0923 07/20/24  0335 07/20/24  0044   SODIUM 139 137 136   POTASSIUM 4.7 3.9 4.3   CHLORIDE 105 100 101   CO2 19.0* 24.0 23.0   ANION GAP 15.0 13.0 12.0   BUN 11 17 18   CREATININE 0.66 0.73 0.73   EGFR 92.2 86.4 86.4   GLUCOSE 175* 167* 183*   CALCIUM 8.9 9.4 9.0   PHOSPHORUS 2.8  --   --    HEMOGLOBIN A1C  --  6.00*  --          Lab 07/21/24  0923 07/20/24  0044   TOTAL PROTEIN  --  7.2   ALBUMIN 3.5 4.2   GLOBULIN  --  3.0   ALT (SGPT)  --  26   AST (SGOT)  --  28   BILIRUBIN  --  0.4   ALK PHOS  --  64                 Lab 07/20/24  0429 07/20/24  0335   ABO TYPING A A   RH TYPING Positive Positive   ANTIBODY SCREEN  --  Negative         Brief Urine Lab Results  (Last result in the past 365 days)        Color   Clarity   Blood   Leuk Est   Nitrite   Protein   CREAT   Urine HCG        04/01/24 0815             135.4                 Microbiology Results Abnormal       None            No radiology results from the last 24 hrs        Current medications:  Scheduled Meds:Pharmacy Consult, , Does not apply, Daily  amLODIPine, 10 mg, Oral, Daily  aspirin, 81 mg, Oral, Q12H  cetirizine, 5 mg, Oral, Daily  insulin lispro, 2-7 Units, Subcutaneous, 4x Daily AC & at Bedtime  latanoprost, 1 drop, Both Eyes, Daily  levothyroxine, 75 mcg, Oral, Q AM  losartan, 50 mg, Oral, Q24H  pantoprazole, 40 mg, Oral, Daily  pravastatin, 40 mg, Oral, Q PM  sodium chloride, 10 mL, Intravenous, Q12H  sodium chloride, 3 mL, Intravenous, Q12H  timolol, 1 drop, Both Eyes, BID      Continuous Infusions:ropivacaine,   sodium chloride 0.9 % with KCl 20 mEq, 50 mL/hr, Last Rate:  50 mL/hr (07/21/24 7073)      PRN Meds:.  senna-docusate sodium **AND** polyethylene glycol **AND** bisacodyl **AND** bisacodyl    Calcium Replacement - Follow Nurse / BPA Driven Protocol    Pharmacy Consult    dextrose    dextrose    docusate sodium    glucagon (human recombinant)    HYDROcodone-acetaminophen    HYDROmorphone    Magnesium Standard Dose Replacement - Follow Nurse / BPA Driven Protocol    Morphine **AND** naloxone    nitroglycerin    ondansetron ODT **OR** ondansetron    Phosphorus Replacement - Follow Nurse / BPA Driven Protocol    Potassium Replacement - Follow Nurse / BPA Driven Protocol    simethicone    [COMPLETED] Insert Peripheral IV **AND** sodium chloride    sodium chloride    sodium chloride    sodium chloride    sodium chloride    Assessment & Plan   Assessment & Plan     Active Hospital Problems    Diagnosis  POA    **Femur fracture [S72.90XA]  Yes    T2DM (type 2 diabetes mellitus) [E11.9]  Yes    HTN (hypertension) [I10]  Yes    Radial fracture [S52.90XA]  Yes    Left wrist fracture, open, initial encounter [S62.102B]  Unknown    Hypothyroidism (acquired) [E03.9]  Yes    Hyperlipidemia LDL goal <70 [E78.5]  Yes      Resolved Hospital Problems   No resolved problems to display.        Brief Hospital Course to date:  Pari Howell is a 74 y.o. female  with hx of HTN, HLD, hypothyroidism, T2DM, IBS who presented to the ED w/ c/o a fall.  Imaging shows left femur fracture and left distal radius fracture.  She underwent repair of left wrist and left hip on 7/20.     Left femur fracture   Left distal radius fracture   -2/2 mechanical fall   -CT pelvis impacted subcapital fracture of the proximal left femur   - CT LUE shows comminuted fracture of the distal radius with displacement  -PRN pain medications  -s/p ORIF distal left radius and anterior CHEYENNE with Dr. Evans/Lane on 7/20  - baby ASA BID for DVT ppx  - will need f/u with Dr. Sherman 2 weeks post-discharge for wound check/suture  "removal and placement in short arm case     T2DM  -hem A1c 6.0  -on Semaglutide   -Continue SSI     Neuropathy  -uses cream from compound pharmacy to help     HTN   HLD   -continue routine Norvasc  -on losartan-HCTZ; losartan continued at half dose, HCTZ held for now   -continued statin     Hypothyroidism   -continue synthroid     Expected Discharge Location and Transportation:  pending  Expected Discharge   Expected Discharge Date: 2024; Expected Discharge Time:      VTE Prophylaxis:  Mechanical VTE prophylaxis orders are present.         AM-PAC 6 Clicks Score (PT): 11 (24 1001)    CODE STATUS:   Code Status and Medical Interventions:   Ordered at: 24 1740     Level Of Support Discussed With:    Patient     Code Status (Patient has no pulse and is not breathing):    CPR (Attempt to Resuscitate)     Medical Interventions (Patient has pulse or is breathing):    Full Support       Jessi Harvey DO  24        Electronically signed by Jessi Harvey DO at 24 1154       Devan Modi MD at 24 1059                    Orthopaedic Surgery Progress Note      LOS: 4 days   Patient Care Team:  Radha Gregg APRN as PCP - General (Nurse Practitioner)  Gloria Santoro DO as Consulting Physician (Endocrinology)    POD 4    Subjective     Interval History:   Patient doing well this morning.  In a recliner.   at the bedside.  Denies chest pain or shortness of breath.  Ready to go to [facility] today.    Objective     Vital Signs:  Temp (24hrs), Av.9 °F (37.2 °C), Min:98.3 °F (36.8 °C), Max:100.6 °F (38.1 °C)    /67 (BP Location: Right arm, Patient Position: Lying)   Pulse 85   Temp 98.6 °F (37 °C) (Oral)   Resp 16   Ht 172.7 cm (68\")   Wt 78.2 kg (172 lb 6.4 oz)   LMP  (LMP Unknown)   SpO2 96%   BMI 26.21 kg/m²     Labs:  Lab Results (last 24 hours)       Procedure Component Value Units Date/Time    POC Glucose Once [[id number]]  (Abnormal) " Collected: 24 0745    Specimen: Blood Updated: 24 0747     Glucose 165 mg/dL     POC Glucose Once [[id number]]  (Abnormal) Collected: 24    Specimen: Blood Updated: 24     Glucose 136 mg/dL     POC Glucose Once [[id number]]  (Abnormal) Collected: 24 1610    Specimen: Blood Updated: 24 1611     Glucose 172 mg/dL     POC Glucose Once [[id number]]  (Abnormal) Collected: 24 1102    Specimen: Blood Updated: 24 1103     Glucose 142 mg/dL             Physical Exam:  Digits are pink and warm with brisk capillary refill of less than 3 seconds.  EPL is intact.  EDC is intact.  Grossly intact sensation to light touch in the median, radial, and ulnar nerve distributions.    Assessment & Plan   Postop day #4 status post ORIF comminuted left distal radius fracture, grade 1 open, and anterior left CHEYENNE for femoral neck fracture    --Patient to be transferred to [facility]  -- Weight-bear as tolerated left upper extremity through elbow, weight-bear as tolerated left lower extremity  -- Baby aspirin twice daily for DVT prophylaxis  -- Patient will need follow-up with me 2024 for wound check and suture removal and x-rays.  If everything looks okay, plan on short arm cast for 2 to 3 weeks.  Once there is radiographic signs of appropriate healing, we can put her into a removable bracing and begin occupational hand therapy at [facility].      Devan Modi MD  24  10:59 EDT          Electronically signed by Devan Modi MD at 24 1102       Jessi Harvey DO at 24 1142              [facility] Medicine Services  PROGRESS NOTE    Patient Name: Pari Howell  : 1950  MRN: [id number]    Date of Admission: 2024  Primary Care Physician: Radha Gregg APRN    Subjective   Subjective     CC:  Fall     HPI:  Patient sitting up in bed. Complains of pain. About to work with physical  therapy.      Objective   Objective     Vital Signs:   Temp:  [98.2 °F (36.8 °C)-99.8 °F (37.7 °C)] 98.4 °F (36.9 °C)  Heart Rate:  [78-96] 78  Resp:  [16-18] 16  BP: (121-152)/(56-95) 121/56  Flow (L/min):  [1.5] 1.5     Physical Exam:  Constitutional: No acute distress, awake, alert  HENT: NCAT, mucous membranes moist  Respiratory: Clear to auscultation bilaterally, respiratory effort normal   Cardiovascular: RRR, no murmurs, rubs, or gallops  Gastrointestinal: Positive bowel sounds, soft, nontender, nondistended  Musculoskeletal: Left arm in sling  Psychiatric: Appropriate affect, cooperative  Neurologic: Oriented x 3, no focal deficits  Skin: No rashes        Results Reviewed:  LAB RESULTS:      Lab 07/21/24  1505 07/20/24 0335   WBC 11.96* 15.47*   HEMOGLOBIN 11.2* 14.5   HEMATOCRIT 32.2* 42.6   PLATELETS 192 269   NEUTROS ABS 7.96* 13.31*   IMMATURE GRANS (ABS) 0.09* 0.09*   LYMPHS ABS 1.85 1.16   MONOS ABS 1.05* 0.83   EOS ABS 0.93* 0.04   MCV 88.7 88.0         Lab 07/21/24  0923 07/20/24 0335 07/20/24  0044   SODIUM 139 137 136   POTASSIUM 4.7 3.9 4.3   CHLORIDE 105 100 101   CO2 19.0* 24.0 23.0   ANION GAP 15.0 13.0 12.0   BUN 11 17 18   CREATININE 0.66 0.73 0.73   EGFR 92.2 86.4 86.4   GLUCOSE 175* 167* 183*   CALCIUM 8.9 9.4 9.0   PHOSPHORUS 2.8  --   --    HEMOGLOBIN A1C  --  6.00*  --          Lab 07/21/24  0923 07/20/24  0044   TOTAL PROTEIN  --  7.2   ALBUMIN 3.5 4.2   GLOBULIN  --  3.0   ALT (SGPT)  --  26   AST (SGOT)  --  28   BILIRUBIN  --  0.4   ALK PHOS  --  64                 Lab 07/20/24  0429 07/20/24  0335   ABO TYPING A A   RH TYPING Positive Positive   ANTIBODY SCREEN  --  Negative         Brief Urine Lab Results  (Last result in the past 365 days)        Color   Clarity   Blood   Leuk Est   Nitrite   Protein   CREAT   Urine HCG        04/01/24 0815             135.4                 Microbiology Results Abnormal       None            No radiology results from the last 24  hrs        Current medications:  Scheduled Meds:Pharmacy Consult, , Does not apply, Daily  amLODIPine, 10 mg, Oral, Daily  aspirin, 81 mg, Oral, Q12H  cetirizine, 5 mg, Oral, Daily  insulin lispro, 2-7 Units, Subcutaneous, 4x Daily AC & at Bedtime  latanoprost, 1 drop, Both Eyes, Daily  levothyroxine, 75 mcg, Oral, Q AM  losartan, 50 mg, Oral, Q24H  pantoprazole, 40 mg, Oral, Daily  pravastatin, 40 mg, Oral, Q PM  sodium chloride, 10 mL, Intravenous, Q12H  sodium chloride, 3 mL, Intravenous, Q12H  timolol, 1 drop, Both Eyes, BID      Continuous Infusions:ropivacaine,   sodium chloride 0.9 % with KCl 20 mEq, 50 mL/hr, Last Rate: 50 mL/hr (07/21/24 2143)      PRN Meds:.  senna-docusate sodium **AND** polyethylene glycol **AND** bisacodyl **AND** bisacodyl    Calcium Replacement - Follow Nurse / BPA Driven Protocol    Pharmacy Consult    dextrose    dextrose    docusate sodium    glucagon (human recombinant)    HYDROcodone-acetaminophen    HYDROmorphone    Magnesium Standard Dose Replacement - Follow Nurse / BPA Driven Protocol    Morphine **AND** naloxone    nitroglycerin    ondansetron ODT **OR** ondansetron    Phosphorus Replacement - Follow Nurse / BPA Driven Protocol    Potassium Replacement - Follow Nurse / BPA Driven Protocol    simethicone    [COMPLETED] Insert Peripheral IV **AND** sodium chloride    sodium chloride    sodium chloride    sodium chloride    sodium chloride    Assessment & Plan   Assessment & Plan     Active Hospital Problems    Diagnosis  POA    **Femur fracture [S72.90XA]  Yes    T2DM (type 2 diabetes mellitus) [E11.9]  Yes    HTN (hypertension) [I10]  Yes    Radial fracture [S52.90XA]  Yes    Left wrist fracture, open, initial encounter [S62.102B]  Unknown    Hypothyroidism (acquired) [E03.9]  Yes    Hyperlipidemia LDL goal <70 [E78.5]  Yes      Resolved Hospital Problems   No resolved problems to display.        Brief Hospital Course to date:  Pari Howell is a 74 y.o. female  with hx of  HTN, HLD, hypothyroidism, T2DM, IBS who presented to the ED w/ c/o a fall.  Imaging shows left femur fracture and left distal radius fracture.  She underwent repair of left wrist and left hip on 7/20.     Left femur fracture   Left distal radius fracture   -2/2 mechanical fall   -CT pelvis impacted subcapital fracture of the proximal left femur   - CT LUE shows comminuted fracture of the distal radius with displacement  -PRN pain medications  -s/p ORIF distal left radius and anterior CHEYENNE with Dr. Evans/Lane on 7/20  - baby ASA BID for DVT ppx  - will need f/u with Dr. Sherman 2 weeks post-discharge for wound check/suture removal and placement in short arm case     T2DM  -hem A1c 6.0  -on Semaglutide   -Continue SSI     Neuropathy  -uses cream from compound pharmacy to help     HTN   HLD   -continue routine Norvasc  -on losartan-HCTZ; losartan continued at half dose, HCTZ held for now   -continued statin     Hypothyroidism   -continue synthroid     Expected Discharge Location and Transportation: rehab   Expected Discharge   Expected Discharge Date: 7/23/2024; Expected Discharge Time:      VTE Prophylaxis:  Mechanical VTE prophylaxis orders are present.         AM-PAC 6 Clicks Score (PT): 12 (07/23/24 1009)    CODE STATUS:   Code Status and Medical Interventions:   Ordered at: 07/20/24 1740     Level Of Support Discussed With:    Patient     Code Status (Patient has no pulse and is not breathing):    CPR (Attempt to Resuscitate)     Medical Interventions (Patient has pulse or is breathing):    Full Support       Jessi Harvey DO  07/23/24        Electronically signed by Jessi Harvey DO at 07/23/24 4656       Devan Modi MD at 07/23/24 0756                    Orthopaedic Surgery Progress Note      LOS: 3 days   Patient Care Team:  Radha Gregg APRN as PCP - General (Nurse Practitioner)  Gloria Santoro DO as Consulting Physician (Endocrinology)    POD 3    Subjective     Interval  "History:   Patient had large bowel movement yesterday and feels much better overall.  Used her PCA once only yesterday.  Denies chest pain or shortness of breath.    Objective     Vital Signs:  Temp (24hrs), Av.9 °F (37.2 °C), Min:98.2 °F (36.8 °C), Max:99.8 °F (37.7 °C)    /72 (BP Location: Right arm, Patient Position: Lying)   Pulse 96   Temp 98.2 °F (36.8 °C) (Oral)   Resp 16   Ht 172.7 cm (68\")   Wt 78.2 kg (172 lb 6.4 oz)   LMP  (LMP Unknown)   SpO2 94%   BMI 26.21 kg/m²     Labs:  Lab Results (last 24 hours)       Procedure Component Value Units Date/Time    POC Glucose Once [[id number]]  (Abnormal) Collected: 24 0650    Specimen: Blood Updated: 24 0654     Glucose 183 mg/dL     POC Glucose Once [[id number]]  (Abnormal) Collected: 24    Specimen: Blood Updated: 24     Glucose 136 mg/dL     POC Glucose Once [[id number]]  (Abnormal) Collected: 24 1636    Specimen: Blood Updated: 24 1638     Glucose 141 mg/dL     POC Glucose Once [[id number]]  (Abnormal) Collected: 24 1120    Specimen: Blood Updated: 24 1123     Glucose 146 mg/dL             Physical Exam:  Surgical splint in place.  EPL and EDC are intact.      Assessment & Plan   Postop day #3 status post ORIF comminuted left distal radius fracture, grade 1 open, and anterior left CHEYENNE for femoral neck fracture    --Discharge planning  -- Patient ready from an orthopedic standpoint for discharge  -- Weight-bear as tolerated left upper extremity through elbow and weight-bear as tolerated left lower extremity  -- Baby aspirin twice daily for DVT prophylaxis  -- Upon discharge, patient will need follow-up with me in around 2 weeks for wound check and suture removal and placement into a short arm cast.  We will also check her left hip incision at that time.    Devan Modi MD  24  07:56 EDT          Electronically signed by Devan Modi MD at 24 0759   " "    Doni Evans MD at 07/22/24 1416            Orthopedic Progress Note      Patient: Pari Howell  YOB: 1950     Date of Admission: 7/19/2024 10:29 PM Medical Record Number: [id number]     Attending Physician: Jessi Harvey DO    Status Post:  Procedure(s):  OPEN REDUCTION INTERNAL FIXATION DISTAL RADIUS - LEFT  TOTAL HIP ARTHROPLASTY ANTERIOR Post Operative Day Number: 2    Subjective : No new orthopaedic complaints     Pain Relief: some relief with present medication.     Systemic Complaints: No Complaints  Vitals:    07/21/24 2328 07/22/24 0340 07/22/24 0646 07/22/24 0811   BP:  133/66 131/64 138/69   BP Location:  Right arm Right arm    Patient Position:  Lying Lying    Pulse:  88 80 84   Resp:  16 18    Temp:  98.5 °F (36.9 °C) 98 °F (36.7 °C)    TempSrc:  Oral Oral    SpO2: 94% 92%     Weight:       Height:           Physical Exam: 74 y.o. female    General Appearance:       Alert, cooperative, in no acute distress                  Extremities:    Dressing Clean, Dry and Intact             No clinical sign of DVT        Able to do good movements of digits    Pulses:   Pulses palpable and equal bilaterally           Diagnostic Tests:     Results from last 7 days   Lab Units 07/21/24  1505 07/20/24  0335   WBC 10*3/mm3 11.96* 15.47*   HEMOGLOBIN g/dL 11.2* 14.5   HEMATOCRIT % 32.2* 42.6   PLATELETS 10*3/mm3 192 269     Results from last 7 days   Lab Units 07/21/24  0923 07/20/24  0335 07/20/24  0044   SODIUM mmol/L 139 137 136   POTASSIUM mmol/L 4.7 3.9 4.3   CHLORIDE mmol/L 105 100 101   CO2 mmol/L 19.0* 24.0 23.0   BUN mg/dL 11 17 18   CREATININE mg/dL 0.66 0.73 0.73   GLUCOSE mg/dL 175* 167* 183*   CALCIUM mg/dL 8.9 9.4 9.0         No results found for: \"URICACID\"  No results found for: \"CRYSTAL\"  Microbiology Results (last 10 days)       ** No results found for the last 240 hours. **          XR Hip With or Without Pelvis 2 - 3 View Left    Result Date: 7/20/2024  Impression: Total " left hip prosthesis in anatomic alignment. No new bony abnormality seen. Electronically Signed: Jasmeet Carrasco MD  7/20/2024 4:53 PM EDT  Workstation ID: [id number]    CT Pelvis Without Contrast    Result Date: 7/20/2024  Impression: Impacted subcapital fracture of the proximal left femur. Electronically Signed: Doni Vazquez MD  7/20/2024 4:41 AM EDT  Workstation ID: [id number]    CT Upper Extremity Left Without Contrast    Result Date: 7/20/2024  Impression: Limited exam. There is a comminuted fracture of the distal radius with displacement. There is a displaced ulnar styloid fracture. Electronically Signed: Doni Vazquez MD  7/20/2024 4:37 AM EDT  Workstation ID: [id number]    XR Chest 1 View    Result Date: 7/19/2024  Impression: No active disease Electronically Signed: Doni Vazquez MD  7/19/2024 11:36 PM EDT  Workstation ID: [id number]    XR Wrist 3+ View Left    Result Date: 7/19/2024  Impression: Comminuted and displaced fracture of the distal radius with disruption of the radiocarpal's. Electronically Signed: Doni Vazquez MD  7/19/2024 11:34 PM EDT  Workstation ID: [id number]    XR Hip With or Without Pelvis 2 - 3 View Left    Result Date: 7/19/2024  Impression: Fracture of the subcapital left femur Electronically Signed: Doni Vazquez MD  7/19/2024 11:31 PM EDT  Workstation ID: [id number]       Current Medications:  Scheduled Meds:Pharmacy Consult, , Does not apply, Daily  amLODIPine, 10 mg, Oral, Daily  aspirin, 81 mg, Oral, Q12H  cetirizine, 5 mg, Oral, Daily  insulin lispro, 2-7 Units, Subcutaneous, 4x Daily AC & at Bedtime  latanoprost, 1 drop, Both Eyes, Daily  levothyroxine, 75 mcg, Oral, Q AM  losartan, 50 mg, Oral, Q24H  pantoprazole, 40 mg, Oral, Daily  pravastatin, 40 mg, Oral, Q PM  sodium chloride, 10 mL, Intravenous, Q12H  sodium chloride, 3 mL, Intravenous, Q12H  timolol, 1 drop, Both Eyes, BID      Continuous Infusions:ropivacaine,   sodium chloride 0.9 % with KCl 20 mEq, 50 mL/hr, Last Rate: 50 mL/hr (07/21/24  2143)      PRN Meds:.  senna-docusate sodium **AND** polyethylene glycol **AND** bisacodyl **AND** bisacodyl    Calcium Replacement - Follow Nurse / BPA Driven Protocol    Pharmacy Consult    dextrose    dextrose    docusate sodium    glucagon (human recombinant)    HYDROcodone-acetaminophen    HYDROmorphone    Magnesium Standard Dose Replacement - Follow Nurse / BPA Driven Protocol    Morphine **AND** naloxone    nitroglycerin    ondansetron ODT **OR** ondansetron    Phosphorus Replacement - Follow Nurse / BPA Driven Protocol    Potassium Replacement - Follow Nurse / BPA Driven Protocol    simethicone    [COMPLETED] Insert Peripheral IV **AND** sodium chloride    sodium chloride    sodium chloride    sodium chloride    sodium chloride    Assessment: Status post  No admission procedures for hospital encounter.    Patient Active Problem List   Diagnosis    Type 2 diabetes mellitus without complication, without long-term current use of insulin    Other specified glaucoma    Hyperlipidemia LDL goal <70    Vitamin D deficiency    Irritable bowel syndrome with diarrhea    Hypothyroidism (acquired)    Acute respiratory insufficiency    Hip fracture    Femur fracture    T2DM (type 2 diabetes mellitus)    HTN (hypertension)    Radial fracture    Left wrist fracture, open, initial encounter       PLAN:   Continues current post-op course  Anticoagulation: Aspirin started  Mobilize with PT as tolerated per protocol    Weight Bearing: WBAT, Platform L UE  Discharge Plan: OK to plan for discharge in  tomorrow to rehab  from orthopaedic perspective.    Doni Evans MD    Date: 2024    Time: 14:16 EDT    Electronically signed by Doni Evans MD at 24 1416       Hayley Norman APRN at 24 0830              [facility] Medicine Services  PROGRESS NOTE    Patient Name: Pari Howell  : 1950  MRN: [id number]    Date of Admission: 2024  Primary Care Physician: Cristino  ELIZABETH Garcia    Subjective   Subjective     CC:  Fall     HPI:  Patient is resting in bed in NAD. She states pain controlled. Tolerating diet. No acute events overnight per nursing.       Objective   Objective     Vital Signs:   Temp:  [98 °F (36.7 °C)-99.4 °F (37.4 °C)] 98 °F (36.7 °C)  Heart Rate:  [80-94] 84  Resp:  [16-18] 18  BP: (129-138)/(56-69) 138/69     Physical Exam:  Constitutional: No acute distress, awake, alert  HENT: NCAT, mucous membranes moist  Respiratory: Clear to auscultation bilaterally, respiratory effort normal room air   Cardiovascular: RRR, no murmurs, rubs, or gallops  Gastrointestinal: Positive bowel sounds, soft, nontender, nondistended  Musculoskeletal: No bilateral ankle edema, left arm with sling and nerve block,   Psychiatric: Appropriate affect, cooperative  Neurologic: Oriented x 3, strength symmetric in all extremities, Cranial Nerves grossly intact to confrontation, speech clear  Skin: No rashes      Results Reviewed:  LAB RESULTS:      Lab 07/21/24  1505 07/20/24  0335   WBC 11.96* 15.47*   HEMOGLOBIN 11.2* 14.5   HEMATOCRIT 32.2* 42.6   PLATELETS 192 269   NEUTROS ABS 7.96* 13.31*   IMMATURE GRANS (ABS) 0.09* 0.09*   LYMPHS ABS 1.85 1.16   MONOS ABS 1.05* 0.83   EOS ABS 0.93* 0.04   MCV 88.7 88.0         Lab 07/21/24  0923 07/20/24  0335 07/20/24  0044   SODIUM 139 137 136   POTASSIUM 4.7 3.9 4.3   CHLORIDE 105 100 101   CO2 19.0* 24.0 23.0   ANION GAP 15.0 13.0 12.0   BUN 11 17 18   CREATININE 0.66 0.73 0.73   EGFR 92.2 86.4 86.4   GLUCOSE 175* 167* 183*   CALCIUM 8.9 9.4 9.0   PHOSPHORUS 2.8  --   --    HEMOGLOBIN A1C  --  6.00*  --          Lab 07/21/24  0923 07/20/24  0044   TOTAL PROTEIN  --  7.2   ALBUMIN 3.5 4.2   GLOBULIN  --  3.0   ALT (SGPT)  --  26   AST (SGOT)  --  28   BILIRUBIN  --  0.4   ALK PHOS  --  64                 Lab 07/20/24  0429 07/20/24  0335   ABO TYPING A A   RH TYPING Positive Positive   ANTIBODY SCREEN  --  Negative         Brief Urine Lab Results   (Last result in the past 365 days)        Color   Clarity   Blood   Leuk Est   Nitrite   Protein   CREAT   Urine HCG        04/01/24 0815             135.4                 Microbiology Results Abnormal       None            XR Hip With or Without Pelvis 2 - 3 View Left    Result Date: 7/20/2024  XR HIP W OR WO PELVIS 2-3 VIEW LEFT Date of Exam: 7/20/2024 4:17 PM EDT Indication: Postop Comparison: 7/19/2024 Findings: Total left hip prosthesis is now seen, components in anatomic alignment. There is expected postop soft tissue air. Right hip prosthesis remains in anatomic alignment. No abnormal lucency is seen around the prosthetic components. Bony structures appear intact.     Impression: Impression: Total left hip prosthesis in anatomic alignment. No new bony abnormality seen. Electronically Signed: Jasmeet Carrasco MD  7/20/2024 4:53 PM EDT  Workstation ID: [id number]    FL C Arm During Surgery    Result Date: 7/20/2024  This procedure was auto-finalized with no dictation required.    Left Fascia iliac SS    Result Date: 7/20/2024  Grant Elias CRNA     7/20/2024 11:59 AM Left Fascia iliac SS Patient reassessed immediately prior to procedure Start time: 7/20/2024 11:20 AM Reason for block: at surgeon's request and post-op pain management Performed by CRNA/CAA: Grant Elias CRNA Assisted by: Janina Chávez RN Preanesthetic Checklist Completed: patient identified, IV checked, site marked, risks and benefits discussed, surgical consent, monitors and equipment checked, pre-op evaluation and timeout performed Prep: Pt Position: supine Sterile barriers:cap, gloves, mask and washed/disinfected hands Prep: ChloraPrep Patient monitoring: blood pressure monitoring, continuous pulse oximetry and EKG Procedure Sedation: yes Performed under: local infiltration Guidance:ultrasound guided ULTRASOUND INTERPRETATION.  Using ultrasound guidance a 20 G gauge needle was placed in close proximity to the nerve, at which point,  "under ultrasound guidance anesthetic was injected in the area of the nerve and spread of the anesthesia was seen on ultrasound in close proximity thereto.  There were no abnormalities seen on ultrasound; a digital image was taken; and the patient tolerated the procedure with no complications. Images:still images obtained, printed/placed on chart Laterality:left Block Type:fascia iliaca compartment Injection Technique:single-shot Needle Type:echogenic and Tuohy Needle Gauge:18 G Resistance on Injection: none Catheter Size:20 G (20g) Medications Used: bupivacaine PF (MARCAINE) 0.25 % injection - Injection  30 mL - 7/20/2024 11:20:00 AM Medications Preservative Free Saline:5ml Post Assessment Injection Assessment: negative aspiration for heme, no paresthesia on injection and incremental injection Patient Tolerance:comfortable throughout block Complications:no Additional Notes CKAFASCIAILIACA: SINGLE shot A high-frequency linear transducer, with sterile cover, was placed in parasagittal plane on top of the Anterior Superior Iliac Spine (ASIS) and moved medially to identify the Internal Oblique muscle, Sartorius muscle, Iliacus Muscle, Fascia Iliaca (FI) and Fascia Latae. The insertion site was prepped and draped in sterile fashion. Skin and cutaneous tissue was infiltrated with 2-5 ml of 1% Lidocaine. Using ultrasound-guidance, a 20-gauge B-Moraes 4\" Ultraplex 360 non-stimulating echogenic needle was advanced in plane from caudad to cephalad. Preservative-free normal saline was utilized for hydro-dissection of tissue, advancement of needle, and to confirm final needle placement below FI. Local anesthetic in incremental 3-5 ml injections. Aspiration every 5 ml to prevent intravascular injection. Injection was completed with negative aspiration of blood and negative intravascular injection. Injection pressures were normal with minimal resistance. Performed by: Grant Elias CRNA          Current medications:  Scheduled " Meds:Pharmacy Consult, , Does not apply, Daily  amLODIPine, 10 mg, Oral, Daily  aspirin, 81 mg, Oral, Q12H  cetirizine, 5 mg, Oral, Daily  insulin lispro, 2-7 Units, Subcutaneous, 4x Daily AC & at Bedtime  latanoprost, 1 drop, Both Eyes, Daily  levothyroxine, 75 mcg, Oral, Q AM  losartan, 50 mg, Oral, Q24H  pantoprazole, 40 mg, Oral, Daily  pravastatin, 40 mg, Oral, Q PM  sodium chloride, 10 mL, Intravenous, Q12H  sodium chloride, 3 mL, Intravenous, Q12H  timolol, 1 drop, Both Eyes, BID      Continuous Infusions:ropivacaine,   sodium chloride 0.9 % with KCl 20 mEq, 50 mL/hr, Last Rate: 50 mL/hr (07/21/24 2143)      PRN Meds:.  senna-docusate sodium **AND** polyethylene glycol **AND** bisacodyl **AND** bisacodyl    Calcium Replacement - Follow Nurse / BPA Driven Protocol    Pharmacy Consult    dextrose    dextrose    docusate sodium    glucagon (human recombinant)    HYDROcodone-acetaminophen    HYDROmorphone    Magnesium Standard Dose Replacement - Follow Nurse / BPA Driven Protocol    Morphine **AND** naloxone    nitroglycerin    ondansetron ODT **OR** ondansetron    Phosphorus Replacement - Follow Nurse / BPA Driven Protocol    Potassium Replacement - Follow Nurse / BPA Driven Protocol    [COMPLETED] Insert Peripheral IV **AND** sodium chloride    sodium chloride    sodium chloride    sodium chloride    sodium chloride    Assessment & Plan   Assessment & Plan     Active Hospital Problems    Diagnosis  POA    **Femur fracture [S72.90XA]  Yes    T2DM (type 2 diabetes mellitus) [E11.9]  Yes    HTN (hypertension) [I10]  Yes    Radial fracture [S52.90XA]  Yes    Left wrist fracture, open, initial encounter [S62.102B]  Unknown    Hypothyroidism (acquired) [E03.9]  Yes    Hyperlipidemia LDL goal <70 [E78.5]  Yes      Resolved Hospital Problems   No resolved problems to display.        Brief Hospital Course to date:  Pari Howell is a 74 y.o. female  with hx of HTN, HLD, hypothyroidism, T2DM, IBS who presented to the ED  w/ c/o a fall.  Imaging shows left femur fracture and left distal radius fracture.  She underwent repair of left wrist and left hip on 7/20.     Plan was partially entered by my partner and I have reviewed and updated as appropriate on 7/22/24     Left femur fracture   Left distal radius fracture   -2/2 mechanical fall   -CT pelvis impacted subcapital fracture of the proximal left femur   - CT LUE shows comminuted fracture of the distal radius with displacement. There is a displaced ulnar styloid fracture.  -Ortho evaluated  -IVF  -PRN pain medications, antiemetics   -s/p ORIF distal left radius and total hip anterior arthroplasty with Dr. Evans/Lane        T2DM  -hem A1c 6.0  -on Semaglutide   -Continue SSI     Neuropathy  -uses cream from compound pharmacy to help     HTN   HLD   -continue routine Norvasc  -on losartan-HCTZ; losartan continued at half dose, HCTZ held for now   -continued statin        Hypothyroidism   -continue synthroid     Expected Discharge Location and Transportation: rehab   Expected Discharge   Expected Discharge Date: 7/23/2024; Expected Discharge Time:      VTE Prophylaxis:  Mechanical VTE prophylaxis orders are present.         AM-PAC 6 Clicks Score (PT): 10 (07/22/24 0811)    CODE STATUS:   Code Status and Medical Interventions:   Ordered at: 07/20/24 1740     Level Of Support Discussed With:    Patient     Code Status (Patient has no pulse and is not breathing):    CPR (Attempt to Resuscitate)     Medical Interventions (Patient has pulse or is breathing):    Full Support       ELIZABETH Raza  07/22/24        Electronically signed by Hayley Norman APRN at 07/22/24 1152       Devan Modi MD at 07/22/24 0812                    Orthopaedic Surgery Progress Note      LOS: 2 days   Patient Care Team:  Radha Gregg APRN as PCP - General (Nurse Practitioner)  Gloria Santoro DO as Consulting Physician (Endocrinology)    POD 2    Subjective  "    Interval History:   Patient doing well this morning.  Says she tried to get up with physical therapy and felt weak.  Was able to walk to the door.  Having more sensation in her hand.  Denies chest pain or shortness of breath    Objective     Vital Signs:  Temp (24hrs), Av.4 °F (36.9 °C), Min:97.7 °F (36.5 °C), Max:99.4 °F (37.4 °C)    /64 (BP Location: Right arm, Patient Position: Lying)   Pulse 80   Temp 98 °F (36.7 °C) (Oral)   Resp 18   Ht 172.7 cm (68\")   Wt 78.2 kg (172 lb 6.4 oz)   LMP  (LMP Unknown)   SpO2 92%   BMI 26.21 kg/m²     Labs:  Lab Results (last 24 hours)       Procedure Component Value Units Date/Time    POC Glucose Once [[id number]]  (Abnormal) Collected: 24 07    Specimen: Blood Updated: 24     Glucose 143 mg/dL     POC Glucose Once [[id number]]  (Abnormal) Collected: 24    Specimen: Blood Updated: 24     Glucose 151 mg/dL     POC Glucose Once [[id number]]  (Abnormal) Collected: 24 1603    Specimen: Blood Updated: 24 1604     Glucose 152 mg/dL     CBC & Differential [[id number]]  (Abnormal) Collected: 24 0923    Specimen: Blood Updated: 24 1520    Narrative:      The following orders were created for panel order CBC & Differential.  Procedure                               Abnormality         Status                     ---------                               -----------         ------                     CBC Auto Differential[[id number]]        Abnormal            Final result               Scan Slide[[id number]]                                                                    Please view results for these tests on the individual orders.    CBC Auto Differential [[id number]]  (Abnormal) Collected: 24 1505    Specimen: Blood Updated: 24 1520     WBC 11.96 10*3/mm3      RBC 3.63 10*6/mm3      Hemoglobin 11.2 g/dL      Hematocrit 32.2 %      MCV 88.7 fL      MCH 30.9 pg      MCHC 34.8 g/dL      RDW 12.9 %  "     RDW-SD 41.8 fl      MPV 10.0 fL      Platelets 192 10*3/mm3      Neutrophil % 66.4 %      Lymphocyte % 15.5 %      Monocyte % 8.8 %      Eosinophil % 7.8 %      Basophil % 0.7 %      Immature Grans % 0.8 %      Neutrophils, Absolute 7.96 10*3/mm3      Lymphocytes, Absolute 1.85 10*3/mm3      Monocytes, Absolute 1.05 10*3/mm3      Eosinophils, Absolute 0.93 10*3/mm3      Basophils, Absolute 0.08 10*3/mm3      Immature Grans, Absolute 0.09 10*3/mm3      nRBC 0.0 /100 WBC     POC Glucose Once [[id number]]  (Abnormal) Collected: 07/21/24 1140    Specimen: Blood Updated: 07/21/24 1142     Glucose 134 mg/dL     Renal Function Panel [[id number]]  (Abnormal) Collected: 07/21/24 0923    Specimen: Blood Updated: 07/21/24 1028     Glucose 175 mg/dL      BUN 11 mg/dL      Creatinine 0.66 mg/dL      Sodium 139 mmol/L      Potassium 4.7 mmol/L      Comment: Specimen 1+ hemolyzed.  Result may be falsely elevated.        Chloride 105 mmol/L      CO2 19.0 mmol/L      Calcium 8.9 mg/dL      Albumin 3.5 g/dL      Phosphorus 2.8 mg/dL      Anion Gap 15.0 mmol/L      BUN/Creatinine Ratio 16.7     eGFR 92.2 mL/min/1.73     Narrative:      GFR Normal >60  Chronic Kidney Disease <60  Kidney Failure <15    The GFR formula is only valid for adults with stable renal function between ages 18 and 70.            Physical Exam:  EPL and EDC are intact this morning.  Grossly intact sensation to light touch over the median, radial, and ulnar nerve distributions.  Sugar-tong splint in place.    Assessment & Plan   Postop day #2 status post ORIF comminuted left distal radius fracture and anterior left CHEYENNE for femoral neck fracture    --Skilled nursing facility referral  -- Weight-bear as tolerated left upper extremity through elbow.  Weight-bear as tolerated left lower extremity  -- Continue PT and OT  -- Baby aspirin twice daily for DVT prophylaxis  -- Upon discharge, patient will need follow-up with me in about 2 weeks for wound check and suture  removal and placement into a short arm cast.  We will also check her left hip incision at that time.    Devan Modi MD  07/22/24  08:12 EDT          Electronically signed by Devan Modi MD at 07/22/24 0814       Doni Evans MD at 07/21/24 1335            Orthopedic Progress Note      Patient: Pari Howell  YOB: 1950     Date of Admission: 7/19/2024 10:29 PM Medical Record Number: [id number]     Attending Physician: Amanda Elias,*    Status Post:  Procedure(s):  OPEN REDUCTION INTERNAL FIXATION DISTAL RADIUS - LEFT  TOTAL HIP ARTHROPLASTY ANTERIOR Post Operative Day Number: 1    Subjective : No new orthopaedic complaints     Pain Relief: some relief with present medication.     Systemic Complaints: No Complaints  Vitals:    07/21/24 0243 07/21/24 0655 07/21/24 0830 07/21/24 1044   BP: 127/63 129/66 125/67 122/67   BP Location: Right arm Right arm  Right arm   Patient Position: Lying Lying  Sitting   Pulse: 86 77 86 79   Resp: 16 18  18   Temp:  98.1 °F (36.7 °C)  97.7 °F (36.5 °C)   TempSrc:  Oral  Oral   SpO2: 96% (!) 88%  93%   Weight:       Height:           Physical Exam: 74 y.o. female    General Appearance:       Alert, cooperative, in no acute distress                  Extremities:    Dressing Clean, Dry and Intact             No clinical sign of DVT        Able to do good movements of digits    Pulses:   Pulses palpable and equal bilaterally           Diagnostic Tests:     Results from last 7 days   Lab Units 07/20/24  0335   WBC 10*3/mm3 15.47*   HEMOGLOBIN g/dL 14.5   HEMATOCRIT % 42.6   PLATELETS 10*3/mm3 269     Results from last 7 days   Lab Units 07/21/24  0923 07/20/24  0335 07/20/24  0044   SODIUM mmol/L 139 137 136   POTASSIUM mmol/L 4.7 3.9 4.3   CHLORIDE mmol/L 105 100 101   CO2 mmol/L 19.0* 24.0 23.0   BUN mg/dL 11 17 18   CREATININE mg/dL 0.66 0.73 0.73   GLUCOSE mg/dL 175* 167* 183*   CALCIUM mg/dL 8.9 9.4 9.0         No results found for:  "\"URICACID\"  No results found for: \"CRYSTAL\"  Microbiology Results (last 10 days)       ** No results found for the last 240 hours. **          XR Hip With or Without Pelvis 2 - 3 View Left    Result Date: 7/20/2024  Impression: Total left hip prosthesis in anatomic alignment. No new bony abnormality seen. Electronically Signed: Jasmeet Carrasco MD  7/20/2024 4:53 PM EDT  Workstation ID: [id number]    CT Pelvis Without Contrast    Result Date: 7/20/2024  Impression: Impacted subcapital fracture of the proximal left femur. Electronically Signed: Doni Vazquez MD  7/20/2024 4:41 AM EDT  Workstation ID: [id number]    CT Upper Extremity Left Without Contrast    Result Date: 7/20/2024  Impression: Limited exam. There is a comminuted fracture of the distal radius with displacement. There is a displaced ulnar styloid fracture. Electronically Signed: Doni Vazquez MD  7/20/2024 4:37 AM EDT  Workstation ID: [id number]    XR Chest 1 View    Result Date: 7/19/2024  Impression: No active disease Electronically Signed: Doni Vazquez MD  7/19/2024 11:36 PM EDT  Workstation ID: [id number]    XR Wrist 3+ View Left    Result Date: 7/19/2024  Impression: Comminuted and displaced fracture of the distal radius with disruption of the radiocarpal's. Electronically Signed: Doni Vazquez MD  7/19/2024 11:34 PM EDT  Workstation ID: [id number]    XR Hip With or Without Pelvis 2 - 3 View Left    Result Date: 7/19/2024  Impression: Fracture of the subcapital left femur Electronically Signed: Doni Vazquez MD  7/19/2024 11:31 PM EDT  Workstation ID: [id number]       Current Medications:  Scheduled Meds:[START ON 7/22/2024] Pharmacy Consult, , Does not apply, Daily  amLODIPine, 10 mg, Oral, Daily  aspirin, 81 mg, Oral, Q12H  cetirizine, 5 mg, Oral, Daily  insulin lispro, 2-7 Units, Subcutaneous, 4x Daily AC & at Bedtime  latanoprost, 1 drop, Both Eyes, Daily  levothyroxine, 75 mcg, Oral, Q AM  losartan, 50 mg, Oral, Q24H  pantoprazole, 40 mg, Oral, Daily  pravastatin, " 40 mg, Oral, Q PM  sodium chloride, 10 mL, Intravenous, Q12H  sodium chloride, 3 mL, Intravenous, Q12H  timolol, 1 drop, Both Eyes, BID      Continuous Infusions:ropivacaine,   sodium chloride 0.9 % with KCl 20 mEq, 50 mL/hr      PRN Meds:.  senna-docusate sodium **AND** polyethylene glycol **AND** bisacodyl **AND** bisacodyl    Calcium Replacement - Follow Nurse / BPA Driven Protocol    Pharmacy Consult    dextrose    dextrose    docusate sodium    glucagon (human recombinant)    HYDROcodone-acetaminophen    HYDROmorphone    Magnesium Standard Dose Replacement - Follow Nurse / BPA Driven Protocol    Morphine **AND** naloxone    nitroglycerin    ondansetron ODT **OR** ondansetron    Phosphorus Replacement - Follow Nurse / BPA Driven Protocol    Potassium Replacement - Follow Nurse / BPA Driven Protocol    [COMPLETED] Insert Peripheral IV **AND** sodium chloride    sodium chloride    sodium chloride    sodium chloride    sodium chloride    Assessment: Status post  No admission procedures for hospital encounter.    Patient Active Problem List   Diagnosis    Type 2 diabetes mellitus without complication, without long-term current use of insulin    Other specified glaucoma    Hyperlipidemia LDL goal <70    Vitamin D deficiency    Irritable bowel syndrome with diarrhea    Hypothyroidism (acquired)    Acute respiratory insufficiency    Hip fracture    Femur fracture    T2DM (type 2 diabetes mellitus)    HTN (hypertension)    Radial fracture    Left wrist fracture, open, initial encounter       PLAN:   Continues current post-op course  Anticoagulation: Aspirin started  Mobilize with PT as tolerated per protocol    Weight Bearing: WBAT, Platform L UE  Discharge Plan: OK to plan for discharge in  tomorrow to rehab  from orthopaedic perspective.    Doni Evans MD    Date: 7/21/2024    Time: 13:35 EDT    Electronically signed by Doni Evans MD at 07/21/24 7072       Devan Modi MD at 07/21/24 4907         "            Orthopaedic Surgery Progress Note      LOS: 1 day   Patient Care Team:  Radha Gregg APRN as PCP - General (Nurse Practitioner)  Gloria Santoro DO as Consulting Physician (Endocrinology)    POD 1    Subjective     Interval History:   Patient underwent ORIF of her comminuted left distal radius fracture with I&D and also anterior left CHEYENNE with Dr. Evans yesterday in the operating room.  This morning she is doing quite well.  No major complaints.  She is working with therapy when I come in to interview her.  Denies chest pain or shortness of breath.    Objective     Vital Signs:  Temp (24hrs), Av.3 °F (36.8 °C), Min:97.3 °F (36.3 °C), Max:99.8 °F (37.7 °C)    /66 (BP Location: Right arm, Patient Position: Lying)   Pulse 77   Temp 98.1 °F (36.7 °C) (Oral)   Resp 18   Ht 172.7 cm (68\")   Wt 78.2 kg (172 lb 6.4 oz)   LMP  (LMP Unknown)   SpO2 (!) 88%   BMI 26.21 kg/m²     Labs:  Lab Results (last 24 hours)       Procedure Component Value Units Date/Time    POC Glucose Once [[id number]]  (Abnormal) Collected: 24 0722    Specimen: Blood Updated: 24 0747     Glucose 143 mg/dL     POC Glucose Once [[id number]]  (Abnormal) Collected: 24 2110    Specimen: Blood Updated: 24 2112     Glucose 198 mg/dL     POC Glucose Once [[id number]]  (Abnormal) Collected: 24 1734    Specimen: Blood Updated: 24 1739     Glucose 240 mg/dL     POC Glucose Once [[id number]]  (Normal) Collected: 24 1132    Specimen: Blood Updated: 24 1143     Glucose 117 mg/dL             Physical Exam:  Patient tells me her fingers are little tingly.  She has grossly intact sensation to light touch in the median, radial, and ulnar nerve distributions.  No EPL or EDC function this morning secondary to the nerve block  Calf is soft and nontender.  She is able to wiggle her toes and move her ankle.    Assessment & Plan   Postop day #1 status post ORIF left distal radius and anterior " left CHEYENNE for femoral neck fracture    -- Weight-bear as tolerated left lower extremity.  Weight-bear as tolerated left upper extremity through the elbow  -- PT and OT this morning  -- After her right hip fracture, patient went home with home health services.  They are interested in trying to do that again if possible.  We will see how she does with physical therapy  --Incentive spirometry  -- Upon discharge, patient will need to follow-up with me in 2 weeks for wound check and suture removal.  She will be placed into a short arm cast at that time and we will initiate formal occupational hand therapy at around the 4-week point if healing and x-rays are satisfactory.    Devan Modi MD  24  08:30 EDT          Electronically signed by Devan Modi MD at 24 0843       Amanda Elias DO at 24 0606              [facility] Medicine Services  PROGRESS NOTE    Patient Name: Pari Howell  : 1950  MRN: [id number]    Date of Admission: 2024  Primary Care Physician: Radha Gregg APRN    Subjective   Subjective     CC:  Fall, fx    HPI:  Had multiple issues with her IV and attempts to replace 1 overnight.  Stuck several times and finally had IV placed with ultrasound.  Complaining of arm soreness and sharp pain in her hip from surgery      Objective   Objective     Vital Signs:   Temp:  [97.3 °F (36.3 °C)-98.6 °F (37 °C)] 97.7 °F (36.5 °C)  Heart Rate:  [] 79  Resp:  [12-23] 18  BP: (107-150)/(55-76) 122/67  Flow (L/min):  [2-6] 2     Physical Exam:  Constitutional: No acute distress, awake, alert  HENT: NCAT, mucous membranes moist  Respiratory: Respiratory effort normal   Cardiovascular: RRR, no murmurs, rubs, or gallops  Musculoskeletal: Trace LE edema, sensation intact, left upper extremity in sling  Psychiatric: Appropriate affect, cooperative  Neurologic: Oriented x 3, speech clear  Skin: No rashes      Results Reviewed:  LAB  RESULTS:      Lab 07/20/24  0335   WBC 15.47*   HEMOGLOBIN 14.5   HEMATOCRIT 42.6   PLATELETS 269   NEUTROS ABS 13.31*   IMMATURE GRANS (ABS) 0.09*   LYMPHS ABS 1.16   MONOS ABS 0.83   EOS ABS 0.04   MCV 88.0         Lab 07/21/24  0923 07/20/24  0335 07/20/24  0044   SODIUM 139 137 136   POTASSIUM 4.7 3.9 4.3   CHLORIDE 105 100 101   CO2 19.0* 24.0 23.0   ANION GAP 15.0 13.0 12.0   BUN 11 17 18   CREATININE 0.66 0.73 0.73   EGFR 92.2 86.4 86.4   GLUCOSE 175* 167* 183*   CALCIUM 8.9 9.4 9.0   PHOSPHORUS 2.8  --   --    HEMOGLOBIN A1C  --  6.00*  --          Lab 07/21/24  0923 07/20/24  0044   TOTAL PROTEIN  --  7.2   ALBUMIN 3.5 4.2   GLOBULIN  --  3.0   ALT (SGPT)  --  26   AST (SGOT)  --  28   BILIRUBIN  --  0.4   ALK PHOS  --  64                 Lab 07/20/24  0429 07/20/24  0335   ABO TYPING A A   RH TYPING Positive Positive   ANTIBODY SCREEN  --  Negative         Brief Urine Lab Results  (Last result in the past 365 days)        Color   Clarity   Blood   Leuk Est   Nitrite   Protein   CREAT   Urine HCG        04/01/24 0815             135.4                 Microbiology Results Abnormal       None            XR Hip With or Without Pelvis 2 - 3 View Left    Result Date: 7/20/2024  XR HIP W OR WO PELVIS 2-3 VIEW LEFT Date of Exam: 7/20/2024 4:17 PM EDT Indication: Postop Comparison: 7/19/2024 Findings: Total left hip prosthesis is now seen, components in anatomic alignment. There is expected postop soft tissue air. Right hip prosthesis remains in anatomic alignment. No abnormal lucency is seen around the prosthetic components. Bony structures appear intact.     Impression: Impression: Total left hip prosthesis in anatomic alignment. No new bony abnormality seen. Electronically Signed: Jasmeet Carrasco MD  7/20/2024 4:53 PM EDT  Workstation ID: [id number]    FL C Arm During Surgery    Result Date: 7/20/2024  This procedure was auto-finalized with no dictation required.    Left Fascia iliac SS    Result Date:  7/20/2024  Grant Elias CRNA     7/20/2024 11:59 AM Left Fascia iliac SS Patient reassessed immediately prior to procedure Start time: 7/20/2024 11:20 AM Reason for block: at surgeon's request and post-op pain management Performed by CRNA/CAA: Grant Elias, ADAMA Assisted by: Janina Chávez RN Preanesthetic Checklist Completed: patient identified, IV checked, site marked, risks and benefits discussed, surgical consent, monitors and equipment checked, pre-op evaluation and timeout performed Prep: Pt Position: supine Sterile barriers:cap, gloves, mask and washed/disinfected hands Prep: ChloraPrep Patient monitoring: blood pressure monitoring, continuous pulse oximetry and EKG Procedure Sedation: yes Performed under: local infiltration Guidance:ultrasound guided ULTRASOUND INTERPRETATION.  Using ultrasound guidance a 20 G gauge needle was placed in close proximity to the nerve, at which point, under ultrasound guidance anesthetic was injected in the area of the nerve and spread of the anesthesia was seen on ultrasound in close proximity thereto.  There were no abnormalities seen on ultrasound; a digital image was taken; and the patient tolerated the procedure with no complications. Images:still images obtained, printed/placed on chart Laterality:left Block Type:fascia iliaca compartment Injection Technique:single-shot Needle Type:echogenic and Tuohy Needle Gauge:18 G Resistance on Injection: none Catheter Size:20 G (20g) Medications Used: bupivacaine PF (MARCAINE) 0.25 % injection - Injection  30 mL - 7/20/2024 11:20:00 AM Medications Preservative Free Saline:5ml Post Assessment Injection Assessment: negative aspiration for heme, no paresthesia on injection and incremental injection Patient Tolerance:comfortable throughout block Complications:no Additional Notes CKAFASCIAILIACA: SINGLE shot A high-frequency linear transducer, with sterile cover, was placed in parasagittal plane on top of the Anterior  "Superior Iliac Spine (ASIS) and moved medially to identify the Internal Oblique muscle, Sartorius muscle, Iliacus Muscle, Fascia Iliaca (FI) and Fascia Latae. The insertion site was prepped and draped in sterile fashion. Skin and cutaneous tissue was infiltrated with 2-5 ml of 1% Lidocaine. Using ultrasound-guidance, a 20-gauge B-Moraes 4\" Ultraplex 360 non-stimulating echogenic needle was advanced in plane from caudad to cephalad. Preservative-free normal saline was utilized for hydro-dissection of tissue, advancement of needle, and to confirm final needle placement below FI. Local anesthetic in incremental 3-5 ml injections. Aspiration every 5 ml to prevent intravascular injection. Injection was completed with negative aspiration of blood and negative intravascular injection. Injection pressures were normal with minimal resistance. Performed by: Grant Elias CRNA     left Infraclavicular Cath    Result Date: 7/20/2024  Grant Elias CRNA     7/20/2024 12:00 PM Left Infraclavicular Cath Patient reassessed immediately prior to procedure Patient location during procedure: pre-op Start time: 7/20/2024 11:15 AM Reason for block: at surgeon's request and post-op pain management Performed by Anesthesiologist: Tyson Hood MD CRNA/CAA: Grant Elias CRNA Preanesthetic Checklist Completed: patient identified, IV checked, site marked, risks and benefits discussed, surgical consent, monitors and equipment checked, pre-op evaluation and timeout performed Prep: Pt Position: supine Sterile barriers:cap, gloves, mask and washed/disinfected hands Prep: ChloraPrep Patient monitoring: blood pressure monitoring, continuous pulse oximetry and EKG Procedure Sedation: yes Performed under: local infiltration Guidance:ultrasound guided ULTRASOUND INTERPRETATION.  Using ultrasound guidance a 20 G gauge needle was placed in close proximity to the brachial plexus nerve, at which point, under ultrasound guidance " anesthetic was injected in the area of the nerve and spread of the anesthesia was seen on ultrasound in close proximity thereto.  There were no abnormalities seen on ultrasound; a digital image was taken; and the patient tolerated the procedure with no complications. Images:still images obtained, printed/placed on chart Laterality:left Block Type:infraclavicular Injection Technique:catheter Needle Type:Tuohy and echogenic Needle Gauge:18 G Resistance on Injection: none Catheter Size:20 G Cath Depth at skin: 9 cm Medications Used: fentaNYL citrate (PF) (SUBLIMAZE) injection - Intravenous  100 mcg - 7/20/2024 11:15:00 AM bupivacaine PF (MARCAINE) 0.25 % injection - Injection  30 mL - 7/20/2024 11:15:00 AM Medications Preservative Free Saline:3ml Post Assessment Injection Assessment: negative aspiration for heme, no paresthesia on injection and incremental injection Patient Tolerance:comfortable throughout block Complications:no Additional Notes CATHETER The affected upper extremity was abducted and rotated 90 degrees at the shoulder, within the patients range of motion. A high-frequency linear transducer, with sterile cover, was placed just medial to the coracoid process, distal third of the clavicle, and inferior to the clavicle. Keeping the transducer is in a parasagittal plane (cephalad to caudad), scanning medially to laterally. The Pectoralis major and minor, brachial plexus, axillary artery and vein, rib and pleura where visualized. The insertion site was prepped and draped in sterile fashion. Skin and cutaneous tissue was infiltrated with 2-5 ml of 1% Lidocaine. Using ultrasound-guidance, an 18-gauge Contiplex Ultra 360 Touhy needle was advanced in plane lateral to the axillary artery and lateral cord of the brachial plexus. Preservative-free normal saline was utilized for hydro-dissection of tissue, advancement of Touhy needle, and to confirm final needle placement posterior to the axillary artery and  "posterior cord of the brachial plexus. Local anesthetic injection spread, in incremental 3-5 ml injections, was confirmed. Aspiration every 5 ml to prevent intravascular injection. Injection was completed with negative aspiration of blood and negative intravascular injection. Injection pressures were normal with minimal resistance. A 20-gauge Contiplex Echo catheter was placed through the needle and advance out the tip of the Touhy 1-3 cm. The Touhy needle was then removed, and final catheter position verified at the 5-6 o'clock position to the axillary artery and posterior cord. The catheter was secured in usual fashion with skin glue, benzoin, steri-strips, CHG tegaderm and Label noting \"Nerve Block Catheter\". Jerk tape applied at yellow connector and catheter connection. Performed by: Grant Elias CRNA    CT Pelvis Without Contrast    Result Date: 7/20/2024  CT PELVIS WO CONTRAST Date of Exam: 7/20/2024 2:17 AM EDT Indication: eval left hip fracture. Comparison: None available. Technique: Axial CT images were obtained of the pelvis without contrast administration.  Reconstructed coronal and sagittal images were also obtained. Automated exposure control and iterative construction methods were used. Findings: There is an impacted subcapital fracture of the proximal left femur. There is no evidence of dislocation of the femoral head. There does appear to be a component of the subcapital fracture which may involve the lateral aspect of the femoral neck. The superior to inferior pubic rami are intact. There are postoperative changes from right total hip arthroplasty. There is subchondral cystic change of the lateral acetabulum     Impression: Impression: Impacted subcapital fracture of the proximal left femur. Electronically Signed: Doni Vazquez MD  7/20/2024 4:41 AM EDT  Workstation ID: [id number]    CT Upper Extremity Left Without Contrast    Result Date: 7/20/2024  CT UPPER EXTREMITY LEFT WO CONTRAST Date of " Exam: 7/20/2024 2:17 AM EDT Indication: evaluate wrist fracture. Comparison: None available. Technique: Axial CT images were obtained of the left upper extremity without contrast administration.  Reconstructed coronal and sagittal images were also obtained. Automated exposure control and iterative construction methods were used. Findings: The exam is limited based upon the position patient's restrictive CT scan and the poor x-ray penetration of the osseous structures. Again demonstrated is a comminuted fracture of the distal radius with displacement. There is a displaced ulnar styloid fracture. There is mild improvement in the alignment of the distal radius fracture with an interval placement of a plaster splint. The scaphoid appears intact. No discrete displaced carpal fracture is identified though again limited by the poor technique.     Impression: Impression: Limited exam. There is a comminuted fracture of the distal radius with displacement. There is a displaced ulnar styloid fracture. Electronically Signed: Doni Vazquez MD  7/20/2024 4:37 AM EDT  Workstation ID: [id number]    XR Chest 1 View    Result Date: 7/19/2024  XR CHEST 1 VW Date of Exam: 7/19/2024 11:02 PM EDT Indication: preop Comparison: Chest radiograph dated February 4, 2024 Findings: There are no airspace consolidations. No pleural fluid. No pneumothorax. The pulmonary vasculature appears within normal limits. The cardiac and mediastinal silhouette appear unremarkable. No acute osseous abnormality identified.     Impression: Impression: No active disease Electronically Signed: Doni Vazquez MD  7/19/2024 11:36 PM EDT  Workstation ID: [id number]    XR Wrist 3+ View Left    Result Date: 7/19/2024  XR WRIST 3+ VW LEFT Date of Exam: 7/19/2024 11:01 PM EDT Indication: fall/pain Comparison: None available. Findings: There is a comminuted and displaced fracture of the distal radius. There is a displaced fracture of the ulnar styloid. There is dislocation of  the radiocarpal joint in the radial direction. There is widening of the scaphoid trapezium interval.     Impression: Impression: Comminuted and displaced fracture of the distal radius with disruption of the radiocarpal's. Electronically Signed: Doni Vazquez MD  7/19/2024 11:34 PM EDT  Workstation ID: [id number]    XR Hip With or Without Pelvis 2 - 3 View Left    Result Date: 7/19/2024  XR HIP W OR WO PELVIS 2-3 VIEW LEFT Date of Exam: 7/19/2024 11:01 PM EDT Indication: fall/pain Comparison: AP pelvis dated 2/4/2024 Findings: There is an acute minimally displaced fracture of the subcapital proximal left femur. There is evidence of a previous right total hip arthroplasty. There are no lytic or destructive bony lesions.     Impression: Impression: Fracture of the subcapital left femur Electronically Signed: Doni Vazquez MD  7/19/2024 11:31 PM EDT  Workstation ID: [id number]         Current medications:  Scheduled Meds:[START ON 7/22/2024] Pharmacy Consult, , Does not apply, Daily  amLODIPine, 10 mg, Oral, Daily  aspirin, 81 mg, Oral, Q12H  cetirizine, 5 mg, Oral, Daily  insulin lispro, 2-7 Units, Subcutaneous, 4x Daily AC & at Bedtime  latanoprost, 1 drop, Both Eyes, Daily  levothyroxine, 75 mcg, Oral, Q AM  losartan, 50 mg, Oral, Q24H  pantoprazole, 40 mg, Oral, Daily  pravastatin, 40 mg, Oral, Q PM  sodium chloride, 10 mL, Intravenous, Q12H  sodium chloride, 3 mL, Intravenous, Q12H  timolol, 1 drop, Both Eyes, BID      Continuous Infusions:ropivacaine,   sodium chloride 0.9 % with KCl 20 mEq, 50 mL/hr      PRN Meds:.  senna-docusate sodium **AND** polyethylene glycol **AND** bisacodyl **AND** bisacodyl    Calcium Replacement - Follow Nurse / BPA Driven Protocol    Pharmacy Consult    dextrose    dextrose    docusate sodium    glucagon (human recombinant)    HYDROcodone-acetaminophen    HYDROmorphone    Magnesium Standard Dose Replacement - Follow Nurse / BPA Driven Protocol    Morphine **AND** naloxone    nitroglycerin     ondansetron ODT **OR** ondansetron    Phosphorus Replacement - Follow Nurse / BPA Driven Protocol    Potassium Replacement - Follow Nurse / BPA Driven Protocol    [COMPLETED] Insert Peripheral IV **AND** sodium chloride    sodium chloride    sodium chloride    sodium chloride    sodium chloride    Assessment & Plan   Assessment & Plan     Active Hospital Problems    Diagnosis  POA    **Femur fracture [S72.90XA]  Yes    T2DM (type 2 diabetes mellitus) [E11.9]  Yes    HTN (hypertension) [I10]  Yes    Radial fracture [S52.90XA]  Yes    Left wrist fracture, open, initial encounter [S62.102B]  Unknown    Hypothyroidism (acquired) [E03.9]  Yes    Hyperlipidemia LDL goal <70 [E78.5]  Yes      Resolved Hospital Problems   No resolved problems to display.        Brief Hospital Course to date:  Pari Howell is a 74 y.o. female with hx of HTN, HLD, hypothyroidism, T2DM, IBS who presented to the ED w/ c/o a fall.  Imaging shows left femur fracture and left distal radius fracture.  She underwent repair of left wrist and left hip on 7/20.        Left femur fracture   Left distal radius fracture   -2/2 mechanical fall   -CT pelvis impacted subcapital fracture of the proximal left femur   - CT LUE shows comminuted fracture of the distal radius with displacement. There is a displaced ulnar styloid fracture.  -Ortho evaluated  -IVF  -PRN pain medications, antiemetics   -s/p ORIF distal left radius and total hip anterior arthroplasty with Dr. Evans/Lane        T2DM  -hem A1c 6.0  -on Semaglutide   -Continue SSI     Neuropathy  -uses cream from compound pharmacy to help     HTN   HLD   -continue routine Norvasc  -on losartan-HCTZ; losartan continued at half dose, HCTZ held for now   -continued statin        Hypothyroidism   -continue synthroid       Expected Discharge Location and Transportation: SNF  Expected Discharge   Expected Discharge Date: 7/22/2024; Expected Discharge Time:      VTE Prophylaxis:  Mechanical VTE  prophylaxis orders are present.         AM-PAC 6 Clicks Score (PT): 13 (07/21/24 1023)    CODE STATUS:   Code Status and Medical Interventions:   Ordered at: 07/20/24 1740     Level Of Support Discussed With:    Patient     Code Status (Patient has no pulse and is not breathing):    CPR (Attempt to Resuscitate)     Medical Interventions (Patient has pulse or is breathing):    Full Support       Amanda Elias DO  07/21/24        Electronically signed by Amanda Elias DO at 07/21/24 1355       Amanda Elias DO at 07/20/24 0607                [facility] Medicine Services  ADMISSION FOLLOW-UP NOTE          Patient admitted after midnight, H&P by my partner performed earlier on today's date reviewed.  Interim findings, labs, and charting also reviewed.        [facility] Problem List has been managed and updated to include any new diagnoses:  Active Hospital Problems    Diagnosis  POA    **Femur fracture [S72.90XA]  Yes    T2DM (type 2 diabetes mellitus) [E11.9]  Yes    HTN (hypertension) [I10]  Yes    Radial fracture [S52.90XA]  Yes    Left wrist fracture, open, initial encounter [S62.102B]  Unknown    Hypothyroidism (acquired) [E03.9]  Yes    Hyperlipidemia LDL goal <70 [E78.5]  Yes      Resolved Hospital Problems   No resolved problems to display.         ADDITIONAL PLAN:  - detailed assessment and plan from admission reviewed  - 74 yr old female with hx of HTN, HLD, hypothyroidism, T2DM, IBS who presented to the ED w/ c/o a fall.  Imaging shows left femur fracture and left distal radius fracture.  She underwent repair of left wrist and left hip on 7/20.      Left femur fracture   Left distal radius fracture   -2/2 mechanical fall   -CT pelvis impacted subcapital fracture of the proximal left femur   - CT LUE shows comminuted fracture of the distal radius with displacement. There is a displaced ulnar styloid fracture.  -Ortho evaluated  -IVF  -PRN pain  medications, antiemetics        T2DM  -hem A1c 6.0  -on Semaglutide   -Continue SSI    Neuropathy  -use cream from compound pharmacy to help     HTN   HLD   -continue routine Norvasc  -on losartan-HCTZ; losartan continued at half dose, HCTZ held for now   -continued statin       Hypothyroidism   -continue synthroid       Expected Discharge  Expected Discharge Date: 7/22/2024; Expected Discharge Time:      Amanda Elias DO  07/20/24        Electronically signed by Amanda Elias DO at 07/20/24 3813

## 2024-08-06 ENCOUNTER — OFFICE VISIT (OUTPATIENT)
Dept: ORTHOPEDIC SURGERY | Facility: CLINIC | Age: 74
End: 2024-08-06
Payer: MEDICARE

## 2024-08-06 VITALS — TEMPERATURE: 96.9 F

## 2024-08-06 DIAGNOSIS — Z87.81 S/P ORIF (OPEN REDUCTION INTERNAL FIXATION) FRACTURE: Primary | ICD-10-CM

## 2024-08-06 DIAGNOSIS — Z98.890 S/P ORIF (OPEN REDUCTION INTERNAL FIXATION) FRACTURE: Primary | ICD-10-CM

## 2024-08-06 PROCEDURE — 29075 APPL CST ELBW FNGR SHORT ARM: CPT | Performed by: ORTHOPAEDIC SURGERY

## 2024-08-06 PROCEDURE — 99024 POSTOP FOLLOW-UP VISIT: CPT | Performed by: ORTHOPAEDIC SURGERY

## 2024-08-06 NOTE — PROGRESS NOTES
[facility] Note        Subjective     Post-op (2 weeks s/p ORIF left distal radius 7/20/24)       HPI    Pari Howell is a 74 y.o. female.  Patient is now 17 days out from ORIF of a grade 1 open left distal radius fracture treated while I was on-call.  She also sustained a left femoral neck fracture and underwent anterior left CHEYENNE by Dr. Evans after my surgery.  She went to [facility] and was diagnosed with COVID and did not get great therapy for 10 days.          Objective      Physical Exam  Temp 96.9 °F (36.1 °C)   LMP  (LMP Unknown)     There is no height or weight on file to calculate BMI.        Ortho Exam  Left Upper Extremity:        Musculoskeletal     Inspection and Palpation:      Hand/Wrist:      Tenderness - none    Swelling - minimal     ROM:  Slightly diminished but within normal limits       Incision:  Healing appropriately, no drainage or erythema     Imaging Reviewed and Interpreted:  Imaging Results (Last 24 Hours)       Procedure Component Value Units Date/Time    XR Wrist 3+ View Left [[id number]] Resulted: 08/06/24 1048     Updated: 08/06/24 1049    Narrative:      Left Wrist X-Ray    Indication: s/p ORIF    Views:  AP, Lateral, and Oblique     Comparison: Left wrist 7/19/2024    Findings:  Patient is status post ORIF of the distal radius  The fracture appears to be healing appropriately.  New bone is visualized.    The hardware is intact.    Normal soft tissues  Normal joint spaces  Alignment appears acceptable.      Impression:  Fracture of the left distal radius s/p ORIF with interval   radiographic signs of healing.  Nondisplaced fracture ulnar styloid                Assessment    Assessment:  1. S/P ORIF (open reduction internal fixation) fracture        Plan:  Status post ORIF left grade 1 open distal radius fracture--patient radiograph looks good overall today.  Plan will be to transition her into a fiberglass short arm cast.  See her back in 3 weeks, remove  her cast, get 3 views of her wrist and then anticipate transitioning her to a removable brace that we will support her until she can make it to [facility] therapy and get a custom brace made.  Physical therapy/occupational hand therapy order printed off for the family today to make their appointment for 3 weeks.  Encouraged range of motion at the digits today.      Devan Modi MD  08/06/24  13:35 EDT      Dictated Utilizing Dragon Dictation.

## 2024-08-09 ENCOUNTER — TELEPHONE (OUTPATIENT)
Dept: ENDOCRINOLOGY | Facility: CLINIC | Age: 74
End: 2024-08-09
Payer: MEDICARE

## 2024-08-09 NOTE — TELEPHONE ENCOUNTER
Spoke with patient.  She states on July 20, 2024 she fell and broke her hip and wrist.  Was in the hospital and had surgery and then went to [facility].  During the hospital stay and at [facility], they were controlling her blood sugars with insulin.  Since coming home, her blood sugars have been staying 150-200.  This morning it was 201.  She wants to know if she should go back to Ozempic 0.5mg and Actos or what Dr. Santoro advises.  Patient is aware that Dr. Santoro is out of the office today but requested message be sent to her for review on Monday.

## 2024-08-09 NOTE — TELEPHONE ENCOUNTER
PATIENT WOULD LIKE A CALL BACK REGARDING CONCERN WITH HER SUGAR. SHE SAID SHE TOOK IT THIS MORNING AND IT .     PLEASE CALL HER BACK [phone]

## 2024-08-12 DIAGNOSIS — E11.42 TYPE 2 DIABETES MELLITUS WITH DIABETIC POLYNEUROPATHY, WITHOUT LONG-TERM CURRENT USE OF INSULIN: Primary | ICD-10-CM

## 2024-08-12 RX ORDER — PIOGLITAZONEHYDROCHLORIDE 15 MG/1
15 TABLET ORAL DAILY
Qty: 90 TABLET | Refills: 1 | Status: SHIPPED | OUTPATIENT
Start: 2024-08-12 | End: 2024-08-13 | Stop reason: SDUPTHER

## 2024-08-12 RX ORDER — PIOGLITAZONEHYDROCHLORIDE 15 MG/1
15 TABLET ORAL DAILY
Qty: 30 TABLET | Refills: 5 | Status: SHIPPED | OUTPATIENT
Start: 2024-08-12 | End: 2024-08-12

## 2024-08-12 NOTE — TELEPHONE ENCOUNTER
Sorry to hear this. Hope she is recovering well.   Resume actos 15 mg daily. Can increase to 30 mg daily if needed. If she has any leg swelling, decrease dose or stop.   Can resume ozempic 0.25 mg weekly if needed. Can give it a few weeks on the actos. I recall she had trouble tolerating the ozempic at the 0.5 mg weekly dose. Start with actos. I'll send a script.

## 2024-08-13 RX ORDER — PIOGLITAZONEHYDROCHLORIDE 15 MG/1
15 TABLET ORAL DAILY
Qty: 90 TABLET | Refills: 0 | Status: SHIPPED | OUTPATIENT
Start: 2024-08-13

## 2024-08-13 NOTE — TELEPHONE ENCOUNTER
Patient notified and verbalized understanding.  Rx for Actos resent to [facility] per protocol.

## 2024-08-20 RX ORDER — PIOGLITAZONEHYDROCHLORIDE 15 MG/1
15 TABLET ORAL DAILY
Qty: 30 TABLET | Refills: 1 | Status: SHIPPED | OUTPATIENT
Start: 2024-08-20

## 2024-08-20 NOTE — TELEPHONE ENCOUNTER
Spoke with patient.  She states that Kay states that on their end it is showing that her prescription was delivered on 08/15/2024 and she never received.  [facility] pharmacy cannot fill due to it already being ran and paid for under her insurance.  Patient is asking for a 30 day supply to be sent in and she will pay cash.

## 2024-08-20 NOTE — TELEPHONE ENCOUNTER
Patient stated she was supposed to receive a medication called Actos in the mail last week. She has not received this medication and she states that she has been unable to track it. Patient would like a call from Dr Santoro's nurse.

## 2024-09-10 ENCOUNTER — OFFICE VISIT (OUTPATIENT)
Dept: ORTHOPEDIC SURGERY | Facility: CLINIC | Age: 74
End: 2024-09-10
Payer: MEDICARE

## 2024-09-10 DIAGNOSIS — Z87.81 S/P ORIF (OPEN REDUCTION INTERNAL FIXATION) FRACTURE: Primary | ICD-10-CM

## 2024-09-10 DIAGNOSIS — S52.502E TYPE I OR II OPEN FRACTURE OF DISTAL END OF LEFT RADIUS WITH ROUTINE HEALING, UNSPECIFIED FRACTURE MORPHOLOGY, SUBSEQUENT ENCOUNTER: ICD-10-CM

## 2024-09-10 DIAGNOSIS — Z98.890 S/P ORIF (OPEN REDUCTION INTERNAL FIXATION) FRACTURE: Primary | ICD-10-CM

## 2024-09-10 PROCEDURE — 99024 POSTOP FOLLOW-UP VISIT: CPT | Performed by: ORTHOPAEDIC SURGERY

## 2024-09-10 NOTE — PROGRESS NOTES
[facility] [place] [facility] Note        Subjective     Post-op (5 week follow up -7 weeks s/p ORIF left distal radius 7/20/24/)       HPI    Pari Howell is a 74 y.o. female.  Patient is here today now 7 weeks out from ORIF of the left distal radius on 7/20/2024.  She is here with her  today.  She also sustained a left femoral neck fracture requiring acute and care CHEYENNE by Dr. Evans.  She comes out of a short arm cast today.  Having some ulnar-sided wrist pain.          Objective      Physical Exam  LMP  (LMP Unknown)     There is no height or weight on file to calculate BMI.        Ortho Exam  Patient has no tenderness to palpation at her fracture site.  Little bit tender over the ulnar styloid.  Incisions are healed and free of erythema or drainage.  EPL is intact.    Imaging Reviewed and Interpreted:  Imaging Results (Last 24 Hours)       Procedure Component Value Units Date/Time    XR Wrist 3+ View Left [[id number]] Resulted: 09/10/24 1239     Updated: 09/10/24 1240    Narrative:      Left Wrist X-Ray    Indication: s/p ORIF    Views:  AP, Lateral, and Oblique     Comparison: L wrist 8/6/24    Findings:  Patient is status post ORIF of the distal radius.  Again noted is a   fracture of the ulnar styloid  The fracture appears to be healing appropriately.  New bone is visualized.    The hardware is intact.    Normal soft tissues  Normal joint spaces  Alignment appears acceptable.      Impression:  Fracture of the left  distal radius s/p ORIF with interval   and near complete healing. Ulnar styloid fracture with persistent gapping                Assessment    Assessment:  1. S/P ORIF (open reduction internal fixation) fracture    2. Type I or II open fracture of distal end of left radius with routine healing, unspecified fracture morphology, subsequent encounter        Plan:  Status post ORIF grade 1 open left distal radius and distal ulna fracture--plan to be to go to removable brace for now.  She  very well may require a custom Orthoplast splint at occupational hand therapy but for now she will go into the removable brace.  I will see her back in a month with an x-ray of her left wrist.      Devan Modi MD  09/10/24  13:25 EDT      Dictated Utilizing Dragon Dictation.  Answers submitted by the patient for this visit:  Other (Submitted on 9/3/2024)  Please describe your symptoms.: Cast removal on left wrist  Have you had these symptoms before?: No  How long have you been having these symptoms?: Greater than 2 weeks  Please list any medications you are currently taking for this condition.: Ibuprofen Tyleno  Please describe any probable cause for these symptoms. : Fell and broke wrist  Primary Reason for Visit (Submitted on 9/3/2024)  What is the primary reason for your visit?: Other

## 2024-10-01 ENCOUNTER — OFFICE VISIT (OUTPATIENT)
Dept: ENDOCRINOLOGY | Facility: CLINIC | Age: 74
End: 2024-10-01
Payer: MEDICARE

## 2024-10-01 VITALS
HEIGHT: 68 IN | WEIGHT: 172 LBS | SYSTOLIC BLOOD PRESSURE: 124 MMHG | HEART RATE: 61 BPM | OXYGEN SATURATION: 94 % | DIASTOLIC BLOOD PRESSURE: 68 MMHG | BODY MASS INDEX: 26.07 KG/M2

## 2024-10-01 DIAGNOSIS — E78.2 MIXED HYPERLIPIDEMIA: ICD-10-CM

## 2024-10-01 DIAGNOSIS — E03.9 HYPOTHYROIDISM (ACQUIRED): ICD-10-CM

## 2024-10-01 DIAGNOSIS — E55.9 VITAMIN D DEFICIENCY: ICD-10-CM

## 2024-10-01 DIAGNOSIS — M81.0 OSTEOPOROSIS WITHOUT CURRENT PATHOLOGICAL FRACTURE, UNSPECIFIED OSTEOPOROSIS TYPE: ICD-10-CM

## 2024-10-01 DIAGNOSIS — E11.42 TYPE 2 DIABETES MELLITUS WITH DIABETIC POLYNEUROPATHY, WITHOUT LONG-TERM CURRENT USE OF INSULIN: Primary | ICD-10-CM

## 2024-10-01 LAB
EXPIRATION DATE: ABNORMAL
EXPIRATION DATE: NORMAL
GLUCOSE BLDC GLUCOMTR-MCNC: 94 MG/DL (ref 70–130)
HBA1C MFR BLD: 6 % (ref 4.5–5.7)
Lab: ABNORMAL
Lab: NORMAL

## 2024-10-01 PROCEDURE — 3074F SYST BP LT 130 MM HG: CPT | Performed by: INTERNAL MEDICINE

## 2024-10-01 PROCEDURE — 1160F RVW MEDS BY RX/DR IN RCRD: CPT | Performed by: INTERNAL MEDICINE

## 2024-10-01 PROCEDURE — 36415 COLL VENOUS BLD VENIPUNCTURE: CPT | Performed by: INTERNAL MEDICINE

## 2024-10-01 PROCEDURE — 82947 ASSAY GLUCOSE BLOOD QUANT: CPT | Performed by: INTERNAL MEDICINE

## 2024-10-01 PROCEDURE — 83036 HEMOGLOBIN GLYCOSYLATED A1C: CPT | Performed by: INTERNAL MEDICINE

## 2024-10-01 PROCEDURE — 80053 COMPREHEN METABOLIC PANEL: CPT | Performed by: INTERNAL MEDICINE

## 2024-10-01 PROCEDURE — 82306 VITAMIN D 25 HYDROXY: CPT | Performed by: INTERNAL MEDICINE

## 2024-10-01 PROCEDURE — 3044F HG A1C LEVEL LT 7.0%: CPT | Performed by: INTERNAL MEDICINE

## 2024-10-01 PROCEDURE — 83970 ASSAY OF PARATHORMONE: CPT | Performed by: INTERNAL MEDICINE

## 2024-10-01 PROCEDURE — 99214 OFFICE O/P EST MOD 30 MIN: CPT | Performed by: INTERNAL MEDICINE

## 2024-10-01 PROCEDURE — 1159F MED LIST DOCD IN RCRD: CPT | Performed by: INTERNAL MEDICINE

## 2024-10-01 PROCEDURE — 3078F DIAST BP <80 MM HG: CPT | Performed by: INTERNAL MEDICINE

## 2024-10-01 RX ORDER — LEVOTHYROXINE SODIUM 75 UG/1
75 TABLET ORAL DAILY
Qty: 90 TABLET | Refills: 3 | Status: SHIPPED | OUTPATIENT
Start: 2024-10-01

## 2024-10-01 RX ORDER — PRAVASTATIN SODIUM 40 MG
40 TABLET ORAL EVERY EVENING
Qty: 90 TABLET | Refills: 3 | Status: SHIPPED | OUTPATIENT
Start: 2024-10-01 | End: 2025-10-01

## 2024-10-01 NOTE — PROGRESS NOTES
"Chief Complaint   Patient presents with    Diabetes          HPI   Pari Howell is a 74 y.o. female had concerns including Diabetes.    She is checking blood sugars daily.  Current medications for diabetes include actos 15 mg daily.    She didn't tolerate low dose ozempic well in the past.  She had a random high BG of 198 a few weeks ago and took a one time dose of ozempic 0.25 mg.     Is consistently taking her pravastatin.     The following portions of the patient's history were reviewed and updated as appropriate: allergies, current medications, past family history, past medical history, past social history, past surgical history, and problem list.      Review of Systems   Constitutional: Negative.    Endocrine: Negative.         See HPI        Physical Exam  Vitals reviewed.   Constitutional:       Appearance: Normal appearance.   Cardiovascular:      Rate and Rhythm: Normal rate.   Pulmonary:      Effort: Pulmonary effort is normal.   Neurological:      General: No focal deficit present.      Mental Status: She is alert. Mental status is at baseline.   Psychiatric:         Mood and Affect: Mood normal.         Behavior: Behavior normal.        /68   Pulse 61   Ht 172.7 cm (68\")   Wt 78 kg (172 lb)   LMP  (LMP Unknown)   SpO2 94%   BMI 26.15 kg/m²      Labs and imaging    CMP:  Lab Results   Component Value Date    BUN 11 07/21/2024    CREATININE 0.66 07/21/2024    EGFR 92.2 07/21/2024    BCR 16.7 07/21/2024     07/21/2024    K 4.7 07/21/2024    CO2 19.0 (L) 07/21/2024    CALCIUM 8.9 07/21/2024    ALBUMIN 3.5 07/21/2024    BILITOT 0.4 07/20/2024    ALKPHOS 64 07/20/2024    AST 28 07/20/2024    ALT 26 07/20/2024     Lipid Panel:  Lab Results   Component Value Date    TRIG 179 (H) 04/01/2024    HDL 47 04/01/2024    VLDL 32 04/01/2024     (H) 04/01/2024     HbA1c:  Lab Results   Component Value Date    HGBA1C 6.0 (A) 10/01/2024    HGBA1C 6.00 (H) 07/20/2024     Glucose:  Lab Results "   Component Value Date    POCGLU 94 10/01/2024     Microalbumin:  Lab Results   Component Value Date    MALBCRERATIO 6 04/01/2024     TSH:  Lab Results   Component Value Date    TSH 2.740 04/01/2024   DXA bone density spine and hip  Order: [id number]  Impression    Normal bone mineral density of the lumbar spine.     Osteopenia of the left hip.       Images reviewed, interpreted, and dictated by Dr. Beau Garza.   Transcribed by Michael Monte PA-C, R.T. (N), C N CRISTINE T.  Narrative    The BONE DENSITY SCAN (DEXA)     INDICATION:  Osteoporosis screening, recent hip fracture     COMPARISON:     FINDINGS: Bone mineral density evaluation of the spine and left femur   was obtained.       Average BMD of the lumbar spine measures 1.103 g/sq cm, with a T-score   of -0.7 and Z-score of 0.8. This is considered normal.     BMD of the left femoral neck measures 0.785 g/sq cm, with a T-score of   -1.8 and Z-score of -0.1. This is considered within the osteopenic   range.     FRAX evaluation gives the risk of a major osteoporotic fracture over 10   years as 18.2% and the risk of a hip fracture as 3.9%.  Exam End: 06/05/24 11:13    Specimen Collected: 06/05/24 16:40 Last Resulted: 06/05/24 17:02   Received From: [facility]  Result Received: 06/12/24 10:15       Assessment and plan  Diagnoses and all orders for this visit:    1. Type 2 diabetes mellitus with diabetic polyneuropathy, without long-term current use of insulin (Primary)  Controlled with A1c 6.0. Complicated by retinopathy and neuropathy.   No metformin due to intolerance at lowest dose. SGLT-2 inhibitors have been avoided due to history of recurrent yeast infections.   Continue actos 15 mg daily.   She hasn't tolerated ozempic at 0.5 mg weekly. Doesn't need at this time.   Labs are UTD. MF UTD from 4/2024. Ophtho exam is UTD recently and report will be requested.   -     POC Glucose, Blood  -     POC Glycosylated Hemoglobin (Hb A1C)    2.  Hyperlipidemia  Taking pravastatin. Intolerant to alternate statins. Is compliant with medication. Check levels yearly.     3.  Osteoporosis  Fracturing when falling from standing height, hip and wrist. Diagnostic of osteoporosis despite BMD with osteopenic range T scores.  Treatment is indicated.   Check PTH, I sandy and vit D today.   Is on vit D 3000 IU daily. Level was quite low in the past.   Pending results, she is open to taking fosamax though she has some reflux. Discussed reclast infusion, but she wants to try fosamax first.     4. Vitamin D deficiency  Check level today. On daily supplement 3000 IU.    5. Hypothyroidism  Refill sent. TSH normal 4/2024. Monitor yearly.        Return in about 6 months (around 4/1/2025) for next scheduled follow up. The patient was instructed to contact the clinic with any interval questions or concerns.    Electronically signed by: Gloria Santoro DO   Endocrinologist    Please note that portions of this note were completed with a voice recognition program.

## 2024-10-01 NOTE — PATIENT INSTRUCTIONS
Patients with diabetes should have a yearly eye exam. Please be sure to schedule this at least once yearly and have the eye exam report faxed to [phone].    Please have the foot exam/forms faxed to the same number.

## 2024-10-02 LAB
25(OH)D3 SERPL-MCNC: 36.3 NG/ML (ref 30–100)
ALBUMIN SERPL-MCNC: 4.6 G/DL (ref 3.5–5.2)
ALBUMIN/GLOB SERPL: 1.6 G/DL
ALP SERPL-CCNC: 70 U/L (ref 39–117)
ALT SERPL W P-5'-P-CCNC: 10 U/L (ref 1–33)
ANION GAP SERPL CALCULATED.3IONS-SCNC: 12.4 MMOL/L (ref 5–15)
AST SERPL-CCNC: 11 U/L (ref 1–32)
BILIRUB SERPL-MCNC: 0.5 MG/DL (ref 0–1.2)
BUN SERPL-MCNC: 15 MG/DL (ref 8–23)
BUN/CREAT SERPL: 21.1 (ref 7–25)
CALCIUM SPEC-SCNC: 10.5 MG/DL (ref 8.6–10.5)
CHLORIDE SERPL-SCNC: 100 MMOL/L (ref 98–107)
CO2 SERPL-SCNC: 26.6 MMOL/L (ref 22–29)
CREAT SERPL-MCNC: 0.71 MG/DL (ref 0.57–1)
EGFRCR SERPLBLD CKD-EPI 2021: 89.4 ML/MIN/1.73
GLOBULIN UR ELPH-MCNC: 2.9 GM/DL
GLUCOSE SERPL-MCNC: 83 MG/DL (ref 65–99)
POTASSIUM SERPL-SCNC: 3.3 MMOL/L (ref 3.5–5.2)
PROT SERPL-MCNC: 7.5 G/DL (ref 6–8.5)
PTH-INTACT SERPL-MCNC: 15.7 PG/ML (ref 15–65)
SODIUM SERPL-SCNC: 139 MMOL/L (ref 136–145)

## 2024-10-03 DIAGNOSIS — M81.0 OSTEOPOROSIS WITHOUT CURRENT PATHOLOGICAL FRACTURE, UNSPECIFIED OSTEOPOROSIS TYPE: ICD-10-CM

## 2024-10-03 DIAGNOSIS — M81.0 OSTEOPOROSIS WITHOUT CURRENT PATHOLOGICAL FRACTURE, UNSPECIFIED OSTEOPOROSIS TYPE: Primary | ICD-10-CM

## 2024-10-03 RX ORDER — ALENDRONATE SODIUM 70 MG/1
70 TABLET ORAL
Qty: 14 TABLET | Refills: 3 | Status: SHIPPED | OUTPATIENT
Start: 2024-10-03 | End: 2024-10-03 | Stop reason: SDUPTHER

## 2024-10-03 RX ORDER — ALENDRONATE SODIUM 70 MG/1
70 TABLET ORAL
Qty: 14 TABLET | Refills: 3 | Status: SHIPPED | OUTPATIENT
Start: 2024-10-03 | End: 2025-10-03

## 2024-10-04 NOTE — PROGRESS NOTES
The ABCs of the Annual Wellness Visit  Subsequent Medicare Wellness Visit    Chief Complaint   Patient presents with   • Medicare Wellness-subsequent      Subjective    History of Present Illness:  Pari Howlel is a 72 y.o. female who presents for a Subsequent Medicare Wellness Visit.    The following portions of the patient's history were reviewed and   updated as appropriate: allergies, current medications, past family history, past medical history, past social history, past surgical history and problem list.    Compared to one year ago, the patient feels her physical   health is the same.    Compared to one year ago, the patient feels her mental   health is the same.    Recent Hospitalizations:  She was not admitted to the hospital during the last year.       Current Medical Providers:  Patient Care Team:  Hayley Ty DO as PCP - General (Internal Medicine)  Gloria Santoro DO as Consulting Physician (Endocrinology)    Outpatient Medications Prior to Visit   Medication Sig Dispense Refill   • Blood Glucose Monitoring Suppl (True Metrix Air Glucose Meter) w/Device kit 1 each 2 (two) times a day. 1 kit 0   • glucose blood (True Metrix Blood Glucose Test) test strip 1 strip daily 100 each 3   • Lancets misc 1 applicator Daily. 100 each 3   • latanoprost (XALATAN) 0.005 % ophthalmic solution Apply 1 drop to eye Daily. 7.5 mL 3   • levothyroxine (SYNTHROID, LEVOTHROID) 75 MCG tablet Take 1 tablet by mouth Daily. 90 tablet 1   • pravastatin (PRAVACHOL) 40 MG tablet Take 1 tablet by mouth Every Evening. 90 tablet 3   • Semaglutide (OZEMPIC, 0.25 OR 0.5 MG/DOSE, SC)      • timolol (TIMOPTIC) 0.5 % ophthalmic solution      • amLODIPine (NORVASC) 5 MG tablet TAKE 2 TABLETS EVERY  tablet 0   • losartan-hydrochlorothiazide (HYZAAR) 100-12.5 MG per tablet Take 1 tablet by mouth Daily. 90 tablet 0   • nystatin (MYCOSTATIN) 705414 UNIT/GM cream APPLY  TOPICALLY TO THE APPROPRIATE AREA AS DIRECTED 2 (TWO)  "TIMES A DAY. 90 g 2   • pantoprazole (PROTONIX) 20 MG EC tablet TAKE 1 TABLET EVERY DAY 90 tablet 1   • triamcinolone (KENALOG) 0.025 % cream Apply 1 application topically to the appropriate area as directed 2 (Two) Times a Day.     • metFORMIN ER (GLUCOPHAGE-XR) 500 MG 24 hr tablet Take 1 tablet by mouth Daily. 90 tablet 1   • pioglitazone (Actos) 30 MG tablet Take 1 tablet by mouth Daily. 90 tablet 1     No facility-administered medications prior to visit.       No opioid medication identified on active medication list. I have reviewed chart for other potential  high risk medication/s and harmful drug interactions in the elderly.          Aspirin is not on active medication list.  Aspirin use is not indicated based on review of current medical condition/s. Risk of harm outweighs potential benefits.  .    Patient Active Problem List   Diagnosis   • Type 2 diabetes mellitus without complication, without long-term current use of insulin (HCC)   • Other specified glaucoma   • Hyperlipidemia LDL goal <70   • Vitamin D deficiency   • Irritable bowel syndrome with diarrhea   • Hypothyroidism (acquired)     Advance Care Planning  Advance Directive is not on file.  ACP discussion was held with the patient during this visit. Patient has an advance directive (not in EMR), copy requested.    Review of Systems   Constitutional: Negative for fatigue and fever.   Respiratory: Negative for cough and shortness of breath.    Cardiovascular: Negative for chest pain and leg swelling.   Gastrointestinal: Negative for diarrhea, nausea and vomiting.   Neurological: Negative for confusion.        Objective    Vitals:    11/09/22 1055   BP: 114/70   Pulse: 70   Temp: 97.2 °F (36.2 °C)   SpO2: 96%   Weight: 80.7 kg (178 lb)   Height: 172.7 cm (67.99\")   PainSc: 0-No pain     Estimated body mass index is 27.07 kg/m² as calculated from the following:    Height as of this encounter: 172.7 cm (67.99\").    Weight as of this encounter: 80.7 kg " (178 lb).    BMI is >= 25 and <30. (Overweight) The following options were offered after discussion;: exercise counseling/recommendations and nutrition counseling/recommendations      Does the patient have evidence of cognitive impairment? No    Physical Exam  Vitals reviewed.   Cardiovascular:      Rate and Rhythm: Normal rate and regular rhythm.   Pulmonary:      Effort: No respiratory distress.      Breath sounds: No wheezing or rales.   Abdominal:      General: There is no distension.      Tenderness: There is no abdominal tenderness. There is no guarding.   Neurological:      Mental Status: She is alert and oriented to person, place, and time.   Psychiatric:         Mood and Affect: Mood normal.         Behavior: Behavior normal.       Lab Results   Component Value Date    HGBA1C 6.9 09/22/2022            HEALTH RISK ASSESSMENT    Smoking Status:  Social History     Tobacco Use   Smoking Status Never   Smokeless Tobacco Never     Alcohol Consumption:  Social History     Substance and Sexual Activity   Alcohol Use Never     Fall Risk Screen:    HANHADI Fall Risk Assessment was completed, and patient is at LOW risk for falls.Assessment completed on:11/9/2022    Depression Screening:  PHQ-2/PHQ-9 Depression Screening 11/9/2022   Retired PHQ-9 Total Score -   Retired Total Score -   Little Interest or Pleasure in Doing Things 0-->not at all   Feeling Down, Depressed or Hopeless 0-->not at all   PHQ-9: Brief Depression Severity Measure Score 0       Health Habits and Functional and Cognitive Screening:  Functional & Cognitive Status 11/9/2022   Do you have difficulty preparing food and eating? No   Do you have difficulty bathing yourself, getting dressed or grooming yourself? No   Do you have difficulty using the toilet? No   Do you have difficulty moving around from place to place? No   Do you have trouble with steps or getting out of a bed or a chair? No   Current Diet Unhealthy Diet   Dental Exam Up to date   Eye  Exam Up to date   Exercise (times per week) 2 times per week   Current Exercises Include Home Exercise Program (TV, Computer, Etc.)   Current Exercise Activities Include -   Do you need help using the phone?  No   Are you deaf or do you have serious difficulty hearing?  No   Do you need help with transportation? No   Do you need help shopping? No   Do you need help preparing meals?  No   Do you need help with housework?  No   Do you need help with laundry? No   Do you need help taking your medications? No   Do you need help managing money? No   Do you ever drive or ride in a car without wearing a seat belt? No   Have you felt unusual stress, anger or loneliness in the last month? No   Who do you live with? Spouse   If you need help, do you have trouble finding someone available to you? No   Have you been bothered in the last four weeks by sexual problems? No   Do you have difficulty concentrating, remembering or making decisions? No       Age-appropriate Screening Schedule:  Refer to the list below for future screening recommendations based on patient's age, sex and/or medical conditions. Orders for these recommended tests are listed in the plan section. The patient has been provided with a written plan.    Health Maintenance   Topic Date Due   • ZOSTER VACCINE (2 of 3) 10/26/2015   • DIABETIC EYE EXAM  01/04/2023 (Originally 12/16/2021)   • LIPID PANEL  11/29/2022   • HEMOGLOBIN A1C  03/22/2023   • URINE MICROALBUMIN  05/04/2023   • DXA SCAN  06/04/2023   • DIABETIC FOOT EXAM  09/22/2023   • MAMMOGRAM  01/20/2024   • TDAP/TD VACCINES (2 - Td or Tdap) 01/24/2028   • INFLUENZA VACCINE  Completed   • PAP SMEAR  Discontinued              Assessment & Plan   CMS Preventative Services Quick Reference  Risk Factors Identified During Encounter  Glaucoma or Family History of Glaucoma  The above risks/problems have been discussed with the patient.  Follow up actions/plans if indicated are seen below in the Assessment/Plan  Section.  Pertinent information has been shared with the patient in the After Visit Summary.    Diagnoses and all orders for this visit:    1. Medicare annual wellness visit, subsequent (Primary)  Done today  2. Hyperlipidemia LDL goal <70  -     Comprehensive Metabolic Panel; Future  -     Lipid Panel; Future  Continue Pravastatin 40 mg po qhs  3. Type 2 diabetes mellitus without complication, without long-term current use of insulin (HCC)  Follows with Endo and is on Ozempic. She stopped Actos secondary to ankle swelling and Metformin causes bowel issues  4. Hypothyroidism (acquired)  -     TSH; Future  Continue Synthroid 75 mcg po qd  5. Gastroesophageal reflux disease without esophagitis  -     pantoprazole (PROTONIX) 20 MG EC tablet; Take 1 tablet by mouth Daily.  Dispense: 90 tablet; Refill: 1  Continue Protonix 20 mg po qd  6. Essential hypertension  -     losartan-hydrochlorothiazide (HYZAAR) 100-12.5 MG per tablet; Take 1 tablet by mouth Daily.  Dispense: 90 tablet; Refill: 1  -     amLODIPine (NORVASC) 5 MG tablet; Take 2 tablets by mouth Daily.  Dispense: 180 tablet; Refill: 1  -     CBC & Differential; Future  Continue Losartan HCTZ 100/ 12.5 mg po qd  7. Vaginal itching  -     nystatin (MYCOSTATIN) 707052 UNIT/GM cream; Apply  topically to the appropriate area as directed 2 (Two) Times a Day.  Dispense: 90 g; Refill: 2    Is moving to [place] and wants to establish with PCP there.         Follow Up:   No follow-ups on file.     An After Visit Summary and PPPS were made available to the patient.                      show

## 2024-10-08 ENCOUNTER — OFFICE VISIT (OUTPATIENT)
Dept: ORTHOPEDIC SURGERY | Facility: CLINIC | Age: 74
End: 2024-10-08
Payer: MEDICARE

## 2024-10-08 DIAGNOSIS — R20.0 NUMBNESS AND TINGLING IN LEFT HAND: ICD-10-CM

## 2024-10-08 DIAGNOSIS — Z98.890 S/P ORIF (OPEN REDUCTION INTERNAL FIXATION) FRACTURE: Primary | ICD-10-CM

## 2024-10-08 DIAGNOSIS — R20.2 NUMBNESS AND TINGLING IN LEFT HAND: ICD-10-CM

## 2024-10-08 DIAGNOSIS — Z87.81 S/P ORIF (OPEN REDUCTION INTERNAL FIXATION) FRACTURE: Primary | ICD-10-CM

## 2024-10-08 NOTE — PROGRESS NOTES
[facility] [place] [facility] Note        Subjective     Post-op (1 month follow up---11 weeks s/p ORIF left distal radius 7/20/24/))       HPI    Pari Howell is a 74 y.o. female.  Patient is here today now 11 weeks out from ORIF of a grade 1 open left distal radius fracture on 7/20/2024.  Patient had left [place] with Dr. Evans at the same time.  She has been doing physical therapy and occupational hand therapy at [facility].  Patient complaining of some occasional numbness in the ulnar 3 digits.          Objective      Physical Exam  LMP  (LMP Unknown)     There is no height or weight on file to calculate BMI.        Ortho Exam  Patient has very good dorsiflexion palmar flexion of the wrist.  She has full supination pronation.  No tenderness to palpation at the distal radius.  EPL is intact.  Mild tenderness over the ulnar styloid.    Imaging Reviewed and Interpreted:  Imaging Results (Last 24 Hours)       Procedure Component Value Units Date/Time    XR Wrist 3+ View Left [[id number]] Resulted: 10/08/24 1135     Updated: 10/08/24 1136    Narrative:      Left Wrist X-Ray    Indication: s/p ORIF    Views:  AP, Lateral, and Oblique     Comparison: Left wrist 9/10/2024    Findings:  Patient is status post ORIF of the distal radius.  The fracture appears to be healing appropriately.  New bone is visualized.    The hardware is intact.  Again noted is a persistent fracture line at the   ulnar styloid  Normal soft tissues  Normal joint spaces  Alignment appears acceptable.      Impression:  Fracture of the left distal radius s/p ORIF with interval and   complete healing.  Persistent fracture line noted at the ulnar styloid                Assessment    Assessment:  1. S/P ORIF (open reduction internal fixation) fracture    2. Numbness and tingling in left hand        Plan:  Status post ORIF left distal radius with persistent fracture noted at the ulnar styloid.  Patient also having some numbness and tingling in  the ulnar 3 digits.  I will recommend that the patient continue with her hand therapy.  She is doing very well overall.  I will see her back in about 4 to 6 weeks with an x-ray of her left wrist.  We told her that there is a chance that the ulnar styloid will not heal but it should not cause any long-term detriment.  If her ulnar nerve symptoms persist or worsen, nerve studies can be ordered.      Devan Modi MD  10/08/24  13:13 EDT      Dictated Utilizing Dragon Dictation.  Answers submitted by the patient for this visit:  Other (Submitted on 10/1/2024)  Please describe your symptoms.: Check up on my broken wrist  Have you had these symptoms before?: Yes  How long have you been having these symptoms?: Greater than 2 weeks  Primary Reason for Visit (Submitted on 10/1/2024)  What is the primary reason for your visit?: Problem Not Listed

## 2024-12-10 ENCOUNTER — OFFICE VISIT (OUTPATIENT)
Dept: ORTHOPEDIC SURGERY | Facility: CLINIC | Age: 74
End: 2024-12-10
Payer: MEDICARE

## 2024-12-10 VITALS
SYSTOLIC BLOOD PRESSURE: 140 MMHG | BODY MASS INDEX: 27.28 KG/M2 | HEIGHT: 68 IN | DIASTOLIC BLOOD PRESSURE: 60 MMHG | WEIGHT: 180 LBS

## 2024-12-10 DIAGNOSIS — S52.502E TYPE I OR II OPEN FRACTURE OF DISTAL END OF LEFT RADIUS WITH ROUTINE HEALING, UNSPECIFIED FRACTURE MORPHOLOGY, SUBSEQUENT ENCOUNTER: ICD-10-CM

## 2024-12-10 DIAGNOSIS — Z98.890 S/P ORIF (OPEN REDUCTION INTERNAL FIXATION) FRACTURE: Primary | ICD-10-CM

## 2024-12-10 DIAGNOSIS — R20.0 NUMBNESS AND TINGLING IN LEFT HAND: ICD-10-CM

## 2024-12-10 DIAGNOSIS — Z87.81 S/P ORIF (OPEN REDUCTION INTERNAL FIXATION) FRACTURE: Primary | ICD-10-CM

## 2024-12-10 DIAGNOSIS — R20.2 NUMBNESS AND TINGLING IN LEFT HAND: ICD-10-CM

## 2024-12-10 RX ORDER — SEMAGLUTIDE 0.68 MG/ML
INJECTION, SOLUTION SUBCUTANEOUS
COMMUNITY

## 2024-12-10 RX ORDER — ALUMINUM ZIRCONIUM OCTACHLOROHYDREX GLY 16 G/100G
GEL TOPICAL
COMMUNITY

## 2024-12-10 RX ORDER — NYSTATIN 100000 U/G
1 CREAM TOPICAL EVERY 12 HOURS SCHEDULED
COMMUNITY

## 2024-12-10 NOTE — PROGRESS NOTES
"    [facility] [facility] Note        Subjective     CC: Follow-up (5 month s/p Open Reduction Internal Fixation Distal Radius - Left /(DOS - 7/20/24))      HPI    Pari Howell is a 74 y.o. female.  Patient here today with her  now almost 5 months out from ORIF of an open left distal radius fracture on 7/20/2024.  She has been doing therapy at [facility].  Is tolerating this well and is very complementary of their services.    Overall, patient's symptoms are as above    ROS:    Constiutional:Pt denies fever, chills, nausea, or vomiting.  MSK:as above        Objective      Past Medical History  Past Medical History:   Diagnosis Date    Bladder infection     Dyspareunia, female     Fracture of hip     Fracture of wrist     Glaucoma     H/O sexual problem     High cholesterol     History of abnormal cervical Pap smear     Hypertension     Hypertension     Hyperthyroidism     Hypothyroidism     Impaired functional mobility, balance, gait, and endurance     Labial cyst     Muscle weakness     Osteoporosis without current pathological fracture 10/1/2024    Type 2 diabetes mellitus     Urinary incontinence     Vaginal itching     Yeast infection      Social History     Socioeconomic History    Marital status:    Tobacco Use    Smoking status: Never   Vaping Use    Vaping status: Never Used   Substance and Sexual Activity    Alcohol use: Never    Drug use: Never    Sexual activity: Yes     Partners: Male     Birth control/protection: Hysterectomy, Surgical          Physical Exam  /60   Ht 172.7 cm (67.99\")   Wt 81.6 kg (180 lb)   LMP  (LMP Unknown)   BMI 27.38 kg/m²     Body mass index is 27.38 kg/m².    Patient is well nourished and well developed.        Ortho Exam  Patient has good range of motion of the wrist and forearm.  She is nontender at her fracture site.  Reasonably good  strength today.    Imaging/Labs/EMG Reviewed and Interpreted:  Imaging Results (Last 24 Hours)  "      Procedure Component Value Units Date/Time    XR Wrist 3+ View Left [[id number]] Resulted: 12/10/24 1317     Updated: 12/10/24 1317    Narrative:      Left Wrist X-Ray    Indication: s/p ORIF    Views:  AP, Lateral, and Oblique     Comparison: Left wrist 10/8/2024    Findings:  Patient is status post ORIF of the distal radius.  Again noted is a   persistent fracture line at the ulnar styloid consistent with nonunion.  The fracture appears to be healed appropriately.  New bone is visualized.    The hardware is intact.    Normal soft tissues  STT arthritis  Alignment appears acceptable.      Impression:  Fracture of the left distal radius s/p ORIF with interval and   complete healing.  Nonunion ulnar styloid fracture.                Assessment    Assessment:  1. S/P ORIF (open reduction internal fixation) fracture    2. Numbness and tingling in left hand    3. Type I or II open fracture of distal end of left radius with routine healing, unspecified fracture morphology, subsequent encounter        Plan:  Recommend over the counter anti-inflammatories for pain and/or swelling  Status post ORIF left distal radius--patient is doing well enough that we can release her at this point.  She should continue hand therapy until she is released by their services.  Ulnar styloid nonunion--recommend observation for now.  Hopefully this does not cause her long-term dysfunction or pain.  Usually this is treated nonoperatively.  Numbness and tingling left hand--ulnar nerve distribution.  Observe for now.      Devan Modi MD  12/10/24  13:23 EST      Dictated Utilizing Dragon Dictation.

## 2025-01-07 NOTE — TELEPHONE ENCOUNTER
Rx Refill Note  Requested Prescriptions     Pending Prescriptions Disp Refills    pioglitazone (ACTOS) 15 MG tablet [Pharmacy Med Name: Pioglitazone HCl Oral Tablet 15 MG] 90 tablet 3     Sig: TAKE 1 TABLET EVERY DAY      Last office visit with prescribing clinician: 10/1/2024     Next office visit with prescribing clinician: 4/1/2025     Amira Sloan MA  01/07/25, 14:23 EST

## 2025-01-08 RX ORDER — PIOGLITAZONE 15 MG/1
15 TABLET ORAL DAILY
Qty: 90 TABLET | Refills: 2 | Status: SHIPPED | OUTPATIENT
Start: 2025-01-08

## 2025-04-01 ENCOUNTER — OFFICE VISIT (OUTPATIENT)
Dept: ENDOCRINOLOGY | Facility: CLINIC | Age: 75
End: 2025-04-01
Payer: MEDICARE

## 2025-04-01 ENCOUNTER — TELEPHONE (OUTPATIENT)
Dept: ENDOCRINOLOGY | Facility: CLINIC | Age: 75
End: 2025-04-01

## 2025-04-01 VITALS
DIASTOLIC BLOOD PRESSURE: 58 MMHG | BODY MASS INDEX: 28.92 KG/M2 | SYSTOLIC BLOOD PRESSURE: 120 MMHG | HEIGHT: 68 IN | WEIGHT: 190.8 LBS | OXYGEN SATURATION: 96 % | HEART RATE: 65 BPM

## 2025-04-01 DIAGNOSIS — E78.2 MIXED HYPERLIPIDEMIA: ICD-10-CM

## 2025-04-01 DIAGNOSIS — M81.0 OSTEOPOROSIS WITHOUT CURRENT PATHOLOGICAL FRACTURE, UNSPECIFIED OSTEOPOROSIS TYPE: ICD-10-CM

## 2025-04-01 DIAGNOSIS — E03.9 HYPOTHYROIDISM (ACQUIRED): ICD-10-CM

## 2025-04-01 DIAGNOSIS — E11.42 TYPE 2 DIABETES MELLITUS WITH DIABETIC POLYNEUROPATHY, WITHOUT LONG-TERM CURRENT USE OF INSULIN: Primary | ICD-10-CM

## 2025-04-01 LAB
ALBUMIN SERPL-MCNC: 4.1 G/DL (ref 3.5–5.2)
ALBUMIN/GLOB SERPL: 1.2 G/DL
ALP SERPL-CCNC: 62 U/L (ref 39–117)
ALT SERPL W P-5'-P-CCNC: 20 U/L (ref 1–33)
ANION GAP SERPL CALCULATED.3IONS-SCNC: 13.6 MMOL/L (ref 5–15)
AST SERPL-CCNC: 19 U/L (ref 1–32)
BILIRUB SERPL-MCNC: 0.6 MG/DL (ref 0–1.2)
BUN SERPL-MCNC: 13 MG/DL (ref 8–23)
BUN/CREAT SERPL: 15.9 (ref 7–25)
CALCIUM SPEC-SCNC: 9.8 MG/DL (ref 8.6–10.5)
CHLORIDE SERPL-SCNC: 101 MMOL/L (ref 98–107)
CHOLEST SERPL-MCNC: 249 MG/DL (ref 0–200)
CO2 SERPL-SCNC: 23.4 MMOL/L (ref 22–29)
CREAT SERPL-MCNC: 0.82 MG/DL (ref 0.57–1)
EGFRCR SERPLBLD CKD-EPI 2021: 75.2 ML/MIN/1.73
EXPIRATION DATE: ABNORMAL
EXPIRATION DATE: ABNORMAL
GLOBULIN UR ELPH-MCNC: 3.4 GM/DL
GLUCOSE BLDC GLUCOMTR-MCNC: 201 MG/DL (ref 70–130)
GLUCOSE SERPL-MCNC: 237 MG/DL (ref 65–99)
HBA1C MFR BLD: 7.6 % (ref 4.5–5.7)
HDLC SERPL-MCNC: 51 MG/DL (ref 40–60)
LDLC SERPL CALC-MCNC: 151 MG/DL (ref 0–100)
LDLC/HDLC SERPL: 2.87 {RATIO}
Lab: ABNORMAL
Lab: ABNORMAL
POTASSIUM SERPL-SCNC: 3.2 MMOL/L (ref 3.5–5.2)
PROT SERPL-MCNC: 7.5 G/DL (ref 6–8.5)
SODIUM SERPL-SCNC: 138 MMOL/L (ref 136–145)
TRIGL SERPL-MCNC: 258 MG/DL (ref 0–150)
TSH SERPL DL<=0.05 MIU/L-ACNC: 2.14 UIU/ML (ref 0.27–4.2)
VLDLC SERPL-MCNC: 47 MG/DL (ref 5–40)

## 2025-04-01 PROCEDURE — 84443 ASSAY THYROID STIM HORMONE: CPT | Performed by: INTERNAL MEDICINE

## 2025-04-01 PROCEDURE — 82043 UR ALBUMIN QUANTITATIVE: CPT | Performed by: INTERNAL MEDICINE

## 2025-04-01 PROCEDURE — 80061 LIPID PANEL: CPT | Performed by: INTERNAL MEDICINE

## 2025-04-01 PROCEDURE — 82570 ASSAY OF URINE CREATININE: CPT | Performed by: INTERNAL MEDICINE

## 2025-04-01 PROCEDURE — 80053 COMPREHEN METABOLIC PANEL: CPT | Performed by: INTERNAL MEDICINE

## 2025-04-01 RX ORDER — PIOGLITAZONE 30 MG/1
30 TABLET ORAL DAILY
Qty: 90 TABLET | Refills: 1 | Status: SHIPPED | OUTPATIENT
Start: 2025-04-01

## 2025-04-01 NOTE — TELEPHONE ENCOUNTER
----- Message from Gloria Santoro sent at 4/1/2025 10:27 AM EDT -----  Regarding: ophtho  Please call to get ophtho reportDr. June [phone]!

## 2025-04-01 NOTE — PATIENT INSTRUCTIONS
Patients with diabetes should have a yearly eye exam. Please be sure to schedule this at least once yearly and have the eye exam report faxed to [phone].    Start mounjaro 2.5 mg weekly x 4 weeks. This medication can sometimes cause gastrointestinal side effects (nausea, vomiting, constipation, etc). If you have moderate+ side effects that are not resolving with continued use, please stop the medication and notify the office. This medication can be increased monthly as tolerated. Please call for dose increases.

## 2025-04-01 NOTE — PROGRESS NOTES
Chief Complaint   Patient presents with    Diabetes     Type II    Hypothyroidism    Vitamin D Deficiency    Osteoporosis    Hyperlipidemia          HPI   Pari Howell is a 74 y.o. female had concerns including Diabetes (Type II), Hypothyroidism, Vitamin D Deficiency, Osteoporosis, and Hyperlipidemia.    She is checking blood sugars 0-1 times per day. BGs are running high 100s to low 200s.   Current medications for diabetes include actos 15 mg daily. Tried ozempic again but causes GI side effects.   Craving sweets and gaining weight.     Fosamax was started 10/2024. Tolerating and taking weekly.     Also taking pravastatin 40 mg daily and levothyroxine 75 mcg daily.       The following portions of the patient's history were reviewed and updated as appropriate: allergies, current medications, past family history, past medical history, past social history, past surgical history, and problem list.      Review of Systems   Constitutional:  Positive for appetite change and unexpected weight gain.   Endocrine:        See HPI        Physical Exam  Vitals reviewed.   Constitutional:       Appearance: Normal appearance.   Cardiovascular:      Rate and Rhythm: Normal rate.      Pulses:           Dorsalis pedis pulses are 2+ on the right side and 2+ on the left side.   Pulmonary:      Effort: Pulmonary effort is normal.   Feet:      Right foot:      Skin integrity: Skin integrity normal.      Toenail Condition: Right toenails are normal.      Left foot:      Skin integrity: Skin integrity normal.      Toenail Condition: Left toenails are normal.      Comments: Diabetic Foot Exam Performed and Monofilament Test Performed      Monofilament 2-3/5 bilaterally and diminished diffusely    Neurological:      General: No focal deficit present.      Mental Status: She is alert. Mental status is at baseline.   Psychiatric:         Mood and Affect: Mood normal.         Behavior: Behavior normal.        /58 (BP Location: Left arm,  Patient's mom-Shanique called again regarding patient's Knee's. She wants to know whether they need a referral for Dr Mckeon. She also mentioned that she may call Dr Mckeon herself. Please call Shanique     Telephone Information:   Mobile [phone]        "Patient Position: Sitting, Cuff Size: Adult)   Pulse 65   Ht 172.7 cm (67.99\")   Wt 86.5 kg (190 lb 12.8 oz)   LMP  (LMP Unknown)   SpO2 96%   BMI 29.02 kg/m²      Labs and imaging    A1C:  Lab Results   Component Value Date    HGBA1C 7.6 (A) 04/01/2025    HGBA1C 6.0 (A) 10/01/2024      Glucose:  Lab Results   Component Value Date    POCGLU 201 (A) 04/01/2025      CMP:  Lab Results   Component Value Date    GLUCOSE 83 10/01/2024    BUN 15 10/01/2024    CREATININE 0.71 10/01/2024     10/01/2024    K 3.3 (L) 10/01/2024     10/01/2024    CALCIUM 10.5 10/01/2024    PROTEINTOT 7.5 10/01/2024    ALBUMIN 4.6 10/01/2024    ALT 10 10/01/2024    AST 11 10/01/2024    ALKPHOS 70 10/01/2024    BILITOT 0.5 10/01/2024    GLOB 2.9 10/01/2024    AGRATIO 1.6 10/01/2024    BCR 21.1 10/01/2024    ANIONGAP 12.4 10/01/2024    EGFR 89.4 10/01/2024      Lipid Panel:  Lab Results   Component Value Date    CHLPL 206 (H) 04/01/2024    TRIG 179 (H) 04/01/2024    HDL 47 04/01/2024     (H) 04/01/2024      Urine Microalbumin:  Lab Results   Component Value Date    MALBCRERATIO 6 04/01/2024      TSH:  Lab Results   Component Value Date    TSH 2.740 04/01/2024      Lab Results   Component Value Date    PTH 15.7 10/01/2024    JAOP74YH 36.3 10/01/2024       DXA bone density spine and hip  Order: [id number]  Impression    Normal bone mineral density of the lumbar spine.     Osteopenia of the left hip.           Images reviewed, interpreted, and dictated by Dr. Beau Garza.   Transcribed by Michael Monte PA-C, RBeverlyTBeverly (N), [facility].  Narrative    The BONE DENSITY SCAN (DEXA)     INDICATION:  Osteoporosis screening, recent hip fracture     COMPARISON:     FINDINGS: Bone mineral density evaluation of the spine and left femur   was obtained.       Average BMD of the lumbar spine measures 1.103 g/sq cm, with a T-score   of -0.7 and Z-score of 0.8. This is considered normal.     BMD of the left femoral neck measures 0.785 g/sq cm, " with a T-score of   -1.8 and Z-score of -0.1. This is considered within the osteopenic   range.     FRAX evaluation gives the risk of a major osteoporotic fracture over 10   years as 18.2% and the risk of a hip fracture as 3.9%.  Exam End: 06/05/24 11:13    Specimen Collected: 06/05/24 16:40 Last Resulted: 06/05/24 17:02   Received From: [facility]  Result Received: 06/12/24 10:15       Assessment and plan  Diagnoses and all orders for this visit:    1. Type 2 diabetes mellitus with diabetic polyneuropathy, without long-term current use of insulin (Primary)  Uncontrolled with A1c up to 7.6. Complicated by retinopathy and neuropathy.   No metformin due to intolerance at lowest dose.   SGLT-2 inhibitors have been avoided due to history of recurrent yeast infections.   Increase actos to 30 mg daily.   She hasn't tolerated ozempic at low doses.   Try mounjaro. Cautioned for possible GI side effects. Titrate up monthly as needed/tolerated.   Labs are due. MF updated today. Ophtho exam should be updated yearly.   -     POC Glucose, Blood  -     POC Glycosylated Hemoglobin (Hb A1C)  -     Comprehensive Metabolic Panel  -     Microalbumin / Creatinine Urine Ratio - Urine, Clean Catch  -     Tirzepatide 2.5 MG/0.5ML solution auto-injector; Inject 2.5 mg under the skin into the appropriate area as directed Every 7 (Seven) Days.  Dispense: 2 mL; Refill: 1  -     pioglitazone (ACTOS) 30 MG tablet; Take 1 tablet by mouth Daily.  Dispense: 90 tablet; Refill: 1    2. Osteoporosis without current pathological fracture, unspecified osteoporosis type  Fracturing when falling from standing height, hip and wrist. Diagnostic of osteoporosis despite BMD 6/2024 with osteopenic range T scores.  Fosamax was started 10/2024. Tolerating without issue.   PTH and vit D were normal when last checked.   Update BMD 6/2026.    3. Hypothyroidism (acquired)  On levo 75 mcg daily. Check labs today.   -     TSH    4. Mixed  hyperlipidemia  -     Lipid Panel  Taking pravastatin. Intolerant to alternate statins. Is compliant with medication. Check levels yearly.        Return in about 4 months (around 8/1/2025). The patient was instructed to contact the clinic with any interval questions or concerns.    Electronically signed by: Gloria Santoro DO   Endocrinologist    Please note that portions of this note were completed with a voice recognition program.

## 2025-04-02 LAB
ALBUMIN UR-MCNC: 4.3 MG/DL
CREAT UR-MCNC: 206.2 MG/DL
MICROALBUMIN/CREAT UR: 20.9 MG/G (ref 0–29)

## 2025-04-03 ENCOUNTER — RESULTS FOLLOW-UP (OUTPATIENT)
Dept: ENDOCRINOLOGY | Facility: CLINIC | Age: 75
End: 2025-04-03
Payer: MEDICARE

## 2025-04-03 DIAGNOSIS — E78.2 MIXED HYPERLIPIDEMIA: Primary | ICD-10-CM

## 2025-04-03 RX ORDER — EZETIMIBE 10 MG/1
10 TABLET ORAL DAILY
Qty: 90 TABLET | Refills: 1 | Status: SHIPPED | OUTPATIENT
Start: 2025-04-03 | End: 2026-04-03

## 2025-04-09 ENCOUNTER — TELEPHONE (OUTPATIENT)
Dept: ENDOCRINOLOGY | Facility: CLINIC | Age: 75
End: 2025-04-09

## 2025-06-02 DIAGNOSIS — E11.42 TYPE 2 DIABETES MELLITUS WITH DIABETIC POLYNEUROPATHY, WITHOUT LONG-TERM CURRENT USE OF INSULIN: ICD-10-CM

## 2025-06-02 RX ORDER — TIRZEPATIDE 5 MG/.5ML
5 INJECTION, SOLUTION SUBCUTANEOUS WEEKLY
Qty: 6 ML | Refills: 0 | Status: SHIPPED | OUTPATIENT
Start: 2025-06-02

## 2025-06-02 NOTE — TELEPHONE ENCOUNTER
PATIENT CALLED STATING SHE IS NEEDING A NEW RX FOR HER MOUNJARO 2.5 AND SHE IS TOLERATING IT WELL. ASKED IF SHE WOULD WANT TO INCREASE THE DOSE AND SHE STATED IF DR. BARKLEY WANTED HER TO THAT IS FINE. WILL PEND RX FOR BOTH DOSES FOR DR. BARKLEY

## 2025-06-02 NOTE — TELEPHONE ENCOUNTER
Rx Refill Note  Requested Prescriptions     Pending Prescriptions Disp Refills    Tirzepatide 2.5 MG/0.5ML solution auto-injector 6 mL 0     Sig: Inject 2.5 mg under the skin into the appropriate area as directed Every 7 (Seven) Days.    Mounjaro 5 MG/0.5ML solution auto-injector 6 mL 0     Sig: Inject 5 mg under the skin into the appropriate area as directed 1 (One) Time Per Week.      Last office visit with prescribing clinician: 4/1/2025      Next office visit with prescribing clinician: 8/6/2025                  Irena Tobin MA  06/02/25, 12:28 EDT

## 2025-06-04 ENCOUNTER — TRANSCRIBE ORDERS (OUTPATIENT)
Dept: ADMINISTRATIVE | Facility: HOSPITAL | Age: 75
End: 2025-06-04
Payer: MEDICARE

## 2025-06-04 DIAGNOSIS — Z12.31 VISIT FOR SCREENING MAMMOGRAM: Primary | ICD-10-CM

## 2025-06-21 LAB
NCCN CRITERIA FLAG: ABNORMAL
TYRER CUZICK SCORE: 7.3

## 2025-06-23 ENCOUNTER — RESULTS FOLLOW-UP (OUTPATIENT)
Facility: HOSPITAL | Age: 75
End: 2025-06-23
Payer: MEDICARE

## 2025-06-23 NOTE — PROGRESS NOTES
This patient recently completed the CARE risk assessment for a mammogram appointment. Based on the patient's responses, NCCN criteria for genetic testing was met. At the time of the assessment, the patient was provided with both written and video educational materials regarding genetic testing.    Navigator follow-up:   I sent the patient a [employer] message asking for a call to discuss assessment results.

## 2025-06-24 ENCOUNTER — DOCUMENTATION (OUTPATIENT)
Dept: GENETICS | Facility: HOSPITAL | Age: 75
End: 2025-06-24
Payer: MEDICARE

## 2025-06-24 NOTE — PROGRESS NOTES
I spoke with the patient regarding the risk assessment results and our genetic testing program.  She declined genetic testing at this time.

## 2025-06-26 ENCOUNTER — HOSPITAL ENCOUNTER (OUTPATIENT)
Dept: MAMMOGRAPHY | Facility: HOSPITAL | Age: 75
Discharge: HOME | End: 2025-06-26
Admitting: COUNSELOR
Payer: MEDICARE

## 2025-06-26 DIAGNOSIS — Z12.31 VISIT FOR SCREENING MAMMOGRAM: ICD-10-CM

## 2025-06-26 PROCEDURE — 77063 BREAST TOMOSYNTHESIS BI: CPT

## 2025-06-26 PROCEDURE — 77067 SCR MAMMO BI INCL CAD: CPT

## 2025-07-10 ENCOUNTER — OFFICE VISIT (OUTPATIENT)
Dept: ENDOCRINOLOGY | Facility: CLINIC | Age: 75
End: 2025-07-10
Payer: MEDICARE

## 2025-07-10 VITALS
BODY MASS INDEX: 28.52 KG/M2 | OXYGEN SATURATION: 96 % | DIASTOLIC BLOOD PRESSURE: 58 MMHG | HEIGHT: 68 IN | SYSTOLIC BLOOD PRESSURE: 126 MMHG | WEIGHT: 188.2 LBS | HEART RATE: 55 BPM

## 2025-07-10 DIAGNOSIS — E03.9 HYPOTHYROIDISM (ACQUIRED): ICD-10-CM

## 2025-07-10 DIAGNOSIS — E11.65 TYPE 2 DIABETES MELLITUS WITH HYPERGLYCEMIA, WITHOUT LONG-TERM CURRENT USE OF INSULIN: Primary | ICD-10-CM

## 2025-07-10 DIAGNOSIS — E78.2 MIXED HYPERLIPIDEMIA: ICD-10-CM

## 2025-07-10 DIAGNOSIS — M81.0 OSTEOPOROSIS WITHOUT CURRENT PATHOLOGICAL FRACTURE, UNSPECIFIED OSTEOPOROSIS TYPE: ICD-10-CM

## 2025-07-10 LAB
EXPIRATION DATE: ABNORMAL
EXPIRATION DATE: ABNORMAL
GLUCOSE BLDC GLUCOMTR-MCNC: 138 MG/DL (ref 70–130)
HBA1C MFR BLD: 5.8 % (ref 4.5–5.7)
Lab: ABNORMAL
Lab: ABNORMAL

## 2025-07-10 RX ORDER — ALENDRONATE SODIUM 70 MG/1
70 TABLET ORAL
Qty: 14 TABLET | Refills: 3 | Status: SHIPPED | OUTPATIENT
Start: 2025-07-10 | End: 2026-07-10

## 2025-07-10 RX ORDER — TRIAMCINOLONE ACETONIDE 1 MG/G
1 CREAM TOPICAL 2 TIMES DAILY PRN
COMMUNITY
Start: 2025-04-28

## 2025-07-10 RX ORDER — PRAVASTATIN SODIUM 40 MG
40 TABLET ORAL EVERY EVENING
Qty: 90 TABLET | Refills: 3 | Status: SHIPPED | OUTPATIENT
Start: 2025-07-10 | End: 2026-07-10

## 2025-07-10 RX ORDER — PIOGLITAZONE 30 MG/1
30 TABLET ORAL DAILY
Qty: 90 TABLET | Refills: 3 | Status: SHIPPED | OUTPATIENT
Start: 2025-07-10

## 2025-07-10 RX ORDER — LEVOCETIRIZINE DIHYDROCHLORIDE 5 MG/1
5 TABLET, FILM COATED ORAL EVERY EVENING
COMMUNITY

## 2025-07-10 RX ORDER — LEVOTHYROXINE SODIUM 75 UG/1
75 TABLET ORAL DAILY
Qty: 90 TABLET | Refills: 3 | Status: SHIPPED | OUTPATIENT
Start: 2025-07-10

## 2025-07-10 NOTE — PROGRESS NOTES
"Chief Complaint   Patient presents with    Diabetes     Type II Diabetes    Hypothyroidism    Vitamin D Deficiency    Hyperlipidemia    Osteoporosis          HPI   Pari Howell is a 75 y.o. female had concerns including Diabetes (Type II Diabetes), Hypothyroidism, Vitamin D Deficiency, Hyperlipidemia, and Osteoporosis.    She is checking blood sugars 1 times per day.  Current medications for diabetes include Actos 30 mg daily.    She stopped the mounjaro 5 mg weekly due to nausea, constipation.   Did ok on the 2.5 mg dose.   History of GI intolerance to Ozempic.  We tried Mounjaro but also not tolerated.    Taking Zetia and pravastatin.    Consistently taking alendronate and levothyroxine 75 mcg daily.    The following portions of the patient's history were reviewed and updated as appropriate: allergies, current medications, past family history, past medical history, past social history, past surgical history, and problem list.      Review of Systems   Constitutional: Negative.    Endocrine: Negative.         Physical Exam  Vitals reviewed.   Constitutional:       Appearance: Normal appearance.   Cardiovascular:      Rate and Rhythm: Normal rate.   Pulmonary:      Effort: Pulmonary effort is normal.   Neurological:      General: No focal deficit present.      Mental Status: She is alert. Mental status is at baseline.   Psychiatric:         Mood and Affect: Mood normal.         Behavior: Behavior normal.        /58 (BP Location: Left arm, Patient Position: Sitting)   Pulse 55   Ht 172.7 cm (67.99\")   Wt 85.4 kg (188 lb 3.2 oz)   LMP  (LMP Unknown)   SpO2 96%   BMI 28.62 kg/m²      Labs and imaging    A1C:  Lab Results   Component Value Date    HGBA1C 5.8 (A) 07/10/2025    HGBA1C 7.6 (A) 04/01/2025      Glucose:  Lab Results   Component Value Date    POCGLU 138 (A) 07/10/2025      CMP:  Lab Results   Component Value Date    GLUCOSE 237 (H) 04/01/2025    BUN 13 04/01/2025    CREATININE 0.82 04/01/2025    "  04/01/2025    K 3.2 (L) 04/01/2025     04/01/2025    CALCIUM 9.8 04/01/2025    PROTEINTOT 7.5 04/01/2025    ALBUMIN 4.1 04/01/2025    ALT 20 04/01/2025    AST 19 04/01/2025    ALKPHOS 62 04/01/2025    BILITOT 0.6 04/01/2025    GLOB 3.4 04/01/2025    AGRATIO 1.2 04/01/2025    BCR 15.9 04/01/2025    ANIONGAP 13.6 04/01/2025    EGFR 75.2 04/01/2025      Lipid Panel:  Lab Results   Component Value Date    CHOL 249 (H) 04/01/2025    CHLPL 206 (H) 04/01/2024    TRIG 258 (H) 04/01/2025    HDL 51 04/01/2025     (H) 04/01/2025      Urine Microalbumin:  Lab Results   Component Value Date    MALBCRERATIO 20.9 04/01/2025      TSH:  Lab Results   Component Value Date    TSH 2.140 04/01/2025      Lab Results   Component Value Date    PTH 15.7 10/01/2024    [id number] 36.3 10/01/2024         DXA bone density spine and hip  Order: [id number]  Impression    Normal bone mineral density of the lumbar spine.     Osteopenia of the left hip.           Images reviewed, interpreted, and dictated by Dr. Beau Garza.   Transcribed by Michael Monte PA-C, RBeverlyT. (N), C N M T.  Narrative    The BONE DENSITY SCAN (DEXA)     INDICATION:  Osteoporosis screening, recent hip fracture     COMPARISON:     FINDINGS: Bone mineral density evaluation of the spine and left femur   was obtained.       Average BMD of the lumbar spine measures 1.103 g/sq cm, with a T-score   of -0.7 and Z-score of 0.8. This is considered normal.     BMD of the left femoral neck measures 0.785 g/sq cm, with a T-score of   -1.8 and Z-score of -0.1. This is considered within the osteopenic   range.     FRAX evaluation gives the risk of a major osteoporotic fracture over 10   years as 18.2% and the risk of a hip fracture as 3.9%.  Exam End: 06/05/24 11:13    Specimen Collected: 06/05/24 16:40 Last Resulted: 06/05/24 17:02   Received From: [facility]  Result Received: 06/12/24 10:15       Assessment and plan  Diagnoses and all orders for this  visit:    1. Type 2 diabetes mellitus with hyperglycemia, without long-term current use of insulin (Primary)  Assessment & Plan:  Controlled with A1c down to 5.8. Complicated by retinopathy and neuropathy.   No metformin due to intolerance at lowest dose.   SGLT-2 inhibitors have been avoided due to history of recurrent yeast infections.   Continue actos to 30 mg daily.   She didn't tolerate ozempic at low doses.   Tolerated mounjaro 2.5 mg weekly but not 5 mg weekly. Can remain off for now but consider resuming 2.5 mg dose if needed.   Labs are UTD 4/2025. MF updated 4/25. ophtho exam 3/19/2025 with no retinopathy.  Refills sent.    Orders:  -     POC Glucose, Blood  -     POC Glycosylated Hemoglobin (Hb A1C)  -     pioglitazone (ACTOS) 30 MG tablet; Take 1 tablet by mouth Daily.  Dispense: 90 tablet; Refill: 3    2. Osteoporosis without current pathological fracture, unspecified osteoporosis type  Assessment & Plan:  Fractured when falling from standing height - hip and wrist. Diagnostic of osteoporosis despite BMD 6/2024 with osteopenic range T scores.  Fosamax was started 10/2024. Tolerating without issue.   PTH and vit D were normal when last checked.   Update BMD due 6/2026.    Orders:  -     alendronate (Fosamax) 70 MG tablet; Take 1 tablet by mouth Every 7 (Seven) Days. Take with water, 30 min before first food/drink/med, avoid lying down for 30 min  Dispense: 14 tablet; Refill: 3    3. Hypothyroidism (acquired)  Assessment & Plan:  On Levothyroxine 75 mcg daily. Labs yearly.  Last checked April 2025.    Orders:  -     levothyroxine (SYNTHROID, LEVOTHROID) 75 MCG tablet; Take 1 tablet by mouth Daily.  Dispense: 90 tablet; Refill: 3    4. Mixed hyperlipidemia  Assessment & Plan:  Taking pravastatin. Intolerant to alternate statins.  Zetia added after last labs with high LDL.  Recheck fasting lipids at follow-up visit.    Orders:  -     pravastatin (PRAVACHOL) 40 MG tablet; Take 1 tablet by mouth Every  Evening.  Dispense: 90 tablet; Refill: 3         Return in about 4 months (around 11/10/2025) for next scheduled follow up, ok to scheduled  as new patient same time. The patient was instructed to contact the clinic with any interval questions or concerns.    Electronically signed by: Gloria Santoro DO   Endocrinologist    Please note that portions of this note were completed with a voice recognition program.

## 2025-07-10 NOTE — ASSESSMENT & PLAN NOTE
Taking pravastatin. Intolerant to alternate statins.  Zetia added after last labs with high LDL.  Recheck fasting lipids at follow-up visit.

## 2025-07-10 NOTE — ASSESSMENT & PLAN NOTE
Controlled with A1c down to 5.8. Complicated by retinopathy and neuropathy.   No metformin due to intolerance at lowest dose.   SGLT-2 inhibitors have been avoided due to history of recurrent yeast infections.   Continue actos to 30 mg daily.   She didn't tolerate ozempic at low doses.   Tolerated mounjaro 2.5 mg weekly but not 5 mg weekly. Can remain off for now but consider resuming 2.5 mg dose if needed.   Labs are UTD 4/2025. MF updated 4/25. ophtho exam 3/19/2025 with no retinopathy.  Refills sent.

## 2025-07-10 NOTE — ASSESSMENT & PLAN NOTE
Fractured when falling from standing height - hip and wrist. Diagnostic of osteoporosis despite BMD 6/2024 with osteopenic range T scores.  Fosamax was started 10/2024. Tolerating without issue.   PTH and vit D were normal when last checked.   Update BMD due 6/2026.

## 2025-08-25 DIAGNOSIS — E78.2 MIXED HYPERLIPIDEMIA: ICD-10-CM

## 2025-08-25 RX ORDER — EZETIMIBE 10 MG/1
10 TABLET ORAL DAILY
Qty: 90 TABLET | Refills: 1 | Status: SHIPPED | OUTPATIENT
Start: 2025-08-25

## (undated) DEVICE — C-ARM DRAPE: Brand: DEROYAL

## (undated) DEVICE — DRP C/ARM MINI

## (undated) DEVICE — SOL ISO/ALC RUB 70PCT 4OZ

## (undated) DEVICE — SEAL FEM EXETER 1/2/MOON DISP

## (undated) DEVICE — HANDPC IRR SURGILAV TIP/HIFLO SX/TBG

## (undated) DEVICE — PENCL SMOKE/EVAC MEGADYNE TELESCP 10FT

## (undated) DEVICE — PULLOVER TOGA, 2X LARGE: Brand: FLYTE, SURGICOOL

## (undated) DEVICE — DRSNG WND STRIP OPTIFOAM AG SUPRABS A/MIC 4X8IN STRL

## (undated) DEVICE — KWIRE STD TP 1.6X127MM
Type: IMPLANTABLE DEVICE | Site: WRIST | Status: NON-FUNCTIONAL
Removed: 2024-07-20

## (undated) DEVICE — 450 ML BOTTLE OF 0.05% CHLORHEXIDINE GLUCONATE IN 99.95% STERILE WATER FOR IRRIGATION, USP AND APPLICATOR.: Brand: IRRISEPT ANTIMICROBIAL WOUND LAVAGE

## (undated) DEVICE — CMT BONE SIMPLEX/P TMYCIN FDOS 10PK
Type: IMPLANTABLE DEVICE | Site: HIP | Status: NON-FUNCTIONAL
Removed: 2024-07-20

## (undated) DEVICE — DRP SURG 3/4 53X77IN STRL

## (undated) DEVICE — DRP IOBAN W/DRN TBG HLDR 125X83IN

## (undated) DEVICE — UNDERGLV SURG BIOGEL INDICAT PI SZ8.5 BLU

## (undated) DEVICE — DRVR AO CONNECT POLY LK SCRW

## (undated) DEVICE — ADHS SKIN PREMIERPRO EXOFIN TOPICAL HI/VISC .5ML

## (undated) DEVICE — HYPODERMIC SAFETY NEEDLE: Brand: MONOJECT

## (undated) DEVICE — PEG GEMINUS THRD LK TI 2.3X22MM
Type: IMPLANTABLE DEVICE | Site: WRIST | Status: NON-FUNCTIONAL
Removed: 2024-07-20

## (undated) DEVICE — PK MAJ SHLDR SPLT 10

## (undated) DEVICE — BLANKT WARM UPPR/BDY ARM/OUT 57X196CM

## (undated) DEVICE — NDL HYPO SURE/SNAP SFTY 22G 1.5IN LF

## (undated) DEVICE — SYR CONTRL PRESS/LO FIX/M/LL W/THMB/RNG 10ML

## (undated) DEVICE — SUT MNCRYL PLS ANTIB UD 3/0 PS2 27IN

## (undated) DEVICE — SCRB SURG BACTOSHIELD CHG 4PCT 4OZ

## (undated) DEVICE — KWIRE STD/TP .9X152MM
Type: IMPLANTABLE DEVICE | Site: WRIST | Status: NON-FUNCTIONAL
Removed: 2024-07-20

## (undated) DEVICE — IMPLANTABLE DEVICE
Type: IMPLANTABLE DEVICE | Site: WRIST | Status: NON-FUNCTIONAL
Removed: 2024-07-20

## (undated) DEVICE — BNDG,ELSTC,MATRIX,STRL,4"X5YD,LF,HOOK&LP: Brand: MEDLINE

## (undated) DEVICE — STRIP,CLOSURE,WOUND,MEDI-STRIP,1/2X4: Brand: MEDLINE

## (undated) DEVICE — GLV SURG SENSICARE PI ORTHO SZ8.5 LF STRL

## (undated) DEVICE — TBG PENCL TELESCP MEGADYNE SMOKE EVAC 10FT

## (undated) DEVICE — STRIP CLS WND CURAD MEDI/STRIP HYPOALLERG 0.5X4IN STRL PK/6

## (undated) DEVICE — BIT DRL SLD SD CUT 2.0X40MM

## (undated) DEVICE — SPK10295 ORTHOPEDIC FRACTURE KIT: Brand: SPK10295 ORTHOPEDIC FRACTURE KIT

## (undated) DEVICE — PATIENT RETURN ELECTRODE, SINGLE-USE, CONTACT QUALITY MONITORING, ADULT, WITH 9FT CORD, FOR PATIENTS WEIGING OVER 33LBS. (15KG): Brand: MEGADYNE

## (undated) DEVICE — HANDPIECE SET WITH HIGH FLOW TIP AND SUCTION TUBE: Brand: INTERPULSE

## (undated) DEVICE — DRESSING,GAUZE,XEROFORM,CURAD,1"X8",ST: Brand: CURAD

## (undated) DEVICE — BLD SAW SAG SYS6 HVY DTY 18X1.27X90MM

## (undated) DEVICE — GOWN SURG TOGA FLYTE SURGICOOL PULLOVR 2XL

## (undated) DEVICE — TRAP FLD MINIVAC MEGADYNE 100ML

## (undated) DEVICE — DRAPE,TOWEL,LARGE,INVISISHIELD: Brand: MEDLINE

## (undated) DEVICE — DRVR QC UNIV T10

## (undated) DEVICE — 6617 IOBAN II PATIENT ISOLATION DRAPE 5/BX,4BX/CS: Brand: STERI-DRAPE™ IOBAN™ 2

## (undated) DEVICE — BNDG ELAS CO-FLEX SLF ADHR 4IN5YD LF STRL

## (undated) DEVICE — ANTIBACTERIAL UNDYED BRAIDED (POLYGLACTIN 910), SYNTHETIC ABSORBABLE SUTURE: Brand: COATED VICRYL

## (undated) DEVICE — SOL PVPI 10PCT 0.75OZ PEEL/PCH/PACKET 1P/U STRL

## (undated) DEVICE — DRVR AO CONNECT SQ TP 2MM

## (undated) DEVICE — GOWN,REINFORCE,POLY,SIRUS,BREATH SLV,XLG: Brand: MEDLINE

## (undated) DEVICE — GUIDE AIMING 1.5MM

## (undated) DEVICE — CVR BALL JNT HANA TBL

## (undated) DEVICE — SUT VIC 2/0 SH 27IN UD VCP417H

## (undated) DEVICE — UNDERCAST PADDING: Brand: DEROYAL

## (undated) DEVICE — PK EXTREM UPPR 10

## (undated) DEVICE — GLV SURG SENSICARE PI MIC PF SZ9 LF STRL

## (undated) DEVICE — DRP C/ARM 41X40IN

## (undated) DEVICE — BIT DRL TRIDENT2 TRITANIUM 3.3X40MM DISP

## (undated) DEVICE — SYRINGE,CONTROL,LL,FINGER,GRIP: Brand: MEDLINE INDUSTRIES, INC.

## (undated) DEVICE — PREMIUM DRY TRAY LF: Brand: MEDLINE INDUSTRIES, INC.

## (undated) DEVICE — Device

## (undated) DEVICE — SWABSTK SKINPREP PVP 2/8IN/SPNG STRL

## (undated) DEVICE — PAD GRND E/S MEGADYNE MONOPLR 2/PLT W/CORD A/ DISP

## (undated) DEVICE — SHEET,DRAPE,53X77,STERILE: Brand: MEDLINE

## (undated) DEVICE — MARKR SKIN W/RULR AND STOP 2TP

## (undated) DEVICE — BIT DRL SLD SD CUT 2.5X40MM

## (undated) DEVICE — SUT ETHLN 3/0 PC5 18IN 1893G

## (undated) DEVICE — GLV SURG PREMIERPRO MIC LTX PF SZ8 BRN

## (undated) DEVICE — MARKER,SKIN,W/RULER,DUAL,STOP: Brand: MEDLINE

## (undated) DEVICE — DISPOSABLE ORTHOPAEDIC PROTECTOR: Brand: ALEXIS ® ORTHOPAEDIC PROTECTOR

## (undated) DEVICE — STRYKER PERFORMANCE SERIES SAGITTAL BLADE: Brand: STRYKER PERFORMANCE SERIES